# Patient Record
Sex: MALE | Race: WHITE | NOT HISPANIC OR LATINO | URBAN - METROPOLITAN AREA
[De-identification: names, ages, dates, MRNs, and addresses within clinical notes are randomized per-mention and may not be internally consistent; named-entity substitution may affect disease eponyms.]

---

## 2017-05-15 ENCOUNTER — OUTPATIENT (OUTPATIENT)
Dept: OUTPATIENT SERVICES | Facility: HOSPITAL | Age: 57
LOS: 1 days | Discharge: HOME | End: 2017-05-15

## 2017-05-22 ENCOUNTER — OUTPATIENT (OUTPATIENT)
Dept: OUTPATIENT SERVICES | Facility: HOSPITAL | Age: 57
LOS: 1 days | Discharge: HOME | End: 2017-05-22

## 2017-06-05 ENCOUNTER — INPATIENT (INPATIENT)
Facility: HOSPITAL | Age: 57
LOS: 1 days | Discharge: HOME | End: 2017-06-07
Attending: INTERNAL MEDICINE | Admitting: INTERNAL MEDICINE

## 2017-06-05 DIAGNOSIS — E78.5 HYPERLIPIDEMIA, UNSPECIFIED: ICD-10-CM

## 2017-06-05 DIAGNOSIS — E11.9 TYPE 2 DIABETES MELLITUS WITHOUT COMPLICATIONS: ICD-10-CM

## 2017-06-05 DIAGNOSIS — N18.9 CHRONIC KIDNEY DISEASE, UNSPECIFIED: ICD-10-CM

## 2017-06-05 DIAGNOSIS — I10 ESSENTIAL (PRIMARY) HYPERTENSION: ICD-10-CM

## 2017-06-28 DIAGNOSIS — I12.0 HYPERTENSIVE CHRONIC KIDNEY DISEASE WITH STAGE 5 CHRONIC KIDNEY DISEASE OR END STAGE RENAL DISEASE: ICD-10-CM

## 2017-06-28 DIAGNOSIS — E78.5 HYPERLIPIDEMIA, UNSPECIFIED: ICD-10-CM

## 2017-06-28 DIAGNOSIS — F17.200 NICOTINE DEPENDENCE, UNSPECIFIED, UNCOMPLICATED: ICD-10-CM

## 2017-06-28 DIAGNOSIS — I69.951 HEMIPLEGIA AND HEMIPARESIS FOLLOWING UNSPECIFIED CEREBROVASCULAR DISEASE AFFECTING RIGHT DOMINANT SIDE: ICD-10-CM

## 2017-06-28 DIAGNOSIS — R53.1 WEAKNESS: ICD-10-CM

## 2017-06-28 DIAGNOSIS — D64.9 ANEMIA, UNSPECIFIED: ICD-10-CM

## 2017-06-28 DIAGNOSIS — N18.5 CHRONIC KIDNEY DISEASE, STAGE 5: ICD-10-CM

## 2017-06-28 DIAGNOSIS — I44.1 ATRIOVENTRICULAR BLOCK, SECOND DEGREE: ICD-10-CM

## 2017-06-28 DIAGNOSIS — Z79.4 LONG TERM (CURRENT) USE OF INSULIN: ICD-10-CM

## 2017-06-28 DIAGNOSIS — E87.6 HYPOKALEMIA: ICD-10-CM

## 2017-06-28 DIAGNOSIS — Z88.0 ALLERGY STATUS TO PENICILLIN: ICD-10-CM

## 2017-06-28 DIAGNOSIS — Z88.2 ALLERGY STATUS TO SULFONAMIDES: ICD-10-CM

## 2017-06-28 DIAGNOSIS — I16.0 HYPERTENSIVE URGENCY: ICD-10-CM

## 2017-06-28 DIAGNOSIS — Z91.018 ALLERGY TO OTHER FOODS: ICD-10-CM

## 2017-06-28 DIAGNOSIS — E11.21 TYPE 2 DIABETES MELLITUS WITH DIABETIC NEPHROPATHY: ICD-10-CM

## 2017-07-16 ENCOUNTER — INPATIENT (INPATIENT)
Facility: HOSPITAL | Age: 57
LOS: 3 days | Discharge: HOME | End: 2017-07-20
Attending: INTERNAL MEDICINE

## 2017-07-16 DIAGNOSIS — N18.9 CHRONIC KIDNEY DISEASE, UNSPECIFIED: ICD-10-CM

## 2017-07-16 DIAGNOSIS — E78.5 HYPERLIPIDEMIA, UNSPECIFIED: ICD-10-CM

## 2017-07-16 DIAGNOSIS — E11.9 TYPE 2 DIABETES MELLITUS WITHOUT COMPLICATIONS: ICD-10-CM

## 2017-07-16 DIAGNOSIS — I10 ESSENTIAL (PRIMARY) HYPERTENSION: ICD-10-CM

## 2017-07-25 DIAGNOSIS — Z88.0 ALLERGY STATUS TO PENICILLIN: ICD-10-CM

## 2017-07-25 DIAGNOSIS — Z91.018 ALLERGY TO OTHER FOODS: ICD-10-CM

## 2017-07-25 DIAGNOSIS — Z79.4 LONG TERM (CURRENT) USE OF INSULIN: ICD-10-CM

## 2017-07-25 DIAGNOSIS — N19 UNSPECIFIED KIDNEY FAILURE: ICD-10-CM

## 2017-07-25 DIAGNOSIS — Z86.73 PERSONAL HISTORY OF TRANSIENT ISCHEMIC ATTACK (TIA), AND CEREBRAL INFARCTION WITHOUT RESIDUAL DEFICITS: ICD-10-CM

## 2017-07-25 DIAGNOSIS — I50.9 HEART FAILURE, UNSPECIFIED: ICD-10-CM

## 2017-07-25 DIAGNOSIS — Z88.2 ALLERGY STATUS TO SULFONAMIDES: ICD-10-CM

## 2017-07-25 DIAGNOSIS — I13.2 HYPERTENSIVE HEART AND CHRONIC KIDNEY DISEASE WITH HEART FAILURE AND WITH STAGE 5 CHRONIC KIDNEY DISEASE, OR END STAGE RENAL DISEASE: ICD-10-CM

## 2017-07-25 DIAGNOSIS — F17.210 NICOTINE DEPENDENCE, CIGARETTES, UNCOMPLICATED: ICD-10-CM

## 2017-07-25 DIAGNOSIS — E78.5 HYPERLIPIDEMIA, UNSPECIFIED: ICD-10-CM

## 2017-07-25 DIAGNOSIS — E11.40 TYPE 2 DIABETES MELLITUS WITH DIABETIC NEUROPATHY, UNSPECIFIED: ICD-10-CM

## 2017-07-25 DIAGNOSIS — Z99.2 DEPENDENCE ON RENAL DIALYSIS: ICD-10-CM

## 2017-07-25 DIAGNOSIS — E11.22 TYPE 2 DIABETES MELLITUS WITH DIABETIC CHRONIC KIDNEY DISEASE: ICD-10-CM

## 2017-07-25 DIAGNOSIS — N17.9 ACUTE KIDNEY FAILURE, UNSPECIFIED: ICD-10-CM

## 2017-07-25 DIAGNOSIS — K21.9 GASTRO-ESOPHAGEAL REFLUX DISEASE WITHOUT ESOPHAGITIS: ICD-10-CM

## 2017-07-25 DIAGNOSIS — N18.6 END STAGE RENAL DISEASE: ICD-10-CM

## 2017-08-10 DIAGNOSIS — Z01.818 ENCOUNTER FOR OTHER PREPROCEDURAL EXAMINATION: ICD-10-CM

## 2017-08-10 DIAGNOSIS — I10 ESSENTIAL (PRIMARY) HYPERTENSION: ICD-10-CM

## 2017-08-10 DIAGNOSIS — E78.00 PURE HYPERCHOLESTEROLEMIA, UNSPECIFIED: ICD-10-CM

## 2017-08-10 DIAGNOSIS — E11.9 TYPE 2 DIABETES MELLITUS WITHOUT COMPLICATIONS: ICD-10-CM

## 2017-08-17 ENCOUNTER — OUTPATIENT (OUTPATIENT)
Dept: OUTPATIENT SERVICES | Facility: HOSPITAL | Age: 57
LOS: 1 days | Discharge: HOME | End: 2017-08-17

## 2017-08-17 DIAGNOSIS — E78.5 HYPERLIPIDEMIA, UNSPECIFIED: ICD-10-CM

## 2017-08-17 DIAGNOSIS — N18.6 END STAGE RENAL DISEASE: ICD-10-CM

## 2017-08-17 DIAGNOSIS — N18.9 CHRONIC KIDNEY DISEASE, UNSPECIFIED: ICD-10-CM

## 2017-08-17 DIAGNOSIS — E11.9 TYPE 2 DIABETES MELLITUS WITHOUT COMPLICATIONS: ICD-10-CM

## 2017-08-17 DIAGNOSIS — I10 ESSENTIAL (PRIMARY) HYPERTENSION: ICD-10-CM

## 2017-08-17 DIAGNOSIS — Z01.818 ENCOUNTER FOR OTHER PREPROCEDURAL EXAMINATION: ICD-10-CM

## 2017-08-23 DIAGNOSIS — Z01.818 ENCOUNTER FOR OTHER PREPROCEDURAL EXAMINATION: ICD-10-CM

## 2017-08-23 DIAGNOSIS — N18.6 END STAGE RENAL DISEASE: ICD-10-CM

## 2017-08-28 ENCOUNTER — OUTPATIENT (OUTPATIENT)
Dept: OUTPATIENT SERVICES | Facility: HOSPITAL | Age: 57
LOS: 1 days | Discharge: HOME | End: 2017-08-28

## 2017-08-28 DIAGNOSIS — E11.9 TYPE 2 DIABETES MELLITUS WITHOUT COMPLICATIONS: ICD-10-CM

## 2017-08-28 DIAGNOSIS — R06.02 SHORTNESS OF BREATH: ICD-10-CM

## 2017-08-28 DIAGNOSIS — N18.9 CHRONIC KIDNEY DISEASE, UNSPECIFIED: ICD-10-CM

## 2017-08-28 DIAGNOSIS — I10 ESSENTIAL (PRIMARY) HYPERTENSION: ICD-10-CM

## 2017-08-28 DIAGNOSIS — E78.5 HYPERLIPIDEMIA, UNSPECIFIED: ICD-10-CM

## 2017-08-29 ENCOUNTER — INPATIENT (INPATIENT)
Facility: HOSPITAL | Age: 57
LOS: 0 days | Discharge: HOME | End: 2017-08-29
Attending: SURGERY

## 2017-08-29 DIAGNOSIS — E78.5 HYPERLIPIDEMIA, UNSPECIFIED: ICD-10-CM

## 2017-08-29 DIAGNOSIS — N18.9 CHRONIC KIDNEY DISEASE, UNSPECIFIED: ICD-10-CM

## 2017-08-29 DIAGNOSIS — I10 ESSENTIAL (PRIMARY) HYPERTENSION: ICD-10-CM

## 2017-08-29 DIAGNOSIS — E11.9 TYPE 2 DIABETES MELLITUS WITHOUT COMPLICATIONS: ICD-10-CM

## 2017-09-01 DIAGNOSIS — Z88.0 ALLERGY STATUS TO PENICILLIN: ICD-10-CM

## 2017-09-01 DIAGNOSIS — E78.5 HYPERLIPIDEMIA, UNSPECIFIED: ICD-10-CM

## 2017-09-01 DIAGNOSIS — Z99.2 DEPENDENCE ON RENAL DIALYSIS: ICD-10-CM

## 2017-09-01 DIAGNOSIS — I12.0 HYPERTENSIVE CHRONIC KIDNEY DISEASE WITH STAGE 5 CHRONIC KIDNEY DISEASE OR END STAGE RENAL DISEASE: ICD-10-CM

## 2017-09-01 DIAGNOSIS — Z87.891 PERSONAL HISTORY OF NICOTINE DEPENDENCE: ICD-10-CM

## 2017-09-01 DIAGNOSIS — Z86.73 PERSONAL HISTORY OF TRANSIENT ISCHEMIC ATTACK (TIA), AND CEREBRAL INFARCTION WITHOUT RESIDUAL DEFICITS: ICD-10-CM

## 2017-09-01 DIAGNOSIS — N18.6 END STAGE RENAL DISEASE: ICD-10-CM

## 2017-09-01 DIAGNOSIS — Z79.4 LONG TERM (CURRENT) USE OF INSULIN: ICD-10-CM

## 2017-09-01 DIAGNOSIS — Z88.2 ALLERGY STATUS TO SULFONAMIDES: ICD-10-CM

## 2017-09-01 DIAGNOSIS — E11.22 TYPE 2 DIABETES MELLITUS WITH DIABETIC CHRONIC KIDNEY DISEASE: ICD-10-CM

## 2017-09-15 ENCOUNTER — OUTPATIENT (OUTPATIENT)
Dept: OUTPATIENT SERVICES | Facility: HOSPITAL | Age: 57
LOS: 1 days | Discharge: HOME | End: 2017-09-15

## 2017-09-15 DIAGNOSIS — E78.5 HYPERLIPIDEMIA, UNSPECIFIED: ICD-10-CM

## 2017-09-15 DIAGNOSIS — I10 ESSENTIAL (PRIMARY) HYPERTENSION: ICD-10-CM

## 2017-09-15 DIAGNOSIS — E11.9 TYPE 2 DIABETES MELLITUS WITHOUT COMPLICATIONS: ICD-10-CM

## 2017-09-15 DIAGNOSIS — N18.9 CHRONIC KIDNEY DISEASE, UNSPECIFIED: ICD-10-CM

## 2017-09-15 DIAGNOSIS — N18.6 END STAGE RENAL DISEASE: ICD-10-CM

## 2017-09-20 ENCOUNTER — OUTPATIENT (OUTPATIENT)
Dept: OUTPATIENT SERVICES | Facility: HOSPITAL | Age: 57
LOS: 1 days | Discharge: HOME | End: 2017-09-20

## 2017-09-20 DIAGNOSIS — I10 ESSENTIAL (PRIMARY) HYPERTENSION: ICD-10-CM

## 2017-09-20 DIAGNOSIS — N18.9 CHRONIC KIDNEY DISEASE, UNSPECIFIED: ICD-10-CM

## 2017-09-20 DIAGNOSIS — E11.9 TYPE 2 DIABETES MELLITUS WITHOUT COMPLICATIONS: ICD-10-CM

## 2017-09-20 DIAGNOSIS — N18.6 END STAGE RENAL DISEASE: ICD-10-CM

## 2017-09-20 DIAGNOSIS — E78.5 HYPERLIPIDEMIA, UNSPECIFIED: ICD-10-CM

## 2017-09-22 ENCOUNTER — OUTPATIENT (OUTPATIENT)
Dept: OUTPATIENT SERVICES | Facility: HOSPITAL | Age: 57
LOS: 1 days | Discharge: HOME | End: 2017-09-22

## 2017-09-22 DIAGNOSIS — I10 ESSENTIAL (PRIMARY) HYPERTENSION: ICD-10-CM

## 2017-09-22 DIAGNOSIS — E78.5 HYPERLIPIDEMIA, UNSPECIFIED: ICD-10-CM

## 2017-09-22 DIAGNOSIS — E11.9 TYPE 2 DIABETES MELLITUS WITHOUT COMPLICATIONS: ICD-10-CM

## 2017-09-22 DIAGNOSIS — N18.9 CHRONIC KIDNEY DISEASE, UNSPECIFIED: ICD-10-CM

## 2017-09-22 DIAGNOSIS — N18.6 END STAGE RENAL DISEASE: ICD-10-CM

## 2017-09-25 ENCOUNTER — OUTPATIENT (OUTPATIENT)
Dept: OUTPATIENT SERVICES | Facility: HOSPITAL | Age: 57
LOS: 1 days | Discharge: HOME | End: 2017-09-25

## 2017-09-25 DIAGNOSIS — E11.9 TYPE 2 DIABETES MELLITUS WITHOUT COMPLICATIONS: ICD-10-CM

## 2017-09-25 DIAGNOSIS — N18.9 CHRONIC KIDNEY DISEASE, UNSPECIFIED: ICD-10-CM

## 2017-09-25 DIAGNOSIS — N18.6 END STAGE RENAL DISEASE: ICD-10-CM

## 2017-09-25 DIAGNOSIS — E78.5 HYPERLIPIDEMIA, UNSPECIFIED: ICD-10-CM

## 2017-09-25 DIAGNOSIS — I10 ESSENTIAL (PRIMARY) HYPERTENSION: ICD-10-CM

## 2017-09-27 ENCOUNTER — OUTPATIENT (OUTPATIENT)
Dept: OUTPATIENT SERVICES | Facility: HOSPITAL | Age: 57
LOS: 1 days | Discharge: HOME | End: 2017-09-27

## 2017-09-27 DIAGNOSIS — N18.6 END STAGE RENAL DISEASE: ICD-10-CM

## 2017-09-27 DIAGNOSIS — I10 ESSENTIAL (PRIMARY) HYPERTENSION: ICD-10-CM

## 2017-09-27 DIAGNOSIS — E78.5 HYPERLIPIDEMIA, UNSPECIFIED: ICD-10-CM

## 2017-09-27 DIAGNOSIS — E11.9 TYPE 2 DIABETES MELLITUS WITHOUT COMPLICATIONS: ICD-10-CM

## 2017-09-27 DIAGNOSIS — N18.9 CHRONIC KIDNEY DISEASE, UNSPECIFIED: ICD-10-CM

## 2017-10-02 ENCOUNTER — OUTPATIENT (OUTPATIENT)
Dept: OUTPATIENT SERVICES | Facility: HOSPITAL | Age: 57
LOS: 1 days | Discharge: HOME | End: 2017-10-02

## 2017-10-02 DIAGNOSIS — N18.9 CHRONIC KIDNEY DISEASE, UNSPECIFIED: ICD-10-CM

## 2017-10-02 DIAGNOSIS — E78.5 HYPERLIPIDEMIA, UNSPECIFIED: ICD-10-CM

## 2017-10-02 DIAGNOSIS — E11.9 TYPE 2 DIABETES MELLITUS WITHOUT COMPLICATIONS: ICD-10-CM

## 2017-10-02 DIAGNOSIS — N18.6 END STAGE RENAL DISEASE: ICD-10-CM

## 2017-10-02 DIAGNOSIS — I10 ESSENTIAL (PRIMARY) HYPERTENSION: ICD-10-CM

## 2017-10-06 ENCOUNTER — OUTPATIENT (OUTPATIENT)
Dept: OUTPATIENT SERVICES | Facility: HOSPITAL | Age: 57
LOS: 1 days | Discharge: HOME | End: 2017-10-06

## 2017-10-06 DIAGNOSIS — E11.9 TYPE 2 DIABETES MELLITUS WITHOUT COMPLICATIONS: ICD-10-CM

## 2017-10-06 DIAGNOSIS — N18.9 CHRONIC KIDNEY DISEASE, UNSPECIFIED: ICD-10-CM

## 2017-10-06 DIAGNOSIS — I10 ESSENTIAL (PRIMARY) HYPERTENSION: ICD-10-CM

## 2017-10-06 DIAGNOSIS — E78.5 HYPERLIPIDEMIA, UNSPECIFIED: ICD-10-CM

## 2017-10-06 DIAGNOSIS — N18.6 END STAGE RENAL DISEASE: ICD-10-CM

## 2017-10-10 ENCOUNTER — OUTPATIENT (OUTPATIENT)
Dept: OUTPATIENT SERVICES | Facility: HOSPITAL | Age: 57
LOS: 1 days | Discharge: HOME | End: 2017-10-10

## 2017-10-10 DIAGNOSIS — N18.9 CHRONIC KIDNEY DISEASE, UNSPECIFIED: ICD-10-CM

## 2017-10-10 DIAGNOSIS — I10 ESSENTIAL (PRIMARY) HYPERTENSION: ICD-10-CM

## 2017-10-10 DIAGNOSIS — E11.9 TYPE 2 DIABETES MELLITUS WITHOUT COMPLICATIONS: ICD-10-CM

## 2017-10-10 DIAGNOSIS — E78.5 HYPERLIPIDEMIA, UNSPECIFIED: ICD-10-CM

## 2017-10-10 DIAGNOSIS — N18.6 END STAGE RENAL DISEASE: ICD-10-CM

## 2017-10-13 ENCOUNTER — OUTPATIENT (OUTPATIENT)
Dept: OUTPATIENT SERVICES | Facility: HOSPITAL | Age: 57
LOS: 1 days | Discharge: HOME | End: 2017-10-13

## 2017-10-13 DIAGNOSIS — E11.9 TYPE 2 DIABETES MELLITUS WITHOUT COMPLICATIONS: ICD-10-CM

## 2017-10-13 DIAGNOSIS — N18.6 END STAGE RENAL DISEASE: ICD-10-CM

## 2017-10-13 DIAGNOSIS — I10 ESSENTIAL (PRIMARY) HYPERTENSION: ICD-10-CM

## 2017-10-13 DIAGNOSIS — E78.5 HYPERLIPIDEMIA, UNSPECIFIED: ICD-10-CM

## 2017-10-13 DIAGNOSIS — N18.9 CHRONIC KIDNEY DISEASE, UNSPECIFIED: ICD-10-CM

## 2017-10-16 ENCOUNTER — OUTPATIENT (OUTPATIENT)
Dept: OUTPATIENT SERVICES | Facility: HOSPITAL | Age: 57
LOS: 1 days | Discharge: HOME | End: 2017-10-16

## 2017-10-16 DIAGNOSIS — N18.9 CHRONIC KIDNEY DISEASE, UNSPECIFIED: ICD-10-CM

## 2017-10-16 DIAGNOSIS — E11.9 TYPE 2 DIABETES MELLITUS WITHOUT COMPLICATIONS: ICD-10-CM

## 2017-10-16 DIAGNOSIS — N18.6 END STAGE RENAL DISEASE: ICD-10-CM

## 2017-10-16 DIAGNOSIS — E78.5 HYPERLIPIDEMIA, UNSPECIFIED: ICD-10-CM

## 2017-10-16 DIAGNOSIS — I10 ESSENTIAL (PRIMARY) HYPERTENSION: ICD-10-CM

## 2017-10-23 ENCOUNTER — OUTPATIENT (OUTPATIENT)
Dept: OUTPATIENT SERVICES | Facility: HOSPITAL | Age: 57
LOS: 1 days | Discharge: HOME | End: 2017-10-23

## 2017-10-23 DIAGNOSIS — I10 ESSENTIAL (PRIMARY) HYPERTENSION: ICD-10-CM

## 2017-10-23 DIAGNOSIS — N18.9 CHRONIC KIDNEY DISEASE, UNSPECIFIED: ICD-10-CM

## 2017-10-23 DIAGNOSIS — E78.5 HYPERLIPIDEMIA, UNSPECIFIED: ICD-10-CM

## 2017-10-23 DIAGNOSIS — E11.9 TYPE 2 DIABETES MELLITUS WITHOUT COMPLICATIONS: ICD-10-CM

## 2017-10-23 DIAGNOSIS — N18.6 END STAGE RENAL DISEASE: ICD-10-CM

## 2017-10-25 ENCOUNTER — OUTPATIENT (OUTPATIENT)
Dept: OUTPATIENT SERVICES | Facility: HOSPITAL | Age: 57
LOS: 1 days | Discharge: HOME | End: 2017-10-25

## 2017-10-25 DIAGNOSIS — E11.9 TYPE 2 DIABETES MELLITUS WITHOUT COMPLICATIONS: ICD-10-CM

## 2017-10-25 DIAGNOSIS — I10 ESSENTIAL (PRIMARY) HYPERTENSION: ICD-10-CM

## 2017-10-25 DIAGNOSIS — I25.10 ATHEROSCLEROTIC HEART DISEASE OF NATIVE CORONARY ARTERY WITHOUT ANGINA PECTORIS: ICD-10-CM

## 2017-10-25 DIAGNOSIS — Z01.818 ENCOUNTER FOR OTHER PREPROCEDURAL EXAMINATION: ICD-10-CM

## 2017-10-25 DIAGNOSIS — I49.9 CARDIAC ARRHYTHMIA, UNSPECIFIED: ICD-10-CM

## 2017-10-25 DIAGNOSIS — E78.00 PURE HYPERCHOLESTEROLEMIA, UNSPECIFIED: ICD-10-CM

## 2017-10-25 DIAGNOSIS — N18.9 CHRONIC KIDNEY DISEASE, UNSPECIFIED: ICD-10-CM

## 2017-10-25 DIAGNOSIS — E78.5 HYPERLIPIDEMIA, UNSPECIFIED: ICD-10-CM

## 2017-10-31 ENCOUNTER — OUTPATIENT (OUTPATIENT)
Dept: OUTPATIENT SERVICES | Facility: HOSPITAL | Age: 57
LOS: 1 days | Discharge: HOME | End: 2017-10-31

## 2017-10-31 DIAGNOSIS — I10 ESSENTIAL (PRIMARY) HYPERTENSION: ICD-10-CM

## 2017-10-31 DIAGNOSIS — E78.5 HYPERLIPIDEMIA, UNSPECIFIED: ICD-10-CM

## 2017-10-31 DIAGNOSIS — E11.9 TYPE 2 DIABETES MELLITUS WITHOUT COMPLICATIONS: ICD-10-CM

## 2017-10-31 DIAGNOSIS — N18.9 CHRONIC KIDNEY DISEASE, UNSPECIFIED: ICD-10-CM

## 2017-11-13 ENCOUNTER — OUTPATIENT (OUTPATIENT)
Dept: OUTPATIENT SERVICES | Facility: HOSPITAL | Age: 57
LOS: 1 days | Discharge: HOME | End: 2017-11-13

## 2017-11-13 DIAGNOSIS — N18.9 CHRONIC KIDNEY DISEASE, UNSPECIFIED: ICD-10-CM

## 2017-11-13 DIAGNOSIS — E11.9 TYPE 2 DIABETES MELLITUS WITHOUT COMPLICATIONS: ICD-10-CM

## 2017-11-13 DIAGNOSIS — N18.6 END STAGE RENAL DISEASE: ICD-10-CM

## 2017-11-13 DIAGNOSIS — I10 ESSENTIAL (PRIMARY) HYPERTENSION: ICD-10-CM

## 2017-11-13 DIAGNOSIS — E78.5 HYPERLIPIDEMIA, UNSPECIFIED: ICD-10-CM

## 2017-11-14 DIAGNOSIS — I48.92 UNSPECIFIED ATRIAL FLUTTER: ICD-10-CM

## 2017-11-14 DIAGNOSIS — T82.898A OTHER SPECIFIED COMPLICATION OF VASCULAR PROSTHETIC DEVICES, IMPLANTS AND GRAFTS, INITIAL ENCOUNTER: ICD-10-CM

## 2017-11-14 DIAGNOSIS — E78.00 PURE HYPERCHOLESTEROLEMIA, UNSPECIFIED: ICD-10-CM
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
## 2017-11-14 DIAGNOSIS — Z88.0 ALLERGY STATUS TO PENICILLIN: ICD-10-CM
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
## 2017-11-14 DIAGNOSIS — Z79.01 LONG TERM (CURRENT) USE OF ANTICOAGULANTS: ICD-10-CM

## 2017-11-14 DIAGNOSIS — I12.0 HYPERTENSIVE CHRONIC KIDNEY DISEASE WITH STAGE 5 CHRONIC KIDNEY DISEASE OR END STAGE RENAL DISEASE: ICD-10-CM

## 2017-11-14 DIAGNOSIS — N18.6 END STAGE RENAL DISEASE: ICD-10-CM

## 2017-11-14 DIAGNOSIS — Z99.2 DEPENDENCE ON RENAL DIALYSIS: ICD-10-CM

## 2017-11-14 DIAGNOSIS — E11.9 TYPE 2 DIABETES MELLITUS WITHOUT COMPLICATIONS: ICD-10-CM

## 2017-12-08 ENCOUNTER — OUTPATIENT (OUTPATIENT)
Dept: OUTPATIENT SERVICES | Facility: HOSPITAL | Age: 57
LOS: 1 days | Discharge: HOME | End: 2017-12-08

## 2017-12-08 DIAGNOSIS — Z00.00 ENCOUNTER FOR GENERAL ADULT MEDICAL EXAMINATION WITHOUT ABNORMAL FINDINGS: ICD-10-CM

## 2017-12-08 DIAGNOSIS — E78.5 HYPERLIPIDEMIA, UNSPECIFIED: ICD-10-CM

## 2017-12-08 DIAGNOSIS — N18.9 CHRONIC KIDNEY DISEASE, UNSPECIFIED: ICD-10-CM

## 2017-12-08 DIAGNOSIS — E11.9 TYPE 2 DIABETES MELLITUS WITHOUT COMPLICATIONS: ICD-10-CM

## 2017-12-08 DIAGNOSIS — E06.3 AUTOIMMUNE THYROIDITIS: ICD-10-CM

## 2017-12-08 DIAGNOSIS — E78.2 MIXED HYPERLIPIDEMIA: ICD-10-CM

## 2017-12-08 DIAGNOSIS — E11.65 TYPE 2 DIABETES MELLITUS WITH HYPERGLYCEMIA: ICD-10-CM

## 2017-12-08 DIAGNOSIS — E53.8 DEFICIENCY OF OTHER SPECIFIED B GROUP VITAMINS: ICD-10-CM

## 2017-12-08 DIAGNOSIS — E66.09 OTHER OBESITY DUE TO EXCESS CALORIES: ICD-10-CM

## 2017-12-08 DIAGNOSIS — I10 ESSENTIAL (PRIMARY) HYPERTENSION: ICD-10-CM

## 2017-12-08 DIAGNOSIS — N18.6 END STAGE RENAL DISEASE: ICD-10-CM

## 2017-12-08 DIAGNOSIS — E55.9 VITAMIN D DEFICIENCY, UNSPECIFIED: ICD-10-CM

## 2017-12-08 DIAGNOSIS — D53.9 NUTRITIONAL ANEMIA, UNSPECIFIED: ICD-10-CM

## 2017-12-11 ENCOUNTER — OUTPATIENT (OUTPATIENT)
Dept: OUTPATIENT SERVICES | Facility: HOSPITAL | Age: 57
LOS: 1 days | Discharge: HOME | End: 2017-12-11

## 2017-12-11 DIAGNOSIS — N18.6 END STAGE RENAL DISEASE: ICD-10-CM

## 2017-12-11 DIAGNOSIS — E11.9 TYPE 2 DIABETES MELLITUS WITHOUT COMPLICATIONS: ICD-10-CM

## 2017-12-11 DIAGNOSIS — I10 ESSENTIAL (PRIMARY) HYPERTENSION: ICD-10-CM

## 2017-12-11 DIAGNOSIS — E06.3 AUTOIMMUNE THYROIDITIS: ICD-10-CM

## 2017-12-11 DIAGNOSIS — Z00.00 ENCOUNTER FOR GENERAL ADULT MEDICAL EXAMINATION WITHOUT ABNORMAL FINDINGS: ICD-10-CM

## 2017-12-11 DIAGNOSIS — E66.09 OTHER OBESITY DUE TO EXCESS CALORIES: ICD-10-CM

## 2017-12-11 DIAGNOSIS — E78.2 MIXED HYPERLIPIDEMIA: ICD-10-CM

## 2017-12-11 DIAGNOSIS — E11.65 TYPE 2 DIABETES MELLITUS WITH HYPERGLYCEMIA: ICD-10-CM

## 2017-12-11 DIAGNOSIS — E55.9 VITAMIN D DEFICIENCY, UNSPECIFIED: ICD-10-CM

## 2017-12-11 DIAGNOSIS — N18.9 CHRONIC KIDNEY DISEASE, UNSPECIFIED: ICD-10-CM

## 2017-12-11 DIAGNOSIS — D53.9 NUTRITIONAL ANEMIA, UNSPECIFIED: ICD-10-CM

## 2017-12-11 DIAGNOSIS — E53.8 DEFICIENCY OF OTHER SPECIFIED B GROUP VITAMINS: ICD-10-CM

## 2017-12-11 DIAGNOSIS — E78.5 HYPERLIPIDEMIA, UNSPECIFIED: ICD-10-CM

## 2017-12-19 ENCOUNTER — OUTPATIENT (OUTPATIENT)
Dept: OUTPATIENT SERVICES | Facility: HOSPITAL | Age: 57
LOS: 1 days | Discharge: HOME | End: 2017-12-19

## 2017-12-19 DIAGNOSIS — E11.9 TYPE 2 DIABETES MELLITUS WITHOUT COMPLICATIONS: ICD-10-CM

## 2017-12-19 DIAGNOSIS — Z01.818 ENCOUNTER FOR OTHER PREPROCEDURAL EXAMINATION: ICD-10-CM

## 2017-12-19 DIAGNOSIS — N18.9 CHRONIC KIDNEY DISEASE, UNSPECIFIED: ICD-10-CM

## 2017-12-19 DIAGNOSIS — I10 ESSENTIAL (PRIMARY) HYPERTENSION: ICD-10-CM

## 2017-12-19 DIAGNOSIS — E78.5 HYPERLIPIDEMIA, UNSPECIFIED: ICD-10-CM

## 2017-12-20 DIAGNOSIS — I63.9 CEREBRAL INFARCTION, UNSPECIFIED: ICD-10-CM

## 2017-12-20 DIAGNOSIS — I49.9 CARDIAC ARRHYTHMIA, UNSPECIFIED: ICD-10-CM

## 2017-12-20 DIAGNOSIS — E11.9 TYPE 2 DIABETES MELLITUS WITHOUT COMPLICATIONS: ICD-10-CM

## 2017-12-20 DIAGNOSIS — I10 ESSENTIAL (PRIMARY) HYPERTENSION: ICD-10-CM

## 2017-12-26 ENCOUNTER — OUTPATIENT (OUTPATIENT)
Dept: OUTPATIENT SERVICES | Facility: HOSPITAL | Age: 57
LOS: 1 days | Discharge: HOME | End: 2017-12-26

## 2017-12-26 DIAGNOSIS — N18.9 CHRONIC KIDNEY DISEASE, UNSPECIFIED: ICD-10-CM

## 2017-12-26 DIAGNOSIS — E11.9 TYPE 2 DIABETES MELLITUS WITHOUT COMPLICATIONS: ICD-10-CM

## 2017-12-26 DIAGNOSIS — I10 ESSENTIAL (PRIMARY) HYPERTENSION: ICD-10-CM

## 2017-12-26 DIAGNOSIS — E78.5 HYPERLIPIDEMIA, UNSPECIFIED: ICD-10-CM

## 2017-12-29 DIAGNOSIS — Z79.4 LONG TERM (CURRENT) USE OF INSULIN: ICD-10-CM

## 2017-12-29 DIAGNOSIS — Z88.0 ALLERGY STATUS TO PENICILLIN: ICD-10-CM

## 2017-12-29 DIAGNOSIS — E11.9 TYPE 2 DIABETES MELLITUS WITHOUT COMPLICATIONS: ICD-10-CM

## 2017-12-29 DIAGNOSIS — N18.6 END STAGE RENAL DISEASE: ICD-10-CM

## 2017-12-29 DIAGNOSIS — Z99.2 DEPENDENCE ON RENAL DIALYSIS: ICD-10-CM

## 2017-12-29 DIAGNOSIS — Z45.2 ENCOUNTER FOR ADJUSTMENT AND MANAGEMENT OF VASCULAR ACCESS DEVICE: ICD-10-CM

## 2017-12-29 DIAGNOSIS — I12.0 HYPERTENSIVE CHRONIC KIDNEY DISEASE WITH STAGE 5 CHRONIC KIDNEY DISEASE OR END STAGE RENAL DISEASE: ICD-10-CM

## 2018-01-18 DIAGNOSIS — E78.00 PURE HYPERCHOLESTEROLEMIA, UNSPECIFIED: ICD-10-CM

## 2018-01-18 DIAGNOSIS — I12.9 HYPERTENSIVE CHRONIC KIDNEY DISEASE WITH STAGE 1 THROUGH STAGE 4 CHRONIC KIDNEY DISEASE, OR UNSPECIFIED CHRONIC KIDNEY DISEASE: ICD-10-CM

## 2018-01-18 DIAGNOSIS — R80.9 PROTEINURIA, UNSPECIFIED: ICD-10-CM

## 2018-01-18 DIAGNOSIS — Z88.0 ALLERGY STATUS TO PENICILLIN: ICD-10-CM

## 2018-01-18 DIAGNOSIS — E13.22 OTHER SPECIFIED DIABETES MELLITUS WITH DIABETIC CHRONIC KIDNEY DISEASE: ICD-10-CM

## 2018-01-18 DIAGNOSIS — N18.3 CHRONIC KIDNEY DISEASE, STAGE 3 (MODERATE): ICD-10-CM

## 2018-01-18 DIAGNOSIS — I10 ESSENTIAL (PRIMARY) HYPERTENSION: ICD-10-CM

## 2018-02-06 ENCOUNTER — OUTPATIENT (OUTPATIENT)
Dept: OUTPATIENT SERVICES | Facility: HOSPITAL | Age: 58
LOS: 1 days | Discharge: HOME | End: 2018-02-06

## 2018-02-09 DIAGNOSIS — N18.6 END STAGE RENAL DISEASE: ICD-10-CM

## 2018-02-14 ENCOUNTER — EMERGENCY (EMERGENCY)
Facility: HOSPITAL | Age: 58
LOS: 0 days | Discharge: HOME | End: 2018-02-14
Attending: EMERGENCY MEDICINE

## 2018-02-14 VITALS
HEIGHT: 70 IN | SYSTOLIC BLOOD PRESSURE: 157 MMHG | WEIGHT: 214.95 LBS | RESPIRATION RATE: 18 BRPM | DIASTOLIC BLOOD PRESSURE: 74 MMHG | TEMPERATURE: 97 F | HEART RATE: 74 BPM | OXYGEN SATURATION: 98 %

## 2018-02-14 DIAGNOSIS — E11.9 TYPE 2 DIABETES MELLITUS WITHOUT COMPLICATIONS: ICD-10-CM
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
## 2018-02-14 DIAGNOSIS — Z88.0 ALLERGY STATUS TO PENICILLIN: ICD-10-CM

## 2018-02-14 DIAGNOSIS — Z99.2 DEPENDENCE ON RENAL DIALYSIS: ICD-10-CM

## 2018-02-14 DIAGNOSIS — Z79.01 LONG TERM (CURRENT) USE OF ANTICOAGULANTS: ICD-10-CM

## 2018-02-14 DIAGNOSIS — T82.838A HEMORRHAGE DUE TO VASCULAR PROSTHETIC DEVICES, IMPLANTS AND GRAFTS, INITIAL ENCOUNTER: ICD-10-CM

## 2018-02-14 DIAGNOSIS — I10 ESSENTIAL (PRIMARY) HYPERTENSION: ICD-10-CM
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
## 2018-02-14 DIAGNOSIS — I77.0 ARTERIOVENOUS FISTULA, ACQUIRED: Chronic | ICD-10-CM

## 2018-02-14 DIAGNOSIS — Z79.4 LONG TERM (CURRENT) USE OF INSULIN: ICD-10-CM
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
## 2018-02-14 DIAGNOSIS — E78.00 PURE HYPERCHOLESTEROLEMIA, UNSPECIFIED: ICD-10-CM

## 2018-02-14 LAB
BASOPHILS # BLD AUTO: 0.09 K/UL — SIGNIFICANT CHANGE UP (ref 0–0.2)
BASOPHILS NFR BLD AUTO: 1.5 % — HIGH (ref 0–1)
EOSINOPHIL # BLD AUTO: 0.1 K/UL — SIGNIFICANT CHANGE UP (ref 0–0.7)
EOSINOPHIL NFR BLD AUTO: 1.6 % — SIGNIFICANT CHANGE UP (ref 0–8)
HCT VFR BLD CALC: 36.5 % — LOW (ref 42–52)
HGB BLD-MCNC: 11.8 G/DL — LOW (ref 14–18)
IMM GRANULOCYTES NFR BLD AUTO: 0.8 % — HIGH (ref 0.1–0.3)
INR BLD: 2.26 RATIO — HIGH (ref 0.65–1.3)
LYMPHOCYTES # BLD AUTO: 1.73 K/UL — SIGNIFICANT CHANGE UP (ref 1.2–3.4)
LYMPHOCYTES # BLD AUTO: 28.5 % — SIGNIFICANT CHANGE UP (ref 20.5–51.1)
MCHC RBC-ENTMCNC: 27.4 PG — SIGNIFICANT CHANGE UP (ref 27–31)
MCHC RBC-ENTMCNC: 32.3 G/DL — SIGNIFICANT CHANGE UP (ref 32–37)
MCV RBC AUTO: 84.7 FL — SIGNIFICANT CHANGE UP (ref 80–94)
MONOCYTES # BLD AUTO: 0.32 K/UL — SIGNIFICANT CHANGE UP (ref 0.1–0.6)
MONOCYTES NFR BLD AUTO: 5.3 % — SIGNIFICANT CHANGE UP (ref 1.7–9.3)
NEUTROPHILS # BLD AUTO: 3.78 K/UL — SIGNIFICANT CHANGE UP (ref 1.4–6.5)
NEUTROPHILS NFR BLD AUTO: 62.3 % — SIGNIFICANT CHANGE UP (ref 42.2–75.2)
NRBC # BLD: 0 /100 WBCS — SIGNIFICANT CHANGE UP (ref 0–0)
PLATELET # BLD AUTO: 263 K/UL — SIGNIFICANT CHANGE UP (ref 130–400)
PROTHROM AB SERPL-ACNC: 25.5 SEC — HIGH (ref 9.95–12.87)
RBC # BLD: 4.31 M/UL — LOW (ref 4.7–6.1)
RBC # FLD: 15.7 % — HIGH (ref 11.5–14.5)
WBC # BLD: 6.07 K/UL — SIGNIFICANT CHANGE UP (ref 4.8–10.8)
WBC # FLD AUTO: 6.07 K/UL — SIGNIFICANT CHANGE UP (ref 4.8–10.8)

## 2018-02-14 NOTE — ED PROVIDER NOTE - ATTENDING CONTRIBUTION TO CARE
Pt is a 56yo male who comes in for oozing blood from L fistula for 15 min after dialysis.  Pt is on Coumadin.  Unknown last INR.  No symptoms.  No lightheadedness.    Exam: pink conjunctivae, L fistula site normal without bleeding, cap refill <2s  Imp: post-dialysis bleeding  Plan: INR check, dc home

## 2018-02-14 NOTE — ED PROVIDER NOTE - CARE PLAN
Principal Discharge DX:	Bleeding from dialysis shunt, initial encounter  Secondary Diagnosis:	On continuous oral anticoagulation

## 2018-02-14 NOTE — ED ADULT NURSE NOTE - PMH
Diabetes mellitus, type 2    Dialysis patient  Mon, wednesday, friday  High blood cholesterol    Hypertension

## 2018-02-14 NOTE — ED PROVIDER NOTE - OBJECTIVE STATEMENT
Pt comes in stating that he had dialysis today and states that he began to bleed from the fistula. Pt is on Coumadin. Pt states that the bleeding has since stopped. Pt denies any weakness, CP, SOB.

## 2018-03-13 ENCOUNTER — INPATIENT (INPATIENT)
Facility: HOSPITAL | Age: 58
LOS: 2 days | Discharge: HOME | End: 2018-03-16
Attending: INTERNAL MEDICINE

## 2018-03-13 VITALS
DIASTOLIC BLOOD PRESSURE: 67 MMHG | TEMPERATURE: 97 F | SYSTOLIC BLOOD PRESSURE: 118 MMHG | HEART RATE: 108 BPM | OXYGEN SATURATION: 98 % | HEIGHT: 70 IN | WEIGHT: 216.05 LBS | RESPIRATION RATE: 18 BRPM

## 2018-03-13 DIAGNOSIS — I77.0 ARTERIOVENOUS FISTULA, ACQUIRED: Chronic | ICD-10-CM

## 2018-03-13 LAB
ALBUMIN SERPL ELPH-MCNC: 3.5 G/DL — SIGNIFICANT CHANGE UP (ref 3–5.5)
ALLERGY+IMMUNOLOGY DIAG STUDY NOTE: SIGNIFICANT CHANGE UP
ALP SERPL-CCNC: 68 U/L — SIGNIFICANT CHANGE UP (ref 30–115)
ALT FLD-CCNC: 698 U/L — HIGH (ref 0–41)
ANION GAP SERPL CALC-SCNC: 12 MMOL/L — SIGNIFICANT CHANGE UP (ref 7–14)
APTT BLD: 31.4 SEC — SIGNIFICANT CHANGE UP (ref 27–39.2)
AST SERPL-CCNC: 741 U/L — HIGH (ref 0–41)
BILIRUB SERPL-MCNC: 0.9 MG/DL — SIGNIFICANT CHANGE UP (ref 0.2–1.2)
BUN SERPL-MCNC: 39 MG/DL — HIGH (ref 10–20)
CALCIUM SERPL-MCNC: 9.2 MG/DL — SIGNIFICANT CHANGE UP (ref 8.5–10.1)
CHLORIDE SERPL-SCNC: 97 MMOL/L — LOW (ref 98–110)
CK MB BLD-MCNC: 1 % — SIGNIFICANT CHANGE UP (ref 0–4)
CK MB CFR SERPL CALC: 14.4 NG/ML — HIGH (ref 0.6–6.3)
CK MB CFR SERPL CALC: 16.5 NG/ML — HIGH (ref 0.6–6.3)
CK SERPL-CCNC: 1692 U/L — HIGH (ref 0–225)
CK SERPL-CCNC: 2207 U/L — HIGH (ref 0–225)
CO2 SERPL-SCNC: 26 MMOL/L — SIGNIFICANT CHANGE UP (ref 17–32)
CREAT SERPL-MCNC: 5.1 MG/DL — CRITICAL HIGH (ref 0.7–1.5)
GAS PNL BLDV: SIGNIFICANT CHANGE UP
GLUCOSE SERPL-MCNC: 134 MG/DL — HIGH (ref 70–110)
HCT VFR BLD CALC: 24.6 % — LOW (ref 42–52)
HCT VFR BLD CALC: 24.9 % — LOW (ref 42–52)
HGB BLD-MCNC: 7.7 G/DL — LOW (ref 14–18)
HGB BLD-MCNC: 7.8 G/DL — LOW (ref 14–18)
INR BLD: 2.09 RATIO — HIGH (ref 0.65–1.3)
MCHC RBC-ENTMCNC: 29.4 PG — SIGNIFICANT CHANGE UP (ref 27–31)
MCHC RBC-ENTMCNC: 30.2 PG — SIGNIFICANT CHANGE UP (ref 27–31)
MCHC RBC-ENTMCNC: 30.9 G/DL — LOW (ref 32–37)
MCHC RBC-ENTMCNC: 31.7 G/DL — LOW (ref 32–37)
MCV RBC AUTO: 95 FL — HIGH (ref 80–94)
MCV RBC AUTO: 95.3 FL — HIGH (ref 80–94)
NRBC # BLD: 0 /100 WBCS — SIGNIFICANT CHANGE UP (ref 0–0)
NRBC # BLD: 0 /100 WBCS — SIGNIFICANT CHANGE UP (ref 0–0)
PLATELET # BLD AUTO: 415 K/UL — HIGH (ref 130–400)
PLATELET # BLD AUTO: 435 K/UL — HIGH (ref 130–400)
POTASSIUM SERPL-MCNC: 4.5 MMOL/L — SIGNIFICANT CHANGE UP (ref 3.5–5)
POTASSIUM SERPL-SCNC: 4.5 MMOL/L — SIGNIFICANT CHANGE UP (ref 3.5–5)
PROT SERPL-MCNC: 7.3 G/DL — SIGNIFICANT CHANGE UP (ref 6–8)
PROTHROM AB SERPL-ACNC: 23.5 SEC — HIGH (ref 9.95–12.87)
RBC # BLD: 2.58 M/UL — LOW (ref 4.7–6.1)
RBC # BLD: 2.62 M/UL — LOW (ref 4.7–6.1)
RBC # FLD: 22.1 % — HIGH (ref 11.5–14.5)
RBC # FLD: 22.9 % — HIGH (ref 11.5–14.5)
SODIUM SERPL-SCNC: 135 MMOL/L — SIGNIFICANT CHANGE UP (ref 135–146)
TROPONIN I SERPL-MCNC: 0.63 NG/ML — HIGH (ref 0–0.05)
TROPONIN I SERPL-MCNC: 0.9 NG/ML — HIGH (ref 0–0.05)
TYPE + AB SCN PNL BLD: SIGNIFICANT CHANGE UP
WBC # BLD: 10.57 K/UL — SIGNIFICANT CHANGE UP (ref 4.8–10.8)
WBC # BLD: 10.7 K/UL — SIGNIFICANT CHANGE UP (ref 4.8–10.8)
WBC # FLD AUTO: 10.57 K/UL — SIGNIFICANT CHANGE UP (ref 4.8–10.8)
WBC # FLD AUTO: 10.7 K/UL — SIGNIFICANT CHANGE UP (ref 4.8–10.8)

## 2018-03-13 RX ORDER — SODIUM CHLORIDE 9 MG/ML
1000 INJECTION, SOLUTION INTRAVENOUS
Qty: 0 | Refills: 0 | Status: DISCONTINUED | OUTPATIENT
Start: 2018-03-13 | End: 2018-03-16

## 2018-03-13 RX ORDER — DEXTROSE 50 % IN WATER 50 %
1 SYRINGE (ML) INTRAVENOUS ONCE
Qty: 0 | Refills: 0 | Status: DISCONTINUED | OUTPATIENT
Start: 2018-03-13 | End: 2018-03-16

## 2018-03-13 RX ORDER — INSULIN LISPRO 100/ML
7 VIAL (ML) SUBCUTANEOUS
Qty: 0 | Refills: 0 | Status: DISCONTINUED | OUTPATIENT
Start: 2018-03-13 | End: 2018-03-16

## 2018-03-13 RX ORDER — METOPROLOL TARTRATE 50 MG
25 TABLET ORAL DAILY
Qty: 0 | Refills: 0 | Status: DISCONTINUED | OUTPATIENT
Start: 2018-03-13 | End: 2018-03-15

## 2018-03-13 RX ORDER — ACETAMINOPHEN 500 MG
650 TABLET ORAL EVERY 6 HOURS
Qty: 0 | Refills: 0 | Status: DISCONTINUED | OUTPATIENT
Start: 2018-03-13 | End: 2018-03-16

## 2018-03-13 RX ORDER — AMLODIPINE BESYLATE 2.5 MG/1
10 TABLET ORAL DAILY
Qty: 0 | Refills: 0 | Status: DISCONTINUED | OUTPATIENT
Start: 2018-03-13 | End: 2018-03-14

## 2018-03-13 RX ORDER — PANTOPRAZOLE SODIUM 20 MG/1
40 TABLET, DELAYED RELEASE ORAL
Qty: 0 | Refills: 0 | Status: DISCONTINUED | OUTPATIENT
Start: 2018-03-13 | End: 2018-03-14

## 2018-03-13 RX ORDER — INSULIN LISPRO 100/ML
VIAL (ML) SUBCUTANEOUS
Qty: 0 | Refills: 0 | Status: DISCONTINUED | OUTPATIENT
Start: 2018-03-13 | End: 2018-03-16

## 2018-03-13 RX ORDER — DIATRIZOATE MEGLUMINE 180 MG/ML
20 INJECTION, SOLUTION INTRAVESICAL ONCE
Qty: 0 | Refills: 0 | Status: COMPLETED | OUTPATIENT
Start: 2018-03-13 | End: 2018-03-13

## 2018-03-13 RX ORDER — INSULIN GLARGINE 100 [IU]/ML
21 INJECTION, SOLUTION SUBCUTANEOUS AT BEDTIME
Qty: 0 | Refills: 0 | Status: DISCONTINUED | OUTPATIENT
Start: 2018-03-13 | End: 2018-03-16

## 2018-03-13 RX ORDER — GLUCAGON INJECTION, SOLUTION 0.5 MG/.1ML
1 INJECTION, SOLUTION SUBCUTANEOUS ONCE
Qty: 0 | Refills: 0 | Status: DISCONTINUED | OUTPATIENT
Start: 2018-03-13 | End: 2018-03-16

## 2018-03-13 RX ORDER — FUROSEMIDE 40 MG
40 TABLET ORAL DAILY
Qty: 0 | Refills: 0 | Status: DISCONTINUED | OUTPATIENT
Start: 2018-03-13 | End: 2018-03-14

## 2018-03-13 RX ORDER — DEXTROSE 50 % IN WATER 50 %
25 SYRINGE (ML) INTRAVENOUS ONCE
Qty: 0 | Refills: 0 | Status: DISCONTINUED | OUTPATIENT
Start: 2018-03-13 | End: 2018-03-16

## 2018-03-13 RX ORDER — HYDROXYZINE HCL 10 MG
25 TABLET ORAL
Qty: 0 | Refills: 0 | Status: DISCONTINUED | OUTPATIENT
Start: 2018-03-13 | End: 2018-03-16

## 2018-03-13 RX ORDER — ATORVASTATIN CALCIUM 80 MG/1
20 TABLET, FILM COATED ORAL AT BEDTIME
Qty: 0 | Refills: 0 | Status: DISCONTINUED | OUTPATIENT
Start: 2018-03-13 | End: 2018-03-14

## 2018-03-13 RX ORDER — LOSARTAN POTASSIUM 100 MG/1
100 TABLET, FILM COATED ORAL DAILY
Qty: 0 | Refills: 0 | Status: DISCONTINUED | OUTPATIENT
Start: 2018-03-13 | End: 2018-03-14

## 2018-03-13 RX ORDER — ASPIRIN/CALCIUM CARB/MAGNESIUM 324 MG
325 TABLET ORAL ONCE
Qty: 0 | Refills: 0 | Status: DISCONTINUED | OUTPATIENT
Start: 2018-03-13 | End: 2018-03-13

## 2018-03-13 RX ORDER — SEVELAMER CARBONATE 2400 MG/1
800 POWDER, FOR SUSPENSION ORAL THREE TIMES A DAY
Qty: 0 | Refills: 0 | Status: DISCONTINUED | OUTPATIENT
Start: 2018-03-13 | End: 2018-03-14

## 2018-03-13 RX ORDER — GABAPENTIN 400 MG/1
100 CAPSULE ORAL
Qty: 0 | Refills: 0 | Status: DISCONTINUED | OUTPATIENT
Start: 2018-03-13 | End: 2018-03-16

## 2018-03-13 RX ORDER — DEXTROSE 50 % IN WATER 50 %
12.5 SYRINGE (ML) INTRAVENOUS ONCE
Qty: 0 | Refills: 0 | Status: DISCONTINUED | OUTPATIENT
Start: 2018-03-13 | End: 2018-03-16

## 2018-03-13 RX ADMIN — LOSARTAN POTASSIUM 100 MILLIGRAM(S): 100 TABLET, FILM COATED ORAL at 17:42

## 2018-03-13 RX ADMIN — Medication 25 MILLIGRAM(S): at 17:42

## 2018-03-13 RX ADMIN — PANTOPRAZOLE SODIUM 40 MILLIGRAM(S): 20 TABLET, DELAYED RELEASE ORAL at 17:42

## 2018-03-13 RX ADMIN — GABAPENTIN 100 MILLIGRAM(S): 400 CAPSULE ORAL at 17:36

## 2018-03-13 RX ADMIN — DIATRIZOATE MEGLUMINE 20 MILLILITER(S): 180 INJECTION, SOLUTION INTRAVESICAL at 21:59

## 2018-03-13 RX ADMIN — INSULIN GLARGINE 21 UNIT(S): 100 INJECTION, SOLUTION SUBCUTANEOUS at 23:00

## 2018-03-13 RX ADMIN — Medication 40 MILLIGRAM(S): at 17:37

## 2018-03-13 RX ADMIN — AMLODIPINE BESYLATE 10 MILLIGRAM(S): 2.5 TABLET ORAL at 17:35

## 2018-03-13 RX ADMIN — Medication 7 UNIT(S): at 17:38

## 2018-03-13 NOTE — ED PROVIDER NOTE - PROGRESS NOTE DETAILS
upon ekg results cards called and Dr. Avelar reviewing ekg and will make recommendation. D/w Dr. Avelar and Dr. Vaughan and stemi code canceled.  Dr. Squires (pts primary cardiologist) paged. D/w Dr. Sanchez (renal) who knows pt well and agrees with transfusion of 1 unit prbc.  Can follow with lasix as needed. D/w Dr. Avelar and Dr. Vaughan and stemi code canceled.  Dr. Squires (pts primary cardiologist) paged. [facility] NEGATIVE Dr. Alicia recommended admission to CCU. Dr. Roque aware of the patient coming up. Dr. Alicia recommended admission to CCU. Dr. Roque aware of the patient coming up.  Cards felt no asa at present.   CP likely secondary to low hbg.  Pt with tachycardia in the ED which might be secondary to anemia or volume depletion.  Adm team will reassess after transfusion.

## 2018-03-13 NOTE — ED PROVIDER NOTE - ATTENDING CONTRIBUTION TO CARE
Pt with continued symptoms in the ED despite treatment.  Pt needed to be admitted for continued treatment.

## 2018-03-13 NOTE — ED PROVIDER NOTE - CARE PLAN
Principal Discharge DX:	Elevated troponin  Secondary Diagnosis:	Generalized weakness Principal Discharge DX:	Elevated troponin  Secondary Diagnosis:	Generalized weakness  Secondary Diagnosis:	Dialysis patient

## 2018-03-13 NOTE — ED ADULT NURSE NOTE - PMH
Diabetes mellitus, type 2    Dialysis patient  Mon, wednesday, friday  High blood cholesterol    Hypertension Atrial fibrillation    Diabetes mellitus, type 2    Dialysis patient  Mon, wednesday, friday  High blood cholesterol    Hypertension    Right sided weakness    Stroke

## 2018-03-13 NOTE — H&P ADULT - NSHPLABSRESULTS_GEN_ALL_CORE
7.7    10.70 )-----------( 435      ( 13 Mar 2018 12:40 )             24.9       03-13    135  |  97<L>  |  39<H>  ----------------------------<  134<H>  4.5   |  26  |  5.1<HH>    Ca    9.2      13 Mar 2018 12:40    TPro  7.3  /  Alb  3.5  /  TBili  0.9  /  DBili  x   /  AST  741<H>  /  ALT  698<H>  /  AlkPhos  68  03-13        PT/INR - ( 13 Mar 2018 14:13 )   PT: 23.50 sec;   INR: 2.09 ratio      PTT - ( 13 Mar 2018 14:13 )  PTT:31.4 sec    CARDIAC MARKERS ( 13 Mar 2018 12:40 )  0.90 ng/mL / x     / 2207 U/L / x     / 16.5 ng/mL

## 2018-03-13 NOTE — H&P ADULT - ASSESSMENT
58 yo M w/ h/o CVA, ESRD on HD MWF, HTN, DM, Atrial fibrillation on coumadin presents with weakness    1. Weakness 2/2 Symptomatic Anemia - guaiac negative  -Admit to ICU  -Trend CBC, CT abdomen/pelvis r/o retroperitoneal bleed, s/p 1u pRBC  - send iron studies in the morning, f/u w/ renal, hold coumadin for now, trend pt/ptt    2. Troponemia - r/o acs, trend CE, most likely type 2 from anemia, 2d echo, am ekg  3. HTN - c/w meds  4. ESRD on HD - renal c/s dr. wong,   5. DM - insulin, check fs  6. GI/DVT PPx

## 2018-03-13 NOTE — ED ADULT NURSE REASSESSMENT NOTE - NS ED NURSE REASSESS COMMENT FT1
1 U PRBC infusing as ordered, vital signs stable, awaiting bed at this time in CCU. Family at bedside, will continue to closely monitor.

## 2018-03-13 NOTE — ED PROVIDER NOTE - ENMT, MLM
Initiate Treatment: Ciclopirox twice daily 2-3 times per week Detail Level: Simple Airway patent, Nasal mucosa clear. Mouth with normal mucosa. Throat has no vesicles, no oropharyngeal exudates and uvula is midline.

## 2018-03-13 NOTE — ED PROVIDER NOTE - OBJECTIVE STATEMENT
58 y/o M with pmh of ESRD MWF, a fib on coumadin, HTN presents with generalized weakness since yesterday. Patient had one episode of midsternal CP yesterday. Denies CP right now. States CP lasted ~15 min at the most. Denies any n/v. No abd pain.

## 2018-03-13 NOTE — H&P ADULT - NSHPPHYSICALEXAM_GEN_ALL_CORE
PHYSICAL EXAM:  GENERAL: NAD, well-developed  HEAD:  Atraumatic, Normocephalic  EYES: EOMI, PERRLA, conjunctiva and sclera clear  NECK: Supple, No JVD  CHEST/LUNG: Clear to auscultation bilaterally; No wheeze  HEART: Regular rate and rhythm; No murmurs, rubs, or gallops  ABDOMEN: Soft, Nontender, Nondistended; Bowel sounds present  EXTREMITIES:  L AVF w/ thrill/bruit  PSYCH: AAOx3  NEUROLOGY: non-focal  SKIN: No rashes or lesions

## 2018-03-13 NOTE — PATIENT PROFILE ADULT. - PMH
Atrial fibrillation    Diabetes mellitus, type 2    Dialysis patient  Mon, wednesday, friday  High blood cholesterol    Hypertension    Right sided weakness    Stroke

## 2018-03-14 LAB
ALBUMIN SERPL ELPH-MCNC: 3.1 G/DL — SIGNIFICANT CHANGE UP (ref 3–5.5)
ALP SERPL-CCNC: 62 U/L — SIGNIFICANT CHANGE UP (ref 30–115)
ALT FLD-CCNC: 616 U/L — HIGH (ref 0–41)
ANION GAP SERPL CALC-SCNC: 12 MMOL/L — SIGNIFICANT CHANGE UP (ref 7–14)
APTT BLD: 31.4 SEC — SIGNIFICANT CHANGE UP (ref 27–39.2)
AST SERPL-CCNC: 502 U/L — HIGH (ref 0–41)
BASOPHILS # BLD AUTO: 0.13 K/UL — SIGNIFICANT CHANGE UP (ref 0–0.2)
BASOPHILS NFR BLD AUTO: 1.5 % — HIGH (ref 0–1)
BILIRUB SERPL-MCNC: 0.9 MG/DL — SIGNIFICANT CHANGE UP (ref 0.2–1.2)
BUN SERPL-MCNC: 45 MG/DL — HIGH (ref 10–20)
CALCIUM SERPL-MCNC: 8.9 MG/DL — SIGNIFICANT CHANGE UP (ref 8.5–10.1)
CHLORIDE SERPL-SCNC: 97 MMOL/L — LOW (ref 98–110)
CHOLEST SERPL-MCNC: 119 MG/DL — SIGNIFICANT CHANGE UP (ref 100–200)
CK MB BLD-MCNC: 1 % — SIGNIFICANT CHANGE UP (ref 0–4)
CK MB CFR SERPL CALC: 11.6 NG/ML — HIGH (ref 0.6–6.3)
CK SERPL-CCNC: 1197 U/L — HIGH (ref 0–225)
CO2 SERPL-SCNC: 25 MMOL/L — SIGNIFICANT CHANGE UP (ref 17–32)
CREAT SERPL-MCNC: 6.1 MG/DL — CRITICAL HIGH (ref 0.7–1.5)
EOSINOPHIL # BLD AUTO: 0.14 K/UL — SIGNIFICANT CHANGE UP (ref 0–0.7)
EOSINOPHIL NFR BLD AUTO: 1.7 % — SIGNIFICANT CHANGE UP (ref 0–8)
ESTIMATED AVERAGE GLUCOSE: 128 MG/DL — HIGH (ref 68–114)
GLUCOSE SERPL-MCNC: 152 MG/DL — HIGH (ref 70–110)
HBA1C BLD-MCNC: 6.1 % — HIGH (ref 4–5.6)
HCT VFR BLD CALC: 24.8 % — LOW (ref 42–52)
HCT VFR BLD CALC: 26.1 % — LOW (ref 42–52)
HDLC SERPL-MCNC: 35 MG/DL — LOW (ref 40–60)
HGB BLD-MCNC: 7.8 G/DL — LOW (ref 14–18)
HGB BLD-MCNC: 8.4 G/DL — LOW (ref 14–18)
IMM GRANULOCYTES NFR BLD AUTO: 0.6 % — HIGH (ref 0.1–0.3)
INR BLD: 1.96 RATIO — HIGH (ref 0.65–1.3)
LIPID PNL WITH DIRECT LDL SERPL: 68 MG/DL — SIGNIFICANT CHANGE UP (ref 50–100)
LYMPHOCYTES # BLD AUTO: 1.73 K/UL — SIGNIFICANT CHANGE UP (ref 1.2–3.4)
LYMPHOCYTES # BLD AUTO: 20.6 % — SIGNIFICANT CHANGE UP (ref 20.5–51.1)
MAGNESIUM SERPL-MCNC: 2.4 MG/DL — SIGNIFICANT CHANGE UP (ref 1.8–2.4)
MCHC RBC-ENTMCNC: 30.1 PG — SIGNIFICANT CHANGE UP (ref 27–31)
MCHC RBC-ENTMCNC: 30.5 PG — SIGNIFICANT CHANGE UP (ref 27–31)
MCHC RBC-ENTMCNC: 31.5 G/DL — LOW (ref 32–37)
MCHC RBC-ENTMCNC: 32.2 G/DL — SIGNIFICANT CHANGE UP (ref 32–37)
MCV RBC AUTO: 93.5 FL — SIGNIFICANT CHANGE UP (ref 80–94)
MCV RBC AUTO: 96.9 FL — HIGH (ref 80–94)
MONOCYTES # BLD AUTO: 0.67 K/UL — HIGH (ref 0.1–0.6)
MONOCYTES NFR BLD AUTO: 8 % — SIGNIFICANT CHANGE UP (ref 1.7–9.3)
NEUTROPHILS # BLD AUTO: 5.67 K/UL — SIGNIFICANT CHANGE UP (ref 1.4–6.5)
NEUTROPHILS NFR BLD AUTO: 67.6 % — SIGNIFICANT CHANGE UP (ref 42.2–75.2)
NRBC # BLD: 0 /100 WBCS — SIGNIFICANT CHANGE UP (ref 0–0)
PHOSPHATE SERPL-MCNC: 5.5 MG/DL — HIGH (ref 2.1–4.9)
PLATELET # BLD AUTO: 280 K/UL — SIGNIFICANT CHANGE UP (ref 130–400)
PLATELET # BLD AUTO: 392 K/UL — SIGNIFICANT CHANGE UP (ref 130–400)
POTASSIUM SERPL-MCNC: 4.5 MMOL/L — SIGNIFICANT CHANGE UP (ref 3.5–5)
POTASSIUM SERPL-SCNC: 4.5 MMOL/L — SIGNIFICANT CHANGE UP (ref 3.5–5)
PROT SERPL-MCNC: 6.2 G/DL — SIGNIFICANT CHANGE UP (ref 6–8)
PROTHROM AB SERPL-ACNC: 21.9 SEC — HIGH (ref 9.95–12.87)
RBC # BLD: 2.56 M/UL — LOW (ref 4.7–6.1)
RBC # BLD: 2.79 M/UL — LOW (ref 4.7–6.1)
RBC # FLD: 21.5 % — HIGH (ref 11.5–14.5)
RBC # FLD: 22 % — HIGH (ref 11.5–14.5)
SODIUM SERPL-SCNC: 134 MMOL/L — LOW (ref 135–146)
TOTAL CHOLESTEROL/HDL RATIO MEASUREMENT: 3.4 RATIO — LOW (ref 4–5.5)
TRIGL SERPL-MCNC: 96 MG/DL — SIGNIFICANT CHANGE UP (ref 40–150)
TROPONIN I SERPL-MCNC: 0.62 NG/ML — HIGH (ref 0–0.05)
TSH SERPL-MCNC: 2.19 UIU/ML — SIGNIFICANT CHANGE UP (ref 0.27–4.2)
WBC # BLD: 8.39 K/UL — SIGNIFICANT CHANGE UP (ref 4.8–10.8)
WBC # BLD: 9.66 K/UL — SIGNIFICANT CHANGE UP (ref 4.8–10.8)
WBC # FLD AUTO: 8.39 K/UL — SIGNIFICANT CHANGE UP (ref 4.8–10.8)
WBC # FLD AUTO: 9.66 K/UL — SIGNIFICANT CHANGE UP (ref 4.8–10.8)

## 2018-03-14 RX ORDER — FUROSEMIDE 40 MG
80 TABLET ORAL
Qty: 0 | Refills: 0 | Status: DISCONTINUED | OUTPATIENT
Start: 2018-03-14 | End: 2018-03-16

## 2018-03-14 RX ORDER — PANTOPRAZOLE SODIUM 20 MG/1
40 TABLET, DELAYED RELEASE ORAL EVERY 12 HOURS
Qty: 0 | Refills: 0 | Status: DISCONTINUED | OUTPATIENT
Start: 2018-03-14 | End: 2018-03-16

## 2018-03-14 RX ORDER — PANTOPRAZOLE SODIUM 20 MG/1
40 TABLET, DELAYED RELEASE ORAL
Qty: 0 | Refills: 0 | Status: DISCONTINUED | OUTPATIENT
Start: 2018-03-14 | End: 2018-03-14

## 2018-03-14 RX ORDER — SEVELAMER CARBONATE 2400 MG/1
800 POWDER, FOR SUSPENSION ORAL THREE TIMES A DAY
Qty: 0 | Refills: 0 | Status: DISCONTINUED | OUTPATIENT
Start: 2018-03-14 | End: 2018-03-16

## 2018-03-14 RX ORDER — ERYTHROPOIETIN 10000 [IU]/ML
10000 INJECTION, SOLUTION INTRAVENOUS; SUBCUTANEOUS
Qty: 0 | Refills: 0 | Status: DISCONTINUED | OUTPATIENT
Start: 2018-03-14 | End: 2018-03-16

## 2018-03-14 RX ADMIN — AMLODIPINE BESYLATE 10 MILLIGRAM(S): 2.5 TABLET ORAL at 06:30

## 2018-03-14 RX ADMIN — Medication 25 MILLIGRAM(S): at 06:31

## 2018-03-14 RX ADMIN — Medication 7 UNIT(S): at 07:48

## 2018-03-14 RX ADMIN — Medication 1: at 11:54

## 2018-03-14 RX ADMIN — PANTOPRAZOLE SODIUM 40 MILLIGRAM(S): 20 TABLET, DELAYED RELEASE ORAL at 07:54

## 2018-03-14 RX ADMIN — Medication 7 UNIT(S): at 11:53

## 2018-03-14 RX ADMIN — GABAPENTIN 100 MILLIGRAM(S): 400 CAPSULE ORAL at 17:34

## 2018-03-14 RX ADMIN — Medication 2: at 16:39

## 2018-03-14 RX ADMIN — Medication 40 MILLIGRAM(S): at 06:31

## 2018-03-14 RX ADMIN — Medication 7 UNIT(S): at 16:39

## 2018-03-14 RX ADMIN — LOSARTAN POTASSIUM 100 MILLIGRAM(S): 100 TABLET, FILM COATED ORAL at 06:31

## 2018-03-14 RX ADMIN — INSULIN GLARGINE 21 UNIT(S): 100 INJECTION, SOLUTION SUBCUTANEOUS at 22:03

## 2018-03-14 RX ADMIN — Medication 80 MILLIGRAM(S): at 17:33

## 2018-03-14 RX ADMIN — ERYTHROPOIETIN 10000 UNIT(S): 10000 INJECTION, SOLUTION INTRAVENOUS; SUBCUTANEOUS at 09:39

## 2018-03-14 RX ADMIN — SEVELAMER CARBONATE 800 MILLIGRAM(S): 2400 POWDER, FOR SUSPENSION ORAL at 06:30

## 2018-03-14 RX ADMIN — SEVELAMER CARBONATE 800 MILLIGRAM(S): 2400 POWDER, FOR SUSPENSION ORAL at 13:08

## 2018-03-14 RX ADMIN — GABAPENTIN 100 MILLIGRAM(S): 400 CAPSULE ORAL at 06:31

## 2018-03-14 RX ADMIN — PANTOPRAZOLE SODIUM 40 MILLIGRAM(S): 20 TABLET, DELAYED RELEASE ORAL at 17:32

## 2018-03-14 NOTE — PROGRESS NOTE ADULT - ASSESSMENT
58 yo M w/ h/o CVA, ESRD on HD MWF, HTN, DM, Atrial fibrillation on coumadin presents with weakness    1. Weakness 2/2 Symptomatic Anemia - guaiac negative  -Admit to ICU  -Trend CBC, CT abdomen/pelvis r/o retroperitoneal bleed, s/p 1u pRBC  - send iron studies in the morning, f/u w/ renal, hold coumadin for now, trend pt/ptt    2. Troponemia - r/o acs, trend CE, most likely type 2 from anemia, 2d echo, am ekg  3. HTN - c/w meds  4. ESRD on HD - renal c/s dr. wong,   5. DM - insulin, check fs  6. GI/DVT PPx 56 yo M w/ h/o CVA, ESRD on HD MWF, HTN, DM, Atrial fibrillation on coumadin presents with weakness    1. Weakness 2/2 Symptomatic Anemia - guaiac negative  -Admit to ICU  -Trend CBC, CT abdomen/pelvis r/o retroperitoneal bleed was negative, s/p 2u pRBC  - send iron studies in the morning, f/u w/ renal, hold coumadin for now, trend pt/ptt  -GI consult, RUQ ultrasound, sequentials    2. Troponemia - r/o acs, trend CE, most likely type 2 from anemia, 2d echo, am ekg  3. HTN - c/w meds  4. ESRD on HD - renal c/s dr. wong, c/w HD  5. DM - insulin, check fs  6. GI/DVT PPx

## 2018-03-14 NOTE — CONSULT NOTE ADULT - ASSESSMENT
IMPRESSION: Acute Blood Loss Anemia, NSTEMI, Transaminitis      PLAN:    CNS: No sedatives     HEENT: HOB at 45    PULMONARY: Keep SpO2>92%    CARDIOVASCULAR: Keep Input less than output, cardiology follow up, Serial CBC, Keep Hg >8     GI: GI prophylaxis.  NPO for now, Follow up with GI, Protonix 40mg Q12 IV for now, RUQ US     RENAL: Follow up with renal for HD    INFECTIOUS DISEASE: no Abx    HEMATOLOGICAL:  DVT prophylaxis.    ENDOCRINE:  Follow up FS.  Insulin protocol if needed. IMPRESSION: Acute Blood Loss Anemia, NSTEMI, Transaminitis, s/p 1 unit of PRBCs      PLAN:    CNS: No sedatives     HEENT: HOB at 45    PULMONARY: Keep SpO2>92%    CARDIOVASCULAR: Keep Input less than output, cardiology follow up, Serial CBC, Keep Hg >8. D/c lipitor, I unit of PRBCS    GI: GI prophylaxis.  Oral diet, Follow up with GI, Protonix 40mg Q12 PO for now, RUQ US     RENAL: Follow up with renal for HD    INFECTIOUS DISEASE: no Abx    HEMATOLOGICAL: Sequentials, f/u INR, PTT, PT    ENDOCRINE:  Follow up FS.  Insulin protocol if needed.

## 2018-03-14 NOTE — CONSULT NOTE ADULT - ASSESSMENT
RENAL FAILURE ON HD  A FIB  ANEMIA GUAIAC NEG LESS LIKELY GI BLOOD LOSS  INCREASED AST/ALT POSSIBLY FROM HYPOTENTION    PLAN          PPI          REPEAT CBC IF DEC THEN CONSIDER EGD AND PLACE PATIENT NPO          IF STABLE THEN RESTART COUMADIN AND CONTINUE PPI

## 2018-03-14 NOTE — CONSULT NOTE ADULT - SUBJECTIVE AND OBJECTIVE BOX
Patient is a 57y old  Male who presents with a chief complaint of Weakness (13 Mar 2018 16:22)      HPI:  56 yo M w/ h/o CVA, ESRD on HD MWF, HTN, DM, Atrial fibrillation on coumadin presents with above cc.  Pt. is poor historian,  Pt. has been feeling weak for some time, unable to tell when symptoms have exactly started but has been becoming progressively weak, yesterday he had difficulty ambulating and asked his mom to bring his walker.  He then came to the ED to get checked out.  Pt. reports dark than usual colored stools (guaiac negative in ED), pt last colonoscopy about 2-3 years ago unsure who performed it or the results.  Pt. denies fever, chills, cough, hemoptysis, hematemesis, CP, SOB, LOC, falls, nausea, vomiting, diarrhea.  Pt. does not recall his medications but gave a list to the ED. (13 Mar 2018 16:22)      PAST MEDICAL & SURGICAL HISTORY:  Atrial fibrillation  Right sided weakness  Stroke  Hypertension  High blood cholesterol  Diabetes mellitus, type 2  Dialysis patient: Mon, wednesday, friday  A-V fistula    Allergies    Orange (Other)  Originally Entered as [HIVES] reaction to [sulfur] (Unknown)  penicillin (Unknown)  sulfADIAZINE (Unknown)    Intolerances      FAMILY HISTORY:  No pertinent family history in first degree relatives    Home Medications:  amLODIPine 10 mg oral tablet: 1 tab(s) orally once a day (13 Mar 2018 12:01)  atorvastatin 20 mg oral tablet: 1 tab(s) orally once a day (13 Mar 2018 12:01)  furosemide 40 mg oral tablet: 1 tab(s) orally once a day (13 Mar 2018 12:01)  gabapentin 100 mg oral tablet: orally 2 times a day (13 Mar 2018 12:01)  hydrOXYzine hydrochloride 25 mg oral tablet:  (13 Mar 2018 12:01)  Lantus 100 units/mL subcutaneous solution:  (13 Mar 2018 12:01)  losartan 100 mg oral tablet: 1 tab(s) orally once a day (13 Mar 2018 12:01)  NovoLOG:  (13 Mar 2018 12:01)  pantoprazole 40 mg oral delayed release tablet: 1 tab(s) orally once a day (13 Mar 2018 12:01)  pioglitazone 30 mg oral tablet: 1 tab(s) orally once a day (13 Mar 2018 12:01)  Renvela 800 mg oral tablet: 1 tab(s) orally 3 times a day (with meals) (13 Mar 2018 12:01)  Toprol-XL 25 mg oral tablet, extended release: 1 tab(s) orally once a day (13 Mar 2018 12:01)  Vitamin D3 10,000 intl units oral capsule: 25643 milligram(s) orally once a week (13 Mar 2018 12:01)  warfarin 2.5 mg oral tablet: 1 tab(s) orally once a day (13 Mar 2018 12:01)    Occupation:  Alochol: Denied  Smoking: Denied  Drug Use: Denied  Marital Status:         ROS: as in HPI; All other systems reviewed are negative    ICU Vital Signs Last 24 Hrs  T(C): 36.2 (14 Mar 2018 07:01), Max: 36.7 (13 Mar 2018 14:20)  T(F): 97.2 (14 Mar 2018 07:01), Max: 98.1 (13 Mar 2018 14:20)  HR: 87 (14 Mar 2018 08:51) (87 - 116)  BP: 99/55 (14 Mar 2018 08:51) (93/55 - 124/57)  BP(mean): 72 (14 Mar 2018 08:51) (68 - 90)  ABP: --  ABP(mean): --  RR: 17 (14 Mar 2018 08:51) (15 - 20)  SpO2: 98% (14 Mar 2018 08:51) (83% - 99%)        Physical Examination:    General: No acute distress.  Alert, oriented, interactive, nonfocal    HEENT: Pupils equal, reactive to light.  Symmetric.    PULM: Clear to auscultation bilaterally, no significant sputum production    CVS: Regular rate and rhythm, no murmurs, rubs, or gallops    ABD: Soft, nondistended, nontender, normoactive bowel sounds, no masses    EXT: No edema, nontender    SKIN: Warm and well perfused, no rashes noted.              I&O's Detail    13 Mar 2018 07:01  -  14 Mar 2018 07:00  --------------------------------------------------------  IN:    Oral Fluid: 1095 mL    PRBCs (Packed Red Blood Cells): 272 mL  Total IN: 1367 mL    OUT:  Total OUT: 0 mL    Total NET: 1367 mL      14 Mar 2018 07:01  -  14 Mar 2018 09:22  --------------------------------------------------------  IN:    Oral Fluid: 360 mL  Total IN: 360 mL    OUT:  Total OUT: 0 mL    Total NET: 360 mL            LABS:                        7.8    8.39  )-----------( 392      ( 14 Mar 2018 05:41 )             24.8     14 Mar 2018 05:41    134    |  97     |  45     ----------------------------<  152    4.5     |  25     |  6.1      Ca    8.9        14 Mar 2018 05:41  Phos  5.5       14 Mar 2018 05:41  Mg     2.4       14 Mar 2018 05:41    TPro  6.2    /  Alb  3.1    /  TBili  0.9    /  DBili  x      /  AST  502    /  ALT  616    /  AlkPhos  62     14 Mar 2018 05:41  Amylase x     lipase x          CARDIAC MARKERS ( 14 Mar 2018 05:41 )  0.62 ng/mL / x     / 1197 U/L / x     / 11.6 ng/mL  CARDIAC MARKERS ( 13 Mar 2018 20:09 )  0.63 ng/mL / x     / 1692 U/L / x     / 14.4 ng/mL  CARDIAC MARKERS ( 13 Mar 2018 12:40 )  0.90 ng/mL / x     / 2207 U/L / x     / 16.5 ng/mL      CAPILLARY BLOOD GLUCOSE  144 (14 Mar 2018 07:42)        PT/INR - ( 14 Mar 2018 05:41 )   PT: 21.90 sec;   INR: 1.96 ratio         PTT - ( 14 Mar 2018 05:41 )  PTT:31.4 sec    Culture        MEDICATIONS  (STANDING):  atorvastatin 20 milliGRAM(s) Oral at bedtime  dextrose 5%. 1000 milliLiter(s) (50 mL/Hr) IV Continuous <Continuous>  dextrose 50% Injectable 12.5 Gram(s) IV Push once  dextrose 50% Injectable 25 Gram(s) IV Push once  dextrose 50% Injectable 25 Gram(s) IV Push once  epoetin raven Injectable 87952 Unit(s) IV Push <User Schedule>  gabapentin 100 milliGRAM(s) Oral two times a day  insulin glargine Injectable (LANTUS) 21 Unit(s) SubCutaneous at bedtime  insulin lispro (HumaLOG) corrective regimen sliding scale   SubCutaneous three times a day before meals  insulin lispro Injectable (HumaLOG) 7 Unit(s) SubCutaneous before breakfast  insulin lispro Injectable (HumaLOG) 7 Unit(s) SubCutaneous before lunch  insulin lispro Injectable (HumaLOG) 7 Unit(s) SubCutaneous before dinner  metoprolol succinate ER 25 milliGRAM(s) Oral daily  pantoprazole    Tablet 40 milliGRAM(s) Oral before breakfast  sevelamer hydrochloride 800 milliGRAM(s) Oral three times a day    MEDICATIONS  (PRN):  acetaminophen   Tablet 650 milliGRAM(s) Oral every 6 hours PRN For Temp greater than 38 C (100.4 F)  dextrose Gel 1 Dose(s) Oral once PRN Blood Glucose LESS THAN 70 milliGRAM(s)/deciliter  glucagon  Injectable 1 milliGRAM(s) IntraMuscular once PRN Glucose LESS THAN 70 milligrams/deciliter  hydrOXYzine hydrochloride 25 milliGRAM(s) Oral two times a day PRN Agitation        RADIOLOGY: ***     CXR: Right costophrenic angle blunting           ECHO: Patient is a 57y old  Male who presents with a chief complaint of Weakness (13 Mar 2018 16:22)      HPI:  56 yo M w/ h/o CVA, ESRD on HD MWF, HTN, DM, Atrial fibrillation on coumadin presents with above cc.  Pt. is poor historian,  Pt. has been feeling weak for some time, unable to tell when symptoms have exactly started but has been becoming progressively weak, yesterday he had difficulty ambulating and asked his mom to bring his walker.  He then came to the ED to get checked out.  Pt. reports dark than usual colored stools (guaiac negative in ED), pt last colonoscopy about 2-3 years ago unsure who performed it or the results.  Pt. denies fever, chills, cough, hemoptysis, hematemesis, CP, SOB, LOC, falls, nausea, vomiting, diarrhea.  Pt. does not recall his medications but gave a list to the ED. (13 Mar 2018 16:22)      PAST MEDICAL & SURGICAL HISTORY:  Atrial fibrillation  Right sided weakness  Stroke  Hypertension  High blood cholesterol  Diabetes mellitus, type 2  Dialysis patient: Mon, wednesday, friday  A-V fistula    Allergies    Orange (Other)  Originally Entered as [HIVES] reaction to [sulfur] (Unknown)  penicillin (Unknown)  sulfADIAZINE (Unknown)    Intolerances      FAMILY HISTORY:  No pertinent family history in first degree relatives    Home Medications:  amLODIPine 10 mg oral tablet: 1 tab(s) orally once a day (13 Mar 2018 12:01)  atorvastatin 20 mg oral tablet: 1 tab(s) orally once a day (13 Mar 2018 12:01)  furosemide 40 mg oral tablet: 1 tab(s) orally once a day (13 Mar 2018 12:01)  gabapentin 100 mg oral tablet: orally 2 times a day (13 Mar 2018 12:01)  hydrOXYzine hydrochloride 25 mg oral tablet:  (13 Mar 2018 12:01)  Lantus 100 units/mL subcutaneous solution:  (13 Mar 2018 12:01)  losartan 100 mg oral tablet: 1 tab(s) orally once a day (13 Mar 2018 12:01)  NovoLOG:  (13 Mar 2018 12:01)  pantoprazole 40 mg oral delayed release tablet: 1 tab(s) orally once a day (13 Mar 2018 12:01)  pioglitazone 30 mg oral tablet: 1 tab(s) orally once a day (13 Mar 2018 12:01)  Renvela 800 mg oral tablet: 1 tab(s) orally 3 times a day (with meals) (13 Mar 2018 12:01)  Toprol-XL 25 mg oral tablet, extended release: 1 tab(s) orally once a day (13 Mar 2018 12:01)  Vitamin D3 10,000 intl units oral capsule: 79534 milligram(s) orally once a week (13 Mar 2018 12:01)  warfarin 2.5 mg oral tablet: 1 tab(s) orally once a day (13 Mar 2018 12:01)    Occupation:  Alochol: Denied  Smoking: Denied  Drug Use: Denied  Marital Status:         ROS: as in HPI; All other systems reviewed are negative    ICU Vital Signs Last 24 Hrs  T(C): 36.2 (14 Mar 2018 07:01), Max: 36.7 (13 Mar 2018 14:20)  T(F): 97.2 (14 Mar 2018 07:01), Max: 98.1 (13 Mar 2018 14:20)  HR: 87 (14 Mar 2018 08:51) (87 - 116)  BP: 99/55 (14 Mar 2018 08:51) (93/55 - 124/57)  BP(mean): 72 (14 Mar 2018 08:51) (68 - 90)  ABP: --  ABP(mean): --  RR: 17 (14 Mar 2018 08:51) (15 - 20)  SpO2: 98% (14 Mar 2018 08:51) (83% - 99%)        Physical Examination:    General: No acute distress.  Alert, oriented, interactive, nonfocal    HEENT: Pupils equal, reactive to light.  Symmetric.    PULM: Clear to auscultation bilaterally, no significant sputum production    CVS: Regular rate and rhythm, no murmurs, rubs, or gallops    ABD: Soft, nondistended, nontender, normoactive bowel sounds, no masses    EXT:  1 edema     SKIN: Warm and well perfused, no rashes noted.              I&O's Detail    13 Mar 2018 07:01  -  14 Mar 2018 07:00  --------------------------------------------------------  IN:    Oral Fluid: 1095 mL    PRBCs (Packed Red Blood Cells): 272 mL  Total IN: 1367 mL    OUT:  Total OUT: 0 mL    Total NET: 1367 mL      14 Mar 2018 07:01  -  14 Mar 2018 09:22  --------------------------------------------------------  IN:    Oral Fluid: 360 mL  Total IN: 360 mL    OUT:  Total OUT: 0 mL    Total NET: 360 mL            LABS:                        7.8    8.39  )-----------( 392      ( 14 Mar 2018 05:41 )             24.8     14 Mar 2018 05:41    134    |  97     |  45     ----------------------------<  152    4.5     |  25     |  6.1      Ca    8.9        14 Mar 2018 05:41  Phos  5.5       14 Mar 2018 05:41  Mg     2.4       14 Mar 2018 05:41    TPro  6.2    /  Alb  3.1    /  TBili  0.9    /  DBili  x      /  AST  502    /  ALT  616    /  AlkPhos  62     14 Mar 2018 05:41  Amylase x     lipase x          CARDIAC MARKERS ( 14 Mar 2018 05:41 )  0.62 ng/mL / x     / 1197 U/L / x     / 11.6 ng/mL  CARDIAC MARKERS ( 13 Mar 2018 20:09 )  0.63 ng/mL / x     / 1692 U/L / x     / 14.4 ng/mL  CARDIAC MARKERS ( 13 Mar 2018 12:40 )  0.90 ng/mL / x     / 2207 U/L / x     / 16.5 ng/mL      CAPILLARY BLOOD GLUCOSE  144 (14 Mar 2018 07:42)        PT/INR - ( 14 Mar 2018 05:41 )   PT: 21.90 sec;   INR: 1.96 ratio         PTT - ( 14 Mar 2018 05:41 )  PTT:31.4 sec    Culture        MEDICATIONS  (STANDING):  atorvastatin 20 milliGRAM(s) Oral at bedtime  dextrose 5%. 1000 milliLiter(s) (50 mL/Hr) IV Continuous <Continuous>  dextrose 50% Injectable 12.5 Gram(s) IV Push once  dextrose 50% Injectable 25 Gram(s) IV Push once  dextrose 50% Injectable 25 Gram(s) IV Push once  epoetin raven Injectable 55707 Unit(s) IV Push <User Schedule>  gabapentin 100 milliGRAM(s) Oral two times a day  insulin glargine Injectable (LANTUS) 21 Unit(s) SubCutaneous at bedtime  insulin lispro (HumaLOG) corrective regimen sliding scale   SubCutaneous three times a day before meals  insulin lispro Injectable (HumaLOG) 7 Unit(s) SubCutaneous before breakfast  insulin lispro Injectable (HumaLOG) 7 Unit(s) SubCutaneous before lunch  insulin lispro Injectable (HumaLOG) 7 Unit(s) SubCutaneous before dinner  metoprolol succinate ER 25 milliGRAM(s) Oral daily  pantoprazole    Tablet 40 milliGRAM(s) Oral before breakfast  sevelamer hydrochloride 800 milliGRAM(s) Oral three times a day    MEDICATIONS  (PRN):  acetaminophen   Tablet 650 milliGRAM(s) Oral every 6 hours PRN For Temp greater than 38 C (100.4 F)  dextrose Gel 1 Dose(s) Oral once PRN Blood Glucose LESS THAN 70 milliGRAM(s)/deciliter  glucagon  Injectable 1 milliGRAM(s) IntraMuscular once PRN Glucose LESS THAN 70 milligrams/deciliter  hydrOXYzine hydrochloride 25 milliGRAM(s) Oral two times a day PRN Agitation        RADIOLOGY: ***     CXR: Right costophrenic angle blunting           ECHO:

## 2018-03-14 NOTE — CONSULT NOTE ADULT - SUBJECTIVE AND OBJECTIVE BOX
[place] [facility] INITIAL CONSULT NOTE  --------------------------------------------------------------------------------  HPI:  56 YO male with past medical history of end stage renal disease secondary to biopsy proven diabetic nephropathy. He is maintained on hemodialysis on MWF at [facility] under the care of Dr Madison. His HD access is a LUE AVF. Non-compliant with fluid restriction. Presented with generalized weakness. Found to gave Hgb of 7.7. Transfused 1 unit PRBCs. CT scan shows R pleural effusion. Patient states 1-2 weeks ago he experienced significant bleeding from AVF post-HD.      PAST HISTORY  --------------------------------------------------------------------------------  PAST MEDICAL & SURGICAL HISTORY:  Atrial fibrillation  Right sided weakness  Stroke  Hypertension  High blood cholesterol  Diabetes mellitus, type 2  ESRD  A-V fistula    FAMILY HISTORY:  No pertinent family history in first degree relatives    SOCIAL HISTORY: Denies current ETOH, tobacco, and illicit drug use.    ALLERGIES & MEDICATIONS  --------------------------------------------------------------------------------  Allergies    Orange (Other)  Originally Entered as [HIVES] reaction to [sulfur] (Unknown)  penicillin (Unknown)  sulfADIAZINE (Unknown)    Standing Inpatient Medications  amLODIPine   Tablet 10 milliGRAM(s) Oral daily  atorvastatin 20 milliGRAM(s) Oral at bedtime  dextrose 5%. 1000 milliLiter(s) IV Continuous <Continuous>  dextrose 50% Injectable 12.5 Gram(s) IV Push once  dextrose 50% Injectable 25 Gram(s) IV Push once  dextrose 50% Injectable 25 Gram(s) IV Push once  furosemide    Tablet 40 milliGRAM(s) Oral daily  gabapentin 100 milliGRAM(s) Oral two times a day  insulin glargine Injectable (LANTUS) 21 Unit(s) SubCutaneous at bedtime  insulin lispro (HumaLOG) corrective regimen sliding scale   SubCutaneous three times a day before meals  insulin lispro Injectable (HumaLOG) 7 Unit(s) SubCutaneous before breakfast  insulin lispro Injectable (HumaLOG) 7 Unit(s) SubCutaneous before lunch  insulin lispro Injectable (HumaLOG) 7 Unit(s) SubCutaneous before dinner  losartan 100 milliGRAM(s) Oral daily  metoprolol succinate ER 25 milliGRAM(s) Oral daily  pantoprazole    Tablet 40 milliGRAM(s) Oral before breakfast  sevelamer hydrochloride 800 milliGRAM(s) Oral three times a day    PRN Inpatient Medications  acetaminophen   Tablet 650 milliGRAM(s) Oral every 6 hours PRN  dextrose Gel 1 Dose(s) Oral once PRN  glucagon  Injectable 1 milliGRAM(s) IntraMuscular once PRN  hydrOXYzine hydrochloride 25 milliGRAM(s) Oral two times a day PRN      REVIEW OF SYSTEMS  --------------------------------------------------------------------------------  Gen: No weight changes, fatigue, fevers/chills  Skin: No rashes  Head/Eyes/Ears/Mouth: No headache; No sinus pain/discomfort, sore throat  Respiratory: No dyspnea, cough, wheezing, hemoptysis  CV: No chest pain, PND, orthopnea  GI: No abdominal pain, diarrhea, constipation, nausea, vomiting, melena, hematochezia  : No increased frequency, dysuria, hematuria, nocturia    All other systems were reviewed and are negative, except as noted.    VITALS/PHYSICAL EXAM  --------------------------------------------------------------------------------  T(C): 36.2 (03-14-18 @ 07:01), Max: 36.7 (03-13-18 @ 14:20)  HR: 114 (03-14-18 @ 07:08) (102 - 116)  BP: 96/56 (03-14-18 @ 07:08) (93/55 - 124/57)  RR: 15 (03-14-18 @ 07:08) (15 - 20)  SpO2: 83% (03-14-18 @ 07:08) (83% - 99%)  Height (cm): 177.8 (03-13-18 @ 16:08)  Weight (kg): 96.3 (03-13-18 @ 17:46)  BMI (kg/m2): 30.5 (03-13-18 @ 17:46)  BSA (m2): 2.14 (03-13-18 @ 17:46)    03-13-18 @ 07:01  -  03-14-18 @ 07:00  --------------------------------------------------------  IN: 1367 mL / OUT: 0 mL / NET: 1367 mL    03-14-18 @ 07:01  -  03-14-18 @ 08:56  --------------------------------------------------------  IN: 360 mL / OUT: 0 mL / NET: 360 mL    Physical Exam:  	Gen: NAD, well-appearing  	HEENT: supple neck, clear oropharynx  	Pulm: CTA B/L  	CV: irreg irreg  	Back: No spinal or CVA tenderness; no sacral edema  	Abd: +BS, soft, nontender/nondistended  	: No suprapubic tenderness  	LE: Warm, FROM, no clubbing, intact strength; no edema  	Psych: Normal affect and mood  	Skin: Warm, without rashes  	Vascular access: AVF with good thrill/bruit    LABS/STUDIES  --------------------------------------------------------------------------------              7.8    8.39  >-----------<  392      [03-14-18 @ 05:41]              24.8     134  |  97  |  45  ----------------------------<  152      [03-14-18 @ 05:41]  4.5   |  25  |  6.1        Ca     8.9     [03-14-18 @ 05:41]      Mg     2.4     [03-14-18 @ 05:41]      Phos  5.5     [03-14-18 @ 05:41]    TPro  6.2  /  Alb  3.1  /  TBili  0.9  /  DBili  x   /  AST  502  /  ALT  616  /  AlkPhos  62  [03-14-18 @ 05:41]    PT/INR: PT 21.90, INR 1.96       [03-14-18 @ 05:41]  PTT: 31.4       [03-14-18 @ 05:41]    Troponin 0.62      [03-14-18 @ 05:41]  CK 1197      [03-14-18 @ 05:41]    Lipid: chol 119, TG 96, HDL 35, LDL 68      [03-14-18 @ 05:41]

## 2018-03-14 NOTE — CONSULT NOTE ADULT - SUBJECTIVE AND OBJECTIVE BOX
CARDIOLOGY CONSULT NOTE     CHIEF COMPLAINT/REASON FOR CONSULT:    HPI:  56 yo M w/ h/o CVA, ESRD on HD MWF, HTN, DM, Atrial fibrillation on coumadin presents with above cc.  Pt. is poor historian,  Pt. has been feeling weak for some time, unable to tell when symptoms have exactly started but has been becoming progressively weak, yesterday he had difficulty ambulating and asked his mom to bring his walker.  He then came to the ED to get checked out.  Pt. reports dark than usual colored stools (guaiac negative in ED), pt last colonoscopy about 2-3 years ago unsure who performed it or the results.  Pt. denies fever, chills, cough, hemoptysis, hematemesis, CP, SOB, LOC, falls, nausea, vomiting, diarrhea.      PAST MEDICAL & SURGICAL HISTORY:  Atrial fibrillation  Right sided weakness  Stroke  Hypertension  High blood cholesterol  Diabetes mellitus, type 2  Dialysis patient: Mon, wednesday, friday  A-V fistula      Cardiac Risks:   [x ]HTN, [x ] DM, [x ] Smoking, [ ] FH,  [x ] Lipids        MEDICATIONS:  MEDICATIONS  (STANDING):  atorvastatin 20 milliGRAM(s) Oral at bedtime  dextrose 5%. 1000 milliLiter(s) (50 mL/Hr) IV Continuous <Continuous>  dextrose 50% Injectable 12.5 Gram(s) IV Push once  dextrose 50% Injectable 25 Gram(s) IV Push once  dextrose 50% Injectable 25 Gram(s) IV Push once  epoetin raven Injectable 97007 Unit(s) IV Push <User Schedule>  furosemide    Tablet 80 milliGRAM(s) Oral two times a day  gabapentin 100 milliGRAM(s) Oral two times a day  insulin glargine Injectable (LANTUS) 21 Unit(s) SubCutaneous at bedtime  insulin lispro (HumaLOG) corrective regimen sliding scale   SubCutaneous three times a day before meals  insulin lispro Injectable (HumaLOG) 7 Unit(s) SubCutaneous before breakfast  insulin lispro Injectable (HumaLOG) 7 Unit(s) SubCutaneous before lunch  insulin lispro Injectable (HumaLOG) 7 Unit(s) SubCutaneous before dinner  metoprolol succinate ER 25 milliGRAM(s) Oral daily  pantoprazole    Tablet 40 milliGRAM(s) Oral before breakfast  sevelamer hydrochloride 800 milliGRAM(s) Oral three times a day      FAMILY HISTORY:  No pertinent family history in first degree relatives      SOCIAL HISTORY:      [ ] Marital status Divourced  Allergies    Orange (Other)  Originally Entered as [HIVES] reaction to [sulfur] (Unknown)  penicillin (Unknown)  sulfADIAZINE (Unknown)        	    REVIEW OF SYSTEMS:  CONSTITUTIONAL: No fever, weight loss, or fatigue  EYES: No eye pain, visual disturbances, or discharge  ENMT:  No difficulty hearing, tinnitus, vertigo; No sinus or throat pain  NECK: No pain or stiffness  RESPIRATORY: No cough, wheezing, chills or hemoptysis; No Shortness of Breath  CARDIOVASCULAR: No chest pain, palpitations, passing out, dizziness, or leg swelling  GASTROINTESTINAL: No abdominal or epigastric pain. No nausea, vomiting, or hematemesis; No diarrhea or constipation. No melena or hematochezia.  GENITOURINARY: No dysuria, frequency, hematuria, or incontinence  NEUROLOGICAL: No headaches, memory loss, loss of strength, numbness, or tremors  SKIN: No itching, burning, rashes, or lesions   	    [ ] All others negative	  [ ] Unable to obtain    PHYSICAL EXAM:  T(C): 36.2 (03-14-18 @ 07:01), Max: 36.7 (03-13-18 @ 14:20)  HR: 87 (03-14-18 @ 08:51) (87 - 116)  BP: 99/55 (03-14-18 @ 08:51) (93/55 - 124/57)  RR: 17 (03-14-18 @ 08:51) (15 - 20)  SpO2: 98% (03-14-18 @ 08:51) (83% - 99%)  Wt(kg): --  I&O's Summary    13 Mar 2018 07:01  -  14 Mar 2018 07:00  --------------------------------------------------------  IN: 1367 mL / OUT: 0 mL / NET: 1367 mL    14 Mar 2018 07:01  -  14 Mar 2018 09:38  --------------------------------------------------------  IN: 360 mL / OUT: 0 mL / NET: 360 mL        Appearance: Normal	  Psychiatry: A & O x 3, Mood & affect appropriate  HEENT:   Normal oral mucosa, PERRL, EOMI	  Lymphatic: No lymphadenopathy  Cardiovascular: Normal S1 S2,RRR, No JVD, No murmurs  Respiratory: Lungs clear to auscultation	  Gastrointestinal:  Soft, Non-tender, + BS	  Skin: No rashes, No ecchymoses, No cyanosis	  Neurologic: Non-focal  Extremities: Normal range of motion, No clubbing, cyanosis or edema  Vascular: Peripheral pulses palpable 2+ bilaterally      ECG:  	Not available    	  LABS:	 	    CARDIAC MARKERS:  Troponin I, Serum: 0.62 ng/mL (03-14 @ 05:41)  Troponin I, Serum: 0.63 ng/mL (03-13 @ 20:09)  Troponin I, Serum: 0.90 ng/mL (03-13 @ 12:40)                                    7.8    8.39  )-----------( 392      ( 14 Mar 2018 05:41 )             24.8     03-14    134<L>  |  97<L>  |  45<H>  ----------------------------<  152<H>  4.5   |  25  |  6.1<HH>    Ca    8.9      14 Mar 2018 05:41  Phos  5.5     03-14  Mg     2.4     03-14    TPro  6.2  /  Alb  3.1  /  TBili  0.9  /  DBili  x   /  AST  502<H>  /  ALT  616<H>  /  AlkPhos  62  03-14    PT/INR - ( 14 Mar 2018 05:41 )   PT: 21.90 sec;   INR: 1.96 ratio         PTT - ( 14 Mar 2018 05:41 )  PTT:31.4 sec

## 2018-03-14 NOTE — CONSULT NOTE ADULT - SUBJECTIVE AND OBJECTIVE BOX
Chief Complaint: Patient is a 57y old  Male who presents with a chief complaint of Weakness (13 Mar 2018 16:22)      HPI:  56 yo M w/ h/o CVA, ESRD on HD MWF, HTN, DM, Atrial fibrillation on coumadin presents with above cc.  Pt. is poor historian,  Pt. has been feeling weak for some time, unable to tell when symptoms have exactly started but has been becoming progressively weak, yesterday he had difficulty ambulating and asked his mom to bring his walker.  He then came to the ED to get checked out.  Pt. reports dark than usual colored stools (guaiac negative in ED), pt last colonoscopy about 3 years ago DR RIVERS, EGD DR RIVERS 1/2017.  Pt. denies fever, chills, cough, hemoptysis, hematemesis, CP, SOB, LOC, falls, nausea, vomiting, diarrhea.  Pt. does not recall his medications but gave a list to the ED. (13 Mar 2018 16:22)      Medications:  acetaminophen   Tablet 650 milliGRAM(s) Oral every 6 hours PRN  dextrose 5%. 1000 milliLiter(s) IV Continuous <Continuous>  dextrose 50% Injectable 12.5 Gram(s) IV Push once  dextrose 50% Injectable 25 Gram(s) IV Push once  dextrose 50% Injectable 25 Gram(s) IV Push once  dextrose Gel 1 Dose(s) Oral once PRN  epoetin raven Injectable 91591 Unit(s) IV Push <User Schedule>  furosemide    Tablet 80 milliGRAM(s) Oral two times a day  gabapentin 100 milliGRAM(s) Oral two times a day  glucagon  Injectable 1 milliGRAM(s) IntraMuscular once PRN  hydrOXYzine hydrochloride 25 milliGRAM(s) Oral two times a day PRN  insulin glargine Injectable (LANTUS) 21 Unit(s) SubCutaneous at bedtime  insulin lispro (HumaLOG) corrective regimen sliding scale   SubCutaneous three times a day before meals  insulin lispro Injectable (HumaLOG) 7 Unit(s) SubCutaneous before breakfast  insulin lispro Injectable (HumaLOG) 7 Unit(s) SubCutaneous before lunch  insulin lispro Injectable (HumaLOG) 7 Unit(s) SubCutaneous before dinner  metoprolol succinate ER 25 milliGRAM(s) Oral daily  pantoprazole    Tablet 40 milliGRAM(s) Oral every 12 hours  sevelamer hydrochloride 800 milliGRAM(s) Oral three times a day      PMHX/PSHX:  Atrial fibrillation  Right sided weakness  Stroke  Hypertension  High blood cholesterol  Diabetes mellitus, type 2  Dialysis patient  A-V fistula      Family history:  No pertinent family history in first degree relatives      Social History:     Allergies:  Orange (Other)  Originally Entered as [HIVES] reaction to [sulfur] (Unknown)  penicillin (Unknown)  sulfADIAZINE (Unknown)        Review of Systems:  General:  No wt loss, fevers, chills, night sweats, fatigue or pruritis.  Eyes:  Good vision, no reported pain or redness.  ENT:  No sore throat, pain, runny nose, or difficulty swallowing  CV:  No pain, palpitations, hypo/hypertension  Resp:  No dyspnea, cough, tachypnea, wheezing  GI:  No pain, nausea, vomiting, dysphagia, heartburn, diarrhea, constipation, or weight loss. , No rectal bleeding, tarry stools, or hematemesis.  :  No pain, bleeding/discharges, incontinence, nocturia  Musculoskeletal:  No pain, weakness or fasciculations.  Neuro:  No weakness, tingling, memory problems or paresthesias  Psych:  No fatigue, insomnia, mood problems, depression  Endocrine:  No polyuria, polydipsia, cold/heat intolerance  Heme:  No petechiae, ecchymosis, easy bruisability  Skin:  No rash, pruritis, tattoos, scars, or edema      PHYSICAL EXAM:   Vital Signs:  Vital Signs Last 24 Hrs  T(C): 36.2 (14 Mar 2018 15:01), Max: 36.6 (13 Mar 2018 23:15)  T(F): 97.1 (14 Mar 2018 15:01), Max: 97.9 (13 Mar 2018 23:15)  HR: 113 (14 Mar 2018 17:06) (87 - 117)  BP: 103/65 (14 Mar 2018 17:06) (92/57 - 124/57)  BP(mean): 78 (14 Mar 2018 17:06) (68 - 84)  RR: 17 (14 Mar 2018 17:06) (15 - 20)  SpO2: 97% (14 Mar 2018 17:06) (68% - 99%)  Daily     Daily     T(C): 36.2 (03-14-18 @ 15:01), Max: 36.6 (03-13-18 @ 23:15)  HR: 113 (03-14-18 @ 17:06) (87 - 117)  BP: 103/65 (03-14-18 @ 17:06) (92/57 - 124/57)  RR: 17 (03-14-18 @ 17:06) (15 - 20)  SpO2: 97% (03-14-18 @ 17:06) (68% - 99%)    GENERAL:  Appears no distress  HEENT:  Conjunctivae clear and pink, no thyromegaly, nodules, adenopathy, no JVD, sclera -anicteric  CHEST:  Full & symmetric excursion, no increased effort, breath sounds clear  HEART:  IRRegular rhythm,   ABDOMEN:  Soft, non-tender, non-distended, normoactive bowel sounds,  no masses ,no hepato-splenomegaly, no signs of chronic liver disease  EXTEREMITIES:  NEG edema  SKIN:  No rash/erythema/ecchymoses/petechiae/wounds/abscess/warm/dry  NEURO:  Alert, oriented, no asterixis, no tremor, no encephalopathy    LABS:                        7.8    8.39  )-----------( 392      ( 14 Mar 2018 05:41 )             24.8     03-14    134<L>  |  97<L>  |  45<H>  ----------------------------<  152<H>  4.5   |  25  |  6.1<HH>    Ca    8.9      14 Mar 2018 05:41  Phos  5.5     03-14  Mg     2.4     03-14    TPro  6.2  /  Alb  3.1  /  TBili  0.9  /  DBili  x   /  AST  502<H>  /  ALT  616<H>  /  AlkPhos  62  03-14    LIVER FUNCTIONS - ( 14 Mar 2018 05:41 )  Alb: 3.1 g/dL / Pro: 6.2 g/dL / ALK PHOS: 62 U/L / ALT: 616 U/L / AST: 502 U/L / GGT: x           PT/INR - ( 14 Mar 2018 05:41 )   PT: 21.90 sec;   INR: 1.96 ratio         PTT - ( 14 Mar 2018 05:41 )  PTT:31.4 sec        Imaging:      Assessment and Plan:

## 2018-03-14 NOTE — PROGRESS NOTE ADULT - SUBJECTIVE AND OBJECTIVE BOX
Patient was examined during HD treatment.    Hemodialysis Prescription:  	Access: LUE AVF  	Dialyzer: Opti 160  	Blood Flow (mL/Min): 400  	Dialysate Flow (mL/Min): 500  	Target UF (Liters): 2  	Treatment Time: 3 hours  	Potassium: 2K  	Calcium: 2.5

## 2018-03-14 NOTE — PROGRESS NOTE ADULT - SUBJECTIVE AND OBJECTIVE BOX
Pt. seen and examined    ICU Vital Signs Last 24 Hrs  T(C): 36.2 (14 Mar 2018 07:01), Max: 36.7 (13 Mar 2018 14:20)  T(F): 97.2 (14 Mar 2018 07:01), Max: 98.1 (13 Mar 2018 14:20)  HR: 114 (14 Mar 2018 07:08) (102 - 116)  BP: 96/56 (14 Mar 2018 07:08) (93/55 - 124/57)  BP(mean): 71 (14 Mar 2018 07:08) (68 - 90)  ABP: --  ABP(mean): --  RR: 15 (14 Mar 2018 07:08) (15 - 20)  SpO2: 83% (14 Mar 2018 07:08) (83% - 99%)    I&O's Summary    13 Mar 2018 07:01  -  14 Mar 2018 07:00  --------------------------------------------------------  IN: 1367 mL / OUT: 0 mL / NET: 1367 mL    PHYSICAL EXAM:  GENERAL: NAD, well-developed  HEAD:  Atraumatic, Normocephalic  EYES: EOMI, PERRLA, conjunctiva and sclera clear  NECK: Supple, No JVD  CHEST/LUNG: Clear to auscultation bilaterally; No wheeze  HEART: Regular rate and rhythm; No murmurs, rubs, or gallops  ABDOMEN: Soft, Nontender, Nondistended; Bowel sounds present  EXTREMITIES:  2+ Peripheral Pulses, No clubbing, cyanosis, or edema  PSYCH: AAOx3  NEUROLOGY: non-focal  SKIN: No rashes or lesions    MEDICATIONS  (STANDING):  amLODIPine   Tablet 10 milliGRAM(s) Oral daily  atorvastatin 20 milliGRAM(s) Oral at bedtime  dextrose 5%. 1000 milliLiter(s) (50 mL/Hr) IV Continuous <Continuous>  dextrose 50% Injectable 12.5 Gram(s) IV Push once  dextrose 50% Injectable 25 Gram(s) IV Push once  dextrose 50% Injectable 25 Gram(s) IV Push once  furosemide    Tablet 40 milliGRAM(s) Oral daily  gabapentin 100 milliGRAM(s) Oral two times a day  insulin glargine Injectable (LANTUS) 21 Unit(s) SubCutaneous at bedtime  insulin lispro (HumaLOG) corrective regimen sliding scale   SubCutaneous three times a day before meals  insulin lispro Injectable (HumaLOG) 7 Unit(s) SubCutaneous before breakfast  insulin lispro Injectable (HumaLOG) 7 Unit(s) SubCutaneous before lunch  insulin lispro Injectable (HumaLOG) 7 Unit(s) SubCutaneous before dinner  losartan 100 milliGRAM(s) Oral daily  metoprolol succinate ER 25 milliGRAM(s) Oral daily  pantoprazole    Tablet 40 milliGRAM(s) Oral before breakfast  sevelamer hydrochloride 800 milliGRAM(s) Oral three times a day    MEDICATIONS  (PRN):  acetaminophen   Tablet 650 milliGRAM(s) Oral every 6 hours PRN For Temp greater than 38 C (100.4 F)  dextrose Gel 1 Dose(s) Oral once PRN Blood Glucose LESS THAN 70 milliGRAM(s)/deciliter  glucagon  Injectable 1 milliGRAM(s) IntraMuscular once PRN Glucose LESS THAN 70 milligrams/deciliter  hydrOXYzine hydrochloride 25 milliGRAM(s) Oral two times a day PRN Agitation                            7.8    8.39  )-----------( 392      ( 14 Mar 2018 05:41 )             24.8       03-14    134<L>  |  97<L>  |  45<H>  ----------------------------<  152<H>  4.5   |  25  |  6.1<HH>    Ca    8.9      14 Mar 2018 05:41  Phos  5.5     03-14  Mg     2.4     03-14    TPro  6.2  /  Alb  3.1  /  TBili  0.9  /  DBili  x   /  AST  502<H>  /  ALT  616<H>  /  AlkPhos  62  03-14          PT/INR - ( 14 Mar 2018 05:41 )   PT: 21.90 sec;   INR: 1.96 ratio       PTT - ( 14 Mar 2018 05:41 )  PTT:31.4 sec    CARDIAC MARKERS ( 14 Mar 2018 05:41 )  0.62 ng/mL / x     / x     / x     / 11.6 ng/mL  CARDIAC MARKERS ( 13 Mar 2018 20:09 )  0.63 ng/mL / x     / 1692 U/L / x     / 14.4 ng/mL  CARDIAC MARKERS ( 13 Mar 2018 12:40 )  0.90 ng/mL / x     / 2207 U/L / x     / 16.5 ng/mL    CAPILLARY BLOOD GLUCOSE  209 (13 Mar 2018 22:01)

## 2018-03-14 NOTE — CONSULT NOTE ADULT - ASSESSMENT
1. Anemia. Trend H/H. Check guaiac. Pulm eval for thoracentesis. Transfuse to keep Hgb 9. EPO 68353 units IV with HD.  2. ESRD on HD MWF. HD today: 3 hours, opti 160 dialyzer, 2K bath, 2L UF.  3. HTN. Stop amlodipine and losartan. Increase metoprolol as needed.  4. Hyperphosphatemia. Sevelamer with meals. Check phos level. Renal diet.

## 2018-03-14 NOTE — CONSULT NOTE ADULT - ASSESSMENT
Patient with hx afib ESRD on HMD. He is anemic. Found increased troponin. No symptoms MI. Need Rx anemia. Afib rate controlled on coumadin. Need determine etiology anemia. Need echo

## 2018-03-15 LAB
ALBUMIN SERPL ELPH-MCNC: 3.6 G/DL — SIGNIFICANT CHANGE UP (ref 3.5–5.2)
ALP SERPL-CCNC: 76 U/L — SIGNIFICANT CHANGE UP (ref 30–115)
ALT FLD-CCNC: 500 U/L — HIGH (ref 0–41)
ANION GAP SERPL CALC-SCNC: 17 MMOL/L — HIGH (ref 7–14)
APTT BLD: 30.1 SEC — SIGNIFICANT CHANGE UP (ref 27–39.2)
AST SERPL-CCNC: 259 U/L — HIGH (ref 0–41)
BASOPHILS # BLD AUTO: 0.17 K/UL — SIGNIFICANT CHANGE UP (ref 0–0.2)
BASOPHILS NFR BLD AUTO: 1.9 % — HIGH (ref 0–1)
BILIRUB DIRECT SERPL-MCNC: 0.4 MG/DL — HIGH (ref 0–0.2)
BILIRUB INDIRECT FLD-MCNC: 0.2 MG/DL — SIGNIFICANT CHANGE UP (ref 0.2–1.2)
BILIRUB SERPL-MCNC: 0.6 MG/DL — SIGNIFICANT CHANGE UP (ref 0.2–1.2)
BUN SERPL-MCNC: 38 MG/DL — HIGH (ref 10–20)
CALCIUM SERPL-MCNC: 8.5 MG/DL — SIGNIFICANT CHANGE UP (ref 8.5–10.1)
CHLORIDE SERPL-SCNC: 95 MMOL/L — LOW (ref 98–110)
CO2 SERPL-SCNC: 26 MMOL/L — SIGNIFICANT CHANGE UP (ref 17–32)
CREAT SERPL-MCNC: 5.4 MG/DL — CRITICAL HIGH (ref 0.7–1.5)
EOSINOPHIL # BLD AUTO: 0.18 K/UL — SIGNIFICANT CHANGE UP (ref 0–0.7)
EOSINOPHIL NFR BLD AUTO: 2.1 % — SIGNIFICANT CHANGE UP (ref 0–8)
GLUCOSE SERPL-MCNC: 66 MG/DL — LOW (ref 70–110)
HCT VFR BLD CALC: 27.3 % — LOW (ref 42–52)
HGB BLD-MCNC: 8.7 G/DL — LOW (ref 14–18)
IMM GRANULOCYTES NFR BLD AUTO: 0.3 % — SIGNIFICANT CHANGE UP (ref 0.1–0.3)
INR BLD: 1.78 RATIO — HIGH (ref 0.65–1.3)
LYMPHOCYTES # BLD AUTO: 2.23 K/UL — SIGNIFICANT CHANGE UP (ref 1.2–3.4)
LYMPHOCYTES # BLD AUTO: 25.5 % — SIGNIFICANT CHANGE UP (ref 20.5–51.1)
MCHC RBC-ENTMCNC: 30.1 PG — SIGNIFICANT CHANGE UP (ref 27–31)
MCHC RBC-ENTMCNC: 31.9 G/DL — LOW (ref 32–37)
MCV RBC AUTO: 94.5 FL — HIGH (ref 80–94)
MONOCYTES # BLD AUTO: 0.8 K/UL — HIGH (ref 0.1–0.6)
MONOCYTES NFR BLD AUTO: 9.2 % — SIGNIFICANT CHANGE UP (ref 1.7–9.3)
NEUTROPHILS # BLD AUTO: 5.32 K/UL — SIGNIFICANT CHANGE UP (ref 1.4–6.5)
NEUTROPHILS NFR BLD AUTO: 61 % — SIGNIFICANT CHANGE UP (ref 42.2–75.2)
NRBC # BLD: 0 /100 WBCS — SIGNIFICANT CHANGE UP (ref 0–0)
PLATELET # BLD AUTO: 286 K/UL — SIGNIFICANT CHANGE UP (ref 130–400)
POTASSIUM SERPL-MCNC: 4.2 MMOL/L — SIGNIFICANT CHANGE UP (ref 3.5–5)
POTASSIUM SERPL-SCNC: 4.2 MMOL/L — SIGNIFICANT CHANGE UP (ref 3.5–5)
PROT SERPL-MCNC: 6 G/DL — SIGNIFICANT CHANGE UP (ref 6–8)
PROTHROM AB SERPL-ACNC: 19.8 SEC — HIGH (ref 9.95–12.87)
RBC # BLD: 2.89 M/UL — LOW (ref 4.7–6.1)
RBC # FLD: 21.4 % — HIGH (ref 11.5–14.5)
SODIUM SERPL-SCNC: 138 MMOL/L — SIGNIFICANT CHANGE UP (ref 135–146)
WBC # BLD: 8.73 K/UL — SIGNIFICANT CHANGE UP (ref 4.8–10.8)
WBC # FLD AUTO: 8.73 K/UL — SIGNIFICANT CHANGE UP (ref 4.8–10.8)

## 2018-03-15 RX ORDER — DEXTROSE 50 % IN WATER 50 %
50 SYRINGE (ML) INTRAVENOUS ONCE
Qty: 0 | Refills: 0 | Status: COMPLETED | OUTPATIENT
Start: 2018-03-15 | End: 2018-03-15

## 2018-03-15 RX ORDER — WARFARIN SODIUM 2.5 MG/1
5 TABLET ORAL ONCE
Qty: 0 | Refills: 0 | Status: COMPLETED | OUTPATIENT
Start: 2018-03-15 | End: 2018-03-15

## 2018-03-15 RX ORDER — METOPROLOL TARTRATE 50 MG
25 TABLET ORAL
Qty: 0 | Refills: 0 | Status: DISCONTINUED | OUTPATIENT
Start: 2018-03-15 | End: 2018-03-16

## 2018-03-15 RX ADMIN — PANTOPRAZOLE SODIUM 40 MILLIGRAM(S): 20 TABLET, DELAYED RELEASE ORAL at 17:39

## 2018-03-15 RX ADMIN — Medication 50 MILLILITER(S): at 11:11

## 2018-03-15 RX ADMIN — Medication 25 MILLIGRAM(S): at 06:23

## 2018-03-15 RX ADMIN — Medication 25 MILLIGRAM(S): at 17:39

## 2018-03-15 RX ADMIN — PANTOPRAZOLE SODIUM 40 MILLIGRAM(S): 20 TABLET, DELAYED RELEASE ORAL at 06:23

## 2018-03-15 RX ADMIN — Medication 80 MILLIGRAM(S): at 17:40

## 2018-03-15 RX ADMIN — WARFARIN SODIUM 5 MILLIGRAM(S): 2.5 TABLET ORAL at 21:26

## 2018-03-15 RX ADMIN — GABAPENTIN 100 MILLIGRAM(S): 400 CAPSULE ORAL at 06:23

## 2018-03-15 RX ADMIN — Medication 80 MILLIGRAM(S): at 06:24

## 2018-03-15 RX ADMIN — INSULIN GLARGINE 21 UNIT(S): 100 INJECTION, SOLUTION SUBCUTANEOUS at 21:26

## 2018-03-15 RX ADMIN — GABAPENTIN 100 MILLIGRAM(S): 400 CAPSULE ORAL at 17:39

## 2018-03-15 RX ADMIN — SEVELAMER CARBONATE 800 MILLIGRAM(S): 2400 POWDER, FOR SUSPENSION ORAL at 17:39

## 2018-03-15 NOTE — PROGRESS NOTE ADULT - SUBJECTIVE AND OBJECTIVE BOX
Patient is a 57y old  Male who presents with a chief complaint of Weakness. Still feels weak      T(F): 98.6 (03-15-18 @ 07:05), Max: 98.6 (03-15-18 @ 07:05)  HR: 104 (03-15-18 @ 03:38)  BP: 101/59 (03-15-18 @ 03:38)  RR: 18 (03-15-18 @ 07:05)  SpO2: 96% (03-15-18 @ 03:38) (68% - 99%)    PHYSICAL EXAM:  GENERAL: NAD, well-groomed, well-developed  HEAD:  Atraumatic, Normocephalic  EYES: EOMI, PERRLA, conjunctiva and sclera clear  ENMT: No tonsillar erythema, exudates, or enlargement; Moist mucous membranes, Good dentition, No lesions  NECK: Supple, No JVD, Normal thyroid  NERVOUS SYSTEM:  Alert & Oriented X3,  Motor Strength 5/5 B/L upper and lower extremities  CHEST/LUNG: Clear to percussion bilaterally; No rales, rhonchi, wheezing, or rubs  HEART: Regular rate and rhythm; No murmurs, rubs, or gallops  ABDOMEN: Soft, Nontender, Nondistended; Bowel sounds present  EXTREMITIES:   No clubbing, cyanosis, or edema  LYMPH: No lymphadenopathy noted  SKIN: No rashes or lesions    labs  03-14    134<L>  |  97<L>  |  45<H>  ----------------------------<  152<H>  4.5   |  25  |  6.1<HH>    Ca    8.9      14 Mar 2018 05:41  Phos  5.5     03-14  Mg     2.4     03-14    TPro  6.2  /  Alb  3.1  /  TBili  0.9  /  DBili  x   /  AST  502<H>  /  ALT  616<H>  /  AlkPhos  62  03-14                          8.7    8.73  )-----------( 286      ( 15 Mar 2018 07:00 )             27.3       Culture - Blood (collected 13 Mar 2018 20:09)  Source: .Blood None  Preliminary Report (15 Mar 2018 02:02):    No growth to date.      PT/INR - ( 15 Mar 2018 07:00 )   PT: 19.80 sec;   INR: 1.78 ratio         PTT - ( 15 Mar 2018 07:00 )  PTT:30.1 sec        acetaminophen   Tablet 650 milliGRAM(s) Oral every 6 hours PRN  dextrose 5%. 1000 milliLiter(s) IV Continuous <Continuous>  dextrose 50% Injectable 12.5 Gram(s) IV Push once  dextrose 50% Injectable 25 Gram(s) IV Push once  dextrose 50% Injectable 25 Gram(s) IV Push once  dextrose Gel 1 Dose(s) Oral once PRN  epoetin raven Injectable 92018 Unit(s) IV Push <User Schedule>  furosemide    Tablet 80 milliGRAM(s) Oral two times a day  gabapentin 100 milliGRAM(s) Oral two times a day  glucagon  Injectable 1 milliGRAM(s) IntraMuscular once PRN  hydrOXYzine hydrochloride 25 milliGRAM(s) Oral two times a day PRN  insulin glargine Injectable (LANTUS) 21 Unit(s) SubCutaneous at bedtime  insulin lispro (HumaLOG) corrective regimen sliding scale   SubCutaneous three times a day before meals  insulin lispro Injectable (HumaLOG) 7 Unit(s) SubCutaneous before breakfast  insulin lispro Injectable (HumaLOG) 7 Unit(s) SubCutaneous before lunch  insulin lispro Injectable (HumaLOG) 7 Unit(s) SubCutaneous before dinner  metoprolol     tartrate 25 milliGRAM(s) Oral two times a day  pantoprazole    Tablet 40 milliGRAM(s) Oral every 12 hours  sevelamer hydrochloride 800 milliGRAM(s) Oral three times a day

## 2018-03-15 NOTE — PROGRESS NOTE ADULT - SUBJECTIVE AND OBJECTIVE BOX
Patient is a 57y old  Male who presents with a chief complaint of Weakness (13 Mar 2018 16:22)      PAST MEDICAL & SURGICAL HISTORY:  Atrial fibrillation  Right sided weakness  Stroke  Hypertension  High blood cholesterol  Diabetes mellitus, type 2  Dialysis patient: Mon, wednesday, friday  A-V fistula    FAMILY HISTORY:  No pertinent family history in first degree relatives      Social: (-) tobacco, alcohol, drug use    MEDICATIONS  (STANDING):  dextrose 5%. 1000 milliLiter(s) (50 mL/Hr) IV Continuous <Continuous>  dextrose 50% Injectable 50 milliLiter(s) IV Push once  dextrose 50% Injectable 12.5 Gram(s) IV Push once  dextrose 50% Injectable 25 Gram(s) IV Push once  dextrose 50% Injectable 25 Gram(s) IV Push once  epoetin raven Injectable 25493 Unit(s) IV Push <User Schedule>  furosemide    Tablet 80 milliGRAM(s) Oral two times a day  gabapentin 100 milliGRAM(s) Oral two times a day  insulin glargine Injectable (LANTUS) 21 Unit(s) SubCutaneous at bedtime  insulin lispro (HumaLOG) corrective regimen sliding scale   SubCutaneous three times a day before meals  insulin lispro Injectable (HumaLOG) 7 Unit(s) SubCutaneous before breakfast  insulin lispro Injectable (HumaLOG) 7 Unit(s) SubCutaneous before lunch  insulin lispro Injectable (HumaLOG) 7 Unit(s) SubCutaneous before dinner  metoprolol     tartrate 25 milliGRAM(s) Oral two times a day  pantoprazole    Tablet 40 milliGRAM(s) Oral every 12 hours  sevelamer hydrochloride 800 milliGRAM(s) Oral three times a day    MEDICATIONS  (PRN):  acetaminophen   Tablet 650 milliGRAM(s) Oral every 6 hours PRN For Temp greater than 38 C (100.4 F)  dextrose Gel 1 Dose(s) Oral once PRN Blood Glucose LESS THAN 70 milliGRAM(s)/deciliter  glucagon  Injectable 1 milliGRAM(s) IntraMuscular once PRN Glucose LESS THAN 70 milligrams/deciliter  hydrOXYzine hydrochloride 25 milliGRAM(s) Oral two times a day PRN Agitation      Overnight events: no BM overnight , last BM was black and was the night before presentation. no bloody vomiting    Vital Signs Last 24 Hrs  T(C): 37 (15 Mar 2018 07:05), Max: 37 (15 Mar 2018 07:05)  T(F): 98.6 (15 Mar 2018 07:05), Max: 98.6 (15 Mar 2018 07:05)  HR: 74 (15 Mar 2018 07:05) (74 - 117)  BP: 101/63 (15 Mar 2018 07:05) (92/57 - 113/63)  BP(mean): 78 (15 Mar 2018 07:05) (69 - 81)  RR: 18 (15 Mar 2018 07:05) (16 - 20)  SpO2: 96% (15 Mar 2018 07:05) (68% - 99%)  CAPILLARY BLOOD GLUCOSE  138 (15 Mar 2018 09:08)  69 (15 Mar 2018 08:17)  127 (14 Mar 2018 22:00)  175 (14 Mar 2018 11:20)        I&O's Summary    14 Mar 2018 07:01  -  15 Mar 2018 07:00  --------------------------------------------------------  IN: 610 mL / OUT: 2925 mL / NET: -2315 mL        Physical Exam:    -     General : In no acute distress. LUE fistula with good thrill. radial pulse normal     -      Cardiac: irregular RR , no added sounds    -      Pulm: GBAE, clear lungs    -      GI: soft abdomen, positive BS< no tenderness     -      Neuro: AAOx3, no focal neurologic deficit        Labs:                        8.7    8.73  )-----------( 286      ( 15 Mar 2018 07:00 )             27.3             03-15    138  |  95<L>  |  38<H>  ----------------------------<  66<L>  4.2   |  26  |  5.4<HH>    Ca    8.5      15 Mar 2018 07:00  Phos  5.5     03-14  Mg     2.4     03-14    TPro  6.0  /  Alb  3.6  /  TBili  0.6  /  DBili  0.4<H>  /  AST  259<H>  /  ALT  500<H>  /  AlkPhos  76  03-15    LIVER FUNCTIONS - ( 15 Mar 2018 07:00 )  Alb: 3.6 g/dL / Pro: 6.0 g/dL / ALK PHOS: 76 U/L / ALT: 500 U/L / AST: 259 U/L / GGT: x                 PT/INR - ( 15 Mar 2018 07:00 )   PT: 19.80 sec;   INR: 1.78 ratio         PTT - ( 15 Mar 2018 07:00 )  PTT:30.1 sec  CARDIAC MARKERS ( 14 Mar 2018 05:41 )  0.62 ng/mL / x     / 1197 U/L / x     / 11.6 ng/mL  CARDIAC MARKERS ( 13 Mar 2018 20:09 )  0.63 ng/mL / x     / 1692 U/L / x     / 14.4 ng/mL  CARDIAC MARKERS ( 13 Mar 2018 12:40 )  0.90 ng/mL / x     / 2207 U/L / x     / 16.5 ng/mL            Culture - Blood (collected 13 Mar 2018 20:09)  Source: .Blood None  Preliminary Report (15 Mar 2018 02:02):    No growth to date.

## 2018-03-15 NOTE — PROGRESS NOTE ADULT - ASSESSMENT
IMPRESSION: Acute Blood Loss Anemia, NSTEMI, Transaminitis, s/p 1 unit of PRBCs      PLAN:    CNS: No sedatives     HEENT: HOB at 45    PULMONARY: Keep SpO2>92%    CARDIOVASCULAR: follow up cardiology   resume coumadin or can try heparin for 24 hrs and if H/H stable switch to coumadin   on lasix continue   on HD     GI: GI prophylaxis.  Oral diet, Follow up with GI, Protonix 40mg Q12 PO for now, RUQ US     RENAL: Follow up with renal for HD    INFECTIOUS DISEASE: no Abx    HEMATOLOGICAL: Sequentials, f/u INR, PTT, PT    ENDOCRINE:  Follow up FS.  Insulin protocol if needed.    can be downgrade to floor if ok by cardiology follow up cardio IMPRESSION: Acute Blood Loss Anemia, NSTEMI, Transaminitis, s/p 1 unit of PRBCs      PLAN:    CNS: No sedatives     HEENT: HOB at 45    PULMONARY: Keep SpO2>92%    CARDIOVASCULAR: follow up cardiology   resume coumadin or can try heparin for 24 hrs and if H/H stable switch to coumadin   on lasix continue   on HD     GI: GI prophylaxis. EGD     RENAL: Follow up with renal for HD    INFECTIOUS DISEASE: no Abx    HEMATOLOGICAL: Sequentials, f/u INR, PTT, PT    ENDOCRINE:  Follow up FS.  Insulin protocol if needed.    can be downgrade to floor if ok by cardiology follow up cardio, if EGD no active bleed

## 2018-03-15 NOTE — PROGRESS NOTE ADULT - SUBJECTIVE AND OBJECTIVE BOX
ROGER RODRIGUES  57y  Male    Patient is a 57y old  Male who presents with a chief complaint of Weakness (13 Mar 2018 16:22)    ALLERGIES:  Orange (Other)  Originally Entered as [HIVES] reaction to [sulfur] (Unknown)  penicillin (Unknown)  sulfADIAZINE (Unknown)      INTERVAL HPI/OVERNIGHT EVENTS:    VITALS:  T(F): 98.6 (03-15-18 @ 07:05), Max: 98.6 (03-15-18 @ 07:05)  HR: 74 (03-15-18 @ 07:05) (74 - 116)  BP: 101/63 (03-15-18 @ 07:05) (92/57 - 113/63)  RR: 18 (03-15-18 @ 07:05) (16 - 20)  SpO2: 96% (03-15-18 @ 07:05) (68% - 99%)    LABS:  03-15    138  |  95<L>  |  38<H>  ----------------------------<  66<L>  4.2   |  26  |  5.4<HH>    Ca    8.5      15 Mar 2018 07:00  Phos  5.5     03-14  Mg     2.4     03-14    TPro  6.0  /  Alb  3.6  /  TBili  0.6  /  DBili  0.4<H>  /  AST  259<H>  /  ALT  500<H>  /  AlkPhos  76  03-15    MICROBIOLOGY:    Culture - Blood (collected 03-13-18 @ 20:09)  Source: .Blood None  Preliminary Report (03-15-18 @ 02:02):    No growth to date.      MEDICATION:  acetaminophen   Tablet 650 milliGRAM(s) Oral every 6 hours PRN  dextrose 5%. 1000 milliLiter(s) IV Continuous <Continuous>  dextrose 50% Injectable 12.5 Gram(s) IV Push once  dextrose 50% Injectable 25 Gram(s) IV Push once  dextrose 50% Injectable 25 Gram(s) IV Push once  dextrose Gel 1 Dose(s) Oral once PRN  epoetin raven Injectable 62719 Unit(s) IV Push <User Schedule>  furosemide    Tablet 80 milliGRAM(s) Oral two times a day  gabapentin 100 milliGRAM(s) Oral two times a day  glucagon  Injectable 1 milliGRAM(s) IntraMuscular once PRN  hydrOXYzine hydrochloride 25 milliGRAM(s) Oral two times a day PRN  insulin glargine Injectable (LANTUS) 21 Unit(s) SubCutaneous at bedtime  insulin lispro (HumaLOG) corrective regimen sliding scale   SubCutaneous three times a day before meals  insulin lispro Injectable (HumaLOG) 7 Unit(s) SubCutaneous before breakfast  insulin lispro Injectable (HumaLOG) 7 Unit(s) SubCutaneous before lunch  insulin lispro Injectable (HumaLOG) 7 Unit(s) SubCutaneous before dinner  metoprolol     tartrate 25 milliGRAM(s) Oral two times a day  pantoprazole    Tablet 40 milliGRAM(s) Oral every 12 hours  sevelamer hydrochloride 800 milliGRAM(s) Oral three times a day    RADIOLOGY & ADDITIONAL TESTS:

## 2018-03-15 NOTE — PROGRESS NOTE ADULT - SUBJECTIVE AND OBJECTIVE BOX
[facility] FOLLOW UP NOTE  --------------------------------------------------------------------------------  24 hour events/subjective: Patient examined. Appears comfortable.    PAST HISTORY  --------------------------------------------------------------------------------  No significant changes to PMH, PSH, FHx, SHx, unless otherwise noted    ALLERGIES & MEDICATIONS  --------------------------------------------------------------------------------  Allergies    Orange (Other)  Originally Entered as [HIVES] reaction to [sulfur] (Unknown)  penicillin (Unknown)  sulfADIAZINE (Unknown)    Standing Inpatient Medications  dextrose 5%. 1000 milliLiter(s) IV Continuous <Continuous>  dextrose 50% Injectable 12.5 Gram(s) IV Push once  dextrose 50% Injectable 25 Gram(s) IV Push once  dextrose 50% Injectable 25 Gram(s) IV Push once  epoetin raven Injectable 66913 Unit(s) IV Push <User Schedule>  furosemide    Tablet 80 milliGRAM(s) Oral two times a day  gabapentin 100 milliGRAM(s) Oral two times a day  insulin glargine Injectable (LANTUS) 21 Unit(s) SubCutaneous at bedtime  insulin lispro (HumaLOG) corrective regimen sliding scale   SubCutaneous three times a day before meals  insulin lispro Injectable (HumaLOG) 7 Unit(s) SubCutaneous before breakfast  insulin lispro Injectable (HumaLOG) 7 Unit(s) SubCutaneous before lunch  insulin lispro Injectable (HumaLOG) 7 Unit(s) SubCutaneous before dinner  metoprolol     tartrate 25 milliGRAM(s) Oral two times a day  pantoprazole    Tablet 40 milliGRAM(s) Oral every 12 hours  sevelamer hydrochloride 800 milliGRAM(s) Oral three times a day    PRN Inpatient Medications  acetaminophen   Tablet 650 milliGRAM(s) Oral every 6 hours PRN  dextrose Gel 1 Dose(s) Oral once PRN  glucagon  Injectable 1 milliGRAM(s) IntraMuscular once PRN  hydrOXYzine hydrochloride 25 milliGRAM(s) Oral two times a day PRN      VITALS/PHYSICAL EXAM  --------------------------------------------------------------------------------  T(C): 37 (03-15-18 @ 07:05), Max: 37 (03-15-18 @ 07:05)  HR: 74 (03-15-18 @ 07:05) (74 - 113)  BP: 101/63 (03-15-18 @ 07:05) (92/57 - 103/65)  RR: 18 (03-15-18 @ 07:05) (16 - 18)  SpO2: 96% (03-15-18 @ 07:05) (91% - 99%)  Height (cm): 177.8 (03-15-18 @ 12:19)  Weight (kg): 96.3 (03-15-18 @ 12:19)  BMI (kg/m2): 30.5 (03-15-18 @ 12:19)  BSA (m2): 2.14 (03-15-18 @ 12:19)    03-14-18 @ 07:01  -  03-15-18 @ 07:00  --------------------------------------------------------  IN: 610 mL / OUT: 2925 mL / NET: -2315 mL      Physical Exam:  	Gen: NAD  	Pulm: CTA B/L  	CV: irreg irreg  	Abd: +BS, soft, nontender/nondistended  	: No suprapubic tenderness  	LE: Warm, no edema  	Vascular access: AVF    LABS/STUDIES  --------------------------------------------------------------------------------              8.7    8.73  >-----------<  286      [03-15-18 @ 07:00]              27.3     138  |  95  |  38  ----------------------------<  66      [03-15-18 @ 07:00]  4.2   |  26  |  5.4        Ca     8.5     [03-15-18 @ 07:00]      Mg     2.4     [03-14-18 @ 05:41]      Phos  5.5     [03-14-18 @ 05:41]    TPro  6.0  /  Alb  3.6  /  TBili  0.6  /  DBili  0.4  /  AST  259  /  ALT  500  /  AlkPhos  76  [03-15-18 @ 07:00]    PT/INR: PT 19.80, INR 1.78       [03-15-18 @ 07:00]  PTT: 30.1       [03-15-18 @ 07:00]    Troponin 0.62      [03-14-18 @ 05:41]  CK 1197      [03-14-18 @ 05:41]

## 2018-03-15 NOTE — PROGRESS NOTE ADULT - ASSESSMENT
Patient still feels weeak. Check enzymes cbc and echo. OOB to chair. Possibly ambulate if stable. Possible outpatient dobutamine SE in 4 to 6 weeks

## 2018-03-15 NOTE — PROGRESS NOTE ADULT - SUBJECTIVE AND OBJECTIVE BOX
Over Night Events:  Patient seen and examined no active bleed as per GI no need for EGD , and ok to resume AC,   patient been refusing to place the monitor lead and to have his BP checked       ROS:  See HPI    PHYSICAL EXAM    ICU Vital Signs Last 24 Hrs  T(C): 37 (15 Mar 2018 07:05), Max: 37 (15 Mar 2018 07:05)  T(F): 98.6 (15 Mar 2018 07:05), Max: 98.6 (15 Mar 2018 07:05)  HR: 104 (15 Mar 2018 03:38) (87 - 117)  BP: 101/59 (15 Mar 2018 03:38) (92/57 - 113/63)  BP(mean): 75 (15 Mar 2018 03:38) (69 - 81)  ABP: --  ABP(mean): --  RR: 18 (15 Mar 2018 07:05) (16 - 20)  SpO2: 96% (15 Mar 2018 03:38) (68% - 99%)      General: AOx3  HEENT:                Lymph Nodes: NO cervical LN   Lungs: Bilateral BS  Cardiovascular: Regular   Abdomen: Soft, Positive BS  Extremities: No clubbing   Skin:   Neurological: follow command move all ext     I&O's Detail    14 Mar 2018 07:01  -  15 Mar 2018 07:00  --------------------------------------------------------  IN:    Oral Fluid: 360 mL    Packed Red Blood Cells: 250 mL  Total IN: 610 mL    OUT:    Other: 2750 mL    Voided: 175 mL  Total OUT: 2925 mL    Total NET: -2315 mL          LABS:                          8.7    8.73  )-----------( 286      ( 15 Mar 2018 07:00 )             27.3         14 Mar 2018 05:41    134    |  97     |  45     ----------------------------<  152    4.5     |  25     |  6.1      Ca    8.9        14 Mar 2018 05:41  Phos  5.5       14 Mar 2018 05:41  Mg     2.4       14 Mar 2018 05:41                                               PT/INR - ( 15 Mar 2018 07:00 )   PT: 19.80 sec;   INR: 1.78 ratio         PTT - ( 15 Mar 2018 07:00 )  PTT:30.1 sec                                             CARDIAC MARKERS ( 14 Mar 2018 05:41 )  0.62 ng/mL / x     / 1197 U/L / x     / 11.6 ng/mL  CARDIAC MARKERS ( 13 Mar 2018 20:09 )  0.63 ng/mL / x     / 1692 U/L / x     / 14.4 ng/mL  CARDIAC MARKERS ( 13 Mar 2018 12:40 )  0.90 ng/mL / x     / 2207 U/L / x     / 16.5 ng/mL                                                        Culture - Blood (collected 13 Mar 2018 20:09)  Source: .Blood None  Preliminary Report (15 Mar 2018 02:02):    No growth to date.                                                                                           MEDICATIONS  (STANDING):  dextrose 5%. 1000 milliLiter(s) (50 mL/Hr) IV Continuous <Continuous>  dextrose 50% Injectable 12.5 Gram(s) IV Push once  dextrose 50% Injectable 25 Gram(s) IV Push once  dextrose 50% Injectable 25 Gram(s) IV Push once  epoetin raven Injectable 47630 Unit(s) IV Push <User Schedule>  furosemide    Tablet 80 milliGRAM(s) Oral two times a day  gabapentin 100 milliGRAM(s) Oral two times a day  insulin glargine Injectable (LANTUS) 21 Unit(s) SubCutaneous at bedtime  insulin lispro (HumaLOG) corrective regimen sliding scale   SubCutaneous three times a day before meals  insulin lispro Injectable (HumaLOG) 7 Unit(s) SubCutaneous before breakfast  insulin lispro Injectable (HumaLOG) 7 Unit(s) SubCutaneous before lunch  insulin lispro Injectable (HumaLOG) 7 Unit(s) SubCutaneous before dinner  metoprolol     tartrate 25 milliGRAM(s) Oral two times a day  pantoprazole    Tablet 40 milliGRAM(s) Oral every 12 hours  sevelamer hydrochloride 800 milliGRAM(s) Oral three times a day    MEDICATIONS  (PRN):  acetaminophen   Tablet 650 milliGRAM(s) Oral every 6 hours PRN For Temp greater than 38 C (100.4 F)  dextrose Gel 1 Dose(s) Oral once PRN Blood Glucose LESS THAN 70 milliGRAM(s)/deciliter  glucagon  Injectable 1 milliGRAM(s) IntraMuscular once PRN Glucose LESS THAN 70 milligrams/deciliter  hydrOXYzine hydrochloride 25 milliGRAM(s) Oral two times a day PRN Agitation          Xrays:  R effusion                                                                                ECHO:    IMPRESSION: Over Night Events:  Patient seen and examined no active bleed  patient been refusing to place the monitor lead and to have his BP checked   pending EGD today       ROS:  See HPI    PHYSICAL EXAM    ICU Vital Signs Last 24 Hrs  T(C): 37 (15 Mar 2018 07:05), Max: 37 (15 Mar 2018 07:05)  T(F): 98.6 (15 Mar 2018 07:05), Max: 98.6 (15 Mar 2018 07:05)  HR: 104 (15 Mar 2018 03:38) (87 - 117)  BP: 101/59 (15 Mar 2018 03:38) (92/57 - 113/63)  BP(mean): 75 (15 Mar 2018 03:38) (69 - 81)  ABP: --  ABP(mean): --  RR: 18 (15 Mar 2018 07:05) (16 - 20)  SpO2: 96% (15 Mar 2018 03:38) (68% - 99%)      General: AOx3  HEENT:                Lymph Nodes: NO cervical LN   Lungs: Bilateral BS  Cardiovascular: Regular   Abdomen: Soft, Positive BS  Extremities: No clubbing   Skin:   Neurological: follow command move all ext     I&O's Detail    14 Mar 2018 07:01  -  15 Mar 2018 07:00  --------------------------------------------------------  IN:    Oral Fluid: 360 mL    Packed Red Blood Cells: 250 mL  Total IN: 610 mL    OUT:    Other: 2750 mL    Voided: 175 mL  Total OUT: 2925 mL    Total NET: -2315 mL          LABS:                          8.7    8.73  )-----------( 286      ( 15 Mar 2018 07:00 )             27.3         14 Mar 2018 05:41    134    |  97     |  45     ----------------------------<  152    4.5     |  25     |  6.1      Ca    8.9        14 Mar 2018 05:41  Phos  5.5       14 Mar 2018 05:41  Mg     2.4       14 Mar 2018 05:41                                               PT/INR - ( 15 Mar 2018 07:00 )   PT: 19.80 sec;   INR: 1.78 ratio         PTT - ( 15 Mar 2018 07:00 )  PTT:30.1 sec                                             CARDIAC MARKERS ( 14 Mar 2018 05:41 )  0.62 ng/mL / x     / 1197 U/L / x     / 11.6 ng/mL  CARDIAC MARKERS ( 13 Mar 2018 20:09 )  0.63 ng/mL / x     / 1692 U/L / x     / 14.4 ng/mL  CARDIAC MARKERS ( 13 Mar 2018 12:40 )  0.90 ng/mL / x     / 2207 U/L / x     / 16.5 ng/mL                                                        Culture - Blood (collected 13 Mar 2018 20:09)  Source: .Blood None  Preliminary Report (15 Mar 2018 02:02):    No growth to date.                                                                                           MEDICATIONS  (STANDING):  dextrose 5%. 1000 milliLiter(s) (50 mL/Hr) IV Continuous <Continuous>  dextrose 50% Injectable 12.5 Gram(s) IV Push once  dextrose 50% Injectable 25 Gram(s) IV Push once  dextrose 50% Injectable 25 Gram(s) IV Push once  epoetin raven Injectable 84789 Unit(s) IV Push <User Schedule>  furosemide    Tablet 80 milliGRAM(s) Oral two times a day  gabapentin 100 milliGRAM(s) Oral two times a day  insulin glargine Injectable (LANTUS) 21 Unit(s) SubCutaneous at bedtime  insulin lispro (HumaLOG) corrective regimen sliding scale   SubCutaneous three times a day before meals  insulin lispro Injectable (HumaLOG) 7 Unit(s) SubCutaneous before breakfast  insulin lispro Injectable (HumaLOG) 7 Unit(s) SubCutaneous before lunch  insulin lispro Injectable (HumaLOG) 7 Unit(s) SubCutaneous before dinner  metoprolol     tartrate 25 milliGRAM(s) Oral two times a day  pantoprazole    Tablet 40 milliGRAM(s) Oral every 12 hours  sevelamer hydrochloride 800 milliGRAM(s) Oral three times a day    MEDICATIONS  (PRN):  acetaminophen   Tablet 650 milliGRAM(s) Oral every 6 hours PRN For Temp greater than 38 C (100.4 F)  dextrose Gel 1 Dose(s) Oral once PRN Blood Glucose LESS THAN 70 milliGRAM(s)/deciliter  glucagon  Injectable 1 milliGRAM(s) IntraMuscular once PRN Glucose LESS THAN 70 milligrams/deciliter  hydrOXYzine hydrochloride 25 milliGRAM(s) Oral two times a day PRN Agitation          Xrays:  R effusion                                                                                ECHO:

## 2018-03-15 NOTE — DIETITIAN INITIAL EVALUATION ADULT. - OTHER INFO
pt presents with weakness, inability to ambulate found to have symptomatic anemia, guaic negative, CT to r/o retroperitoneal bleed was negative, pt to have EGD today, s/p 2 u PRBC, PMHX: CVA, ESRD/HD, HTN, DM, afib, AVF

## 2018-03-15 NOTE — PROGRESS NOTE ADULT - ASSESSMENT
58 yo M w/ h/o CVA, ESRD on HD MWF, HTN, DM, Atrial fibrillation on coumadin presents with weakness.  Reports black stools for 1 week. guaiac negative    CT abdomen/pelvis r/o retroperitoneal bleed was negative    # Weakness 2/2 Symptomatic Anemia  - Hb stable s/p 2 u prbc transfusion.  -GI Dr Ovalle: EGD today scheduled at 2:30 pm  -plan per cardiology, to keep on tele, and if EGD negative for bleed, to resume coumadin for afib.    #Troponemia - most likely type 2 from anemia  -2d echo  -no heparin drip even if egd neg, keep on tele per cardiology    #transaminitis- likely secondary to an episode of hypotension   TA trending down, RUQ sono unremarkable    #HTN - c/w metoprol only for now    #ESRD on HD - renal c/s dr. wong, Had a HD 3/14    #DM - insulin, check fs    #GI/DVT PPx

## 2018-03-15 NOTE — PROGRESS NOTE ADULT - ASSESSMENT
1. Anemia. Hgb stable. EGD today. EPO 12849 units IV with HD.  2. ESRD on HD MWF. HD tomorrow: 3 hours, opti 160 dialyzer, 2K bath, 3L UF.  3. HTN. Continue metoprolol.  4. Hyperphosphatemia. Sevelamer with meals. Renal diet.

## 2018-03-16 ENCOUNTER — TRANSCRIPTION ENCOUNTER (OUTPATIENT)
Age: 58
End: 2018-03-16

## 2018-03-16 VITALS — HEIGHT: 70 IN | WEIGHT: 212.31 LBS

## 2018-03-16 LAB
ANION GAP SERPL CALC-SCNC: 16 MMOL/L — HIGH (ref 7–14)
APTT BLD: 30.6 SEC — SIGNIFICANT CHANGE UP (ref 27–39.2)
BUN SERPL-MCNC: 46 MG/DL — HIGH (ref 10–20)
CALCIUM SERPL-MCNC: 8.6 MG/DL — SIGNIFICANT CHANGE UP (ref 8.5–10.1)
CHLORIDE SERPL-SCNC: 91 MMOL/L — LOW (ref 98–110)
CO2 SERPL-SCNC: 26 MMOL/L — SIGNIFICANT CHANGE UP (ref 17–32)
CREAT SERPL-MCNC: 6.7 MG/DL — CRITICAL HIGH (ref 0.7–1.5)
GLUCOSE SERPL-MCNC: 221 MG/DL — HIGH (ref 70–110)
HCT VFR BLD CALC: 27.4 % — LOW (ref 42–52)
HGB BLD-MCNC: 8.6 G/DL — LOW (ref 14–18)
INR BLD: 1.62 RATIO — HIGH (ref 0.65–1.3)
MAGNESIUM SERPL-MCNC: 2.5 MG/DL — HIGH (ref 1.8–2.4)
MCHC RBC-ENTMCNC: 29.9 PG — SIGNIFICANT CHANGE UP (ref 27–31)
MCHC RBC-ENTMCNC: 31.4 G/DL — LOW (ref 32–37)
MCV RBC AUTO: 95.1 FL — HIGH (ref 80–94)
NRBC # BLD: 0 /100 WBCS — SIGNIFICANT CHANGE UP (ref 0–0)
PLATELET # BLD AUTO: 300 K/UL — SIGNIFICANT CHANGE UP (ref 130–400)
POTASSIUM SERPL-MCNC: 4.4 MMOL/L — SIGNIFICANT CHANGE UP (ref 3.5–5)
POTASSIUM SERPL-SCNC: 4.4 MMOL/L — SIGNIFICANT CHANGE UP (ref 3.5–5)
PROTHROM AB SERPL-ACNC: 18 SEC — HIGH (ref 9.95–12.87)
RBC # BLD: 2.88 M/UL — LOW (ref 4.7–6.1)
RBC # FLD: 21 % — HIGH (ref 11.5–14.5)
SODIUM SERPL-SCNC: 133 MMOL/L — LOW (ref 135–146)
WBC # BLD: 7.73 K/UL — SIGNIFICANT CHANGE UP (ref 4.8–10.8)
WBC # FLD AUTO: 7.73 K/UL — SIGNIFICANT CHANGE UP (ref 4.8–10.8)

## 2018-03-16 RX ORDER — FUROSEMIDE 40 MG
1 TABLET ORAL
Qty: 0 | Refills: 0 | COMMUNITY

## 2018-03-16 RX ORDER — WARFARIN SODIUM 2.5 MG/1
5 TABLET ORAL AT BEDTIME
Qty: 0 | Refills: 0 | Status: DISCONTINUED | OUTPATIENT
Start: 2018-03-16 | End: 2018-03-16

## 2018-03-16 RX ORDER — FUROSEMIDE 40 MG
1 TABLET ORAL
Qty: 0 | Refills: 0 | COMMUNITY
Start: 2018-03-16

## 2018-03-16 RX ORDER — AMLODIPINE BESYLATE 2.5 MG/1
1 TABLET ORAL
Qty: 0 | Refills: 0 | COMMUNITY

## 2018-03-16 RX ORDER — INSULIN ASPART 100 [IU]/ML
0 INJECTION, SOLUTION SUBCUTANEOUS
Qty: 0 | Refills: 0 | COMMUNITY

## 2018-03-16 RX ORDER — INSULIN LISPRO 100/ML
7 VIAL (ML) SUBCUTANEOUS
Qty: 0 | Refills: 0 | DISCHARGE
Start: 2018-03-16

## 2018-03-16 RX ORDER — PIOGLITAZONE HYDROCHLORIDE 15 MG/1
1 TABLET ORAL
Qty: 0 | Refills: 0 | COMMUNITY

## 2018-03-16 RX ORDER — INSULIN GLARGINE 100 [IU]/ML
0 INJECTION, SOLUTION SUBCUTANEOUS
Qty: 0 | Refills: 0 | COMMUNITY

## 2018-03-16 RX ORDER — PANTOPRAZOLE SODIUM 20 MG/1
40 TABLET, DELAYED RELEASE ORAL
Qty: 0 | Refills: 0 | Status: DISCONTINUED | OUTPATIENT
Start: 2018-03-16 | End: 2018-03-16

## 2018-03-16 RX ORDER — LOSARTAN POTASSIUM 100 MG/1
1 TABLET, FILM COATED ORAL
Qty: 0 | Refills: 0 | COMMUNITY

## 2018-03-16 RX ORDER — HYDROXYZINE HCL 10 MG
0 TABLET ORAL
Qty: 0 | Refills: 0 | COMMUNITY

## 2018-03-16 RX ADMIN — Medication 7 UNIT(S): at 13:55

## 2018-03-16 RX ADMIN — PANTOPRAZOLE SODIUM 40 MILLIGRAM(S): 20 TABLET, DELAYED RELEASE ORAL at 05:19

## 2018-03-16 RX ADMIN — SEVELAMER CARBONATE 800 MILLIGRAM(S): 2400 POWDER, FOR SUSPENSION ORAL at 13:55

## 2018-03-16 RX ADMIN — Medication 25 MILLIGRAM(S): at 07:02

## 2018-03-16 RX ADMIN — GABAPENTIN 100 MILLIGRAM(S): 400 CAPSULE ORAL at 05:19

## 2018-03-16 RX ADMIN — Medication 7 UNIT(S): at 07:53

## 2018-03-16 RX ADMIN — SEVELAMER CARBONATE 800 MILLIGRAM(S): 2400 POWDER, FOR SUSPENSION ORAL at 07:50

## 2018-03-16 RX ADMIN — Medication 2: at 07:52

## 2018-03-16 RX ADMIN — Medication 2: at 13:55

## 2018-03-16 RX ADMIN — Medication 80 MILLIGRAM(S): at 05:19

## 2018-03-16 NOTE — DISCHARGE NOTE ADULT - CARE PROVIDER_API CALL
Marli Madison), Internal Medicine; Nephrology  [address] [place] [address]  Phone: [phone]  Fax: [phone]    Dilip Shah), Cardiovascular Disease; Internal Medicine  [address] [place] [id number]  Phone: [phone]  Fax: [phone]

## 2018-03-16 NOTE — PROGRESS NOTE ADULT - ASSESSMENT
56 yo M w/ h/o CVA, ESRD on HD MWF, HTN, DM, Atrial fibrillation on coumadin presents with weakness.  Reports black stools for 1 week. guaiac negative    CT abdomen/pelvis r/o retroperitoneal bleed was negative    # Weakness 2/2 Symptomatic Anemia  - Hb stable s/p 2 u prbc transfusion.  - EGD no acute bleed    #atrial fibrillation  -rate controlled, coumadin resumed    #Troponemia - most likely type 2 from anemia  -no further treatment for now per cardiology, outpatient dobutamine SE in 4 to 6 weeks    #HTN - c/w metoprolol only for now    #ESRD on HD - renal c/s dr. wong, Had a HD 3/14    #DM - insulin, check fs    #GI/DVT PPx 58 yo M w/ h/o CVA, ESRD on HD MWF, HTN, DM, Atrial fibrillation on coumadin presents with weakness.  Reports black stools for 1 week. guaiac negative    CT abdomen/pelvis r/o retroperitoneal bleed was negative    # Weakness 2/2 Symptomatic Anemia  - Hb stable s/p 2 u prbc transfusion.  - EGD no acute bleed  -spoke with Dr nguyen about colonoscopy, aware that the patient is restarted on coumadin, he recommended that the patient follows with him and to get the colonoscopy as an outpatient.     #atrial fibrillation  -rate controlled, coumadin resumed. please check INR elvis and give coumadin dose accordingly    #Troponemia - most likely type 2 from anemia  -no further treatment for now per cardiology, outpatient dobutamine SE in 4 to 6 weeks    #HTN - c/w metoprolol only for now    #ESRD on HD - renal c/s dr. wong, Had a HD 3/14    #DM - insulin, check fs    #GI/DVT PPx

## 2018-03-16 NOTE — PROGRESS NOTE ADULT - SUBJECTIVE AND OBJECTIVE BOX
Patient was examined during HD treatment.    Hemodialysis Prescription:  	Access: AVF  	Dialyzer: Opti 160  	Blood Flow (mL/Min): 400  	Dialysate Flow (mL/Min): 500  	Target UF (Liters): 3  	Treatment Time: 3 hours  	Potassium: 2K  	Calcium: 2.5

## 2018-03-16 NOTE — DISCHARGE NOTE ADULT - CARE PLAN
Principal Discharge DX:	Anemia, unspecified type  Goal:	Hb stable  Assessment and plan of treatment:	EGD done inpatient showed no sign of active bleed.   please follow up with GI-Dr nguyen for outpatient colonoscopy  Secondary Diagnosis:	Elevated troponin  Goal:	likely secondary to anemia  Assessment and plan of treatment:	continue with home medications. follow up with your cardiologist.   possible outpatient dobutamine Stress echo in 4 to 6 weeks.  Secondary Diagnosis:	Atrial fibrillation, unspecified type  Goal:	rate controlled  Assessment and plan of treatment:	on coumadin, reports that he follows his coumadin dose with his cardiologist, instructed on getting his INR checked in the next 3 days in case adjustment needed to be done to the coumadin dose. can also follow up with his Dialysis unit during next dialysis session on Monday

## 2018-03-16 NOTE — PROGRESS NOTE ADULT - SUBJECTIVE AND OBJECTIVE BOX
Over Night Events:  Patient seen and examined.   s/p EGD to active bleed coumadin was reviewed on HD     ROS:  See HPI    PHYSICAL EXAM    ICU Vital Signs Last 24 Hrs  T(C): 35.8 (16 Mar 2018 07:03), Max: 37 (15 Mar 2018 14:24)  T(F): 96.5 (16 Mar 2018 07:03), Max: 98.6 (15 Mar 2018 14:24)  HR: 83 (16 Mar 2018 07:03) (74 - 97)  BP: 97/53 (16 Mar 2018 07:03) (97/53 - 110/59)  BP(mean): 69 (16 Mar 2018 07:03) (69 - 78)  ABP: --  ABP(mean): --  RR: 16 (16 Mar 2018 07:03) (16 - 116)  SpO2: 97% (16 Mar 2018 05:18) (93% - 97%)      General: AOx3   HEENT:     [place]           Lymph Nodes: NO cervical LN   Lungs: Bilateral BS, decrease right lower   Cardiovascular: Regular   Abdomen: Soft, Positive BS  Extremities: No clubbing, 1 edema    Skin:   Neurological: move all ext     I&O's Detail      LABS:                          8.6    7.73  )-----------( 300      ( 16 Mar 2018 06:01 )             27.4         16 Mar 2018 06:01    133    |  91     |  46     ----------------------------<  221    4.4     |  26     |  6.7      Ca    8.6        16 Mar 2018 06:01  Mg     2.5       16 Mar 2018 06:01                                               PT/INR - ( 16 Mar 2018 06:01 )   PT: 18.00 sec;   INR: 1.62 ratio         PTT - ( 16 Mar 2018 06:01 )  PTT:30.6 sec                                                                                                   Culture - Blood (collected 14 Mar 2018 05:41)  Source: .Blood None  Preliminary Report (15 Mar 2018 15:01):    No growth to date.    Culture - Blood (collected 13 Mar 2018 20:09)  Source: .Blood None  Preliminary Report (15 Mar 2018 02:02):    No growth to date.                                                                                           MEDICATIONS  (STANDING):  dextrose 5%. 1000 milliLiter(s) (50 mL/Hr) IV Continuous <Continuous>  dextrose 50% Injectable 12.5 Gram(s) IV Push once  dextrose 50% Injectable 25 Gram(s) IV Push once  dextrose 50% Injectable 25 Gram(s) IV Push once  epoetin raven Injectable 22557 Unit(s) IV Push <User Schedule>  furosemide    Tablet 80 milliGRAM(s) Oral two times a day  gabapentin 100 milliGRAM(s) Oral two times a day  insulin glargine Injectable (LANTUS) 21 Unit(s) SubCutaneous at bedtime  insulin lispro (HumaLOG) corrective regimen sliding scale   SubCutaneous three times a day before meals  insulin lispro Injectable (HumaLOG) 7 Unit(s) SubCutaneous before breakfast  insulin lispro Injectable (HumaLOG) 7 Unit(s) SubCutaneous before lunch  insulin lispro Injectable (HumaLOG) 7 Unit(s) SubCutaneous before dinner  metoprolol     tartrate 25 milliGRAM(s) Oral two times a day  pantoprazole    Tablet 40 milliGRAM(s) Oral every 12 hours  sevelamer hydrochloride 800 milliGRAM(s) Oral three times a day  warfarin 5 milliGRAM(s) Oral at bedtime    MEDICATIONS  (PRN):  acetaminophen   Tablet 650 milliGRAM(s) Oral every 6 hours PRN For Temp greater than 38 C (100.4 F)  dextrose Gel 1 Dose(s) Oral once PRN Blood Glucose LESS THAN 70 milliGRAM(s)/deciliter  glucagon  Injectable 1 milliGRAM(s) IntraMuscular once PRN Glucose LESS THAN 70 milligrams/deciliter  hydrOXYzine hydrochloride 25 milliGRAM(s) Oral two times a day PRN Agitation          Xrays:  TLC: decrease right effsuion   OG:  ET tube:                                                                                       ECHO:

## 2018-03-16 NOTE — PHYSICAL THERAPY INITIAL EVALUATION ADULT - IMPAIRMENTS CONTRIBUTING TO GAIT DEVIATIONS, PT EVAL
decreased strength/decreased endurance/impaired balance/narrow base of support/impaired postural control

## 2018-03-16 NOTE — PROGRESS NOTE ADULT - ASSESSMENT
Patient still feels weak.  Possible outpatient dobutamine SE in 4 to 6 weeks. Work up per GI. Try ambulate

## 2018-03-16 NOTE — CHART NOTE - NSCHARTNOTEFT_GEN_A_CORE
Patient was seen by PT today, he walked with the walker, was a bit unsteady. The patient is adamant about staying and wanted to go home.   Spoke with Dr Nielsen , ok to discharge home.

## 2018-03-16 NOTE — PROGRESS NOTE ADULT - SUBJECTIVE AND OBJECTIVE BOX
Patient is a 57y old  Male who presents with a chief complaint of Weakness (13 Mar 2018 16:22)      T(F): 96.5 (03-16-18 @ 07:03), Max: 98.6 (03-15-18 @ 14:24)  HR: 83 (03-16-18 @ 07:03)  BP: 97/53 (03-16-18 @ 07:03)  RR: 16 (03-16-18 @ 07:03)  SpO2: 97% (03-16-18 @ 05:18) (93% - 97%)    PHYSICAL EXAM:  GENERAL: NAD, well-groomed, well-developed  HEAD:  Atraumatic, Normocephalic  EYES: EOMI, PERRLA, conjunctiva and sclera clear  ENMT: No tonsillar erythema, exudates, or enlargement; Moist mucous membranes, Good dentition, No lesions  NECK: Supple, No JVD, Normal thyroid  NERVOUS SYSTEM:  Alert & Oriented X3,  Motor Strength 5/5 B/L upper and lower extremities  CHEST/LUNG: Clear to percussion bilaterally; No rales, rhonchi, wheezing, or rubs  HEART: Regular rate and rhythm; No murmurs, rubs, or gallops  ABDOMEN: Soft, Nontender, Nondistended; Bowel sounds present  EXTREMITIES:   No clubbing, cyanosis, or edema  LYMPH: No lymphadenopathy noted  SKIN: No rashes or lesions    labs  03-15    138  |  95<L>  |  38<H>  ----------------------------<  66<L>  4.2   |  26  |  5.4<HH>    Ca    8.5      15 Mar 2018 07:00  Mg     2.5     03-16    TPro  6.0  /  Alb  3.6  /  TBili  0.6  /  DBili  0.4<H>  /  AST  259<H>  /  ALT  500<H>  /  AlkPhos  76  03-15                          8.6    7.73  )-----------( 300      ( 16 Mar 2018 06:01 )             27.4       Culture - Blood (collected 14 Mar 2018 05:41)  Source: .Blood None  Preliminary Report (15 Mar 2018 15:01):    No growth to date.    Culture - Blood (collected 13 Mar 2018 20:09)  Source: .Blood None  Preliminary Report (15 Mar 2018 02:02):    No growth to date.      PT/INR - ( 16 Mar 2018 06:01 )   PT: 18.00 sec;   INR: 1.62 ratio         PTT - ( 16 Mar 2018 06:01 )  PTT:30.6 sec        acetaminophen   Tablet 650 milliGRAM(s) Oral every 6 hours PRN  dextrose 5%. 1000 milliLiter(s) IV Continuous <Continuous>  dextrose 50% Injectable 12.5 Gram(s) IV Push once  dextrose 50% Injectable 25 Gram(s) IV Push once  dextrose 50% Injectable 25 Gram(s) IV Push once  dextrose Gel 1 Dose(s) Oral once PRN  epoetin raven Injectable 51254 Unit(s) IV Push <User Schedule>  furosemide    Tablet 80 milliGRAM(s) Oral two times a day  gabapentin 100 milliGRAM(s) Oral two times a day  glucagon  Injectable 1 milliGRAM(s) IntraMuscular once PRN  hydrOXYzine hydrochloride 25 milliGRAM(s) Oral two times a day PRN  insulin glargine Injectable (LANTUS) 21 Unit(s) SubCutaneous at bedtime  insulin lispro (HumaLOG) corrective regimen sliding scale   SubCutaneous three times a day before meals  insulin lispro Injectable (HumaLOG) 7 Unit(s) SubCutaneous before breakfast  insulin lispro Injectable (HumaLOG) 7 Unit(s) SubCutaneous before lunch  insulin lispro Injectable (HumaLOG) 7 Unit(s) SubCutaneous before dinner  metoprolol     tartrate 25 milliGRAM(s) Oral two times a day  pantoprazole    Tablet 40 milliGRAM(s) Oral every 12 hours  sevelamer hydrochloride 800 milliGRAM(s) Oral three times a day  warfarin 5 milliGRAM(s) Oral at bedtime

## 2018-03-16 NOTE — DISCHARGE NOTE ADULT - HOSPITAL COURSE
56 yo M w/ h/o CVA, ESRD on HD MWF, HTN, DM, Atrial fibrillation on coumadin presents with progressive weakness, darker than usual colored stools (guaiac negative in ED). He was found to be anemic with Hb of 7.8 ( last hb reported was in feb 2018 was 11.9) , received 2 unit of PRBC and Hb remained stable. CT abdomen/pelvis showed no  retroperitoneal bleed. EGD done showed no acute bleed. HE is to follow with DR Erickson for an oupatient colonoscopy.  He was also found to have increased troponin. No symptoms MI. He was seen by cardiologykareen secondary to anemia with no further management for now and to follow up as an outpatient for possible outpatient dobutamine SE in 4 to 6 weeks.  He was resumed on coumadin for his afib  and instructed to follow up with his cardiologist and nephrologist for his INR and coumadin dose.

## 2018-03-16 NOTE — DISCHARGE NOTE ADULT - MEDICATION SUMMARY - MEDICATIONS TO TAKE
I will START or STAY ON the medications listed below when I get home from the hospital:    warfarin 2.5 mg oral tablet  -- 1 tab(s) by mouth once a day  -- Indication: For Afib    gabapentin 100 mg oral tablet  -- orally 2 times a day  -- Indication: For neuropathy    Lantus 100 units/mL subcutaneous solution  -- 21 unit(s) subcutaneous once a day (at bedtime)  -- Indication: For Diabetes    insulin lispro (concentrated) 200 units/mL subcutaneous solution  -- 7 unit(s) subcutaneous 3 times a day  -- Indication: For Diabetes    atorvastatin 20 mg oral tablet  -- 1 tab(s) by mouth once a day  -- Indication: For cad    hydrOXYzine hydrochloride 25 mg oral tablet  -- orally once a day  -- Indication: For Allergy    Toprol-XL 25 mg oral tablet, extended release  -- 1 tab(s) by mouth once a day  -- Indication: For cad    furosemide 80 mg oral tablet  -- 1 tab(s) by mouth 2 times a day  -- Indication: For chf    Renvela 800 mg oral tablet  -- 1 tab(s) by mouth 3 times a day (with meals)  -- Indication: For Esrd    pantoprazole 40 mg oral delayed release tablet  -- 1 tab(s) by mouth once a day  -- Indication: For pud    Vitamin D3 10,000 intl units oral capsule  -- 82985 milligram(s) by mouth once a week  -- Indication: For Supplement

## 2018-03-16 NOTE — PROGRESS NOTE ADULT - PROVIDER SPECIALTY LIST ADULT
Cardiology
Critical Care
MICU
Nephrology
Nephrology
Cardiology
Critical Care
Nephrology
Internal Medicine
Internal Medicine

## 2018-03-16 NOTE — PHYSICAL THERAPY INITIAL EVALUATION ADULT - IMPAIRED TRANSFERS: SIT/STAND, REHAB EVAL
narrow base of support/decreased strength/decreased endurance/impaired balance/impaired postural control

## 2018-03-16 NOTE — PROGRESS NOTE ADULT - SUBJECTIVE AND OBJECTIVE BOX
ROGER RODRIGUES  57y  Male    Patient is a 57y old  Male who presents with a chief complaint of Weakness (13 Mar 2018 16:22)    ALLERGIES:  Orange (Other)  Originally Entered as [HIVES] reaction to [sulfur] (Unknown)  penicillin (Unknown)  sulfADIAZINE (Unknown)      INTERVAL HPI/OVERNIGHT EVENTS:    VITALS:  T(F): 96.5 (03-16-18 @ 07:03), Max: 98.6 (03-15-18 @ 14:24)  HR: 76 (03-16-18 @ 12:25) (74 - 97)  BP: 98/67 (03-16-18 @ 12:25) (97/53 - 110/59)  RR: 16 (03-16-18 @ 07:03) (16 - 116)  SpO2: 97% (03-16-18 @ 05:18) (93% - 97%)    LABS:  03-16    133<L>  |  91<L>  |  46<H>  ----------------------------<  221<H>  4.4   |  26  |  6.7<HH>    Ca    8.6      16 Mar 2018 06:01  Mg     2.5     03-16    TPro  6.0  /  Alb  3.6  /  TBili  0.6  /  DBili  0.4<H>  /  AST  259<H>  /  ALT  500<H>  /  AlkPhos  76  03-15    MICROBIOLOGY:    Culture - Blood (collected 03-14-18 @ 05:41)  Source: .Blood None  Preliminary Report (03-15-18 @ 15:01):    No growth to date.    Culture - Blood (collected 03-13-18 @ 20:09)  Source: .Blood None  Preliminary Report (03-15-18 @ 02:02):	    No growth to date.      MEDICATION:  acetaminophen   Tablet 650 milliGRAM(s) Oral every 6 hours PRN  dextrose 5%. 1000 milliLiter(s) IV Continuous <Continuous>  dextrose 50% Injectable 12.5 Gram(s) IV Push once  dextrose 50% Injectable 25 Gram(s) IV Push once  dextrose 50% Injectable 25 Gram(s) IV Push once  dextrose Gel 1 Dose(s) Oral once PRN  epoetin raven Injectable 91230 Unit(s) IV Push <User Schedule>  furosemide    Tablet 80 milliGRAM(s) Oral two times a day  gabapentin 100 milliGRAM(s) Oral two times a day  glucagon  Injectable 1 milliGRAM(s) IntraMuscular once PRN  hydrOXYzine hydrochloride 25 milliGRAM(s) Oral two times a day PRN  insulin glargine Injectable (LANTUS) 21 Unit(s) SubCutaneous at bedtime  insulin lispro (HumaLOG) corrective regimen sliding scale   SubCutaneous three times a day before meals  insulin lispro Injectable (HumaLOG) 7 Unit(s) SubCutaneous before breakfast  insulin lispro Injectable (HumaLOG) 7 Unit(s) SubCutaneous before lunch  insulin lispro Injectable (HumaLOG) 7 Unit(s) SubCutaneous before dinner  metoprolol     tartrate 25 milliGRAM(s) Oral two times a day  pantoprazole    Tablet 40 milliGRAM(s) Oral before breakfast  sevelamer hydrochloride 800 milliGRAM(s) Oral three times a day  warfarin 5 milliGRAM(s) Oral at bedtime    RADIOLOGY & ADDITIONAL TESTS:

## 2018-03-16 NOTE — DISCHARGE NOTE ADULT - PATIENT PORTAL LINK FT
You can access the [facility] Patient Portal, offered by [facility], by registering with the following website: [email or web address]

## 2018-03-16 NOTE — PHYSICAL THERAPY INITIAL EVALUATION ADULT - GAIT DEVIATIONS NOTED, PT EVAL
stooped posture, dec heel strike/pushoff/decreased step length/decreased weight-shifting ability/decreased mery

## 2018-03-16 NOTE — DISCHARGE NOTE ADULT - MEDICATION SUMMARY - MEDICATIONS TO STOP TAKING
I will STOP taking the medications listed below when I get home from the hospital:    amLODIPine 10 mg oral tablet  -- 1 tab(s) by mouth once a day    losartan 100 mg oral tablet  -- 1 tab(s) by mouth once a day    NovoLOG    pioglitazone 30 mg oral tablet  -- 1 tab(s) by mouth once a day

## 2018-03-16 NOTE — PHYSICAL THERAPY INITIAL EVALUATION ADULT - GENERAL OBSERVATIONS, REHAB EVAL
13:00-13:25 Chart reviewed. Pt encountered sitting in chair, may be seen by Physical Therapist as confirmed with Nurse. Patient denies pain and would like to walk with walker for now; +tele

## 2018-03-16 NOTE — PROGRESS NOTE ADULT - ASSESSMENT
58 yo M w/ h/o CVA, ESRD on HD MWF, HTN, DM, Atrial fibrillation on coumadin presents with weakness.  Reports black stools for 1 week. guaiac negative    CT abdomen/pelvis r/o retroperitoneal bleed was negative    # Weakness 2/2 Symptomatic Anemia  - Hb stable s/p 2 u prbc transfusion.  - EGD no acute bleed  -spoke with Dr nguyen about colonoscopy, aware that the patient is restarted on coumadin, he recommended that the patient follows with him and to get the colonoscopy as an outpatient.     #atrial fibrillation  -rate controlled, coumadin resumed. please check INR elvis and give coumadin dose accordingly    #Troponemia - most likely type 2 from anemia  -no further treatment for now per cardiology, outpatient dobutamine SE in 4 to 6 weeks    #HTN - c/w metoprolol only for now    #ESRD on HD - renal c/s dr. wong, Had a HD 3/14    #DM - insulin, check fs    #GI/DVT PPx

## 2018-03-16 NOTE — PROGRESS NOTE ADULT - ASSESSMENT
IMPRESSION: Acute Blood Loss Anemia, NSTEMI, Transaminitis, s/p 1 unit of PRBCs      PLAN:    CNS: No sedatives     HEENT: HOB at 45    PULMONARY: Keep SpO2>92%    CARDIOVASCULAR: follow up cardiology   coumadin    on lasix continue   on HD     GI: GI prophylaxis. EGD     RENAL: Follow up with renal for HD    INFECTIOUS DISEASE: no Abx    HEMATOLOGICAL: Sequentials, f/u INR, and H/H     ENDOCRINE:  Follow up FS.  Insulin protocol if needed.  follow up cardiology recall PRN

## 2018-03-16 NOTE — DISCHARGE NOTE ADULT - PLAN OF CARE
Hb stable EGD done inpatient showed no sign of active bleed.   please follow up with GI-Dr nguyen for outpatient colonoscopy likely secondary to anemia continue with home medications. follow up with your cardiologist.   possible outpatient dobutamine Stress echo in 4 to 6 weeks. rate controlled on coumadin, reports that he follows his coumadin dose with his cardiologist, instructed on getting his INR checked in the next 3 days in case adjustment needed to be done to the coumadin dose. can also follow up with his Dialysis unit during next dialysis session on Monday

## 2018-03-16 NOTE — PROGRESS NOTE ADULT - SUBJECTIVE AND OBJECTIVE BOX
Patient is a 57y old  Male who presents with a chief complaint of Weakness (13 Mar 2018 16:22)      PAST MEDICAL & SURGICAL HISTORY:  Atrial fibrillation  Right sided weakness  Stroke  Hypertension  High blood cholesterol  Diabetes mellitus, type 2  Dialysis patient: Mon, wednesday, friday  A-V fistula    FAMILY HISTORY:  No pertinent family history in first degree relatives      Social: (-) tobacco, alcohol, drug use    MEDICATIONS  (STANDING):  dextrose 5%. 1000 milliLiter(s) (50 mL/Hr) IV Continuous <Continuous>  dextrose 50% Injectable 12.5 Gram(s) IV Push once  dextrose 50% Injectable 25 Gram(s) IV Push once  dextrose 50% Injectable 25 Gram(s) IV Push once  epoetin raven Injectable 50833 Unit(s) IV Push <User Schedule>  furosemide    Tablet 80 milliGRAM(s) Oral two times a day  gabapentin 100 milliGRAM(s) Oral two times a day  insulin glargine Injectable (LANTUS) 21 Unit(s) SubCutaneous at bedtime  insulin lispro (HumaLOG) corrective regimen sliding scale   SubCutaneous three times a day before meals  insulin lispro Injectable (HumaLOG) 7 Unit(s) SubCutaneous before breakfast  insulin lispro Injectable (HumaLOG) 7 Unit(s) SubCutaneous before lunch  insulin lispro Injectable (HumaLOG) 7 Unit(s) SubCutaneous before dinner  metoprolol     tartrate 25 milliGRAM(s) Oral two times a day  pantoprazole    Tablet 40 milliGRAM(s) Oral before breakfast  sevelamer hydrochloride 800 milliGRAM(s) Oral three times a day  warfarin 5 milliGRAM(s) Oral at bedtime    MEDICATIONS  (PRN):  acetaminophen   Tablet 650 milliGRAM(s) Oral every 6 hours PRN For Temp greater than 38 C (100.4 F)  dextrose Gel 1 Dose(s) Oral once PRN Blood Glucose LESS THAN 70 milliGRAM(s)/deciliter  glucagon  Injectable 1 milliGRAM(s) IntraMuscular once PRN Glucose LESS THAN 70 milligrams/deciliter  hydrOXYzine hydrochloride 25 milliGRAM(s) Oral two times a day PRN Agitation      Overnight events:    Vital Signs Last 24 Hrs  T(C): 35.8 (16 Mar 2018 07:03), Max: 37 (15 Mar 2018 14:24)  T(F): 96.5 (16 Mar 2018 07:03), Max: 98.6 (15 Mar 2018 14:24)  HR: 74 (16 Mar 2018 08:50) (74 - 97)  BP: 97/53 (16 Mar 2018 08:50) (97/53 - 110/59)  BP(mean): 69 (16 Mar 2018 07:03) (69 - 78)  RR: 16 (16 Mar 2018 07:03) (16 - 116)  SpO2: 97% (16 Mar 2018 05:18) (93% - 97%)  CAPILLARY BLOOD GLUCOSE  228 (16 Mar 2018 07:33)  116 (15 Mar 2018 16:00)  92 (15 Mar 2018 12:19)        I&O's Summary    16 Mar 2018 07:01  -  16 Mar 2018 09:56  --------------------------------------------------------  IN: 60 mL / OUT: 0 mL / NET: 60 mL        Physical Exam:  left AVF, good thrill    -     General : In no acute distress    -      Cardiac: irregular RR , no added sounds    -      Pulm: GBAE, clear lungs    -      GI: soft abdomen, positive BS,  no tenderness     -      Neuro: AAOx3, no focal neurologic deficit        Labs:                        8.6    7.73  )-----------( 300      ( 16 Mar 2018 06:01 )             27.4             03-16    133<L>  |  91<L>  |  46<H>  ----------------------------<  221<H>  4.4   |  26  |  6.7<HH>    Ca    8.6      16 Mar 2018 06:01  Mg     2.5     03-16    TPro  6.0  /  Alb  3.6  /  TBili  0.6  /  DBili  0.4<H>  /  AST  259<H>  /  ALT  500<H>  /  AlkPhos  76  03-15    LIVER FUNCTIONS - ( 15 Mar 2018 07:00 )  Alb: 3.6 g/dL / Pro: 6.0 g/dL / ALK PHOS: 76 U/L / ALT: 500 U/L / AST: 259 U/L / GGT: x                 PT/INR - ( 16 Mar 2018 06:01 )   PT: 18.00 sec;   INR: 1.62 ratio         PTT - ( 16 Mar 2018 06:01 )  PTT:30.6 sec    Culture - Blood (collected 14 Mar 2018 05:41)  Source: .Blood None  Preliminary Report (15 Mar 2018 15:01):    No growth to date.    Culture - Blood (collected 13 Mar 2018 20:09)  Source: .Blood None  Preliminary Report (15 Mar 2018 02:02):    No growth to date. Patient is a 57y old  Male who presents with a chief complaint of Weakness (13 Mar 2018 16:22)      PAST MEDICAL & SURGICAL HISTORY:  Atrial fibrillation  Right sided weakness  Stroke  Hypertension  High blood cholesterol  Diabetes mellitus, type 2  Dialysis patient: Mon, wednesday, friday  A-V fistula    FAMILY HISTORY:  No pertinent family history in first degree relatives      Social: (-) tobacco, alcohol, drug use    MEDICATIONS  (STANDING):  dextrose 5%. 1000 milliLiter(s) (50 mL/Hr) IV Continuous <Continuous>  dextrose 50% Injectable 12.5 Gram(s) IV Push once  dextrose 50% Injectable 25 Gram(s) IV Push once  dextrose 50% Injectable 25 Gram(s) IV Push once  epoetin raven Injectable 97420 Unit(s) IV Push <User Schedule>  furosemide    Tablet 80 milliGRAM(s) Oral two times a day  gabapentin 100 milliGRAM(s) Oral two times a day  insulin glargine Injectable (LANTUS) 21 Unit(s) SubCutaneous at bedtime  insulin lispro (HumaLOG) corrective regimen sliding scale   SubCutaneous three times a day before meals  insulin lispro Injectable (HumaLOG) 7 Unit(s) SubCutaneous before breakfast  insulin lispro Injectable (HumaLOG) 7 Unit(s) SubCutaneous before lunch  insulin lispro Injectable (HumaLOG) 7 Unit(s) SubCutaneous before dinner  metoprolol     tartrate 25 milliGRAM(s) Oral two times a day  pantoprazole    Tablet 40 milliGRAM(s) Oral before breakfast  sevelamer hydrochloride 800 milliGRAM(s) Oral three times a day  warfarin 5 milliGRAM(s) Oral at bedtime    MEDICATIONS  (PRN):  acetaminophen   Tablet 650 milliGRAM(s) Oral every 6 hours PRN For Temp greater than 38 C (100.4 F)  dextrose Gel 1 Dose(s) Oral once PRN Blood Glucose LESS THAN 70 milliGRAM(s)/deciliter  glucagon  Injectable 1 milliGRAM(s) IntraMuscular once PRN Glucose LESS THAN 70 milligrams/deciliter  hydrOXYzine hydrochloride 25 milliGRAM(s) Oral two times a day PRN Agitation      Overnight events:    Vital Signs Last 24 Hrs  T(C): 35.8 (16 Mar 2018 07:03), Max: 37 (15 Mar 2018 14:24)  T(F): 96.5 (16 Mar 2018 07:03), Max: 98.6 (15 Mar 2018 14:24)  HR: 74 (16 Mar 2018 08:50) (74 - 97)  BP: 97/53 (16 Mar 2018 08:50) (97/53 - 110/59)  BP(mean): 69 (16 Mar 2018 07:03) (69 - 78)  RR: 16 (16 Mar 2018 07:03) (16 - 116)  SpO2: 97% (16 Mar 2018 05:18) (93% - 97%)  CAPILLARY BLOOD GLUCOSE  228 (16 Mar 2018 07:33)  116 (15 Mar 2018 16:00)  92 (15 Mar 2018 12:19)        I&O's Summary    16 Mar 2018 07:01  -  16 Mar 2018 09:56  --------------------------------------------------------  IN: 60 mL / OUT: 0 mL / NET: 60 mL        Physical Exam:  left AVF, good thrill    -     General : In no acute distress    -      Cardiac: irregular RR , no added sounds    -      Pulm: GBAE, clear lungs    -      GI: soft abdomen, positive BS,  no tenderness     -      Neuro: AAOx3, no focal neurologic deficit      Labs:                        8.6    7.73  )-----------( 300      ( 16 Mar 2018 06:01 )             27.4             03-16    133<L>  |  91<L>  |  46<H>  ----------------------------<  221<H>  4.4   |  26  |  6.7<HH>    Ca    8.6      16 Mar 2018 06:01  Mg     2.5     03-16    TPro  6.0  /  Alb  3.6  /  TBili  0.6  /  DBili  0.4<H>  /  AST  259<H>  /  ALT  500<H>  /  AlkPhos  76  03-15    LIVER FUNCTIONS - ( 15 Mar 2018 07:00 )  Alb: 3.6 g/dL / Pro: 6.0 g/dL / ALK PHOS: 76 U/L / ALT: 500 U/L / AST: 259 U/L / GGT: x                 PT/INR - ( 16 Mar 2018 06:01 )   PT: 18.00 sec;   INR: 1.62 ratio         PTT - ( 16 Mar 2018 06:01 )  PTT:30.6 sec    Culture - Blood (collected 14 Mar 2018 05:41)  Source: .Blood None  Preliminary Report (15 Mar 2018 15:01):    No growth to date.    Culture - Blood (collected 13 Mar 2018 20:09)  Source: .Blood None  Preliminary Report (15 Mar 2018 02:02):    No growth to date.

## 2018-03-19 LAB
CULTURE RESULTS: SIGNIFICANT CHANGE UP
CULTURE RESULTS: SIGNIFICANT CHANGE UP
SPECIMEN SOURCE: SIGNIFICANT CHANGE UP
SPECIMEN SOURCE: SIGNIFICANT CHANGE UP

## 2018-03-20 DIAGNOSIS — Z79.4 LONG TERM (CURRENT) USE OF INSULIN: ICD-10-CM

## 2018-03-20 DIAGNOSIS — R74.0 NONSPECIFIC ELEVATION OF LEVELS OF TRANSAMINASE AND LACTIC ACID DEHYDROGENASE [LDH]: ICD-10-CM

## 2018-03-20 DIAGNOSIS — E11.21 TYPE 2 DIABETES MELLITUS WITH DIABETIC NEPHROPATHY: ICD-10-CM

## 2018-03-20 DIAGNOSIS — K20.9 ESOPHAGITIS, UNSPECIFIED: ICD-10-CM

## 2018-03-20 DIAGNOSIS — E83.39 OTHER DISORDERS OF PHOSPHORUS METABOLISM: ICD-10-CM

## 2018-03-20 DIAGNOSIS — Z91.19 PATIENT'S NONCOMPLIANCE WITH OTHER MEDICAL TREATMENT AND REGIMEN: ICD-10-CM

## 2018-03-20 DIAGNOSIS — J90 PLEURAL EFFUSION, NOT ELSEWHERE CLASSIFIED: ICD-10-CM

## 2018-03-20 DIAGNOSIS — I48.91 UNSPECIFIED ATRIAL FIBRILLATION: ICD-10-CM

## 2018-03-20 DIAGNOSIS — D62 ACUTE POSTHEMORRHAGIC ANEMIA: ICD-10-CM

## 2018-03-20 DIAGNOSIS — E78.00 PURE HYPERCHOLESTEROLEMIA, UNSPECIFIED: ICD-10-CM

## 2018-03-20 DIAGNOSIS — I69.351 HEMIPLEGIA AND HEMIPARESIS FOLLOWING CEREBRAL INFARCTION AFFECTING RIGHT DOMINANT SIDE: ICD-10-CM

## 2018-03-20 DIAGNOSIS — R79.89 OTHER SPECIFIED ABNORMAL FINDINGS OF BLOOD CHEMISTRY: ICD-10-CM

## 2018-03-20 DIAGNOSIS — N18.6 END STAGE RENAL DISEASE: ICD-10-CM

## 2018-03-20 DIAGNOSIS — Z99.2 DEPENDENCE ON RENAL DIALYSIS: ICD-10-CM

## 2018-03-20 DIAGNOSIS — K29.70 GASTRITIS, UNSPECIFIED, WITHOUT BLEEDING: ICD-10-CM

## 2018-03-20 DIAGNOSIS — I95.9 HYPOTENSION, UNSPECIFIED: ICD-10-CM

## 2018-03-20 DIAGNOSIS — Z79.01 LONG TERM (CURRENT) USE OF ANTICOAGULANTS: ICD-10-CM

## 2018-03-20 DIAGNOSIS — E11.22 TYPE 2 DIABETES MELLITUS WITH DIABETIC CHRONIC KIDNEY DISEASE: ICD-10-CM

## 2018-04-23 ENCOUNTER — OUTPATIENT (OUTPATIENT)
Dept: OUTPATIENT SERVICES | Facility: HOSPITAL | Age: 58
LOS: 1 days | Discharge: HOME | End: 2018-04-23

## 2018-04-23 ENCOUNTER — RESULT REVIEW (OUTPATIENT)
Age: 58
End: 2018-04-23

## 2018-04-23 VITALS
SYSTOLIC BLOOD PRESSURE: 154 MMHG | TEMPERATURE: 98 F | HEART RATE: 103 BPM | WEIGHT: 214.95 LBS | RESPIRATION RATE: 20 BRPM | DIASTOLIC BLOOD PRESSURE: 83 MMHG

## 2018-04-23 VITALS — RESPIRATION RATE: 18 BRPM | SYSTOLIC BLOOD PRESSURE: 150 MMHG | DIASTOLIC BLOOD PRESSURE: 75 MMHG | HEART RATE: 75 BPM

## 2018-04-23 DIAGNOSIS — I77.0 ARTERIOVENOUS FISTULA, ACQUIRED: Chronic | ICD-10-CM

## 2018-04-23 DIAGNOSIS — K63.5 POLYP OF COLON: ICD-10-CM

## 2018-04-23 NOTE — ASU DISCHARGE PLAN (ADULT/PEDIATRIC). - LAUNCH MEDICATION RECONCILIATION
<<-----Click here for Discharge Medication Review
2/5 strength in left leg   5/5 strength in right leg

## 2018-04-24 LAB — SURGICAL PATHOLOGY STUDY: SIGNIFICANT CHANGE UP

## 2018-04-25 DIAGNOSIS — D12.5 BENIGN NEOPLASM OF SIGMOID COLON: ICD-10-CM

## 2018-04-25 DIAGNOSIS — D12.3 BENIGN NEOPLASM OF TRANSVERSE COLON: ICD-10-CM

## 2018-04-25 DIAGNOSIS — D50.9 IRON DEFICIENCY ANEMIA, UNSPECIFIED: ICD-10-CM

## 2018-04-25 DIAGNOSIS — K64.8 OTHER HEMORRHOIDS: ICD-10-CM

## 2018-04-25 DIAGNOSIS — K57.30 DIVERTICULOSIS OF LARGE INTESTINE WITHOUT PERFORATION OR ABSCESS WITHOUT BLEEDING: ICD-10-CM

## 2018-05-07 ENCOUNTER — INPATIENT (INPATIENT)
Facility: HOSPITAL | Age: 58
LOS: 1 days | Discharge: LEFT BEFORE DISCHARGE | End: 2018-05-09
Attending: INTERNAL MEDICINE | Admitting: INTERNAL MEDICINE

## 2018-05-07 VITALS
TEMPERATURE: 100 F | OXYGEN SATURATION: 93 % | RESPIRATION RATE: 22 BRPM | DIASTOLIC BLOOD PRESSURE: 65 MMHG | SYSTOLIC BLOOD PRESSURE: 142 MMHG | HEIGHT: 70 IN | WEIGHT: 210.1 LBS | HEART RATE: 144 BPM

## 2018-05-07 DIAGNOSIS — I77.0 ARTERIOVENOUS FISTULA, ACQUIRED: Chronic | ICD-10-CM

## 2018-05-07 LAB
ALBUMIN SERPL ELPH-MCNC: 2 G/DL — LOW (ref 3.5–5.2)
ALBUMIN SERPL ELPH-MCNC: 3.9 G/DL — SIGNIFICANT CHANGE UP (ref 3.5–5.2)
ALP SERPL-CCNC: 134 U/L — HIGH (ref 30–115)
ALP SERPL-CCNC: 58 U/L — SIGNIFICANT CHANGE UP (ref 30–115)
ALT FLD-CCNC: 15 U/L — SIGNIFICANT CHANGE UP (ref 0–41)
ALT FLD-CCNC: 7 U/L — SIGNIFICANT CHANGE UP (ref 0–41)
ANION GAP SERPL CALC-SCNC: 16 MMOL/L — HIGH (ref 7–14)
ANION GAP SERPL CALC-SCNC: 21 MMOL/L — HIGH (ref 7–14)
APTT BLD: 33 SEC — SIGNIFICANT CHANGE UP (ref 27–39.2)
AST SERPL-CCNC: 10 U/L — SIGNIFICANT CHANGE UP (ref 0–41)
AST SERPL-CCNC: 35 U/L — SIGNIFICANT CHANGE UP (ref 0–41)
BASE EXCESS BLDV CALC-SCNC: 1.3 MMOL/L — SIGNIFICANT CHANGE UP (ref -2–2)
BILIRUB SERPL-MCNC: 0.4 MG/DL — SIGNIFICANT CHANGE UP (ref 0.2–1.2)
BILIRUB SERPL-MCNC: 0.9 MG/DL — SIGNIFICANT CHANGE UP (ref 0.2–1.2)
BUN SERPL-MCNC: 29 MG/DL — HIGH (ref 10–20)
BUN SERPL-MCNC: 42 MG/DL — HIGH (ref 10–20)
CA-I SERPL-SCNC: 1.12 MMOL/L — SIGNIFICANT CHANGE UP (ref 1.12–1.3)
CALCIUM SERPL-MCNC: 4.5 MG/DL — CRITICAL LOW (ref 8.5–10.1)
CALCIUM SERPL-MCNC: 9.5 MG/DL — SIGNIFICANT CHANGE UP (ref 8.5–10.1)
CHLORIDE SERPL-SCNC: 112 MMOL/L — HIGH (ref 98–110)
CHLORIDE SERPL-SCNC: 91 MMOL/L — LOW (ref 98–110)
CK MB CFR SERPL CALC: 14 NG/ML — HIGH (ref 0.6–6.3)
CK SERPL-CCNC: 368 U/L — HIGH (ref 0–225)
CO2 SERPL-SCNC: 14 MMOL/L — LOW (ref 17–32)
CO2 SERPL-SCNC: 23 MMOL/L — SIGNIFICANT CHANGE UP (ref 17–32)
CREAT SERPL-MCNC: 3 MG/DL — HIGH (ref 0.7–1.5)
CREAT SERPL-MCNC: 5.3 MG/DL — CRITICAL HIGH (ref 0.7–1.5)
GAS PNL BLDV: 135 MMOL/L — LOW (ref 136–145)
GAS PNL BLDV: SIGNIFICANT CHANGE UP
GLUCOSE SERPL-MCNC: 186 MG/DL — HIGH (ref 70–99)
GLUCOSE SERPL-MCNC: 254 MG/DL — HIGH (ref 70–99)
HCO3 BLDV-SCNC: 27 MMOL/L — SIGNIFICANT CHANGE UP (ref 22–29)
HCT VFR BLD CALC: 28.7 % — LOW (ref 42–52)
HCT VFR BLDA CALC: 41.3 % — SIGNIFICANT CHANGE UP (ref 34–44)
HGB BLD CALC-MCNC: 13.5 G/DL — LOW (ref 14–18)
HGB BLD-MCNC: 9.4 G/DL — LOW (ref 14–18)
HOROWITZ INDEX BLDV+IHG-RTO: 21 — SIGNIFICANT CHANGE UP
INR BLD: 2.46 RATIO — HIGH (ref 0.65–1.3)
INR BLD: 2.54 RATIO — HIGH (ref 0.65–1.3)
LACTATE BLDV-MCNC: 2.5 MMOL/L — HIGH (ref 0.5–1.6)
LACTATE BLDV-MCNC: 2.5 MMOL/L — HIGH (ref 0.5–1.6)
LACTATE SERPL-SCNC: 1.7 MMOL/L — SIGNIFICANT CHANGE UP (ref 0.5–2.2)
LACTATE SERPL-SCNC: 2.5 MMOL/L — HIGH (ref 0.5–2.2)
MCHC RBC-ENTMCNC: 29.4 PG — SIGNIFICANT CHANGE UP (ref 27–31)
MCHC RBC-ENTMCNC: 32.8 G/DL — SIGNIFICANT CHANGE UP (ref 32–37)
MCV RBC AUTO: 89.7 FL — SIGNIFICANT CHANGE UP (ref 80–94)
NRBC # BLD: 0 /100 WBCS — SIGNIFICANT CHANGE UP (ref 0–0)
NT-PROBNP SERPL-SCNC: HIGH PG/ML (ref 0–300)
PCO2 BLDV: 45 MMHG — SIGNIFICANT CHANGE UP (ref 41–51)
PH BLDV: 7.38 — SIGNIFICANT CHANGE UP (ref 7.26–7.43)
PLATELET # BLD AUTO: 252 K/UL — SIGNIFICANT CHANGE UP (ref 130–400)
PO2 BLDV: 32 MMHG — SIGNIFICANT CHANGE UP (ref 20–40)
POTASSIUM BLDV-SCNC: 4.5 MMOL/L — SIGNIFICANT CHANGE UP (ref 3.3–5.6)
POTASSIUM SERPL-MCNC: 2.6 MMOL/L — CRITICAL LOW (ref 3.5–5)
POTASSIUM SERPL-MCNC: 6 MMOL/L — CRITICAL HIGH (ref 3.5–5)
POTASSIUM SERPL-SCNC: 2.6 MMOL/L — CRITICAL LOW (ref 3.5–5)
POTASSIUM SERPL-SCNC: 6 MMOL/L — CRITICAL HIGH (ref 3.5–5)
PROT SERPL-MCNC: 3.6 G/DL — LOW (ref 6–8)
PROT SERPL-MCNC: 7.9 G/DL — SIGNIFICANT CHANGE UP (ref 6–8)
PROTHROM AB SERPL-ACNC: 27.1 SEC — HIGH (ref 9.95–12.87)
PROTHROM AB SERPL-ACNC: 28 SEC — HIGH (ref 9.95–12.87)
RBC # BLD: 3.2 M/UL — LOW (ref 4.7–6.1)
RBC # FLD: 16.4 % — HIGH (ref 11.5–14.5)
SAO2 % BLDV: 57 % — SIGNIFICANT CHANGE UP
SODIUM SERPL-SCNC: 135 MMOL/L — SIGNIFICANT CHANGE UP (ref 135–146)
SODIUM SERPL-SCNC: 142 MMOL/L — SIGNIFICANT CHANGE UP (ref 135–146)
TROPONIN T SERPL-MCNC: 0.7 NG/ML — CRITICAL HIGH
TROPONIN T SERPL-MCNC: 0.88 NG/ML — CRITICAL HIGH
WBC # BLD: 9.19 K/UL — SIGNIFICANT CHANGE UP (ref 4.8–10.8)
WBC # FLD AUTO: 9.19 K/UL — SIGNIFICANT CHANGE UP (ref 4.8–10.8)

## 2018-05-07 RX ORDER — ZOLPIDEM TARTRATE 10 MG/1
5 TABLET ORAL AT BEDTIME
Qty: 0 | Refills: 0 | Status: DISCONTINUED | OUTPATIENT
Start: 2018-05-07 | End: 2018-05-09

## 2018-05-07 RX ORDER — DEXTROSE 50 % IN WATER 50 %
25 SYRINGE (ML) INTRAVENOUS ONCE
Qty: 0 | Refills: 0 | Status: DISCONTINUED | OUTPATIENT
Start: 2018-05-07 | End: 2018-05-09

## 2018-05-07 RX ORDER — DEXTROSE 50 % IN WATER 50 %
1 SYRINGE (ML) INTRAVENOUS ONCE
Qty: 0 | Refills: 0 | Status: DISCONTINUED | OUTPATIENT
Start: 2018-05-07 | End: 2018-05-09

## 2018-05-07 RX ORDER — FUROSEMIDE 40 MG
40 TABLET ORAL
Qty: 0 | Refills: 0 | COMMUNITY

## 2018-05-07 RX ORDER — INSULIN LISPRO 100/ML
VIAL (ML) SUBCUTANEOUS
Qty: 0 | Refills: 0 | Status: DISCONTINUED | OUTPATIENT
Start: 2018-05-07 | End: 2018-05-09

## 2018-05-07 RX ORDER — ATORVASTATIN CALCIUM 80 MG/1
20 TABLET, FILM COATED ORAL AT BEDTIME
Qty: 0 | Refills: 0 | Status: DISCONTINUED | OUTPATIENT
Start: 2018-05-07 | End: 2018-05-09

## 2018-05-07 RX ORDER — INSULIN LISPRO 100/ML
5 VIAL (ML) SUBCUTANEOUS
Qty: 0 | Refills: 0 | Status: DISCONTINUED | OUTPATIENT
Start: 2018-05-07 | End: 2018-05-09

## 2018-05-07 RX ORDER — SODIUM CHLORIDE 9 MG/ML
500 INJECTION INTRAMUSCULAR; INTRAVENOUS; SUBCUTANEOUS
Qty: 0 | Refills: 0 | Status: COMPLETED | OUTPATIENT
Start: 2018-05-07 | End: 2018-05-07

## 2018-05-07 RX ORDER — GABAPENTIN 400 MG/1
0 CAPSULE ORAL
Qty: 0 | Refills: 0 | COMMUNITY

## 2018-05-07 RX ORDER — VANCOMYCIN HCL 1 G
1000 VIAL (EA) INTRAVENOUS ONCE
Qty: 0 | Refills: 0 | Status: COMPLETED | OUTPATIENT
Start: 2018-05-07 | End: 2018-05-07

## 2018-05-07 RX ORDER — PANTOPRAZOLE SODIUM 20 MG/1
40 TABLET, DELAYED RELEASE ORAL
Qty: 0 | Refills: 0 | Status: DISCONTINUED | OUTPATIENT
Start: 2018-05-07 | End: 2018-05-09

## 2018-05-07 RX ORDER — ACETAMINOPHEN 500 MG
975 TABLET ORAL ONCE
Qty: 0 | Refills: 0 | Status: COMPLETED | OUTPATIENT
Start: 2018-05-07 | End: 2018-05-07

## 2018-05-07 RX ORDER — INSULIN GLARGINE 100 [IU]/ML
21 INJECTION, SOLUTION SUBCUTANEOUS
Qty: 0 | Refills: 0 | COMMUNITY

## 2018-05-07 RX ORDER — DEXTROSE 50 % IN WATER 50 %
12.5 SYRINGE (ML) INTRAVENOUS ONCE
Qty: 0 | Refills: 0 | Status: DISCONTINUED | OUTPATIENT
Start: 2018-05-07 | End: 2018-05-09

## 2018-05-07 RX ORDER — WARFARIN SODIUM 2.5 MG/1
5 TABLET ORAL ONCE
Qty: 0 | Refills: 0 | Status: COMPLETED | OUTPATIENT
Start: 2018-05-07 | End: 2018-05-07

## 2018-05-07 RX ORDER — GABAPENTIN 400 MG/1
100 CAPSULE ORAL DAILY
Qty: 0 | Refills: 0 | Status: DISCONTINUED | OUTPATIENT
Start: 2018-05-07 | End: 2018-05-09

## 2018-05-07 RX ORDER — WARFARIN SODIUM 2.5 MG/1
1 TABLET ORAL
Qty: 0 | Refills: 0 | COMMUNITY

## 2018-05-07 RX ORDER — INSULIN GLARGINE 100 [IU]/ML
15 INJECTION, SOLUTION SUBCUTANEOUS AT BEDTIME
Qty: 0 | Refills: 0 | Status: DISCONTINUED | OUTPATIENT
Start: 2018-05-07 | End: 2018-05-09

## 2018-05-07 RX ORDER — GLUCAGON INJECTION, SOLUTION 0.5 MG/.1ML
1 INJECTION, SOLUTION SUBCUTANEOUS ONCE
Qty: 0 | Refills: 0 | Status: DISCONTINUED | OUTPATIENT
Start: 2018-05-07 | End: 2018-05-09

## 2018-05-07 RX ORDER — SODIUM CHLORIDE 9 MG/ML
1000 INJECTION, SOLUTION INTRAVENOUS
Qty: 0 | Refills: 0 | Status: DISCONTINUED | OUTPATIENT
Start: 2018-05-07 | End: 2018-05-09

## 2018-05-07 RX ORDER — SEVELAMER CARBONATE 2400 MG/1
800 POWDER, FOR SUSPENSION ORAL THREE TIMES A DAY
Qty: 0 | Refills: 0 | Status: DISCONTINUED | OUTPATIENT
Start: 2018-05-07 | End: 2018-05-09

## 2018-05-07 RX ORDER — PIOGLITAZONE HYDROCHLORIDE 15 MG/1
1 TABLET ORAL
Qty: 0 | Refills: 0 | COMMUNITY

## 2018-05-07 RX ORDER — SODIUM CHLORIDE 9 MG/ML
3 INJECTION INTRAMUSCULAR; INTRAVENOUS; SUBCUTANEOUS ONCE
Qty: 0 | Refills: 0 | Status: COMPLETED | OUTPATIENT
Start: 2018-05-07 | End: 2018-05-07

## 2018-05-07 RX ORDER — HYDROXYZINE HCL 10 MG
0 TABLET ORAL
Qty: 0 | Refills: 0 | COMMUNITY

## 2018-05-07 RX ORDER — METOPROLOL TARTRATE 50 MG
25 TABLET ORAL
Qty: 0 | Refills: 0 | Status: DISCONTINUED | OUTPATIENT
Start: 2018-05-07 | End: 2018-05-08

## 2018-05-07 RX ADMIN — WARFARIN SODIUM 5 MILLIGRAM(S): 2.5 TABLET ORAL at 21:52

## 2018-05-07 RX ADMIN — SODIUM CHLORIDE 3 MILLILITER(S): 9 INJECTION INTRAMUSCULAR; INTRAVENOUS; SUBCUTANEOUS at 14:37

## 2018-05-07 RX ADMIN — INSULIN GLARGINE 15 UNIT(S): 100 INJECTION, SOLUTION SUBCUTANEOUS at 21:52

## 2018-05-07 RX ADMIN — SODIUM CHLORIDE 2000 MILLILITER(S): 9 INJECTION INTRAMUSCULAR; INTRAVENOUS; SUBCUTANEOUS at 15:33

## 2018-05-07 RX ADMIN — Medication 975 MILLIGRAM(S): at 14:37

## 2018-05-07 RX ADMIN — SODIUM CHLORIDE 2000 MILLILITER(S): 9 INJECTION INTRAMUSCULAR; INTRAVENOUS; SUBCUTANEOUS at 14:57

## 2018-05-07 RX ADMIN — SODIUM CHLORIDE 2000 MILLILITER(S): 9 INJECTION INTRAMUSCULAR; INTRAVENOUS; SUBCUTANEOUS at 14:37

## 2018-05-07 RX ADMIN — SODIUM CHLORIDE 2000 MILLILITER(S): 9 INJECTION INTRAMUSCULAR; INTRAVENOUS; SUBCUTANEOUS at 14:58

## 2018-05-07 RX ADMIN — Medication 250 MILLIGRAM(S): at 14:36

## 2018-05-07 RX ADMIN — ATORVASTATIN CALCIUM 20 MILLIGRAM(S): 80 TABLET, FILM COATED ORAL at 21:52

## 2018-05-07 RX ADMIN — SODIUM CHLORIDE 2000 MILLILITER(S): 9 INJECTION INTRAMUSCULAR; INTRAVENOUS; SUBCUTANEOUS at 15:17

## 2018-05-07 RX ADMIN — SEVELAMER CARBONATE 800 MILLIGRAM(S): 2400 POWDER, FOR SUSPENSION ORAL at 21:52

## 2018-05-07 NOTE — ED PROVIDER NOTE - PHYSICAL EXAMINATION
pt is ill appearing, coughing, but able to speak in full sentences. On PE: irregularly irreg. sinus tachy consistent with Afib right sided wheezing and crackles consistent with PNA. Pedal and radial pulses 2+=. No focal deficits.

## 2018-05-07 NOTE — ED PROVIDER NOTE - MEDICAL DECISION MAKING DETAILS
I have personally performed a history and physical exam on this patient and personally directed the management of the patient. Plan: pt likely has infection secondary to PNA which is likely causing him to go into Afib which is new onset. He was given IV ABX. Secondary to pt dialysis status he was given a fluid bolus less than 30cc kilo despite sepsis guidelines. Was given IV Cardizem and improved HR from 130's to 90’s. pt resp status improved will admit the patient.

## 2018-05-07 NOTE — ED ADULT TRIAGE NOTE - CHIEF COMPLAINT QUOTE
Pt here for shortness of breath.  Per wife "He was at dialysis and they had to stop because he was short of breath".

## 2018-05-07 NOTE — CHART NOTE - NSCHARTNOTEFT_GEN_A_CORE
pt was questioned about his home meds. the meds listed were what the pt can only remember, witnessed by mell lea

## 2018-05-07 NOTE — CONSULT NOTE ADULT - ASSESSMENT
1. Probable pneumonia, LLL.  2. ESRD , on HD.  3. A Fibrillation , chronic.  4. DM , on insulin.  5. Mild hyperkalemia.      Recommendations-  1. To admit on telemetry.  2. Continue with lopressor to control ventricular rate.  3. N C O2 and keep O2 saturation above 92%.  4. IV Antibiotic- HD dose.  5. FS Glucose and insulin coverage.  6. Continue Levemir at HS.  7. Continue coumadin and keep INR between 2-3.  8. No more IV Fluid as BP is good and he is on HD.  9. Nephrology consult as he will need HD tomorrow.  10. Cxray tomorrow.

## 2018-05-07 NOTE — ED PROVIDER NOTE - OBJECTIVE STATEMENT
58 yo M comes in with SOB and cough pt with chronic kidney disease dialysis, m/w/f. went for partial today and was sent in for eval of SOB and cough. Pt admits to productive cough, fever, no chills

## 2018-05-07 NOTE — ED PROVIDER NOTE - PROGRESS NOTE DETAILS
I discussed the case with the intensivist admit to ICU telemetry. I consulted nephrology Patient has improved heart rate into the 90's I will continue to monitor at this time

## 2018-05-07 NOTE — ED PROVIDER NOTE - NS ED ROS FT
Review of Systems    Constitutional: (+) fever/ chills (-) weight loss  Eyes/ENT: (-) blurry vision, (-) epistaxis (-) sore throat (-) ear pain  Cardiovascular: (-) chest pain, (-) syncope (-) palpitations  Respiratory: (+) cough, (+) shortness of breath  Gastrointestinal: (-) vomiting, (-) diarrhea (-) abdominal pain  Musculoskeletal: (-) neck pain, (-) back pain, (-) joint pain (-) pedal edema   Integumentary: (-) rash, (-) swelling  Neurological: (-) headache, (-) altered mental status  Psychiatric: (-) hallucinations or depression   Allergic/Immunologic: (-) pruritus

## 2018-05-08 LAB
ANION GAP SERPL CALC-SCNC: 17 MMOL/L — HIGH (ref 7–14)
BUN SERPL-MCNC: 64 MG/DL — CRITICAL HIGH (ref 10–20)
CALCIUM SERPL-MCNC: 9.3 MG/DL — SIGNIFICANT CHANGE UP (ref 8.5–10.1)
CHLORIDE SERPL-SCNC: 92 MMOL/L — LOW (ref 98–110)
CO2 SERPL-SCNC: 25 MMOL/L — SIGNIFICANT CHANGE UP (ref 17–32)
CREAT SERPL-MCNC: 6.5 MG/DL — CRITICAL HIGH (ref 0.7–1.5)
GAS PNL BLDA: SIGNIFICANT CHANGE UP
GLUCOSE SERPL-MCNC: 210 MG/DL — HIGH (ref 70–99)
POTASSIUM SERPL-MCNC: 5.5 MMOL/L — HIGH (ref 3.5–5)
POTASSIUM SERPL-SCNC: 5.5 MMOL/L — HIGH (ref 3.5–5)
SODIUM SERPL-SCNC: 134 MMOL/L — LOW (ref 135–146)

## 2018-05-08 RX ORDER — ALBUTEROL 90 UG/1
1 AEROSOL, METERED ORAL EVERY 4 HOURS
Qty: 0 | Refills: 0 | Status: DISCONTINUED | OUTPATIENT
Start: 2018-05-08 | End: 2018-05-09

## 2018-05-08 RX ORDER — FUROSEMIDE 40 MG
80 TABLET ORAL
Qty: 0 | Refills: 0 | Status: DISCONTINUED | OUTPATIENT
Start: 2018-05-08 | End: 2018-05-09

## 2018-05-08 RX ORDER — FUROSEMIDE 40 MG
80 TABLET ORAL ONCE
Qty: 0 | Refills: 0 | Status: COMPLETED | OUTPATIENT
Start: 2018-05-08 | End: 2018-05-08

## 2018-05-08 RX ORDER — WARFARIN SODIUM 2.5 MG/1
5 TABLET ORAL AT BEDTIME
Qty: 0 | Refills: 0 | Status: DISCONTINUED | OUTPATIENT
Start: 2018-05-08 | End: 2018-05-09

## 2018-05-08 RX ORDER — ERYTHROPOIETIN 10000 [IU]/ML
10000 INJECTION, SOLUTION INTRAVENOUS; SUBCUTANEOUS
Qty: 0 | Refills: 0 | Status: DISCONTINUED | OUTPATIENT
Start: 2018-05-08 | End: 2018-05-09

## 2018-05-08 RX ORDER — METOPROLOL TARTRATE 50 MG
5 TABLET ORAL ONCE
Qty: 0 | Refills: 0 | Status: COMPLETED | OUTPATIENT
Start: 2018-05-08 | End: 2018-05-08

## 2018-05-08 RX ORDER — METOPROLOL TARTRATE 50 MG
50 TABLET ORAL
Qty: 0 | Refills: 0 | Status: DISCONTINUED | OUTPATIENT
Start: 2018-05-08 | End: 2018-05-09

## 2018-05-08 RX ORDER — ALBUTEROL 90 UG/1
2.5 AEROSOL, METERED ORAL EVERY 6 HOURS
Qty: 0 | Refills: 0 | Status: DISCONTINUED | OUTPATIENT
Start: 2018-05-08 | End: 2018-05-09

## 2018-05-08 RX ADMIN — Medication 50 MILLIGRAM(S): at 18:15

## 2018-05-08 RX ADMIN — Medication 5 UNIT(S): at 11:13

## 2018-05-08 RX ADMIN — Medication 1 MILLIGRAM(S): at 11:15

## 2018-05-08 RX ADMIN — ALBUTEROL 2.5 MILLIGRAM(S): 90 AEROSOL, METERED ORAL at 19:33

## 2018-05-08 RX ADMIN — Medication 5 MILLIGRAM(S): at 11:14

## 2018-05-08 RX ADMIN — PANTOPRAZOLE SODIUM 40 MILLIGRAM(S): 20 TABLET, DELAYED RELEASE ORAL at 14:25

## 2018-05-08 RX ADMIN — ZOLPIDEM TARTRATE 5 MILLIGRAM(S): 10 TABLET ORAL at 00:11

## 2018-05-08 RX ADMIN — INSULIN GLARGINE 15 UNIT(S): 100 INJECTION, SOLUTION SUBCUTANEOUS at 21:58

## 2018-05-08 RX ADMIN — Medication 5 UNIT(S): at 18:10

## 2018-05-08 RX ADMIN — Medication 60 MILLIGRAM(S): at 11:26

## 2018-05-08 RX ADMIN — Medication 1: at 11:10

## 2018-05-08 RX ADMIN — Medication 5 UNIT(S): at 11:12

## 2018-05-08 RX ADMIN — Medication 80 MILLIGRAM(S): at 11:19

## 2018-05-08 RX ADMIN — Medication 80 MILLIGRAM(S): at 18:46

## 2018-05-08 RX ADMIN — Medication 40 MILLIGRAM(S): at 18:14

## 2018-05-08 RX ADMIN — SEVELAMER CARBONATE 800 MILLIGRAM(S): 2400 POWDER, FOR SUSPENSION ORAL at 21:57

## 2018-05-08 RX ADMIN — GABAPENTIN 100 MILLIGRAM(S): 400 CAPSULE ORAL at 14:25

## 2018-05-08 RX ADMIN — ALBUTEROL 2.5 MILLIGRAM(S): 90 AEROSOL, METERED ORAL at 14:08

## 2018-05-08 RX ADMIN — Medication 2: at 18:11

## 2018-05-08 RX ADMIN — Medication 25 MILLIGRAM(S): at 05:51

## 2018-05-08 RX ADMIN — SEVELAMER CARBONATE 800 MILLIGRAM(S): 2400 POWDER, FOR SUSPENSION ORAL at 05:51

## 2018-05-08 RX ADMIN — SEVELAMER CARBONATE 800 MILLIGRAM(S): 2400 POWDER, FOR SUSPENSION ORAL at 14:27

## 2018-05-08 RX ADMIN — WARFARIN SODIUM 5 MILLIGRAM(S): 2.5 TABLET ORAL at 21:57

## 2018-05-08 RX ADMIN — ATORVASTATIN CALCIUM 20 MILLIGRAM(S): 80 TABLET, FILM COATED ORAL at 21:56

## 2018-05-08 NOTE — H&P ADULT - NSHPLABSRESULTS_GEN_ALL_CORE
9.4    9.19  )-----------( 252      ( 07 May 2018 19:27 )             28.7     142  |  112<H>  |  29<H>  ----------------------------<  186<H>  2.6<LL>   |  14<L>  |  3.0<H>    Ca    4.5<LL>      07 May 2018 19:27    TPro  3.6<L>  /  Alb  2.0<L>  /  TBili  0.4  /  DBili  x   /  AST  10  /  ALT  7   /  AlkPhos  58  05-07        PT/INR - ( 07 May 2018 19:27 )   PT: 27.10 sec;   INR: 2.46 ratio      PTT - ( 07 May 2018 15:10 )  PTT:33.0 sec    Lactate Trend  05-07 @ 19:27 Lactate:1.7   05-07 @ 15:10 Lactate:2.5       CARDIAC MARKERS ( 07 May 2018 19:27 )  x     / 0.70 ng/mL / x     / x     / x      CARDIAC MARKERS ( 07 May 2018 15:10 )  x     / 0.88 ng/mL / 368 U/L / x     / 14.0 ng/mL    CAPILLARY BLOOD GLUCOSE  180 (08 May 2018 07:32)

## 2018-05-08 NOTE — H&P ADULT - ASSESSMENT
57 year old male p/w SOB / cough / phlegm production found to have LLL PNA 57 year old male p/w SOB / cough / phlegm production found to have pulm congestion / ? LLL opacity    # Shortness of breath / cough  - possible LLL pna / w pulm edema  - pt recieved dialysis yesterday although not a complete course  - plan as per nephrology is to rpt dialysis tomorrow / and give lasix 80mg PO BID (some residual renal function)  - c/w levaquin (renally dosed)  - BP stable, WBC WNL, afebrile  - check 2d ECHO  - cardiac enzymes elevated    # 57 year old male p/w SOB / cough / phlegm production found to have pulm congestion / ? LLL opacity    # Shortness of breath / cough  - possible LLL pna / w pulm edema  - pt recieved dialysis yesterday although not a complete course  - plan as per nephrology is to rpt dialysis tomorrow / and give lasix 80mg PO BID (some residual renal function)  - c/w levaquin (renally dosed)  - BP stable, WBC WNL, afebrile  - check 2d ECHO  - sputum cx / zackery stain / urine legionella antigen (as per ID)    # troponemia   - stable 2/2 ESRD    # 57 year old male p/w SOB / cough / phlegm production found to have pulm congestion / ? LLL opacity    # Shortness of breath / cough  - possible LLL opacity / w pulm edema  - pt recieved dialysis yesterday although not a complete course  - plan as per nephrology is to rpt dialysis tomorrow / and give lasix 80mg PO BID (some residual renal function)  - BP stable, WBC WNL, afebrile  - check 2d ECHO  - ddx also includes undiagnosed COPD / pna  - significant smoking hx / wheezing on exam  - start prednisone 40 mg qd  - sputum cx / zackery stain / urine legionella antigen (as per ID)  - c/w levaquin (renally dosed)  - P/A / Lateral CXR to further assess    # troponemia   - stable 2/2 ESRD    # AFIB  - rate controlled on lopressor  - c/w coumadin INR 2 - 2.5    # HTN  - stable, c/w current medication regimen    # DM  - basal / bolus    # DVT ppx    # FULL CODE

## 2018-05-08 NOTE — CONSULT NOTE ADULT - ASSESSMENT
IMPRESSION  Suspected Gram negative pneumonia i pt on hemodialysis with DM    On levoFLOXacin 500 mg IV Intermittent every 24 hours    Pt with hyponatremia, lactic acidosis    5/7 CXRAY Hazy opacities of the lung bases bilaterally probably representing small   effusions, overall not significant changed since 3/16/2018.    Allergies: sulfa (hives)  penicillin (Unknown)  sulfADIAZINE (Unknown)    SUGGESTIONs  Await blood cultures,   Continue Levaquin q48h for now  Urine legionella antigen IMPRESSION  Suspected Gram negative pneumonia i pt on hemodialysis with DM    On levoFLOXacin 500 mg IV Intermittent every 24 hours    Pt with hyponatremia, lactic acidosis    5/7 CXRAY Hazy opacities of the lung bases bilaterally probably representing small   effusions, overall not significant changed since 3/16/2018.    Allergies: sulfa (hives)  penicillin (Unknown)  sulfADIAZINE (Unknown)    SUGGESTIONs  Await blood cultures,   Continue Levaquin q48h for now  Urine legionella antigen  Send Sputum C&S and gram stain

## 2018-05-08 NOTE — H&P ADULT - NSHPPHYSICALEXAM_GEN_ALL_CORE
T(C): 37.8 (05-08-18 @ 07:01), Max: 37.8 (05-07-18 @ 14:10)  HR: 92 (05-08-18 @ 07:05) (74 - 144)  BP: 135/87 (05-08-18 @ 07:05) (106/65 - 165/76)  RR: 20 (05-08-18 @ 07:01) (16 - 22)  SpO2: 96% (05-08-18 @ 07:05) (93% - 100%)    PHYSICAL EXAM:  GENERAL: NAD, well-developed  HEAD:  Atraumatic, Normocephalic  EYES: EOMI, PERRLA, conjunctiva and sclera clear  ENT: Normal tympanic membrane. No nasal obstruction or discharge. No tonsillar exudate, swelling or erythema.  NECK: Supple, No JVD  CHEST/LUNG: decrease air entry b/l, no wheezing, no crackles  HEART: Regular rate and rhythm; No murmurs, rubs, or gallops  ABDOMEN: Soft, Nontender, Nondistended; Bowel sounds present  EXTREMITIES:  2+ Peripheral Pulses, No clubbing, cyanosis, or edema  PSYCH: AAOx3  NEUROLOGY: non-focal  SKIN: No rashes or lesions

## 2018-05-08 NOTE — CONSULT NOTE ADULT - ASSESSMENT
1. Anemia. Start EPO 46866 units IV with HD.  2. ESRD on HD MWF. HD tomorrow: 3 hours, opti 160 dialyzer, 2K bath, 2L UF.  3. HTN. Continue metoprolol. Start Lasix 80mg PO BID.  4. Hyperphosphatemia. Sevelamer with meals. Check phos level. Renal diet.  5. Hypokalemia. Followup BMP today. Supplement if less than 3.5.  6. PNA. Change Levaquin to every 48 hours.

## 2018-05-08 NOTE — H&P ADULT - HISTORY OF PRESENT ILLNESS
57 year old male p/w CC: cough / phlegm production x 3 days + ? fever. Yesterday went for dialysis and FPC through did not feel good. Otherwise denies any chest pain, palpitations, nausea, vomiting, diarrhea. Pmhx of HTN / DM / ESRD on HD / PVD w/ toe amputations.

## 2018-05-08 NOTE — CONSULT NOTE ADULT - SUBJECTIVE AND OBJECTIVE BOX
[facility] INITIAL CONSULT NOTE  --------------------------------------------------------------------------------  HPI:  57 year old male p/w CC: cough / phlegm production x 3 days + ? fever. Monday went for dialysis and FPC through did not feel good. Otherwise denies any chest pain, palpitations, nausea, vomiting, diarrhea. Pmhx of HTN / DM / PVD w/ toe amputations.     Patient has a past medical history of end stage renal disease secondary to diabetic nephropathy. He is maintained on hemodialysis on MWF at [facility] under the care of Dr Madison. His HD access is a LUE AVF. Last HD treatment was Monday.    PAST HISTORY  --------------------------------------------------------------------------------  PAST MEDICAL & SURGICAL HISTORY:  Atrial fibrillation  Right sided weakness  Stroke  Hypertension  High blood cholesterol  Diabetes mellitus, type 2  Dialysis patient: Mon, wednesday, friday  A-V fistula    FAMILY HISTORY:  No pertinent family history in first degree relatives    SOCIAL HISTORY: No current ETOH, tobacco, or illicit drug use.    ALLERGIES & MEDICATIONS  --------------------------------------------------------------------------------  Allergies    Orange (Other)  Originally Entered as [HIVES] reaction to [sulfur] (Unknown)  penicillin (Unknown)  sulfADIAZINE (Unknown)    Standing Inpatient Medications  atorvastatin 20 milliGRAM(s) Oral at bedtime  dextrose 5%. 1000 milliLiter(s) IV Continuous <Continuous>  dextrose 50% Injectable 12.5 Gram(s) IV Push once  dextrose 50% Injectable 25 Gram(s) IV Push once  dextrose 50% Injectable 25 Gram(s) IV Push once  gabapentin 100 milliGRAM(s) Oral daily  insulin glargine Injectable (LANTUS) 15 Unit(s) SubCutaneous at bedtime  insulin lispro (HumaLOG) corrective regimen sliding scale   SubCutaneous three times a day before meals  insulin lispro Injectable (HumaLOG) 5 Unit(s) SubCutaneous before breakfast  insulin lispro Injectable (HumaLOG) 5 Unit(s) SubCutaneous before lunch  insulin lispro Injectable (HumaLOG) 5 Unit(s) SubCutaneous before dinner  levoFLOXacin IVPB 500 milliGRAM(s) IV Intermittent every 24 hours  metoprolol tartrate 25 milliGRAM(s) Oral two times a day  pantoprazole    Tablet 40 milliGRAM(s) Oral before breakfast  sevelamer hydrochloride 800 milliGRAM(s) Oral three times a day    PRN Inpatient Medications  dextrose Gel 1 Dose(s) Oral once PRN  glucagon  Injectable 1 milliGRAM(s) IntraMuscular once PRN  zolpidem 5 milliGRAM(s) Oral at bedtime PRN  zolpidem 5 milliGRAM(s) Oral at bedtime PRN    REVIEW OF SYSTEMS  --------------------------------------------------------------------------------  Gen: No weight changes  Skin: No rashes  Head/Eyes/Ears/Mouth: No headache; No sinus pain/discomfort, sore throat  Respiratory: No dyspnea, cough, wheezing, hemoptysis  CV: No chest pain, PND, orthopnea  GI: No abdominal pain, diarrhea, constipation, nausea, vomiting, melena, hematochezia  : No increased frequency, dysuria, hematuria, nocturia  All other systems were reviewed and are negative, except as noted.    VITALS/PHYSICAL EXAM  --------------------------------------------------------------------------------  T(C): 37.8 (05-08-18 @ 07:01), Max: 37.8 (05-07-18 @ 14:10)  HR: 92 (05-08-18 @ 07:05) (74 - 144)  BP: 135/87 (05-08-18 @ 07:05) (106/65 - 165/76)  RR: 20 (05-08-18 @ 07:01) (16 - 22)  SpO2: 96% (05-08-18 @ 07:05) (93% - 100%)  Height (cm): 177.8 (05-07-18 @ 19:35)  Weight (kg): 97.9 (05-07-18 @ 19:35)  BMI (kg/m2): 31 (05-07-18 @ 19:35)  BSA (m2): 2.16 (05-07-18 @ 19:35)    05-07-18 @ 07:01  -  05-08-18 @ 07:00  --------------------------------------------------------  IN: 820 mL / OUT: 201 mL / NET: 619 mL    Physical Exam:  	Gen: NAD  	HEENT: AT, NC  	Pulm: B/L rales  	CV: RRR, S1S2  	Back: No CVA tenderness; no sacral edema  	Abd: +BS, soft, nontender/nondistended  	: No suprapubic tenderness  	LE: Warm, edema  	Neuro: AAO x3  	Psych: Normal affect and mood  	Skin: Warm, without rashes  	Vascular access: LUE AVF with good thrill/bruit    LABS/STUDIES  --------------------------------------------------------------------------------              9.4    9.19  >-----------<  252      [05-07-18 @ 19:27]              28.7     142  |  112  |  29  ----------------------------<  186      [05-07-18 @ 19:27]  2.6   |  14  |  3.0        Ca     4.5     [05-07-18 @ 19:27]    TPro  3.6  /  Alb  2.0  /  TBili  0.4  /  DBili  x   /  AST  10  /  ALT  7   /  AlkPhos  58  [05-07-18 @ 19:27]    PT/INR: PT 27.10, INR 2.46       [05-07-18 @ 19:27]  PTT: 33.0       [05-07-18 @ 15:10]    Troponin 0.70      [05-07-18 @ 19:27]        [05-07-18 @ 15:10]    Creatinine Trend:  SCr 3.0 [05-07 @ 19:27]  SCr 5.3 [05-07 @ 15:10]    HbA1c 6.1      [03-14-18 @ 05:41]  TSH 2.19      [03-14-18 @ 05:41]  Lipid: chol 119, TG 96, HDL 35, LDL 68      [03-14-18 @ 05:41]
Patient is a 57y old  Male who presents with a chief complaint of cough for the last 3 days. Mild phlegm present. Today he did not feel good when  he went for his regular HD. Also has mild shortness of breath. Not sure about fever at home. No chest pain. No vomiting.    HPI: Patient is a known case of DM, on insulin and ESRD- on HD. Complaints as above.  Present history as described above.         PAST MEDICAL & SURGICAL HISTORY:  Atrial fibrillation- for the last 4-5 months.    Stroke  Hypertension  High blood cholesterol  Diabetes mellitus, type 2 for 24 years.  Dialysis patient: Mon, wednesday, friday - for the last 7-8 months.  A-V fistula        MEDICATIONS  (STANDING): coumadin , lasix , lipitor , lantus insulin HS and regular Humalog with meals- 3 times a day.  Personal History- . Smokes 1/2 PPD -20 years.    Allergies    Orange (Other)  Originally Entered as [HIVES] reaction to [sulfur] (Unknown)  penicillin (Unknown)  sulfADIAZINE (Unknown)    Intolerances      Vital Signs Last 24 Hrs  T(C): 36.8 (07 May 2018 17:28), Max: 37.8 (07 May 2018 14:10)  T(F): 98.3 (07 May 2018 17:28), Max: 100 (07 May 2018 14:10)  HR: 86 (07 May 2018 17:28) (86 - 144)  BP: 114/65 (07 May 2018 17:28) (106/65 - 142/65)  BP(mean): --  RR: 18 (07 May 2018 17:28) (18 - 22)  SpO2: 99% (07 May 2018 17:28) (93% - 100%)  PHYSICAL EXAM:      Constitutional: sitting in the chair.    Eyes: no icterus.    ENMT: clear.    Neck: no LN enlargement.    Breasts: normal.    Back:    Respiratory: no wheezing. occasional left crepitations.    Cardiovascular: irregular. no murmur.    Gastrointestinal: soft and non tender.    Genitourinary:    Rectal: deferred.    Extremities: no leg edema.    Vascular: poor pulsations in feet and ankles.    Neurological: awake , alert. speech normal.    Skin: no rash.    Lymph Nodes: none.    Musculoskeletal:    Psychiatric:      05-07    135  |  91<L>  |  42<H>  ----------------------------<  254<H>  6.0<HH>   |  23  |  5.3<HH>    Ca    9.5      07 May 2018 15:10    TPro  7.9  /  Alb  3.9  /  TBili  0.9  /  DBili  x   /  AST  35  /  ALT  15  /  AlkPhos  134<H>  05-07        PT/INR - ( 07 May 2018 15:10 )   PT: 28.00 sec;   INR: 2.54 ratio         PTT - ( 07 May 2018 15:10 )  PTT:33.0 sec      EKG A Fibrillation with rapid ventricular rate.    Radiology: cardiomegaly. possible small infiltrate in LLL.
Patient is a 57y old  Male who presents with a chief complaint of pna (08 May 2018 07:56)      HPI:  57 year old male p/w CC: cough / phlegm production x 3 days + ? fever. Yesterday went for dialysis and custodial through did not feel good. Otherwise denies any chest pain, palpitations, nausea, vomiting, diarrhea. Pmhx of HTN / DM / ESRD on HD / PVD w/ toe amputations. (08 May 2018 07:56) admitted to [facility] for rapid AFIB , cardizem push 20 and now in NSR       PAST MEDICAL & SURGICAL HISTORY:  Atrial fibrillation  Right sided weakness  Stroke  Hypertension  High blood cholesterol  Diabetes mellitus, type 2  Dialysis patient: Mon, wednesday, friday  A-V fistula    Allergies    Orange (Other)  Originally Entered as [HIVES] reaction to [sulfur] (Unknown)  penicillin (Unknown)  sulfADIAZINE (Unknown)    Intolerances      FAMILY HISTORY:  No pertinent family history in first degree relatives    Home Medications:  atorvastatin 20 mg oral tablet: 1 tab(s) orally once a day (07 May 2018 20:27)  Coumadin 2.5 mg oral tablet: 2 tab(s) orally once a day (at bedtime) (07 May 2018 20:27)  gabapentin 100 mg oral tablet: 1 tab(s) orally once a day (07 May 2018 20:27)  insulin lispro (concentrated) 200 units/mL subcutaneous solution: 7 unit(s) subcutaneous 3 times a day (07 May 2018 20:27)  Lantus 100 units/mL subcutaneous solution: 15 unit(s) subcutaneous once a day (at bedtime) (07 May 2018 20:27)  pantoprazole 40 mg oral delayed release tablet: 1 tab(s) orally once a day (07 May 2018 20:27)  Renvela 800 mg oral tablet: 1 tab(s) orally 3 times a day (with meals) (07 May 2018 20:27)  Toprol-XL 25 mg oral tablet, extended release: 1 tab(s) orally once a day (07 May 2018 20:27)  Vitamin D3 10,000 intl units oral capsule: 99840 milligram(s) orally once a week (07 May 2018 20:27)    Occupation:  Alochol: Denied  Smoking: Denied  Drug Use: Denied  Marital Status:         ROS: as in HPI; All other systems reviewed are negative    ICU Vital Signs Last 24 Hrs  T(C): 37.8 (08 May 2018 07:01), Max: 37.8 (07 May 2018 14:10)  T(F): 100.1 (08 May 2018 07:01), Max: 100.1 (08 May 2018 07:01)  HR: 92 (08 May 2018 07:05) (74 - 144)  BP: 135/87 (08 May 2018 07:05) (106/65 - 165/76)  BP(mean): 101 (08 May 2018 07:05) (101 - 109)  ABP: --  ABP(mean): --  RR: 20 (08 May 2018 07:01) (16 - 22)  SpO2: 96% (08 May 2018 07:05) (93% - 100%)        Physical Examination:    General: No acute distress.  Alert, oriented, interactive, nonfocal    HEENT: Pupils equal, reactive to light.  Symmetric.    PULM: mild wheeze     CVS: Regular rate and rhythm, no murmurs, rubs, or gallops    ABD: Soft, nondistended, nontender, normoactive bowel sounds, no masses    EXT: 1 edema , nontender    SKIN: Warm and well perfused, no rashes noted.              I&O's Detail    07 May 2018 07:01  -  08 May 2018 07:00  --------------------------------------------------------  IN:    Oral Fluid: 820 mL  Total IN: 820 mL    OUT:    Stool: 1 mL    Voided: 200 mL  Total OUT: 201 mL    Total NET: 619 mL            LABS:                        9.4    9.19  )-----------( 252      ( 07 May 2018 19:27 )             28.7     07 May 2018 19:27    142    |  112    |  29     ----------------------------<  186    2.6     |  14     |  3.0      Ca    4.5        07 May 2018 19:27    TPro  3.6    /  Alb  2.0    /  TBili  0.4    /  DBili  x      /  AST  10     /  ALT  7      /  AlkPhos  58     07 May 2018 19:27  Amylase x     lipase x          CARDIAC MARKERS ( 07 May 2018 19:27 )  x     / 0.70 ng/mL / x     / x     / x      CARDIAC MARKERS ( 07 May 2018 15:10 )  x     / 0.88 ng/mL / 368 U/L / x     / 14.0 ng/mL      CAPILLARY BLOOD GLUCOSE  180 (08 May 2018 07:32)        PT/INR - ( 07 May 2018 19:27 )   PT: 27.10 sec;   INR: 2.46 ratio         PTT - ( 07 May 2018 15:10 )  PTT:33.0 sec    Culture    Lactate, Blood: 1.7 mmol/L (05-07-18 @ 19:27)  Lactate, Blood: 2.5 mmol/L (05-07-18 @ 15:10)      MEDICATIONS  (STANDING):  atorvastatin 20 milliGRAM(s) Oral at bedtime  dextrose 5%. 1000 milliLiter(s) (50 mL/Hr) IV Continuous <Continuous>  dextrose 50% Injectable 12.5 Gram(s) IV Push once  dextrose 50% Injectable 25 Gram(s) IV Push once  dextrose 50% Injectable 25 Gram(s) IV Push once  gabapentin 100 milliGRAM(s) Oral daily  insulin glargine Injectable (LANTUS) 15 Unit(s) SubCutaneous at bedtime  insulin lispro (HumaLOG) corrective regimen sliding scale   SubCutaneous three times a day before meals  insulin lispro Injectable (HumaLOG) 5 Unit(s) SubCutaneous before breakfast  insulin lispro Injectable (HumaLOG) 5 Unit(s) SubCutaneous before lunch  insulin lispro Injectable (HumaLOG) 5 Unit(s) SubCutaneous before dinner  levoFLOXacin IVPB 500 milliGRAM(s) IV Intermittent every 24 hours  metoprolol tartrate 25 milliGRAM(s) Oral two times a day  pantoprazole    Tablet 40 milliGRAM(s) Oral before breakfast  sevelamer hydrochloride 800 milliGRAM(s) Oral three times a day    MEDICATIONS  (PRN):  dextrose Gel 1 Dose(s) Oral once PRN Blood Glucose LESS THAN 70 milliGRAM(s)/deciliter  glucagon  Injectable 1 milliGRAM(s) IntraMuscular once PRN Glucose LESS THAN 70 milligrams/deciliter  zolpidem 5 milliGRAM(s) Oral at bedtime PRN Insomnia  zolpidem 5 milliGRAM(s) Oral at bedtime PRN Insomnia        RADIOLOGY: ***     CXR:  TLC:  OG:  ET tube:      b/l effusion mild     ECHO:
ROGER RODRIGUES 57yMalePatient is a 57y old  Male who presents with a chief complaint of pna (08 May 2018 07:56)      Patient has history of:  Orange (Other)  Originally Entered as [HIVES] reaction to [sulfur] (Unknown)  penicillin (Unknown)  sulfADIAZINE (Unknown)      Adult/Nursing Home residence    PNEUMONIA;ATRIAL FIBRILLATION  ^SOB  H/o or current diagnosis of HF- ACEI/ARB contraindication unknown  H/o or current diagnosis of HF- ACEI/ARB contraindication unknown  No pertinent family history in first degree relatives  Handoff  MEWS Score  Atrial fibrillation  Right sided weakness  Stroke  Hypertension  High blood cholesterol  Diabetes mellitus, type 2  Dialysis patient  Pneumonia  A-V fistula  SOB  51  Atrial fibrillation        Patient treated with:  levoFLOXacin IVPB 500 milliGRAM(s) IV Intermittent every 24 hours        PHYSICAL EXAM  T(F): 100.1 (18 @ 07:01), Max: 100.1 (18 @ 07:01)  HR: 92 (18 @ 07:05) (74 - 144)  BP: 135/87 (18 @ 07:05) (106/65 - 165/76)  RR: 20 (18 @ 07:01) (16 - 22)  SpO2: 96% (18 @ 07:05) (93% - 100%)  Daily Height in cm: 177.8 (07 May 2018 19:35)    Daily Weight in k.9 (07 May 2018 19:35)  HEENT: normal, no nuchal rigidity  Cor: RSR Nl S1 S2  Lungs: clear  Decreased breath sounds at bases    Abdomen: Nontender, Nl BS,     Ext: No clubbing,cyanosis or edema    LAB & RADIOLOGIC RESULTS:                        9.4    9.19  )-----------( 252      ( 07 May 2018 19:27 )             28.7             142  |  112<H>  |  29<H>  ----------------------------<  186<H>  2.6<LL>   |  14<L>  |  3.0<H>      TPro  3.6<L>  /  Alb  2.0<L>  /  TBili  0.4  /  DBili  x   /  AST  10  /  ALT  7   /  AlkPhos  58      <--<9>>2.5      Lactic Acidosis   Lactate, Blood: 1.7 mmol/L (18 @ 19:27)  Lactate, Blood: 2.5 mmol/L (18 @ 15:10)  Blood Gas Venous - Lactate: 2.5 mmoL/L (18 @ 14:59)  Blood Gas Venous - Lactate: 2.5 mmoL/L (18 @ 14:59)      Acidosis         Creatinine, Serum: 3.0 mg/dL (18 @ 19:27)  eGFR if Non African American: 22 mL/min/1.73M2 (18 @ 19:27)  eGFR if : 25 mL/min/1.73M2 (18 @ 19:27)  eGFR if : 13 mL/min/1.73M2 (18 @ 15:10)  eGFR if Non African American: 11 mL/min/1.73M2 (18 @ 15:10)  Creatinine, Serum: 5.3 mg/dL (18 @ 15:10)      Acute Kidney Injury          PT/INR - ( 07 May 2018 19:27 )   PT: 27.10 sec;   INR: 2.46 ratio         PTT - ( 07 May 2018 15:10 )  PTT:33.0 sec

## 2018-05-08 NOTE — CONSULT NOTE ADULT - ASSESSMENT
IMPRESSION:  CHF   ESRD   AFIB paroxysmal   ?? pna /bronchitis       PLAN:    CNS:no sedation     HEENT:    PULMONARY: keep pox > 92 5 , nebulizer albuterol q 4 hrs   prednisone 40 mg     CARDIOVASCULAR: echo, tele  cardiology follow up     GI: GI prophylaxis.  Feeding     RENAL: HD elvis keep negative balance     INFECTIOUS DISEASE: levaquin continue follow culture     HEMATOLOGICAL:  DVT prophylaxis.    ENDOCRINE:  Follow up FS.  Insulin protocol if needed.    MUSCULOSKELETAL:        CRITICAL CARE TIME SPENT: ***

## 2018-05-08 NOTE — H&P ADULT - NSHPREVIEWOFSYSTEMS_GEN_ALL_CORE
REVIEW OF SYSTEMS:    CONSTITUTIONAL: No weakness, fevers or chills  EYES/ENT: No visual changes;  No vertigo or throat pain   NECK: No pain or stiffness  RESPIRATORY: no wheezing, hemoptysis; + shortness of breath, + cough  CARDIOVASCULAR: No chest pain or palpitations  GASTROINTESTINAL: No abdominal or epigastric pain. No nausea, vomiting, or hematemesis; No diarrhea or constipation. No melena or hematochezia.  GENITOURINARY: No dysuria, frequency or hematuria  NEUROLOGICAL: No numbness or weakness  SKIN: No itching, rashes

## 2018-05-09 ENCOUNTER — TRANSCRIPTION ENCOUNTER (OUTPATIENT)
Age: 58
End: 2018-05-09

## 2018-05-09 ENCOUNTER — INPATIENT (INPATIENT)
Facility: HOSPITAL | Age: 58
LOS: 7 days | Discharge: HOME | End: 2018-05-17
Attending: INTERNAL MEDICINE | Admitting: INTERNAL MEDICINE

## 2018-05-09 VITALS
WEIGHT: 220.02 LBS | TEMPERATURE: 98 F | DIASTOLIC BLOOD PRESSURE: 80 MMHG | OXYGEN SATURATION: 95 % | RESPIRATION RATE: 23 BRPM | HEIGHT: 70 IN | SYSTOLIC BLOOD PRESSURE: 137 MMHG | HEART RATE: 119 BPM

## 2018-05-09 VITALS — WEIGHT: 215.83 LBS

## 2018-05-09 DIAGNOSIS — E11.9 TYPE 2 DIABETES MELLITUS WITHOUT COMPLICATIONS: ICD-10-CM

## 2018-05-09 DIAGNOSIS — I50.9 HEART FAILURE, UNSPECIFIED: ICD-10-CM

## 2018-05-09 DIAGNOSIS — I48.91 UNSPECIFIED ATRIAL FIBRILLATION: ICD-10-CM

## 2018-05-09 DIAGNOSIS — I77.0 ARTERIOVENOUS FISTULA, ACQUIRED: Chronic | ICD-10-CM

## 2018-05-09 DIAGNOSIS — Z99.2 DEPENDENCE ON RENAL DIALYSIS: ICD-10-CM

## 2018-05-09 DIAGNOSIS — E78.00 PURE HYPERCHOLESTEROLEMIA, UNSPECIFIED: ICD-10-CM

## 2018-05-09 DIAGNOSIS — I10 ESSENTIAL (PRIMARY) HYPERTENSION: ICD-10-CM

## 2018-05-09 LAB
ALBUMIN SERPL ELPH-MCNC: 3.6 G/DL — SIGNIFICANT CHANGE UP (ref 3.5–5.2)
ALBUMIN SERPL ELPH-MCNC: 4.2 G/DL — SIGNIFICANT CHANGE UP (ref 3.5–5.2)
ALP SERPL-CCNC: 111 U/L — SIGNIFICANT CHANGE UP (ref 30–115)
ALP SERPL-CCNC: 98 U/L — SIGNIFICANT CHANGE UP (ref 30–115)
ALT FLD-CCNC: 21 U/L — SIGNIFICANT CHANGE UP (ref 0–41)
ALT FLD-CCNC: 25 U/L — SIGNIFICANT CHANGE UP (ref 0–41)
ANION GAP SERPL CALC-SCNC: 17 MMOL/L — HIGH (ref 7–14)
ANION GAP SERPL CALC-SCNC: 20 MMOL/L — HIGH (ref 7–14)
APTT BLD: 32.1 SEC — SIGNIFICANT CHANGE UP (ref 27–39.2)
AST SERPL-CCNC: 34 U/L — SIGNIFICANT CHANGE UP (ref 0–41)
AST SERPL-CCNC: 61 U/L — HIGH (ref 0–41)
BASOPHILS # BLD AUTO: 0.01 K/UL — SIGNIFICANT CHANGE UP (ref 0–0.2)
BASOPHILS # BLD AUTO: 0.02 K/UL — SIGNIFICANT CHANGE UP (ref 0–0.2)
BASOPHILS NFR BLD AUTO: 0.1 % — SIGNIFICANT CHANGE UP (ref 0–1)
BASOPHILS NFR BLD AUTO: 0.3 % — SIGNIFICANT CHANGE UP (ref 0–1)
BILIRUB SERPL-MCNC: 0.7 MG/DL — SIGNIFICANT CHANGE UP (ref 0.2–1.2)
BILIRUB SERPL-MCNC: 0.8 MG/DL — SIGNIFICANT CHANGE UP (ref 0.2–1.2)
BUN SERPL-MCNC: 58 MG/DL — HIGH (ref 10–20)
BUN SERPL-MCNC: 76 MG/DL — CRITICAL HIGH (ref 10–20)
CALCIUM SERPL-MCNC: 9.1 MG/DL — SIGNIFICANT CHANGE UP (ref 8.5–10.1)
CALCIUM SERPL-MCNC: 9.6 MG/DL — SIGNIFICANT CHANGE UP (ref 8.5–10.1)
CHLORIDE SERPL-SCNC: 87 MMOL/L — LOW (ref 98–110)
CHLORIDE SERPL-SCNC: 96 MMOL/L — LOW (ref 98–110)
CK MB CFR SERPL CALC: 22.5 NG/ML — HIGH (ref 0.6–6.3)
CK SERPL-CCNC: 822 U/L — HIGH (ref 0–225)
CO2 SERPL-SCNC: 23 MMOL/L — SIGNIFICANT CHANGE UP (ref 17–32)
CO2 SERPL-SCNC: 25 MMOL/L — SIGNIFICANT CHANGE UP (ref 17–32)
CREAT SERPL-MCNC: 6.1 MG/DL — CRITICAL HIGH (ref 0.7–1.5)
CREAT SERPL-MCNC: 7.1 MG/DL — CRITICAL HIGH (ref 0.7–1.5)
EOSINOPHIL # BLD AUTO: 0 K/UL — SIGNIFICANT CHANGE UP (ref 0–0.7)
EOSINOPHIL # BLD AUTO: 0 K/UL — SIGNIFICANT CHANGE UP (ref 0–0.7)
EOSINOPHIL NFR BLD AUTO: 0 % — SIGNIFICANT CHANGE UP (ref 0–8)
EOSINOPHIL NFR BLD AUTO: 0 % — SIGNIFICANT CHANGE UP (ref 0–8)
GLUCOSE SERPL-MCNC: 336 MG/DL — HIGH (ref 70–99)
GLUCOSE SERPL-MCNC: 363 MG/DL — HIGH (ref 70–99)
HCT VFR BLD CALC: 38.7 % — LOW (ref 42–52)
HCT VFR BLD CALC: 40.8 % — LOW (ref 42–52)
HGB BLD-MCNC: 12.5 G/DL — LOW (ref 14–18)
HGB BLD-MCNC: 12.6 G/DL — LOW (ref 14–18)
IMM GRANULOCYTES NFR BLD AUTO: 0.6 % — HIGH (ref 0.1–0.3)
IMM GRANULOCYTES NFR BLD AUTO: 0.7 % — HIGH (ref 0.1–0.3)
INR BLD: 2.08 RATIO — HIGH (ref 0.65–1.3)
INR BLD: 2.37 RATIO — HIGH (ref 0.65–1.3)
LYMPHOCYTES # BLD AUTO: 0.45 K/UL — LOW (ref 1.2–3.4)
LYMPHOCYTES # BLD AUTO: 0.51 K/UL — LOW (ref 1.2–3.4)
LYMPHOCYTES # BLD AUTO: 4.8 % — LOW (ref 20.5–51.1)
LYMPHOCYTES # BLD AUTO: 6.7 % — LOW (ref 20.5–51.1)
MAGNESIUM SERPL-MCNC: 2.5 MG/DL — HIGH (ref 1.8–2.4)
MAGNESIUM SERPL-MCNC: 2.7 MG/DL — HIGH (ref 1.8–2.4)
MCHC RBC-ENTMCNC: 28.3 PG — SIGNIFICANT CHANGE UP (ref 27–31)
MCHC RBC-ENTMCNC: 29.1 PG — SIGNIFICANT CHANGE UP (ref 27–31)
MCHC RBC-ENTMCNC: 30.9 G/DL — LOW (ref 32–37)
MCHC RBC-ENTMCNC: 32.3 G/DL — SIGNIFICANT CHANGE UP (ref 32–37)
MCV RBC AUTO: 90 FL — SIGNIFICANT CHANGE UP (ref 80–94)
MCV RBC AUTO: 91.7 FL — SIGNIFICANT CHANGE UP (ref 80–94)
MONOCYTES # BLD AUTO: 0.22 K/UL — SIGNIFICANT CHANGE UP (ref 0.1–0.6)
MONOCYTES # BLD AUTO: 0.68 K/UL — HIGH (ref 0.1–0.6)
MONOCYTES NFR BLD AUTO: 3.3 % — SIGNIFICANT CHANGE UP (ref 1.7–9.3)
MONOCYTES NFR BLD AUTO: 6.4 % — SIGNIFICANT CHANGE UP (ref 1.7–9.3)
NEUTROPHILS # BLD AUTO: 5.96 K/UL — SIGNIFICANT CHANGE UP (ref 1.4–6.5)
NEUTROPHILS # BLD AUTO: 9.38 K/UL — HIGH (ref 1.4–6.5)
NEUTROPHILS NFR BLD AUTO: 88.1 % — HIGH (ref 42.2–75.2)
NEUTROPHILS NFR BLD AUTO: 89 % — HIGH (ref 42.2–75.2)
NRBC # BLD: 0 /100 WBCS — SIGNIFICANT CHANGE UP (ref 0–0)
NT-PROBNP SERPL-SCNC: HIGH PG/ML (ref 0–300)
PLATELET # BLD AUTO: 342 K/UL — SIGNIFICANT CHANGE UP (ref 130–400)
PLATELET # BLD AUTO: 377 K/UL — SIGNIFICANT CHANGE UP (ref 130–400)
POTASSIUM SERPL-MCNC: 6.3 MMOL/L — CRITICAL HIGH (ref 3.5–5)
POTASSIUM SERPL-MCNC: 6.7 MMOL/L — CRITICAL HIGH (ref 3.5–5)
POTASSIUM SERPL-SCNC: 6.3 MMOL/L — CRITICAL HIGH (ref 3.5–5)
POTASSIUM SERPL-SCNC: 6.7 MMOL/L — CRITICAL HIGH (ref 3.5–5)
PROT SERPL-MCNC: 7.3 G/DL — SIGNIFICANT CHANGE UP (ref 6–8)
PROT SERPL-MCNC: 7.7 G/DL — SIGNIFICANT CHANGE UP (ref 6–8)
PROTHROM AB SERPL-ACNC: 22.8 SEC — HIGH (ref 9.95–12.87)
PROTHROM AB SERPL-ACNC: 26 SEC — HIGH (ref 9.95–12.87)
RBC # BLD: 4.3 M/UL — LOW (ref 4.7–6.1)
RBC # BLD: 4.45 M/UL — LOW (ref 4.7–6.1)
RBC # FLD: 16.3 % — HIGH (ref 11.5–14.5)
RBC # FLD: 16.4 % — HIGH (ref 11.5–14.5)
SODIUM SERPL-SCNC: 132 MMOL/L — LOW (ref 135–146)
SODIUM SERPL-SCNC: 136 MMOL/L — SIGNIFICANT CHANGE UP (ref 135–146)
TROPONIN T SERPL-MCNC: 0.64 NG/ML — CRITICAL HIGH
WBC # BLD: 10.64 K/UL — SIGNIFICANT CHANGE UP (ref 4.8–10.8)
WBC # BLD: 6.7 K/UL — SIGNIFICANT CHANGE UP (ref 4.8–10.8)
WBC # FLD AUTO: 10.64 K/UL — SIGNIFICANT CHANGE UP (ref 4.8–10.8)
WBC # FLD AUTO: 6.7 K/UL — SIGNIFICANT CHANGE UP (ref 4.8–10.8)

## 2018-05-09 RX ORDER — PANTOPRAZOLE SODIUM 20 MG/1
40 TABLET, DELAYED RELEASE ORAL
Qty: 0 | Refills: 0 | Status: DISCONTINUED | OUTPATIENT
Start: 2018-05-09 | End: 2018-05-17

## 2018-05-09 RX ORDER — AMIODARONE HYDROCHLORIDE 400 MG/1
1 TABLET ORAL
Qty: 900 | Refills: 0 | Status: DISCONTINUED | OUTPATIENT
Start: 2018-05-09 | End: 2018-05-09

## 2018-05-09 RX ORDER — AMIODARONE HYDROCHLORIDE 400 MG/1
150 TABLET ORAL ONCE
Qty: 0 | Refills: 0 | Status: COMPLETED | OUTPATIENT
Start: 2018-05-09 | End: 2018-05-09

## 2018-05-09 RX ORDER — ALPRAZOLAM 0.25 MG
0.25 TABLET ORAL EVERY 8 HOURS
Qty: 0 | Refills: 0 | Status: DISCONTINUED | OUTPATIENT
Start: 2018-05-09 | End: 2018-05-09

## 2018-05-09 RX ORDER — AMIODARONE HYDROCHLORIDE 400 MG/1
0.5 TABLET ORAL
Qty: 900 | Refills: 0 | Status: DISCONTINUED | OUTPATIENT
Start: 2018-05-09 | End: 2018-05-09

## 2018-05-09 RX ORDER — WARFARIN SODIUM 2.5 MG/1
5 TABLET ORAL ONCE
Qty: 0 | Refills: 0 | Status: COMPLETED | OUTPATIENT
Start: 2018-05-09 | End: 2018-05-10

## 2018-05-09 RX ORDER — INSULIN LISPRO 100/ML
7 VIAL (ML) SUBCUTANEOUS
Qty: 0 | Refills: 0 | Status: DISCONTINUED | OUTPATIENT
Start: 2018-05-09 | End: 2018-05-17

## 2018-05-09 RX ORDER — METOPROLOL TARTRATE 50 MG
25 TABLET ORAL DAILY
Qty: 0 | Refills: 0 | Status: DISCONTINUED | OUTPATIENT
Start: 2018-05-09 | End: 2018-05-17

## 2018-05-09 RX ORDER — INSULIN GLARGINE 100 [IU]/ML
7 INJECTION, SOLUTION SUBCUTANEOUS AT BEDTIME
Qty: 0 | Refills: 0 | Status: DISCONTINUED | OUTPATIENT
Start: 2018-05-09 | End: 2018-05-17

## 2018-05-09 RX ORDER — FUROSEMIDE 40 MG
40 TABLET ORAL ONCE
Qty: 0 | Refills: 0 | Status: COMPLETED | OUTPATIENT
Start: 2018-05-09 | End: 2018-05-09

## 2018-05-09 RX ORDER — ATORVASTATIN CALCIUM 80 MG/1
20 TABLET, FILM COATED ORAL AT BEDTIME
Qty: 0 | Refills: 0 | Status: DISCONTINUED | OUTPATIENT
Start: 2018-05-09 | End: 2018-05-17

## 2018-05-09 RX ORDER — INSULIN GLARGINE 100 [IU]/ML
15 INJECTION, SOLUTION SUBCUTANEOUS
Qty: 0 | Refills: 0 | COMMUNITY

## 2018-05-09 RX ORDER — WARFARIN SODIUM 2.5 MG/1
5 TABLET ORAL ONCE
Qty: 0 | Refills: 0 | Status: DISCONTINUED | OUTPATIENT
Start: 2018-05-09 | End: 2018-05-09

## 2018-05-09 RX ORDER — SEVELAMER CARBONATE 2400 MG/1
800 POWDER, FOR SUSPENSION ORAL THREE TIMES A DAY
Qty: 0 | Refills: 0 | Status: DISCONTINUED | OUTPATIENT
Start: 2018-05-09 | End: 2018-05-17

## 2018-05-09 RX ORDER — CHOLECALCIFEROL (VITAMIN D3) 125 MCG
50000 CAPSULE ORAL
Qty: 0 | Refills: 0 | COMMUNITY

## 2018-05-09 RX ORDER — GABAPENTIN 400 MG/1
100 CAPSULE ORAL DAILY
Qty: 0 | Refills: 0 | Status: DISCONTINUED | OUTPATIENT
Start: 2018-05-09 | End: 2018-05-17

## 2018-05-09 RX ADMIN — ERYTHROPOIETIN 10000 UNIT(S): 10000 INJECTION, SOLUTION INTRAVENOUS; SUBCUTANEOUS at 10:17

## 2018-05-09 RX ADMIN — ALBUTEROL 2.5 MILLIGRAM(S): 90 AEROSOL, METERED ORAL at 09:16

## 2018-05-09 RX ADMIN — GABAPENTIN 100 MILLIGRAM(S): 400 CAPSULE ORAL at 11:53

## 2018-05-09 RX ADMIN — Medication 0.25 MILLIGRAM(S): at 08:58

## 2018-05-09 RX ADMIN — Medication 50 MILLIGRAM(S): at 06:05

## 2018-05-09 RX ADMIN — Medication 5 UNIT(S): at 07:46

## 2018-05-09 RX ADMIN — SEVELAMER CARBONATE 800 MILLIGRAM(S): 2400 POWDER, FOR SUSPENSION ORAL at 13:29

## 2018-05-09 RX ADMIN — PANTOPRAZOLE SODIUM 40 MILLIGRAM(S): 20 TABLET, DELAYED RELEASE ORAL at 07:49

## 2018-05-09 RX ADMIN — Medication 4: at 16:47

## 2018-05-09 RX ADMIN — SEVELAMER CARBONATE 800 MILLIGRAM(S): 2400 POWDER, FOR SUSPENSION ORAL at 06:05

## 2018-05-09 RX ADMIN — Medication 4: at 07:47

## 2018-05-09 RX ADMIN — AMIODARONE HYDROCHLORIDE 618 MILLIGRAM(S): 400 TABLET ORAL at 12:11

## 2018-05-09 RX ADMIN — Medication 80 MILLIGRAM(S): at 06:04

## 2018-05-09 RX ADMIN — INSULIN GLARGINE 7 UNIT(S): 100 INJECTION, SOLUTION SUBCUTANEOUS at 23:35

## 2018-05-09 RX ADMIN — AMIODARONE HYDROCHLORIDE 33.33 MG/MIN: 400 TABLET ORAL at 13:29

## 2018-05-09 RX ADMIN — Medication 5 UNIT(S): at 11:53

## 2018-05-09 RX ADMIN — Medication 3: at 11:54

## 2018-05-09 RX ADMIN — Medication 40 MILLIGRAM(S): at 23:34

## 2018-05-09 RX ADMIN — Medication 5 UNIT(S): at 16:47

## 2018-05-09 RX ADMIN — Medication 40 MILLIGRAM(S): at 06:05

## 2018-05-09 NOTE — DISCHARGE NOTE ADULT - PATIENT PORTAL LINK FT
You can access the [facility] Patient Portal, offered by [facility], by registering with the following website: [email or web address]

## 2018-05-09 NOTE — ED PROVIDER NOTE - PROGRESS NOTE DETAILS
Discussed case with [facility] group Dr. Bowens accepting admission. Called ICU Dr. Davies for ICU approval whom declined patient for ICU stating patient AMA earlier today after detailed discussion about his health. States to place pt in low risk tele.

## 2018-05-09 NOTE — DISCHARGE NOTE ADULT - HOSPITAL COURSE
Presented with respiratory distress / rapid afib. Plan to rate control with amiodarone now s/p dialysis with improvement in respiratory status.    Patient would like to leave AMA / despite being fully aware that he can have a life threatening complication if he leaves.

## 2018-05-09 NOTE — PROGRESS NOTE ADULT - ASSESSMENT
1. Anemia. Start EPO 03971 units IV with HD.  2. ESRD on HD MWF. HD tomorrow: 3 hours, opti 160 dialyzer, 2K bath, 2L UF.  3. HTN. Continue metoprolol. Start Lasix 80mg PO BID.  4. Hyperphosphatemia. Sevelamer with meals. Check phos level. Renal diet.  5. Hypokalemia. Followup BMP today. Supplement if less than 3.5.  6. PNA. Change Levaquin to every 48 hours.

## 2018-05-09 NOTE — DISCHARGE NOTE ADULT - CARE PLAN
Principal Discharge DX:	Paroxysmal atrial fibrillation  Goal:	leaving AMA / needs appropriate rate control  Assessment and plan of treatment:	please follow upw ith your primary care doctor / you have been aware that you are not cleared to leave the hospital and could potentially have a life threatening complication

## 2018-05-09 NOTE — ED PROVIDER NOTE - OBJECTIVE STATEMENT
56 yo M with pmhx of HTN, DM, ESRD on HD(M,W,Fr), PVD presenting today with shortness of breath that started 1 hour ago after pt signed himself out AMA from the hospital. Pt admitted to hospital for pneumonia found to be in rapid afib on admission w/ small b/l pleural effusions. Was given cardizem 20 mg after which the afib broke into sinus. Has since been maintained on metoprolol PO and symptoms improved during inpatient with lasix 80 mg BID, prednisone, and levaquin. No cp,  fever, chills, abdominal pain, nausea, vomiting, diarrhea, back pain, leg swelling, urinary symptoms, headache, dizziness,  paresthesias, or weakness.

## 2018-05-09 NOTE — H&P ADULT - HISTORY OF PRESENT ILLNESS
56 yo Male presents to ER with chief complaint of  shortness of breath that started 1 hour ago after pt signed himself out AMA from the hospital. Pt was admitted to hospital with similar complaint and was diagnosed sec to pneumonia, volume overload, with a component of undiagnosed COPD exacerebation, later found to be in rapid afib and Was given cardizem 20 mg after which the afib broke into sinus. He was treated with lasix 80 mg BID, prednisone, and levaquin, with this regimen his symptoms are being improved during his earlier hospitalization. Today in the afternoon, Pt left  AMA from ICU and refused to stay and wait. At that time he Is aware of the possibility of a life threatening complication by leaving the hospital prematurely and still he left.  Now pt came back with SOB worsening not associated with cp,  fever, chills, abdominal pain, nausea, vomiting, diarrhea, back pain, leg swelling, urinary symptoms, headache, dizziness,  paresthesias, or weakness .  Pt attends HemoDialysis on Mon, wed, fri

## 2018-05-09 NOTE — DISCHARGE NOTE ADULT - CONDITIONS AT DISCHARGE
Pt left AMA despite at the urging of the medical Resident.  Pt aware of his critical  circumstances yet he refuses to listen to medical staff.

## 2018-05-09 NOTE — CONSULT NOTE ADULT - SUBJECTIVE AND OBJECTIVE BOX
Patient is a 57y old  Male who presents with a chief complaint of shortness of breath     HPI: 56 yo M smoker with pmhx of HTN, DM, ESRD on HD(M,W,Fr), PVD came to ER with chief complaint of  shortness of breath that started 1 hour ago after pt signed himself out AMA from the hospital. Pt was admitted to hospital with similar complaint and was diagnosed sec to pneumonia, volume overload, with a component of undiagnosed COPD exacerebation, later found to be in rapid afib and Was given cardizem 20 mg after which the afib broke into sinus. He was treated with lasix 80 mg BID, prednisone, and levaquin, with this regimen his symptoms are being improved during his earlier hospitalization. Today afternoon Pt left  AMA from ICU and refused to stay and wait. At that time he Is aware of the possibility of a life threatening complication by leaving the hospital prematurely and still he left.  Now pt came back with SOB worsening not associated with cp,  fever, chills, abdominal pain, nausea, vomiting, diarrhea, back pain, leg swelling, urinary symptoms, headache, dizziness,  paresthesias, or weakness .      PAST MEDICAL & SURGICAL HISTORY:  Atrial fibrillation  Right sided weakness  Stroke  Hypertension  High blood cholesterol  Diabetes mellitus, type 2  Dialysis patient: Mon, wednesday, friday  A-V fistula    Allergies    Orange (Other)  Originally Entered as [HIVES] reaction to [sulfur] (Unknown)  penicillin (Unknown)  sulfADIAZINE (Unknown)      FAMILY HISTORY:  No pertinent family history in first degree relatives    Home Medications:  atorvastatin 20 mg oral tablet: 1 tab(s) orally once a day (09 May 2018 18:04)  Coumadin 2.5 mg oral tablet: 2 tab(s) orally once a day (at bedtime) (09 May 2018 18:04)  gabapentin 100 mg oral tablet: 1 tab(s) orally once a day (09 May 2018 18:04)  insulin lispro (concentrated) 200 units/mL subcutaneous solution: 7 unit(s) subcutaneous 3 times a day (09 May 2018 18:04)  Lantus 100 units/mL subcutaneous solution: 15 unit(s) subcutaneous once a day (at bedtime) (09 May 2018 18:04)  pantoprazole 40 mg oral delayed release tablet: 1 tab(s) orally once a day (09 May 2018 18:04)  Renvela 800 mg oral tablet: 1 tab(s) orally 3 times a day (with meals) (09 May 2018 18:04)  Toprol-XL 25 mg oral tablet, extended release: 1 tab(s) orally once a day (09 May 2018 18:04)    Occupation:  Alochol: Denied  Smoking: Denied  Drug Use: Denied  Marital Status:         ROS: as in HPI; All other systems reviewed are negative    ICU Vital Signs Last 24 Hrs  T(C): 36.7 (09 May 2018 18:00), Max: 36.7 (09 May 2018 18:00)  T(F): 98.1 (09 May 2018 18:00), Max: 98.1 (09 May 2018 18:00)  HR: 124 (09 May 2018 19:00) (97 - 124)  BP: 136/94 (09 May 2018 19:00) (101/62 - 137/80)  BP(mean): 91 (09 May 2018 15:04) (75 - 101)  ABP: --  ABP(mean): --  RR: 22 (09 May 2018 19:00) (16 - 23)  SpO2: 98% (09 May 2018 19:00) (82% - 100%)        Physical Examination:      General: No acute distress.  Alert, oriented, interactive, nonfocal    HEENT: Pupils equal, reactive to light.  Symmetric.    PULM: Clear to auscultation bilaterally, no significant sputum production    CVS: Regular rate and rhythm, no murmurs, rubs, or gallops    ABD: Soft, nondistended, nontender, normoactive bowel sounds, no masses    EXT: No edema, nontender    SKIN: Warm and well perfused, no rashes noted.          ABG - ( 08 May 2018 11:22 )  pH, Arterial: 7.34  pH, Blood: x     /  pCO2: 45    /  pO2: 92    / HCO3: 24    / Base Excess: -1.9  /  SaO2: 98                  I&O's Detail        LABS:                        12.5   10.64 )-----------( 342      ( 09 May 2018 19:15 )             38.7     09 May 2018 19:15    136    |  96     |  58     ----------------------------<  336    6.7     |  23     |  6.1      Ca    9.1        09 May 2018 19:15  Mg     2.5       09 May 2018 19:15    TPro  7.3    /  Alb  3.6    /  TBili  0.7    /  DBili  x      /  AST  61     /  ALT  25     /  AlkPhos  98     09 May 2018 19:15  Amylase x     lipase x          CARDIAC MARKERS ( 09 May 2018 19:15 )  x     / 0.64 ng/mL / 822 U/L / x     / 22.5 ng/mL      CAPILLARY BLOOD GLUCOSE  321 (09 May 2018 16:07)        PT/INR - ( 09 May 2018 19:15 )   PT: 26.00 sec;   INR: 2.37 ratio         PTT - ( 09 May 2018 19:15 )  PTT:32.1 sec    Culture    Culture - Blood (collected 07 May 2018 14:29)  Source: .Blood Blood-Peripheral  Preliminary Report (09 May 2018 01:02):    No growth to date.    Culture - Blood (collected 07 May 2018 14:29)  Source: .Blood Blood-Peripheral  Preliminary Report (09 May 2018 01:02):    No growth to date.      Lactate, Blood: 1.7 mmol/L (05-07-18 @ 19:27)  Lactate, Blood: 2.5 mmol/L (05-07-18 @ 15:10)      MEDICATIONS  (STANDING):  furosemide   Injectable 40 milliGRAM(s) IV Push Once    MEDICATIONS  (PRN):        RADIOLOGY: ***     CXR:  TLC:  OG:  ET tube:          ECHO:      IMPRESSION:  Shortness of breath / cough  - CXR w/ b/l pleural effusions / pulm edema  - symptomatically improved s/p lasix / prednisone  - plan as per nephrology is to rpt dialysis today / receiving lasix 80 mg PO BID (some residual renal function)  - BP stable, WBC WNL, afebrile  - check 2d ECHO  - ddx also includes undiagnosed COPD (significant smokign hx)  - avoid metoprolol (especially IV) (increased wheezing s/p lopressor likely 2/2 bronchospasms)  - start prednisone 40 mg qd  - sputum cx / zackery stain / urine legionella antigen (as per ID)  - c/w levaquin (renally dosed)  - rpt CXR in AM    # troponemia   - stable 2/2 ESRD    # AFIB  - transition to amiodarone (to be loaded today)  - stop metoprolol in AM  - c/w coumadin    # HTN  - stable, c/w current medication regimen    # DM  - basal / bolus    # DVT ppx    # FULL CODE            PLAN:    CNS:    HEENT:    PULMONARY:    CARDIOVASCULAR:    GI: GI prophylaxis.  Feeding     RENAL:    INFECTIOUS DISEASE:    HEMATOLOGICAL:  DVT prophylaxis.    ENDOCRINE:  Follow up FS.  Insulin protocol if needed.    MUSCULOSKELETAL:        CRITICAL CARE TIME SPENT: *** Patient is a 57y old  Male who presents with a chief complaint of shortness of breath     HPI: 58 yo M smoker with pmhx of HTN, DM, ESRD on HD(M,W,Fr), PVD came to ER with chief complaint of  shortness of breath that started 1 hour ago after pt signed himself out AMA from the hospital. Pt was admitted to hospital with similar complaint and was diagnosed sec to pneumonia, volume overload, with a component of undiagnosed COPD exacerebation, later found to be in rapid afib and Was given cardizem 20 mg after which the afib broke into sinus. He was treated with lasix 80 mg BID, prednisone, and levaquin, with this regimen his symptoms are being improved during his earlier hospitalization. Today afternoon Pt left  AMA from ICU and refused to stay and wait. At that time he Is aware of the possibility of a life threatening complication by leaving the hospital prematurely and still he left.  Now pt came back with SOB even at rest, worsening with exertion not associated with cp,  fever, chills, abdominal pain, nausea, vomiting, diarrhea, back pain, leg swelling, urinary symptoms, headache, dizziness,  paresthesias, or weakness .      PAST MEDICAL & SURGICAL HISTORY:  Atrial fibrillation  Right sided weakness  Stroke  Hypertension  High blood cholesterol  Diabetes mellitus, type 2  Dialysis patient: Mon, wednesday, friday  A-V fistula    Allergies    Orange (Other)  Originally Entered as [HIVES] reaction to [sulfur] (Unknown)  penicillin (Unknown)  sulfADIAZINE (Unknown)      FAMILY HISTORY:  No pertinent family history in first degree relatives    Home Medications:  atorvastatin 20 mg oral tablet: 1 tab(s) orally once a day (09 May 2018 18:04)  Coumadin 2.5 mg oral tablet: 2 tab(s) orally once a day (at bedtime) (09 May 2018 18:04)  gabapentin 100 mg oral tablet: 1 tab(s) orally once a day (09 May 2018 18:04)  insulin lispro (concentrated) 200 units/mL subcutaneous solution: 7 unit(s) subcutaneous 3 times a day (09 May 2018 18:04)  Lantus 100 units/mL subcutaneous solution: 15 unit(s) subcutaneous once a day (at bedtime) (09 May 2018 18:04)  pantoprazole 40 mg oral delayed release tablet: 1 tab(s) orally once a day (09 May 2018 18:04)  Renvela 800 mg oral tablet: 1 tab(s) orally 3 times a day (with meals) (09 May 2018 18:04)  Toprol-XL 25 mg oral tablet, extended release: 1 tab(s) orally once a day (09 May 2018 18:04)    Occupation:  Alochol: Denied  Smoking: Denied  Drug Use: Denied  Marital Status:         ROS: as in HPI; All other systems reviewed are negative    ICU Vital Signs Last 24 Hrs  T(C): 36.7 (09 May 2018 18:00), Max: 36.7 (09 May 2018 18:00)  T(F): 98.1 (09 May 2018 18:00), Max: 98.1 (09 May 2018 18:00)  HR: 124 (09 May 2018 19:00) (97 - 124)  BP: 136/94 (09 May 2018 19:00) (101/62 - 137/80)  BP(mean): 91 (09 May 2018 15:04) (75 - 101)  ABP: --  ABP(mean): --  RR: 22 (09 May 2018 19:00) (16 - 23)  SpO2: 98% (09 May 2018 19:00) (82% - 100%)        Physical Examination:      General: Not in  acute distress, able to speak in sentences.  Alert, oriented, interactive, nonfocal    HEENT: Pupils equal, reactive to light.  Symmetric.    PULM: B/L AE +, decreased BS at B/l Bases, no wheezes    CVS: irregular, no murmurs, rubs, or gallops    ABD: Soft, nondistended, nontender, normoactive bowel sounds, no masses    EXT: +1 edema,  nontender    SKIN: Warm and well perfused, no rashes noted.          ABG - ( 08 May 2018 11:22 )  pH, Arterial: 7.34  pH, Blood: x     /  pCO2: 45    /  pO2: 92    / HCO3: 24    / Base Excess: -1.9  /  SaO2: 98                  I&O's Detail        LABS:                        12.5   10.64 )-----------( 342      ( 09 May 2018 19:15 )             38.7     09 May 2018 19:15    136    |  96     |  58     ----------------------------<  336    6.7     |  23     |  6.1      Ca    9.1        09 May 2018 19:15  Mg     2.5       09 May 2018 19:15    TPro  7.3    /  Alb  3.6    /  TBili  0.7    /  DBili  x      /  AST  61     /  ALT  25     /  AlkPhos  98     09 May 2018 19:15  Amylase x     lipase x          CARDIAC MARKERS ( 09 May 2018 19:15 )  x     / 0.64 ng/mL / 822 U/L / x     / 22.5 ng/mL      CAPILLARY BLOOD GLUCOSE  321 (09 May 2018 16:07)        PT/INR - ( 09 May 2018 19:15 )   PT: 26.00 sec;   INR: 2.37 ratio         PTT - ( 09 May 2018 19:15 )  PTT:32.1 sec    Culture    Culture - Blood (collected 07 May 2018 14:29)  Source: .Blood Blood-Peripheral  Preliminary Report (09 May 2018 01:02):    No growth to date.    Culture - Blood (collected 07 May 2018 14:29)  Source: .Blood Blood-Peripheral  Preliminary Report (09 May 2018 01:02):    No growth to date.      Lactate, Blood: 1.7 mmol/L (05-07-18 @ 19:27)  Lactate, Blood: 2.5 mmol/L (05-07-18 @ 15:10)      MEDICATIONS  (STANDING):  furosemide   Injectable 40 milliGRAM(s) IV Push Once    MEDICATIONS  (PRN):        RADIOLOGY: ***     CXR: (Prelim) - Pulm vasc congestion with b/l pleural effusions      ECHO:      IMPRESSION:  Acute respiratory failure likely sec to volume overload, CHF exacerbation.  complicated by A.fib with RVR and elevated troponin (down trending)  Possible underlying Pneumonia and Undiagnosed COPD as pt is chronic smoker.      PLAN: Admit to Non CCU Tele    PULMONARY: c/w IV diuresis, monitor I's and O's  fup with renal for possible dialysis tmrw  complete the course of Levaquin  C/w Prednisone taper    CARDIOVASCULAR: Control A.fib with Amiodarone as his SOB is worsened with metoprolol  monitor INR and c/w coumadin  trend CE  fup with 2dECHO    GI: GI prophylaxis.  Feeding     RENAL: monitor lytes, fup with renal for HD   fup with potassium levels.     INFECTIOUS DISEASE: fup with cultures    HEMATOLOGICAL:  DVT prophylaxis.    ENDOCRINE:  Follow up FS, start on Insulin regimen        CRITICAL CARE TIME SPENT: 45 minutes Patient is a 57y old  Male who presents with a chief complaint of shortness of breath     HPI: 58 yo M smoker with pmhx of HTN, DM, ESRD on HD(M,W,Fr), PVD came to ER with chief complaint of  shortness of breath that started 1 hour ago after pt signed himself out AMA from the hospital. Pt was admitted to hospital with similar complaint and was diagnosed sec to pneumonia, volume overload, with a component of undiagnosed COPD exacerebation, later found to be in rapid afib and Was given cardizem 20 mg after which the afib broke into sinus. He was treated with lasix 80 mg BID, prednisone, and levaquin, with this regimen his symptoms are being improved during his earlier hospitalization. Today afternoon Pt left  AMA from ICU and refused to stay and wait. At that time he Is aware of the possibility of a life threatening complication by leaving the hospital prematurely and still he left.  Now pt came back with SOB even at rest, worsening with exertion not associated with cp,  fever, chills, abdominal pain, nausea, vomiting, diarrhea, back pain, leg swelling, urinary symptoms, headache, dizziness,  paresthesias, or weakness .      PAST MEDICAL & SURGICAL HISTORY:  Atrial fibrillation  Right sided weakness  Stroke  Hypertension  High blood cholesterol  Diabetes mellitus, type 2  Dialysis patient: Mon, wednesday, friday  A-V fistula    Allergies    Orange (Other)  Originally Entered as [HIVES] reaction to [sulfur] (Unknown)  penicillin (Unknown)  sulfADIAZINE (Unknown)      FAMILY HISTORY:  No pertinent family history in first degree relatives    Home Medications:  atorvastatin 20 mg oral tablet: 1 tab(s) orally once a day (09 May 2018 18:04)  Coumadin 2.5 mg oral tablet: 2 tab(s) orally once a day (at bedtime) (09 May 2018 18:04)  gabapentin 100 mg oral tablet: 1 tab(s) orally once a day (09 May 2018 18:04)  insulin lispro (concentrated) 200 units/mL subcutaneous solution: 7 unit(s) subcutaneous 3 times a day (09 May 2018 18:04)  Lantus 100 units/mL subcutaneous solution: 15 unit(s) subcutaneous once a day (at bedtime) (09 May 2018 18:04)  pantoprazole 40 mg oral delayed release tablet: 1 tab(s) orally once a day (09 May 2018 18:04)  Renvela 800 mg oral tablet: 1 tab(s) orally 3 times a day (with meals) (09 May 2018 18:04)  Toprol-XL 25 mg oral tablet, extended release: 1 tab(s) orally once a day (09 May 2018 18:04)    Occupation:  Alochol: Denied  Smoking: Denied  Drug Use: Denied  Marital Status:         ROS: as in HPI; All other systems reviewed are negative    ICU Vital Signs Last 24 Hrs  T(C): 36.7 (09 May 2018 18:00), Max: 36.7 (09 May 2018 18:00)  T(F): 98.1 (09 May 2018 18:00), Max: 98.1 (09 May 2018 18:00)  HR: 124 (09 May 2018 19:00) (97 - 124)  BP: 136/94 (09 May 2018 19:00) (101/62 - 137/80)  BP(mean): 91 (09 May 2018 15:04) (75 - 101)  ABP: --  ABP(mean): --  RR: 22 (09 May 2018 19:00) (16 - 23)  SpO2: 98% (09 May 2018 19:00) (82% - 100%)        Physical Examination:      General: Not in  acute distress, able to speak in sentences.  Alert, oriented, interactive, nonfocal    HEENT: Pupils equal, reactive to light.  Symmetric.    PULM: B/L AE +, decreased BS at B/l Bases, no wheezes    CVS: irregular, no murmurs, rubs, or gallops    ABD: Soft, nondistended, nontender, normoactive bowel sounds, no masses    EXT: +1 edema,  nontender    SKIN: Warm and well perfused, no rashes noted.          ABG - ( 08 May 2018 11:22 )  pH, Arterial: 7.34  pH, Blood: x     /  pCO2: 45    /  pO2: 92    / HCO3: 24    / Base Excess: -1.9  /  SaO2: 98                  I&O's Detail        LABS:                        12.5   10.64 )-----------( 342      ( 09 May 2018 19:15 )             38.7     09 May 2018 19:15    136    |  96     |  58     ----------------------------<  336    6.7     |  23     |  6.1      Ca    9.1        09 May 2018 19:15  Mg     2.5       09 May 2018 19:15    TPro  7.3    /  Alb  3.6    /  TBili  0.7    /  DBili  x      /  AST  61     /  ALT  25     /  AlkPhos  98     09 May 2018 19:15  Amylase x     lipase x          CARDIAC MARKERS ( 09 May 2018 19:15 )  x     / 0.64 ng/mL / 822 U/L / x     / 22.5 ng/mL      CAPILLARY BLOOD GLUCOSE  321 (09 May 2018 16:07)        PT/INR - ( 09 May 2018 19:15 )   PT: 26.00 sec;   INR: 2.37 ratio         PTT - ( 09 May 2018 19:15 )  PTT:32.1 sec    Culture    Culture - Blood (collected 07 May 2018 14:29)  Source: .Blood Blood-Peripheral  Preliminary Report (09 May 2018 01:02):    No growth to date.    Culture - Blood (collected 07 May 2018 14:29)  Source: .Blood Blood-Peripheral  Preliminary Report (09 May 2018 01:02):    No growth to date.      Lactate, Blood: 1.7 mmol/L (05-07-18 @ 19:27)  Lactate, Blood: 2.5 mmol/L (05-07-18 @ 15:10)      MEDICATIONS  (STANDING):  furosemide   Injectable 40 milliGRAM(s) IV Push Once    MEDICATIONS  (PRN):        RADIOLOGY: ***     CXR: (Prelim) - Pulm vasc congestion with b/l pleural effusions      ECHO:

## 2018-05-09 NOTE — PROGRESS NOTE ADULT - ASSESSMENT
IMPRESSION:  CHF   ESRD   AFIB paroxysmal   ?? pna /bronchitis       PLAN:    CNS:no sedation     HEENT:    PULMONARY: keep pox > 92 5 , nebulizer albuterol q 4 hrs   prednisone 40 mg     CARDIOVASCULAR: echo, tele  cardiology follow up need HD today keep IS<OS    GI: GI prophylaxis.  Feeding     RENAL: HD today keep negative balance     INFECTIOUS DISEASE: levaquin continue follow culture     HEMATOLOGICAL:  DVT prophylaxis.    ENDOCRINE:  Follow up FS.  Insulin protocol if needed.    MUSCULOSKELETAL:        CRITICAL CARE TIME SPENT: ***

## 2018-05-09 NOTE — PROGRESS NOTE ADULT - SUBJECTIVE AND OBJECTIVE BOX
ROGER RODRIGUES  57y  Male    Patient is a 57y old  Male who presents with a chief complaint of pna (08 May 2018 07:56)    ALLERGIES:  Orange (Other)  Originally Entered as [HIVES] reaction to [sulfur] (Unknown)  penicillin (Unknown)  sulfADIAZINE (Unknown)      INTERVAL HPI/OVERNIGHT EVENTS:    VITALS:  T(F): 96 (05-09-18 @ 07:03), Max: 98.2 (05-08-18 @ 15:15)  HR: 106 (05-09-18 @ 07:03) (106 - 145)  BP: 113/55 (05-09-18 @ 07:03) (113/55 - 159/84)  RR: 17 (05-09-18 @ 07:03) (16 - 44)  SpO2: 98% (05-09-18 @ 07:03) (82% - 100%)    LABS:  05-09    132<L>  |  87<L>  |  x   ----------------------------<  363<H>  x    |  25  |  x     Ca    9.6      09 May 2018 05:23  Mg     2.7     05-09    TPro  7.7  /  Alb  4.2  /  TBili  0.8  /  DBili  x   /  AST  34  /  ALT  21  /  AlkPhos  111  05-09    MICROBIOLOGY:    Culture - Blood (collected 05-07-18 @ 14:29)  Source: .Blood Blood-Peripheral  Preliminary Report (05-09-18 @ 01:02):    No growth to date.    Culture - Blood (collected 05-07-18 @ 14:29)  Source: .Blood Blood-Peripheral  Preliminary Report (05-09-18 @ 01:02):    No growth to date.      MEDICATION:  ALBUTerol    0.083% 2.5 milliGRAM(s) Nebulizer every 6 hours  ALBUTerol    90 MICROgram(s) HFA Inhaler 1 Puff(s) Inhalation every 4 hours  atorvastatin 20 milliGRAM(s) Oral at bedtime  dextrose 5%. 1000 milliLiter(s) IV Continuous <Continuous>  dextrose 50% Injectable 12.5 Gram(s) IV Push once  dextrose 50% Injectable 25 Gram(s) IV Push once  dextrose 50% Injectable 25 Gram(s) IV Push once  dextrose Gel 1 Dose(s) Oral once PRN  epoetin raven Injectable 51114 Unit(s) IV Push <User Schedule>  furosemide    Tablet 80 milliGRAM(s) Oral two times a day  gabapentin 100 milliGRAM(s) Oral daily  glucagon  Injectable 1 milliGRAM(s) IntraMuscular once PRN  insulin glargine Injectable (LANTUS) 15 Unit(s) SubCutaneous at bedtime  insulin lispro (HumaLOG) corrective regimen sliding scale   SubCutaneous three times a day before meals  insulin lispro Injectable (HumaLOG) 5 Unit(s) SubCutaneous before breakfast  insulin lispro Injectable (HumaLOG) 5 Unit(s) SubCutaneous before lunch  insulin lispro Injectable (HumaLOG) 5 Unit(s) SubCutaneous before dinner  levoFLOXacin IVPB 500 milliGRAM(s) IV Intermittent every 48 hours  metoprolol tartrate 50 milliGRAM(s) Oral two times a day  pantoprazole    Tablet 40 milliGRAM(s) Oral before breakfast  predniSONE   Tablet 40 milliGRAM(s) Oral daily  sevelamer hydrochloride 800 milliGRAM(s) Oral three times a day  warfarin 5 milliGRAM(s) Oral at bedtime  zolpidem 5 milliGRAM(s) Oral at bedtime PRN  zolpidem 5 milliGRAM(s) Oral at bedtime PRN    RADIOLOGY & ADDITIONAL TESTS:
Over Night Events:  Patient seen and examined. today feel better started on solumedrol yesterday       ROS:  See HPI    PHYSICAL EXAM    ICU Vital Signs Last 24 Hrs  T(C): 35.6 (09 May 2018 07:03), Max: 36.8 (08 May 2018 15:15)  T(F): 96 (09 May 2018 07:03), Max: 98.2 (08 May 2018 15:15)  HR: 106 (09 May 2018 07:03) (106 - 145)  BP: 113/55 (09 May 2018 07:03) (113/55 - 159/84)  BP(mean): 96 (08 May 2018 22:58) (96 - 116)  ABP: --  ABP(mean): --  RR: 17 (09 May 2018 07:03) (16 - 44)  SpO2: 98% (09 May 2018 07:03) (82% - 100%)      General: AOx3   HEENT:                Lymph Nodes: NO cervical LN   Lungs: mild wheeze and bibasilar crackles   Cardiovascular: Regular   Abdomen: Soft, Positive BS  Extremities: No clubbing   Skin:   Neurological: move all ext     I&O's Detail    08 May 2018 07:01  -  09 May 2018 07:00  --------------------------------------------------------  IN:    Oral Fluid: 400 mL  Total IN: 400 mL    OUT:    Voided: 400 mL  Total OUT: 400 mL    Total NET: 0 mL          LABS:                          12.6   6.70  )-----------( 377      ( 09 May 2018 05:23 )             40.8         09 May 2018 05:23    132    |  87     |  x      ----------------------------<  363    x       |  25     |  x        Ca    9.6        09 May 2018 05:23  Mg     2.7       09 May 2018 05:23    TPro  7.7    /  Alb  4.2    /  TBili  0.8    /  DBili  x      /  AST  34     /  ALT  21     /  AlkPhos  111    09 May 2018 05:23  Amylase x     lipase x                                                 PT/INR - ( 09 May 2018 05:23 )   PT: 22.80 sec;   INR: 2.08 ratio         PTT - ( 07 May 2018 15:10 )  PTT:33.0 sec                                           Lactate, Blood: 1.7 mmol/L (05-07-18 @ 19:27)  Lactate, Blood: 2.5 mmol/L (05-07-18 @ 15:10)    CARDIAC MARKERS ( 07 May 2018 19:27 )  x     / 0.70 ng/mL / x     / x     / x      CARDIAC MARKERS ( 07 May 2018 15:10 )  x     / 0.88 ng/mL / 368 U/L / x     / 14.0 ng/mL                                                        Culture - Blood (collected 07 May 2018 14:29)  Source: .Blood Blood-Peripheral  Preliminary Report (09 May 2018 01:02):    No growth to date.    Culture - Blood (collected 07 May 2018 14:29)  Source: .Blood Blood-Peripheral  Preliminary Report (09 May 2018 01:02):    No growth to date.                                                                                       ABG - ( 08 May 2018 11:22 )  pH, Arterial: 7.34  pH, Blood: x     /  pCO2: 45    /  pO2: 92    / HCO3: 24    / Base Excess: -1.9  /  SaO2: 98                  MEDICATIONS  (STANDING):  ALBUTerol    0.083% 2.5 milliGRAM(s) Nebulizer every 6 hours  ALBUTerol    90 MICROgram(s) HFA Inhaler 1 Puff(s) Inhalation every 4 hours  atorvastatin 20 milliGRAM(s) Oral at bedtime  dextrose 5%. 1000 milliLiter(s) (50 mL/Hr) IV Continuous <Continuous>  dextrose 50% Injectable 12.5 Gram(s) IV Push once  dextrose 50% Injectable 25 Gram(s) IV Push once  dextrose 50% Injectable 25 Gram(s) IV Push once  epoetin raven Injectable 47362 Unit(s) IV Push <User Schedule>  furosemide    Tablet 80 milliGRAM(s) Oral two times a day  gabapentin 100 milliGRAM(s) Oral daily  insulin glargine Injectable (LANTUS) 15 Unit(s) SubCutaneous at bedtime  insulin lispro (HumaLOG) corrective regimen sliding scale   SubCutaneous three times a day before meals  insulin lispro Injectable (HumaLOG) 5 Unit(s) SubCutaneous before breakfast  insulin lispro Injectable (HumaLOG) 5 Unit(s) SubCutaneous before lunch  insulin lispro Injectable (HumaLOG) 5 Unit(s) SubCutaneous before dinner  levoFLOXacin IVPB 500 milliGRAM(s) IV Intermittent every 48 hours  metoprolol tartrate 50 milliGRAM(s) Oral two times a day  pantoprazole    Tablet 40 milliGRAM(s) Oral before breakfast  predniSONE   Tablet 40 milliGRAM(s) Oral daily  sevelamer hydrochloride 800 milliGRAM(s) Oral three times a day  warfarin 5 milliGRAM(s) Oral at bedtime    MEDICATIONS  (PRN):  ALPRAZolam 0.25 milliGRAM(s) Oral every 8 hours PRN anxiety  dextrose Gel 1 Dose(s) Oral once PRN Blood Glucose LESS THAN 70 milliGRAM(s)/deciliter  glucagon  Injectable 1 milliGRAM(s) IntraMuscular once PRN Glucose LESS THAN 70 milligrams/deciliter  zolpidem 5 milliGRAM(s) Oral at bedtime PRN Insomnia  zolpidem 5 milliGRAM(s) Oral at bedtime PRN Insomnia          Xrays:  TLC:  OG:  ET tube:                                                                                    b/l effusion worsening    ECHO:
Patient was examined during HD treatment.    Hemodialysis Prescription:  	Access: LUE AVF  	Dialyzer: Opti 160  	Blood Flow (mL/Min): 400  	Dialysate Flow (mL/Min): 500  	Target UF (Liters): 3.5  	Treatment Time: 3 hours  	Potassium: 2K  	Calcium: 2.5
RAVI ROGER  [address] ([facility] 3I-ICU)      Patient is a 57y old  Male who presents with a chief complaint of pna (08 May 2018 07:56)      HPI:  57 year old male p/w CC: cough / phlegm production x 3 days + ? fever. Yesterday went for dialysis and care home through did not feel good. Otherwise denies any chest pain, palpitations, nausea, vomiting, diarrhea. Pmhx of HTN / DM / ESRD on HD / PVD w/ toe amputations. (08 May 2018 07:56). Found to be in rapid afib on admission w/ small b/l pleural effusions. Was given cardizem 20 mg after which the afib broke into sinus. Has since been maintained on metoprolol PO. Currently symptomatically improved s/p lasix 80 mg BID / prednisone / levaquin. Yesterday had an episode of wheezing / shortness of breath s/p lopressor IV 5 mg.    INTERVAL EVENTS:  - today pt is symptomatically improved  - plan for dialysis today    -PMHx: Atrial fibrillation [Active]  Right sided weakness [Active]  Stroke [Active]  Hypertension [Active]  High blood cholesterol [Active]  Diabetes mellitus, type 2 [Active]  Dialysis patient [Active]    -PSHx:A-V fistula    -FAMILY HISTORY:  No pertinent family history in first degree relatives      Interval events:      REVIEW OF SYSTEMS:  CONSTITUTIONAL: No fever, weight loss, or fatigue  RESPIRATORY: No cough, wheezing, chills or hemoptysis; No shortness of breath  CARDIOVASCULAR: No chest pain, palpitations, dizziness, or leg swelling  GASTROINTESTINAL: No abdominal or epigastric pain. No nausea, vomiting, or hematemesis; No diarrhea or constipation. No melena or hematochezia.  NEUROLOGICAL: No headaches  LYMPH NODES: No enlarged glands  MUSCULOSKELETAL: No joint pain or swelling; No muscle, back, or extremity pain      T(C): , Max: 36.8 (05-08-18 @ 15:15)  HR: 106 (05-09-18 @ 07:03)  BP: 113/55 (05-09-18 @ 07:03)  RR: 17 (05-09-18 @ 07:03)  SpO2: 98% (05-09-18 @ 07:03)  CAPILLARY BLOOD GLUCOSE  333 (09 May 2018 07:26)  334 (08 May 2018 22:01)  217 (08 May 2018 16:30)  217 (08 May 2018 16:25)  215 (08 May 2018 11:00)          PHYSICAL EXAM:  #physical      LABS:          12.6  6.70  )-------(377          40.8  N=89.0  L=6.7  MCV=91.7    134<L>|92<L>|64<HH>  ------------------<210<H>  5.5<H>|25|6.5<HH>  eGFR:9<L>  Ca:9.3      PT/INR - ( 09 May 2018 05:23 )   PT: 22.80 sec;   INR: 2.08 ratio         PTT - ( 07 May 2018 15:10 )  PTT:33.0 sec      Microbiology:    Culture - Blood (collected 05-07-18 @ 14:29)  Source: .Blood Blood-Peripheral  Preliminary Report (05-09-18 @ 01:02):    No growth to date.    Culture - Blood (collected 05-07-18 @ 14:29)  Source: .Blood Blood-Peripheral  Preliminary Report (05-09-18 @ 01:02):    No growth to date.        RADIOLOGY & ADDITIONAL TESTS:        Medications:  ALBUTerol    0.083% 2.5 milliGRAM(s) Nebulizer every 6 hours  ALBUTerol    90 MICROgram(s) HFA Inhaler 1 Puff(s) Inhalation every 4 hours  atorvastatin 20 milliGRAM(s) Oral at bedtime  dextrose 5%. 1000 milliLiter(s) IV Continuous <Continuous>  dextrose 50% Injectable 12.5 Gram(s) IV Push once  dextrose 50% Injectable 25 Gram(s) IV Push once  dextrose 50% Injectable 25 Gram(s) IV Push once  dextrose Gel 1 Dose(s) Oral once PRN  epoetin raven Injectable 94599 Unit(s) IV Push <User Schedule>  furosemide    Tablet 80 milliGRAM(s) Oral two times a day  gabapentin 100 milliGRAM(s) Oral daily  glucagon  Injectable 1 milliGRAM(s) IntraMuscular once PRN  insulin glargine Injectable (LANTUS) 15 Unit(s) SubCutaneous at bedtime  insulin lispro (HumaLOG) corrective regimen sliding scale   SubCutaneous three times a day before meals  insulin lispro Injectable (HumaLOG) 5 Unit(s) SubCutaneous before breakfast  insulin lispro Injectable (HumaLOG) 5 Unit(s) SubCutaneous before lunch  insulin lispro Injectable (HumaLOG) 5 Unit(s) SubCutaneous before dinner  levoFLOXacin IVPB 500 milliGRAM(s) IV Intermittent every 48 hours  metoprolol tartrate 50 milliGRAM(s) Oral two times a day  pantoprazole    Tablet 40 milliGRAM(s) Oral before breakfast  predniSONE   Tablet 40 milliGRAM(s) Oral daily  sevelamer hydrochloride 800 milliGRAM(s) Oral three times a day  warfarin 5 milliGRAM(s) Oral at bedtime  zolpidem 5 milliGRAM(s) Oral at bedtime PRN  zolpidem 5 milliGRAM(s) Oral at bedtime PRN

## 2018-05-09 NOTE — ED PROVIDER NOTE - NS ED ROS FT
Review of Systems:  	•	CONSTITUTIONAL - no fever, no diaphoresis, no chills  	•	SKIN - no rash  	•	HEMATOLOGIC - no bleeding, no bruising  	•	EYES - no eye pain, no blurry vision  	•	ENT - no congestion  	•	RESPIRATORY - + shortness of breath, + cough  	•	CARDIAC - no chest pain, no palpitations  	•	GI - no abd pain, no nausea, no vomiting  	•	GENITO-URINARY -no dysuria; no hematuria, no increased urinary frequency  	•	MUSCULOSKELETAL - no joint paint, no swelling, no redness  	•	NEUROLOGIC - no weakness, no headache, no paresthesias, no LOC

## 2018-05-09 NOTE — DISCHARGE NOTE ADULT - PLAN OF CARE
leaving AMA / needs appropriate rate control please follow upw ith your primary care doctor / you have been aware that you are not cleared to leave the hospital and could potentially have a life threatening complication

## 2018-05-09 NOTE — ED ADULT TRIAGE NOTE - CCCP TRG CHIEF CMPLNT
[facility]    Received message from cardiology to begin warfarin with DAPT for hx of DVT.  Pt is to stop ASA after one month of warfarin (stop on 4/29/18).  Pt has been on warfarin in the past.  Pt will resume her previous warfarin regimen.  Earliest pt can return to the clinic is in 7 days.  Therefore, no bolus dose.  Pt is aware of the risk of not following up with an INR at a sooner interval.    Noman Shukla, PharmD    shortness of breath

## 2018-05-09 NOTE — DISCHARGE NOTE ADULT - CARE PROVIDER_API CALL
Justin Nielsen (MD), Internal Medicine  [address] [facility] [place] [address]  Phone: [phone]  Fax: [phone]

## 2018-05-09 NOTE — DISCHARGE NOTE ADULT - MEDICATION SUMMARY - MEDICATIONS TO TAKE
I will START or STAY ON the medications listed below when I get home from the hospital:    Coumadin 2.5 mg oral tablet  -- 2 tab(s) by mouth once a day (at bedtime)  -- Indication: For Paroxysmal atrial fibrillation    gabapentin 100 mg oral tablet  -- 1 tab(s) by mouth once a day  -- Indication: For neurpathy    Lantus 100 units/mL subcutaneous solution  -- 15 unit(s) subcutaneous once a day (at bedtime)  -- Indication: For diabetes    insulin lispro (concentrated) 200 units/mL subcutaneous solution  -- 7 unit(s) subcutaneous 3 times a day  -- Indication: For diabetes    atorvastatin 20 mg oral tablet  -- 1 tab(s) by mouth once a day  -- Indication: For Pvd    Toprol-XL 25 mg oral tablet, extended release  -- 1 tab(s) by mouth once a day  -- Indication: For afib    Renvela 800 mg oral tablet  -- 1 tab(s) by mouth 3 times a day (with meals)  -- Indication: For afib    pantoprazole 40 mg oral delayed release tablet  -- 1 tab(s) by mouth once a day  -- Indication: For gerd

## 2018-05-09 NOTE — CHART NOTE - NSCHARTNOTEFT_GEN_A_CORE
Made multiple attempts to reach out to the primary care physician via office phone # and cell phone # without success. Pt would like to leave AMA and is refusing to stay and wait. Is aware of the possibility of a life threatening complication by leaving the hospital prematurely. Spent > 30 minutes attempting to convince patient that this is not in his best interest however he has become increasing combative and would like to leave now.

## 2018-05-09 NOTE — ED PROVIDER NOTE - PHYSICAL EXAMINATION
VITAL SIGNS: I have reviewed nursing notes and confirm.  CONSTITUTIONAL: Well-developed; well-nourished; in no mild respiratory distress.  SKIN: Skin exam is warm and dry, no acute rash.  HEAD: Normocephalic; atraumatic.  EYES: PERRL, EOM intact; conjunctiva and sclera clear.  ENT: No nasal discharge; airway clear.   NECK: Supple; non tender.  CARD: S1, S2 normal; no murmurs, gallops, or rubs. Regular rhythm. +Tachycardic.  RESP: Clear to auscultation bilaterally. No wheezes, rales or rhonchi. +Accessory muscle use.   ABD: Normal bowel sounds; soft; non-distended; non-tender.   EXT: Normal ROM. No edema.  LYMPH: No acute cervical adenopathy.  NEURO: Alert, oriented. Grossly unremarkable. No focal deficits.  PSYCH: Cooperative, appropriate.

## 2018-05-09 NOTE — PROGRESS NOTE ADULT - ASSESSMENT
1. Anemia. EPO 10805 units IV with HD.  2. ESRD on HD MWF. HD today: 3 hours, opti 160 dialyzer, 2K bath, 3.5L UF.  3. HTN. Continue metoprolol and Lasix.  4. Hyperphosphatemia. Sevelamer with meals. Check phos level. Renal diet.  5. PNA. On Levaquin.

## 2018-05-09 NOTE — PROGRESS NOTE ADULT - ASSESSMENT
57 year old male p/w SOB / cough / phlegm production found to have pulm congestion / ? LLL opacity    # Shortness of breath / cough  - CXR w/ b/l pleural effusions  - symptomatically improved s/p lasix / prednisone  - plan as per nephrology is to rpt dialysis today / receiving lasix 80 mg PO BID (some residual renal function)  - BP stable, WBC WNL, afebrile  - check 2d ECHO  - ddx also includes undiagnosed COPD / pna  - significant smoking hx / wheezing on exam  - avoid lopressor IV  - start prednisone 40 mg qd  - sputum cx / zackery stain / urine legionella antigen (as per ID)  - c/w levaquin (renally dosed)    # troponemia   - stable 2/2 ESRD    # AFIB  - rate controlled on lopressor PO  - c/w coumadin INR 2 - 2.5, INR 2 today  - avoid IV lopressor (due to possible bronchospasms)  - consider transitioning to PO cardizem    # HTN  - stable, c/w current medication regimen    # DM  - basal / bolus    # DVT ppx    # FULL CODE 57 year old male p/w SOB / cough / phlegm production found to have pulm congestion / ? LLL opacity    # Shortness of breath / cough  - CXR w/ b/l pleural effusions / pulm edema  - symptomatically improved s/p lasix / prednisone  - plan as per nephrology is to rpt dialysis today / receiving lasix 80 mg PO BID (some residual renal function)  - BP stable, WBC WNL, afebrile  - check 2d ECHO  - ddx also includes undiagnosed COPD (significant smokign hx)  - avoid metoprolol (especially IV) (increased wheezing s/p lopressor likely 2/2 bronchospasms)  - start prednisone 40 mg qd  - sputum cx / zackery stain / urine legionella antigen (as per ID)  - c/w levaquin (renally dosed)  - rpt CXR in AM    # troponemia   - stable 2/2 ESRD    # AFIB  - transition to amiodarone (to be loaded today)  - stop metoprolol in AM  - c/w coumadin    # HTN  - stable, c/w current medication regimen    # DM  - basal / bolus    # DVT ppx    # FULL CODE

## 2018-05-10 DIAGNOSIS — I50.23 ACUTE ON CHRONIC SYSTOLIC (CONGESTIVE) HEART FAILURE: ICD-10-CM

## 2018-05-10 DIAGNOSIS — N18.6 END STAGE RENAL DISEASE: ICD-10-CM

## 2018-05-10 DIAGNOSIS — I48.91 UNSPECIFIED ATRIAL FIBRILLATION: ICD-10-CM

## 2018-05-10 LAB
ALBUMIN SERPL ELPH-MCNC: 4 G/DL — SIGNIFICANT CHANGE UP (ref 3.5–5.2)
ALP SERPL-CCNC: 105 U/L — SIGNIFICANT CHANGE UP (ref 30–115)
ALT FLD-CCNC: 26 U/L — SIGNIFICANT CHANGE UP (ref 0–41)
ANION GAP SERPL CALC-SCNC: 18 MMOL/L — HIGH (ref 7–14)
AST SERPL-CCNC: 41 U/L — SIGNIFICANT CHANGE UP (ref 0–41)
BASOPHILS # BLD AUTO: 0.03 K/UL — SIGNIFICANT CHANGE UP (ref 0–0.2)
BASOPHILS NFR BLD AUTO: 0.2 % — SIGNIFICANT CHANGE UP (ref 0–1)
BILIRUB SERPL-MCNC: 0.7 MG/DL — SIGNIFICANT CHANGE UP (ref 0.2–1.2)
BUN SERPL-MCNC: 67 MG/DL — CRITICAL HIGH (ref 10–20)
CALCIUM SERPL-MCNC: 9 MG/DL — SIGNIFICANT CHANGE UP (ref 8.5–10.1)
CHLORIDE SERPL-SCNC: 93 MMOL/L — LOW (ref 98–110)
CO2 SERPL-SCNC: 23 MMOL/L — SIGNIFICANT CHANGE UP (ref 17–32)
CREAT SERPL-MCNC: 5.8 MG/DL — CRITICAL HIGH (ref 0.7–1.5)
EOSINOPHIL # BLD AUTO: 0 K/UL — SIGNIFICANT CHANGE UP (ref 0–0.7)
EOSINOPHIL NFR BLD AUTO: 0 % — SIGNIFICANT CHANGE UP (ref 0–8)
GLUCOSE SERPL-MCNC: 368 MG/DL — HIGH (ref 70–99)
HCT VFR BLD CALC: 38.7 % — LOW (ref 42–52)
HGB BLD-MCNC: 12.3 G/DL — LOW (ref 14–18)
IMM GRANULOCYTES NFR BLD AUTO: 1.7 % — HIGH (ref 0.1–0.3)
INR BLD: 1.95 RATIO — HIGH (ref 0.65–1.3)
LYMPHOCYTES # BLD AUTO: 1.03 K/UL — LOW (ref 1.2–3.4)
LYMPHOCYTES # BLD AUTO: 8.3 % — LOW (ref 20.5–51.1)
MAGNESIUM SERPL-MCNC: 2.5 MG/DL — HIGH (ref 1.8–2.4)
MCHC RBC-ENTMCNC: 29.2 PG — SIGNIFICANT CHANGE UP (ref 27–31)
MCHC RBC-ENTMCNC: 31.8 G/DL — LOW (ref 32–37)
MCV RBC AUTO: 91.9 FL — SIGNIFICANT CHANGE UP (ref 80–94)
MONOCYTES # BLD AUTO: 0.77 K/UL — HIGH (ref 0.1–0.6)
MONOCYTES NFR BLD AUTO: 6.2 % — SIGNIFICANT CHANGE UP (ref 1.7–9.3)
NEUTROPHILS # BLD AUTO: 10.33 K/UL — HIGH (ref 1.4–6.5)
NEUTROPHILS NFR BLD AUTO: 83.6 % — HIGH (ref 42.2–75.2)
NRBC # BLD: 0 /100 WBCS — SIGNIFICANT CHANGE UP (ref 0–0)
PHOSPHATE SERPL-MCNC: 5.4 MG/DL — HIGH (ref 2.1–4.9)
PLATELET # BLD AUTO: 338 K/UL — SIGNIFICANT CHANGE UP (ref 130–400)
POTASSIUM SERPL-MCNC: 5.4 MMOL/L — HIGH (ref 3.5–5)
POTASSIUM SERPL-SCNC: 5.4 MMOL/L — HIGH (ref 3.5–5)
PROT SERPL-MCNC: 7 G/DL — SIGNIFICANT CHANGE UP (ref 6–8)
PROTHROM AB SERPL-ACNC: 21.4 SEC — HIGH (ref 9.95–12.87)
RBC # BLD: 4.21 M/UL — LOW (ref 4.7–6.1)
RBC # FLD: 16.4 % — HIGH (ref 11.5–14.5)
SODIUM SERPL-SCNC: 134 MMOL/L — LOW (ref 135–146)
TROPONIN T SERPL-MCNC: 0.55 NG/ML — CRITICAL HIGH
WBC # BLD: 12.37 K/UL — HIGH (ref 4.8–10.8)
WBC # FLD AUTO: 12.37 K/UL — HIGH (ref 4.8–10.8)

## 2018-05-10 RX ORDER — FUROSEMIDE 40 MG
80 TABLET ORAL EVERY 8 HOURS
Qty: 0 | Refills: 0 | Status: DISCONTINUED | OUTPATIENT
Start: 2018-05-10 | End: 2018-05-17

## 2018-05-10 RX ORDER — ALPRAZOLAM 0.25 MG
0.5 TABLET ORAL ONCE
Qty: 0 | Refills: 0 | Status: DISCONTINUED | OUTPATIENT
Start: 2018-05-10 | End: 2018-05-10

## 2018-05-10 RX ADMIN — Medication 7 UNIT(S): at 17:42

## 2018-05-10 RX ADMIN — Medication 7 UNIT(S): at 08:09

## 2018-05-10 RX ADMIN — SEVELAMER CARBONATE 800 MILLIGRAM(S): 2400 POWDER, FOR SUSPENSION ORAL at 21:47

## 2018-05-10 RX ADMIN — Medication 25 MILLIGRAM(S): at 05:29

## 2018-05-10 RX ADMIN — Medication 80 MILLIGRAM(S): at 21:48

## 2018-05-10 RX ADMIN — WARFARIN SODIUM 5 MILLIGRAM(S): 2.5 TABLET ORAL at 23:03

## 2018-05-10 RX ADMIN — ATORVASTATIN CALCIUM 20 MILLIGRAM(S): 80 TABLET, FILM COATED ORAL at 21:47

## 2018-05-10 RX ADMIN — Medication 7 UNIT(S): at 12:18

## 2018-05-10 RX ADMIN — Medication 0.5 MILLIGRAM(S): at 23:03

## 2018-05-10 RX ADMIN — SEVELAMER CARBONATE 800 MILLIGRAM(S): 2400 POWDER, FOR SUSPENSION ORAL at 17:42

## 2018-05-10 RX ADMIN — INSULIN GLARGINE 7 UNIT(S): 100 INJECTION, SOLUTION SUBCUTANEOUS at 23:03

## 2018-05-10 RX ADMIN — GABAPENTIN 100 MILLIGRAM(S): 400 CAPSULE ORAL at 17:42

## 2018-05-10 RX ADMIN — SEVELAMER CARBONATE 800 MILLIGRAM(S): 2400 POWDER, FOR SUSPENSION ORAL at 05:29

## 2018-05-10 RX ADMIN — PANTOPRAZOLE SODIUM 40 MILLIGRAM(S): 20 TABLET, DELAYED RELEASE ORAL at 08:02

## 2018-05-10 RX ADMIN — Medication 80 MILLIGRAM(S): at 17:17

## 2018-05-10 NOTE — PROGRESS NOTE ADULT - ASSESSMENT
1. Anemia. Adequate Hgb, no need for RAINA.  2. ESRD on HD MWF. HD tomorrow: 3 hours, opti 160 dialyzer, 2K bath, 3L UF.  3. Afib. Continue metoprolol.  4. Hyperphosphatemia. Sevelamer with meals. Check phos level. Renal diet.  5. CHF. Start furosemide 80mg IV every 8 hours. Unable to tolerate large fluid removal during HD.

## 2018-05-11 LAB
INR BLD: 1.44 RATIO — HIGH (ref 0.65–1.3)
PROTHROM AB SERPL-ACNC: 15.7 SEC — HIGH (ref 9.95–12.87)

## 2018-05-11 RX ORDER — WARFARIN SODIUM 2.5 MG/1
7.5 TABLET ORAL ONCE
Qty: 0 | Refills: 0 | Status: COMPLETED | OUTPATIENT
Start: 2018-05-11 | End: 2018-05-11

## 2018-05-11 RX ADMIN — GABAPENTIN 100 MILLIGRAM(S): 400 CAPSULE ORAL at 12:48

## 2018-05-11 RX ADMIN — Medication 7 UNIT(S): at 07:47

## 2018-05-11 RX ADMIN — Medication 80 MILLIGRAM(S): at 14:21

## 2018-05-11 RX ADMIN — WARFARIN SODIUM 7.5 MILLIGRAM(S): 2.5 TABLET ORAL at 21:36

## 2018-05-11 RX ADMIN — Medication 80 MILLIGRAM(S): at 21:35

## 2018-05-11 RX ADMIN — SEVELAMER CARBONATE 800 MILLIGRAM(S): 2400 POWDER, FOR SUSPENSION ORAL at 14:22

## 2018-05-11 RX ADMIN — Medication 80 MILLIGRAM(S): at 05:47

## 2018-05-11 RX ADMIN — Medication 25 MILLIGRAM(S): at 05:48

## 2018-05-11 RX ADMIN — SEVELAMER CARBONATE 800 MILLIGRAM(S): 2400 POWDER, FOR SUSPENSION ORAL at 21:36

## 2018-05-11 RX ADMIN — ATORVASTATIN CALCIUM 20 MILLIGRAM(S): 80 TABLET, FILM COATED ORAL at 21:36

## 2018-05-11 RX ADMIN — SEVELAMER CARBONATE 800 MILLIGRAM(S): 2400 POWDER, FOR SUSPENSION ORAL at 05:48

## 2018-05-11 RX ADMIN — PANTOPRAZOLE SODIUM 40 MILLIGRAM(S): 20 TABLET, DELAYED RELEASE ORAL at 05:47

## 2018-05-11 RX ADMIN — Medication 7 UNIT(S): at 17:18

## 2018-05-11 RX ADMIN — INSULIN GLARGINE 7 UNIT(S): 100 INJECTION, SOLUTION SUBCUTANEOUS at 21:36

## 2018-05-11 RX ADMIN — Medication 7 UNIT(S): at 12:48

## 2018-05-11 NOTE — PROGRESS NOTE ADULT - ASSESSMENT
1. Anemia. Adequate Hgb, no need for RAINA.  2. ESRD on HD MWF. HD today: 3 hours, opti 160 dialyzer, 1K bath, 3.5L UF.  3. Afib. Continue metoprolol.  4. Hyperphosphatemia. Sevelamer with meals. Check phos level. Renal diet.  5. CHF. Furosemide 80mg IV every 8 hours. Unable to tolerate large fluid removal during HD. Will dialyze again tomorrow.

## 2018-05-11 NOTE — CHART NOTE - NSCHARTNOTEFT_GEN_A_CORE
patient seen and examined  still atrial fibrillation  breathing improving  dialysis tomorrow  await pulmonary

## 2018-05-11 NOTE — CHART NOTE - NSCHARTNOTEFT_GEN_A_CORE
Called by RN for Coumadin order.  INR today 1.4.  Usual dose 5mg.  Will give 7.5mg PO QHS tonight and recheck INR in the am

## 2018-05-11 NOTE — PROGRESS NOTE ADULT - ASSESSMENT
Impression:  ARf improved secondary to pulmonary edema fluid over load secondary to ESRD   doubt infection   AFIB     Plan:  Do cxr today   taper off bipap keep on oxygen keep pox>92%  bipap at night and PRN  follow cardiology and renal   possible HD elvis   follow lytes   dvt prophylaxis

## 2018-05-12 LAB
INR BLD: 1.84 RATIO — HIGH (ref 0.65–1.3)
PROTHROM AB SERPL-ACNC: 20.1 SEC — HIGH (ref 9.95–12.87)

## 2018-05-12 RX ORDER — ALPRAZOLAM 0.25 MG
0.5 TABLET ORAL ONCE
Qty: 0 | Refills: 0 | Status: DISCONTINUED | OUTPATIENT
Start: 2018-05-12 | End: 2018-05-12

## 2018-05-12 RX ADMIN — SEVELAMER CARBONATE 800 MILLIGRAM(S): 2400 POWDER, FOR SUSPENSION ORAL at 14:00

## 2018-05-12 RX ADMIN — Medication 80 MILLIGRAM(S): at 05:49

## 2018-05-12 RX ADMIN — Medication 0.5 MILLIGRAM(S): at 04:20

## 2018-05-12 RX ADMIN — SEVELAMER CARBONATE 800 MILLIGRAM(S): 2400 POWDER, FOR SUSPENSION ORAL at 05:49

## 2018-05-12 RX ADMIN — Medication 7 UNIT(S): at 16:39

## 2018-05-12 RX ADMIN — Medication 80 MILLIGRAM(S): at 22:08

## 2018-05-12 RX ADMIN — SEVELAMER CARBONATE 800 MILLIGRAM(S): 2400 POWDER, FOR SUSPENSION ORAL at 22:09

## 2018-05-12 RX ADMIN — Medication 80 MILLIGRAM(S): at 14:00

## 2018-05-12 RX ADMIN — INSULIN GLARGINE 7 UNIT(S): 100 INJECTION, SOLUTION SUBCUTANEOUS at 22:08

## 2018-05-12 RX ADMIN — Medication 25 MILLIGRAM(S): at 05:49

## 2018-05-12 RX ADMIN — Medication 7 UNIT(S): at 13:59

## 2018-05-12 RX ADMIN — Medication 7 UNIT(S): at 08:01

## 2018-05-12 RX ADMIN — GABAPENTIN 100 MILLIGRAM(S): 400 CAPSULE ORAL at 14:00

## 2018-05-12 RX ADMIN — ATORVASTATIN CALCIUM 20 MILLIGRAM(S): 80 TABLET, FILM COATED ORAL at 22:09

## 2018-05-12 RX ADMIN — Medication 0.5 MILLIGRAM(S): at 22:34

## 2018-05-12 RX ADMIN — PANTOPRAZOLE SODIUM 40 MILLIGRAM(S): 20 TABLET, DELAYED RELEASE ORAL at 05:51

## 2018-05-12 NOTE — PROGRESS NOTE ADULT - ASSESSMENT
Impression:  ARf improved secondary to pulmonary edema fluid over load secondary to ESRD   doubt infection   AFIB     Plan:  taper off bipap keep on oxygen keep pox>92%  bipap at night and PRN  follow cardiology and renal   possible HD elvis   follow lytes   dvt prophylaxis

## 2018-05-13 RX ORDER — WARFARIN SODIUM 2.5 MG/1
7.5 TABLET ORAL ONCE
Qty: 0 | Refills: 0 | Status: COMPLETED | OUTPATIENT
Start: 2018-05-13 | End: 2018-05-13

## 2018-05-13 RX ORDER — ALPRAZOLAM 0.25 MG
0.5 TABLET ORAL ONCE
Qty: 0 | Refills: 0 | Status: DISCONTINUED | OUTPATIENT
Start: 2018-05-13 | End: 2018-05-13

## 2018-05-13 RX ADMIN — Medication 80 MILLIGRAM(S): at 05:14

## 2018-05-13 RX ADMIN — Medication 7 UNIT(S): at 09:03

## 2018-05-13 RX ADMIN — Medication 80 MILLIGRAM(S): at 22:21

## 2018-05-13 RX ADMIN — WARFARIN SODIUM 7.5 MILLIGRAM(S): 2.5 TABLET ORAL at 22:21

## 2018-05-13 RX ADMIN — Medication 0.5 MILLIGRAM(S): at 22:21

## 2018-05-13 RX ADMIN — SEVELAMER CARBONATE 800 MILLIGRAM(S): 2400 POWDER, FOR SUSPENSION ORAL at 05:14

## 2018-05-13 RX ADMIN — ATORVASTATIN CALCIUM 20 MILLIGRAM(S): 80 TABLET, FILM COATED ORAL at 22:22

## 2018-05-13 RX ADMIN — Medication 80 MILLIGRAM(S): at 14:34

## 2018-05-13 RX ADMIN — SEVELAMER CARBONATE 800 MILLIGRAM(S): 2400 POWDER, FOR SUSPENSION ORAL at 22:22

## 2018-05-13 RX ADMIN — Medication 7 UNIT(S): at 12:37

## 2018-05-13 RX ADMIN — INSULIN GLARGINE 7 UNIT(S): 100 INJECTION, SOLUTION SUBCUTANEOUS at 22:22

## 2018-05-13 RX ADMIN — WARFARIN SODIUM 7.5 MILLIGRAM(S): 2.5 TABLET ORAL at 03:38

## 2018-05-13 RX ADMIN — SEVELAMER CARBONATE 800 MILLIGRAM(S): 2400 POWDER, FOR SUSPENSION ORAL at 14:34

## 2018-05-13 RX ADMIN — Medication 25 MILLIGRAM(S): at 05:14

## 2018-05-13 RX ADMIN — PANTOPRAZOLE SODIUM 40 MILLIGRAM(S): 20 TABLET, DELAYED RELEASE ORAL at 05:14

## 2018-05-13 RX ADMIN — Medication 7 UNIT(S): at 17:06

## 2018-05-13 RX ADMIN — GABAPENTIN 100 MILLIGRAM(S): 400 CAPSULE ORAL at 12:36

## 2018-05-13 NOTE — PROGRESS NOTE ADULT - ASSESSMENT
ESRD on HD  fluid overload  + troponins  ?COPD    Pt still symptomatic despite agressive UF at dialysis  ? cardiac or pulm etiology    Suggest:  Dialysis tomorrow  Pulm f/u   Cardio consult Dr. Leiva

## 2018-05-14 LAB
INR BLD: 1.71 RATIO — HIGH (ref 0.65–1.3)
PROTHROM AB SERPL-ACNC: 18.7 SEC — HIGH (ref 9.95–12.87)

## 2018-05-14 RX ORDER — WARFARIN SODIUM 2.5 MG/1
7.5 TABLET ORAL ONCE
Qty: 0 | Refills: 0 | Status: COMPLETED | OUTPATIENT
Start: 2018-05-14 | End: 2018-05-14

## 2018-05-14 RX ADMIN — Medication 80 MILLIGRAM(S): at 13:22

## 2018-05-14 RX ADMIN — SEVELAMER CARBONATE 800 MILLIGRAM(S): 2400 POWDER, FOR SUSPENSION ORAL at 22:13

## 2018-05-14 RX ADMIN — PANTOPRAZOLE SODIUM 40 MILLIGRAM(S): 20 TABLET, DELAYED RELEASE ORAL at 05:17

## 2018-05-14 RX ADMIN — WARFARIN SODIUM 7.5 MILLIGRAM(S): 2.5 TABLET ORAL at 22:13

## 2018-05-14 RX ADMIN — Medication 80 MILLIGRAM(S): at 22:12

## 2018-05-14 RX ADMIN — Medication 7 UNIT(S): at 17:51

## 2018-05-14 RX ADMIN — ATORVASTATIN CALCIUM 20 MILLIGRAM(S): 80 TABLET, FILM COATED ORAL at 22:12

## 2018-05-14 RX ADMIN — Medication 7 UNIT(S): at 13:22

## 2018-05-14 RX ADMIN — SEVELAMER CARBONATE 800 MILLIGRAM(S): 2400 POWDER, FOR SUSPENSION ORAL at 13:22

## 2018-05-14 RX ADMIN — Medication 25 MILLIGRAM(S): at 05:16

## 2018-05-14 RX ADMIN — SEVELAMER CARBONATE 800 MILLIGRAM(S): 2400 POWDER, FOR SUSPENSION ORAL at 05:16

## 2018-05-14 RX ADMIN — GABAPENTIN 100 MILLIGRAM(S): 400 CAPSULE ORAL at 13:22

## 2018-05-14 RX ADMIN — Medication 80 MILLIGRAM(S): at 05:16

## 2018-05-14 RX ADMIN — INSULIN GLARGINE 7 UNIT(S): 100 INJECTION, SOLUTION SUBCUTANEOUS at 22:13

## 2018-05-14 NOTE — PROGRESS NOTE ADULT - ASSESSMENT
ESRD on HD  fluid overload  + troponins  ?COPD    Pt still symptomatic despite agressive UF at dialysis  ? cardiac or pulm etiology    plan==========================continue current meds  once breathing improved will discharge patient

## 2018-05-14 NOTE — CONSULT NOTE ADULT - SUBJECTIVE AND OBJECTIVE BOX
CARDIOLOGY CONSULT NOTE     CHIEF COMPLAINT/REASON FOR CONSULT:    HPI:  58 yo Male presents to ER with chief complaint of  shortness of breath that started 1 hour after pt signed himself out AMA from the hospital. Pt was admitted to hospital with similar complaint and was diagnosed sec to pneumonia, volume overload, with a component of undiagnosed COPD exacerebation, later found to be in rapid afib and Was given cardizem 20 mg after which the afib broke into sinus. He was treated with lasix 80 mg BID, prednisone, and levaquin, with this regimen his symptoms are being improved during his earlier hospitalization. Today in the afternoon, Pt left  AMA from ICU and refused to stay and wait. At that time he Is aware of the possibility of a life threatening complication by leaving the hospital prematurely and still he left.  Now pt came back with SOB worsening not associated with cp,  fever, chills, abdominal pain, nausea, vomiting, diarrhea, back pain, leg swelling, urinary symptoms, headache, dizziness,  paresthesias, or weakness .  Pt attends HemoDialysis on Mon, wed, fri (09 May 2018 22:32)      PAST MEDICAL & SURGICAL HISTORY:  Atrial fibrillation  Right sided weakness  Stroke  Hypertension  High blood cholesterol  Diabetes mellitus, type 2  Dialysis patient: Mon, wednesday, friday  A-V fistula      Cardiac Risks:   [ x]HTN, [x ] DM, [x ] Smoking, [ ] FH,  [x ] Lipids   Quit cig one week        MEDICATIONS:  MEDICATIONS  (STANDING):  atorvastatin 20 milliGRAM(s) Oral at bedtime  furosemide   Injectable 80 milliGRAM(s) IV Push every 8 hours  gabapentin 100 milliGRAM(s) Oral daily  insulin glargine Injectable (LANTUS) 7 Unit(s) SubCutaneous at bedtime  insulin lispro Injectable (HumaLOG) 7 Unit(s) SubCutaneous three times a day before meals  metoprolol succinate ER 25 milliGRAM(s) Oral daily  pantoprazole    Tablet 40 milliGRAM(s) Oral before breakfast  sevelamer hydrochloride 800 milliGRAM(s) Oral three times a day      FAMILY HISTORY:  No pertinent family history in first degree relatives      SOCIAL HISTORY:      [ ] Marital status  Allergies    Orange (Other)  Originally Entered as [HIVES] reaction to [sulfur] (Unknown)  penicillin (Unknown)  sulfADIAZINE (Unknown)    Intolerances    	    REVIEW OF SYSTEMS:  CONSTITUTIONAL: No fever, weight loss, or fatigue  EYES: No eye pain, visual disturbances, or discharge  ENMT:  No difficulty hearing, tinnitus, vertigo; No sinus or throat pain  NECK: No pain or stiffness  RESPIRATORY: No cough, wheezing, chills or hemoptysis; No Shortness of Breath  CARDIOVASCULAR: No chest pain, palpitations, passing out, dizziness, or leg swelling  GASTROINTESTINAL: No abdominal or epigastric pain. No nausea, vomiting, or hematemesis; No diarrhea or constipation. No melena or hematochezia.  GENITOURINARY: No dysuria, frequency, hematuria, or incontinence  NEUROLOGICAL: No headaches, memory loss, loss of strength, numbness, or tremors  SKIN: No itching, burning, rashes, or lesions   	    [ ] All others negative	  [ ] Unable to obtain    PHYSICAL EXAM:  T(C): 36.1 (05-14-18 @ 05:19), Max: 37.1 (05-13-18 @ 14:03)  HR: 74 (05-14-18 @ 09:08) (74 - 77)  BP: 158/85 (05-14-18 @ 09:08) (147/69 - 176/84)  RR: 18 (05-14-18 @ 09:08) (16 - 18)  SpO2: --  Wt(kg): --  I&O's Summary    13 May 2018 07:01  -  14 May 2018 07:00  --------------------------------------------------------  IN: 0 mL / OUT: 1570 mL / NET: -1570 mL        Appearance: Normal	  Psychiatry: A & O x 3, Mood & affect appropriate  HEENT:   Normal oral mucosa, PERRL, EOMI	  Lymphatic: No lymphadenopathy  Cardiovascular: Normal S1 S2,RRR, No JVD, No murmurs  Respiratory: Lungs clear to auscultation	  Gastrointestinal:  Soft, Non-tender, + BS	  Skin: No rashes, No ecchymoses, No cyanosis	  Neurologic: Non-focal  Extremities: Normal range of motion, No clubbing, cyanosis or edema  Vascular: Peripheral pulses palpable 2+ bilaterally      ECG:  	< from: 12 Lead ECG (05.09.18 @ 18:13) >  Diagnosis Line Atrial fibrillation with rapid ventricular response  Poor R wave progression  Nonspecific ST-T changes  Confirmed by SIN IBARRA MD ([id number]) on 5/10/2018 12:14:14 PM    < end of copied text >      	  LABS:	 	    CARDIAC MARKERS:                      PT/INR - ( 14 May 2018 06:21 )   PT: 18.70 sec;   INR: 1.71 ratio

## 2018-05-14 NOTE — DIETITIAN INITIAL EVALUATION ADULT. - OTHER INFO
pt recently signed himself out of critical care AMA (admitted with PNA/volume overload, rapid afib) presents with SOB, placed on bipap, s/p HD, 4 L fluid removed pt readmitted with ARF 2/2 pulmonary edema. presently tolerating po diet well. PMHX: afib, DM< ESRD/HD, HTN, AVF, stroke with R sided weakness

## 2018-05-14 NOTE — PROGRESS NOTE ADULT - ASSESSMENT
1. Anemia. Adequate Hgb, no need for RAINA.  2. ESRD on HD MWF. HD today: 3 hours, opti 160 dialyzer, 2K bath, 4L UF.  3. Afib. Continue metoprolol.  4. Hyperphosphatemia. Sevelamer with meals. Renal diet.  5. CHF. HD to remove 4 liters today.

## 2018-05-15 LAB
APTT BLD: 35 SEC — SIGNIFICANT CHANGE UP (ref 27–39.2)
INR BLD: 1.74 RATIO — HIGH (ref 0.65–1.3)
PROTHROM AB SERPL-ACNC: 19 SEC — HIGH (ref 9.95–12.87)

## 2018-05-15 RX ORDER — HYDROXYZINE HCL 10 MG
50 TABLET ORAL ONCE
Qty: 0 | Refills: 0 | Status: COMPLETED | OUTPATIENT
Start: 2018-05-15 | End: 2018-05-15

## 2018-05-15 RX ADMIN — Medication 7 UNIT(S): at 08:59

## 2018-05-15 RX ADMIN — Medication 80 MILLIGRAM(S): at 17:37

## 2018-05-15 RX ADMIN — PANTOPRAZOLE SODIUM 40 MILLIGRAM(S): 20 TABLET, DELAYED RELEASE ORAL at 05:55

## 2018-05-15 RX ADMIN — SEVELAMER CARBONATE 800 MILLIGRAM(S): 2400 POWDER, FOR SUSPENSION ORAL at 05:56

## 2018-05-15 RX ADMIN — Medication 50 MILLIGRAM(S): at 22:21

## 2018-05-15 RX ADMIN — INSULIN GLARGINE 7 UNIT(S): 100 INJECTION, SOLUTION SUBCUTANEOUS at 21:40

## 2018-05-15 RX ADMIN — GABAPENTIN 100 MILLIGRAM(S): 400 CAPSULE ORAL at 12:40

## 2018-05-15 RX ADMIN — SEVELAMER CARBONATE 800 MILLIGRAM(S): 2400 POWDER, FOR SUSPENSION ORAL at 21:38

## 2018-05-15 RX ADMIN — Medication 80 MILLIGRAM(S): at 05:55

## 2018-05-15 RX ADMIN — Medication 25 MILLIGRAM(S): at 05:55

## 2018-05-15 RX ADMIN — Medication 7 UNIT(S): at 17:36

## 2018-05-15 RX ADMIN — Medication 80 MILLIGRAM(S): at 21:38

## 2018-05-15 RX ADMIN — Medication 7 UNIT(S): at 12:39

## 2018-05-15 RX ADMIN — ATORVASTATIN CALCIUM 20 MILLIGRAM(S): 80 TABLET, FILM COATED ORAL at 21:38

## 2018-05-15 RX ADMIN — SEVELAMER CARBONATE 800 MILLIGRAM(S): 2400 POWDER, FOR SUSPENSION ORAL at 17:37

## 2018-05-15 NOTE — CONSULT NOTE ADULT - ASSESSMENT
Impression:  Acute chronic COPD with exacerbation  RLL infiltrate    Check O2 sat on NC, record, continue O2 as necessary to maintain sats > 90%  completed course of antibiotics  OOB to chair  GI and DVT prophylaxis  pulmonary toilet  recheck new CXR  may need home O2
Patient with Hx ESRD on HMD. He appears volume overloaded now. But on dialysis. Trop higher may 7. I doubt mi. I would echo if not done. Dialysis. F/U Dr montgomery
IMPRESSION:  Acute respiratory failure likely sec to volume overload, CHF exacerbation.  complicated by Eugenio with RVR and elevated troponin (down trending)  Possible underlying Pneumonia and Undiagnosed COPD as pt is chronic smoker.      PLAN: Admit to Non CCU Tele    PULMONARY: c/w IV diuresis, monitor I's and O's  fup with renal for possible dialysis tmrw  complete the course of Levaquin  C/w Prednisone taper    CARDIOVASCULAR: Control MONALISA.flor with Amiodarone as his SOB is worsened with metoprolol  monitor INR and c/w coumadin  trend CE  fup with 2dECHO    GI: GI prophylaxis.  Feeding     RENAL: monitor lytes, fup with renal for HD   fup with potassium levels.     INFECTIOUS DISEASE: fup with cultures finish 7 days of ABX    HEMATOLOGICAL:  DVT prophylaxis.    ENDOCRINE:  Follow up FS, start on Insulin regimen  tele monitoring       CRITICAL CARE TIME SPENT: 45 minutes

## 2018-05-15 NOTE — CONSULT NOTE ADULT - SUBJECTIVE AND OBJECTIVE BOX
ROGER RODRIGUES    Male    Patient is a 57y old  Male who presents with a chief complaint of SOB (09 May 2018 22:32)      HPI:  56 yo Male presents to ER with chief complaint of  shortness of breath that started 1 hour ago after pt signed himself out AMA from the hospital. Pt was admitted to hospital with similar complaint and was diagnosed sec to pneumonia, volume overload, with a component of undiagnosed COPD exacerebation, later found to be in rapid afib and Was given cardizem 20 mg after which the afib broke into sinus. He was treated with lasix 80 mg BID, prednisone, and levaquin, with this regimen his symptoms are being improved during his earlier hospitalization.       Now pt came back with SOB worsening not associated with cp,  fever, chills, abdominal pain, nausea, vomiting, diarrhea, back pain, leg swelling, urinary symptoms, headache, dizziness,  paresthesias, or weakness .  Pt attends HemoDialysis on Mon, wed, fri (09 May 2018 22:32).    He states that he is feeling a little more SOB than usual.        Allergies    Orange (Other)  Originally Entered as [HIVES] reaction to [sulfur] (Unknown)  penicillin (Unknown)  sulfADIAZINE (Unknown)    Intolerances        Daily Height in cm: 177.8 (14 May 2018 10:19)    Daily Weight in k.9 (14 May 2018 20:28)    FAMILY HISTORY:  No pertinent family history in first degree relatives      HEALTH ISSUES - PROBLEM Dx:  ESRD (end stage renal disease): ESRD (end stage renal disease)  Atrial fibrillation, unspecified type: Atrial fibrillation, unspecified type  Acute on chronic systolic congestive heart failure: Acute on chronic systolic congestive heart failure  High blood cholesterol: High blood cholesterol  Dialysis patient: Dialysis patient  Diabetes mellitus, type 2: Diabetes mellitus, type 2  Hypertension: Hypertension  Atrial fibrillation: Atrial fibrillation  CHF exacerbation: CHF exacerbation          Vital Signs Last 24 Hrs  T(C): 35.6 (15 May 2018 05:23), Max: 35.9 (14 May 2018 13:23)  T(F): 96 (15 May 2018 05:23), Max: 96.6 (14 May 2018 13:23)  HR: 108 (15 May 2018 05:23) (70 - 108)  BP: 133/65 (15 May 2018 05:23) (133/65 - 173/84)  BP(mean): --  RR: 17 (15 May 2018 07:47) (16 - 19)  SpO2: 98% (15 May 2018 07:47) (95% - 98%)    REVIEW OF SYSTEMS:  CONSTITUTIONAL: No fever, weight loss, or fatigue  EYES: No eye pain, visual disturbances, or discharge  NECK: No pain or stiffness  RESPIRATORY: No cough, wheezing, chills or hemoptysis; +shortness of breath  CARDIOVASCULAR: No chest pain, palpitations, dizziness, +leg swelling  GASTROINTESTINAL: No abdominal or epigastric pain. No nausea, vomiting, or hematemesis; No diarrhea or constipation. No melena or hematochezia.  GENITOURINARY: No dysuria, frequency, hematuria, or incontinence  NEUROLOGICAL: No headaches, memory loss, loss of strength, numbness, or tremors  MUSCULOSKELETAL: No joint pain or swelling; No muscle, back, or extremity pain        PT/INR - ( 15 May 2018 06:24 )   PT: 19.00 sec;   INR: 1.74 ratio         PTT - ( 15 May 2018 06:24 )  PTT:35.0 sec          CAPILLARY BLOOD GLUCOSE  282 (15 May 2018 07:08)        Radiology: RLL infiltrate/effusion last CXR 18    MEDICATIONS  (STANDING):  atorvastatin 20 milliGRAM(s) Oral at bedtime  furosemide   Injectable 80 milliGRAM(s) IV Push every 8 hours  gabapentin 100 milliGRAM(s) Oral daily  insulin glargine Injectable (LANTUS) 7 Unit(s) SubCutaneous at bedtime  insulin lispro Injectable (HumaLOG) 7 Unit(s) SubCutaneous three times a day before meals  metoprolol succinate ER 25 milliGRAM(s) Oral daily  pantoprazole    Tablet 40 milliGRAM(s) Oral before breakfast  sevelamer hydrochloride 800 milliGRAM(s) Oral three times a day      PHYSICAL EXAM:  GENERAL: NAD, well-groomed, well-developed  HEAD:  Atraumatic, Normocephalic  EYES: EOMI, PERRLA, conjunctiva and sclera clear  NERVOUS SYSTEM:  Alert & Oriented X 4, Good concentration; Motor Strength 5/5 B/L upper and lower extremities; DTRs 2+ intact and symmetric  CHEST/LUNG: marked decrease b/l bilaterally; HEART: Regular rate and rhythm; No murmurs, rubs, or gallops  ABDOMEN: Soft, Nontender, Nondistended; Bowel sounds present  EXTREMITIES:  2+ Peripheral Pulses, No clubbing, cyanosis, or edema  SKIN: No rashes or lesions

## 2018-05-15 NOTE — PROGRESS NOTE ADULT - ASSESSMENT
Impression:  Acute chronic COPD with exacerbation  RLL infiltrate    Check O2 sat on NC, record, continue O2 as necessary to maintain sats > 90%  completed course of antibiotics  OOB to chair  GI and DVT prophylaxis  pulmonary toilet  recheck new CXR  may need home O2

## 2018-05-15 NOTE — PROGRESS NOTE ADULT - ASSESSMENT
1. Anemia. Adequate Hgb, no need for RAINA.  2. ESRD on HD MWF. HD tomorrow: 3 hours, opti 160 dialyzer, 2K bath, 4L UF.  3. Afib. Continue metoprolol.  4. Hyperphosphatemia. Sevelamer with meals. Renal diet.  5. CHF. Titrate off supplemental O2.  I have been removing 4 liters fluid with each dialysis treatment: needs to be on a fluid restriction.

## 2018-05-16 RX ORDER — HYDROXYZINE HCL 10 MG
25 TABLET ORAL ONCE
Qty: 0 | Refills: 0 | Status: COMPLETED | OUTPATIENT
Start: 2018-05-16 | End: 2018-05-16

## 2018-05-16 RX ORDER — ACETAMINOPHEN 500 MG
650 TABLET ORAL EVERY 6 HOURS
Qty: 0 | Refills: 0 | Status: DISCONTINUED | OUTPATIENT
Start: 2018-05-16 | End: 2018-05-17

## 2018-05-16 RX ADMIN — Medication 80 MILLIGRAM(S): at 15:35

## 2018-05-16 RX ADMIN — Medication 7 UNIT(S): at 17:44

## 2018-05-16 RX ADMIN — SEVELAMER CARBONATE 800 MILLIGRAM(S): 2400 POWDER, FOR SUSPENSION ORAL at 21:24

## 2018-05-16 RX ADMIN — SEVELAMER CARBONATE 800 MILLIGRAM(S): 2400 POWDER, FOR SUSPENSION ORAL at 15:34

## 2018-05-16 RX ADMIN — Medication 25 MILLIGRAM(S): at 05:54

## 2018-05-16 RX ADMIN — Medication 80 MILLIGRAM(S): at 05:54

## 2018-05-16 RX ADMIN — Medication 80 MILLIGRAM(S): at 21:24

## 2018-05-16 RX ADMIN — Medication 7 UNIT(S): at 08:27

## 2018-05-16 RX ADMIN — ATORVASTATIN CALCIUM 20 MILLIGRAM(S): 80 TABLET, FILM COATED ORAL at 21:24

## 2018-05-16 RX ADMIN — PANTOPRAZOLE SODIUM 40 MILLIGRAM(S): 20 TABLET, DELAYED RELEASE ORAL at 05:54

## 2018-05-16 RX ADMIN — INSULIN GLARGINE 7 UNIT(S): 100 INJECTION, SOLUTION SUBCUTANEOUS at 21:25

## 2018-05-16 RX ADMIN — GABAPENTIN 100 MILLIGRAM(S): 400 CAPSULE ORAL at 12:38

## 2018-05-16 RX ADMIN — Medication 7 UNIT(S): at 12:38

## 2018-05-16 RX ADMIN — SEVELAMER CARBONATE 800 MILLIGRAM(S): 2400 POWDER, FOR SUSPENSION ORAL at 05:54

## 2018-05-16 RX ADMIN — Medication 650 MILLIGRAM(S): at 13:50

## 2018-05-16 NOTE — PROGRESS NOTE ADULT - ASSESSMENT
Impression:  Acute chronic COPD with exacerbation  RLL infiltrate resolved  No CHF, fluid overload    pulse OX on RA  OOB to chair  GI and DVT prophylaxis  pulmonary toilet  Needs ICS/LABA, LAMA  Prednisone taper  may need home O2

## 2018-05-16 NOTE — PROGRESS NOTE ADULT - ASSESSMENT
discharge planning  need home o2 ESRD  COPD  HYPOXIA--improved    dialysis today  monitor 24 hour and discharge in AM

## 2018-05-16 NOTE — PROGRESS NOTE ADULT - ASSESSMENT
1. Anemia. Adequate Hgb, no need for RAINA.  2. ESRD on HD MWF. HD today: 3 hours, opti 160 dialyzer, 2K bath, 4L UF.  3. Afib. Continue metoprolol.  4. Hyperphosphatemia. Sevelamer with meals. Renal diet.  5. CHF. Titrate off supplemental O2.  I have been removing 4 liters fluid with each dialysis treatment: needs to be on a fluid restriction.

## 2018-05-17 ENCOUNTER — TRANSCRIPTION ENCOUNTER (OUTPATIENT)
Age: 58
End: 2018-05-17

## 2018-05-17 VITALS — WEIGHT: 206.57 LBS

## 2018-05-17 DIAGNOSIS — R79.89 OTHER SPECIFIED ABNORMAL FINDINGS OF BLOOD CHEMISTRY: ICD-10-CM

## 2018-05-17 DIAGNOSIS — N18.6 END STAGE RENAL DISEASE: ICD-10-CM

## 2018-05-17 DIAGNOSIS — E11.22 TYPE 2 DIABETES MELLITUS WITH DIABETIC CHRONIC KIDNEY DISEASE: ICD-10-CM

## 2018-05-17 DIAGNOSIS — E11.21 TYPE 2 DIABETES MELLITUS WITH DIABETIC NEPHROPATHY: ICD-10-CM

## 2018-05-17 DIAGNOSIS — E11.51 TYPE 2 DIABETES MELLITUS WITH DIABETIC PERIPHERAL ANGIOPATHY WITHOUT GANGRENE: ICD-10-CM

## 2018-05-17 DIAGNOSIS — Z79.4 LONG TERM (CURRENT) USE OF INSULIN: ICD-10-CM

## 2018-05-17 DIAGNOSIS — J44.0 CHRONIC OBSTRUCTIVE PULMONARY DISEASE WITH (ACUTE) LOWER RESPIRATORY INFECTION: ICD-10-CM

## 2018-05-17 DIAGNOSIS — I48.0 PAROXYSMAL ATRIAL FIBRILLATION: ICD-10-CM

## 2018-05-17 DIAGNOSIS — E87.1 HYPO-OSMOLALITY AND HYPONATREMIA: ICD-10-CM

## 2018-05-17 DIAGNOSIS — Z99.2 DEPENDENCE ON RENAL DIALYSIS: ICD-10-CM

## 2018-05-17 DIAGNOSIS — Z89.429 ACQUIRED ABSENCE OF OTHER TOE(S), UNSPECIFIED SIDE: ICD-10-CM

## 2018-05-17 DIAGNOSIS — Z87.891 PERSONAL HISTORY OF NICOTINE DEPENDENCE: ICD-10-CM

## 2018-05-17 DIAGNOSIS — J18.9 PNEUMONIA, UNSPECIFIED ORGANISM: ICD-10-CM

## 2018-05-17 DIAGNOSIS — E87.2 ACIDOSIS: ICD-10-CM

## 2018-05-17 DIAGNOSIS — E87.5 HYPERKALEMIA: ICD-10-CM

## 2018-05-17 DIAGNOSIS — R00.0 TACHYCARDIA, UNSPECIFIED: ICD-10-CM

## 2018-05-17 DIAGNOSIS — E83.39 OTHER DISORDERS OF PHOSPHORUS METABOLISM: ICD-10-CM

## 2018-05-17 DIAGNOSIS — I13.2 HYPERTENSIVE HEART AND CHRONIC KIDNEY DISEASE WITH HEART FAILURE AND WITH STAGE 5 CHRONIC KIDNEY DISEASE, OR END STAGE RENAL DISEASE: ICD-10-CM

## 2018-05-17 DIAGNOSIS — D64.9 ANEMIA, UNSPECIFIED: ICD-10-CM

## 2018-05-17 DIAGNOSIS — J98.01 ACUTE BRONCHOSPASM: ICD-10-CM

## 2018-05-17 DIAGNOSIS — I50.9 HEART FAILURE, UNSPECIFIED: ICD-10-CM

## 2018-05-17 DIAGNOSIS — R06.02 SHORTNESS OF BREATH: ICD-10-CM

## 2018-05-17 DIAGNOSIS — I69.351 HEMIPLEGIA AND HEMIPARESIS FOLLOWING CEREBRAL INFARCTION AFFECTING RIGHT DOMINANT SIDE: ICD-10-CM

## 2018-05-17 DIAGNOSIS — E87.6 HYPOKALEMIA: ICD-10-CM

## 2018-05-17 RX ADMIN — Medication 25 MILLIGRAM(S): at 06:43

## 2018-05-17 RX ADMIN — PANTOPRAZOLE SODIUM 40 MILLIGRAM(S): 20 TABLET, DELAYED RELEASE ORAL at 06:43

## 2018-05-17 RX ADMIN — Medication 7 UNIT(S): at 08:49

## 2018-05-17 RX ADMIN — Medication 80 MILLIGRAM(S): at 06:44

## 2018-05-17 RX ADMIN — SEVELAMER CARBONATE 800 MILLIGRAM(S): 2400 POWDER, FOR SUSPENSION ORAL at 06:44

## 2018-05-17 NOTE — DISCHARGE NOTE ADULT - HOSPITAL COURSE
patient was admitted w volume overload  given 3 days of dialysis   improved  also had pneumonia  improved

## 2018-05-17 NOTE — DISCHARGE NOTE ADULT - MEDICATION SUMMARY - MEDICATIONS TO TAKE
I will START or STAY ON the medications listed below when I get home from the hospital:    Coumadin 2.5 mg oral tablet  -- 2 tab(s) by mouth once a day (at bedtime)  -- Indication: For Atrial fibrillation    gabapentin 100 mg oral tablet  -- 1 tab(s) by mouth once a day  -- Indication: For neuropathy    insulin lispro (concentrated) 200 units/mL subcutaneous solution  -- 7 unit(s) subcutaneous 3 times a day  -- Indication: For Diabetes mellitus, type 2    Lantus 100 units/mL subcutaneous solution  -- 15 unit(s) subcutaneous once a day (at bedtime)  -- Indication: For Diabetes mellitus, type 2    atorvastatin 20 mg oral tablet  -- 1 tab(s) by mouth once a day  -- Indication: For Hyperlipidemia    Toprol-XL 25 mg oral tablet, extended release  -- 1 tab(s) by mouth once a day  -- Indication: For Hypertension    Renvela 800 mg oral tablet  -- 1 tab(s) by mouth 3 times a day (with meals)  -- Indication: For Dialysis patient    pantoprazole 40 mg oral delayed release tablet  -- 1 tab(s) by mouth once a day  -- Indication: For gerd

## 2018-05-17 NOTE — PROGRESS NOTE ADULT - EXTREMITIES
No cyanosis, clubbing or edema
detailed exam
No cyanosis, clubbing or edema
General

## 2018-05-17 NOTE — DISCHARGE NOTE ADULT - CARE PLAN
Principal Discharge DX:	CHF exacerbation  Goal:	improved w dialysis  Assessment and plan of treatment:	continue current meds

## 2018-05-17 NOTE — PROGRESS NOTE ADULT - SUBJECTIVE AND OBJECTIVE BOX
[facility] FOLLOW UP NOTE  --------------------------------------------------------------------------------  24 hour events/subjective:  Pt's SOB improved but not back to baseline  Adhering to fluid restriction      PAST HISTORY  --------------------------------------------------------------------------------  No significant changes to PMH, PSH, FHx, SHx, unless otherwise noted    ALLERGIES & MEDICATIONS  --------------------------------------------------------------------------------  Allergies    Orange (Other)  Originally Entered as [HIVES] reaction to [sulfur] (Unknown)  penicillin (Unknown)  sulfADIAZINE (Unknown)    Intolerances      Standing Inpatient Medications  atorvastatin 20 milliGRAM(s) Oral at bedtime  furosemide   Injectable 80 milliGRAM(s) IV Push every 8 hours  gabapentin 100 milliGRAM(s) Oral daily  insulin glargine Injectable (LANTUS) 7 Unit(s) SubCutaneous at bedtime  insulin lispro Injectable (HumaLOG) 7 Unit(s) SubCutaneous three times a day before meals  metoprolol succinate ER 25 milliGRAM(s) Oral daily  pantoprazole    Tablet 40 milliGRAM(s) Oral before breakfast  sevelamer hydrochloride 800 milliGRAM(s) Oral three times a day    PRN Inpatient Medications      REVIEW OF SYSTEMS  --------------------------------------------------------------------------------  No New complaints    VITALS/PHYSICAL EXAM  --------------------------------------------------------------------------------  T(C): 36.8 (05-13-18 @ 05:56), Max: 36.8 (05-13-18 @ 05:56)  HR: 71 (05-13-18 @ 05:56) (71 - 125)  BP: 144/70 (05-13-18 @ 05:56) (126/72 - 144/76)  RR: 17 (05-13-18 @ 07:36) (17 - 98)  SpO2: 95% (05-13-18 @ 07:36) (95% - 95%)  Wt(kg): --  Height (cm): 177.8 (05-12-18 @ 13:50)  Weight (kg): 99.8 (05-12-18 @ 13:50)  BMI (kg/m2): 31.6 (05-12-18 @ 13:50)  BSA (m2): 2.17 (05-12-18 @ 13:50)      05-12-18 @ 07:01  -  05-13-18 @ 07:00  --------------------------------------------------------  IN: 780 mL / OUT: 2500 mL / NET: -1720 mL      Physical Exam:  	Gen: tacypneic, appears sad  	HEENT: no JVD  	Pulm: decreased breath sounds, no wheeses or rales  	CV: RRR  	Abd: +BS, soft, nontender/nondistended  	No edema  	Vascular access: left fistula    LABS/STUDIES  --------------------------------------------------------------------------------    HbA1c 6.1      [03-14-18 @ 05:41]  TSH 2.19      [03-14-18 @ 05:41]  Lipid: chol 119, TG 96, HDL 35, LDL 68      [03-14-18 @ 05:41]
[facility] FOLLOW UP NOTE  --------------------------------------------------------------------------------  24 hour events/subjective: Patient examined. Appears comfortable.    PAST HISTORY  --------------------------------------------------------------------------------  No significant changes to PMH, PSH, FHx, SHx, unless otherwise noted    ALLERGIES & MEDICATIONS  --------------------------------------------------------------------------------  Allergies    Orange (Other)  Originally Entered as [HIVES] reaction to [sulfur] (Unknown)  penicillin (Unknown)  sulfADIAZINE (Unknown)    Standing Inpatient Medications  atorvastatin 20 milliGRAM(s) Oral at bedtime  gabapentin 100 milliGRAM(s) Oral daily  insulin glargine Injectable (LANTUS) 7 Unit(s) SubCutaneous at bedtime  insulin lispro Injectable (HumaLOG) 7 Unit(s) SubCutaneous three times a day before meals  metoprolol succinate ER 25 milliGRAM(s) Oral daily  pantoprazole    Tablet 40 milliGRAM(s) Oral before breakfast  sevelamer hydrochloride 800 milliGRAM(s) Oral three times a day  warfarin 5 milliGRAM(s) Oral once    VITALS/PHYSICAL EXAM  --------------------------------------------------------------------------------  T(C): 35.3 (05-10-18 @ 13:30), Max: 36.8 (05-10-18 @ 10:12)  HR: 103 (05-10-18 @ 13:30) (103 - 124)  BP: 130/75 (05-10-18 @ 13:30) (115/71 - 162/82)  RR: 16 (05-10-18 @ 13:30) (16 - 23)  SpO2: 96% (05-10-18 @ 05:21) (95% - 100%)  Height (cm): 177.8 (05-09-18 @ 18:00)  Weight (kg): 99.8 (05-09-18 @ 18:00)  BMI (kg/m2): 31.6 (05-09-18 @ 18:00)  BSA (m2): 2.17 (05-09-18 @ 18:00)    Physical Exam:  	Gen: NAD  	Pulm: B/L rales  	CV: RRR, S1S2  	Abd: +BS, soft, nontender/nondistended  	: No suprapubic tenderness  	LE: Warm,  edema  	Vascular access: AVF    LABS/STUDIES  --------------------------------------------------------------------------------              12.3   12.37 >-----------<  338      [05-10-18 @ 07:09]              38.7     134  |  93  |  67  ----------------------------<  368      [05-10-18 @ 07:09]  5.4   |  23  |  5.8        Ca     9.0     [05-10-18 @ 07:09]      Mg     2.5     [05-10-18 @ 07:09]      Phos  5.4     [05-10-18 @ 07:09]    TPro  7.0  /  Alb  4.0  /  TBili  0.7  /  DBili  x   /  AST  41  /  ALT  26  /  AlkPhos  105  [05-10-18 @ 07:09]    PT/INR: PT 21.40, INR 1.95       [05-10-18 @ 07:09]  PTT: 32.1       [05-09-18 @ 19:15]    Troponin 0.55      [05-10-18 @ 07:09]        [05-09-18 @ 19:15]    Creatinine Trend:  SCr 5.8 [05-10 @ 07:09]  SCr 6.1 [05-09 @ 19:15]  SCr 7.1 [05-09 @ 05:23]  SCr 6.5 [05-08 @ 11:45]  SCr 3.0 [05-07 @ 19:27]    HbA1c 6.1      [03-14-18 @ 05:41]  TSH 2.19      [03-14-18 @ 05:41]  Lipid: chol 119, TG 96, HDL 35, LDL 68      [03-14-18 @ 05:41]
INTERVAL HISTORY/overnight events  this morning he was complaining of sob and resp distress placed on bipap and then taken to HD   now he is s/p HD and 4 liter on fluid was removed , he feel much better now sitting in bed no distress       Vital Signs Last 24 Hrs  T(C): 35.9 (11 May 2018 06:00), Max: 36.8 (10 May 2018 12:06)  T(F): 96.7 (11 May 2018 06:00), Max: 98.2 (10 May 2018 12:06)  HR: 115 (11 May 2018 11:33) (103 - 133)  BP: 128/74 (11 May 2018 11:33) (128/74 - 164/114)  BP(mean): --  RR: 22 (11 May 2018 11:33) (16 - 22)  SpO2: 98% (11 May 2018 08:40) (94% - 98%)  Daily Height in cm: 177.8 (11 May 2018 11:21)    Daily Weight in k (11 May 2018 06:00)  I&O's Summary    10 May 2018 07:01  -  11 May 2018 07:00  --------------------------------------------------------  IN: 60 mL / OUT: 200 mL / NET: -140 mL    11 May 2018 07:01  -  11 May 2018 12:00  --------------------------------------------------------  IN: 0 mL / OUT: 4000 mL / NET: -4000 mL        Physical Examination:    General: No acute distress.  Alert, oriented, interactive, nonfocal    HEENT: Pupils equal, reactive to light.  Symmetric.    PULM: decrease bibasilar     CVS: Regular rate and rhythm, no murmurs, rubs, or gallops    ABD: Soft, nondistended, nontender, normoactive bowel sounds, no masses    EXT: No edema, nontender    SKIN: Warm and well perfused, no rashes noted.        Lab Results:                        12.3   12.37 )-----------( 338      ( 10 May 2018 07:09 )             38.7     10 May 2018 07:09    134    |  93     |  67     ----------------------------<  368    5.4     |  23     |  5.8      Ca    9.0        10 May 2018 07:09  Phos  5.4       10 May 2018 07:09  Mg     2.5       10 May 2018 07:09      PT/INR - ( 10 May 2018 07:09 )   PT: 21.40 sec;   INR: 1.95 ratio         PTT - ( 09 May 2018 19:15 )  PTT:32.1 sec    CARDIAC MARKERS ( 10 May 2018 07:09 )  x     / 0.55 ng/mL / x     / x     / x      CARDIAC MARKERS ( 09 May 2018 19:15 )  x     / 0.64 ng/mL / 822 U/L / x     / 22.5 ng/mL        Microbiology      Medication:  MEDICATIONS  (STANDING):  atorvastatin 20 milliGRAM(s) Oral at bedtime  furosemide   Injectable 80 milliGRAM(s) IV Push every 8 hours  gabapentin 100 milliGRAM(s) Oral daily  insulin glargine Injectable (LANTUS) 7 Unit(s) SubCutaneous at bedtime  insulin lispro Injectable (HumaLOG) 7 Unit(s) SubCutaneous three times a day before meals  metoprolol succinate ER 25 milliGRAM(s) Oral daily  pantoprazole    Tablet 40 milliGRAM(s) Oral before breakfast  sevelamer hydrochloride 800 milliGRAM(s) Oral three times a day    MEDICATIONS  (PRN):        IMAGING STUDIES:
Interval Events:    None    REVIEW OF SYSTEMS:  Constitutional: No fevers or chills. No weight loss. +fatigue +generalized malaise.    CV: No chest pain. No palpitations. No lightheadedness or dizziness.   Resp: No dyspnea at rest. No dyspnea on exertion. No orthopnea. No wheezing. No cough. No stridor. No sputum production. No chest pain with respiration.  GI: No nausea. No vomiting. No diarrhea.  MSK: No joint pain or pain in any extremities  Integumentary: No skin lesions. No pedal edema.  Neurological: No gross motor weakness. No sensory changes.      OBJECTIVE:  ICU Vital Signs Last 24 Hrs  T(C): 35.7 (16 May 2018 06:00), Max: 36.1 (15 May 2018 21:00)  T(F): 96.2 (16 May 2018 06:00), Max: 96.9 (15 May 2018 21:00)  HR: 79 (16 May 2018 08:50) (73 - 108)  BP: 155/85 (16 May 2018 08:50) (106/58 - 174/80)    RR: 17 (16 May 2018 08:50) (16 - 17)  SpO2: 99% (16 May 2018 08:50) (92% - 99%)        05-15 @ 07:01  -  05-16 @ 07:00  --------------------------------------------------------  IN: 0 mL / OUT: 450 mL / NET: -450 mL      CAPILLARY BLOOD GLUCOSE  138 (16 May 2018 07:30)          PHYSICAL EXAM:  General: Awake, alert, oriented X 3.   HEENT: Atraumatic, normocephalic.                 Mallampatti Grade                 No nasal congestion.                No tonsillar or pharyngeal exudates.  Lymph Nodes: No palpable lymphadenopathy  Neck: No JVD. No carotid bruit.   Respiratory: Normal chest expansion                         Normal percussion                         decreased air entry                          Cardiovascular: S1 S2 normal. No murmurs, rubs or gallops.   Abdomen: Soft, non-tender, non-distended. No organomegaly.  Extremities: Warm to touch. Peripheral pulse palpable. No pedal edema.   Skin: No rashes or skin lesions  Neurological: Motor and sensory examination equal and normal in all four extremities.  Psychiatry: Appropriate mood and affect.    HOSPITAL MEDICATIONS:  MEDICATIONS  (STANDING):  atorvastatin 20 milliGRAM(s) Oral at bedtime  furosemide   Injectable 80 milliGRAM(s) IV Push every 8 hours  gabapentin 100 milliGRAM(s) Oral daily  insulin glargine Injectable (LANTUS) 7 Unit(s) SubCutaneous at bedtime  insulin lispro Injectable (HumaLOG) 7 Unit(s) SubCutaneous three times a day before meals  metoprolol succinate ER 25 milliGRAM(s) Oral daily  pantoprazole    Tablet 40 milliGRAM(s) Oral before breakfast  sevelamer hydrochloride 800 milliGRAM(s) Oral three times a day        PT/INR - ( 15 May 2018 06:24 )   PT: 19.00 sec;   INR: 1.74 ratio         PTT - ( 15 May 2018 06:24 )  PTT:35.0 sec              RADIOLOGY: resolved RLL infiltrate, no CHF
[facility]Y FOLLOW UP NOTE  --------------------------------------------------------------------------------  24 hour events/subjective: Patient examined. Appears comfortable.    PAST HISTORY  --------------------------------------------------------------------------------  No significant changes to PMH, PSH, FHx, SHx, unless otherwise noted    ALLERGIES & MEDICATIONS  --------------------------------------------------------------------------------  Allergies    Orange (Other)  Originally Entered as [HIVES] reaction to [sulfur] (Unknown)  penicillin (Unknown)  sulfADIAZINE (Unknown)    Standing Inpatient Medications  atorvastatin 20 milliGRAM(s) Oral at bedtime  furosemide   Injectable 80 milliGRAM(s) IV Push every 8 hours  gabapentin 100 milliGRAM(s) Oral daily  insulin glargine Injectable (LANTUS) 7 Unit(s) SubCutaneous at bedtime  insulin lispro Injectable (HumaLOG) 7 Unit(s) SubCutaneous three times a day before meals  metoprolol succinate ER 25 milliGRAM(s) Oral daily  pantoprazole    Tablet 40 milliGRAM(s) Oral before breakfast  sevelamer hydrochloride 800 milliGRAM(s) Oral three times a day    VITALS/PHYSICAL EXAM  --------------------------------------------------------------------------------  T(C): 35.6 (05-15-18 @ 05:23), Max: 35.9 (05-14-18 @ 13:23)  HR: 108 (05-15-18 @ 05:23) (70 - 108)  BP: 133/65 (05-15-18 @ 05:23) (133/65 - 173/84)  RR: 17 (05-15-18 @ 07:47) (16 - 19)  SpO2: 98% (05-15-18 @ 07:47) (95% - 98%)  Height (cm): 177.8 (05-14-18 @ 10:19)  Weight (kg): 88.6 (05-15-18 @ 05:23)  BMI (kg/m2): 28 (05-15-18 @ 05:23)  BSA (m2): 2.07 (05-15-18 @ 05:23)    05-14-18 @ 07:01  -  05-15-18 @ 07:00  --------------------------------------------------------  IN: 0 mL / OUT: 4200 mL / NET: -4200 mL    Physical Exam:  	Gen: NAD  	Pulm: CTA B/L  	CV:  S1S2  	Abd: +BS, soft, nontender/nondistended  	: No suprapubic tenderness  	LE: Warm,  edema    LABS/STUDIES  -------------------------------------------------------------------------------    PT/INR: PT 19.00, INR 1.74       [05-15-18 @ 06:24]  PTT: 35.0       [05-15-18 @ 06:24]    Creatinine Trend:  SCr 5.8 [05-10 @ 07:09]  SCr 6.1 [05-09 @ 19:15]  SCr 7.1 [05-09 @ 05:23]  SCr 6.5 [05-08 @ 11:45]  SCr 3.0 [05-07 @ 19:27]    HbA1c 6.1      [03-14-18 @ 05:41]  TSH 2.19      [03-14-18 @ 05:41]  Lipid: chol 119, TG 96, HDL 35, LDL 68      [03-14-18 @ 05:41]
Patient was examined during HD treatment.    Hemodialysis Prescription:  	Access: AVF  	Dialyzer: Opti 160  	Blood Flow (mL/Min): 400  	Dialysate Flow (mL/Min): 500  	Target UF (Liters): 3  	Treatment Time: 3 hours  	Potassium: 2K  	Calcium: 2.5
Patient was examined during HD treatment.    Hemodialysis Prescription:  	Access: AVF  	Dialyzer: Opti 160  	Blood Flow (mL/Min): 400  	Dialysate Flow (mL/Min): 500  	Target UF (Liters): 4  	Treatment Time: 3 hours  	Potassium: 2K  	Calcium: 2.5
Patient was examined during HD treatment.    Hemodialysis Prescription:  	Access: LUE AVF  	Dialyzer: Opti 160  	Blood Flow (mL/Min): 400  	Dialysate Flow (mL/Min): 500  	Target UF (Liters): 3.5  	Treatment Time: 3 hours  	Potassium: 1K  	Calcium: 2.5
ROGER RODRIGUES  57y  Male    Patient is a 57y old  Male who presents with a chief complaint of SOB (09 May 2018 22:32)    ALLERGIES:  Orange (Other)  Originally Entered as [HIVES] reaction to [sulfur] (Unknown)  penicillin (Unknown)  sulfADIAZINE (Unknown)      INTERVAL HPI/OVERNIGHT EVENTS:    VITALS:  T(F): 96 (05-15-18 @ 05:23), Max: 96.6 (05-14-18 @ 14:00)  HR: 108 (05-15-18 @ 05:23) (70 - 108)  BP: 133/65 (05-15-18 @ 05:23) (133/65 - 153/68)  RR: 17 (05-15-18 @ 07:47) (16 - 19)  SpO2: 98% (05-15-18 @ 07:47) (98% - 98%)    LABS:        MICROBIOLOGY:    MEDICATION:  furosemide   Injectable 80 milliGRAM(s) IV Push every 8 hours    RADIOLOGY & ADDITIONAL TESTS:
ROGER RODRIGUES  57y  Male    Patient is a 57y old  Male who presents with a chief complaint of SOB (09 May 2018 22:32)    ALLERGIES:  Orange (Other)  Originally Entered as [HIVES] reaction to [sulfur] (Unknown)  penicillin (Unknown)  sulfADIAZINE (Unknown)      INTERVAL HPI/OVERNIGHT EVENTS:    VITALS:  T(F): 96.2 (05-16-18 @ 06:00), Max: 96.9 (05-15-18 @ 21:00)  HR: 80 (05-16-18 @ 06:00) (73 - 108)  BP: 174/80 (05-16-18 @ 06:00) (106/58 - 174/80)  RR: 16 (05-16-18 @ 07:34) (16 - 17)  SpO2: 92% (05-16-18 @ 07:34) (92% - 92%)    LABS:        MICROBIOLOGY:    MEDICATION:    RADIOLOGY & ADDITIONAL TESTS:
ROGER RODRIGUES  57y  Male    Patient is a 57y old  Male who presents with a chief complaint of SOB (09 May 2018 22:32)    ALLERGIES:  Orange (Other)  Originally Entered as [HIVES] reaction to [sulfur] (Unknown)  penicillin (Unknown)  sulfADIAZINE (Unknown)      INTERVAL HPI/OVERNIGHT EVENTS:    VITALS:  T(F): 96.7 (05-11-18 @ 06:00), Max: 98.2 (05-10-18 @ 12:06)  HR: 133 (05-11-18 @ 08:40) (103 - 133)  BP: 160/87 (05-11-18 @ 08:20) (130/75 - 164/114)  RR: 18 (05-11-18 @ 08:20) (16 - 18)  SpO2: 98% (05-11-18 @ 08:40) (94% - 98%)    LABS:  05-10    134<L>  |  93<L>  |  67<HH>  ----------------------------<  368<H>  5.4<H>   |  23  |  5.8<HH>    Ca    9.0      10 May 2018 07:09  Phos  5.4     05-10  Mg     2.5     05-10    TPro  7.0  /  Alb  4.0  /  TBili  0.7  /  DBili  x   /  AST  41  /  ALT  26  /  AlkPhos  105  05-10    MICROBIOLOGY:    MEDICATION:  atorvastatin 20 milliGRAM(s) Oral at bedtime  furosemide   Injectable 80 milliGRAM(s) IV Push every 8 hours  gabapentin 100 milliGRAM(s) Oral daily  insulin glargine Injectable (LANTUS) 7 Unit(s) SubCutaneous at bedtime  insulin lispro Injectable (HumaLOG) 7 Unit(s) SubCutaneous three times a day before meals  metoprolol succinate ER 25 milliGRAM(s) Oral daily  pantoprazole    Tablet 40 milliGRAM(s) Oral before breakfast  sevelamer hydrochloride 800 milliGRAM(s) Oral three times a day    RADIOLOGY & ADDITIONAL TESTS:
ROGER RODRIGUES  57y  Male    Patient is a 57y old  Male who presents with a chief complaint of SOB (09 May 2018 22:32)    ALLERGIES:  Orange (Other)  Originally Entered as [HIVES] reaction to [sulfur] (Unknown)  penicillin (Unknown)  sulfADIAZINE (Unknown)      INTERVAL HPI/OVERNIGHT EVENTS:    VITALS:  T(F): 97 (05-14-18 @ 05:19), Max: 98.7 (05-13-18 @ 14:03)  HR: 74 (05-14-18 @ 09:08) (74 - 77)  BP: 158/85 (05-14-18 @ 09:08) (147/69 - 176/84)  RR: 18 (05-14-18 @ 09:08) (16 - 18)  SpO2: --    LABS:        MICROBIOLOGY:    MEDICATION:  atorvastatin 20 milliGRAM(s) Oral at bedtime  furosemide   Injectable 80 milliGRAM(s) IV Push every 8 hours  gabapentin 100 milliGRAM(s) Oral daily  insulin glargine Injectable (LANTUS) 7 Unit(s) SubCutaneous at bedtime  insulin lispro Injectable (HumaLOG) 7 Unit(s) SubCutaneous three times a day before meals  metoprolol succinate ER 25 milliGRAM(s) Oral daily  pantoprazole    Tablet 40 milliGRAM(s) Oral before breakfast  sevelamer hydrochloride 800 milliGRAM(s) Oral three times a day    RADIOLOGY & ADDITIONAL TESTS:
ROGER RODRIGUES  57y  Male    Patient is a 57y old  Male who presents with a chief complaint of SOB (09 May 2018 22:32)    ALLERGIES:  Orange (Other)  Originally Entered as [HIVES] reaction to [sulfur] (Unknown)  penicillin (Unknown)  sulfADIAZINE (Unknown)      INTERVAL HPI/OVERNIGHT EVENTS:    VITALS:  T(F): 97.1 (05-12-18 @ 06:00), Max: 97.1 (05-12-18 @ 06:00)  HR: 100 (05-12-18 @ 11:30) (73 - 118)  BP: 144/76 (05-12-18 @ 11:30) (118/77 - 144/76)  RR: 22 (05-12-18 @ 11:30) (16 - 22)  SpO2: --    LABS:        MICROBIOLOGY:    MEDICATION:  atorvastatin 20 milliGRAM(s) Oral at bedtime  furosemide   Injectable 80 milliGRAM(s) IV Push every 8 hours  gabapentin 100 milliGRAM(s) Oral daily  insulin glargine Injectable (LANTUS) 7 Unit(s) SubCutaneous at bedtime  insulin lispro Injectable (HumaLOG) 7 Unit(s) SubCutaneous three times a day before meals  metoprolol succinate ER 25 milliGRAM(s) Oral daily  pantoprazole    Tablet 40 milliGRAM(s) Oral before breakfast  sevelamer hydrochloride 800 milliGRAM(s) Oral three times a day    RADIOLOGY & ADDITIONAL TESTS:
ROGER RODRIGUES  57y  Male    Patient is a 57y old  Male who presents with a chief complaint of SOB (09 May 2018 22:32)    ALLERGIES:  Orange (Other)  Originally Entered as [HIVES] reaction to [sulfur] (Unknown)  penicillin (Unknown)  sulfADIAZINE (Unknown)      INTERVAL HPI/OVERNIGHT EVENTS:    VITALS:  T(F): 97.1 (05-17-18 @ 06:13), Max: 97.1 (05-17-18 @ 06:13)  HR: 72 (05-17-18 @ 06:13) (70 - 100)  BP: 150/75 (05-17-18 @ 06:13) (111/57 - 150/75)  RR: 16 (05-17-18 @ 06:13) (16 - 18)  SpO2: 94% (05-16-18 @ 21:00) (94% - 97%)    LABS:        MICROBIOLOGY:    MEDICATION:  acetaminophen   Tablet 650 milliGRAM(s) Oral every 6 hours PRN    RADIOLOGY & ADDITIONAL TESTS:
ROGER RODRIGUES  57y  Male    Patient is a 57y old  Male who presents with a chief complaint of SOB (09 May 2018 22:32)    ALLERGIES:  Orange (Other)  Originally Entered as [HIVES] reaction to [sulfur] (Unknown)  penicillin (Unknown)  sulfADIAZINE (Unknown)      INTERVAL HPI/OVERNIGHT EVENTS:    VITALS:  T(F): 98.3 (05-13-18 @ 05:56), Max: 98.3 (05-13-18 @ 05:56)  HR: 71 (05-13-18 @ 05:56) (71 - 125)  BP: 144/70 (05-13-18 @ 05:56) (126/72 - 144/70)  RR: 17 (05-13-18 @ 07:36) (17 - 98)  SpO2: 95% (05-13-18 @ 07:36) (95% - 95%)    LABS:        MICROBIOLOGY:    MEDICATION:  atorvastatin 20 milliGRAM(s) Oral at bedtime  furosemide   Injectable 80 milliGRAM(s) IV Push every 8 hours  gabapentin 100 milliGRAM(s) Oral daily  insulin glargine Injectable (LANTUS) 7 Unit(s) SubCutaneous at bedtime  insulin lispro Injectable (HumaLOG) 7 Unit(s) SubCutaneous three times a day before meals  metoprolol succinate ER 25 milliGRAM(s) Oral daily  pantoprazole    Tablet 40 milliGRAM(s) Oral before breakfast  sevelamer hydrochloride 800 milliGRAM(s) Oral three times a day    RADIOLOGY & ADDITIONAL TESTS:

## 2018-05-17 NOTE — DISCHARGE NOTE ADULT - PATIENT PORTAL LINK FT
You can access the [facility] Patient Portal, offered by [facility], by registering with the following website: [email or web address]

## 2018-05-17 NOTE — PROGRESS NOTE ADULT - PROVIDER SPECIALTY LIST ADULT
Internal Medicine
Nephrology
Pulmonology
Pulmonology
Nephrology

## 2018-05-17 NOTE — DISCHARGE NOTE ADULT - CARE PROVIDER_API CALL
Justin Nielsen), Internal Medicine  [facility] [place] [address]  Phone: [phone]  Fax: [phone]

## 2018-05-19 DIAGNOSIS — K21.9 GASTRO-ESOPHAGEAL REFLUX DISEASE WITHOUT ESOPHAGITIS: ICD-10-CM

## 2018-05-19 DIAGNOSIS — I50.23 ACUTE ON CHRONIC SYSTOLIC (CONGESTIVE) HEART FAILURE: ICD-10-CM

## 2018-05-19 DIAGNOSIS — E11.51 TYPE 2 DIABETES MELLITUS WITH DIABETIC PERIPHERAL ANGIOPATHY WITHOUT GANGRENE: ICD-10-CM

## 2018-05-19 DIAGNOSIS — E11.22 TYPE 2 DIABETES MELLITUS WITH DIABETIC CHRONIC KIDNEY DISEASE: ICD-10-CM

## 2018-05-19 DIAGNOSIS — I48.91 UNSPECIFIED ATRIAL FIBRILLATION: ICD-10-CM

## 2018-05-19 DIAGNOSIS — J44.0 CHRONIC OBSTRUCTIVE PULMONARY DISEASE WITH (ACUTE) LOWER RESPIRATORY INFECTION: ICD-10-CM

## 2018-05-19 DIAGNOSIS — J44.1 CHRONIC OBSTRUCTIVE PULMONARY DISEASE WITH (ACUTE) EXACERBATION: ICD-10-CM

## 2018-05-19 DIAGNOSIS — I69.351 HEMIPLEGIA AND HEMIPARESIS FOLLOWING CEREBRAL INFARCTION AFFECTING RIGHT DOMINANT SIDE: ICD-10-CM

## 2018-05-19 DIAGNOSIS — F17.210 NICOTINE DEPENDENCE, CIGARETTES, UNCOMPLICATED: ICD-10-CM

## 2018-05-19 DIAGNOSIS — I13.2 HYPERTENSIVE HEART AND CHRONIC KIDNEY DISEASE WITH HEART FAILURE AND WITH STAGE 5 CHRONIC KIDNEY DISEASE, OR END STAGE RENAL DISEASE: ICD-10-CM

## 2018-05-19 DIAGNOSIS — D64.9 ANEMIA, UNSPECIFIED: ICD-10-CM

## 2018-05-19 DIAGNOSIS — Z79.01 LONG TERM (CURRENT) USE OF ANTICOAGULANTS: ICD-10-CM

## 2018-05-19 DIAGNOSIS — Z79.4 LONG TERM (CURRENT) USE OF INSULIN: ICD-10-CM

## 2018-05-19 DIAGNOSIS — R06.02 SHORTNESS OF BREATH: ICD-10-CM

## 2018-05-19 DIAGNOSIS — N18.6 END STAGE RENAL DISEASE: ICD-10-CM

## 2018-05-19 DIAGNOSIS — E83.39 OTHER DISORDERS OF PHOSPHORUS METABOLISM: ICD-10-CM

## 2018-05-19 DIAGNOSIS — Z99.2 DEPENDENCE ON RENAL DIALYSIS: ICD-10-CM

## 2018-05-19 DIAGNOSIS — J18.9 PNEUMONIA, UNSPECIFIED ORGANISM: ICD-10-CM

## 2018-05-19 DIAGNOSIS — E11.40 TYPE 2 DIABETES MELLITUS WITH DIABETIC NEUROPATHY, UNSPECIFIED: ICD-10-CM

## 2018-05-19 DIAGNOSIS — E78.5 HYPERLIPIDEMIA, UNSPECIFIED: ICD-10-CM

## 2018-05-19 DIAGNOSIS — J96.00 ACUTE RESPIRATORY FAILURE, UNSPECIFIED WHETHER WITH HYPOXIA OR HYPERCAPNIA: ICD-10-CM

## 2018-06-11 ENCOUNTER — OUTPATIENT (OUTPATIENT)
Dept: OUTPATIENT SERVICES | Facility: HOSPITAL | Age: 58
LOS: 1 days | Discharge: HOME | End: 2018-06-11

## 2018-06-11 DIAGNOSIS — I77.0 ARTERIOVENOUS FISTULA, ACQUIRED: Chronic | ICD-10-CM

## 2018-06-11 DIAGNOSIS — N18.6 END STAGE RENAL DISEASE: ICD-10-CM

## 2018-07-17 PROBLEM — E11.9 TYPE 2 DIABETES MELLITUS WITHOUT COMPLICATIONS: Chronic | Status: ACTIVE | Noted: 2018-02-14

## 2018-07-17 PROBLEM — E78.00 PURE HYPERCHOLESTEROLEMIA, UNSPECIFIED: Chronic | Status: ACTIVE | Noted: 2018-02-14

## 2018-07-17 PROBLEM — R53.1 WEAKNESS: Chronic | Status: ACTIVE | Noted: 2018-03-13

## 2018-07-17 PROBLEM — I10 ESSENTIAL (PRIMARY) HYPERTENSION: Chronic | Status: ACTIVE | Noted: 2018-02-14

## 2018-08-22 NOTE — ED ADULT NURSE NOTE - AS SC BRADEN SENSORY
(4) no impairment Number Of Freeze-Thaw Cycles: 1 freeze-thaw cycle Post-Care Instructions: I reviewed with the patient in detail post-care instructions. Patient is to wear sunprotection, and avoid picking at any of the treated lesions. Pt may apply Vaseline to crusted or scabbing areas. Include Z78.9 (Other Specified Conditions Influencing Health Status) As An Associated Diagnosis?: No Medical Necessity Clause: This procedure was medically necessary because the lesions that were treated were intensely: Detail Level: Detailed Consent: The patient's consent was obtained including but not limited to risks of crusting, scabbing, blistering, scarring, darker or lighter pigmentary change, recurrence, incomplete removal and infection. Medical Necessity Information: It is in your best interest to select a reason for this procedure from the list below. All of these items fulfill various CMS LCD requirements except the new and changing color options.

## 2019-01-25 NOTE — PATIENT PROFILE ADULT. - PAIN CHRONIC, PROFILE
Please see the attachement and also transfer my  Kevin Ryan prescriptions to [facility]  Patient requesting following refills:  LOV 5/24/18  Due for office visit and lab work  Ranitadine  Fenofibrate  Advair Discus  Montelukast  Albuterol inhaler  Meloxicam  (due for lab work)
no

## 2019-02-21 NOTE — PRE-ANESTHESIA EVALUATION ADULT - ANESTHESIA, PREVIOUS REACTION, PROFILE
Problem: Respiratory Impairment - Respiratory Therapy 253  Intervention: Inhaled medication delivery  Intervention Status  Done  Intervention: Inhaled gas administration  Intervention Status  Done         none

## 2019-02-22 NOTE — H&P ADULT - HISTORY OF PRESENT ILLNESS
X Size Of Lesion In Cm (Optional): 0 Detail Level: Generalized 56 yo M w/ h/o CVA, ESRD on HD MWF, HTN, DM, Atrial fibrillation on coumadin presents with above cc.  Pt. is poor historian,  Pt. has been feeling weak for some time, unable to tell when symptoms have exactly started but has been becoming progressively weak, yesterday he had difficulty ambulating and asked his mom to bring his walker.  He then came to the ED to get checked out.  Pt. reports dark than usual colored stools (guaiac negative in ED), pt last colonoscopy about 2-3 years ago unsure who performed it or the results.  Pt. denies fever, chills, cough, hemoptysis, hematemesis, CP, SOB, LOC, falls, nausea, vomiting, diarrhea.  Pt. does not recall his medications but gave a list to the ED.

## 2019-03-27 ENCOUNTER — APPOINTMENT (OUTPATIENT)
Dept: OTOLARYNGOLOGY | Facility: CLINIC | Age: 59
End: 2019-03-27
Payer: MEDICARE

## 2019-03-27 VITALS
WEIGHT: 220 LBS | HEIGHT: 72 IN | DIASTOLIC BLOOD PRESSURE: 80 MMHG | BODY MASS INDEX: 29.8 KG/M2 | SYSTOLIC BLOOD PRESSURE: 134 MMHG

## 2019-03-27 DIAGNOSIS — F17.200 NICOTINE DEPENDENCE, UNSPECIFIED, UNCOMPLICATED: ICD-10-CM

## 2019-03-27 DIAGNOSIS — Z83.49 FAMILY HISTORY OF OTHER ENDOCRINE, NUTRITIONAL AND METABOLIC DISEASES: ICD-10-CM

## 2019-03-27 DIAGNOSIS — E83.52 HYPERCALCEMIA: ICD-10-CM

## 2019-03-27 DIAGNOSIS — F17.210 NICOTINE DEPENDENCE, CIGARETTES, UNCOMPLICATED: ICD-10-CM

## 2019-03-27 PROCEDURE — 31575 DIAGNOSTIC LARYNGOSCOPY: CPT

## 2019-03-27 PROCEDURE — 99205 OFFICE O/P NEW HI 60 MIN: CPT | Mod: 25

## 2019-03-27 RX ORDER — SEVELAMER CARBONATE 800 MG/1
800 TABLET, FILM COATED ORAL
Refills: 0 | Status: ACTIVE | COMMUNITY

## 2019-03-27 NOTE — CONSULT LETTER
[Dear  ___] : Dear  [unfilled], [Consult Letter:] : I had the pleasure of evaluating your patient, [unfilled]. [Please see my note below.] : Please see my note below. [Consult Closing:] : Thank you very much for allowing me to participate in the care of this patient.  If you have any questions, please do not hesitate to contact me. [Sincerely,] : Sincerely, [FreeTextEntry3] : Corbin Culver MD FACS

## 2019-03-27 NOTE — HISTORY OF PRESENT ILLNESS
[de-identified] : Patient here today due to recent diagnosis of thyroid carcinoma. Patient admits having thyroid nodules for years. Biopsies in the past have been benign. Recently biopsied and had positive for malignant cells. Patient denies any dysphagia. No choking. Patient c/o severe dry mouth when going to sleep at night. Fatigue. Snoring at night. No history of radiation exposure. Family history of sisters having thyroid disease.

## 2019-03-27 NOTE — ASSESSMENT
[FreeTextEntry1] : We will discuss the possibility of parathyroidectomy. We will check PTH and Ca and speak with nephrologist.

## 2019-03-27 NOTE — PROCEDURE
[Unable to Cooperate with Mirror] : patient unable to cooperate with mirror [Lesion] : lesion identified by mirror examination needing further evaluation [Normal] : posterior cricoid area had healthy pink mucosa in the interarytenoid area and the esophageal inlet

## 2019-05-05 ENCOUNTER — FORM ENCOUNTER (OUTPATIENT)
Age: 59
End: 2019-05-05

## 2019-05-06 ENCOUNTER — OUTPATIENT (OUTPATIENT)
Dept: OUTPATIENT SERVICES | Facility: HOSPITAL | Age: 59
LOS: 1 days | Discharge: HOME | End: 2019-05-06
Payer: MEDICARE

## 2019-05-06 VITALS
DIASTOLIC BLOOD PRESSURE: 64 MMHG | TEMPERATURE: 99 F | OXYGEN SATURATION: 96 % | SYSTOLIC BLOOD PRESSURE: 133 MMHG | RESPIRATION RATE: 20 BRPM | HEIGHT: 72 IN | HEART RATE: 60 BPM | WEIGHT: 214.07 LBS

## 2019-05-06 DIAGNOSIS — Z86.39 PERSONAL HISTORY OF OTHER ENDOCRINE, NUTRITIONAL AND METABOLIC DISEASE: Chronic | ICD-10-CM

## 2019-05-06 DIAGNOSIS — Z01.818 ENCOUNTER FOR OTHER PREPROCEDURAL EXAMINATION: ICD-10-CM

## 2019-05-06 DIAGNOSIS — C73 MALIGNANT NEOPLASM OF THYROID GLAND: ICD-10-CM

## 2019-05-06 DIAGNOSIS — I77.0 ARTERIOVENOUS FISTULA, ACQUIRED: Chronic | ICD-10-CM

## 2019-05-06 DIAGNOSIS — Z90.89 ACQUIRED ABSENCE OF OTHER ORGANS: Chronic | ICD-10-CM

## 2019-05-06 DIAGNOSIS — F17.200 NICOTINE DEPENDENCE, UNSPECIFIED, UNCOMPLICATED: Chronic | ICD-10-CM

## 2019-05-06 DIAGNOSIS — Z87.19 PERSONAL HISTORY OF OTHER DISEASES OF THE DIGESTIVE SYSTEM: Chronic | ICD-10-CM

## 2019-05-06 DIAGNOSIS — Z98.890 OTHER SPECIFIED POSTPROCEDURAL STATES: Chronic | ICD-10-CM

## 2019-05-06 LAB
ALBUMIN SERPL ELPH-MCNC: 4.3 G/DL — SIGNIFICANT CHANGE UP (ref 3.5–5.2)
ALP SERPL-CCNC: 104 U/L — SIGNIFICANT CHANGE UP (ref 30–115)
ALT FLD-CCNC: 15 U/L — SIGNIFICANT CHANGE UP (ref 0–41)
ANION GAP SERPL CALC-SCNC: 18 MMOL/L — HIGH (ref 7–14)
APPEARANCE UR: ABNORMAL
APTT BLD: 37.7 SEC — SIGNIFICANT CHANGE UP (ref 27–39.2)
AST SERPL-CCNC: 25 U/L — SIGNIFICANT CHANGE UP (ref 0–41)
BACTERIA # UR AUTO: ABNORMAL /HPF
BASOPHILS # BLD AUTO: 0.19 K/UL — SIGNIFICANT CHANGE UP (ref 0–0.2)
BASOPHILS NFR BLD AUTO: 3.2 % — HIGH (ref 0–1)
BILIRUB SERPL-MCNC: 1.1 MG/DL — SIGNIFICANT CHANGE UP (ref 0.2–1.2)
BILIRUB UR-MCNC: ABNORMAL
BUN SERPL-MCNC: 27 MG/DL — HIGH (ref 10–20)
CALCIUM SERPL-MCNC: 9.5 MG/DL — SIGNIFICANT CHANGE UP (ref 8.5–10.1)
CHLORIDE SERPL-SCNC: 92 MMOL/L — LOW (ref 98–110)
CO2 SERPL-SCNC: 31 MMOL/L — SIGNIFICANT CHANGE UP (ref 17–32)
COLOR SPEC: ABNORMAL
COMMENT - URINE: SIGNIFICANT CHANGE UP
CREAT SERPL-MCNC: 4.6 MG/DL — CRITICAL HIGH (ref 0.7–1.5)
DIFF PNL FLD: ABNORMAL
EOSINOPHIL # BLD AUTO: 0.06 K/UL — SIGNIFICANT CHANGE UP (ref 0–0.7)
EOSINOPHIL NFR BLD AUTO: 1 % — SIGNIFICANT CHANGE UP (ref 0–8)
GLUCOSE SERPL-MCNC: 116 MG/DL — HIGH (ref 70–99)
GLUCOSE UR QL: NEGATIVE MG/DL — SIGNIFICANT CHANGE UP
HCT VFR BLD CALC: 38.1 % — LOW (ref 42–52)
HGB BLD-MCNC: 12.3 G/DL — LOW (ref 14–18)
IMM GRANULOCYTES NFR BLD AUTO: 0.3 % — SIGNIFICANT CHANGE UP (ref 0.1–0.3)
INR BLD: 1.68 RATIO — HIGH (ref 0.65–1.3)
KETONES UR-MCNC: ABNORMAL
LEUKOCYTE ESTERASE UR-ACNC: ABNORMAL
LYMPHOCYTES # BLD AUTO: 1.14 K/UL — LOW (ref 1.2–3.4)
LYMPHOCYTES # BLD AUTO: 18.9 % — LOW (ref 20.5–51.1)
MCHC RBC-ENTMCNC: 29.5 PG — SIGNIFICANT CHANGE UP (ref 27–31)
MCHC RBC-ENTMCNC: 32.3 G/DL — SIGNIFICANT CHANGE UP (ref 32–37)
MCV RBC AUTO: 91.4 FL — SIGNIFICANT CHANGE UP (ref 80–94)
MONOCYTES # BLD AUTO: 0.44 K/UL — SIGNIFICANT CHANGE UP (ref 0.1–0.6)
MONOCYTES NFR BLD AUTO: 7.3 % — SIGNIFICANT CHANGE UP (ref 1.7–9.3)
NEUTROPHILS # BLD AUTO: 4.17 K/UL — SIGNIFICANT CHANGE UP (ref 1.4–6.5)
NEUTROPHILS NFR BLD AUTO: 69.3 % — SIGNIFICANT CHANGE UP (ref 42.2–75.2)
NITRITE UR-MCNC: NEGATIVE — SIGNIFICANT CHANGE UP
NRBC # BLD: 0 /100 WBCS — SIGNIFICANT CHANGE UP (ref 0–0)
PH UR: 7 — SIGNIFICANT CHANGE UP (ref 5–8)
PLATELET # BLD AUTO: 249 K/UL — SIGNIFICANT CHANGE UP (ref 130–400)
POTASSIUM SERPL-MCNC: 4.1 MMOL/L — SIGNIFICANT CHANGE UP (ref 3.5–5)
POTASSIUM SERPL-SCNC: 4.1 MMOL/L — SIGNIFICANT CHANGE UP (ref 3.5–5)
PROT SERPL-MCNC: 7.6 G/DL — SIGNIFICANT CHANGE UP (ref 6–8)
PROT UR-MCNC: >=300 MG/DL
PROTHROM AB SERPL-ACNC: 19.2 SEC — HIGH (ref 9.95–12.87)
RBC # BLD: 4.17 M/UL — LOW (ref 4.7–6.1)
RBC # FLD: 17.2 % — HIGH (ref 11.5–14.5)
RBC CASTS # UR COMP ASSIST: >50 /HPF
SODIUM SERPL-SCNC: 141 MMOL/L — SIGNIFICANT CHANGE UP (ref 135–146)
SP GR SPEC: 1.02 — SIGNIFICANT CHANGE UP (ref 1.01–1.03)
UROBILINOGEN FLD QL: 1 MG/DL (ref 0.2–0.2)
WBC # BLD: 6.02 K/UL — SIGNIFICANT CHANGE UP (ref 4.8–10.8)
WBC # FLD AUTO: 6.02 K/UL — SIGNIFICANT CHANGE UP (ref 4.8–10.8)
WBC UR QL: ABNORMAL /HPF

## 2019-05-06 PROCEDURE — 71046 X-RAY EXAM CHEST 2 VIEWS: CPT | Mod: 26

## 2019-05-06 PROCEDURE — 93010 ELECTROCARDIOGRAM REPORT: CPT

## 2019-05-06 RX ORDER — ATORVASTATIN CALCIUM 80 MG/1
1 TABLET, FILM COATED ORAL
Qty: 0 | Refills: 0 | COMMUNITY

## 2019-05-06 NOTE — H&P PST ADULT - NSICDXPASTSURGICALHX_GEN_ALL_CORE_FT
PAST SURGICAL HISTORY:  A-V fistula left AVF    History of surgery Multiple toe amputations (amp 4 1/2 left toes; has 1/2 left mid toe; amp right mid toe)    History of tonsillectomy PAST SURGICAL HISTORY:  A-V fistula left AVF    H/O gastroesophageal reflux (GERD)     H/O thyroid nodule     History of surgery Multiple toe amputations (amp 4 1/2 left toes; has 1/2 left mid toe; amp right mid toe)    History of tonsillectomy     Smoker

## 2019-05-06 NOTE — H&P PST ADULT - NSICDXPASTMEDICALHX_GEN_ALL_CORE_FT
PAST MEDICAL HISTORY:  Anemia chronic anemia - s/p transfusion 2018    Atrial fibrillation on warfarin    Chronic leg pain     Diabetes mellitus, type 2     Dialysis patient Mon, Wednesday, Friday ([address])    ESRD (end stage renal disease) 2 yrs    High blood cholesterol     Hypertension     OA (osteoarthritis)     PAD (peripheral artery disease) multiple toe amputations    Right sided weakness     SOB (shortness of breath) on exertion     Stroke 8 yrs ago    Thyroid cancer

## 2019-05-06 NOTE — H&P PST ADULT - HISTORY OF PRESENT ILLNESS
57 y/o male reports h/o thyroid nodules found to be cancerous, dx few months ago; elected for procedure. Denies chest pain, palp, cough, fever, recent URI / UTI, dizziness, syncope.    Ambs < 1 bls with HOLCOMB.    ESRD on HD M_W_F (voids) - had HD today (5/6/19).

## 2019-05-06 NOTE — H&P PST ADULT - EXTREMITIES COMMENTS
Kj Lower Extrems edema 3+; Vascular changes kj lower extrems;  Small open lesion to right shin (reports he "banged" shin against furniture).

## 2019-05-07 DIAGNOSIS — E11.9 TYPE 2 DIABETES MELLITUS WITHOUT COMPLICATIONS: ICD-10-CM

## 2019-05-07 DIAGNOSIS — I73.9 PERIPHERAL VASCULAR DISEASE, UNSPECIFIED: ICD-10-CM

## 2019-05-07 DIAGNOSIS — D64.9 ANEMIA, UNSPECIFIED: ICD-10-CM

## 2019-05-07 DIAGNOSIS — K21.9 GASTRO-ESOPHAGEAL REFLUX DISEASE WITHOUT ESOPHAGITIS: ICD-10-CM

## 2019-05-07 DIAGNOSIS — C80.1 MALIGNANT (PRIMARY) NEOPLASM, UNSPECIFIED: ICD-10-CM

## 2019-05-07 DIAGNOSIS — E78.00 PURE HYPERCHOLESTEROLEMIA, UNSPECIFIED: ICD-10-CM

## 2019-05-07 DIAGNOSIS — I10 ESSENTIAL (PRIMARY) HYPERTENSION: ICD-10-CM

## 2019-05-07 DIAGNOSIS — Z86.73 PERSONAL HISTORY OF TRANSIENT ISCHEMIC ATTACK (TIA), AND CEREBRAL INFARCTION WITHOUT RESIDUAL DEFICITS: ICD-10-CM

## 2019-05-07 DIAGNOSIS — R06.02 SHORTNESS OF BREATH: ICD-10-CM

## 2019-05-07 PROBLEM — N18.6 END STAGE RENAL DISEASE: Chronic | Status: ACTIVE | Noted: 2019-05-06

## 2019-05-07 PROBLEM — I63.9 CEREBRAL INFARCTION, UNSPECIFIED: Chronic | Status: ACTIVE | Noted: 2018-03-13

## 2019-05-07 PROBLEM — Z99.2 DEPENDENCE ON RENAL DIALYSIS: Chronic | Status: ACTIVE | Noted: 2018-02-14

## 2019-05-07 PROBLEM — I48.91 UNSPECIFIED ATRIAL FIBRILLATION: Chronic | Status: ACTIVE | Noted: 2018-03-13

## 2019-05-08 ENCOUNTER — APPOINTMENT (OUTPATIENT)
Dept: OTOLARYNGOLOGY | Facility: CLINIC | Age: 59
End: 2019-05-08

## 2019-05-20 ENCOUNTER — RESULT REVIEW (OUTPATIENT)
Age: 59
End: 2019-05-20

## 2019-05-20 ENCOUNTER — APPOINTMENT (OUTPATIENT)
Dept: OTOLARYNGOLOGY | Facility: HOSPITAL | Age: 59
End: 2019-05-20
Payer: MEDICARE

## 2019-05-20 ENCOUNTER — INPATIENT (INPATIENT)
Facility: HOSPITAL | Age: 59
LOS: 0 days | Discharge: HOME | End: 2019-05-21
Attending: OTOLARYNGOLOGY | Admitting: OTOLARYNGOLOGY
Payer: MEDICARE

## 2019-05-20 ENCOUNTER — OUTPATIENT (OUTPATIENT)
Dept: OUTPATIENT SERVICES | Facility: HOSPITAL | Age: 59
LOS: 1 days | Discharge: HOME | End: 2019-05-20

## 2019-05-20 VITALS
WEIGHT: 220.02 LBS | DIASTOLIC BLOOD PRESSURE: 81 MMHG | SYSTOLIC BLOOD PRESSURE: 150 MMHG | HEART RATE: 105 BPM | RESPIRATION RATE: 18 BRPM | TEMPERATURE: 98 F

## 2019-05-20 DIAGNOSIS — M19.90 UNSPECIFIED OSTEOARTHRITIS, UNSPECIFIED SITE: ICD-10-CM

## 2019-05-20 DIAGNOSIS — F17.210 NICOTINE DEPENDENCE, CIGARETTES, UNCOMPLICATED: ICD-10-CM

## 2019-05-20 DIAGNOSIS — Z98.890 OTHER SPECIFIED POSTPROCEDURAL STATES: Chronic | ICD-10-CM

## 2019-05-20 DIAGNOSIS — C73 MALIGNANT NEOPLASM OF THYROID GLAND: ICD-10-CM

## 2019-05-20 DIAGNOSIS — I48.91 UNSPECIFIED ATRIAL FIBRILLATION: ICD-10-CM

## 2019-05-20 DIAGNOSIS — N18.6 END STAGE RENAL DISEASE: ICD-10-CM

## 2019-05-20 DIAGNOSIS — Z90.89 ACQUIRED ABSENCE OF OTHER ORGANS: Chronic | ICD-10-CM

## 2019-05-20 DIAGNOSIS — Z87.19 PERSONAL HISTORY OF OTHER DISEASES OF THE DIGESTIVE SYSTEM: Chronic | ICD-10-CM

## 2019-05-20 DIAGNOSIS — Z89.422 ACQUIRED ABSENCE OF OTHER LEFT TOE(S): ICD-10-CM

## 2019-05-20 DIAGNOSIS — Z86.39 PERSONAL HISTORY OF OTHER ENDOCRINE, NUTRITIONAL AND METABOLIC DISEASE: Chronic | ICD-10-CM

## 2019-05-20 DIAGNOSIS — E83.39 OTHER DISORDERS OF PHOSPHORUS METABOLISM: ICD-10-CM

## 2019-05-20 DIAGNOSIS — E11.51 TYPE 2 DIABETES MELLITUS WITH DIABETIC PERIPHERAL ANGIOPATHY WITHOUT GANGRENE: ICD-10-CM

## 2019-05-20 DIAGNOSIS — E78.00 PURE HYPERCHOLESTEROLEMIA, UNSPECIFIED: ICD-10-CM

## 2019-05-20 DIAGNOSIS — D63.1 ANEMIA IN CHRONIC KIDNEY DISEASE: ICD-10-CM

## 2019-05-20 DIAGNOSIS — G89.29 OTHER CHRONIC PAIN: ICD-10-CM

## 2019-05-20 DIAGNOSIS — Z99.2 DEPENDENCE ON RENAL DIALYSIS: ICD-10-CM

## 2019-05-20 DIAGNOSIS — I77.0 ARTERIOVENOUS FISTULA, ACQUIRED: Chronic | ICD-10-CM

## 2019-05-20 DIAGNOSIS — I69.351 HEMIPLEGIA AND HEMIPARESIS FOLLOWING CEREBRAL INFARCTION AFFECTING RIGHT DOMINANT SIDE: ICD-10-CM

## 2019-05-20 DIAGNOSIS — Z89.421 ACQUIRED ABSENCE OF OTHER RIGHT TOE(S): ICD-10-CM

## 2019-05-20 DIAGNOSIS — E11.22 TYPE 2 DIABETES MELLITUS WITH DIABETIC CHRONIC KIDNEY DISEASE: ICD-10-CM

## 2019-05-20 DIAGNOSIS — I12.0 HYPERTENSIVE CHRONIC KIDNEY DISEASE WITH STAGE 5 CHRONIC KIDNEY DISEASE OR END STAGE RENAL DISEASE: ICD-10-CM

## 2019-05-20 DIAGNOSIS — Z79.01 LONG TERM (CURRENT) USE OF ANTICOAGULANTS: ICD-10-CM

## 2019-05-20 DIAGNOSIS — F17.200 NICOTINE DEPENDENCE, UNSPECIFIED, UNCOMPLICATED: Chronic | ICD-10-CM

## 2019-05-20 LAB
ANION GAP SERPL CALC-SCNC: 18 MMOL/L — HIGH (ref 7–14)
APTT BLD: 34.3 SEC — SIGNIFICANT CHANGE UP (ref 27–39.2)
BASE EXCESS BLDA CALC-SCNC: 3.3 MMOL/L — HIGH (ref -2–2)
BASE EXCESS BLDA CALC-SCNC: 3.3 MMOL/L — HIGH (ref -2–2)
BUN SERPL-MCNC: 46 MG/DL — HIGH (ref 10–20)
CALCIUM SERPL-MCNC: 9.2 MG/DL — SIGNIFICANT CHANGE UP (ref 8.5–10.1)
CHLORIDE SERPL-SCNC: 92 MMOL/L — LOW (ref 98–110)
CK MB CFR SERPL CALC: 8.8 NG/ML — HIGH (ref 0.6–6.3)
CK SERPL-CCNC: 348 U/L — HIGH (ref 0–225)
CO2 SERPL-SCNC: 27 MMOL/L — SIGNIFICANT CHANGE UP (ref 17–32)
CREAT SERPL-MCNC: 6.2 MG/DL — CRITICAL HIGH (ref 0.7–1.5)
GLUCOSE BLDC GLUCOMTR-MCNC: 144 MG/DL — HIGH (ref 70–99)
GLUCOSE BLDC GLUCOMTR-MCNC: 172 MG/DL — HIGH (ref 70–99)
GLUCOSE BLDC GLUCOMTR-MCNC: 180 MG/DL — HIGH (ref 70–99)
GLUCOSE SERPL-MCNC: 181 MG/DL — HIGH (ref 70–99)
HCO3 BLDA-SCNC: 29 MMOL/L — HIGH (ref 23–27)
HCO3 BLDA-SCNC: 29 MMOL/L — HIGH (ref 23–27)
HCT VFR BLD CALC: 36.1 % — LOW (ref 42–52)
HCT VFR BLD CALC: 37.8 % — LOW (ref 42–52)
HGB BLD-MCNC: 11.8 G/DL — LOW (ref 14–18)
HGB BLD-MCNC: 12.2 G/DL — LOW (ref 14–18)
INR BLD: 1.36 RATIO — HIGH (ref 0.65–1.3)
MAGNESIUM SERPL-MCNC: 2.3 MG/DL — SIGNIFICANT CHANGE UP (ref 1.8–2.4)
MCHC RBC-ENTMCNC: 29.9 PG — SIGNIFICANT CHANGE UP (ref 27–31)
MCHC RBC-ENTMCNC: 29.9 PG — SIGNIFICANT CHANGE UP (ref 27–31)
MCHC RBC-ENTMCNC: 32.3 G/DL — SIGNIFICANT CHANGE UP (ref 32–37)
MCHC RBC-ENTMCNC: 32.7 G/DL — SIGNIFICANT CHANGE UP (ref 32–37)
MCV RBC AUTO: 91.4 FL — SIGNIFICANT CHANGE UP (ref 80–94)
MCV RBC AUTO: 92.6 FL — SIGNIFICANT CHANGE UP (ref 80–94)
NRBC # BLD: 0 /100 WBCS — SIGNIFICANT CHANGE UP (ref 0–0)
NRBC # BLD: 0 /100 WBCS — SIGNIFICANT CHANGE UP (ref 0–0)
PCO2 BLDA: 47 MMHG — HIGH (ref 38–42)
PCO2 BLDA: 47 MMHG — HIGH (ref 38–42)
PH BLDA: 7.4 — SIGNIFICANT CHANGE UP (ref 7.38–7.42)
PH BLDA: 7.4 — SIGNIFICANT CHANGE UP (ref 7.38–7.42)
PHOSPHATE SERPL-MCNC: 5.9 MG/DL — HIGH (ref 2.1–4.9)
PLATELET # BLD AUTO: 208 K/UL — SIGNIFICANT CHANGE UP (ref 130–400)
PLATELET # BLD AUTO: 220 K/UL — SIGNIFICANT CHANGE UP (ref 130–400)
PO2 BLDA: 85 MMHG — SIGNIFICANT CHANGE UP (ref 78–95)
PO2 BLDA: 85 MMHG — SIGNIFICANT CHANGE UP (ref 78–95)
POTASSIUM SERPL-MCNC: 5.1 MMOL/L — HIGH (ref 3.5–5)
POTASSIUM SERPL-SCNC: 5.1 MMOL/L — HIGH (ref 3.5–5)
PROTHROM AB SERPL-ACNC: 15.6 SEC — HIGH (ref 9.95–12.87)
RBC # BLD: 3.95 M/UL — LOW (ref 4.7–6.1)
RBC # BLD: 4.08 M/UL — LOW (ref 4.7–6.1)
RBC # FLD: 17 % — HIGH (ref 11.5–14.5)
RBC # FLD: 17.3 % — HIGH (ref 11.5–14.5)
SAO2 % BLDA: 97 % — SIGNIFICANT CHANGE UP (ref 94–98)
SAO2 % BLDA: 97 % — SIGNIFICANT CHANGE UP (ref 94–98)
SODIUM SERPL-SCNC: 137 MMOL/L — SIGNIFICANT CHANGE UP (ref 135–146)
TROPONIN T SERPL-MCNC: 0.56 NG/ML — CRITICAL HIGH
WBC # BLD: 6.29 K/UL — SIGNIFICANT CHANGE UP (ref 4.8–10.8)
WBC # BLD: 6.39 K/UL — SIGNIFICANT CHANGE UP (ref 4.8–10.8)
WBC # FLD AUTO: 6.29 K/UL — SIGNIFICANT CHANGE UP (ref 4.8–10.8)
WBC # FLD AUTO: 6.39 K/UL — SIGNIFICANT CHANGE UP (ref 4.8–10.8)

## 2019-05-20 PROCEDURE — 93010 ELECTROCARDIOGRAM REPORT: CPT

## 2019-05-20 PROCEDURE — 60240 REMOVAL OF THYROID: CPT

## 2019-05-20 PROCEDURE — 88307 TISSUE EXAM BY PATHOLOGIST: CPT | Mod: 26

## 2019-05-20 PROCEDURE — 99291 CRITICAL CARE FIRST HOUR: CPT

## 2019-05-20 RX ORDER — DEXTROSE 50 % IN WATER 50 %
25 SYRINGE (ML) INTRAVENOUS ONCE
Refills: 0 | Status: DISCONTINUED | OUTPATIENT
Start: 2019-05-20 | End: 2019-05-21

## 2019-05-20 RX ORDER — ONDANSETRON 8 MG/1
4 TABLET, FILM COATED ORAL ONCE
Refills: 0 | Status: DISCONTINUED | OUTPATIENT
Start: 2019-05-20 | End: 2019-05-20

## 2019-05-20 RX ORDER — ATORVASTATIN CALCIUM 80 MG/1
20 TABLET, FILM COATED ORAL AT BEDTIME
Refills: 0 | Status: DISCONTINUED | OUTPATIENT
Start: 2019-05-20 | End: 2019-05-21

## 2019-05-20 RX ORDER — PANTOPRAZOLE SODIUM 20 MG/1
40 TABLET, DELAYED RELEASE ORAL
Refills: 0 | Status: DISCONTINUED | OUTPATIENT
Start: 2019-05-20 | End: 2019-05-21

## 2019-05-20 RX ORDER — INSULIN LISPRO 100/ML
7 VIAL (ML) SUBCUTANEOUS
Refills: 0 | Status: DISCONTINUED | OUTPATIENT
Start: 2019-05-20 | End: 2019-05-21

## 2019-05-20 RX ORDER — PIOGLITAZONE HYDROCHLORIDE 15 MG/1
30 TABLET ORAL DAILY
Refills: 0 | Status: DISCONTINUED | OUTPATIENT
Start: 2019-05-20 | End: 2019-05-21

## 2019-05-20 RX ORDER — FUROSEMIDE 40 MG
40 TABLET ORAL DAILY
Refills: 0 | Status: DISCONTINUED | OUTPATIENT
Start: 2019-05-20 | End: 2019-05-21

## 2019-05-20 RX ORDER — DEXTROSE 50 % IN WATER 50 %
12.5 SYRINGE (ML) INTRAVENOUS ONCE
Refills: 0 | Status: DISCONTINUED | OUTPATIENT
Start: 2019-05-20 | End: 2019-05-21

## 2019-05-20 RX ORDER — HYDROMORPHONE HYDROCHLORIDE 2 MG/ML
0.5 INJECTION INTRAMUSCULAR; INTRAVENOUS; SUBCUTANEOUS
Refills: 0 | Status: DISCONTINUED | OUTPATIENT
Start: 2019-05-20 | End: 2019-05-20

## 2019-05-20 RX ORDER — SODIUM CHLORIDE 9 MG/ML
1000 INJECTION, SOLUTION INTRAVENOUS
Refills: 0 | Status: DISCONTINUED | OUTPATIENT
Start: 2019-05-20 | End: 2019-05-21

## 2019-05-20 RX ORDER — GLUCAGON INJECTION, SOLUTION 0.5 MG/.1ML
1 INJECTION, SOLUTION SUBCUTANEOUS ONCE
Refills: 0 | Status: DISCONTINUED | OUTPATIENT
Start: 2019-05-20 | End: 2019-05-21

## 2019-05-20 RX ORDER — SODIUM CHLORIDE 9 MG/ML
1000 INJECTION INTRAMUSCULAR; INTRAVENOUS; SUBCUTANEOUS
Refills: 0 | Status: DISCONTINUED | OUTPATIENT
Start: 2019-05-20 | End: 2019-05-20

## 2019-05-20 RX ORDER — INSULIN GLARGINE 100 [IU]/ML
15 INJECTION, SOLUTION SUBCUTANEOUS AT BEDTIME
Refills: 0 | Status: DISCONTINUED | OUTPATIENT
Start: 2019-05-20 | End: 2019-05-21

## 2019-05-20 RX ORDER — INSULIN GLARGINE 100 [IU]/ML
15 INJECTION, SOLUTION SUBCUTANEOUS AT BEDTIME
Refills: 0 | Status: DISCONTINUED | OUTPATIENT
Start: 2019-05-20 | End: 2019-05-20

## 2019-05-20 RX ORDER — METOPROLOL TARTRATE 50 MG
25 TABLET ORAL DAILY
Refills: 0 | Status: DISCONTINUED | OUTPATIENT
Start: 2019-05-20 | End: 2019-05-21

## 2019-05-20 RX ORDER — INSULIN LISPRO 100/ML
VIAL (ML) SUBCUTANEOUS
Refills: 0 | Status: DISCONTINUED | OUTPATIENT
Start: 2019-05-20 | End: 2019-05-21

## 2019-05-20 RX ORDER — HYDROMORPHONE HYDROCHLORIDE 2 MG/ML
1 INJECTION INTRAMUSCULAR; INTRAVENOUS; SUBCUTANEOUS
Refills: 0 | Status: DISCONTINUED | OUTPATIENT
Start: 2019-05-20 | End: 2019-05-20

## 2019-05-20 RX ORDER — DEXTROSE 50 % IN WATER 50 %
15 SYRINGE (ML) INTRAVENOUS ONCE
Refills: 0 | Status: DISCONTINUED | OUTPATIENT
Start: 2019-05-20 | End: 2019-05-21

## 2019-05-20 RX ORDER — SEVELAMER CARBONATE 2400 MG/1
800 POWDER, FOR SUSPENSION ORAL
Refills: 0 | Status: DISCONTINUED | OUTPATIENT
Start: 2019-05-20 | End: 2019-05-21

## 2019-05-20 RX ADMIN — INSULIN GLARGINE 15 UNIT(S): 100 INJECTION, SOLUTION SUBCUTANEOUS at 21:48

## 2019-05-20 RX ADMIN — Medication 2 MILLIGRAM(S): at 14:00

## 2019-05-20 NOTE — PROGRESS NOTE ADULT - ASSESSMENT
P) Nephrology notified Dr Sanchez, plans for HD tomorrow   Monitor PTH levels   Monitor Calcium levels   Monitor for signs of hypocalcemia   Monitor hemovac output  Monitor incision site   Management as per SICU team and Nephrology team       D/W attending

## 2019-05-20 NOTE — PRE-ANESTHESIA EVALUATION ADULT - NSANTHPEFT_GEN_ALL_CORE
Pale, ill-appearing WM in NAD.  Somewhat lethargic.  Oriented X 3.  Appears older than stated age.  Chest:  BLBS, ronchi present  Cor:  Reg. S1S2 no murmur

## 2019-05-20 NOTE — CONSULT NOTE ADULT - SUBJECTIVE AND OBJECTIVE BOX
[facility] INITIAL CONSULT NOTE  --------------------------------------------------------------------------------  HPI:  Mr Maza is a 48 YO male with past medical history of end stage renal disease secondary to diabetic nephropathy. He is maintained on hemodialysis on MWF at [facility] under the care of Dr Madison. His HD access is a LUE AVF. Last HD treatment was Saturday (missed Friday due to doctor's appointment). He underwent a total parathyroidectomy today for thyroid cancer. Patient evaluated in the PACU with family at bedside. Complained of mild SOB.     PAST HISTORY  --------------------------------------------------------------------------------  PAST MEDICAL & SURGICAL HISTORY:  Peripheral artery disease: multiple toe amputations (amp 4 1/2 left toes; has 1/2 left mid toe; amp right mid toe)  Anemia: chronic anemia - s/p transfusion 2018  Thyroid cancer  Chronic leg pain  OA (osteoarthritis)  SOB (shortness of breath) on exertion  CHF  ESRD (end stage renal disease): 2 yrs  Atrial fibrillation: on warfarin  Right sided weakness  Stroke: 8 yrs ago  Hypertension  High blood cholesterol  Diabetes mellitus, type 2  Smoker  H/O gastroesophageal reflux (GERD)  History of tonsillectomy  A-V fistula: left AVF    FAMILY HISTORY:  FH: atrial fibrillation: mother  FH: heart disease: Mother    SOCIAL HISTORY:  Smoker    ALLERGIES & MEDICATIONS  --------------------------------------------------------------------------------  Allergies    Orange (Other)  Originally Entered as [HIVES] reaction to [sulfur] (Unknown)  penicillin (Unknown)  sulfADIAZINE (Unknown)    Standing Inpatient Medications  atorvastatin 20 milliGRAM(s) Oral at bedtime  dextrose 5%. 1000 milliLiter(s) IV Continuous <Continuous>  dextrose 50% Injectable 12.5 Gram(s) IV Push once  dextrose 50% Injectable 25 Gram(s) IV Push once  dextrose 50% Injectable 25 Gram(s) IV Push once  furosemide    Tablet 40 milliGRAM(s) Oral daily  insulin glargine Injectable (LANTUS) 15 Unit(s) SubCutaneous at bedtime  insulin lispro (HumaLOG) corrective regimen sliding scale   SubCutaneous three times a day before meals  insulin lispro Injectable (HumaLOG) 7 Unit(s) SubCutaneous three times a day before meals  LORazepam   Injectable 2 milliGRAM(s) IV Push once  metoprolol succinate ER 25 milliGRAM(s) Oral daily  pantoprazole    Tablet 40 milliGRAM(s) Oral before breakfast  pioglitazone 30 milliGRAM(s) Oral daily  sevelamer carbonate 800 milliGRAM(s) Oral three times a day with meals  sodium chloride 0.9%. 1000 milliLiter(s) IV Continuous <Continuous>    PRN Inpatient Medications  dextrose 40% Gel 15 Gram(s) Oral once PRN  glucagon  Injectable 1 milliGRAM(s) IntraMuscular once PRN  HYDROmorphone  Injectable 0.5 milliGRAM(s) IV Push every 10 minutes PRN  HYDROmorphone  Injectable 1 milliGRAM(s) IV Push every 10 minutes PRN  ondansetron Injectable 4 milliGRAM(s) IV Push once PRN    REVIEW OF SYSTEMS  --------------------------------------------------------------------------------  Gen: No fevers/chills  Skin: No rashes  Head/Eyes/Ears/Mouth: No headache; No sinus pain/discomfort, sore throat  Respiratory: No dyspnea, cough, wheezing, hemoptysis  CV: No chest pain, PND, orthopnea  GI: No abdominal pain, diarrhea, constipation, nausea, vomiting, melena, hematochezia  : No increased frequency, dysuria, hematuria, nocturia  All other systems were reviewed and are negative, except as noted.    VITALS/PHYSICAL EXAM  --------------------------------------------------------------------------------  T(C): 36.4 (05-20-19 @ 13:54), Max: 36.6 (05-20-19 @ 08:27)  HR: 102 (05-20-19 @ 14:37) (102 - 105)  BP: 136/75 (05-20-19 @ 14:37) (123/70 - 150/81)  RR: 20 (05-20-19 @ 14:37) (18 - 28)  SpO2: 99% (05-20-19 @ 14:37) (92% - 100%)  Weight (kg): 99.8 (05-20-19 @ 10:15)    Physical Exam:  	Gen: NAD  	HEENT: Supple neck, clear oropharynx  	Pulm: CTA B/L  	CV: RRR, S1S2  	Back: No CVA tenderness; no sacral edema  	Abd: +BS, soft, nontender/nondistended  	: No suprapubic tenderness  	LE: Warm, edema  	Vascular access: LUE AVF with good thrill/bruit    LABS/STUDIES  --------------------------------------------------------------------------------  Creatinine Trend:  SCr 4.6 [05-06 @ 18:24]    Urinalysis - [05-06-19 @ 18:24]      Color Yellow / Appearance Cloudy / SG 1.020 / pH 7.0      Gluc Negative / Ketone Trace  / Bili Small / Urobili 1.0       Blood Large / Protein >=300 / Leuk Est Small / Nitrite Negative      RBC >50 / WBC 6-10 / Hyaline  / Gran  / Sq Epi  / Non Sq Epi  / Bacteria Few    HbA1c 6.1      [03-14-18 @ 05:41]

## 2019-05-20 NOTE — PRE-ANESTHESIA EVALUATION ADULT - NSANTHADDINFOFT_GEN_ALL_CORE
59 y/o WM evaluated for total thyroidectomy.  ASA 4.  Plan GA.  Plan, benefits, foreseeable risks, viable alternatives discussed with patient and all his pertinent questions answered and he understands and elects to proceed.

## 2019-05-20 NOTE — CONSULT NOTE ADULT - ASSESSMENT
Assessment and Plan:  58y Male  s/p     NEURO:   Pain-controlled with     RESP:   RR: 20 (05-20) (18 - 28)  SpO2: 99% (05-20) (92% - 100%)      CARDS:   HR: 102 (05-20) (102 - 105)  BP: 136/75 (05-20) (123/70 - 150/81)  BP(mean): --  -      GI/NUTRITION:   Diet-    /RENAL:   -Cr     HEME/ONC:   -H/H     ID:   -Afebrile T(C): 36.4 (05-20 @ 13:54), Max: 36.6 (05-20 @ 08:27)  -       ENDO:  -Glu 144 mg/dL (05-20 @ 08:14)      LINES/DRAINS: Myla RODRIGUEZ Foley     DISPO: SICU Assessment and Plan:  58y Male s/p total thyroidectomy    NEURO:   Pain-controlled with     RESP:   RR: 20 (05-20) (18 - 28)  SpO2: 99% (05-20) (92% - 100%)      CARDS:   HR: 102 (05-20) (102 - 105)  BP: 136/75 (05-20) (123/70 - 150/81)  BP(mean): --  -      GI/NUTRITION:   Diet-    /RENAL:   -Cr     HEME/ONC:   -H/H     ID:   -Afebrile T(C): 36.4 (05-20 @ 13:54), Max: 36.6 (05-20 @ 08:27)  -       ENDO:  -Glu 144 mg/dL (05-20 @ 08:14)      LINES/DRAINS: Myla RODRIGUEZ Foley     DISPO: SICU Assessment and Plan:  58y Male s/p total thyroidectomy    NEURO:   Pain-controlled with Tylenol.  Patient agitated in PACU, given Ativan 2mg by primary team, no lethargic but arousable    RESP:   NC @ 2L, currently saturating at 98%  ABG -> r/o hypoxia or hypercapnia as cause of post-op agitation  RR: 20 (05-20) (18 - 28)  SpO2: 99% (05-20) (92% - 100%)      CARDS:   EKG shows tachycardia at 103, incomplete RBBB  Cardiac enzymes pending, patient denies chest pain  HR: 102 (05-20) (102 - 105)  BP: 136/75 (05-20) (123/70 - 150/81)    GI/NUTRITION:   Diet - NPO    /RENAL:   ESRD (HD MWF, last dialysis Saturday) - nephrologist (Laura) recommends dialysis post-op  BMP, Mg, Phos    HEME/ONC:   CBC  Wound vac to anterior neck surgical site, bloody drainage, monitor output    ID:   No active disease  -Afebrile T(C): 36.4 (05-20 @ 13:54), Max: 36.6 (05-20 @ 08:27)    ENDO:  s/p complete thyroidectomy for thyroid cancer  Ca and PTH s/p dialysis as per primary team  -Glu 144 mg/dL (05-20 @ 08:14)    LINES/DRAINS: Myla RODRIGUEZ    DISPO: SICU Assessment and Plan:  58y Male s/p total thyroidectomy    NEURO:   Pain-controlled with Tylenol.  Patient agitated in PACU, given Ativan 2mg by primary team, no lethargic but arousable    RESP:   NC @ 2L, currently saturating at 98%  ABG -> r/o hypoxia or hypercapnia as cause of post-op agitation  RR: 20 (05-20) (18 - 28)  SpO2: 99% (05-20) (92% - 100%)      CARDS:   EKG shows tachycardia at 103, incomplete RBBB  Cardiac enzymes pending, patient denies chest pain  HR: 102 (05-20) (102 - 105)  BP: 136/75 (05-20) (123/70 - 150/81)    GI/NUTRITION:   Diet - NPO    /RENAL:   ESRD (HD MWF, last dialysis Saturday) - nephrologist (Laura) recommends dialysis post-op  BMP, Mg, Phos    HEME/ONC:   CBC  Wound vac to anterior neck surgical site, bloody drainage, monitor output    ID:   No active disease  -Afebrile T(C): 36.4 (05-20 @ 13:54), Max: 36.6 (05-20 @ 08:27)    ENDO:  S/p complete thyroidectomy for thyroid cancer  Ca q6 hours and PTH s/p dialysis as per primary team  -Glu 144 mg/dL (05-20 @ 08:14)    LINES/DRAINS: Myla RODRIGUEZ    DISPO: SICU Assessment and Plan:  58y Male s/p total thyroidectomy    NEURO:   Pain-controlled with Tylenol.  Patient agitated in PACU, given Ativan 2mg by primary team, no lethargic but arousable    RESP:   NC @ 2L, currently saturating at 98%  ABG -> r/o hypoxia or hypercapnia as cause of post-op agitation  RR: 20 (05-20) (18 - 28)  SpO2: 99% (05-20) (92% - 100%)      CARDS:   EKG shows tachycardia at 103, incomplete RBBB  Cardiac enzymes pending, patient denies chest pain  HR: 102 (05-20) (102 - 105)  BP: 136/75 (05-20) (123/70 - 150/81)    GI/NUTRITION:   Diet - NPO    /RENAL:   ESRD (HD MWF, last dialysis Saturday) - nephrologist (Laura) recommends dialysis post-op  BMP, Mg, Phos    HEME/ONC:   CBC  Wound vac to anterior neck surgical site, bloody drainage, monitor output    ID:   No active disease  -Afebrile T(C): 36.4 (05-20 @ 13:54), Max: 36.6 (05-20 @ 08:27)    ENDO:  S/p complete thyroidectomy for thyroid cancer  Ca q6 hours and PTH s/p dialysis as per primary team  DM -> ISS  -Glu 144 mg/dL (05-20 @ 08:14)    LINES/DRAINS: MICHAEL, [place]    DISPO: SICU

## 2019-05-20 NOTE — CONSULT NOTE ADULT - CONSULT REASON
S/p total thyroidectomy, concern for HD monitoring S/p total thyroidectomy, concern for HD monitoring, electrolyte monitoring and emergent dialysis

## 2019-05-20 NOTE — CONSULT NOTE ADULT - ATTENDING COMMENTS
patient was agitated post op and required ativan- now somnolent, post op labs no severe electrolyte abnormalities will postpone HD today since patient's neurological exam might worsen and patient should not be sent to HD unit out of icu.   Assessment and plan above were modified and discussed with residents, physician assistants, and nurses.  I have provided 35  min of Critical Care to this patient.

## 2019-05-20 NOTE — CONSULT NOTE ADULT - ASSESSMENT
1. S/P thyroidectomy. POD #0. Check iPTH and calcium.   2. ESRD on HD MWF. HD today: 3 hours, opti 160 dialyzer, 2K bath, 4L UF.  3. HTN. Continue metoprolol. Increase Lasix to 80mg PO BID.  4. Hyperphosphatemia. Increase sevelamer to 2400mg with meals. Check phos level. Renal diet.  5. Anemia. Adequate Hgb, no need for RAINA.

## 2019-05-20 NOTE — PROGRESS NOTE ADULT - SUBJECTIVE AND OBJECTIVE BOX
Post Op Note     58y Male s/p total thyroidectomy. Admitted to SICU for hemodynamic monitoring and for monitoring calcium and PTH levels. Patient seen and examined at bedside. No apparent distress. ENT was called to bedside 2/2 to blood drainage around Hemovac site. Hemovac is draining appropriately no clots within the hemovac/tubing. Sutures remained in place. Site was cleaned and 4x4 gauze and tegaderm were placed at site. Patient denies any paraesthesias. Patient is currently hemodynamically stable. Management as per the SICU team.     Vitals:  T(C): 36.7 (05-20-19 @ 16:12), Max: 36.7 (05-20-19 @ 16:12)  HR: 102 (05-20-19 @ 17:00) (102 - 105)  BP: 123/66 (05-20-19 @ 17:00) (123/66 - 150/81)  RR: 20 (05-20-19 @ 17:00) (17 - 28)  SpO2: 96% (05-20-19 @ 17:00) (92% - 100%)    Labs:  CAPILLARY BLOOD GLUCOSE  POCT Blood Glucose.: 172 mg/dL (20 May 2019 16:43)  POCT Blood Glucose.: 144 mg/dL (20 May 2019 08:14)                          12.2   6.29  )-----------( 220      ( 20 May 2019 15:05 )             37.8         05-20    137  |  92<L>  |  46<H>  ----------------------------<  181<H>  5.1<H>   |  27  |  6.2<HH>      Calcium, Total Serum: 9.2 mg/dL (05-20-19 @ 15:05)      LFTs:     Blood Gas Arterial, Lactate: 0.8 mmoL/L (05-20-19 @ 17:51)  Blood Gas Arterial, Lactate: 0.8 mmoL/L (05-20-19 @ 16:24)    ABG - ( 20 May 2019 17:51 )  pH: 7.40  /  pCO2: 47    /  pO2: 85    / HCO3: 29    / Base Excess: 3.3   /  SaO2: 97              ABG - ( 20 May 2019 16:24 )  pH: 7.40  /  pCO2: 47    /  pO2: 85    / HCO3: 29    / Base Excess: 3.3   /  SaO2: 97                Coags:     15.60  ----< 1.36    ( 20 May 2019 15:05 )     34.3        CARDIAC MARKERS ( 20 May 2019 15:05 )  x     / 0.56 ng/mL / 348 U/L / x     / 8.8 ng/mL                      Physical Exam:  Neuro: NAD   Head: NC/AT. EOM intact   Ears: No deformities noted. No tenderness to palpation at pre/post auricle or tragal site. BL TM intact, pearly gray. No hearing loss noted. No discharge, erythema noted.   Nose: Pink mucosa, BL nares patent. No discharge, erythema noted BL.   Throat: Pink mucosa, good dentition. Uvula and tongue midline. No tenderness noted at the floor of the mouth.   Neck: Incision c/d/i. Hemovac suture in place. Hemovac draining appropriately. Trachea midline. No lymphadenopathy.

## 2019-05-21 ENCOUNTER — TRANSCRIPTION ENCOUNTER (OUTPATIENT)
Age: 59
End: 2019-05-21

## 2019-05-21 VITALS
RESPIRATION RATE: 24 BRPM | DIASTOLIC BLOOD PRESSURE: 43 MMHG | OXYGEN SATURATION: 92 % | TEMPERATURE: 98 F | HEART RATE: 90 BPM | SYSTOLIC BLOOD PRESSURE: 99 MMHG

## 2019-05-21 PROBLEM — C73 MALIGNANT NEOPLASM OF THYROID GLAND: Chronic | Status: ACTIVE | Noted: 2019-05-06

## 2019-05-21 PROBLEM — M79.606 PAIN IN LEG, UNSPECIFIED: Chronic | Status: ACTIVE | Noted: 2019-05-06

## 2019-05-21 PROBLEM — R06.02 SHORTNESS OF BREATH: Chronic | Status: ACTIVE | Noted: 2019-05-06

## 2019-05-21 PROBLEM — M19.90 UNSPECIFIED OSTEOARTHRITIS, UNSPECIFIED SITE: Chronic | Status: ACTIVE | Noted: 2019-05-06

## 2019-05-21 LAB
ALBUMIN SERPL ELPH-MCNC: 3.8 G/DL — SIGNIFICANT CHANGE UP (ref 3.5–5.2)
ALP SERPL-CCNC: 90 U/L — SIGNIFICANT CHANGE UP (ref 30–115)
ALT FLD-CCNC: 10 U/L — SIGNIFICANT CHANGE UP (ref 0–41)
ANION GAP SERPL CALC-SCNC: 17 MMOL/L — HIGH (ref 7–14)
ANION GAP SERPL CALC-SCNC: 18 MMOL/L — HIGH (ref 7–14)
ANION GAP SERPL CALC-SCNC: 19 MMOL/L — HIGH (ref 7–14)
AST SERPL-CCNC: 18 U/L — SIGNIFICANT CHANGE UP (ref 0–41)
BILIRUB SERPL-MCNC: 1.2 MG/DL — SIGNIFICANT CHANGE UP (ref 0.2–1.2)
BUN SERPL-MCNC: 53 MG/DL — HIGH (ref 10–20)
BUN SERPL-MCNC: 56 MG/DL — HIGH (ref 10–20)
BUN SERPL-MCNC: 61 MG/DL — CRITICAL HIGH (ref 10–20)
CA-I BLD-SCNC: 1.09 MMOL/L — LOW (ref 1.12–1.3)
CALCIUM SERPL-MCNC: 8.9 MG/DL — SIGNIFICANT CHANGE UP (ref 8.5–10.1)
CALCIUM SERPL-MCNC: 9 MG/DL — SIGNIFICANT CHANGE UP (ref 8.4–10.5)
CALCIUM SERPL-MCNC: 9 MG/DL — SIGNIFICANT CHANGE UP (ref 8.5–10.1)
CALCIUM SERPL-MCNC: 9.1 MG/DL — SIGNIFICANT CHANGE UP (ref 8.5–10.1)
CALCIUM SERPL-MCNC: 9.1 MG/DL — SIGNIFICANT CHANGE UP (ref 8.5–10.1)
CHLORIDE SERPL-SCNC: 92 MMOL/L — LOW (ref 98–110)
CHLORIDE SERPL-SCNC: 92 MMOL/L — LOW (ref 98–110)
CHLORIDE SERPL-SCNC: 94 MMOL/L — LOW (ref 98–110)
CO2 SERPL-SCNC: 26 MMOL/L — SIGNIFICANT CHANGE UP (ref 17–32)
CO2 SERPL-SCNC: 26 MMOL/L — SIGNIFICANT CHANGE UP (ref 17–32)
CO2 SERPL-SCNC: 27 MMOL/L — SIGNIFICANT CHANGE UP (ref 17–32)
CREAT SERPL-MCNC: 7.3 MG/DL — CRITICAL HIGH (ref 0.7–1.5)
CREAT SERPL-MCNC: 7.6 MG/DL — CRITICAL HIGH (ref 0.7–1.5)
CREAT SERPL-MCNC: 7.7 MG/DL — CRITICAL HIGH (ref 0.7–1.5)
ESTIMATED AVERAGE GLUCOSE: 157 MG/DL — HIGH (ref 68–114)
GLUCOSE BLDC GLUCOMTR-MCNC: 152 MG/DL — HIGH (ref 70–99)
GLUCOSE BLDC GLUCOMTR-MCNC: 154 MG/DL — HIGH (ref 70–99)
GLUCOSE SERPL-MCNC: 185 MG/DL — HIGH (ref 70–99)
GLUCOSE SERPL-MCNC: 188 MG/DL — HIGH (ref 70–99)
GLUCOSE SERPL-MCNC: 205 MG/DL — HIGH (ref 70–99)
HBA1C BLD-MCNC: 7.1 % — HIGH (ref 4–5.6)
HCT VFR BLD CALC: 35.9 % — LOW (ref 42–52)
HGB BLD-MCNC: 11.7 G/DL — LOW (ref 14–18)
MAGNESIUM SERPL-MCNC: 2.2 MG/DL — SIGNIFICANT CHANGE UP (ref 1.8–2.4)
MAGNESIUM SERPL-MCNC: 2.3 MG/DL — SIGNIFICANT CHANGE UP (ref 1.8–2.4)
MCHC RBC-ENTMCNC: 29.8 PG — SIGNIFICANT CHANGE UP (ref 27–31)
MCHC RBC-ENTMCNC: 32.6 G/DL — SIGNIFICANT CHANGE UP (ref 32–37)
MCV RBC AUTO: 91.6 FL — SIGNIFICANT CHANGE UP (ref 80–94)
NRBC # BLD: 0 /100 WBCS — SIGNIFICANT CHANGE UP (ref 0–0)
PHOSPHATE SERPL-MCNC: 6.7 MG/DL — HIGH (ref 2.1–4.9)
PHOSPHATE SERPL-MCNC: 7.1 MG/DL — HIGH (ref 2.1–4.9)
PLATELET # BLD AUTO: 215 K/UL — SIGNIFICANT CHANGE UP (ref 130–400)
POTASSIUM SERPL-MCNC: 5.4 MMOL/L — HIGH (ref 3.5–5)
POTASSIUM SERPL-MCNC: 5.6 MMOL/L — HIGH (ref 3.5–5)
POTASSIUM SERPL-MCNC: 5.6 MMOL/L — HIGH (ref 3.5–5)
POTASSIUM SERPL-SCNC: 5.4 MMOL/L — HIGH (ref 3.5–5)
POTASSIUM SERPL-SCNC: 5.6 MMOL/L — HIGH (ref 3.5–5)
POTASSIUM SERPL-SCNC: 5.6 MMOL/L — HIGH (ref 3.5–5)
PROT SERPL-MCNC: 6.8 G/DL — SIGNIFICANT CHANGE UP (ref 6–8)
PTH-INTACT FLD-MCNC: 98 PG/ML — HIGH (ref 15–65)
PTH-INTACT IO % DIF SERPL: 120 PG/ML — HIGH (ref 19–73)
RBC # BLD: 3.92 M/UL — LOW (ref 4.7–6.1)
RBC # FLD: 17 % — HIGH (ref 11.5–14.5)
SODIUM SERPL-SCNC: 136 MMOL/L — SIGNIFICANT CHANGE UP (ref 135–146)
SODIUM SERPL-SCNC: 137 MMOL/L — SIGNIFICANT CHANGE UP (ref 135–146)
SODIUM SERPL-SCNC: 138 MMOL/L — SIGNIFICANT CHANGE UP (ref 135–146)
TROPONIN T SERPL-MCNC: 0.52 NG/ML — CRITICAL HIGH
TROPONIN T SERPL-MCNC: 0.56 NG/ML — CRITICAL HIGH
WBC # BLD: 7.04 K/UL — SIGNIFICANT CHANGE UP (ref 4.8–10.8)
WBC # FLD AUTO: 7.04 K/UL — SIGNIFICANT CHANGE UP (ref 4.8–10.8)

## 2019-05-21 PROCEDURE — 99233 SBSQ HOSP IP/OBS HIGH 50: CPT

## 2019-05-21 PROCEDURE — 71045 X-RAY EXAM CHEST 1 VIEW: CPT | Mod: 26

## 2019-05-21 PROCEDURE — 93010 ELECTROCARDIOGRAM REPORT: CPT

## 2019-05-21 RX ORDER — LEVOTHYROXINE SODIUM 125 MCG
1 TABLET ORAL
Qty: 30 | Refills: 0
Start: 2019-05-21 | End: 2019-06-19

## 2019-05-21 RX ORDER — HEPARIN SODIUM 5000 [USP'U]/ML
5000 INJECTION INTRAVENOUS; SUBCUTANEOUS EVERY 8 HOURS
Refills: 0 | Status: DISCONTINUED | OUTPATIENT
Start: 2019-05-21 | End: 2019-05-21

## 2019-05-21 RX ORDER — WARFARIN SODIUM 2.5 MG/1
2 TABLET ORAL
Qty: 0 | Refills: 0 | DISCHARGE

## 2019-05-21 RX ADMIN — Medication 7 UNIT(S): at 08:42

## 2019-05-21 RX ADMIN — Medication 40 MILLIGRAM(S): at 06:01

## 2019-05-21 RX ADMIN — Medication 25 MILLIGRAM(S): at 06:01

## 2019-05-21 RX ADMIN — PIOGLITAZONE HYDROCHLORIDE 30 MILLIGRAM(S): 15 TABLET ORAL at 12:36

## 2019-05-21 RX ADMIN — Medication 2: at 08:42

## 2019-05-21 RX ADMIN — Medication 7 UNIT(S): at 12:33

## 2019-05-21 RX ADMIN — Medication 7 UNIT(S): at 12:34

## 2019-05-21 RX ADMIN — SEVELAMER CARBONATE 800 MILLIGRAM(S): 2400 POWDER, FOR SUSPENSION ORAL at 10:49

## 2019-05-21 RX ADMIN — PANTOPRAZOLE SODIUM 40 MILLIGRAM(S): 20 TABLET, DELAYED RELEASE ORAL at 06:01

## 2019-05-21 RX ADMIN — Medication 2: at 12:34

## 2019-05-21 RX ADMIN — SEVELAMER CARBONATE 800 MILLIGRAM(S): 2400 POWDER, FOR SUSPENSION ORAL at 12:36

## 2019-05-21 NOTE — PROGRESS NOTE ADULT - SUBJECTIVE AND OBJECTIVE BOX
[facility] FOLLOW UP NOTE  --------------------------------------------------------------------------------  24 hour events/subjective: Patient examined during HD. Appears comfortable.    PAST HISTORY  --------------------------------------------------------------------------------  No significant changes to PMH, PSH, FHx, SHx, unless otherwise noted    ALLERGIES & MEDICATIONS  --------------------------------------------------------------------------------  Allergies    Orange (Other)  Originally Entered as [HIVES] reaction to [sulfur] (Unknown)  penicillin (Unknown)  sulfADIAZINE (Unknown)    Standing Inpatient Medications  atorvastatin 20 milliGRAM(s) Oral at bedtime  dextrose 5%. 1000 milliLiter(s) IV Continuous <Continuous>  dextrose 50% Injectable 12.5 Gram(s) IV Push once  dextrose 50% Injectable 25 Gram(s) IV Push once  dextrose 50% Injectable 25 Gram(s) IV Push once  furosemide    Tablet 40 milliGRAM(s) Oral daily  heparin  Injectable 5000 Unit(s) SubCutaneous every 8 hours  insulin glargine Injectable (LANTUS) 15 Unit(s) SubCutaneous at bedtime  insulin lispro (HumaLOG) corrective regimen sliding scale   SubCutaneous three times a day before meals  insulin lispro Injectable (HumaLOG) 7 Unit(s) SubCutaneous three times a day before meals  metoprolol succinate ER 25 milliGRAM(s) Oral daily  pantoprazole    Tablet 40 milliGRAM(s) Oral before breakfast  pioglitazone 30 milliGRAM(s) Oral daily  sevelamer carbonate 800 milliGRAM(s) Oral three times a day with meals    PRN Inpatient Medications  dextrose 40% Gel 15 Gram(s) Oral once PRN  glucagon  Injectable 1 milliGRAM(s) IntraMuscular once PRN    VITALS/PHYSICAL EXAM  --------------------------------------------------------------------------------  T(C): 37 (05-21-19 @ 08:00), Max: 37.1 (05-21-19 @ 04:00)  HR: 106 (05-21-19 @ 09:00) (88 - 106)  BP: 123/66 (05-20-19 @ 17:00) (123/66 - 146/74)  RR: 20 (05-21-19 @ 09:00) (16 - 37)  SpO2: 98% (05-21-19 @ 09:00) (92% - 100%)  Weight (kg): 99.5 (05-20-19 @ 18:00)    05-20-19 @ 07:01  -  05-21-19 @ 07:00  --------------------------------------------------------  IN: 200 mL / OUT: 85 mL / NET: 115 mL    Physical Exam:  	Gen: NAD  	Pulm: CTA B/L  	CV: RRR, S1S2  	Abd: +BS, soft, nontender/nondistended  	: No suprapubic tenderness  	LE: Warm, no edema  	Vascular access: AVF    LABS/STUDIES  --------------------------------------------------------------------------------              11.7   7.04  >-----------<  215      [05-21-19 @ 04:30]              35.9     137  |  92  |  61  ----------------------------<  185      [05-21-19 @ 11:00]  5.4   |  26  |  7.7        Ca     9.1     [05-21-19 @ 11:00]      iCa    1.09     [05-20 @ 19:50]      Mg     2.3     [05-21-19 @ 04:30]      Phos  7.1     [05-21-19 @ 04:30]    TPro  6.8  /  Alb  3.8  /  TBili  1.2  /  DBili  x   /  AST  18  /  ALT  10  /  AlkPhos  90  [05-21-19 @ 11:00]    PT/INR: PT 15.60, INR 1.36       [05-20-19 @ 15:05]  PTT: 34.3       [05-20-19 @ 15:05]    Troponin 0.56      [05-21-19 @ 04:30]        [05-20-19 @ 15:05]    Creatinine Trend:  SCr 7.7 [05-21 @ 11:00]  SCr 7.6 [05-21 @ 04:30]  SCr 7.3 [05-20 @ 23:30]  SCr 6.2 [05-20 @ 15:05]  SCr 4.6 [05-06 @ 18:24]    Urinalysis - [05-06-19 @ 18:24]      Color Yellow / Appearance Cloudy / SG 1.020 / pH 7.0      Gluc Negative / Ketone Trace  / Bili Small / Urobili 1.0       Blood Large / Protein >=300 / Leuk Est Small / Nitrite Negative      RBC >50 / WBC 6-10 / Hyaline  / Gran  / Sq Epi  / Non Sq Epi  / Bacteria Few    HbA1c 7.1      [05-21-19 @ 04:30]

## 2019-05-21 NOTE — DISCHARGE NOTE PROVIDER - NSDCCPTREATMENT_GEN_ALL_CORE_FT
PRINCIPAL PROCEDURE  Procedure: Total thyroidectomy  Findings and Treatment: Follow up with Dr Culver in one week

## 2019-05-21 NOTE — DISCHARGE NOTE PROVIDER - NSDCFUADDINST_GEN_ALL_CORE_FT
May bathe, may shower on Wednesday. Have water hit you in the back of your head, pat incision dry - do not rub. Do not take steri strips off. May resume Coumadin on Wednesday. Return to ED or call Dr Culver if develop fever> 101.4, difficulty breathing or increased swelling in your neck.

## 2019-05-21 NOTE — DISCHARGE NOTE NURSING/CASE MANAGEMENT/SOCIAL WORK - NSDCDPATPORTLINK_GEN_ALL_CORE
You can access the [email or web address] Portal, offered by [facility], by registering with the following website: [email or web address]

## 2019-05-21 NOTE — DISCHARGE NOTE PROVIDER - CARE PROVIDER_API CALL
Corbin Culver)  Otolaryngology  [address], [address] [place] [address]  Phone: [phone]  Fax: [phone]  Follow Up Time:     Jonatan Anaya)  EndocrinologyMetabDiabetes; Internal Medicine  [address] [place] [address]  Phone: [phone]  Fax: [phone]  Follow Up Time:     Debbie Sanchez)  Internal Medicine; Nephrology  [address] [place] [address]  Phone: [phone]  Fax: [phone]  Follow Up Time:

## 2019-05-21 NOTE — PROGRESS NOTE ADULT - ASSESSMENT
58 y.o male s/p total thyroidectomy POD #1, doing well.    ·	will redraw PTH to be done inhouse  ·	renal following - HD today, will discuss role of replacement Ca after PTH is back  ·	pain control  ·	will remove hemovac prior to D/C  ·	possible D/C home today if stable post HD.  ·	w/d with attng

## 2019-05-21 NOTE — PROGRESS NOTE ADULT - SUBJECTIVE AND OBJECTIVE BOX
Pt is a 58 y.o male s/p total thyroidectomy, POD #1, seen and examined at bedside. PT doing well this AM, no complaints. PT denies any numbness/tingling to hands/feet/mouth. PT tolerating PO breakfast this AM. No SOB/diff breathing.    Vital Signs: T(F): 98.6 (21 May 2019 08:00), Max: 98.8 (21 May 2019 04:00), HR: 106, BP: 123/66, RR: 20, SpO2: 98%  GEN: NAD, awake and alert. no drooling or pooling of secretions. good vocal quality, no increased work of respiratory effort  HEENT: + incision C/D/I, + steri strips with bloody drainage. area around incision soft, no hematoma palpated. no TTP. no chovstek B/L    Basic Metabolic Panel in AM (05.21.19 @ 04:30)    Calcium, Total Serum: 9.0 mg/dL

## 2019-05-21 NOTE — DISCHARGE NOTE PROVIDER - HOSPITAL COURSE
Pt is a 58 y.o male s/p total thyroidectomy for possible thyroid cancer. Pt was evaluated and observed in the ICU post op due to his renal issues (ESRD on HD). Pt did well overnight, was dialyzed on Tuesday. PT remained stable and was discharged from the ICU (downgrade) POD #1. Calcium remained stable, did not require calcium replacement.

## 2019-05-21 NOTE — PROGRESS NOTE ADULT - ASSESSMENT
1. S/P thyroidectomy. POD #1. Calcium normal/stable.   2. ESRD on HD MWF. HD today: 3 hours, opti 160 dialyzer, 2K bath, 3L UF.  3. HTN. Continue metoprolol.   4. Hyperphosphatemia. Sevelamer with meals. Renal diet.  5. Anemia. Adequate Hgb, no need for RAINA.

## 2019-05-21 NOTE — PROGRESS NOTE ADULT - ASSESSMENT
Assessment and Plan:  58y Male s/p total thyroidectomy    NEURO:   Pain-controlled with Tylenol.  Patient awas gitated in PACU, given Ativan 2mg by primary team, no lethargic but arousable  - holding ativan    RESP:   NC @ 2L, currently saturating at 98%        CARDS:   EKG shows tachycardia at 103, incomplete RBBB  Troponin 0.56 (baseline 0.80.55)  HLD: c/w atorvastatin  AFIB - holding coumadin      GI/NUTRITION:   Diet - NPO    /RENAL:   ESRD (HD MWF, last dialysis Saturday) - nephrologist (Laura) recommends dialysis post-op  BMP, Mg, Phos  Consider increasing lasix to 80 as per neprhology.     HEME/ONC:   CBC  Wound vac to anterior neck surgical site, bloody drainage, monitor output    ID:   No active disease  -Afebrille    ENDO:  S/p complete thyroidectomy for thyroid cancer  Ca q6 hours and PTH s/p dialysis as per primary team (PTH=5.9)  DM -> ISS      LINES/DRAINS: Myla RODRIGUEZ    DISPO: SICU

## 2019-05-21 NOTE — DISCHARGE NOTE PROVIDER - NSDCCPCAREPLAN_GEN_ALL_CORE_FT
PRINCIPAL DISCHARGE DIAGNOSIS  Diagnosis: Thyroid cancer  Assessment and Plan of Treatment: Follow up with Dr Culver in one week (5/28/19 at 9:45 am) to have stitch removed. Please make an appointment to follow up with Dr Anaya (Endocrinologist) within two weeks.

## 2019-05-21 NOTE — DISCHARGE NOTE PROVIDER - CARE PROVIDERS DIRECT ADDRESSES
[email or web address]

## 2019-05-21 NOTE — DISCHARGE NOTE PROVIDER - PROVIDER TOKENS
PROVIDER:[TOKEN:[[id number]]],PROVIDER:[TOKEN:[[id number]]],PROVIDER:[TOKEN:[[id number]]]

## 2019-05-21 NOTE — PROGRESS NOTE ADULT - ATTENDING COMMENTS
neurological exam improved.  HD today  downgrade  Assessment and plan above were modified and discussed with residents, physician assistants, and nurses.  I have provided 28  min of Critical Care to this patient.

## 2019-05-21 NOTE — DISCHARGE NOTE PROVIDER - NSDCFUADDAPPT_GEN_ALL_CORE_FT
Follow up with Dr Culver next week on Tuesday 5/28/19 at 9:45 am. Please call Dr Anaya for an appointment within the next two weeks. Return to your regular HD schedule tomorrow.

## 2019-05-21 NOTE — PROGRESS NOTE ADULT - SUBJECTIVE AND OBJECTIVE BOX
ROGER RODRIGUES  [id number]  58y Male    Indication for ICU admission:    Admit Date:05-20-19  ICU Date: 5-20-19  OR Date: 5-20-19    [place] (Other)  Originally Entered as [HIVES] reaction to [sulfur] (Unknown)  penicillin (Unknown)  sulfADIAZINE (Unknown)    PAST MEDICAL & SURGICAL HISTORY:  PAD (peripheral artery disease): multiple toe amputations  Anemia: chronic anemia - s/p transfusion 2018  Thyroid cancer  Chronic leg pain  OA (osteoarthritis)  SOB (shortness of breath) on exertion  ESRD (end stage renal disease): 2 yrs  Atrial fibrillation: on warfarin  Right sided weakness  Stroke: 8 yrs ago  Hypertension  High blood cholesterol  Diabetes mellitus, type 2  Dialysis patient: Mon, Wednesday, Friday ([facility])  Smoker  H/O thyroid nodule  H/O gastroesophageal reflux (GERD)  History of tonsillectomy  History of surgery: Multiple toe amputations (amp 4 1/2 left toes; has 1/2 left mid toe; amp right mid toe)  A-V fistula: left AVF    Home Medications:  Ambien: 1 tab(s) orally once a day (at bedtime) (20 May 2019 08:23)  atorvastatin 20 mg oral tablet: 1 tab(s) orally once a day (at bedtime) (20 May 2019 08:23)  Coumadin 2.5 mg oral tablet: 2 tab(s) orally once a day (at bedtime) (20 May 2019 08:23)  furosemide 40 mg oral tablet: 1 tab(s) orally once a day (20 May 2019 08:23)  gabapentin 100 mg oral tablet: 1 tab(s) orally once a day (20 May 2019 08:23)  insulin lispro (concentrated) 200 units/mL subcutaneous solution: 7 unit(s) subcutaneous 3 times a day  5-10 units prn (20 May 2019 08:23)  Lantus 100 units/mL subcutaneous solution: 15 unit(s) subcutaneous once a day (at bedtime) (20 May 2019 08:23)  pantoprazole 40 mg oral delayed release tablet: 1 tab(s) orally once a day (20 May 2019 08:23)  pioglitazone 30 mg oral tablet: 1 tab(s) orally once a day (20 May 2019 08:23)  Renvela 800 mg oral tablet: 1 tab(s) orally 3 times a day (with meals) (20 May 2019 08:23)  Toprol-XL 25 mg oral tablet, extended release: 1 tab(s) orally once a day (20 May 2019 08:23)  Vit D 50,000: 1 cap(s) orally once a week (20 May 2019 08:23)        24HRS EVENT:  NEURO:   Pain-controlled with Tylenol.  Patient agitated in PACU, given Ativan 2mg by primary team, no lethargic but arousable    RESP:   NC @ 2L, currently saturating at 98%  ABG -> r/o hypoxia or hypercapnia as cause of post-op agitation  RR: 20 (05-20) (18 - 28)  SpO2: 99% (05-20) (92% - 100%)      CARDS:   EKG shows tachycardia at 103, incomplete RBBB  Cardiac enzymes pending, patient denies chest pain  HR: 102 (05-20) (102 - 105)  BP: 136/75 (05-20) (123/70 - 150/81)    GI/NUTRITION:   Diet - NPO    /RENAL:   ESRD (HD MWF, last dialysis Saturday) - nephrologist (Laura) recommends dialysis post-op  BMP, Mg, Phos    HEME/ONC:   CBC  Wound vac to anterior neck surgical site, bloody drainage, monitor output    ID:   No active disease  -Afebrile T(C): 36.4 (05-20 @ 13:54), Max: 36.6 (05-20 @ 08:27)    ENDO:  s/p complete thyroidectomy for thyroid cancer  Ca q6 hours and PTH s/p dialysis as per primary team  -Glu 144 mg/dL (05-20 @ 08:14)    LINES/DRAINS: PIV, Myla    DVT PTX:     GI PTX: Protonix PO    ***Tubes/Lines/Drains  ***  Peripheral IV  Central Venous Line     	Date   Arterial Line		                Date   [] PICC:         	[] Midline		[] Mediport             Urinary Catheter		Indication: Strict I&O    Date Placed:       REVIEW OF SYSTEMS    [ ] A ten-point review of systems was otherwise negative except as noted.  [ ] Due to altered mental status/intubation, subjective information were not able to be obtained from the patient. History was obtained, to the extent possible, from review of the chart and collateral sources of information. ROGER RODRIGUES  [id number]  58y Male    Indication for ICU admission:    Admit Date:19  ICU Date: 19  OR Date: 19    [place] (Other)  Originally Entered as [HIVES] reaction to [sulfur] (Unknown)  penicillin (Unknown)  sulfADIAZINE (Unknown)    PAST MEDICAL & SURGICAL HISTORY:  PAD (peripheral artery disease): multiple toe amputations  Anemia: chronic anemia - s/p transfusion 2018  Thyroid cancer  Chronic leg pain  OA (osteoarthritis)  SOB (shortness of breath) on exertion  ESRD (end stage renal disease): 2 yrs  Atrial fibrillation: on warfarin  Right sided weakness  Stroke: 8 yrs ago  Hypertension  High blood cholesterol  Diabetes mellitus, type 2  Dialysis patient: Mon, Wednesday, Friday ([facility])  Smoker  H/O thyroid nodule  H/O gastroesophageal reflux (GERD)  History of tonsillectomy  History of surgery: Multiple toe amputations (amp 4 1/2 left toes; has 1/2 left mid toe; amp right mid toe)  A-V fistula: left AVF    Home Medications:  Ambien: 1 tab(s) orally once a day (at bedtime) (20 May 2019 08:23)  atorvastatin 20 mg oral tablet: 1 tab(s) orally once a day (at bedtime) (20 May 2019 08:23)  Coumadin 2.5 mg oral tablet: 2 tab(s) orally once a day (at bedtime) (20 May 2019 08:23)  furosemide 40 mg oral tablet: 1 tab(s) orally once a day (20 May 2019 08:23)  gabapentin 100 mg oral tablet: 1 tab(s) orally once a day (20 May 2019 08:23)  insulin lispro (concentrated) 200 units/mL subcutaneous solution: 7 unit(s) subcutaneous 3 times a day  5-10 units prn (20 May 2019 08:23)  Lantus 100 units/mL subcutaneous solution: 15 unit(s) subcutaneous once a day (at bedtime) (20 May 2019 08:23)  pantoprazole 40 mg oral delayed release tablet: 1 tab(s) orally once a day (20 May 2019 08:23)  pioglitazone 30 mg oral tablet: 1 tab(s) orally once a day (20 May 2019 08:23)  Renvela 800 mg oral tablet: 1 tab(s) orally 3 times a day (with meals) (20 May 2019 08:23)  Toprol-XL 25 mg oral tablet, extended release: 1 tab(s) orally once a day (20 May 2019 08:23)  Vit D 50,000: 1 cap(s) orally once a week (20 May 2019 08:23)        24HRS EVENT:  NEURO:   Pain-controlled with Tylenol.  Patient agitated in PACU, given Ativan 2mg by primary team, no lethargic but arousable    RESP:   NC @ 2L, currently saturating at 98%  ABG -> r/o hypoxia or hypercapnia as cause of post-op agitation  RR: 20 () (18 - 28)  SpO2: 99% () (92% - 100%)      CARDS:   EKG shows tachycardia at 103, incomplete RBBB  Cardiac enzymes pending, patient denies chest pain  HR: 102 () (102 - 105)  BP: 136/75 () (123/70 - 150/81)    GI/NUTRITION:   Diet - NPO    /RENAL:   ESRD (HD , last dialysis Saturday) - nephrologist (Laura) recommends dialysis post-op  BMP, Mg, Phos    HEME/ONC:   CBC  Wound vac to anterior neck surgical site, bloody drainage, monitor output    ID:   No active disease  -Afebrile T(C): 36.4 ( @ 13:54), Max: 36.6 ( @ 08:27)    ENDO:  s/p complete thyroidectomy for thyroid cancer  Ca q6 hours and PTH s/p dialysis as per primary team  -Glu 144 mg/dL ( @ 08:14)    LINES/DRAINS: PIV, Myla    DVT PTX:     GI PTX: Protonix PO    ***Tubes/Lines/Drains  ***  Peripheral IV  Central Venous Line     	Date   Arterial Line		                Date   [] PICC:         	[] Midline		[] Mediport             Urinary Catheter		Indication: Strict I&O    Date Placed:       REVIEW OF SYSTEMS    [x] A ten-point review of systems was otherwise negative except as noted.  [ ] Due to altered mental status/intubation, subjective information were not able to be obtained from the patient. History was obtained, to the extent possible, from review of the chart and collateral sources of information.      Daily     Daily Weight in k.5 (20 May 2019 18:00)    Diet, Consistent Carbohydrate Renal/No Snacks (19 @ 20:07)      CURRENT MEDS:  Neurologic Medications    Respiratory Medications    Cardiovascular Medications  furosemide    Tablet 40 milliGRAM(s) Oral daily  metoprolol succinate ER 25 milliGRAM(s) Oral daily    Gastrointestinal Medications  dextrose 5%. 1000 milliLiter(s) IV Continuous <Continuous>  pantoprazole    Tablet 40 milliGRAM(s) Oral before breakfast    Genitourinary Medications    Hematologic/Oncologic Medications    Antimicrobial/Immunologic Medications    Endocrine/Metabolic Medications  atorvastatin 20 milliGRAM(s) Oral at bedtime  dextrose 40% Gel 15 Gram(s) Oral once PRN Blood Glucose LESS THAN 70 milliGRAM(s)/deciliter  dextrose 50% Injectable 12.5 Gram(s) IV Push once  dextrose 50% Injectable 25 Gram(s) IV Push once  dextrose 50% Injectable 25 Gram(s) IV Push once  glucagon  Injectable 1 milliGRAM(s) IntraMuscular once PRN Glucose LESS THAN 70 milligrams/deciliter  insulin glargine Injectable (LANTUS) 15 Unit(s) SubCutaneous at bedtime  insulin lispro (HumaLOG) corrective regimen sliding scale   SubCutaneous three times a day before meals  insulin lispro Injectable (HumaLOG) 7 Unit(s) SubCutaneous three times a day before meals  pioglitazone 30 milliGRAM(s) Oral daily    Topical/Other Medications  sevelamer carbonate 800 milliGRAM(s) Oral three times a day with meals      ICU Vital Signs Last 24 Hrs  T(C): 37.1 (21 May 2019 04:00), Max: 37.1 (21 May 2019 04:00)  T(F): 98.8 (21 May 2019 04:00), Max: 98.8 (21 May 2019 04:00)  HR: 100 (21 May 2019 06:00) (90 - 105)  BP: 123/66 (20 May 2019 17:00) (123/66 - 150/81)  BP(mean): 85 (20 May 2019 17:00) (85 - 105)  ABP: 146/70 (21 May 2019 06:00) (116/110 - 156/80)  ABP(mean): 94 (21 May 2019 06:00) (0 - 104)  RR: 24 (21 May 2019 06:00) (16 - 37)  SpO2: 97% (21 May 2019 06:00) (92% - 100%)      Adult Advanced Hemodynamics Last 24 Hrs  CVP(mm Hg): --  CVP(cm H2O): --  CO: --  CI: --  PA: --  PA(mean): --  PCWP: --  SVR: --  SVRI: --  PVR: --  PVRI: --      ABG - ( 21 May 2019 03:50 )  pH, Arterial: 7.37  pH, Blood: x     /  pCO2: 50    /  pO2: 76    / HCO3: 29    / Base Excess: 2.9   /  SaO2: 93                  I&O's Summary    20 May 2019 07:  -  21 May 2019 07:00  --------------------------------------------------------  IN: 200 mL / OUT: 85 mL / NET: 115 mL      I&O's Detail    20 May 2019 07:  -  21 May 2019 07:00  --------------------------------------------------------  IN:    Oral Fluid: 200 mL  Total IN: 200 mL    OUT:    Accordian: 35 mL    Voided: 50 mL  Total OUT: 85 mL    Total NET: 115 mL          PHYSICAL EXAM:    General/Neuro  RASS:   0          GCS: 15   Deficits: none,  alert & oriented x 3, no focal deficits  Pupils: 3mm, PERRLA    Lungs:      clear to auscultation, Normal expansion/effort.     Cardiovascular : S1, S2.  Regular rate and rhythm.  No Peripheral edema   Cardiac Rhythm: Normal Sinus Rhythm    GI: Abdomen soft, Non-tender, Non-distended.      Wound: well healing surgical wound to anterior neck    Extremities: Extremities warm, pink, well-perfused. Pulses: Rt  1+   Lt 1+    Derm: Good skin turgor, no skin breakdown.      LABS:  CAPILLARY BLOOD GLUCOSE      POCT Blood Glucose.: 152 mg/dL (21 May 2019 08:10)  POCT Blood Glucose.: 180 mg/dL (20 May 2019 21:45)  POCT Blood Glucose.: 172 mg/dL (20 May 2019 16:43)                          11.7   7.04  )-----------( 215      ( 21 May 2019 04:30 )             35.9       PT  15.60 ()    INR  1.36 ()    PTT  34.3 ()          136  |  92<L>  |  56<H>  ----------------------------<  188<H>  5.6<H>   |  27  |  7.6<HH>    Ca    9.0      21 May 2019 04:30  Phos  7.1       Mg     2.3             PT/INR - ( 20 May 2019 15:05 )   PT: 15.60 sec;   INR: 1.36 ratio         PTT - ( 20 May 2019 15:05 )  PTT:34.3 sec  CARDIAC MARKERS ( 21 May 2019 04:30 )  x     / 0.56 ng/mL / x     / x     / x      CARDIAC MARKERS ( 20 May 2019 23:30 )  x     / 0.52 ng/mL / x     / x     / x      CARDIAC MARKERS ( 20 May 2019 15:05 )  x     / 0.56 ng/mL / 348 U/L / x     / 8.8 ng/mL

## 2019-05-24 LAB — SURGICAL PATHOLOGY STUDY: SIGNIFICANT CHANGE UP

## 2019-05-29 ENCOUNTER — APPOINTMENT (OUTPATIENT)
Dept: OTOLARYNGOLOGY | Facility: CLINIC | Age: 59
End: 2019-05-29
Payer: MEDICARE

## 2019-05-29 DIAGNOSIS — C73 MALIGNANT NEOPLASM OF THYROID GLAND: ICD-10-CM

## 2019-05-29 PROCEDURE — 99024 POSTOP FOLLOW-UP VISIT: CPT

## 2019-05-29 NOTE — HISTORY OF PRESENT ILLNESS
[FreeTextEntry1] : Patient here post op 5/20/19 total thyroidectomy. Pt has no complaints. Pathology was reviewed with the patient. Pt has no change in voice or voice issues. Pt has no paresthesias or numbness.

## 2019-06-04 NOTE — ASU DISCHARGE PLAN (ADULT/PEDIATRIC). - ACTIVITY LEVEL
----- Message from Claus Liao MD sent at 6/4/2019  7:49 AM CDT -----  Call. CBC normal. Ferritin 23.   no exercise

## 2019-06-27 DIAGNOSIS — Z99.2 DEPENDENCE ON RENAL DIALYSIS: ICD-10-CM

## 2019-06-27 DIAGNOSIS — I73.9 PERIPHERAL VASCULAR DISEASE, UNSPECIFIED: ICD-10-CM

## 2019-06-27 DIAGNOSIS — I48.91 UNSPECIFIED ATRIAL FIBRILLATION: ICD-10-CM

## 2019-06-27 DIAGNOSIS — Z88.2 ALLERGY STATUS TO SULFONAMIDES: ICD-10-CM

## 2019-06-27 DIAGNOSIS — I63.9 CEREBRAL INFARCTION, UNSPECIFIED: ICD-10-CM

## 2019-06-27 DIAGNOSIS — E78.00 PURE HYPERCHOLESTEROLEMIA, UNSPECIFIED: ICD-10-CM

## 2019-06-27 DIAGNOSIS — C73 MALIGNANT NEOPLASM OF THYROID GLAND: ICD-10-CM

## 2019-06-27 DIAGNOSIS — E11.9 TYPE 2 DIABETES MELLITUS WITHOUT COMPLICATIONS: ICD-10-CM

## 2019-06-27 DIAGNOSIS — R06.02 SHORTNESS OF BREATH: ICD-10-CM

## 2019-06-27 DIAGNOSIS — Z88.0 ALLERGY STATUS TO PENICILLIN: ICD-10-CM

## 2019-06-27 DIAGNOSIS — D64.9 ANEMIA, UNSPECIFIED: ICD-10-CM

## 2019-06-27 DIAGNOSIS — N18.6 END STAGE RENAL DISEASE: ICD-10-CM

## 2019-06-27 DIAGNOSIS — I10 ESSENTIAL (PRIMARY) HYPERTENSION: ICD-10-CM

## 2019-06-27 DIAGNOSIS — E04.1 NONTOXIC SINGLE THYROID NODULE: ICD-10-CM

## 2019-08-20 ENCOUNTER — APPOINTMENT (OUTPATIENT)
Dept: ENDOCRINOLOGY | Facility: CLINIC | Age: 59
End: 2019-08-20

## 2019-09-05 ENCOUNTER — APPOINTMENT (OUTPATIENT)
Dept: ENDOCRINOLOGY | Facility: CLINIC | Age: 59
End: 2019-09-05
Payer: MEDICARE

## 2019-09-05 VITALS
RESPIRATION RATE: 18 BRPM | SYSTOLIC BLOOD PRESSURE: 126 MMHG | HEIGHT: 70 IN | BODY MASS INDEX: 30.92 KG/M2 | OXYGEN SATURATION: 98 % | WEIGHT: 216 LBS | HEART RATE: 96 BPM | DIASTOLIC BLOOD PRESSURE: 74 MMHG

## 2019-09-05 PROCEDURE — 99214 OFFICE O/P EST MOD 30 MIN: CPT

## 2019-09-05 RX ORDER — INSULIN GLARGINE 100 [IU]/ML
100 INJECTION, SOLUTION SUBCUTANEOUS
Refills: 0 | Status: ACTIVE | COMMUNITY

## 2019-09-05 NOTE — ASSESSMENT
[Carbohydrate Consistent Diet] : carbohydrate consistent diet [Hypoglycemia Management] : hypoglycemia management [Sick-Day Management] : sick-day management [Diabetes Foot Care] : diabetes foot care [Long Term Vascular Complications] : long term vascular complications of diabetes [Importance of Diet and Exercise] : importance of diet and exercise to improve glycemic control, achieve weight loss and improve cardiovascular health [Action and use of Insulin] : action and use of short and long-acting insulin [Self Monitoring of Blood Glucose] : self monitoring of blood glucose [Insulin Self-Administration] : insulin self-administration [Levothyroxine] : The patient was instructed to take Levothyroxine on an empty stomach, separate from vitamins, and wait at least 30 minutes before eating [FreeTextEntry1] : patient to try lantus 20 units at night and to monitor novolog via scale. Discussed stop pioglitazone. He is on hemodialysis 3 x a week

## 2019-09-05 NOTE — THERAPY
[Today's Date] : [unfilled] [Novolog] : Novolog [___] : 1 unit per [unfilled] grams of carbohydrate [BG Target = ____] : BG Target = [unfilled] [FreeTextEntry6] : has not used x 1 month [FreeTextEntry2] : teaching to use lantu to decrease hypoglycemia 20 units at night.\par novolog >120 = 0 insulin, 121- 150= 3 units, 151-200 6 units, 201-250 9 units, 251-300= 12 units [FreeTextEntry7] : pioglitazone 30 daily

## 2019-09-05 NOTE — PHYSICAL EXAM
[Alert] : alert [No Acute Distress] : no acute distress [Well Developed] : well developed [Well Nourished] : well nourished [Normal Sclera/Conjunctiva] : normal sclera/conjunctiva [EOMI] : extra ocular movement intact [No Proptosis] : no proptosis [Normal Oropharynx] : the oropharynx was normal [Thyroid Not Enlarged] : the thyroid was not enlarged [No Thyroid Nodules] : there were no palpable thyroid nodules [No Respiratory Distress] : no respiratory distress [No Accessory Muscle Use] : no accessory muscle use [Clear to Auscultation] : lungs were clear to auscultation bilaterally [Normal Rate] : heart rate was normal  [Normal S1, S2] : normal S1 and S2 [Regular Rhythm] : with a regular rhythm [Pedal Pulses Normal] : the pedal pulses are present [No Edema] : there was no peripheral edema [Normal Bowel Sounds] : normal bowel sounds [Not Tender] : non-tender [Not Distended] : not distended [Soft] : abdomen soft [Post Cervical Nodes] : posterior cervical nodes [Anterior Cervical Nodes] : anterior cervical nodes [Axillary Nodes] : axillary nodes [Normal] : normal and non tender [No Spinal Tenderness] : no spinal tenderness [Spine Straight] : spine straight [Normal Strength/Tone] : muscle strength and tone were normal [Normal Gait] : normal gait [No Rash] : no rash [Acanthosis Nigricans] : no acanthosis nigricans [No Tremors] : no tremors [Normal Reflexes] : deep tendon reflexes were 2+ and symmetric [Oriented x3] : oriented to person, place, and time [de-identified] : DM 2 [de-identified] : skin leasions in hands

## 2019-12-23 ENCOUNTER — APPOINTMENT (OUTPATIENT)
Dept: ENDOCRINOLOGY | Facility: CLINIC | Age: 59
End: 2019-12-23
Payer: MEDICARE

## 2019-12-23 VITALS
WEIGHT: 220 LBS | HEIGHT: 70 IN | SYSTOLIC BLOOD PRESSURE: 140 MMHG | BODY MASS INDEX: 31.5 KG/M2 | HEART RATE: 102 BPM | DIASTOLIC BLOOD PRESSURE: 80 MMHG

## 2019-12-23 DIAGNOSIS — Z98.890 OTHER SPECIFIED POSTPROCEDURAL STATES: ICD-10-CM

## 2019-12-23 DIAGNOSIS — E11.9 TYPE 2 DIABETES MELLITUS W/OUT COMPLICATIONS: ICD-10-CM

## 2019-12-23 DIAGNOSIS — I10 ESSENTIAL (PRIMARY) HYPERTENSION: ICD-10-CM

## 2019-12-23 DIAGNOSIS — K21.9 GASTRO-ESOPHAGEAL REFLUX DISEASE W/OUT ESOPHAGITIS: ICD-10-CM

## 2019-12-23 DIAGNOSIS — N18.9 CHRONIC KIDNEY DISEASE, UNSPECIFIED: ICD-10-CM

## 2019-12-23 PROCEDURE — 99214 OFFICE O/P EST MOD 30 MIN: CPT

## 2019-12-23 NOTE — PHYSICAL EXAM
[Alert] : alert [Well Nourished] : well nourished [No Acute Distress] : no acute distress [Well Developed] : well developed [Normal Sclera/Conjunctiva] : normal sclera/conjunctiva [EOMI] : extra ocular movement intact [Normal Hearing] : hearing was normal [No Proptosis] : no proptosis [Normal Nasal Mucosa] : the nasal mucosa was normal [Thyroid Not Enlarged] : the thyroid was not enlarged [Normal Oropharynx] : the oropharynx was normal [No Thyroid Nodules] : there were no palpable thyroid nodules [No Respiratory Distress] : no respiratory distress [Clear to Auscultation] : lungs were clear to auscultation bilaterally [Normal Rate] : heart rate was normal  [No Accessory Muscle Use] : no accessory muscle use [Normal S1, S2] : normal S1 and S2 [Regular Rhythm] : with a regular rhythm [Normal Bowel Sounds] : normal bowel sounds [No Edema] : there was no peripheral edema [Pedal Pulses Normal] : the pedal pulses are present [Soft] : abdomen soft [Not Tender] : non-tender [Anterior Cervical Nodes] : anterior cervical nodes [Post Cervical Nodes] : posterior cervical nodes [Not Distended] : not distended [Normal] : normal and non tender [Axillary Nodes] : axillary nodes [Spine Straight] : spine straight [No CVA Tenderness] : no ~M costovertebral angle tenderness [No Spinal Tenderness] : no spinal tenderness [Normal Gait] : normal gait [Normal Strength/Tone] : muscle strength and tone were normal [Acanthosis Nigricans] : no acanthosis nigricans [No Rash] : no rash [Normal Reflexes] : deep tendon reflexes were 2+ and symmetric [Cranial Nerves Intact] : cranial nerves 2-12 were intact [No Tremors] : no tremors [Oriented x3] : oriented to person, place, and time [Normal Affect] : the affect was normal [Normal Mood] : the mood was normal [de-identified] : walks with cane [de-identified] : DM 2 , hypothyroid

## 2019-12-23 NOTE — THERAPY
[Today's Date] : [unfilled] [Novolog] : Novolog [BG Target = ____] : BG Target = [unfilled] [___] : [unfilled] units of insulin pre-bedtime [FreeTextEntry2] : patient to take 15 units q hs Lantus, 4-10 units Novolog 3 x a day

## 2019-12-23 NOTE — ASSESSMENT
[FreeTextEntry1] : patient was recently seen by Dr. Anaya who follows up on his thyroid. but noted TSH 16.6 on 12/6/19. \par Will call Dr. Anaya office if he was started on Synthroid. \par He has poor memory recall and does not have updated medication list. \par Will need to have  call [employer] to verify all medications.  [Hypoglycemia Management] : hypoglycemia management [Carbohydrate Consistent Diet] : carbohydrate consistent diet [Sick-Day Management] : sick-day management [Diabetes Foot Care] : diabetes foot care [Long Term Vascular Complications] : long term vascular complications of diabetes [Action and use of Insulin] : action and use of short and long-acting insulin [Self Monitoring of Blood Glucose] : self monitoring of blood glucose [Importance of Diet and Exercise] : importance of diet and exercise to improve glycemic control, achieve weight loss and improve cardiovascular health [Injection Technique, Storage, Sharps Disposal] : injection technique, storage, and sharps disposal [Retinopathy Screening] : Patient was referred to ophthalmology for retinopathy screening [Insulin Self-Administration] : insulin self-administration

## 2020-01-01 ENCOUNTER — APPOINTMENT (OUTPATIENT)
Dept: ENDOCRINOLOGY | Facility: CLINIC | Age: 60
End: 2020-01-01

## 2020-01-01 RX ORDER — HYDROXYZINE HYDROCHLORIDE 25 MG/1
25 TABLET ORAL
Qty: 30 | Refills: 3 | Status: ACTIVE | COMMUNITY
Start: 2020-01-01

## 2020-01-01 RX ORDER — PANTOPRAZOLE 40 MG/1
40 TABLET, DELAYED RELEASE ORAL DAILY
Refills: 0 | Status: ACTIVE | COMMUNITY
Start: 2020-01-01

## 2020-01-01 RX ORDER — METOPROLOL TARTRATE 25 MG/1
25 TABLET, FILM COATED ORAL DAILY
Refills: 0 | Status: ACTIVE | COMMUNITY
Start: 2020-01-01

## 2020-01-01 RX ORDER — LEVOTHYROXINE SODIUM 0.15 MG/1
150 TABLET ORAL DAILY
Qty: 90 | Refills: 2 | Status: ACTIVE | COMMUNITY
Start: 2020-01-01

## 2020-01-01 RX ORDER — GABAPENTIN 100 MG/1
100 CAPSULE ORAL
Qty: 30 | Refills: 3 | Status: ACTIVE | COMMUNITY
Start: 2020-01-01

## 2020-01-01 RX ORDER — ATORVASTATIN CALCIUM 20 MG/1
20 TABLET, FILM COATED ORAL
Qty: 90 | Refills: 1 | Status: ACTIVE | COMMUNITY
Start: 2020-01-01

## 2020-01-01 RX ORDER — FUROSEMIDE 40 MG/1
40 TABLET ORAL DAILY
Refills: 0 | Status: ACTIVE | COMMUNITY
Start: 2020-01-01

## 2020-01-01 RX ORDER — ZOLPIDEM TARTRATE 10 MG/1
10 TABLET ORAL
Refills: 0 | Status: ACTIVE | COMMUNITY
Start: 2020-01-01

## 2020-01-01 RX ORDER — WARFARIN 2.5 MG/1
2.5 TABLET ORAL DAILY
Refills: 0 | Status: ACTIVE | COMMUNITY
Start: 2020-01-01

## 2020-01-01 RX ORDER — INSULIN ASPART 100 [IU]/ML
100 INJECTION, SOLUTION INTRAVENOUS; SUBCUTANEOUS
Refills: 0 | Status: ACTIVE | COMMUNITY
Start: 2020-01-01

## 2020-05-11 NOTE — H&P ADULT - EXTREMITIES
Next appt 05/29/20  Last appt 01/2/20    Refill request for   Disp Refills Start End    levothyroxine (SYNTHROID, LEVOTHROID) 50 MCG tablet 90 tablet 1 2/17/2020     Sig - Route: Take 1 tablet by mouth daily. - Oral    Sent to pharmacy as: Levothyroxine Sodium 50 MCG Oral Tablet    Class: Eprescribe    E-Prescribing Status: Receipt confirmed by pharmacy (2/17/2020  1:04 PM CST)          Refilled per standing med protocol.    
No cyanosis, clubbing or edema

## 2020-05-26 NOTE — H&P ADULT - NSHPROSALLOTHERNEGRD_GEN_ALL_CORE
[facility] request was put on Dr Juan Aldridge desk to process
All other review of systems negative, except as noted in HPI

## 2021-01-01 ENCOUNTER — INPATIENT (INPATIENT)
Facility: HOSPITAL | Age: 61
LOS: 39 days | End: 2021-02-10
Attending: INTERNAL MEDICINE | Admitting: INTERNAL MEDICINE
Payer: MEDICARE

## 2021-01-01 VITALS
TEMPERATURE: 98 F | HEART RATE: 89 BPM | HEIGHT: 72 IN | DIASTOLIC BLOOD PRESSURE: 62 MMHG | OXYGEN SATURATION: 95 % | SYSTOLIC BLOOD PRESSURE: 142 MMHG

## 2021-01-01 VITALS
DIASTOLIC BLOOD PRESSURE: 45 MMHG | TEMPERATURE: 99 F | RESPIRATION RATE: 20 BRPM | HEART RATE: 77 BPM | SYSTOLIC BLOOD PRESSURE: 124 MMHG

## 2021-01-01 DIAGNOSIS — G93.41 METABOLIC ENCEPHALOPATHY: ICD-10-CM

## 2021-01-01 DIAGNOSIS — Z86.39 PERSONAL HISTORY OF OTHER ENDOCRINE, NUTRITIONAL AND METABOLIC DISEASE: Chronic | ICD-10-CM
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
## 2021-01-01 DIAGNOSIS — E87.70 FLUID OVERLOAD, UNSPECIFIED: ICD-10-CM

## 2021-01-01 DIAGNOSIS — K56.7 ILEUS, UNSPECIFIED: ICD-10-CM

## 2021-01-01 DIAGNOSIS — E11.65 TYPE 2 DIABETES MELLITUS WITH HYPERGLYCEMIA: ICD-10-CM

## 2021-01-01 DIAGNOSIS — N18.6 END STAGE RENAL DISEASE: ICD-10-CM

## 2021-01-01 DIAGNOSIS — Z88.0 ALLERGY STATUS TO PENICILLIN: ICD-10-CM

## 2021-01-01 DIAGNOSIS — I48.0 PAROXYSMAL ATRIAL FIBRILLATION: ICD-10-CM

## 2021-01-01 DIAGNOSIS — I77.0 ARTERIOVENOUS FISTULA, ACQUIRED: Chronic | ICD-10-CM

## 2021-01-01 DIAGNOSIS — F17.200 NICOTINE DEPENDENCE, UNSPECIFIED, UNCOMPLICATED: Chronic | ICD-10-CM
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
## 2021-01-01 DIAGNOSIS — L89.150 PRESSURE ULCER OF SACRAL REGION, UNSTAGEABLE: ICD-10-CM

## 2021-01-01 DIAGNOSIS — Z79.4 LONG TERM (CURRENT) USE OF INSULIN: ICD-10-CM

## 2021-01-01 DIAGNOSIS — Z88.2 ALLERGY STATUS TO SULFONAMIDES: ICD-10-CM

## 2021-01-01 DIAGNOSIS — S90.412A ABRASION, LEFT GREAT TOE, INITIAL ENCOUNTER: ICD-10-CM

## 2021-01-01 DIAGNOSIS — I13.2 HYPERTENSIVE HEART AND CHRONIC KIDNEY DISEASE WITH HEART FAILURE AND WITH STAGE 5 CHRONIC KIDNEY DISEASE, OR END STAGE RENAL DISEASE: ICD-10-CM

## 2021-01-01 DIAGNOSIS — E11.22 TYPE 2 DIABETES MELLITUS WITH DIABETIC CHRONIC KIDNEY DISEASE: ICD-10-CM

## 2021-01-01 DIAGNOSIS — U07.1 COVID-19: ICD-10-CM

## 2021-01-01 DIAGNOSIS — Z86.73 PERSONAL HISTORY OF TRANSIENT ISCHEMIC ATTACK (TIA), AND CEREBRAL INFARCTION WITHOUT RESIDUAL DEFICITS: ICD-10-CM

## 2021-01-01 DIAGNOSIS — T85.528A DISPLACEMENT OF OTHER GASTROINTESTINAL PROSTHETIC DEVICES, IMPLANTS AND GRAFTS, INITIAL ENCOUNTER: ICD-10-CM

## 2021-01-01 DIAGNOSIS — I46.9 CARDIAC ARREST, CAUSE UNSPECIFIED: ICD-10-CM
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
## 2021-01-01 DIAGNOSIS — J96.01 ACUTE RESPIRATORY FAILURE WITH HYPOXIA: ICD-10-CM

## 2021-01-01 DIAGNOSIS — J12.82 PNEUMONIA DUE TO CORONAVIRUS DISEASE 2019: ICD-10-CM

## 2021-01-01 DIAGNOSIS — Z90.89 ACQUIRED ABSENCE OF OTHER ORGANS: Chronic | ICD-10-CM

## 2021-01-01 DIAGNOSIS — Z89.422 ACQUIRED ABSENCE OF OTHER LEFT TOE(S): ICD-10-CM

## 2021-01-01 DIAGNOSIS — I21.A1 MYOCARDIAL INFARCTION TYPE 2: ICD-10-CM

## 2021-01-01 DIAGNOSIS — Z79.01 LONG TERM (CURRENT) USE OF ANTICOAGULANTS: ICD-10-CM

## 2021-01-01 DIAGNOSIS — Z98.890 OTHER SPECIFIED POSTPROCEDURAL STATES: Chronic | ICD-10-CM

## 2021-01-01 DIAGNOSIS — E11.51 TYPE 2 DIABETES MELLITUS WITH DIABETIC PERIPHERAL ANGIOPATHY WITHOUT GANGRENE: ICD-10-CM

## 2021-01-01 DIAGNOSIS — E87.1 HYPO-OSMOLALITY AND HYPONATREMIA: ICD-10-CM

## 2021-01-01 DIAGNOSIS — Z87.19 PERSONAL HISTORY OF OTHER DISEASES OF THE DIGESTIVE SYSTEM: Chronic | ICD-10-CM

## 2021-01-01 DIAGNOSIS — Z91.018 ALLERGY TO OTHER FOODS: ICD-10-CM

## 2021-01-01 DIAGNOSIS — Z89.421 ACQUIRED ABSENCE OF OTHER RIGHT TOE(S): ICD-10-CM

## 2021-01-01 DIAGNOSIS — J15.9 UNSPECIFIED BACTERIAL PNEUMONIA: ICD-10-CM

## 2021-01-01 LAB
-  AMPICILLIN: SIGNIFICANT CHANGE UP
-  DAPTOMYCIN: SIGNIFICANT CHANGE UP
-  DAPTOMYCIN: SIGNIFICANT CHANGE UP
-  LEVOFLOXACIN: SIGNIFICANT CHANGE UP
-  LINEZOLID: SIGNIFICANT CHANGE UP
-  TETRACYCLINE: SIGNIFICANT CHANGE UP
-  VANCOMYCIN: SIGNIFICANT CHANGE UP
24R-OH-CALCIDIOL SERPL-MCNC: 45 NG/ML — SIGNIFICANT CHANGE UP (ref 30–80)
A1C WITH ESTIMATED AVERAGE GLUCOSE RESULT: 5.9 % — HIGH (ref 4–5.6)
ALBUMIN SERPL ELPH-MCNC: 1.5 G/DL — LOW (ref 3.5–5.2)
ALBUMIN SERPL ELPH-MCNC: 2.2 G/DL — LOW (ref 3.5–5.2)
ALBUMIN SERPL ELPH-MCNC: 2.4 G/DL — LOW (ref 3.5–5.2)
ALBUMIN SERPL ELPH-MCNC: 2.5 G/DL — LOW (ref 3.5–5.2)
ALBUMIN SERPL ELPH-MCNC: 2.5 G/DL — LOW (ref 3.5–5.2)
ALBUMIN SERPL ELPH-MCNC: 2.6 G/DL — LOW (ref 3.5–5.2)
ALBUMIN SERPL ELPH-MCNC: 2.7 G/DL — LOW (ref 3.5–5.2)
ALBUMIN SERPL ELPH-MCNC: 2.8 G/DL — LOW (ref 3.5–5.2)
ALBUMIN SERPL ELPH-MCNC: 2.9 G/DL — LOW (ref 3.5–5.2)
ALBUMIN SERPL ELPH-MCNC: 3 G/DL — LOW (ref 3.5–5.2)
ALBUMIN SERPL ELPH-MCNC: 3.1 G/DL — LOW (ref 3.5–5.2)
ALBUMIN SERPL ELPH-MCNC: 3.1 G/DL — LOW (ref 3.5–5.2)
ALBUMIN SERPL ELPH-MCNC: 3.2 G/DL — LOW (ref 3.5–5.2)
ALBUMIN SERPL ELPH-MCNC: 3.3 G/DL — LOW (ref 3.5–5.2)
ALBUMIN SERPL ELPH-MCNC: 3.5 G/DL — SIGNIFICANT CHANGE UP (ref 3.5–5.2)
ALBUMIN SERPL ELPH-MCNC: 3.7 G/DL — SIGNIFICANT CHANGE UP (ref 3.5–5.2)
ALP SERPL-CCNC: 100 U/L — SIGNIFICANT CHANGE UP (ref 30–115)
ALP SERPL-CCNC: 101 U/L — SIGNIFICANT CHANGE UP (ref 30–115)
ALP SERPL-CCNC: 102 U/L — SIGNIFICANT CHANGE UP (ref 30–115)
ALP SERPL-CCNC: 103 U/L — SIGNIFICANT CHANGE UP (ref 30–115)
ALP SERPL-CCNC: 104 U/L — SIGNIFICANT CHANGE UP (ref 30–115)
ALP SERPL-CCNC: 105 U/L — SIGNIFICANT CHANGE UP (ref 30–115)
ALP SERPL-CCNC: 106 U/L — SIGNIFICANT CHANGE UP (ref 30–115)
ALP SERPL-CCNC: 106 U/L — SIGNIFICANT CHANGE UP (ref 30–115)
ALP SERPL-CCNC: 107 U/L — SIGNIFICANT CHANGE UP (ref 30–115)
ALP SERPL-CCNC: 108 U/L — SIGNIFICANT CHANGE UP (ref 30–115)
ALP SERPL-CCNC: 112 U/L — SIGNIFICANT CHANGE UP (ref 30–115)
ALP SERPL-CCNC: 113 U/L — SIGNIFICANT CHANGE UP (ref 30–115)
ALP SERPL-CCNC: 117 U/L — HIGH (ref 30–115)
ALP SERPL-CCNC: 117 U/L — HIGH (ref 30–115)
ALP SERPL-CCNC: 118 U/L — HIGH (ref 30–115)
ALP SERPL-CCNC: 118 U/L — HIGH (ref 30–115)
ALP SERPL-CCNC: 127 U/L — HIGH (ref 30–115)
ALP SERPL-CCNC: 133 U/L — HIGH (ref 30–115)
ALP SERPL-CCNC: 139 U/L — HIGH (ref 30–115)
ALP SERPL-CCNC: 139 U/L — HIGH (ref 30–115)
ALP SERPL-CCNC: 66 U/L — SIGNIFICANT CHANGE UP (ref 30–115)
ALP SERPL-CCNC: 72 U/L — SIGNIFICANT CHANGE UP (ref 30–115)
ALP SERPL-CCNC: 74 U/L — SIGNIFICANT CHANGE UP (ref 30–115)
ALP SERPL-CCNC: 75 U/L — SIGNIFICANT CHANGE UP (ref 30–115)
ALP SERPL-CCNC: 79 U/L — SIGNIFICANT CHANGE UP (ref 30–115)
ALP SERPL-CCNC: 80 U/L — SIGNIFICANT CHANGE UP (ref 30–115)
ALP SERPL-CCNC: 81 U/L — SIGNIFICANT CHANGE UP (ref 30–115)
ALP SERPL-CCNC: 84 U/L — SIGNIFICANT CHANGE UP (ref 30–115)
ALP SERPL-CCNC: 86 U/L — SIGNIFICANT CHANGE UP (ref 30–115)
ALP SERPL-CCNC: 86 U/L — SIGNIFICANT CHANGE UP (ref 30–115)
ALP SERPL-CCNC: 88 U/L — SIGNIFICANT CHANGE UP (ref 30–115)
ALP SERPL-CCNC: 90 U/L — SIGNIFICANT CHANGE UP (ref 30–115)
ALP SERPL-CCNC: 90 U/L — SIGNIFICANT CHANGE UP (ref 30–115)
ALP SERPL-CCNC: 91 U/L — SIGNIFICANT CHANGE UP (ref 30–115)
ALP SERPL-CCNC: 93 U/L — SIGNIFICANT CHANGE UP (ref 30–115)
ALP SERPL-CCNC: 93 U/L — SIGNIFICANT CHANGE UP (ref 30–115)
ALP SERPL-CCNC: 94 U/L — SIGNIFICANT CHANGE UP (ref 30–115)
ALP SERPL-CCNC: 97 U/L — SIGNIFICANT CHANGE UP (ref 30–115)
ALP SERPL-CCNC: 98 U/L — SIGNIFICANT CHANGE UP (ref 30–115)
ALT FLD-CCNC: 11 U/L — SIGNIFICANT CHANGE UP (ref 0–41)
ALT FLD-CCNC: 12 U/L — SIGNIFICANT CHANGE UP (ref 0–41)
ALT FLD-CCNC: 13 U/L — SIGNIFICANT CHANGE UP (ref 0–41)
ALT FLD-CCNC: 14 U/L — SIGNIFICANT CHANGE UP (ref 0–41)
ALT FLD-CCNC: 15 U/L — SIGNIFICANT CHANGE UP (ref 0–41)
ALT FLD-CCNC: 15 U/L — SIGNIFICANT CHANGE UP (ref 0–41)
ALT FLD-CCNC: 16 U/L — SIGNIFICANT CHANGE UP (ref 0–41)
ALT FLD-CCNC: 16 U/L — SIGNIFICANT CHANGE UP (ref 0–41)
ALT FLD-CCNC: 18 U/L — SIGNIFICANT CHANGE UP (ref 0–41)
ALT FLD-CCNC: 22 U/L — SIGNIFICANT CHANGE UP (ref 0–41)
ALT FLD-CCNC: 23 U/L — SIGNIFICANT CHANGE UP (ref 0–41)
ALT FLD-CCNC: 24 U/L — SIGNIFICANT CHANGE UP (ref 0–41)
ALT FLD-CCNC: 25 U/L — SIGNIFICANT CHANGE UP (ref 0–41)
ALT FLD-CCNC: 25 U/L — SIGNIFICANT CHANGE UP (ref 0–41)
ALT FLD-CCNC: 26 U/L — SIGNIFICANT CHANGE UP (ref 0–41)
ALT FLD-CCNC: 28 U/L — SIGNIFICANT CHANGE UP (ref 0–41)
ALT FLD-CCNC: 31 U/L — SIGNIFICANT CHANGE UP (ref 0–41)
ALT FLD-CCNC: 32 U/L — SIGNIFICANT CHANGE UP (ref 0–41)
ALT FLD-CCNC: 32 U/L — SIGNIFICANT CHANGE UP (ref 0–41)
ALT FLD-CCNC: 33 U/L — SIGNIFICANT CHANGE UP (ref 0–41)
ALT FLD-CCNC: 37 U/L — SIGNIFICANT CHANGE UP (ref 0–41)
ALT FLD-CCNC: 38 U/L — SIGNIFICANT CHANGE UP (ref 0–41)
ALT FLD-CCNC: 40 U/L — SIGNIFICANT CHANGE UP (ref 0–41)
ALT FLD-CCNC: 52 U/L — HIGH (ref 0–41)
ALT FLD-CCNC: 57 U/L — HIGH (ref 0–41)
ALT FLD-CCNC: 63 U/L — HIGH (ref 0–41)
ALT FLD-CCNC: 68 U/L — HIGH (ref 0–41)
ALT FLD-CCNC: 69 U/L — HIGH (ref 0–41)
ALT FLD-CCNC: 89 U/L — HIGH (ref 0–41)
ALT FLD-CCNC: 89 U/L — HIGH (ref 0–41)
AMMONIA BLD-MCNC: 33 UMOL/L — SIGNIFICANT CHANGE UP (ref 11–55)
ANION GAP SERPL CALC-SCNC: 12 MMOL/L — SIGNIFICANT CHANGE UP (ref 7–14)
ANION GAP SERPL CALC-SCNC: 13 MMOL/L — SIGNIFICANT CHANGE UP (ref 7–14)
ANION GAP SERPL CALC-SCNC: 14 MMOL/L — SIGNIFICANT CHANGE UP (ref 7–14)
ANION GAP SERPL CALC-SCNC: 15 MMOL/L — HIGH (ref 7–14)
ANION GAP SERPL CALC-SCNC: 16 MMOL/L — HIGH (ref 7–14)
ANION GAP SERPL CALC-SCNC: 17 MMOL/L — HIGH (ref 7–14)
ANION GAP SERPL CALC-SCNC: 18 MMOL/L — HIGH (ref 7–14)
ANION GAP SERPL CALC-SCNC: 19 MMOL/L — HIGH (ref 7–14)
ANION GAP SERPL CALC-SCNC: 20 MMOL/L — HIGH (ref 7–14)
ANION GAP SERPL CALC-SCNC: 20 MMOL/L — HIGH (ref 7–14)
ANION GAP SERPL CALC-SCNC: 21 MMOL/L — HIGH (ref 7–14)
ANION GAP SERPL CALC-SCNC: 22 MMOL/L — HIGH (ref 7–14)
ANION GAP SERPL CALC-SCNC: 23 MMOL/L — HIGH (ref 7–14)
ANION GAP SERPL CALC-SCNC: 9 MMOL/L — SIGNIFICANT CHANGE UP (ref 7–14)
ANISOCYTOSIS BLD QL: SLIGHT — SIGNIFICANT CHANGE UP
APPEARANCE UR: ABNORMAL
APTT BLD: 104.2 SEC — CRITICAL HIGH (ref 27–39.2)
APTT BLD: 139.2 SEC — CRITICAL HIGH (ref 27–39.2)
APTT BLD: 140.9 SEC — CRITICAL HIGH (ref 27–39.2)
APTT BLD: 141.8 SEC — CRITICAL HIGH (ref 27–39.2)
APTT BLD: 150.3 SEC — CRITICAL HIGH (ref 27–39.2)
APTT BLD: 178.4 SEC — CRITICAL HIGH (ref 27–39.2)
APTT BLD: 27.9 SEC — SIGNIFICANT CHANGE UP (ref 27–39.2)
APTT BLD: 29 SEC — SIGNIFICANT CHANGE UP (ref 27–39.2)
APTT BLD: 31.9 SEC — SIGNIFICANT CHANGE UP (ref 27–39.2)
APTT BLD: 32.3 SEC — SIGNIFICANT CHANGE UP (ref 27–39.2)
APTT BLD: 32.5 SEC — SIGNIFICANT CHANGE UP (ref 27–39.2)
APTT BLD: 33.1 SEC — SIGNIFICANT CHANGE UP (ref 27–39.2)
APTT BLD: 33.2 SEC — SIGNIFICANT CHANGE UP (ref 27–39.2)
APTT BLD: 33.8 SEC — SIGNIFICANT CHANGE UP (ref 27–39.2)
APTT BLD: 34.3 SEC — SIGNIFICANT CHANGE UP (ref 27–39.2)
APTT BLD: 34.4 SEC — SIGNIFICANT CHANGE UP (ref 27–39.2)
APTT BLD: 36 SEC — SIGNIFICANT CHANGE UP (ref 27–39.2)
APTT BLD: 36 SEC — SIGNIFICANT CHANGE UP (ref 27–39.2)
APTT BLD: 38 SEC — SIGNIFICANT CHANGE UP (ref 27–39.2)
APTT BLD: 40 SEC — HIGH (ref 27–39.2)
APTT BLD: 40.7 SEC — HIGH (ref 27–39.2)
APTT BLD: 41.9 SEC — HIGH (ref 27–39.2)
APTT BLD: 41.9 SEC — HIGH (ref 27–39.2)
APTT BLD: 45.3 SEC — HIGH (ref 27–39.2)
APTT BLD: 46.2 SEC — HIGH (ref 27–39.2)
APTT BLD: 46.7 SEC — HIGH (ref 27–39.2)
APTT BLD: 47.5 SEC — HIGH (ref 27–39.2)
APTT BLD: 47.5 SEC — HIGH (ref 27–39.2)
APTT BLD: 47.9 SEC — HIGH (ref 27–39.2)
APTT BLD: 48.4 SEC — HIGH (ref 27–39.2)
APTT BLD: 49.2 SEC — HIGH (ref 27–39.2)
APTT BLD: 49.8 SEC — HIGH (ref 27–39.2)
APTT BLD: 49.9 SEC — HIGH (ref 27–39.2)
APTT BLD: 51.6 SEC — HIGH (ref 27–39.2)
APTT BLD: 52.8 SEC — HIGH (ref 27–39.2)
APTT BLD: 53.6 SEC — HIGH (ref 27–39.2)
APTT BLD: 54.2 SEC — HIGH (ref 27–39.2)
APTT BLD: 54.3 SEC — HIGH (ref 27–39.2)
APTT BLD: 54.5 SEC — HIGH (ref 27–39.2)
APTT BLD: 54.7 SEC — HIGH (ref 27–39.2)
APTT BLD: 55 SEC — HIGH (ref 27–39.2)
APTT BLD: 55.6 SEC — HIGH (ref 27–39.2)
APTT BLD: 55.8 SEC — HIGH (ref 27–39.2)
APTT BLD: 55.8 SEC — HIGH (ref 27–39.2)
APTT BLD: 56.6 SEC — HIGH (ref 27–39.2)
APTT BLD: 58 SEC — HIGH (ref 27–39.2)
APTT BLD: 58.2 SEC — HIGH (ref 27–39.2)
APTT BLD: 58.8 SEC — HIGH (ref 27–39.2)
APTT BLD: 59.3 SEC — HIGH (ref 27–39.2)
APTT BLD: 59.6 SEC — HIGH (ref 27–39.2)
APTT BLD: 59.7 SEC — HIGH (ref 27–39.2)
APTT BLD: 59.9 SEC — HIGH (ref 27–39.2)
APTT BLD: 60.1 SEC — HIGH (ref 27–39.2)
APTT BLD: 60.7 SEC — HIGH (ref 27–39.2)
APTT BLD: 61.6 SEC — HIGH (ref 27–39.2)
APTT BLD: 62.1 SEC — HIGH (ref 27–39.2)
APTT BLD: 63.8 SEC — HIGH (ref 27–39.2)
APTT BLD: 64.4 SEC — HIGH (ref 27–39.2)
APTT BLD: 64.6 SEC — HIGH (ref 27–39.2)
APTT BLD: 64.6 SEC — HIGH (ref 27–39.2)
APTT BLD: 65.1 SEC — HIGH (ref 27–39.2)
APTT BLD: 65.2 SEC — HIGH (ref 27–39.2)
APTT BLD: 66 SEC — HIGH (ref 27–39.2)
APTT BLD: 66.3 SEC — HIGH (ref 27–39.2)
APTT BLD: 67.6 SEC — HIGH (ref 27–39.2)
APTT BLD: 68.1 SEC — HIGH (ref 27–39.2)
APTT BLD: 68.2 SEC — HIGH (ref 27–39.2)
APTT BLD: 68.7 SEC — HIGH (ref 27–39.2)
APTT BLD: 69.6 SEC — HIGH (ref 27–39.2)
APTT BLD: 70.4 SEC — CRITICAL HIGH (ref 27–39.2)
APTT BLD: 70.5 SEC — CRITICAL HIGH (ref 27–39.2)
APTT BLD: 71 SEC — CRITICAL HIGH (ref 27–39.2)
APTT BLD: 71.3 SEC — CRITICAL HIGH (ref 27–39.2)
APTT BLD: 71.6 SEC — CRITICAL HIGH (ref 27–39.2)
APTT BLD: 72.1 SEC — CRITICAL HIGH (ref 27–39.2)
APTT BLD: 73.1 SEC — CRITICAL HIGH (ref 27–39.2)
APTT BLD: 74.2 SEC — CRITICAL HIGH (ref 27–39.2)
APTT BLD: 75 SEC — CRITICAL HIGH (ref 27–39.2)
APTT BLD: 78.1 SEC — CRITICAL HIGH (ref 27–39.2)
APTT BLD: 78.4 SEC — CRITICAL HIGH (ref 27–39.2)
APTT BLD: 78.6 SEC — CRITICAL HIGH (ref 27–39.2)
APTT BLD: 81.5 SEC — CRITICAL HIGH (ref 27–39.2)
APTT BLD: 82.9 SEC — CRITICAL HIGH (ref 27–39.2)
APTT BLD: 83.3 SEC — CRITICAL HIGH (ref 27–39.2)
APTT BLD: 87.1 SEC — CRITICAL HIGH (ref 27–39.2)
APTT BLD: 87.9 SEC — CRITICAL HIGH (ref 27–39.2)
APTT BLD: 88.5 SEC — CRITICAL HIGH (ref 27–39.2)
APTT BLD: 89.7 SEC — CRITICAL HIGH (ref 27–39.2)
AST SERPL-CCNC: 108 U/L — HIGH (ref 0–41)
AST SERPL-CCNC: 144 U/L — HIGH (ref 0–41)
AST SERPL-CCNC: 174 U/L — HIGH (ref 0–41)
AST SERPL-CCNC: 197 U/L — HIGH (ref 0–41)
AST SERPL-CCNC: 22 U/L — SIGNIFICANT CHANGE UP (ref 0–41)
AST SERPL-CCNC: 24 U/L — SIGNIFICANT CHANGE UP (ref 0–41)
AST SERPL-CCNC: 24 U/L — SIGNIFICANT CHANGE UP (ref 0–41)
AST SERPL-CCNC: 25 U/L — SIGNIFICANT CHANGE UP (ref 0–41)
AST SERPL-CCNC: 253 U/L — HIGH (ref 0–41)
AST SERPL-CCNC: 26 U/L — SIGNIFICANT CHANGE UP (ref 0–41)
AST SERPL-CCNC: 27 U/L — SIGNIFICANT CHANGE UP (ref 0–41)
AST SERPL-CCNC: 27 U/L — SIGNIFICANT CHANGE UP (ref 0–41)
AST SERPL-CCNC: 28 U/L — SIGNIFICANT CHANGE UP (ref 0–41)
AST SERPL-CCNC: 28 U/L — SIGNIFICANT CHANGE UP (ref 0–41)
AST SERPL-CCNC: 29 U/L — SIGNIFICANT CHANGE UP (ref 0–41)
AST SERPL-CCNC: 30 U/L — SIGNIFICANT CHANGE UP (ref 0–41)
AST SERPL-CCNC: 31 U/L — SIGNIFICANT CHANGE UP (ref 0–41)
AST SERPL-CCNC: 32 U/L — SIGNIFICANT CHANGE UP (ref 0–41)
AST SERPL-CCNC: 32 U/L — SIGNIFICANT CHANGE UP (ref 0–41)
AST SERPL-CCNC: 33 U/L — SIGNIFICANT CHANGE UP (ref 0–41)
AST SERPL-CCNC: 35 U/L — SIGNIFICANT CHANGE UP (ref 0–41)
AST SERPL-CCNC: 355 U/L — HIGH (ref 0–41)
AST SERPL-CCNC: 37 U/L — SIGNIFICANT CHANGE UP (ref 0–41)
AST SERPL-CCNC: 38 U/L — SIGNIFICANT CHANGE UP (ref 0–41)
AST SERPL-CCNC: 382 U/L — HIGH (ref 0–41)
AST SERPL-CCNC: 39 U/L — SIGNIFICANT CHANGE UP (ref 0–41)
AST SERPL-CCNC: 39 U/L — SIGNIFICANT CHANGE UP (ref 0–41)
AST SERPL-CCNC: 40 U/L — SIGNIFICANT CHANGE UP (ref 0–41)
AST SERPL-CCNC: 42 U/L — HIGH (ref 0–41)
AST SERPL-CCNC: 43 U/L — HIGH (ref 0–41)
AST SERPL-CCNC: 44 U/L — HIGH (ref 0–41)
AST SERPL-CCNC: 45 U/L — HIGH (ref 0–41)
AST SERPL-CCNC: 45 U/L — HIGH (ref 0–41)
AST SERPL-CCNC: 49 U/L — HIGH (ref 0–41)
AST SERPL-CCNC: 53 U/L — HIGH (ref 0–41)
AST SERPL-CCNC: 70 U/L — HIGH (ref 0–41)
BACTERIA # UR AUTO: NEGATIVE — SIGNIFICANT CHANGE UP
BASE EXCESS BLDA CALC-SCNC: 0.8 MMOL/L — SIGNIFICANT CHANGE UP (ref -2–2)
BASE EXCESS BLDV CALC-SCNC: 5.6 MMOL/L — HIGH (ref -2–2)
BASOPHILS # BLD AUTO: 0 K/UL — SIGNIFICANT CHANGE UP (ref 0–0.2)
BASOPHILS # BLD AUTO: 0.01 K/UL — SIGNIFICANT CHANGE UP (ref 0–0.2)
BASOPHILS # BLD AUTO: 0.03 K/UL — SIGNIFICANT CHANGE UP (ref 0–0.2)
BASOPHILS # BLD AUTO: 0.04 K/UL — SIGNIFICANT CHANGE UP (ref 0–0.2)
BASOPHILS # BLD AUTO: 0.04 K/UL — SIGNIFICANT CHANGE UP (ref 0–0.2)
BASOPHILS # BLD AUTO: 0.05 K/UL — SIGNIFICANT CHANGE UP (ref 0–0.2)
BASOPHILS # BLD AUTO: 0.06 K/UL — SIGNIFICANT CHANGE UP (ref 0–0.2)
BASOPHILS # BLD AUTO: 0.07 K/UL — SIGNIFICANT CHANGE UP (ref 0–0.2)
BASOPHILS # BLD AUTO: 0.08 K/UL — SIGNIFICANT CHANGE UP (ref 0–0.2)
BASOPHILS # BLD AUTO: 0.09 K/UL — SIGNIFICANT CHANGE UP (ref 0–0.2)
BASOPHILS # BLD AUTO: 0.1 K/UL — SIGNIFICANT CHANGE UP (ref 0–0.2)
BASOPHILS # BLD AUTO: 0.11 K/UL — SIGNIFICANT CHANGE UP (ref 0–0.2)
BASOPHILS # BLD AUTO: 0.11 K/UL — SIGNIFICANT CHANGE UP (ref 0–0.2)
BASOPHILS # BLD AUTO: 0.12 K/UL — SIGNIFICANT CHANGE UP (ref 0–0.2)
BASOPHILS # BLD AUTO: 0.12 K/UL — SIGNIFICANT CHANGE UP (ref 0–0.2)
BASOPHILS # BLD AUTO: 0.13 K/UL — SIGNIFICANT CHANGE UP (ref 0–0.2)
BASOPHILS # BLD AUTO: 0.13 K/UL — SIGNIFICANT CHANGE UP (ref 0–0.2)
BASOPHILS # BLD AUTO: 0.15 K/UL — SIGNIFICANT CHANGE UP (ref 0–0.2)
BASOPHILS # BLD AUTO: 0.15 K/UL — SIGNIFICANT CHANGE UP (ref 0–0.2)
BASOPHILS # BLD AUTO: 0.17 K/UL — SIGNIFICANT CHANGE UP (ref 0–0.2)
BASOPHILS # BLD AUTO: 0.17 K/UL — SIGNIFICANT CHANGE UP (ref 0–0.2)
BASOPHILS # BLD AUTO: 0.18 K/UL — SIGNIFICANT CHANGE UP (ref 0–0.2)
BASOPHILS NFR BLD AUTO: 0 % — SIGNIFICANT CHANGE UP (ref 0–1)
BASOPHILS NFR BLD AUTO: 0.1 % — SIGNIFICANT CHANGE UP (ref 0–1)
BASOPHILS NFR BLD AUTO: 0.2 % — SIGNIFICANT CHANGE UP (ref 0–1)
BASOPHILS NFR BLD AUTO: 0.3 % — SIGNIFICANT CHANGE UP (ref 0–1)
BASOPHILS NFR BLD AUTO: 0.4 % — SIGNIFICANT CHANGE UP (ref 0–1)
BASOPHILS NFR BLD AUTO: 0.5 % — SIGNIFICANT CHANGE UP (ref 0–1)
BASOPHILS NFR BLD AUTO: 0.6 % — SIGNIFICANT CHANGE UP (ref 0–1)
BASOPHILS NFR BLD AUTO: 0.7 % — SIGNIFICANT CHANGE UP (ref 0–1)
BASOPHILS NFR BLD AUTO: 0.8 % — SIGNIFICANT CHANGE UP (ref 0–1)
BASOPHILS NFR BLD AUTO: 0.9 % — SIGNIFICANT CHANGE UP (ref 0–1)
BASOPHILS NFR BLD AUTO: 1 % — SIGNIFICANT CHANGE UP (ref 0–1)
BASOPHILS NFR BLD AUTO: 1 % — SIGNIFICANT CHANGE UP (ref 0–1)
BASOPHILS NFR BLD AUTO: 1.1 % — HIGH (ref 0–1)
BASOPHILS NFR BLD AUTO: 1.2 % — HIGH (ref 0–1)
BILIRUB SERPL-MCNC: 0.6 MG/DL — SIGNIFICANT CHANGE UP (ref 0.2–1.2)
BILIRUB SERPL-MCNC: 0.7 MG/DL — SIGNIFICANT CHANGE UP (ref 0.2–1.2)
BILIRUB SERPL-MCNC: 0.8 MG/DL — SIGNIFICANT CHANGE UP (ref 0.2–1.2)
BILIRUB SERPL-MCNC: 0.9 MG/DL — SIGNIFICANT CHANGE UP (ref 0.2–1.2)
BILIRUB SERPL-MCNC: 1 MG/DL — SIGNIFICANT CHANGE UP (ref 0.2–1.2)
BILIRUB SERPL-MCNC: 1.1 MG/DL — SIGNIFICANT CHANGE UP (ref 0.2–1.2)
BILIRUB SERPL-MCNC: 1.2 MG/DL — SIGNIFICANT CHANGE UP (ref 0.2–1.2)
BILIRUB SERPL-MCNC: 1.3 MG/DL — HIGH (ref 0.2–1.2)
BILIRUB UR-MCNC: NEGATIVE — SIGNIFICANT CHANGE UP
BLD GP AB SCN SERPL QL: SIGNIFICANT CHANGE UP
BUN SERPL-MCNC: 100 MG/DL — CRITICAL HIGH (ref 10–20)
BUN SERPL-MCNC: 102 MG/DL — CRITICAL HIGH (ref 10–20)
BUN SERPL-MCNC: 106 MG/DL — CRITICAL HIGH (ref 10–20)
BUN SERPL-MCNC: 127 MG/DL — CRITICAL HIGH (ref 10–20)
BUN SERPL-MCNC: 37 MG/DL — HIGH (ref 10–20)
BUN SERPL-MCNC: 39 MG/DL — HIGH (ref 10–20)
BUN SERPL-MCNC: 40 MG/DL — HIGH (ref 10–20)
BUN SERPL-MCNC: 43 MG/DL — HIGH (ref 10–20)
BUN SERPL-MCNC: 44 MG/DL — HIGH (ref 10–20)
BUN SERPL-MCNC: 46 MG/DL — HIGH (ref 10–20)
BUN SERPL-MCNC: 48 MG/DL — HIGH (ref 10–20)
BUN SERPL-MCNC: 50 MG/DL — HIGH (ref 10–20)
BUN SERPL-MCNC: 52 MG/DL — HIGH (ref 10–20)
BUN SERPL-MCNC: 53 MG/DL — HIGH (ref 10–20)
BUN SERPL-MCNC: 54 MG/DL — HIGH (ref 10–20)
BUN SERPL-MCNC: 60 MG/DL — HIGH (ref 10–20)
BUN SERPL-MCNC: 61 MG/DL — CRITICAL HIGH (ref 10–20)
BUN SERPL-MCNC: 61 MG/DL — CRITICAL HIGH (ref 10–20)
BUN SERPL-MCNC: 63 MG/DL — CRITICAL HIGH (ref 10–20)
BUN SERPL-MCNC: 65 MG/DL — CRITICAL HIGH (ref 10–20)
BUN SERPL-MCNC: 67 MG/DL — CRITICAL HIGH (ref 10–20)
BUN SERPL-MCNC: 67 MG/DL — CRITICAL HIGH (ref 10–20)
BUN SERPL-MCNC: 69 MG/DL — CRITICAL HIGH (ref 10–20)
BUN SERPL-MCNC: 70 MG/DL — CRITICAL HIGH (ref 10–20)
BUN SERPL-MCNC: 73 MG/DL — CRITICAL HIGH (ref 10–20)
BUN SERPL-MCNC: 74 MG/DL — CRITICAL HIGH (ref 10–20)
BUN SERPL-MCNC: 77 MG/DL — CRITICAL HIGH (ref 10–20)
BUN SERPL-MCNC: 77 MG/DL — CRITICAL HIGH (ref 10–20)
BUN SERPL-MCNC: 78 MG/DL — CRITICAL HIGH (ref 10–20)
BUN SERPL-MCNC: 79 MG/DL — CRITICAL HIGH (ref 10–20)
BUN SERPL-MCNC: 80 MG/DL — CRITICAL HIGH (ref 10–20)
BUN SERPL-MCNC: 84 MG/DL — CRITICAL HIGH (ref 10–20)
BUN SERPL-MCNC: 84 MG/DL — CRITICAL HIGH (ref 10–20)
BUN SERPL-MCNC: 87 MG/DL — CRITICAL HIGH (ref 10–20)
BUN SERPL-MCNC: 88 MG/DL — CRITICAL HIGH (ref 10–20)
BUN SERPL-MCNC: 88 MG/DL — CRITICAL HIGH (ref 10–20)
BUN SERPL-MCNC: 89 MG/DL — CRITICAL HIGH (ref 10–20)
BUN SERPL-MCNC: 93 MG/DL — CRITICAL HIGH (ref 10–20)
BUN SERPL-MCNC: 93 MG/DL — CRITICAL HIGH (ref 10–20)
BUN SERPL-MCNC: 97 MG/DL — CRITICAL HIGH (ref 10–20)
C DIFF BY PCR RESULT: NEGATIVE — SIGNIFICANT CHANGE UP
C DIFF TOX GENS STL QL NAA+PROBE: SIGNIFICANT CHANGE UP
CA-I SERPL-SCNC: 1.13 MMOL/L — SIGNIFICANT CHANGE UP (ref 1.12–1.3)
CALCIUM SERPL-MCNC: 7.2 MG/DL — LOW (ref 8.5–10.1)
CALCIUM SERPL-MCNC: 7.2 MG/DL — LOW (ref 8.5–10.1)
CALCIUM SERPL-MCNC: 7.3 MG/DL — LOW (ref 8.5–10.1)
CALCIUM SERPL-MCNC: 7.4 MG/DL — LOW (ref 8.5–10.1)
CALCIUM SERPL-MCNC: 7.5 MG/DL — LOW (ref 8.5–10.1)
CALCIUM SERPL-MCNC: 7.5 MG/DL — LOW (ref 8.5–10.1)
CALCIUM SERPL-MCNC: 7.6 MG/DL — LOW (ref 8.5–10.1)
CALCIUM SERPL-MCNC: 7.7 MG/DL — LOW (ref 8.5–10.1)
CALCIUM SERPL-MCNC: 7.8 MG/DL — LOW (ref 8.5–10.1)
CALCIUM SERPL-MCNC: 7.9 MG/DL — LOW (ref 8.5–10.1)
CALCIUM SERPL-MCNC: 7.9 MG/DL — LOW (ref 8.5–10.1)
CALCIUM SERPL-MCNC: 8 MG/DL — LOW (ref 8.5–10.1)
CALCIUM SERPL-MCNC: 8.1 MG/DL — LOW (ref 8.5–10.1)
CALCIUM SERPL-MCNC: 8.2 MG/DL — LOW (ref 8.5–10.1)
CALCIUM SERPL-MCNC: 8.3 MG/DL — LOW (ref 8.5–10.1)
CALCIUM SERPL-MCNC: 8.5 MG/DL — SIGNIFICANT CHANGE UP (ref 8.5–10.1)
CALCIUM SERPL-MCNC: 8.5 MG/DL — SIGNIFICANT CHANGE UP (ref 8.5–10.1)
CALCIUM SERPL-MCNC: 8.6 MG/DL — SIGNIFICANT CHANGE UP (ref 8.5–10.1)
CALCIUM SERPL-MCNC: 8.7 MG/DL — SIGNIFICANT CHANGE UP (ref 8.5–10.1)
CALCIUM SERPL-MCNC: 8.9 MG/DL — SIGNIFICANT CHANGE UP (ref 8.5–10.1)
CHLORIDE SERPL-SCNC: 100 MMOL/L — SIGNIFICANT CHANGE UP (ref 98–110)
CHLORIDE SERPL-SCNC: 101 MMOL/L — SIGNIFICANT CHANGE UP (ref 98–110)
CHLORIDE SERPL-SCNC: 102 MMOL/L — SIGNIFICANT CHANGE UP (ref 98–110)
CHLORIDE SERPL-SCNC: 102 MMOL/L — SIGNIFICANT CHANGE UP (ref 98–110)
CHLORIDE SERPL-SCNC: 103 MMOL/L — SIGNIFICANT CHANGE UP (ref 98–110)
CHLORIDE SERPL-SCNC: 103 MMOL/L — SIGNIFICANT CHANGE UP (ref 98–110)
CHLORIDE SERPL-SCNC: 104 MMOL/L — SIGNIFICANT CHANGE UP (ref 98–110)
CHLORIDE SERPL-SCNC: 104 MMOL/L — SIGNIFICANT CHANGE UP (ref 98–110)
CHLORIDE SERPL-SCNC: 90 MMOL/L — LOW (ref 98–110)
CHLORIDE SERPL-SCNC: 91 MMOL/L — LOW (ref 98–110)
CHLORIDE SERPL-SCNC: 92 MMOL/L — LOW (ref 98–110)
CHLORIDE SERPL-SCNC: 93 MMOL/L — LOW (ref 98–110)
CHLORIDE SERPL-SCNC: 94 MMOL/L — LOW (ref 98–110)
CHLORIDE SERPL-SCNC: 95 MMOL/L — LOW (ref 98–110)
CHLORIDE SERPL-SCNC: 96 MMOL/L — LOW (ref 98–110)
CHLORIDE SERPL-SCNC: 96 MMOL/L — LOW (ref 98–110)
CHLORIDE SERPL-SCNC: 97 MMOL/L — LOW (ref 98–110)
CHLORIDE SERPL-SCNC: 97 MMOL/L — LOW (ref 98–110)
CHLORIDE SERPL-SCNC: 98 MMOL/L — SIGNIFICANT CHANGE UP (ref 98–110)
CHLORIDE SERPL-SCNC: 98 MMOL/L — SIGNIFICANT CHANGE UP (ref 98–110)
CHLORIDE SERPL-SCNC: 99 MMOL/L — SIGNIFICANT CHANGE UP (ref 98–110)
CK MB CFR SERPL CALC: 18.3 NG/ML — HIGH (ref 0.6–6.3)
CK MB CFR SERPL CALC: 20.9 NG/ML — HIGH (ref 0.6–6.3)
CK SERPL-CCNC: 56 U/L — SIGNIFICANT CHANGE UP (ref 0–225)
CK SERPL-CCNC: 6283 U/L — HIGH (ref 0–225)
CK SERPL-CCNC: 66 U/L — SIGNIFICANT CHANGE UP (ref 0–225)
CK SERPL-CCNC: 6997 U/L — HIGH (ref 0–225)
CO2 SERPL-SCNC: 16 MMOL/L — LOW (ref 17–32)
CO2 SERPL-SCNC: 20 MMOL/L — SIGNIFICANT CHANGE UP (ref 17–32)
CO2 SERPL-SCNC: 21 MMOL/L — SIGNIFICANT CHANGE UP (ref 17–32)
CO2 SERPL-SCNC: 22 MMOL/L — SIGNIFICANT CHANGE UP (ref 17–32)
CO2 SERPL-SCNC: 23 MMOL/L — SIGNIFICANT CHANGE UP (ref 17–32)
CO2 SERPL-SCNC: 24 MMOL/L — SIGNIFICANT CHANGE UP (ref 17–32)
CO2 SERPL-SCNC: 25 MMOL/L — SIGNIFICANT CHANGE UP (ref 17–32)
CO2 SERPL-SCNC: 26 MMOL/L — SIGNIFICANT CHANGE UP (ref 17–32)
CO2 SERPL-SCNC: 27 MMOL/L — SIGNIFICANT CHANGE UP (ref 17–32)
CO2 SERPL-SCNC: 27 MMOL/L — SIGNIFICANT CHANGE UP (ref 17–32)
CO2 SERPL-SCNC: 28 MMOL/L — SIGNIFICANT CHANGE UP (ref 17–32)
CO2 SERPL-SCNC: 28 MMOL/L — SIGNIFICANT CHANGE UP (ref 17–32)
CO2 SERPL-SCNC: 29 MMOL/L — SIGNIFICANT CHANGE UP (ref 17–32)
CO2 SERPL-SCNC: 30 MMOL/L — SIGNIFICANT CHANGE UP (ref 17–32)
CO2 SERPL-SCNC: 30 MMOL/L — SIGNIFICANT CHANGE UP (ref 17–32)
COLOR SPEC: YELLOW — SIGNIFICANT CHANGE UP
COMMENT - URINE: SIGNIFICANT CHANGE UP
CREAT SERPL-MCNC: 3.3 MG/DL — HIGH (ref 0.7–1.5)
CREAT SERPL-MCNC: 3.6 MG/DL — HIGH (ref 0.7–1.5)
CREAT SERPL-MCNC: 4.1 MG/DL — CRITICAL HIGH (ref 0.7–1.5)
CREAT SERPL-MCNC: 4.1 MG/DL — CRITICAL HIGH (ref 0.7–1.5)
CREAT SERPL-MCNC: 4.3 MG/DL — CRITICAL HIGH (ref 0.7–1.5)
CREAT SERPL-MCNC: 4.6 MG/DL — CRITICAL HIGH (ref 0.7–1.5)
CREAT SERPL-MCNC: 4.7 MG/DL — CRITICAL HIGH (ref 0.7–1.5)
CREAT SERPL-MCNC: 4.7 MG/DL — CRITICAL HIGH (ref 0.7–1.5)
CREAT SERPL-MCNC: 5 MG/DL — CRITICAL HIGH (ref 0.7–1.5)
CREAT SERPL-MCNC: 5.1 MG/DL — CRITICAL HIGH (ref 0.7–1.5)
CREAT SERPL-MCNC: 5.2 MG/DL — CRITICAL HIGH (ref 0.7–1.5)
CREAT SERPL-MCNC: 5.3 MG/DL — CRITICAL HIGH (ref 0.7–1.5)
CREAT SERPL-MCNC: 5.3 MG/DL — CRITICAL HIGH (ref 0.7–1.5)
CREAT SERPL-MCNC: 5.4 MG/DL — CRITICAL HIGH (ref 0.7–1.5)
CREAT SERPL-MCNC: 5.5 MG/DL — CRITICAL HIGH (ref 0.7–1.5)
CREAT SERPL-MCNC: 5.5 MG/DL — CRITICAL HIGH (ref 0.7–1.5)
CREAT SERPL-MCNC: 5.6 MG/DL — CRITICAL HIGH (ref 0.7–1.5)
CREAT SERPL-MCNC: 5.7 MG/DL — CRITICAL HIGH (ref 0.7–1.5)
CREAT SERPL-MCNC: 5.8 MG/DL — CRITICAL HIGH (ref 0.7–1.5)
CREAT SERPL-MCNC: 5.8 MG/DL — CRITICAL HIGH (ref 0.7–1.5)
CREAT SERPL-MCNC: 5.9 MG/DL — CRITICAL HIGH (ref 0.7–1.5)
CREAT SERPL-MCNC: 5.9 MG/DL — CRITICAL HIGH (ref 0.7–1.5)
CREAT SERPL-MCNC: 6 MG/DL — CRITICAL HIGH (ref 0.7–1.5)
CREAT SERPL-MCNC: 6.1 MG/DL — CRITICAL HIGH (ref 0.7–1.5)
CREAT SERPL-MCNC: 6.2 MG/DL — CRITICAL HIGH (ref 0.7–1.5)
CREAT SERPL-MCNC: 6.2 MG/DL — CRITICAL HIGH (ref 0.7–1.5)
CREAT SERPL-MCNC: 6.4 MG/DL — CRITICAL HIGH (ref 0.7–1.5)
CREAT SERPL-MCNC: 6.4 MG/DL — CRITICAL HIGH (ref 0.7–1.5)
CREAT SERPL-MCNC: 6.5 MG/DL — CRITICAL HIGH (ref 0.7–1.5)
CREAT SERPL-MCNC: 6.5 MG/DL — CRITICAL HIGH (ref 0.7–1.5)
CREAT SERPL-MCNC: 6.6 MG/DL — CRITICAL HIGH (ref 0.7–1.5)
CREAT SERPL-MCNC: 6.8 MG/DL — CRITICAL HIGH (ref 0.7–1.5)
CREAT SERPL-MCNC: 6.9 MG/DL — CRITICAL HIGH (ref 0.7–1.5)
CREAT SERPL-MCNC: 6.9 MG/DL — CRITICAL HIGH (ref 0.7–1.5)
CREAT SERPL-MCNC: 7 MG/DL — CRITICAL HIGH (ref 0.7–1.5)
CREAT SERPL-MCNC: 7 MG/DL — CRITICAL HIGH (ref 0.7–1.5)
CREAT SERPL-MCNC: 7.3 MG/DL — CRITICAL HIGH (ref 0.7–1.5)
CREAT SERPL-MCNC: 7.3 MG/DL — CRITICAL HIGH (ref 0.7–1.5)
CREAT SERPL-MCNC: 7.5 MG/DL — CRITICAL HIGH (ref 0.7–1.5)
CRP SERPL-MCNC: 3.19 MG/DL — HIGH (ref 0–0.4)
CRP SERPL-MCNC: 3.67 MG/DL — HIGH (ref 0–0.4)
CRP SERPL-MCNC: 3.95 MG/DL — HIGH (ref 0–0.4)
CRP SERPL-MCNC: 4.84 MG/DL — HIGH (ref 0–0.4)
CRP SERPL-MCNC: 4.91 MG/DL — HIGH (ref 0–0.4)
CRP SERPL-MCNC: 6.43 MG/DL — HIGH (ref 0–0.4)
CULTURE RESULTS: SIGNIFICANT CHANGE UP
D DIMER BLD IA.RAPID-MCNC: 1130 NG/ML DDU — HIGH (ref 0–230)
D DIMER BLD IA.RAPID-MCNC: 1183 NG/ML DDU — HIGH (ref 0–230)
D DIMER BLD IA.RAPID-MCNC: 1290 NG/ML DDU — HIGH (ref 0–230)
D DIMER BLD IA.RAPID-MCNC: 1353 NG/ML DDU — HIGH (ref 0–230)
D DIMER BLD IA.RAPID-MCNC: 377 NG/ML DDU — HIGH (ref 0–230)
D DIMER BLD IA.RAPID-MCNC: 685 NG/ML DDU — HIGH (ref 0–230)
D DIMER BLD IA.RAPID-MCNC: 852 NG/ML DDU — HIGH (ref 0–230)
D DIMER BLD IA.RAPID-MCNC: 884 NG/ML DDU — HIGH (ref 0–230)
D DIMER BLD IA.RAPID-MCNC: 896 NG/ML DDU — HIGH (ref 0–230)
DIFF PNL FLD: ABNORMAL
EOSINOPHIL # BLD AUTO: 0 K/UL — SIGNIFICANT CHANGE UP (ref 0–0.7)
EOSINOPHIL # BLD AUTO: 0.01 K/UL — SIGNIFICANT CHANGE UP (ref 0–0.7)
EOSINOPHIL # BLD AUTO: 0.03 K/UL — SIGNIFICANT CHANGE UP (ref 0–0.7)
EOSINOPHIL # BLD AUTO: 0.03 K/UL — SIGNIFICANT CHANGE UP (ref 0–0.7)
EOSINOPHIL # BLD AUTO: 0.04 K/UL — SIGNIFICANT CHANGE UP (ref 0–0.7)
EOSINOPHIL # BLD AUTO: 0.04 K/UL — SIGNIFICANT CHANGE UP (ref 0–0.7)
EOSINOPHIL # BLD AUTO: 0.05 K/UL — SIGNIFICANT CHANGE UP (ref 0–0.7)
EOSINOPHIL # BLD AUTO: 0.06 K/UL — SIGNIFICANT CHANGE UP (ref 0–0.7)
EOSINOPHIL # BLD AUTO: 0.07 K/UL — SIGNIFICANT CHANGE UP (ref 0–0.7)
EOSINOPHIL # BLD AUTO: 0.1 K/UL — SIGNIFICANT CHANGE UP (ref 0–0.7)
EOSINOPHIL # BLD AUTO: 0.12 K/UL — SIGNIFICANT CHANGE UP (ref 0–0.7)
EOSINOPHIL # BLD AUTO: 0.12 K/UL — SIGNIFICANT CHANGE UP (ref 0–0.7)
EOSINOPHIL # BLD AUTO: 0.13 K/UL — SIGNIFICANT CHANGE UP (ref 0–0.7)
EOSINOPHIL # BLD AUTO: 0.14 K/UL — SIGNIFICANT CHANGE UP (ref 0–0.7)
EOSINOPHIL # BLD AUTO: 0.14 K/UL — SIGNIFICANT CHANGE UP (ref 0–0.7)
EOSINOPHIL # BLD AUTO: 0.15 K/UL — SIGNIFICANT CHANGE UP (ref 0–0.7)
EOSINOPHIL # BLD AUTO: 0.16 K/UL — SIGNIFICANT CHANGE UP (ref 0–0.7)
EOSINOPHIL # BLD AUTO: 0.17 K/UL — SIGNIFICANT CHANGE UP (ref 0–0.7)
EOSINOPHIL # BLD AUTO: 0.17 K/UL — SIGNIFICANT CHANGE UP (ref 0–0.7)
EOSINOPHIL # BLD AUTO: 0.18 K/UL — SIGNIFICANT CHANGE UP (ref 0–0.7)
EOSINOPHIL # BLD AUTO: 0.2 K/UL — SIGNIFICANT CHANGE UP (ref 0–0.7)
EOSINOPHIL # BLD AUTO: 0.26 K/UL — SIGNIFICANT CHANGE UP (ref 0–0.7)
EOSINOPHIL # BLD AUTO: 0.31 K/UL — SIGNIFICANT CHANGE UP (ref 0–0.7)
EOSINOPHIL NFR BLD AUTO: 0 % — SIGNIFICANT CHANGE UP (ref 0–8)
EOSINOPHIL NFR BLD AUTO: 0.1 % — SIGNIFICANT CHANGE UP (ref 0–8)
EOSINOPHIL NFR BLD AUTO: 0.2 % — SIGNIFICANT CHANGE UP (ref 0–8)
EOSINOPHIL NFR BLD AUTO: 0.2 % — SIGNIFICANT CHANGE UP (ref 0–8)
EOSINOPHIL NFR BLD AUTO: 0.3 % — SIGNIFICANT CHANGE UP (ref 0–8)
EOSINOPHIL NFR BLD AUTO: 0.4 % — SIGNIFICANT CHANGE UP (ref 0–8)
EOSINOPHIL NFR BLD AUTO: 0.5 % — SIGNIFICANT CHANGE UP (ref 0–8)
EOSINOPHIL NFR BLD AUTO: 0.6 % — SIGNIFICANT CHANGE UP (ref 0–8)
EOSINOPHIL NFR BLD AUTO: 0.7 % — SIGNIFICANT CHANGE UP (ref 0–8)
EOSINOPHIL NFR BLD AUTO: 1.2 % — SIGNIFICANT CHANGE UP (ref 0–8)
EOSINOPHIL NFR BLD AUTO: 1.4 % — SIGNIFICANT CHANGE UP (ref 0–8)
EOSINOPHIL NFR BLD AUTO: 1.4 % — SIGNIFICANT CHANGE UP (ref 0–8)
EOSINOPHIL NFR BLD AUTO: 1.5 % — SIGNIFICANT CHANGE UP (ref 0–8)
EOSINOPHIL NFR BLD AUTO: 1.5 % — SIGNIFICANT CHANGE UP (ref 0–8)
EOSINOPHIL NFR BLD AUTO: 1.9 % — SIGNIFICANT CHANGE UP (ref 0–8)
EOSINOPHIL NFR BLD AUTO: 2.1 % — SIGNIFICANT CHANGE UP (ref 0–8)
EOSINOPHIL NFR BLD AUTO: 2.4 % — SIGNIFICANT CHANGE UP (ref 0–8)
EOSINOPHIL NFR BLD AUTO: 3.7 % — SIGNIFICANT CHANGE UP (ref 0–8)
EPI CELLS # UR: 1 /HPF — SIGNIFICANT CHANGE UP (ref 0–5)
ESTIMATED AVERAGE GLUCOSE: 123 MG/DL — HIGH (ref 68–114)
FERRITIN SERPL-MCNC: 1202 NG/ML — HIGH (ref 30–400)
FERRITIN SERPL-MCNC: 1273 NG/ML — HIGH (ref 30–400)
FERRITIN SERPL-MCNC: 1382 NG/ML — HIGH (ref 30–400)
FERRITIN SERPL-MCNC: 1696 NG/ML — HIGH (ref 30–400)
FERRITIN SERPL-MCNC: 2748 NG/ML — HIGH (ref 30–400)
FERRITIN SERPL-MCNC: 2812 NG/ML — HIGH (ref 30–400)
FERRITIN SERPL-MCNC: 4895 NG/ML — HIGH (ref 30–400)
FERRITIN SERPL-MCNC: 671 NG/ML — HIGH (ref 30–400)
FERRITIN SERPL-MCNC: 983 NG/ML — HIGH (ref 30–400)
FOLATE SERPL-MCNC: 8 NG/ML — SIGNIFICANT CHANGE UP
FUNGITELL: 38 PG/ML — SIGNIFICANT CHANGE UP
GAS PNL BLDA: SIGNIFICANT CHANGE UP
GAS PNL BLDV: 137 MMOL/L — SIGNIFICANT CHANGE UP (ref 136–145)
GAS PNL BLDV: SIGNIFICANT CHANGE UP
GIANT PLATELETS BLD QL SMEAR: PRESENT — SIGNIFICANT CHANGE UP
GLUCOSE BLDC GLUCOMTR-MCNC: 100 MG/DL — HIGH (ref 70–99)
GLUCOSE BLDC GLUCOMTR-MCNC: 101 MG/DL — HIGH (ref 70–99)
GLUCOSE BLDC GLUCOMTR-MCNC: 102 MG/DL — HIGH (ref 70–99)
GLUCOSE BLDC GLUCOMTR-MCNC: 102 MG/DL — HIGH (ref 70–99)
GLUCOSE BLDC GLUCOMTR-MCNC: 104 MG/DL — HIGH (ref 70–99)
GLUCOSE BLDC GLUCOMTR-MCNC: 105 MG/DL — HIGH (ref 70–99)
GLUCOSE BLDC GLUCOMTR-MCNC: 106 MG/DL — HIGH (ref 70–99)
GLUCOSE BLDC GLUCOMTR-MCNC: 107 MG/DL — HIGH (ref 70–99)
GLUCOSE BLDC GLUCOMTR-MCNC: 107 MG/DL — HIGH (ref 70–99)
GLUCOSE BLDC GLUCOMTR-MCNC: 108 MG/DL — HIGH (ref 70–99)
GLUCOSE BLDC GLUCOMTR-MCNC: 109 MG/DL — HIGH (ref 70–99)
GLUCOSE BLDC GLUCOMTR-MCNC: 113 MG/DL — HIGH (ref 70–99)
GLUCOSE BLDC GLUCOMTR-MCNC: 114 MG/DL — HIGH (ref 70–99)
GLUCOSE BLDC GLUCOMTR-MCNC: 114 MG/DL — HIGH (ref 70–99)
GLUCOSE BLDC GLUCOMTR-MCNC: 117 MG/DL — HIGH (ref 70–99)
GLUCOSE BLDC GLUCOMTR-MCNC: 118 MG/DL — HIGH (ref 70–99)
GLUCOSE BLDC GLUCOMTR-MCNC: 118 MG/DL — HIGH (ref 70–99)
GLUCOSE BLDC GLUCOMTR-MCNC: 120 MG/DL — HIGH (ref 70–99)
GLUCOSE BLDC GLUCOMTR-MCNC: 120 MG/DL — HIGH (ref 70–99)
GLUCOSE BLDC GLUCOMTR-MCNC: 121 MG/DL — HIGH (ref 70–99)
GLUCOSE BLDC GLUCOMTR-MCNC: 121 MG/DL — HIGH (ref 70–99)
GLUCOSE BLDC GLUCOMTR-MCNC: 122 MG/DL — HIGH (ref 70–99)
GLUCOSE BLDC GLUCOMTR-MCNC: 126 MG/DL — HIGH (ref 70–99)
GLUCOSE BLDC GLUCOMTR-MCNC: 126 MG/DL — HIGH (ref 70–99)
GLUCOSE BLDC GLUCOMTR-MCNC: 127 MG/DL — HIGH (ref 70–99)
GLUCOSE BLDC GLUCOMTR-MCNC: 127 MG/DL — HIGH (ref 70–99)
GLUCOSE BLDC GLUCOMTR-MCNC: 128 MG/DL — HIGH (ref 70–99)
GLUCOSE BLDC GLUCOMTR-MCNC: 130 MG/DL — HIGH (ref 70–99)
GLUCOSE BLDC GLUCOMTR-MCNC: 130 MG/DL — HIGH (ref 70–99)
GLUCOSE BLDC GLUCOMTR-MCNC: 132 MG/DL — HIGH (ref 70–99)
GLUCOSE BLDC GLUCOMTR-MCNC: 133 MG/DL — HIGH (ref 70–99)
GLUCOSE BLDC GLUCOMTR-MCNC: 133 MG/DL — HIGH (ref 70–99)
GLUCOSE BLDC GLUCOMTR-MCNC: 134 MG/DL — HIGH (ref 70–99)
GLUCOSE BLDC GLUCOMTR-MCNC: 135 MG/DL — HIGH (ref 70–99)
GLUCOSE BLDC GLUCOMTR-MCNC: 135 MG/DL — HIGH (ref 70–99)
GLUCOSE BLDC GLUCOMTR-MCNC: 136 MG/DL — HIGH (ref 70–99)
GLUCOSE BLDC GLUCOMTR-MCNC: 136 MG/DL — HIGH (ref 70–99)
GLUCOSE BLDC GLUCOMTR-MCNC: 137 MG/DL — HIGH (ref 70–99)
GLUCOSE BLDC GLUCOMTR-MCNC: 138 MG/DL — HIGH (ref 70–99)
GLUCOSE BLDC GLUCOMTR-MCNC: 139 MG/DL — HIGH (ref 70–99)
GLUCOSE BLDC GLUCOMTR-MCNC: 139 MG/DL — HIGH (ref 70–99)
GLUCOSE BLDC GLUCOMTR-MCNC: 140 MG/DL — HIGH (ref 70–99)
GLUCOSE BLDC GLUCOMTR-MCNC: 141 MG/DL — HIGH (ref 70–99)
GLUCOSE BLDC GLUCOMTR-MCNC: 142 MG/DL — HIGH (ref 70–99)
GLUCOSE BLDC GLUCOMTR-MCNC: 142 MG/DL — HIGH (ref 70–99)
GLUCOSE BLDC GLUCOMTR-MCNC: 143 MG/DL — HIGH (ref 70–99)
GLUCOSE BLDC GLUCOMTR-MCNC: 144 MG/DL — HIGH (ref 70–99)
GLUCOSE BLDC GLUCOMTR-MCNC: 145 MG/DL — HIGH (ref 70–99)
GLUCOSE BLDC GLUCOMTR-MCNC: 146 MG/DL — HIGH (ref 70–99)
GLUCOSE BLDC GLUCOMTR-MCNC: 146 MG/DL — HIGH (ref 70–99)
GLUCOSE BLDC GLUCOMTR-MCNC: 147 MG/DL — HIGH (ref 70–99)
GLUCOSE BLDC GLUCOMTR-MCNC: 148 MG/DL — HIGH (ref 70–99)
GLUCOSE BLDC GLUCOMTR-MCNC: 149 MG/DL — HIGH (ref 70–99)
GLUCOSE BLDC GLUCOMTR-MCNC: 151 MG/DL — HIGH (ref 70–99)
GLUCOSE BLDC GLUCOMTR-MCNC: 152 MG/DL — HIGH (ref 70–99)
GLUCOSE BLDC GLUCOMTR-MCNC: 152 MG/DL — HIGH (ref 70–99)
GLUCOSE BLDC GLUCOMTR-MCNC: 154 MG/DL — HIGH (ref 70–99)
GLUCOSE BLDC GLUCOMTR-MCNC: 154 MG/DL — HIGH (ref 70–99)
GLUCOSE BLDC GLUCOMTR-MCNC: 156 MG/DL — HIGH (ref 70–99)
GLUCOSE BLDC GLUCOMTR-MCNC: 156 MG/DL — HIGH (ref 70–99)
GLUCOSE BLDC GLUCOMTR-MCNC: 160 MG/DL — HIGH (ref 70–99)
GLUCOSE BLDC GLUCOMTR-MCNC: 160 MG/DL — HIGH (ref 70–99)
GLUCOSE BLDC GLUCOMTR-MCNC: 161 MG/DL — HIGH (ref 70–99)
GLUCOSE BLDC GLUCOMTR-MCNC: 161 MG/DL — HIGH (ref 70–99)
GLUCOSE BLDC GLUCOMTR-MCNC: 164 MG/DL — HIGH (ref 70–99)
GLUCOSE BLDC GLUCOMTR-MCNC: 164 MG/DL — HIGH (ref 70–99)
GLUCOSE BLDC GLUCOMTR-MCNC: 165 MG/DL — HIGH (ref 70–99)
GLUCOSE BLDC GLUCOMTR-MCNC: 165 MG/DL — HIGH (ref 70–99)
GLUCOSE BLDC GLUCOMTR-MCNC: 166 MG/DL — HIGH (ref 70–99)
GLUCOSE BLDC GLUCOMTR-MCNC: 166 MG/DL — HIGH (ref 70–99)
GLUCOSE BLDC GLUCOMTR-MCNC: 167 MG/DL — HIGH (ref 70–99)
GLUCOSE BLDC GLUCOMTR-MCNC: 169 MG/DL — HIGH (ref 70–99)
GLUCOSE BLDC GLUCOMTR-MCNC: 170 MG/DL — HIGH (ref 70–99)
GLUCOSE BLDC GLUCOMTR-MCNC: 172 MG/DL — HIGH (ref 70–99)
GLUCOSE BLDC GLUCOMTR-MCNC: 172 MG/DL — HIGH (ref 70–99)
GLUCOSE BLDC GLUCOMTR-MCNC: 173 MG/DL — HIGH (ref 70–99)
GLUCOSE BLDC GLUCOMTR-MCNC: 175 MG/DL — HIGH (ref 70–99)
GLUCOSE BLDC GLUCOMTR-MCNC: 177 MG/DL — HIGH (ref 70–99)
GLUCOSE BLDC GLUCOMTR-MCNC: 179 MG/DL — HIGH (ref 70–99)
GLUCOSE BLDC GLUCOMTR-MCNC: 179 MG/DL — HIGH (ref 70–99)
GLUCOSE BLDC GLUCOMTR-MCNC: 180 MG/DL — HIGH (ref 70–99)
GLUCOSE BLDC GLUCOMTR-MCNC: 181 MG/DL — HIGH (ref 70–99)
GLUCOSE BLDC GLUCOMTR-MCNC: 181 MG/DL — HIGH (ref 70–99)
GLUCOSE BLDC GLUCOMTR-MCNC: 182 MG/DL — HIGH (ref 70–99)
GLUCOSE BLDC GLUCOMTR-MCNC: 182 MG/DL — HIGH (ref 70–99)
GLUCOSE BLDC GLUCOMTR-MCNC: 184 MG/DL — HIGH (ref 70–99)
GLUCOSE BLDC GLUCOMTR-MCNC: 185 MG/DL — HIGH (ref 70–99)
GLUCOSE BLDC GLUCOMTR-MCNC: 186 MG/DL — HIGH (ref 70–99)
GLUCOSE BLDC GLUCOMTR-MCNC: 187 MG/DL — HIGH (ref 70–99)
GLUCOSE BLDC GLUCOMTR-MCNC: 188 MG/DL — HIGH (ref 70–99)
GLUCOSE BLDC GLUCOMTR-MCNC: 189 MG/DL — HIGH (ref 70–99)
GLUCOSE BLDC GLUCOMTR-MCNC: 190 MG/DL — HIGH (ref 70–99)
GLUCOSE BLDC GLUCOMTR-MCNC: 192 MG/DL — HIGH (ref 70–99)
GLUCOSE BLDC GLUCOMTR-MCNC: 192 MG/DL — HIGH (ref 70–99)
GLUCOSE BLDC GLUCOMTR-MCNC: 193 MG/DL — HIGH (ref 70–99)
GLUCOSE BLDC GLUCOMTR-MCNC: 193 MG/DL — HIGH (ref 70–99)
GLUCOSE BLDC GLUCOMTR-MCNC: 194 MG/DL — HIGH (ref 70–99)
GLUCOSE BLDC GLUCOMTR-MCNC: 194 MG/DL — HIGH (ref 70–99)
GLUCOSE BLDC GLUCOMTR-MCNC: 195 MG/DL — HIGH (ref 70–99)
GLUCOSE BLDC GLUCOMTR-MCNC: 196 MG/DL — HIGH (ref 70–99)
GLUCOSE BLDC GLUCOMTR-MCNC: 197 MG/DL — HIGH (ref 70–99)
GLUCOSE BLDC GLUCOMTR-MCNC: 199 MG/DL — HIGH (ref 70–99)
GLUCOSE BLDC GLUCOMTR-MCNC: 199 MG/DL — HIGH (ref 70–99)
GLUCOSE BLDC GLUCOMTR-MCNC: 201 MG/DL — HIGH (ref 70–99)
GLUCOSE BLDC GLUCOMTR-MCNC: 202 MG/DL — HIGH (ref 70–99)
GLUCOSE BLDC GLUCOMTR-MCNC: 203 MG/DL — HIGH (ref 70–99)
GLUCOSE BLDC GLUCOMTR-MCNC: 203 MG/DL — HIGH (ref 70–99)
GLUCOSE BLDC GLUCOMTR-MCNC: 204 MG/DL — HIGH (ref 70–99)
GLUCOSE BLDC GLUCOMTR-MCNC: 204 MG/DL — HIGH (ref 70–99)
GLUCOSE BLDC GLUCOMTR-MCNC: 205 MG/DL — HIGH (ref 70–99)
GLUCOSE BLDC GLUCOMTR-MCNC: 206 MG/DL — HIGH (ref 70–99)
GLUCOSE BLDC GLUCOMTR-MCNC: 208 MG/DL — HIGH (ref 70–99)
GLUCOSE BLDC GLUCOMTR-MCNC: 209 MG/DL — HIGH (ref 70–99)
GLUCOSE BLDC GLUCOMTR-MCNC: 209 MG/DL — HIGH (ref 70–99)
GLUCOSE BLDC GLUCOMTR-MCNC: 210 MG/DL — HIGH (ref 70–99)
GLUCOSE BLDC GLUCOMTR-MCNC: 211 MG/DL — HIGH (ref 70–99)
GLUCOSE BLDC GLUCOMTR-MCNC: 212 MG/DL — HIGH (ref 70–99)
GLUCOSE BLDC GLUCOMTR-MCNC: 212 MG/DL — HIGH (ref 70–99)
GLUCOSE BLDC GLUCOMTR-MCNC: 213 MG/DL — HIGH (ref 70–99)
GLUCOSE BLDC GLUCOMTR-MCNC: 213 MG/DL — HIGH (ref 70–99)
GLUCOSE BLDC GLUCOMTR-MCNC: 214 MG/DL — HIGH (ref 70–99)
GLUCOSE BLDC GLUCOMTR-MCNC: 215 MG/DL — HIGH (ref 70–99)
GLUCOSE BLDC GLUCOMTR-MCNC: 219 MG/DL — HIGH (ref 70–99)
GLUCOSE BLDC GLUCOMTR-MCNC: 220 MG/DL — HIGH (ref 70–99)
GLUCOSE BLDC GLUCOMTR-MCNC: 221 MG/DL — HIGH (ref 70–99)
GLUCOSE BLDC GLUCOMTR-MCNC: 221 MG/DL — HIGH (ref 70–99)
GLUCOSE BLDC GLUCOMTR-MCNC: 222 MG/DL — HIGH (ref 70–99)
GLUCOSE BLDC GLUCOMTR-MCNC: 223 MG/DL — HIGH (ref 70–99)
GLUCOSE BLDC GLUCOMTR-MCNC: 225 MG/DL — HIGH (ref 70–99)
GLUCOSE BLDC GLUCOMTR-MCNC: 227 MG/DL — HIGH (ref 70–99)
GLUCOSE BLDC GLUCOMTR-MCNC: 228 MG/DL — HIGH (ref 70–99)
GLUCOSE BLDC GLUCOMTR-MCNC: 229 MG/DL — HIGH (ref 70–99)
GLUCOSE BLDC GLUCOMTR-MCNC: 230 MG/DL — HIGH (ref 70–99)
GLUCOSE BLDC GLUCOMTR-MCNC: 232 MG/DL — HIGH (ref 70–99)
GLUCOSE BLDC GLUCOMTR-MCNC: 237 MG/DL — HIGH (ref 70–99)
GLUCOSE BLDC GLUCOMTR-MCNC: 238 MG/DL — HIGH (ref 70–99)
GLUCOSE BLDC GLUCOMTR-MCNC: 238 MG/DL — HIGH (ref 70–99)
GLUCOSE BLDC GLUCOMTR-MCNC: 239 MG/DL — HIGH (ref 70–99)
GLUCOSE BLDC GLUCOMTR-MCNC: 240 MG/DL — HIGH (ref 70–99)
GLUCOSE BLDC GLUCOMTR-MCNC: 241 MG/DL — HIGH (ref 70–99)
GLUCOSE BLDC GLUCOMTR-MCNC: 242 MG/DL — HIGH (ref 70–99)
GLUCOSE BLDC GLUCOMTR-MCNC: 243 MG/DL — HIGH (ref 70–99)
GLUCOSE BLDC GLUCOMTR-MCNC: 243 MG/DL — HIGH (ref 70–99)
GLUCOSE BLDC GLUCOMTR-MCNC: 245 MG/DL — HIGH (ref 70–99)
GLUCOSE BLDC GLUCOMTR-MCNC: 245 MG/DL — HIGH (ref 70–99)
GLUCOSE BLDC GLUCOMTR-MCNC: 249 MG/DL — HIGH (ref 70–99)
GLUCOSE BLDC GLUCOMTR-MCNC: 253 MG/DL — HIGH (ref 70–99)
GLUCOSE BLDC GLUCOMTR-MCNC: 255 MG/DL — HIGH (ref 70–99)
GLUCOSE BLDC GLUCOMTR-MCNC: 256 MG/DL — HIGH (ref 70–99)
GLUCOSE BLDC GLUCOMTR-MCNC: 258 MG/DL — HIGH (ref 70–99)
GLUCOSE BLDC GLUCOMTR-MCNC: 259 MG/DL — HIGH (ref 70–99)
GLUCOSE BLDC GLUCOMTR-MCNC: 26 MG/DL — CRITICAL LOW (ref 70–99)
GLUCOSE BLDC GLUCOMTR-MCNC: 265 MG/DL — HIGH (ref 70–99)
GLUCOSE BLDC GLUCOMTR-MCNC: 267 MG/DL — HIGH (ref 70–99)
GLUCOSE BLDC GLUCOMTR-MCNC: 268 MG/DL — HIGH (ref 70–99)
GLUCOSE BLDC GLUCOMTR-MCNC: 270 MG/DL — HIGH (ref 70–99)
GLUCOSE BLDC GLUCOMTR-MCNC: 271 MG/DL — HIGH (ref 70–99)
GLUCOSE BLDC GLUCOMTR-MCNC: 271 MG/DL — HIGH (ref 70–99)
GLUCOSE BLDC GLUCOMTR-MCNC: 275 MG/DL — HIGH (ref 70–99)
GLUCOSE BLDC GLUCOMTR-MCNC: 281 MG/DL — HIGH (ref 70–99)
GLUCOSE BLDC GLUCOMTR-MCNC: 287 MG/DL — HIGH (ref 70–99)
GLUCOSE BLDC GLUCOMTR-MCNC: 298 MG/DL — HIGH (ref 70–99)
GLUCOSE BLDC GLUCOMTR-MCNC: 299 MG/DL — HIGH (ref 70–99)
GLUCOSE BLDC GLUCOMTR-MCNC: 301 MG/DL — HIGH (ref 70–99)
GLUCOSE BLDC GLUCOMTR-MCNC: 302 MG/DL — HIGH (ref 70–99)
GLUCOSE BLDC GLUCOMTR-MCNC: 306 MG/DL — HIGH (ref 70–99)
GLUCOSE BLDC GLUCOMTR-MCNC: 309 MG/DL — HIGH (ref 70–99)
GLUCOSE BLDC GLUCOMTR-MCNC: 315 MG/DL — HIGH (ref 70–99)
GLUCOSE BLDC GLUCOMTR-MCNC: 318 MG/DL — HIGH (ref 70–99)
GLUCOSE BLDC GLUCOMTR-MCNC: 323 MG/DL — HIGH (ref 70–99)
GLUCOSE BLDC GLUCOMTR-MCNC: 325 MG/DL — HIGH (ref 70–99)
GLUCOSE BLDC GLUCOMTR-MCNC: 327 MG/DL — HIGH (ref 70–99)
GLUCOSE BLDC GLUCOMTR-MCNC: 330 MG/DL — HIGH (ref 70–99)
GLUCOSE BLDC GLUCOMTR-MCNC: 334 MG/DL — HIGH (ref 70–99)
GLUCOSE BLDC GLUCOMTR-MCNC: 337 MG/DL — HIGH (ref 70–99)
GLUCOSE BLDC GLUCOMTR-MCNC: 340 MG/DL — HIGH (ref 70–99)
GLUCOSE BLDC GLUCOMTR-MCNC: 355 MG/DL — HIGH (ref 70–99)
GLUCOSE BLDC GLUCOMTR-MCNC: 358 MG/DL — HIGH (ref 70–99)
GLUCOSE BLDC GLUCOMTR-MCNC: 364 MG/DL — HIGH (ref 70–99)
GLUCOSE BLDC GLUCOMTR-MCNC: 372 MG/DL — HIGH (ref 70–99)
GLUCOSE BLDC GLUCOMTR-MCNC: 376 MG/DL — HIGH (ref 70–99)
GLUCOSE BLDC GLUCOMTR-MCNC: 38 MG/DL — CRITICAL LOW (ref 70–99)
GLUCOSE BLDC GLUCOMTR-MCNC: 390 MG/DL — HIGH (ref 70–99)
GLUCOSE BLDC GLUCOMTR-MCNC: 399 MG/DL — HIGH (ref 70–99)
GLUCOSE BLDC GLUCOMTR-MCNC: 410 MG/DL — HIGH (ref 70–99)
GLUCOSE BLDC GLUCOMTR-MCNC: 414 MG/DL — HIGH (ref 70–99)
GLUCOSE BLDC GLUCOMTR-MCNC: 416 MG/DL — HIGH (ref 70–99)
GLUCOSE BLDC GLUCOMTR-MCNC: 434 MG/DL — HIGH (ref 70–99)
GLUCOSE BLDC GLUCOMTR-MCNC: 434 MG/DL — HIGH (ref 70–99)
GLUCOSE BLDC GLUCOMTR-MCNC: 447 MG/DL — HIGH (ref 70–99)
GLUCOSE BLDC GLUCOMTR-MCNC: 53 MG/DL — CRITICAL LOW (ref 70–99)
GLUCOSE BLDC GLUCOMTR-MCNC: 56 MG/DL — LOW (ref 70–99)
GLUCOSE BLDC GLUCOMTR-MCNC: 58 MG/DL — LOW (ref 70–99)
GLUCOSE BLDC GLUCOMTR-MCNC: 59 MG/DL — LOW (ref 70–99)
GLUCOSE BLDC GLUCOMTR-MCNC: 60 MG/DL — LOW (ref 70–99)
GLUCOSE BLDC GLUCOMTR-MCNC: 62 MG/DL — LOW (ref 70–99)
GLUCOSE BLDC GLUCOMTR-MCNC: 67 MG/DL — LOW (ref 70–99)
GLUCOSE BLDC GLUCOMTR-MCNC: 68 MG/DL — LOW (ref 70–99)
GLUCOSE BLDC GLUCOMTR-MCNC: 72 MG/DL — SIGNIFICANT CHANGE UP (ref 70–99)
GLUCOSE BLDC GLUCOMTR-MCNC: 73 MG/DL — SIGNIFICANT CHANGE UP (ref 70–99)
GLUCOSE BLDC GLUCOMTR-MCNC: 73 MG/DL — SIGNIFICANT CHANGE UP (ref 70–99)
GLUCOSE BLDC GLUCOMTR-MCNC: 75 MG/DL — SIGNIFICANT CHANGE UP (ref 70–99)
GLUCOSE BLDC GLUCOMTR-MCNC: 76 MG/DL — SIGNIFICANT CHANGE UP (ref 70–99)
GLUCOSE BLDC GLUCOMTR-MCNC: 76 MG/DL — SIGNIFICANT CHANGE UP (ref 70–99)
GLUCOSE BLDC GLUCOMTR-MCNC: 77 MG/DL — SIGNIFICANT CHANGE UP (ref 70–99)
GLUCOSE BLDC GLUCOMTR-MCNC: 79 MG/DL — SIGNIFICANT CHANGE UP (ref 70–99)
GLUCOSE BLDC GLUCOMTR-MCNC: 79 MG/DL — SIGNIFICANT CHANGE UP (ref 70–99)
GLUCOSE BLDC GLUCOMTR-MCNC: 80 MG/DL — SIGNIFICANT CHANGE UP (ref 70–99)
GLUCOSE BLDC GLUCOMTR-MCNC: 82 MG/DL — SIGNIFICANT CHANGE UP (ref 70–99)
GLUCOSE BLDC GLUCOMTR-MCNC: 83 MG/DL — SIGNIFICANT CHANGE UP (ref 70–99)
GLUCOSE BLDC GLUCOMTR-MCNC: 84 MG/DL — SIGNIFICANT CHANGE UP (ref 70–99)
GLUCOSE BLDC GLUCOMTR-MCNC: 85 MG/DL — SIGNIFICANT CHANGE UP (ref 70–99)
GLUCOSE BLDC GLUCOMTR-MCNC: 86 MG/DL — SIGNIFICANT CHANGE UP (ref 70–99)
GLUCOSE BLDC GLUCOMTR-MCNC: 87 MG/DL — SIGNIFICANT CHANGE UP (ref 70–99)
GLUCOSE BLDC GLUCOMTR-MCNC: 88 MG/DL — SIGNIFICANT CHANGE UP (ref 70–99)
GLUCOSE BLDC GLUCOMTR-MCNC: 91 MG/DL — SIGNIFICANT CHANGE UP (ref 70–99)
GLUCOSE BLDC GLUCOMTR-MCNC: 91 MG/DL — SIGNIFICANT CHANGE UP (ref 70–99)
GLUCOSE BLDC GLUCOMTR-MCNC: 92 MG/DL — SIGNIFICANT CHANGE UP (ref 70–99)
GLUCOSE BLDC GLUCOMTR-MCNC: 92 MG/DL — SIGNIFICANT CHANGE UP (ref 70–99)
GLUCOSE BLDC GLUCOMTR-MCNC: 93 MG/DL — SIGNIFICANT CHANGE UP (ref 70–99)
GLUCOSE BLDC GLUCOMTR-MCNC: 94 MG/DL — SIGNIFICANT CHANGE UP (ref 70–99)
GLUCOSE BLDC GLUCOMTR-MCNC: 96 MG/DL — SIGNIFICANT CHANGE UP (ref 70–99)
GLUCOSE BLDC GLUCOMTR-MCNC: 97 MG/DL — SIGNIFICANT CHANGE UP (ref 70–99)
GLUCOSE BLDC GLUCOMTR-MCNC: 98 MG/DL — SIGNIFICANT CHANGE UP (ref 70–99)
GLUCOSE BLDC GLUCOMTR-MCNC: 98 MG/DL — SIGNIFICANT CHANGE UP (ref 70–99)
GLUCOSE BLDC GLUCOMTR-MCNC: 99 MG/DL — SIGNIFICANT CHANGE UP (ref 70–99)
GLUCOSE SERPL-MCNC: 112 MG/DL — HIGH (ref 70–99)
GLUCOSE SERPL-MCNC: 115 MG/DL — HIGH (ref 70–99)
GLUCOSE SERPL-MCNC: 120 MG/DL — HIGH (ref 70–99)
GLUCOSE SERPL-MCNC: 121 MG/DL — HIGH (ref 70–99)
GLUCOSE SERPL-MCNC: 126 MG/DL — HIGH (ref 70–99)
GLUCOSE SERPL-MCNC: 127 MG/DL — HIGH (ref 70–99)
GLUCOSE SERPL-MCNC: 137 MG/DL — HIGH (ref 70–99)
GLUCOSE SERPL-MCNC: 143 MG/DL — HIGH (ref 70–99)
GLUCOSE SERPL-MCNC: 150 MG/DL — HIGH (ref 70–99)
GLUCOSE SERPL-MCNC: 152 MG/DL — HIGH (ref 70–99)
GLUCOSE SERPL-MCNC: 153 MG/DL — HIGH (ref 70–99)
GLUCOSE SERPL-MCNC: 155 MG/DL — HIGH (ref 70–99)
GLUCOSE SERPL-MCNC: 167 MG/DL — HIGH (ref 70–99)
GLUCOSE SERPL-MCNC: 168 MG/DL — HIGH (ref 70–99)
GLUCOSE SERPL-MCNC: 171 MG/DL — HIGH (ref 70–99)
GLUCOSE SERPL-MCNC: 176 MG/DL — HIGH (ref 70–99)
GLUCOSE SERPL-MCNC: 178 MG/DL — HIGH (ref 70–99)
GLUCOSE SERPL-MCNC: 178 MG/DL — HIGH (ref 70–99)
GLUCOSE SERPL-MCNC: 180 MG/DL — HIGH (ref 70–99)
GLUCOSE SERPL-MCNC: 184 MG/DL — HIGH (ref 70–99)
GLUCOSE SERPL-MCNC: 191 MG/DL — HIGH (ref 70–99)
GLUCOSE SERPL-MCNC: 197 MG/DL — HIGH (ref 70–99)
GLUCOSE SERPL-MCNC: 200 MG/DL — HIGH (ref 70–99)
GLUCOSE SERPL-MCNC: 203 MG/DL — HIGH (ref 70–99)
GLUCOSE SERPL-MCNC: 204 MG/DL — HIGH (ref 70–99)
GLUCOSE SERPL-MCNC: 206 MG/DL — HIGH (ref 70–99)
GLUCOSE SERPL-MCNC: 210 MG/DL — HIGH (ref 70–99)
GLUCOSE SERPL-MCNC: 222 MG/DL — HIGH (ref 70–99)
GLUCOSE SERPL-MCNC: 249 MG/DL — HIGH (ref 70–99)
GLUCOSE SERPL-MCNC: 268 MG/DL — HIGH (ref 70–99)
GLUCOSE SERPL-MCNC: 274 MG/DL — HIGH (ref 70–99)
GLUCOSE SERPL-MCNC: 279 MG/DL — HIGH (ref 70–99)
GLUCOSE SERPL-MCNC: 283 MG/DL — HIGH (ref 70–99)
GLUCOSE SERPL-MCNC: 317 MG/DL — HIGH (ref 70–99)
GLUCOSE SERPL-MCNC: 339 MG/DL — HIGH (ref 70–99)
GLUCOSE SERPL-MCNC: 66 MG/DL — LOW (ref 70–99)
GLUCOSE SERPL-MCNC: 70 MG/DL — SIGNIFICANT CHANGE UP (ref 70–99)
GLUCOSE SERPL-MCNC: 70 MG/DL — SIGNIFICANT CHANGE UP (ref 70–99)
GLUCOSE SERPL-MCNC: 75 MG/DL — SIGNIFICANT CHANGE UP (ref 70–99)
GLUCOSE SERPL-MCNC: 76 MG/DL — SIGNIFICANT CHANGE UP (ref 70–99)
GLUCOSE SERPL-MCNC: 78 MG/DL — SIGNIFICANT CHANGE UP (ref 70–99)
GLUCOSE SERPL-MCNC: 78 MG/DL — SIGNIFICANT CHANGE UP (ref 70–99)
GLUCOSE SERPL-MCNC: 84 MG/DL — SIGNIFICANT CHANGE UP (ref 70–99)
GLUCOSE SERPL-MCNC: 93 MG/DL — SIGNIFICANT CHANGE UP (ref 70–99)
GLUCOSE SERPL-MCNC: 94 MG/DL — SIGNIFICANT CHANGE UP (ref 70–99)
GLUCOSE SERPL-MCNC: 95 MG/DL — SIGNIFICANT CHANGE UP (ref 70–99)
GLUCOSE UR QL: ABNORMAL
GRAM STN FLD: SIGNIFICANT CHANGE UP
HAV IGM SER-ACNC: SIGNIFICANT CHANGE UP
HAV IGM SER-ACNC: SIGNIFICANT CHANGE UP
HBV CORE AB SER-ACNC: SIGNIFICANT CHANGE UP
HBV CORE IGM SER-ACNC: SIGNIFICANT CHANGE UP
HBV CORE IGM SER-ACNC: SIGNIFICANT CHANGE UP
HBV SURFACE AB SER-ACNC: REACTIVE
HBV SURFACE AG SER-ACNC: SIGNIFICANT CHANGE UP
HBV SURFACE AG SER-ACNC: SIGNIFICANT CHANGE UP
HCO3 BLDA-SCNC: 28 MMOL/L — SIGNIFICANT CHANGE UP (ref 21–29)
HCO3 BLDV-SCNC: 33 MMOL/L — HIGH (ref 22–29)
HCT VFR BLD CALC: 22.1 % — LOW (ref 42–52)
HCT VFR BLD CALC: 22.3 % — LOW (ref 42–52)
HCT VFR BLD CALC: 22.4 % — LOW (ref 42–52)
HCT VFR BLD CALC: 22.5 % — LOW (ref 42–52)
HCT VFR BLD CALC: 22.5 % — LOW (ref 42–52)
HCT VFR BLD CALC: 22.6 % — LOW (ref 42–52)
HCT VFR BLD CALC: 22.7 % — LOW (ref 42–52)
HCT VFR BLD CALC: 22.8 % — LOW (ref 42–52)
HCT VFR BLD CALC: 22.9 % — LOW (ref 42–52)
HCT VFR BLD CALC: 22.9 % — LOW (ref 42–52)
HCT VFR BLD CALC: 23.1 % — LOW (ref 42–52)
HCT VFR BLD CALC: 23.2 % — LOW (ref 42–52)
HCT VFR BLD CALC: 23.3 % — LOW (ref 42–52)
HCT VFR BLD CALC: 23.6 % — LOW (ref 42–52)
HCT VFR BLD CALC: 23.9 % — LOW (ref 42–52)
HCT VFR BLD CALC: 24 % — LOW (ref 42–52)
HCT VFR BLD CALC: 24.3 % — LOW (ref 42–52)
HCT VFR BLD CALC: 24.4 % — LOW (ref 42–52)
HCT VFR BLD CALC: 24.5 % — LOW (ref 42–52)
HCT VFR BLD CALC: 24.8 % — LOW (ref 42–52)
HCT VFR BLD CALC: 24.9 % — LOW (ref 42–52)
HCT VFR BLD CALC: 25 % — LOW (ref 42–52)
HCT VFR BLD CALC: 25 % — LOW (ref 42–52)
HCT VFR BLD CALC: 25.1 % — LOW (ref 42–52)
HCT VFR BLD CALC: 25.2 % — LOW (ref 42–52)
HCT VFR BLD CALC: 25.4 % — LOW (ref 42–52)
HCT VFR BLD CALC: 25.8 % — LOW (ref 42–52)
HCT VFR BLD CALC: 25.9 % — LOW (ref 42–52)
HCT VFR BLD CALC: 26.6 % — LOW (ref 42–52)
HCT VFR BLD CALC: 26.8 % — LOW (ref 42–52)
HCT VFR BLD CALC: 27.1 % — LOW (ref 42–52)
HCT VFR BLD CALC: 27.1 % — LOW (ref 42–52)
HCT VFR BLD CALC: 27.3 % — LOW (ref 42–52)
HCT VFR BLD CALC: 27.3 % — LOW (ref 42–52)
HCT VFR BLD CALC: 27.5 % — LOW (ref 42–52)
HCT VFR BLD CALC: 27.9 % — LOW (ref 42–52)
HCT VFR BLD CALC: 28.3 % — LOW (ref 42–52)
HCT VFR BLD CALC: 28.6 % — LOW (ref 42–52)
HCT VFR BLD CALC: 28.7 % — LOW (ref 42–52)
HCT VFR BLD CALC: 28.9 % — LOW (ref 42–52)
HCT VFR BLD CALC: 29.1 % — LOW (ref 42–52)
HCT VFR BLD CALC: 29.1 % — LOW (ref 42–52)
HCT VFR BLD CALC: 29.2 % — LOW (ref 42–52)
HCT VFR BLD CALC: 29.3 % — LOW (ref 42–52)
HCT VFR BLD CALC: 30.9 % — LOW (ref 42–52)
HCT VFR BLD CALC: 32.7 % — LOW (ref 42–52)
HCT VFR BLDA CALC: 41.6 % — SIGNIFICANT CHANGE UP (ref 34–44)
HCV AB S/CO SERPL IA: 0.04 COI — SIGNIFICANT CHANGE UP
HCV AB S/CO SERPL IA: 0.17 S/CO — SIGNIFICANT CHANGE UP (ref 0–0.99)
HCV AB S/CO SERPL IA: 0.18 S/CO — SIGNIFICANT CHANGE UP (ref 0–0.99)
HCV AB SERPL-IMP: SIGNIFICANT CHANGE UP
HGB BLD CALC-MCNC: 13.6 G/DL — LOW (ref 14–18)
HGB BLD-MCNC: 10.2 G/DL — LOW (ref 14–18)
HGB BLD-MCNC: 6.6 G/DL — CRITICAL LOW (ref 14–18)
HGB BLD-MCNC: 6.8 G/DL — CRITICAL LOW (ref 14–18)
HGB BLD-MCNC: 7 G/DL — LOW (ref 14–18)
HGB BLD-MCNC: 7.1 G/DL — LOW (ref 14–18)
HGB BLD-MCNC: 7.2 G/DL — LOW (ref 14–18)
HGB BLD-MCNC: 7.3 G/DL — LOW (ref 14–18)
HGB BLD-MCNC: 7.4 G/DL — LOW (ref 14–18)
HGB BLD-MCNC: 7.5 G/DL — LOW (ref 14–18)
HGB BLD-MCNC: 7.6 G/DL — LOW (ref 14–18)
HGB BLD-MCNC: 7.7 G/DL — LOW (ref 14–18)
HGB BLD-MCNC: 7.7 G/DL — LOW (ref 14–18)
HGB BLD-MCNC: 7.8 G/DL — LOW (ref 14–18)
HGB BLD-MCNC: 8 G/DL — LOW (ref 14–18)
HGB BLD-MCNC: 8.2 G/DL — LOW (ref 14–18)
HGB BLD-MCNC: 8.2 G/DL — LOW (ref 14–18)
HGB BLD-MCNC: 8.5 G/DL — LOW (ref 14–18)
HGB BLD-MCNC: 8.5 G/DL — LOW (ref 14–18)
HGB BLD-MCNC: 8.6 G/DL — LOW (ref 14–18)
HGB BLD-MCNC: 8.6 G/DL — LOW (ref 14–18)
HGB BLD-MCNC: 8.7 G/DL — LOW (ref 14–18)
HGB BLD-MCNC: 8.8 G/DL — LOW (ref 14–18)
HGB BLD-MCNC: 8.8 G/DL — LOW (ref 14–18)
HGB BLD-MCNC: 8.9 G/DL — LOW (ref 14–18)
HGB BLD-MCNC: 9 G/DL — LOW (ref 14–18)
HGB BLD-MCNC: 9.1 G/DL — LOW (ref 14–18)
HGB BLD-MCNC: 9.1 G/DL — LOW (ref 14–18)
HGB BLD-MCNC: 9.2 G/DL — LOW (ref 14–18)
HGB BLD-MCNC: 9.3 G/DL — LOW (ref 14–18)
HGB BLD-MCNC: 9.5 G/DL — LOW (ref 14–18)
HGB BLD-MCNC: 9.9 G/DL — LOW (ref 14–18)
HMWK ACTIVITY ACT/NOR PPP: 32.4 SEC — SIGNIFICANT CHANGE UP
HMWK PPP-ACNC: 44.5 SEC — HIGH (ref 27.5–36.5)
HMWK PPP-ACNC: >200 — SIGNIFICANT CHANGE UP
HYALINE CASTS # UR AUTO: 114 /LPF — HIGH (ref 0–7)
IMM GRANULOCYTES NFR BLD AUTO: 0.4 % — HIGH (ref 0.1–0.3)
IMM GRANULOCYTES NFR BLD AUTO: 0.5 % — HIGH (ref 0.1–0.3)
IMM GRANULOCYTES NFR BLD AUTO: 0.6 % — HIGH (ref 0.1–0.3)
IMM GRANULOCYTES NFR BLD AUTO: 0.7 % — HIGH (ref 0.1–0.3)
IMM GRANULOCYTES NFR BLD AUTO: 1.1 % — HIGH (ref 0.1–0.3)
IMM GRANULOCYTES NFR BLD AUTO: 1.2 % — HIGH (ref 0.1–0.3)
IMM GRANULOCYTES NFR BLD AUTO: 1.3 % — HIGH (ref 0.1–0.3)
IMM GRANULOCYTES NFR BLD AUTO: 1.4 % — HIGH (ref 0.1–0.3)
IMM GRANULOCYTES NFR BLD AUTO: 1.4 % — HIGH (ref 0.1–0.3)
IMM GRANULOCYTES NFR BLD AUTO: 1.7 % — HIGH (ref 0.1–0.3)
IMM GRANULOCYTES NFR BLD AUTO: 1.7 % — HIGH (ref 0.1–0.3)
IMM GRANULOCYTES NFR BLD AUTO: 11.7 % — HIGH (ref 0.1–0.3)
IMM GRANULOCYTES NFR BLD AUTO: 11.9 % — HIGH (ref 0.1–0.3)
IMM GRANULOCYTES NFR BLD AUTO: 13.5 % — HIGH (ref 0.1–0.3)
IMM GRANULOCYTES NFR BLD AUTO: 2 % — HIGH (ref 0.1–0.3)
IMM GRANULOCYTES NFR BLD AUTO: 2.3 % — HIGH (ref 0.1–0.3)
IMM GRANULOCYTES NFR BLD AUTO: 2.3 % — HIGH (ref 0.1–0.3)
IMM GRANULOCYTES NFR BLD AUTO: 2.4 % — HIGH (ref 0.1–0.3)
IMM GRANULOCYTES NFR BLD AUTO: 2.5 % — HIGH (ref 0.1–0.3)
IMM GRANULOCYTES NFR BLD AUTO: 5.5 % — HIGH (ref 0.1–0.3)
IMM GRANULOCYTES NFR BLD AUTO: 5.7 % — HIGH (ref 0.1–0.3)
IMM GRANULOCYTES NFR BLD AUTO: 6.1 % — HIGH (ref 0.1–0.3)
IMM GRANULOCYTES NFR BLD AUTO: 6.8 % — HIGH (ref 0.1–0.3)
IMM GRANULOCYTES NFR BLD AUTO: 6.8 % — HIGH (ref 0.1–0.3)
IMM GRANULOCYTES NFR BLD AUTO: 6.9 % — HIGH (ref 0.1–0.3)
IMM GRANULOCYTES NFR BLD AUTO: 7.1 % — HIGH (ref 0.1–0.3)
IMM GRANULOCYTES NFR BLD AUTO: 9 % — HIGH (ref 0.1–0.3)
INR BLD: 1.05 RATIO — SIGNIFICANT CHANGE UP (ref 0.65–1.3)
INR BLD: 1.14 RATIO — SIGNIFICANT CHANGE UP (ref 0.65–1.3)
INR BLD: 1.17 RATIO — SIGNIFICANT CHANGE UP (ref 0.65–1.3)
INR BLD: 1.18 RATIO — SIGNIFICANT CHANGE UP (ref 0.65–1.3)
INR BLD: 1.19 RATIO — SIGNIFICANT CHANGE UP (ref 0.65–1.3)
INR BLD: 1.2 RATIO — SIGNIFICANT CHANGE UP (ref 0.65–1.3)
INR BLD: 1.23 RATIO — SIGNIFICANT CHANGE UP (ref 0.65–1.3)
INR BLD: 1.27 RATIO — SIGNIFICANT CHANGE UP (ref 0.65–1.3)
INR BLD: 1.28 RATIO — SIGNIFICANT CHANGE UP (ref 0.65–1.3)
INR BLD: 1.29 RATIO — SIGNIFICANT CHANGE UP (ref 0.65–1.3)
INR BLD: 1.29 RATIO — SIGNIFICANT CHANGE UP (ref 0.65–1.3)
INR BLD: 1.32 RATIO — HIGH (ref 0.65–1.3)
INR BLD: 1.36 RATIO — HIGH (ref 0.65–1.3)
INR BLD: 1.4 RATIO — HIGH (ref 0.65–1.3)
INR BLD: 1.41 RATIO — HIGH (ref 0.65–1.3)
INR BLD: 1.49 RATIO — HIGH (ref 0.65–1.3)
INR BLD: 1.5 RATIO — HIGH (ref 0.65–1.3)
INR BLD: 1.54 RATIO — HIGH (ref 0.65–1.3)
INR BLD: 1.56 RATIO — HIGH (ref 0.65–1.3)
INR BLD: 1.58 RATIO — HIGH (ref 0.65–1.3)
INR BLD: 1.72 RATIO — HIGH (ref 0.65–1.3)
INR BLD: 2.55 RATIO — HIGH (ref 0.65–1.3)
INR BLD: 2.66 RATIO — HIGH (ref 0.65–1.3)
INR BLD: 2.77 RATIO — HIGH (ref 0.65–1.3)
INR BLD: 2.79 RATIO — HIGH (ref 0.65–1.3)
IRON SATN MFR SERPL: 16 % — SIGNIFICANT CHANGE UP (ref 15–50)
IRON SATN MFR SERPL: 33 UG/DL — LOW (ref 35–150)
KETONES UR-MCNC: NEGATIVE — SIGNIFICANT CHANGE UP
LACTATE BLDV-MCNC: 3.4 MMOL/L — HIGH (ref 0.5–1.6)
LACTATE SERPL-SCNC: 1.1 MMOL/L — SIGNIFICANT CHANGE UP (ref 0.7–2)
LACTATE SERPL-SCNC: 1.4 MMOL/L — SIGNIFICANT CHANGE UP (ref 0.7–2)
LACTATE SERPL-SCNC: 3.7 MMOL/L — HIGH (ref 0.7–2)
LEUKOCYTE ESTERASE UR-ACNC: ABNORMAL
LYMPHOCYTES # BLD AUTO: 0.26 K/UL — LOW (ref 1.2–3.4)
LYMPHOCYTES # BLD AUTO: 0.26 K/UL — LOW (ref 1.2–3.4)
LYMPHOCYTES # BLD AUTO: 0.29 K/UL — LOW (ref 1.2–3.4)
LYMPHOCYTES # BLD AUTO: 0.32 K/UL — LOW (ref 1.2–3.4)
LYMPHOCYTES # BLD AUTO: 0.36 K/UL — LOW (ref 1.2–3.4)
LYMPHOCYTES # BLD AUTO: 0.37 K/UL — LOW (ref 1.2–3.4)
LYMPHOCYTES # BLD AUTO: 0.38 K/UL — LOW (ref 1.2–3.4)
LYMPHOCYTES # BLD AUTO: 0.48 K/UL — LOW (ref 1.2–3.4)
LYMPHOCYTES # BLD AUTO: 0.55 K/UL — LOW (ref 1.2–3.4)
LYMPHOCYTES # BLD AUTO: 0.57 K/UL — LOW (ref 1.2–3.4)
LYMPHOCYTES # BLD AUTO: 0.61 K/UL — LOW (ref 1.2–3.4)
LYMPHOCYTES # BLD AUTO: 0.74 K/UL — LOW (ref 1.2–3.4)
LYMPHOCYTES # BLD AUTO: 0.74 K/UL — LOW (ref 1.2–3.4)
LYMPHOCYTES # BLD AUTO: 0.77 K/UL — LOW (ref 1.2–3.4)
LYMPHOCYTES # BLD AUTO: 0.78 K/UL — LOW (ref 1.2–3.4)
LYMPHOCYTES # BLD AUTO: 0.78 K/UL — LOW (ref 1.2–3.4)
LYMPHOCYTES # BLD AUTO: 0.79 K/UL — LOW (ref 1.2–3.4)
LYMPHOCYTES # BLD AUTO: 0.8 K/UL — LOW (ref 1.2–3.4)
LYMPHOCYTES # BLD AUTO: 0.81 K/UL — LOW (ref 1.2–3.4)
LYMPHOCYTES # BLD AUTO: 0.83 K/UL — LOW (ref 1.2–3.4)
LYMPHOCYTES # BLD AUTO: 0.86 K/UL — LOW (ref 1.2–3.4)
LYMPHOCYTES # BLD AUTO: 0.87 K/UL — LOW (ref 1.2–3.4)
LYMPHOCYTES # BLD AUTO: 0.92 K/UL — LOW (ref 1.2–3.4)
LYMPHOCYTES # BLD AUTO: 0.94 K/UL — LOW (ref 1.2–3.4)
LYMPHOCYTES # BLD AUTO: 0.99 K/UL — LOW (ref 1.2–3.4)
LYMPHOCYTES # BLD AUTO: 0.99 K/UL — LOW (ref 1.2–3.4)
LYMPHOCYTES # BLD AUTO: 1.03 K/UL — LOW (ref 1.2–3.4)
LYMPHOCYTES # BLD AUTO: 1.08 K/UL — LOW (ref 1.2–3.4)
LYMPHOCYTES # BLD AUTO: 1.14 K/UL — LOW (ref 1.2–3.4)
LYMPHOCYTES # BLD AUTO: 1.17 K/UL — LOW (ref 1.2–3.4)
LYMPHOCYTES # BLD AUTO: 1.18 K/UL — LOW (ref 1.2–3.4)
LYMPHOCYTES # BLD AUTO: 1.25 K/UL — SIGNIFICANT CHANGE UP (ref 1.2–3.4)
LYMPHOCYTES # BLD AUTO: 1.25 K/UL — SIGNIFICANT CHANGE UP (ref 1.2–3.4)
LYMPHOCYTES # BLD AUTO: 1.34 K/UL — SIGNIFICANT CHANGE UP (ref 1.2–3.4)
LYMPHOCYTES # BLD AUTO: 1.38 K/UL — SIGNIFICANT CHANGE UP (ref 1.2–3.4)
LYMPHOCYTES # BLD AUTO: 1.4 K/UL — SIGNIFICANT CHANGE UP (ref 1.2–3.4)
LYMPHOCYTES # BLD AUTO: 1.9 % — LOW (ref 20.5–51.1)
LYMPHOCYTES # BLD AUTO: 1.97 K/UL — SIGNIFICANT CHANGE UP (ref 1.2–3.4)
LYMPHOCYTES # BLD AUTO: 10.3 % — LOW (ref 20.5–51.1)
LYMPHOCYTES # BLD AUTO: 10.9 % — LOW (ref 20.5–51.1)
LYMPHOCYTES # BLD AUTO: 11.3 % — LOW (ref 20.5–51.1)
LYMPHOCYTES # BLD AUTO: 12.4 % — LOW (ref 20.5–51.1)
LYMPHOCYTES # BLD AUTO: 12.8 % — LOW (ref 20.5–51.1)
LYMPHOCYTES # BLD AUTO: 2.4 % — LOW (ref 20.5–51.1)
LYMPHOCYTES # BLD AUTO: 2.4 % — LOW (ref 20.5–51.1)
LYMPHOCYTES # BLD AUTO: 2.5 % — LOW (ref 20.5–51.1)
LYMPHOCYTES # BLD AUTO: 2.7 % — LOW (ref 20.5–51.1)
LYMPHOCYTES # BLD AUTO: 2.7 % — LOW (ref 20.5–51.1)
LYMPHOCYTES # BLD AUTO: 3.2 % — LOW (ref 20.5–51.1)
LYMPHOCYTES # BLD AUTO: 3.2 % — LOW (ref 20.5–51.1)
LYMPHOCYTES # BLD AUTO: 3.4 % — LOW (ref 20.5–51.1)
LYMPHOCYTES # BLD AUTO: 3.4 % — LOW (ref 20.5–51.1)
LYMPHOCYTES # BLD AUTO: 3.8 % — LOW (ref 20.5–51.1)
LYMPHOCYTES # BLD AUTO: 3.8 % — LOW (ref 20.5–51.1)
LYMPHOCYTES # BLD AUTO: 3.9 % — LOW (ref 20.5–51.1)
LYMPHOCYTES # BLD AUTO: 4.1 % — LOW (ref 20.5–51.1)
LYMPHOCYTES # BLD AUTO: 4.5 % — LOW (ref 20.5–51.1)
LYMPHOCYTES # BLD AUTO: 4.6 % — LOW (ref 20.5–51.1)
LYMPHOCYTES # BLD AUTO: 4.9 % — LOW (ref 20.5–51.1)
LYMPHOCYTES # BLD AUTO: 4.9 % — LOW (ref 20.5–51.1)
LYMPHOCYTES # BLD AUTO: 5 % — LOW (ref 20.5–51.1)
LYMPHOCYTES # BLD AUTO: 5.2 % — LOW (ref 20.5–51.1)
LYMPHOCYTES # BLD AUTO: 5.2 % — LOW (ref 20.5–51.1)
LYMPHOCYTES # BLD AUTO: 5.3 % — LOW (ref 20.5–51.1)
LYMPHOCYTES # BLD AUTO: 5.4 % — LOW (ref 20.5–51.1)
LYMPHOCYTES # BLD AUTO: 5.6 % — LOW (ref 20.5–51.1)
LYMPHOCYTES # BLD AUTO: 7.1 % — LOW (ref 20.5–51.1)
LYMPHOCYTES # BLD AUTO: 7.2 % — LOW (ref 20.5–51.1)
LYMPHOCYTES # BLD AUTO: 7.3 % — LOW (ref 20.5–51.1)
LYMPHOCYTES # BLD AUTO: 7.6 % — LOW (ref 20.5–51.1)
LYMPHOCYTES # BLD AUTO: 7.6 % — LOW (ref 20.5–51.1)
LYMPHOCYTES # BLD AUTO: 7.7 % — LOW (ref 20.5–51.1)
LYMPHOCYTES # BLD AUTO: 8 % — LOW (ref 20.5–51.1)
LYMPHOCYTES # BLD AUTO: 8.6 % — LOW (ref 20.5–51.1)
LYMPHOCYTES # BLD AUTO: 8.6 % — LOW (ref 20.5–51.1)
LYMPHOCYTES # BLD AUTO: 9.2 % — LOW (ref 20.5–51.1)
LYMPHOCYTES # BLD AUTO: 9.5 % — LOW (ref 20.5–51.1)
MACROCYTES BLD QL: SLIGHT — SIGNIFICANT CHANGE UP
MAGNESIUM SERPL-MCNC: 1.9 MG/DL — SIGNIFICANT CHANGE UP (ref 1.8–2.4)
MAGNESIUM SERPL-MCNC: 2.3 MG/DL — SIGNIFICANT CHANGE UP (ref 1.8–2.4)
MAGNESIUM SERPL-MCNC: 2.3 MG/DL — SIGNIFICANT CHANGE UP (ref 1.8–2.4)
MAGNESIUM SERPL-MCNC: 2.4 MG/DL — SIGNIFICANT CHANGE UP (ref 1.8–2.4)
MAGNESIUM SERPL-MCNC: 2.5 MG/DL — HIGH (ref 1.8–2.4)
MAGNESIUM SERPL-MCNC: 2.6 MG/DL — HIGH (ref 1.8–2.4)
MAGNESIUM SERPL-MCNC: 2.7 MG/DL — HIGH (ref 1.8–2.4)
MAGNESIUM SERPL-MCNC: 2.8 MG/DL — HIGH (ref 1.8–2.4)
MAGNESIUM SERPL-MCNC: 2.8 MG/DL — HIGH (ref 1.8–2.4)
MAGNESIUM SERPL-MCNC: 2.9 MG/DL — HIGH (ref 1.8–2.4)
MAGNESIUM SERPL-MCNC: 2.9 MG/DL — HIGH (ref 1.8–2.4)
MANUAL SMEAR VERIFICATION: SIGNIFICANT CHANGE UP
MCHC RBC-ENTMCNC: 28.8 PG — SIGNIFICANT CHANGE UP (ref 27–31)
MCHC RBC-ENTMCNC: 29.2 G/DL — LOW (ref 32–37)
MCHC RBC-ENTMCNC: 29.2 PG — SIGNIFICANT CHANGE UP (ref 27–31)
MCHC RBC-ENTMCNC: 29.3 G/DL — LOW (ref 32–37)
MCHC RBC-ENTMCNC: 29.3 PG — SIGNIFICANT CHANGE UP (ref 27–31)
MCHC RBC-ENTMCNC: 29.3 PG — SIGNIFICANT CHANGE UP (ref 27–31)
MCHC RBC-ENTMCNC: 29.4 PG — SIGNIFICANT CHANGE UP (ref 27–31)
MCHC RBC-ENTMCNC: 29.4 PG — SIGNIFICANT CHANGE UP (ref 27–31)
MCHC RBC-ENTMCNC: 29.5 G/DL — LOW (ref 32–37)
MCHC RBC-ENTMCNC: 29.6 PG — SIGNIFICANT CHANGE UP (ref 27–31)
MCHC RBC-ENTMCNC: 29.6 PG — SIGNIFICANT CHANGE UP (ref 27–31)
MCHC RBC-ENTMCNC: 29.7 G/DL — LOW (ref 32–37)
MCHC RBC-ENTMCNC: 29.7 PG — SIGNIFICANT CHANGE UP (ref 27–31)
MCHC RBC-ENTMCNC: 29.8 PG — SIGNIFICANT CHANGE UP (ref 27–31)
MCHC RBC-ENTMCNC: 30 PG — SIGNIFICANT CHANGE UP (ref 27–31)
MCHC RBC-ENTMCNC: 30 PG — SIGNIFICANT CHANGE UP (ref 27–31)
MCHC RBC-ENTMCNC: 30.1 PG — SIGNIFICANT CHANGE UP (ref 27–31)
MCHC RBC-ENTMCNC: 30.2 G/DL — LOW (ref 32–37)
MCHC RBC-ENTMCNC: 30.2 PG — SIGNIFICANT CHANGE UP (ref 27–31)
MCHC RBC-ENTMCNC: 30.3 G/DL — LOW (ref 32–37)
MCHC RBC-ENTMCNC: 30.3 PG — SIGNIFICANT CHANGE UP (ref 27–31)
MCHC RBC-ENTMCNC: 30.3 PG — SIGNIFICANT CHANGE UP (ref 27–31)
MCHC RBC-ENTMCNC: 30.4 G/DL — LOW (ref 32–37)
MCHC RBC-ENTMCNC: 30.4 G/DL — LOW (ref 32–37)
MCHC RBC-ENTMCNC: 30.4 PG — SIGNIFICANT CHANGE UP (ref 27–31)
MCHC RBC-ENTMCNC: 30.4 PG — SIGNIFICANT CHANGE UP (ref 27–31)
MCHC RBC-ENTMCNC: 30.5 G/DL — LOW (ref 32–37)
MCHC RBC-ENTMCNC: 30.5 PG — SIGNIFICANT CHANGE UP (ref 27–31)
MCHC RBC-ENTMCNC: 30.6 G/DL — LOW (ref 32–37)
MCHC RBC-ENTMCNC: 30.6 G/DL — LOW (ref 32–37)
MCHC RBC-ENTMCNC: 30.6 PG — SIGNIFICANT CHANGE UP (ref 27–31)
MCHC RBC-ENTMCNC: 30.7 G/DL — LOW (ref 32–37)
MCHC RBC-ENTMCNC: 30.7 PG — SIGNIFICANT CHANGE UP (ref 27–31)
MCHC RBC-ENTMCNC: 30.8 G/DL — LOW (ref 32–37)
MCHC RBC-ENTMCNC: 30.8 PG — SIGNIFICANT CHANGE UP (ref 27–31)
MCHC RBC-ENTMCNC: 30.9 G/DL — LOW (ref 32–37)
MCHC RBC-ENTMCNC: 30.9 PG — SIGNIFICANT CHANGE UP (ref 27–31)
MCHC RBC-ENTMCNC: 31 G/DL — LOW (ref 32–37)
MCHC RBC-ENTMCNC: 31 PG — SIGNIFICANT CHANGE UP (ref 27–31)
MCHC RBC-ENTMCNC: 31.1 G/DL — LOW (ref 32–37)
MCHC RBC-ENTMCNC: 31.1 PG — HIGH (ref 27–31)
MCHC RBC-ENTMCNC: 31.2 G/DL — LOW (ref 32–37)
MCHC RBC-ENTMCNC: 31.2 G/DL — LOW (ref 32–37)
MCHC RBC-ENTMCNC: 31.3 G/DL — LOW (ref 32–37)
MCHC RBC-ENTMCNC: 31.3 G/DL — LOW (ref 32–37)
MCHC RBC-ENTMCNC: 31.3 PG — HIGH (ref 27–31)
MCHC RBC-ENTMCNC: 31.4 G/DL — LOW (ref 32–37)
MCHC RBC-ENTMCNC: 31.5 G/DL — LOW (ref 32–37)
MCHC RBC-ENTMCNC: 31.5 PG — HIGH (ref 27–31)
MCHC RBC-ENTMCNC: 31.6 G/DL — LOW (ref 32–37)
MCHC RBC-ENTMCNC: 31.6 G/DL — LOW (ref 32–37)
MCHC RBC-ENTMCNC: 31.7 G/DL — LOW (ref 32–37)
MCHC RBC-ENTMCNC: 31.7 PG — HIGH (ref 27–31)
MCHC RBC-ENTMCNC: 31.8 G/DL — LOW (ref 32–37)
MCHC RBC-ENTMCNC: 31.8 PG — HIGH (ref 27–31)
MCHC RBC-ENTMCNC: 31.8 PG — HIGH (ref 27–31)
MCHC RBC-ENTMCNC: 31.9 G/DL — LOW (ref 32–37)
MCHC RBC-ENTMCNC: 31.9 PG — HIGH (ref 27–31)
MCHC RBC-ENTMCNC: 32 G/DL — SIGNIFICANT CHANGE UP (ref 32–37)
MCHC RBC-ENTMCNC: 32.1 G/DL — SIGNIFICANT CHANGE UP (ref 32–37)
MCHC RBC-ENTMCNC: 32.2 G/DL — SIGNIFICANT CHANGE UP (ref 32–37)
MCHC RBC-ENTMCNC: 32.2 PG — HIGH (ref 27–31)
MCHC RBC-ENTMCNC: 32.3 G/DL — SIGNIFICANT CHANGE UP (ref 32–37)
MCHC RBC-ENTMCNC: 32.4 G/DL — SIGNIFICANT CHANGE UP (ref 32–37)
MCHC RBC-ENTMCNC: 32.5 G/DL — SIGNIFICANT CHANGE UP (ref 32–37)
MCHC RBC-ENTMCNC: 32.5 G/DL — SIGNIFICANT CHANGE UP (ref 32–37)
MCHC RBC-ENTMCNC: 32.6 G/DL — SIGNIFICANT CHANGE UP (ref 32–37)
MCHC RBC-ENTMCNC: 32.7 G/DL — SIGNIFICANT CHANGE UP (ref 32–37)
MCHC RBC-ENTMCNC: 33 G/DL — SIGNIFICANT CHANGE UP (ref 32–37)
MCHC RBC-ENTMCNC: 33.3 G/DL — SIGNIFICANT CHANGE UP (ref 32–37)
MCV RBC AUTO: 100 FL — HIGH (ref 80–94)
MCV RBC AUTO: 100.4 FL — HIGH (ref 80–94)
MCV RBC AUTO: 100.4 FL — HIGH (ref 80–94)
MCV RBC AUTO: 100.7 FL — HIGH (ref 80–94)
MCV RBC AUTO: 100.8 FL — HIGH (ref 80–94)
MCV RBC AUTO: 101.2 FL — HIGH (ref 80–94)
MCV RBC AUTO: 101.3 FL — HIGH (ref 80–94)
MCV RBC AUTO: 101.3 FL — HIGH (ref 80–94)
MCV RBC AUTO: 101.6 FL — HIGH (ref 80–94)
MCV RBC AUTO: 101.7 FL — HIGH (ref 80–94)
MCV RBC AUTO: 101.8 FL — HIGH (ref 80–94)
MCV RBC AUTO: 102.2 FL — HIGH (ref 80–94)
MCV RBC AUTO: 102.3 FL — HIGH (ref 80–94)
MCV RBC AUTO: 102.5 FL — HIGH (ref 80–94)
MCV RBC AUTO: 102.6 FL — HIGH (ref 80–94)
MCV RBC AUTO: 103.3 FL — HIGH (ref 80–94)
MCV RBC AUTO: 103.6 FL — HIGH (ref 80–94)
MCV RBC AUTO: 90.3 FL — SIGNIFICANT CHANGE UP (ref 80–94)
MCV RBC AUTO: 91 FL — SIGNIFICANT CHANGE UP (ref 80–94)
MCV RBC AUTO: 91.1 FL — SIGNIFICANT CHANGE UP (ref 80–94)
MCV RBC AUTO: 91.2 FL — SIGNIFICANT CHANGE UP (ref 80–94)
MCV RBC AUTO: 91.3 FL — SIGNIFICANT CHANGE UP (ref 80–94)
MCV RBC AUTO: 91.3 FL — SIGNIFICANT CHANGE UP (ref 80–94)
MCV RBC AUTO: 91.6 FL — SIGNIFICANT CHANGE UP (ref 80–94)
MCV RBC AUTO: 91.8 FL — SIGNIFICANT CHANGE UP (ref 80–94)
MCV RBC AUTO: 92 FL — SIGNIFICANT CHANGE UP (ref 80–94)
MCV RBC AUTO: 92.5 FL — SIGNIFICANT CHANGE UP (ref 80–94)
MCV RBC AUTO: 92.5 FL — SIGNIFICANT CHANGE UP (ref 80–94)
MCV RBC AUTO: 92.6 FL — SIGNIFICANT CHANGE UP (ref 80–94)
MCV RBC AUTO: 92.6 FL — SIGNIFICANT CHANGE UP (ref 80–94)
MCV RBC AUTO: 92.7 FL — SIGNIFICANT CHANGE UP (ref 80–94)
MCV RBC AUTO: 92.8 FL — SIGNIFICANT CHANGE UP (ref 80–94)
MCV RBC AUTO: 93.5 FL — SIGNIFICANT CHANGE UP (ref 80–94)
MCV RBC AUTO: 93.8 FL — SIGNIFICANT CHANGE UP (ref 80–94)
MCV RBC AUTO: 94 FL — SIGNIFICANT CHANGE UP (ref 80–94)
MCV RBC AUTO: 95.1 FL — HIGH (ref 80–94)
MCV RBC AUTO: 95.3 FL — HIGH (ref 80–94)
MCV RBC AUTO: 95.3 FL — HIGH (ref 80–94)
MCV RBC AUTO: 95.4 FL — HIGH (ref 80–94)
MCV RBC AUTO: 95.4 FL — HIGH (ref 80–94)
MCV RBC AUTO: 95.7 FL — HIGH (ref 80–94)
MCV RBC AUTO: 95.7 FL — HIGH (ref 80–94)
MCV RBC AUTO: 95.9 FL — HIGH (ref 80–94)
MCV RBC AUTO: 96 FL — HIGH (ref 80–94)
MCV RBC AUTO: 96 FL — HIGH (ref 80–94)
MCV RBC AUTO: 97.7 FL — HIGH (ref 80–94)
MCV RBC AUTO: 97.8 FL — HIGH (ref 80–94)
MCV RBC AUTO: 98.4 FL — HIGH (ref 80–94)
MCV RBC AUTO: 99.2 FL — HIGH (ref 80–94)
MCV RBC AUTO: 99.6 FL — HIGH (ref 80–94)
MCV RBC AUTO: 99.6 FL — HIGH (ref 80–94)
METHOD TYPE: SIGNIFICANT CHANGE UP
METHOD TYPE: SIGNIFICANT CHANGE UP
MONOCYTES # BLD AUTO: 0.14 K/UL — SIGNIFICANT CHANGE UP (ref 0.1–0.6)
MONOCYTES # BLD AUTO: 0.18 K/UL — SIGNIFICANT CHANGE UP (ref 0.1–0.6)
MONOCYTES # BLD AUTO: 0.21 K/UL — SIGNIFICANT CHANGE UP (ref 0.1–0.6)
MONOCYTES # BLD AUTO: 0.24 K/UL — SIGNIFICANT CHANGE UP (ref 0.1–0.6)
MONOCYTES # BLD AUTO: 0.25 K/UL — SIGNIFICANT CHANGE UP (ref 0.1–0.6)
MONOCYTES # BLD AUTO: 0.35 K/UL — SIGNIFICANT CHANGE UP (ref 0.1–0.6)
MONOCYTES # BLD AUTO: 0.38 K/UL — SIGNIFICANT CHANGE UP (ref 0.1–0.6)
MONOCYTES # BLD AUTO: 0.58 K/UL — SIGNIFICANT CHANGE UP (ref 0.1–0.6)
MONOCYTES # BLD AUTO: 0.59 K/UL — SIGNIFICANT CHANGE UP (ref 0.1–0.6)
MONOCYTES # BLD AUTO: 0.62 K/UL — HIGH (ref 0.1–0.6)
MONOCYTES # BLD AUTO: 0.63 K/UL — HIGH (ref 0.1–0.6)
MONOCYTES # BLD AUTO: 0.65 K/UL — HIGH (ref 0.1–0.6)
MONOCYTES # BLD AUTO: 0.68 K/UL — HIGH (ref 0.1–0.6)
MONOCYTES # BLD AUTO: 0.7 K/UL — HIGH (ref 0.1–0.6)
MONOCYTES # BLD AUTO: 0.71 K/UL — HIGH (ref 0.1–0.6)
MONOCYTES # BLD AUTO: 0.74 K/UL — HIGH (ref 0.1–0.6)
MONOCYTES # BLD AUTO: 0.74 K/UL — HIGH (ref 0.1–0.6)
MONOCYTES # BLD AUTO: 0.79 K/UL — HIGH (ref 0.1–0.6)
MONOCYTES # BLD AUTO: 0.81 K/UL — HIGH (ref 0.1–0.6)
MONOCYTES # BLD AUTO: 0.82 K/UL — HIGH (ref 0.1–0.6)
MONOCYTES # BLD AUTO: 0.84 K/UL — HIGH (ref 0.1–0.6)
MONOCYTES # BLD AUTO: 0.85 K/UL — HIGH (ref 0.1–0.6)
MONOCYTES # BLD AUTO: 0.87 K/UL — HIGH (ref 0.1–0.6)
MONOCYTES # BLD AUTO: 0.93 K/UL — HIGH (ref 0.1–0.6)
MONOCYTES # BLD AUTO: 0.99 K/UL — HIGH (ref 0.1–0.6)
MONOCYTES # BLD AUTO: 1.02 K/UL — HIGH (ref 0.1–0.6)
MONOCYTES # BLD AUTO: 1.06 K/UL — HIGH (ref 0.1–0.6)
MONOCYTES # BLD AUTO: 1.15 K/UL — HIGH (ref 0.1–0.6)
MONOCYTES # BLD AUTO: 1.16 K/UL — HIGH (ref 0.1–0.6)
MONOCYTES # BLD AUTO: 1.16 K/UL — HIGH (ref 0.1–0.6)
MONOCYTES # BLD AUTO: 1.26 K/UL — HIGH (ref 0.1–0.6)
MONOCYTES # BLD AUTO: 1.27 K/UL — HIGH (ref 0.1–0.6)
MONOCYTES # BLD AUTO: 1.3 K/UL — HIGH (ref 0.1–0.6)
MONOCYTES # BLD AUTO: 1.38 K/UL — HIGH (ref 0.1–0.6)
MONOCYTES # BLD AUTO: 1.54 K/UL — HIGH (ref 0.1–0.6)
MONOCYTES # BLD AUTO: 1.65 K/UL — HIGH (ref 0.1–0.6)
MONOCYTES # BLD AUTO: 1.72 K/UL — HIGH (ref 0.1–0.6)
MONOCYTES # BLD AUTO: 1.9 K/UL — HIGH (ref 0.1–0.6)
MONOCYTES # BLD AUTO: 1.96 K/UL — HIGH (ref 0.1–0.6)
MONOCYTES # BLD AUTO: 2.2 K/UL — HIGH (ref 0.1–0.6)
MONOCYTES NFR BLD AUTO: 1.2 % — LOW (ref 1.7–9.3)
MONOCYTES NFR BLD AUTO: 1.4 % — LOW (ref 1.7–9.3)
MONOCYTES NFR BLD AUTO: 1.7 % — SIGNIFICANT CHANGE UP (ref 1.7–9.3)
MONOCYTES NFR BLD AUTO: 10.1 % — HIGH (ref 1.7–9.3)
MONOCYTES NFR BLD AUTO: 11.4 % — HIGH (ref 1.7–9.3)
MONOCYTES NFR BLD AUTO: 11.8 % — HIGH (ref 1.7–9.3)
MONOCYTES NFR BLD AUTO: 11.8 % — HIGH (ref 1.7–9.3)
MONOCYTES NFR BLD AUTO: 2.1 % — SIGNIFICANT CHANGE UP (ref 1.7–9.3)
MONOCYTES NFR BLD AUTO: 2.2 % — SIGNIFICANT CHANGE UP (ref 1.7–9.3)
MONOCYTES NFR BLD AUTO: 2.3 % — SIGNIFICANT CHANGE UP (ref 1.7–9.3)
MONOCYTES NFR BLD AUTO: 2.4 % — SIGNIFICANT CHANGE UP (ref 1.7–9.3)
MONOCYTES NFR BLD AUTO: 3.4 % — SIGNIFICANT CHANGE UP (ref 1.7–9.3)
MONOCYTES NFR BLD AUTO: 4 % — SIGNIFICANT CHANGE UP (ref 1.7–9.3)
MONOCYTES NFR BLD AUTO: 4.3 % — SIGNIFICANT CHANGE UP (ref 1.7–9.3)
MONOCYTES NFR BLD AUTO: 4.6 % — SIGNIFICANT CHANGE UP (ref 1.7–9.3)
MONOCYTES NFR BLD AUTO: 4.8 % — SIGNIFICANT CHANGE UP (ref 1.7–9.3)
MONOCYTES NFR BLD AUTO: 4.8 % — SIGNIFICANT CHANGE UP (ref 1.7–9.3)
MONOCYTES NFR BLD AUTO: 4.9 % — SIGNIFICANT CHANGE UP (ref 1.7–9.3)
MONOCYTES NFR BLD AUTO: 5 % — SIGNIFICANT CHANGE UP (ref 1.7–9.3)
MONOCYTES NFR BLD AUTO: 5.7 % — SIGNIFICANT CHANGE UP (ref 1.7–9.3)
MONOCYTES NFR BLD AUTO: 5.7 % — SIGNIFICANT CHANGE UP (ref 1.7–9.3)
MONOCYTES NFR BLD AUTO: 6 % — SIGNIFICANT CHANGE UP (ref 1.7–9.3)
MONOCYTES NFR BLD AUTO: 6.5 % — SIGNIFICANT CHANGE UP (ref 1.7–9.3)
MONOCYTES NFR BLD AUTO: 6.7 % — SIGNIFICANT CHANGE UP (ref 1.7–9.3)
MONOCYTES NFR BLD AUTO: 6.8 % — SIGNIFICANT CHANGE UP (ref 1.7–9.3)
MONOCYTES NFR BLD AUTO: 6.8 % — SIGNIFICANT CHANGE UP (ref 1.7–9.3)
MONOCYTES NFR BLD AUTO: 7 % — SIGNIFICANT CHANGE UP (ref 1.7–9.3)
MONOCYTES NFR BLD AUTO: 7.3 % — SIGNIFICANT CHANGE UP (ref 1.7–9.3)
MONOCYTES NFR BLD AUTO: 7.4 % — SIGNIFICANT CHANGE UP (ref 1.7–9.3)
MONOCYTES NFR BLD AUTO: 7.5 % — SIGNIFICANT CHANGE UP (ref 1.7–9.3)
MONOCYTES NFR BLD AUTO: 7.8 % — SIGNIFICANT CHANGE UP (ref 1.7–9.3)
MONOCYTES NFR BLD AUTO: 7.9 % — SIGNIFICANT CHANGE UP (ref 1.7–9.3)
MONOCYTES NFR BLD AUTO: 8.5 % — SIGNIFICANT CHANGE UP (ref 1.7–9.3)
MONOCYTES NFR BLD AUTO: 8.6 % — SIGNIFICANT CHANGE UP (ref 1.7–9.3)
MONOCYTES NFR BLD AUTO: 9.8 % — HIGH (ref 1.7–9.3)
MONOCYTES NFR BLD AUTO: 9.8 % — HIGH (ref 1.7–9.3)
MRSA PCR RESULT.: NEGATIVE — SIGNIFICANT CHANGE UP
MRSA PCR RESULT.: SIGNIFICANT CHANGE UP
NEUTROPHILS # BLD AUTO: 10 K/UL — HIGH (ref 1.4–6.5)
NEUTROPHILS # BLD AUTO: 10.06 K/UL — HIGH (ref 1.4–6.5)
NEUTROPHILS # BLD AUTO: 10.38 K/UL — HIGH (ref 1.4–6.5)
NEUTROPHILS # BLD AUTO: 10.39 K/UL — HIGH (ref 1.4–6.5)
NEUTROPHILS # BLD AUTO: 10.45 K/UL — HIGH (ref 1.4–6.5)
NEUTROPHILS # BLD AUTO: 10.92 K/UL — HIGH (ref 1.4–6.5)
NEUTROPHILS # BLD AUTO: 11.13 K/UL — HIGH (ref 1.4–6.5)
NEUTROPHILS # BLD AUTO: 11.44 K/UL — HIGH (ref 1.4–6.5)
NEUTROPHILS # BLD AUTO: 11.77 K/UL — HIGH (ref 1.4–6.5)
NEUTROPHILS # BLD AUTO: 12.86 K/UL — HIGH (ref 1.4–6.5)
NEUTROPHILS # BLD AUTO: 13.58 K/UL — HIGH (ref 1.4–6.5)
NEUTROPHILS # BLD AUTO: 14 K/UL — HIGH (ref 1.4–6.5)
NEUTROPHILS # BLD AUTO: 14.09 K/UL — HIGH (ref 1.4–6.5)
NEUTROPHILS # BLD AUTO: 14.53 K/UL — HIGH (ref 1.4–6.5)
NEUTROPHILS # BLD AUTO: 14.69 K/UL — HIGH (ref 1.4–6.5)
NEUTROPHILS # BLD AUTO: 16.67 K/UL — HIGH (ref 1.4–6.5)
NEUTROPHILS # BLD AUTO: 17.21 K/UL — HIGH (ref 1.4–6.5)
NEUTROPHILS # BLD AUTO: 17.26 K/UL — HIGH (ref 1.4–6.5)
NEUTROPHILS # BLD AUTO: 19.83 K/UL — HIGH (ref 1.4–6.5)
NEUTROPHILS # BLD AUTO: 20.23 K/UL — HIGH (ref 1.4–6.5)
NEUTROPHILS # BLD AUTO: 21.53 K/UL — HIGH (ref 1.4–6.5)
NEUTROPHILS # BLD AUTO: 21.78 K/UL — HIGH (ref 1.4–6.5)
NEUTROPHILS # BLD AUTO: 21.99 K/UL — HIGH (ref 1.4–6.5)
NEUTROPHILS # BLD AUTO: 27.29 K/UL — HIGH (ref 1.4–6.5)
NEUTROPHILS # BLD AUTO: 28.05 K/UL — HIGH (ref 1.4–6.5)
NEUTROPHILS # BLD AUTO: 31.71 K/UL — HIGH (ref 1.4–6.5)
NEUTROPHILS # BLD AUTO: 6.25 K/UL — SIGNIFICANT CHANGE UP (ref 1.4–6.5)
NEUTROPHILS # BLD AUTO: 6.26 K/UL — SIGNIFICANT CHANGE UP (ref 1.4–6.5)
NEUTROPHILS # BLD AUTO: 6.43 K/UL — SIGNIFICANT CHANGE UP (ref 1.4–6.5)
NEUTROPHILS # BLD AUTO: 7.6 K/UL — HIGH (ref 1.4–6.5)
NEUTROPHILS # BLD AUTO: 7.76 K/UL — HIGH (ref 1.4–6.5)
NEUTROPHILS # BLD AUTO: 7.95 K/UL — HIGH (ref 1.4–6.5)
NEUTROPHILS # BLD AUTO: 8.35 K/UL — HIGH (ref 1.4–6.5)
NEUTROPHILS # BLD AUTO: 8.36 K/UL — HIGH (ref 1.4–6.5)
NEUTROPHILS # BLD AUTO: 8.37 K/UL — HIGH (ref 1.4–6.5)
NEUTROPHILS # BLD AUTO: 8.48 K/UL — HIGH (ref 1.4–6.5)
NEUTROPHILS # BLD AUTO: 8.55 K/UL — HIGH (ref 1.4–6.5)
NEUTROPHILS # BLD AUTO: 8.7 K/UL — HIGH (ref 1.4–6.5)
NEUTROPHILS # BLD AUTO: 8.79 K/UL — HIGH (ref 1.4–6.5)
NEUTROPHILS # BLD AUTO: 9.97 K/UL — HIGH (ref 1.4–6.5)
NEUTROPHILS NFR BLD AUTO: 71.9 % — SIGNIFICANT CHANGE UP (ref 42.2–75.2)
NEUTROPHILS NFR BLD AUTO: 75.1 % — SIGNIFICANT CHANGE UP (ref 42.2–75.2)
NEUTROPHILS NFR BLD AUTO: 75.6 % — HIGH (ref 42.2–75.2)
NEUTROPHILS NFR BLD AUTO: 75.6 % — HIGH (ref 42.2–75.2)
NEUTROPHILS NFR BLD AUTO: 76.2 % — HIGH (ref 42.2–75.2)
NEUTROPHILS NFR BLD AUTO: 76.6 % — HIGH (ref 42.2–75.2)
NEUTROPHILS NFR BLD AUTO: 77.2 % — HIGH (ref 42.2–75.2)
NEUTROPHILS NFR BLD AUTO: 77.2 % — HIGH (ref 42.2–75.2)
NEUTROPHILS NFR BLD AUTO: 77.3 % — HIGH (ref 42.2–75.2)
NEUTROPHILS NFR BLD AUTO: 78 % — HIGH (ref 42.2–75.2)
NEUTROPHILS NFR BLD AUTO: 78.5 % — HIGH (ref 42.2–75.2)
NEUTROPHILS NFR BLD AUTO: 78.8 % — HIGH (ref 42.2–75.2)
NEUTROPHILS NFR BLD AUTO: 79.4 % — HIGH (ref 42.2–75.2)
NEUTROPHILS NFR BLD AUTO: 79.8 % — HIGH (ref 42.2–75.2)
NEUTROPHILS NFR BLD AUTO: 80.4 % — HIGH (ref 42.2–75.2)
NEUTROPHILS NFR BLD AUTO: 80.9 % — HIGH (ref 42.2–75.2)
NEUTROPHILS NFR BLD AUTO: 81.1 % — HIGH (ref 42.2–75.2)
NEUTROPHILS NFR BLD AUTO: 82.2 % — HIGH (ref 42.2–75.2)
NEUTROPHILS NFR BLD AUTO: 82.8 % — HIGH (ref 42.2–75.2)
NEUTROPHILS NFR BLD AUTO: 83.2 % — HIGH (ref 42.2–75.2)
NEUTROPHILS NFR BLD AUTO: 83.4 % — HIGH (ref 42.2–75.2)
NEUTROPHILS NFR BLD AUTO: 83.5 % — HIGH (ref 42.2–75.2)
NEUTROPHILS NFR BLD AUTO: 84.4 % — HIGH (ref 42.2–75.2)
NEUTROPHILS NFR BLD AUTO: 84.4 % — HIGH (ref 42.2–75.2)
NEUTROPHILS NFR BLD AUTO: 85.6 % — HIGH (ref 42.2–75.2)
NEUTROPHILS NFR BLD AUTO: 85.6 % — HIGH (ref 42.2–75.2)
NEUTROPHILS NFR BLD AUTO: 86 % — HIGH (ref 42.2–75.2)
NEUTROPHILS NFR BLD AUTO: 86.1 % — HIGH (ref 42.2–75.2)
NEUTROPHILS NFR BLD AUTO: 86.4 % — HIGH (ref 42.2–75.2)
NEUTROPHILS NFR BLD AUTO: 87.2 % — HIGH (ref 42.2–75.2)
NEUTROPHILS NFR BLD AUTO: 88 % — HIGH (ref 42.2–75.2)
NEUTROPHILS NFR BLD AUTO: 88.2 % — HIGH (ref 42.2–75.2)
NEUTROPHILS NFR BLD AUTO: 89.5 % — HIGH (ref 42.2–75.2)
NEUTROPHILS NFR BLD AUTO: 91.6 % — HIGH (ref 42.2–75.2)
NEUTROPHILS NFR BLD AUTO: 92.3 % — HIGH (ref 42.2–75.2)
NEUTROPHILS NFR BLD AUTO: 92.5 % — HIGH (ref 42.2–75.2)
NEUTROPHILS NFR BLD AUTO: 93.8 % — HIGH (ref 42.2–75.2)
NEUTROPHILS NFR BLD AUTO: 94.7 % — HIGH (ref 42.2–75.2)
NEUTROPHILS NFR BLD AUTO: 95.3 % — HIGH (ref 42.2–75.2)
NEUTROPHILS NFR BLD AUTO: 95.5 % — HIGH (ref 42.2–75.2)
NITRITE UR-MCNC: NEGATIVE — SIGNIFICANT CHANGE UP
NRBC # BLD: 0 /100 WBCS — SIGNIFICANT CHANGE UP (ref 0–0)
NT-PROBNP SERPL-SCNC: HIGH PG/ML (ref 0–300)
ORGANISM # SPEC MICROSCOPIC CNT: SIGNIFICANT CHANGE UP
PCO2 BLDA: 54 MMHG — HIGH (ref 38–42)
PCO2 BLDV: 60 MMHG — HIGH (ref 41–51)
PH BLDA: 7.32 — LOW (ref 7.38–7.42)
PH BLDV: 7.35 — SIGNIFICANT CHANGE UP (ref 7.26–7.43)
PH UR: 6.5 — SIGNIFICANT CHANGE UP (ref 5–8)
PHOSPHATE SERPL-MCNC: 3.4 MG/DL — SIGNIFICANT CHANGE UP (ref 2.1–4.9)
PHOSPHATE SERPL-MCNC: 3.9 MG/DL — SIGNIFICANT CHANGE UP (ref 2.1–4.9)
PHOSPHATE SERPL-MCNC: 4.1 MG/DL — SIGNIFICANT CHANGE UP (ref 2.1–4.9)
PHOSPHATE SERPL-MCNC: 4.5 MG/DL — SIGNIFICANT CHANGE UP (ref 2.1–4.9)
PHOSPHATE SERPL-MCNC: 4.6 MG/DL — SIGNIFICANT CHANGE UP (ref 2.1–4.9)
PHOSPHATE SERPL-MCNC: 4.9 MG/DL — SIGNIFICANT CHANGE UP (ref 2.1–4.9)
PHOSPHATE SERPL-MCNC: 5 MG/DL — HIGH (ref 2.1–4.9)
PHOSPHATE SERPL-MCNC: 5.3 MG/DL — HIGH (ref 2.1–4.9)
PHOSPHATE SERPL-MCNC: 5.6 MG/DL — HIGH (ref 2.1–4.9)
PHOSPHATE SERPL-MCNC: 5.7 MG/DL — HIGH (ref 2.1–4.9)
PHOSPHATE SERPL-MCNC: 6 MG/DL — HIGH (ref 2.1–4.9)
PHOSPHATE SERPL-MCNC: 6.1 MG/DL — HIGH (ref 2.1–4.9)
PHOSPHATE SERPL-MCNC: 6.7 MG/DL — HIGH (ref 2.1–4.9)
PHOSPHATE SERPL-MCNC: 6.9 MG/DL — HIGH (ref 2.1–4.9)
PHOSPHATE SERPL-MCNC: 7.1 MG/DL — HIGH (ref 2.1–4.9)
PHOSPHATE SERPL-MCNC: 7.2 MG/DL — HIGH (ref 2.1–4.9)
PHOSPHATE SERPL-MCNC: 7.2 MG/DL — HIGH (ref 2.1–4.9)
PHOSPHATE SERPL-MCNC: 7.6 MG/DL — HIGH (ref 2.1–4.9)
PHOSPHATE SERPL-MCNC: 8 MG/DL — HIGH (ref 2.1–4.9)
PLAT MORPH BLD: ABNORMAL
PLATELET # BLD AUTO: 136 K/UL — SIGNIFICANT CHANGE UP (ref 130–400)
PLATELET # BLD AUTO: 153 K/UL — SIGNIFICANT CHANGE UP (ref 130–400)
PLATELET # BLD AUTO: 156 K/UL — SIGNIFICANT CHANGE UP (ref 130–400)
PLATELET # BLD AUTO: 158 K/UL — SIGNIFICANT CHANGE UP (ref 130–400)
PLATELET # BLD AUTO: 159 K/UL — SIGNIFICANT CHANGE UP (ref 130–400)
PLATELET # BLD AUTO: 165 K/UL — SIGNIFICANT CHANGE UP (ref 130–400)
PLATELET # BLD AUTO: 184 K/UL — SIGNIFICANT CHANGE UP (ref 130–400)
PLATELET # BLD AUTO: 195 K/UL — SIGNIFICANT CHANGE UP (ref 130–400)
PLATELET # BLD AUTO: 196 K/UL — SIGNIFICANT CHANGE UP (ref 130–400)
PLATELET # BLD AUTO: 198 K/UL — SIGNIFICANT CHANGE UP (ref 130–400)
PLATELET # BLD AUTO: 202 K/UL — SIGNIFICANT CHANGE UP (ref 130–400)
PLATELET # BLD AUTO: 205 K/UL — SIGNIFICANT CHANGE UP (ref 130–400)
PLATELET # BLD AUTO: 212 K/UL — SIGNIFICANT CHANGE UP (ref 130–400)
PLATELET # BLD AUTO: 213 K/UL — SIGNIFICANT CHANGE UP (ref 130–400)
PLATELET # BLD AUTO: 218 K/UL — SIGNIFICANT CHANGE UP (ref 130–400)
PLATELET # BLD AUTO: 219 K/UL — SIGNIFICANT CHANGE UP (ref 130–400)
PLATELET # BLD AUTO: 222 K/UL — SIGNIFICANT CHANGE UP (ref 130–400)
PLATELET # BLD AUTO: 225 K/UL — SIGNIFICANT CHANGE UP (ref 130–400)
PLATELET # BLD AUTO: 226 K/UL — SIGNIFICANT CHANGE UP (ref 130–400)
PLATELET # BLD AUTO: 235 K/UL — SIGNIFICANT CHANGE UP (ref 130–400)
PLATELET # BLD AUTO: 239 K/UL — SIGNIFICANT CHANGE UP (ref 130–400)
PLATELET # BLD AUTO: 239 K/UL — SIGNIFICANT CHANGE UP (ref 130–400)
PLATELET # BLD AUTO: 252 K/UL — SIGNIFICANT CHANGE UP (ref 130–400)
PLATELET # BLD AUTO: 255 K/UL — SIGNIFICANT CHANGE UP (ref 130–400)
PLATELET # BLD AUTO: 267 K/UL — SIGNIFICANT CHANGE UP (ref 130–400)
PLATELET # BLD AUTO: 268 K/UL — SIGNIFICANT CHANGE UP (ref 130–400)
PLATELET # BLD AUTO: 275 K/UL — SIGNIFICANT CHANGE UP (ref 130–400)
PLATELET # BLD AUTO: 280 K/UL — SIGNIFICANT CHANGE UP (ref 130–400)
PLATELET # BLD AUTO: 282 K/UL — SIGNIFICANT CHANGE UP (ref 130–400)
PLATELET # BLD AUTO: 287 K/UL — SIGNIFICANT CHANGE UP (ref 130–400)
PLATELET # BLD AUTO: 295 K/UL — SIGNIFICANT CHANGE UP (ref 130–400)
PLATELET # BLD AUTO: 300 K/UL — SIGNIFICANT CHANGE UP (ref 130–400)
PLATELET # BLD AUTO: 300 K/UL — SIGNIFICANT CHANGE UP (ref 130–400)
PLATELET # BLD AUTO: 301 K/UL — SIGNIFICANT CHANGE UP (ref 130–400)
PLATELET # BLD AUTO: 305 K/UL — SIGNIFICANT CHANGE UP (ref 130–400)
PLATELET # BLD AUTO: 314 K/UL — SIGNIFICANT CHANGE UP (ref 130–400)
PLATELET # BLD AUTO: 319 K/UL — SIGNIFICANT CHANGE UP (ref 130–400)
PLATELET # BLD AUTO: 326 K/UL — SIGNIFICANT CHANGE UP (ref 130–400)
PLATELET # BLD AUTO: 339 K/UL — SIGNIFICANT CHANGE UP (ref 130–400)
PLATELET # BLD AUTO: 346 K/UL — SIGNIFICANT CHANGE UP (ref 130–400)
PLATELET # BLD AUTO: 348 K/UL — SIGNIFICANT CHANGE UP (ref 130–400)
PLATELET # BLD AUTO: 349 K/UL — SIGNIFICANT CHANGE UP (ref 130–400)
PLATELET # BLD AUTO: 371 K/UL — SIGNIFICANT CHANGE UP (ref 130–400)
PLATELET # BLD AUTO: 371 K/UL — SIGNIFICANT CHANGE UP (ref 130–400)
PLATELET # BLD AUTO: 383 K/UL — SIGNIFICANT CHANGE UP (ref 130–400)
PLATELET # BLD AUTO: 384 K/UL — SIGNIFICANT CHANGE UP (ref 130–400)
PLATELET # BLD AUTO: 392 K/UL — SIGNIFICANT CHANGE UP (ref 130–400)
PLATELET # BLD AUTO: 392 K/UL — SIGNIFICANT CHANGE UP (ref 130–400)
PLATELET # BLD AUTO: 405 K/UL — HIGH (ref 130–400)
PLATELET # BLD AUTO: 412 K/UL — HIGH (ref 130–400)
PLATELET # BLD AUTO: 415 K/UL — HIGH (ref 130–400)
PLATELET # BLD AUTO: 425 K/UL — HIGH (ref 130–400)
PLATELET # BLD AUTO: 431 K/UL — HIGH (ref 130–400)
PLATELET # BLD AUTO: 433 K/UL — HIGH (ref 130–400)
PLATELET # BLD AUTO: 435 K/UL — HIGH (ref 130–400)
PLATELET # BLD AUTO: 512 K/UL — HIGH (ref 130–400)
PLATELET # BLD AUTO: 79 K/UL — LOW (ref 130–400)
PO2 BLDA: 64 MMHG — LOW (ref 78–95)
PO2 BLDV: 28 MMHG — SIGNIFICANT CHANGE UP (ref 20–40)
POIKILOCYTOSIS BLD QL AUTO: SLIGHT — SIGNIFICANT CHANGE UP
POLYCHROMASIA BLD QL SMEAR: SLIGHT — SIGNIFICANT CHANGE UP
POTASSIUM BLDV-SCNC: 4.6 MMOL/L — SIGNIFICANT CHANGE UP (ref 3.3–5.6)
POTASSIUM SERPL-MCNC: 3.3 MMOL/L — LOW (ref 3.5–5)
POTASSIUM SERPL-MCNC: 3.9 MMOL/L — SIGNIFICANT CHANGE UP (ref 3.5–5)
POTASSIUM SERPL-MCNC: 4 MMOL/L — SIGNIFICANT CHANGE UP (ref 3.5–5)
POTASSIUM SERPL-MCNC: 4.1 MMOL/L — SIGNIFICANT CHANGE UP (ref 3.5–5)
POTASSIUM SERPL-MCNC: 4.2 MMOL/L — SIGNIFICANT CHANGE UP (ref 3.5–5)
POTASSIUM SERPL-MCNC: 4.3 MMOL/L — SIGNIFICANT CHANGE UP (ref 3.5–5)
POTASSIUM SERPL-MCNC: 4.3 MMOL/L — SIGNIFICANT CHANGE UP (ref 3.5–5)
POTASSIUM SERPL-MCNC: 4.4 MMOL/L — SIGNIFICANT CHANGE UP (ref 3.5–5)
POTASSIUM SERPL-MCNC: 4.4 MMOL/L — SIGNIFICANT CHANGE UP (ref 3.5–5)
POTASSIUM SERPL-MCNC: 4.5 MMOL/L — SIGNIFICANT CHANGE UP (ref 3.5–5)
POTASSIUM SERPL-MCNC: 4.5 MMOL/L — SIGNIFICANT CHANGE UP (ref 3.5–5)
POTASSIUM SERPL-MCNC: 4.6 MMOL/L — SIGNIFICANT CHANGE UP (ref 3.5–5)
POTASSIUM SERPL-MCNC: 4.7 MMOL/L — SIGNIFICANT CHANGE UP (ref 3.5–5)
POTASSIUM SERPL-MCNC: 4.8 MMOL/L — SIGNIFICANT CHANGE UP (ref 3.5–5)
POTASSIUM SERPL-MCNC: 4.9 MMOL/L — SIGNIFICANT CHANGE UP (ref 3.5–5)
POTASSIUM SERPL-MCNC: 5 MMOL/L — SIGNIFICANT CHANGE UP (ref 3.5–5)
POTASSIUM SERPL-MCNC: 5.3 MMOL/L — HIGH (ref 3.5–5)
POTASSIUM SERPL-MCNC: 5.4 MMOL/L — HIGH (ref 3.5–5)
POTASSIUM SERPL-MCNC: 5.6 MMOL/L — HIGH (ref 3.5–5)
POTASSIUM SERPL-MCNC: 5.6 MMOL/L — HIGH (ref 3.5–5)
POTASSIUM SERPL-MCNC: 5.7 MMOL/L — HIGH (ref 3.5–5)
POTASSIUM SERPL-MCNC: 6 MMOL/L — CRITICAL HIGH (ref 3.5–5)
POTASSIUM SERPL-MCNC: 6.1 MMOL/L — CRITICAL HIGH (ref 3.5–5)
POTASSIUM SERPL-SCNC: 3.3 MMOL/L — LOW (ref 3.5–5)
POTASSIUM SERPL-SCNC: 3.9 MMOL/L — SIGNIFICANT CHANGE UP (ref 3.5–5)
POTASSIUM SERPL-SCNC: 4 MMOL/L — SIGNIFICANT CHANGE UP (ref 3.5–5)
POTASSIUM SERPL-SCNC: 4.1 MMOL/L — SIGNIFICANT CHANGE UP (ref 3.5–5)
POTASSIUM SERPL-SCNC: 4.2 MMOL/L — SIGNIFICANT CHANGE UP (ref 3.5–5)
POTASSIUM SERPL-SCNC: 4.3 MMOL/L — SIGNIFICANT CHANGE UP (ref 3.5–5)
POTASSIUM SERPL-SCNC: 4.3 MMOL/L — SIGNIFICANT CHANGE UP (ref 3.5–5)
POTASSIUM SERPL-SCNC: 4.4 MMOL/L — SIGNIFICANT CHANGE UP (ref 3.5–5)
POTASSIUM SERPL-SCNC: 4.4 MMOL/L — SIGNIFICANT CHANGE UP (ref 3.5–5)
POTASSIUM SERPL-SCNC: 4.5 MMOL/L — SIGNIFICANT CHANGE UP (ref 3.5–5)
POTASSIUM SERPL-SCNC: 4.5 MMOL/L — SIGNIFICANT CHANGE UP (ref 3.5–5)
POTASSIUM SERPL-SCNC: 4.6 MMOL/L — SIGNIFICANT CHANGE UP (ref 3.5–5)
POTASSIUM SERPL-SCNC: 4.7 MMOL/L — SIGNIFICANT CHANGE UP (ref 3.5–5)
POTASSIUM SERPL-SCNC: 4.8 MMOL/L — SIGNIFICANT CHANGE UP (ref 3.5–5)
POTASSIUM SERPL-SCNC: 4.9 MMOL/L — SIGNIFICANT CHANGE UP (ref 3.5–5)
POTASSIUM SERPL-SCNC: 5 MMOL/L — SIGNIFICANT CHANGE UP (ref 3.5–5)
POTASSIUM SERPL-SCNC: 5.3 MMOL/L — HIGH (ref 3.5–5)
POTASSIUM SERPL-SCNC: 5.4 MMOL/L — HIGH (ref 3.5–5)
POTASSIUM SERPL-SCNC: 5.6 MMOL/L — HIGH (ref 3.5–5)
POTASSIUM SERPL-SCNC: 5.6 MMOL/L — HIGH (ref 3.5–5)
POTASSIUM SERPL-SCNC: 5.7 MMOL/L — HIGH (ref 3.5–5)
POTASSIUM SERPL-SCNC: 6 MMOL/L — CRITICAL HIGH (ref 3.5–5)
POTASSIUM SERPL-SCNC: 6.1 MMOL/L — CRITICAL HIGH (ref 3.5–5)
PROCALCITONIN SERPL-MCNC: 1.33 NG/ML — HIGH (ref 0.02–0.1)
PROCALCITONIN SERPL-MCNC: 1.98 NG/ML — HIGH (ref 0.02–0.1)
PROCALCITONIN SERPL-MCNC: 2.42 NG/ML — HIGH (ref 0.02–0.1)
PROCALCITONIN SERPL-MCNC: 2.9 NG/ML — HIGH (ref 0.02–0.1)
PROCALCITONIN SERPL-MCNC: 2.95 NG/ML — HIGH (ref 0.02–0.1)
PROCALCITONIN SERPL-MCNC: 3.47 NG/ML — HIGH (ref 0.02–0.1)
PROCALCITONIN SERPL-MCNC: 3.6 NG/ML — HIGH (ref 0.02–0.1)
PROCALCITONIN SERPL-MCNC: 4.11 NG/ML — HIGH (ref 0.02–0.1)
PROCALCITONIN SERPL-MCNC: 4.7 NG/ML — HIGH (ref 0.02–0.1)
PROCALCITONIN SERPL-MCNC: 4.89 NG/ML — HIGH (ref 0.02–0.1)
PROCALCITONIN SERPL-MCNC: 5.28 NG/ML — HIGH (ref 0.02–0.1)
PROCALCITONIN SERPL-MCNC: 5.41 NG/ML — HIGH (ref 0.02–0.1)
PROCALCITONIN SERPL-MCNC: 6.13 NG/ML — HIGH (ref 0.02–0.1)
PROMYELOCYTES # FLD: 2.6 % — HIGH (ref 0–0)
PROT SERPL-MCNC: 4.5 G/DL — LOW (ref 6–8)
PROT SERPL-MCNC: 4.7 G/DL — LOW (ref 6–8)
PROT SERPL-MCNC: 5 G/DL — LOW (ref 6–8)
PROT SERPL-MCNC: 5 G/DL — LOW (ref 6–8)
PROT SERPL-MCNC: 5.2 G/DL — LOW (ref 6–8)
PROT SERPL-MCNC: 5.3 G/DL — LOW (ref 6–8)
PROT SERPL-MCNC: 5.3 G/DL — LOW (ref 6–8)
PROT SERPL-MCNC: 5.4 G/DL — LOW (ref 6–8)
PROT SERPL-MCNC: 5.5 G/DL — LOW (ref 6–8)
PROT SERPL-MCNC: 5.6 G/DL — LOW (ref 6–8)
PROT SERPL-MCNC: 5.7 G/DL — LOW (ref 6–8)
PROT SERPL-MCNC: 5.8 G/DL — LOW (ref 6–8)
PROT SERPL-MCNC: 5.9 G/DL — LOW (ref 6–8)
PROT SERPL-MCNC: 5.9 G/DL — LOW (ref 6–8)
PROT SERPL-MCNC: 6 G/DL — SIGNIFICANT CHANGE UP (ref 6–8)
PROT SERPL-MCNC: 6.1 G/DL — SIGNIFICANT CHANGE UP (ref 6–8)
PROT SERPL-MCNC: 6.1 G/DL — SIGNIFICANT CHANGE UP (ref 6–8)
PROT SERPL-MCNC: 6.2 G/DL — SIGNIFICANT CHANGE UP (ref 6–8)
PROT SERPL-MCNC: 6.2 G/DL — SIGNIFICANT CHANGE UP (ref 6–8)
PROT SERPL-MCNC: 6.3 G/DL — SIGNIFICANT CHANGE UP (ref 6–8)
PROT SERPL-MCNC: 6.4 G/DL — SIGNIFICANT CHANGE UP (ref 6–8)
PROT SERPL-MCNC: 6.9 G/DL — SIGNIFICANT CHANGE UP (ref 6–8)
PROT UR-MCNC: ABNORMAL
PROTHROM AB SERPL-ACNC: 12.1 SEC — SIGNIFICANT CHANGE UP (ref 9.95–12.87)
PROTHROM AB SERPL-ACNC: 13.1 SEC — HIGH (ref 9.95–12.87)
PROTHROM AB SERPL-ACNC: 13.5 SEC — HIGH (ref 9.95–12.87)
PROTHROM AB SERPL-ACNC: 13.6 SEC — HIGH (ref 9.95–12.87)
PROTHROM AB SERPL-ACNC: 13.7 SEC — HIGH (ref 9.95–12.87)
PROTHROM AB SERPL-ACNC: 13.8 SEC — HIGH (ref 9.95–12.87)
PROTHROM AB SERPL-ACNC: 14.2 SEC — HIGH (ref 9.95–12.87)
PROTHROM AB SERPL-ACNC: 14.6 SEC — HIGH (ref 9.95–12.87)
PROTHROM AB SERPL-ACNC: 14.7 SEC — HIGH (ref 9.95–12.87)
PROTHROM AB SERPL-ACNC: 14.8 SEC — HIGH (ref 9.95–12.87)
PROTHROM AB SERPL-ACNC: 14.8 SEC — HIGH (ref 9.95–12.87)
PROTHROM AB SERPL-ACNC: 15.2 SEC — HIGH (ref 9.95–12.87)
PROTHROM AB SERPL-ACNC: 15.6 SEC — HIGH (ref 9.95–12.87)
PROTHROM AB SERPL-ACNC: 16.1 SEC — HIGH (ref 9.95–12.87)
PROTHROM AB SERPL-ACNC: 16.2 SEC — HIGH (ref 9.95–12.87)
PROTHROM AB SERPL-ACNC: 17.1 SEC — HIGH (ref 9.95–12.87)
PROTHROM AB SERPL-ACNC: 17.2 SEC — HIGH (ref 9.95–12.87)
PROTHROM AB SERPL-ACNC: 17.7 SEC — HIGH (ref 9.95–12.87)
PROTHROM AB SERPL-ACNC: 17.9 SEC — HIGH (ref 9.95–12.87)
PROTHROM AB SERPL-ACNC: 18.2 SEC — HIGH (ref 9.95–12.87)
PROTHROM AB SERPL-ACNC: 19.8 SEC — HIGH (ref 9.95–12.87)
PROTHROM AB SERPL-ACNC: 29.3 SEC — HIGH (ref 9.95–12.87)
PROTHROM AB SERPL-ACNC: 30.6 SEC — HIGH (ref 9.95–12.87)
PROTHROM AB SERPL-ACNC: 31.9 SEC — HIGH (ref 9.95–12.87)
PROTHROM AB SERPL-ACNC: 32.1 SEC — HIGH (ref 9.95–12.87)
RAPID RVP RESULT: DETECTED
RBC # BLD: 2.2 M/UL — LOW (ref 4.7–6.1)
RBC # BLD: 2.2 M/UL — LOW (ref 4.7–6.1)
RBC # BLD: 2.21 M/UL — LOW (ref 4.7–6.1)
RBC # BLD: 2.23 M/UL — LOW (ref 4.7–6.1)
RBC # BLD: 2.24 M/UL — LOW (ref 4.7–6.1)
RBC # BLD: 2.27 M/UL — LOW (ref 4.7–6.1)
RBC # BLD: 2.27 M/UL — LOW (ref 4.7–6.1)
RBC # BLD: 2.28 M/UL — LOW (ref 4.7–6.1)
RBC # BLD: 2.28 M/UL — LOW (ref 4.7–6.1)
RBC # BLD: 2.29 M/UL — LOW (ref 4.7–6.1)
RBC # BLD: 2.3 M/UL — LOW (ref 4.7–6.1)
RBC # BLD: 2.32 M/UL — LOW (ref 4.7–6.1)
RBC # BLD: 2.32 M/UL — LOW (ref 4.7–6.1)
RBC # BLD: 2.33 M/UL — LOW (ref 4.7–6.1)
RBC # BLD: 2.35 M/UL — LOW (ref 4.7–6.1)
RBC # BLD: 2.35 M/UL — LOW (ref 4.7–6.1)
RBC # BLD: 2.41 M/UL — LOW (ref 4.7–6.1)
RBC # BLD: 2.43 M/UL — LOW (ref 4.7–6.1)
RBC # BLD: 2.45 M/UL — LOW (ref 4.7–6.1)
RBC # BLD: 2.46 M/UL — LOW (ref 4.7–6.1)
RBC # BLD: 2.48 M/UL — LOW (ref 4.7–6.1)
RBC # BLD: 2.49 M/UL — LOW (ref 4.7–6.1)
RBC # BLD: 2.5 M/UL — LOW (ref 4.7–6.1)
RBC # BLD: 2.5 M/UL — LOW (ref 4.7–6.1)
RBC # BLD: 2.51 M/UL — LOW (ref 4.7–6.1)
RBC # BLD: 2.52 M/UL — LOW (ref 4.7–6.1)
RBC # BLD: 2.56 M/UL — LOW (ref 4.7–6.1)
RBC # BLD: 2.59 M/UL — LOW (ref 4.7–6.1)
RBC # BLD: 2.6 M/UL — LOW (ref 4.7–6.1)
RBC # BLD: 2.65 M/UL — LOW (ref 4.7–6.1)
RBC # BLD: 2.73 M/UL — LOW (ref 4.7–6.1)
RBC # BLD: 2.79 M/UL — LOW (ref 4.7–6.1)
RBC # BLD: 2.79 M/UL — LOW (ref 4.7–6.1)
RBC # BLD: 2.85 M/UL — LOW (ref 4.7–6.1)
RBC # BLD: 2.89 M/UL — LOW (ref 4.7–6.1)
RBC # BLD: 2.89 M/UL — LOW (ref 4.7–6.1)
RBC # BLD: 2.95 M/UL — LOW (ref 4.7–6.1)
RBC # BLD: 2.96 M/UL — LOW (ref 4.7–6.1)
RBC # BLD: 2.99 M/UL — LOW (ref 4.7–6.1)
RBC # BLD: 3.03 M/UL — LOW (ref 4.7–6.1)
RBC # BLD: 3.04 M/UL — LOW (ref 4.7–6.1)
RBC # BLD: 3.05 M/UL — LOW (ref 4.7–6.1)
RBC # BLD: 3.1 M/UL — LOW (ref 4.7–6.1)
RBC # BLD: 3.11 M/UL — LOW (ref 4.7–6.1)
RBC # BLD: 3.12 M/UL — LOW (ref 4.7–6.1)
RBC # BLD: 3.14 M/UL — LOW (ref 4.7–6.1)
RBC # BLD: 3.19 M/UL — LOW (ref 4.7–6.1)
RBC # BLD: 3.21 M/UL — LOW (ref 4.7–6.1)
RBC # BLD: 3.24 M/UL — LOW (ref 4.7–6.1)
RBC # BLD: 3.43 M/UL — LOW (ref 4.7–6.1)
RBC # FLD: 17.2 % — HIGH (ref 11.5–14.5)
RBC # FLD: 17.7 % — HIGH (ref 11.5–14.5)
RBC # FLD: 18 % — HIGH (ref 11.5–14.5)
RBC # FLD: 18 % — HIGH (ref 11.5–14.5)
RBC # FLD: 18.1 % — HIGH (ref 11.5–14.5)
RBC # FLD: 18.1 % — HIGH (ref 11.5–14.5)
RBC # FLD: 18.2 % — HIGH (ref 11.5–14.5)
RBC # FLD: 18.2 % — HIGH (ref 11.5–14.5)
RBC # FLD: 18.3 % — HIGH (ref 11.5–14.5)
RBC # FLD: 18.5 % — HIGH (ref 11.5–14.5)
RBC # FLD: 18.6 % — HIGH (ref 11.5–14.5)
RBC # FLD: 18.6 % — HIGH (ref 11.5–14.5)
RBC # FLD: 18.7 % — HIGH (ref 11.5–14.5)
RBC # FLD: 18.8 % — HIGH (ref 11.5–14.5)
RBC # FLD: 18.9 % — HIGH (ref 11.5–14.5)
RBC # FLD: 18.9 % — HIGH (ref 11.5–14.5)
RBC # FLD: 19 % — HIGH (ref 11.5–14.5)
RBC # FLD: 19.1 % — HIGH (ref 11.5–14.5)
RBC # FLD: 19.1 % — HIGH (ref 11.5–14.5)
RBC # FLD: 19.2 % — HIGH (ref 11.5–14.5)
RBC # FLD: 19.3 % — HIGH (ref 11.5–14.5)
RBC # FLD: 19.4 % — HIGH (ref 11.5–14.5)
RBC # FLD: 19.5 % — HIGH (ref 11.5–14.5)
RBC # FLD: 19.8 % — HIGH (ref 11.5–14.5)
RBC # FLD: 19.9 % — HIGH (ref 11.5–14.5)
RBC # FLD: 19.9 % — HIGH (ref 11.5–14.5)
RBC # FLD: 20 % — HIGH (ref 11.5–14.5)
RBC # FLD: 20.2 % — HIGH (ref 11.5–14.5)
RBC # FLD: 20.2 % — HIGH (ref 11.5–14.5)
RBC # FLD: 20.4 % — HIGH (ref 11.5–14.5)
RBC # FLD: 20.5 % — HIGH (ref 11.5–14.5)
RBC # FLD: 21 % — HIGH (ref 11.5–14.5)
RBC # FLD: 21.1 % — HIGH (ref 11.5–14.5)
RBC # FLD: 21.2 % — HIGH (ref 11.5–14.5)
RBC # FLD: 21.4 % — HIGH (ref 11.5–14.5)
RBC # FLD: 21.7 % — HIGH (ref 11.5–14.5)
RBC # FLD: 21.7 % — HIGH (ref 11.5–14.5)
RBC # FLD: 21.8 % — HIGH (ref 11.5–14.5)
RBC # FLD: 21.9 % — HIGH (ref 11.5–14.5)
RBC # FLD: 22.3 % — HIGH (ref 11.5–14.5)
RBC # FLD: 22.5 % — HIGH (ref 11.5–14.5)
RBC # FLD: 22.5 % — HIGH (ref 11.5–14.5)
RBC # FLD: 22.6 % — HIGH (ref 11.5–14.5)
RBC # FLD: 22.8 % — HIGH (ref 11.5–14.5)
RBC # FLD: 22.9 % — HIGH (ref 11.5–14.5)
RBC # FLD: 23 % — HIGH (ref 11.5–14.5)
RBC BLD AUTO: ABNORMAL
RBC CASTS # UR COMP ASSIST: 178 /HPF — HIGH (ref 0–4)
S AUREUS DNA NOSE QL NAA+PROBE: DETECTED
SAO2 % BLDA: 94 % — SIGNIFICANT CHANGE UP (ref 92–96)
SAO2 % BLDV: 45 % — SIGNIFICANT CHANGE UP
SARS-COV-2 IGG SERPL IA-ACNC: 0.8 RATIO — SIGNIFICANT CHANGE UP
SARS-COV-2 IGG SERPL QL IA: ABNORMAL
SARS-COV-2 IGG SERPL QL IA: NEGATIVE — SIGNIFICANT CHANGE UP
SARS-COV-2 IGM SERPL IA-ACNC: 0.38 RATIO — SIGNIFICANT CHANGE UP
SARS-COV-2 RNA SPEC QL NAA+PROBE: DETECTED
SARS-COV-2 RNA SPEC QL NAA+PROBE: SIGNIFICANT CHANGE UP
SARS-COV-2 RNA SPEC QL NAA+PROBE: SIGNIFICANT CHANGE UP
SODIUM SERPL-SCNC: 131 MMOL/L — LOW (ref 135–146)
SODIUM SERPL-SCNC: 131 MMOL/L — LOW (ref 135–146)
SODIUM SERPL-SCNC: 132 MMOL/L — LOW (ref 135–146)
SODIUM SERPL-SCNC: 133 MMOL/L — LOW (ref 135–146)
SODIUM SERPL-SCNC: 134 MMOL/L — LOW (ref 135–146)
SODIUM SERPL-SCNC: 135 MMOL/L — SIGNIFICANT CHANGE UP (ref 135–146)
SODIUM SERPL-SCNC: 137 MMOL/L — SIGNIFICANT CHANGE UP (ref 135–146)
SODIUM SERPL-SCNC: 138 MMOL/L — SIGNIFICANT CHANGE UP (ref 135–146)
SODIUM SERPL-SCNC: 139 MMOL/L — SIGNIFICANT CHANGE UP (ref 135–146)
SODIUM SERPL-SCNC: 140 MMOL/L — SIGNIFICANT CHANGE UP (ref 135–146)
SODIUM SERPL-SCNC: 141 MMOL/L — SIGNIFICANT CHANGE UP (ref 135–146)
SODIUM SERPL-SCNC: 142 MMOL/L — SIGNIFICANT CHANGE UP (ref 135–146)
SODIUM SERPL-SCNC: 144 MMOL/L — SIGNIFICANT CHANGE UP (ref 135–146)
SODIUM SERPL-SCNC: 144 MMOL/L — SIGNIFICANT CHANGE UP (ref 135–146)
SP GR SPEC: 1.02 — SIGNIFICANT CHANGE UP (ref 1.01–1.03)
SPECIMEN SOURCE: SIGNIFICANT CHANGE UP
TIBC SERPL-MCNC: 212 UG/DL — LOW (ref 220–430)
TRANSFERRIN SERPL-MCNC: 181 MG/DL — LOW (ref 200–360)
TROPONIN T SERPL-MCNC: 1.2 NG/ML — CRITICAL HIGH
TROPONIN T SERPL-MCNC: 1.62 NG/ML — CRITICAL HIGH
TROPONIN T SERPL-MCNC: 1.78 NG/ML — CRITICAL HIGH
TROPONIN T SERPL-MCNC: 2.08 NG/ML — CRITICAL HIGH
TSH SERPL-MCNC: 4.57 UIU/ML — HIGH (ref 0.27–4.2)
UIBC SERPL-MCNC: 179 UG/DL — SIGNIFICANT CHANGE UP (ref 110–370)
UROBILINOGEN FLD QL: SIGNIFICANT CHANGE UP
VANCOMYCIN FLD-MCNC: 17.6 UG/ML — HIGH (ref 5–10)
VANCOMYCIN FLD-MCNC: 19.7 UG/ML — HIGH (ref 5–10)
VANCOMYCIN TROUGH SERPL-MCNC: 19.5 UG/ML — HIGH (ref 5–10)
VANCOMYCIN TROUGH SERPL-MCNC: 21.9 UG/ML — HIGH (ref 5–10)
VANCOMYCIN TROUGH SERPL-MCNC: 25.7 UG/ML — HIGH (ref 5–10)
VANCOMYCIN TROUGH SERPL-MCNC: 27.4 UG/ML — HIGH (ref 5–10)
VIT B12 SERPL-MCNC: 1917 PG/ML — HIGH (ref 232–1245)
WBC # BLD: 10.13 K/UL — SIGNIFICANT CHANGE UP (ref 4.8–10.8)
WBC # BLD: 10.18 K/UL — SIGNIFICANT CHANGE UP (ref 4.8–10.8)
WBC # BLD: 10.18 K/UL — SIGNIFICANT CHANGE UP (ref 4.8–10.8)
WBC # BLD: 10.44 K/UL — SIGNIFICANT CHANGE UP (ref 4.8–10.8)
WBC # BLD: 10.48 K/UL — SIGNIFICANT CHANGE UP (ref 4.8–10.8)
WBC # BLD: 10.63 K/UL — SIGNIFICANT CHANGE UP (ref 4.8–10.8)
WBC # BLD: 10.79 K/UL — SIGNIFICANT CHANGE UP (ref 4.8–10.8)
WBC # BLD: 10.81 K/UL — HIGH (ref 4.8–10.8)
WBC # BLD: 10.94 K/UL — HIGH (ref 4.8–10.8)
WBC # BLD: 10.96 K/UL — HIGH (ref 4.8–10.8)
WBC # BLD: 11.03 K/UL — HIGH (ref 4.8–10.8)
WBC # BLD: 11.05 K/UL — HIGH (ref 4.8–10.8)
WBC # BLD: 11.15 K/UL — HIGH (ref 4.8–10.8)
WBC # BLD: 11.31 K/UL — HIGH (ref 4.8–10.8)
WBC # BLD: 11.36 K/UL — HIGH (ref 4.8–10.8)
WBC # BLD: 11.61 K/UL — HIGH (ref 4.8–10.8)
WBC # BLD: 11.99 K/UL — HIGH (ref 4.8–10.8)
WBC # BLD: 12.07 K/UL — HIGH (ref 4.8–10.8)
WBC # BLD: 12.24 K/UL — HIGH (ref 4.8–10.8)
WBC # BLD: 12.31 K/UL — HIGH (ref 4.8–10.8)
WBC # BLD: 12.52 K/UL — HIGH (ref 4.8–10.8)
WBC # BLD: 12.63 K/UL — HIGH (ref 4.8–10.8)
WBC # BLD: 13.07 K/UL — HIGH (ref 4.8–10.8)
WBC # BLD: 13.74 K/UL — HIGH (ref 4.8–10.8)
WBC # BLD: 14.53 K/UL — HIGH (ref 4.8–10.8)
WBC # BLD: 14.66 K/UL — HIGH (ref 4.8–10.8)
WBC # BLD: 14.75 K/UL — HIGH (ref 4.8–10.8)
WBC # BLD: 15.2 K/UL — HIGH (ref 4.8–10.8)
WBC # BLD: 15.29 K/UL — HIGH (ref 4.8–10.8)
WBC # BLD: 15.34 K/UL — HIGH (ref 4.8–10.8)
WBC # BLD: 15.63 K/UL — HIGH (ref 4.8–10.8)
WBC # BLD: 16.36 K/UL — HIGH (ref 4.8–10.8)
WBC # BLD: 16.55 K/UL — HIGH (ref 4.8–10.8)
WBC # BLD: 17.17 K/UL — HIGH (ref 4.8–10.8)
WBC # BLD: 18.72 K/UL — HIGH (ref 4.8–10.8)
WBC # BLD: 20.1 K/UL — HIGH (ref 4.8–10.8)
WBC # BLD: 20.62 K/UL — HIGH (ref 4.8–10.8)
WBC # BLD: 22.8 K/UL — HIGH (ref 4.8–10.8)
WBC # BLD: 23.22 K/UL — HIGH (ref 4.8–10.8)
WBC # BLD: 25.5 K/UL — HIGH (ref 4.8–10.8)
WBC # BLD: 25.65 K/UL — HIGH (ref 4.8–10.8)
WBC # BLD: 26.23 K/UL — HIGH (ref 4.8–10.8)
WBC # BLD: 27.28 K/UL — HIGH (ref 4.8–10.8)
WBC # BLD: 28.02 K/UL — HIGH (ref 4.8–10.8)
WBC # BLD: 30.94 K/UL — HIGH (ref 4.8–10.8)
WBC # BLD: 32.69 K/UL — HIGH (ref 4.8–10.8)
WBC # BLD: 33.61 K/UL — HIGH (ref 4.8–10.8)
WBC # BLD: 34.35 K/UL — HIGH (ref 4.8–10.8)
WBC # BLD: 7.75 K/UL — SIGNIFICANT CHANGE UP (ref 4.8–10.8)
WBC # BLD: 8.11 K/UL — SIGNIFICANT CHANGE UP (ref 4.8–10.8)
WBC # BLD: 8.33 K/UL — SIGNIFICANT CHANGE UP (ref 4.8–10.8)
WBC # BLD: 8.34 K/UL — SIGNIFICANT CHANGE UP (ref 4.8–10.8)
WBC # BLD: 8.7 K/UL — SIGNIFICANT CHANGE UP (ref 4.8–10.8)
WBC # BLD: 9.59 K/UL — SIGNIFICANT CHANGE UP (ref 4.8–10.8)
WBC # BLD: 9.8 K/UL — SIGNIFICANT CHANGE UP (ref 4.8–10.8)
WBC # BLD: 9.88 K/UL — SIGNIFICANT CHANGE UP (ref 4.8–10.8)
WBC # BLD: 9.95 K/UL — SIGNIFICANT CHANGE UP (ref 4.8–10.8)
WBC # FLD AUTO: 10.13 K/UL — SIGNIFICANT CHANGE UP (ref 4.8–10.8)
WBC # FLD AUTO: 10.18 K/UL — SIGNIFICANT CHANGE UP (ref 4.8–10.8)
WBC # FLD AUTO: 10.18 K/UL — SIGNIFICANT CHANGE UP (ref 4.8–10.8)
WBC # FLD AUTO: 10.44 K/UL — SIGNIFICANT CHANGE UP (ref 4.8–10.8)
WBC # FLD AUTO: 10.48 K/UL — SIGNIFICANT CHANGE UP (ref 4.8–10.8)
WBC # FLD AUTO: 10.63 K/UL — SIGNIFICANT CHANGE UP (ref 4.8–10.8)
WBC # FLD AUTO: 10.79 K/UL — SIGNIFICANT CHANGE UP (ref 4.8–10.8)
WBC # FLD AUTO: 10.81 K/UL — HIGH (ref 4.8–10.8)
WBC # FLD AUTO: 10.94 K/UL — HIGH (ref 4.8–10.8)
WBC # FLD AUTO: 10.96 K/UL — HIGH (ref 4.8–10.8)
WBC # FLD AUTO: 11.03 K/UL — HIGH (ref 4.8–10.8)
WBC # FLD AUTO: 11.05 K/UL — HIGH (ref 4.8–10.8)
WBC # FLD AUTO: 11.15 K/UL — HIGH (ref 4.8–10.8)
WBC # FLD AUTO: 11.31 K/UL — HIGH (ref 4.8–10.8)
WBC # FLD AUTO: 11.36 K/UL — HIGH (ref 4.8–10.8)
WBC # FLD AUTO: 11.61 K/UL — HIGH (ref 4.8–10.8)
WBC # FLD AUTO: 11.99 K/UL — HIGH (ref 4.8–10.8)
WBC # FLD AUTO: 12.07 K/UL — HIGH (ref 4.8–10.8)
WBC # FLD AUTO: 12.24 K/UL — HIGH (ref 4.8–10.8)
WBC # FLD AUTO: 12.31 K/UL — HIGH (ref 4.8–10.8)
WBC # FLD AUTO: 12.52 K/UL — HIGH (ref 4.8–10.8)
WBC # FLD AUTO: 12.63 K/UL — HIGH (ref 4.8–10.8)
WBC # FLD AUTO: 13.07 K/UL — HIGH (ref 4.8–10.8)
WBC # FLD AUTO: 13.74 K/UL — HIGH (ref 4.8–10.8)
WBC # FLD AUTO: 14.53 K/UL — HIGH (ref 4.8–10.8)
WBC # FLD AUTO: 14.66 K/UL — HIGH (ref 4.8–10.8)
WBC # FLD AUTO: 14.75 K/UL — HIGH (ref 4.8–10.8)
WBC # FLD AUTO: 15.2 K/UL — HIGH (ref 4.8–10.8)
WBC # FLD AUTO: 15.29 K/UL — HIGH (ref 4.8–10.8)
WBC # FLD AUTO: 15.34 K/UL — HIGH (ref 4.8–10.8)
WBC # FLD AUTO: 15.63 K/UL — HIGH (ref 4.8–10.8)
WBC # FLD AUTO: 16.36 K/UL — HIGH (ref 4.8–10.8)
WBC # FLD AUTO: 16.55 K/UL — HIGH (ref 4.8–10.8)
WBC # FLD AUTO: 17.17 K/UL — HIGH (ref 4.8–10.8)
WBC # FLD AUTO: 18.72 K/UL — HIGH (ref 4.8–10.8)
WBC # FLD AUTO: 20.1 K/UL — HIGH (ref 4.8–10.8)
WBC # FLD AUTO: 20.62 K/UL — HIGH (ref 4.8–10.8)
WBC # FLD AUTO: 22.8 K/UL — HIGH (ref 4.8–10.8)
WBC # FLD AUTO: 23.22 K/UL — HIGH (ref 4.8–10.8)
WBC # FLD AUTO: 25.5 K/UL — HIGH (ref 4.8–10.8)
WBC # FLD AUTO: 25.65 K/UL — HIGH (ref 4.8–10.8)
WBC # FLD AUTO: 26.23 K/UL — HIGH (ref 4.8–10.8)
WBC # FLD AUTO: 27.28 K/UL — HIGH (ref 4.8–10.8)
WBC # FLD AUTO: 28.02 K/UL — HIGH (ref 4.8–10.8)
WBC # FLD AUTO: 30.94 K/UL — HIGH (ref 4.8–10.8)
WBC # FLD AUTO: 32.69 K/UL — HIGH (ref 4.8–10.8)
WBC # FLD AUTO: 33.61 K/UL — HIGH (ref 4.8–10.8)
WBC # FLD AUTO: 34.35 K/UL — HIGH (ref 4.8–10.8)
WBC # FLD AUTO: 7.75 K/UL — SIGNIFICANT CHANGE UP (ref 4.8–10.8)
WBC # FLD AUTO: 8.11 K/UL — SIGNIFICANT CHANGE UP (ref 4.8–10.8)
WBC # FLD AUTO: 8.33 K/UL — SIGNIFICANT CHANGE UP (ref 4.8–10.8)
WBC # FLD AUTO: 8.34 K/UL — SIGNIFICANT CHANGE UP (ref 4.8–10.8)
WBC # FLD AUTO: 8.7 K/UL — SIGNIFICANT CHANGE UP (ref 4.8–10.8)
WBC # FLD AUTO: 9.59 K/UL — SIGNIFICANT CHANGE UP (ref 4.8–10.8)
WBC # FLD AUTO: 9.8 K/UL — SIGNIFICANT CHANGE UP (ref 4.8–10.8)
WBC # FLD AUTO: 9.88 K/UL — SIGNIFICANT CHANGE UP (ref 4.8–10.8)
WBC # FLD AUTO: 9.95 K/UL — SIGNIFICANT CHANGE UP (ref 4.8–10.8)
WBC UR QL: 120 /HPF — HIGH (ref 0–5)

## 2021-01-01 PROCEDURE — 99232 SBSQ HOSP IP/OBS MODERATE 35: CPT

## 2021-01-01 PROCEDURE — 74019 RADEX ABDOMEN 2 VIEWS: CPT | Mod: 26

## 2021-01-01 PROCEDURE — 71045 X-RAY EXAM CHEST 1 VIEW: CPT | Mod: 26

## 2021-01-01 PROCEDURE — 11043 DBRDMT MUSC&/FSCA 1ST 20/<: CPT

## 2021-01-01 PROCEDURE — 99222 1ST HOSP IP/OBS MODERATE 55: CPT

## 2021-01-01 PROCEDURE — 99285 EMERGENCY DEPT VISIT HI MDM: CPT

## 2021-01-01 PROCEDURE — 99233 SBSQ HOSP IP/OBS HIGH 50: CPT

## 2021-01-01 PROCEDURE — 99232 SBSQ HOSP IP/OBS MODERATE 35: CPT | Mod: GC

## 2021-01-01 PROCEDURE — 43246 EGD PLACE GASTROSTOMY TUBE: CPT

## 2021-01-01 PROCEDURE — 93970 EXTREMITY STUDY: CPT | Mod: 26

## 2021-01-01 PROCEDURE — 93010 ELECTROCARDIOGRAM REPORT: CPT | Mod: 76

## 2021-01-01 PROCEDURE — 93010 ELECTROCARDIOGRAM REPORT: CPT | Mod: 77

## 2021-01-01 PROCEDURE — 74018 RADEX ABDOMEN 1 VIEW: CPT | Mod: 26

## 2021-01-01 PROCEDURE — 95816 EEG AWAKE AND DROWSY: CPT | Mod: 26

## 2021-01-01 PROCEDURE — 70450 CT HEAD/BRAIN W/O DYE: CPT | Mod: 26

## 2021-01-01 PROCEDURE — 99231 SBSQ HOSP IP/OBS SF/LOW 25: CPT

## 2021-01-01 PROCEDURE — 71045 X-RAY EXAM CHEST 1 VIEW: CPT | Mod: 26,77

## 2021-01-01 PROCEDURE — 11046 DBRDMT MUSC&/FSCA EA ADDL: CPT

## 2021-01-01 PROCEDURE — 99221 1ST HOSP IP/OBS SF/LOW 40: CPT

## 2021-01-01 PROCEDURE — 76705 ECHO EXAM OF ABDOMEN: CPT | Mod: 26

## 2021-01-01 PROCEDURE — 74176 CT ABD & PELVIS W/O CONTRAST: CPT | Mod: 26

## 2021-01-01 PROCEDURE — 31600 PLANNED TRACHEOSTOMY: CPT

## 2021-01-01 PROCEDURE — 71045 X-RAY EXAM CHEST 1 VIEW: CPT | Mod: 26,76

## 2021-01-01 PROCEDURE — 93971 EXTREMITY STUDY: CPT | Mod: 26,RT

## 2021-01-01 PROCEDURE — 93931 UPPER EXTREMITY STUDY: CPT | Mod: 26,RT

## 2021-01-01 PROCEDURE — 93010 ELECTROCARDIOGRAM REPORT: CPT

## 2021-01-01 PROCEDURE — 99231 SBSQ HOSP IP/OBS SF/LOW 25: CPT | Mod: GC

## 2021-01-01 PROCEDURE — 74018 RADEX ABDOMEN 1 VIEW: CPT | Mod: 26,77

## 2021-01-01 RX ORDER — NOREPINEPHRINE BITARTRATE/D5W 8 MG/250ML
0.19 PLASTIC BAG, INJECTION (ML) INTRAVENOUS
Qty: 8 | Refills: 0 | Status: DISCONTINUED | OUTPATIENT
Start: 2021-01-01 | End: 2021-01-01

## 2021-01-01 RX ORDER — VANCOMYCIN HCL 1 G
250 VIAL (EA) INTRAVENOUS EVERY 6 HOURS
Refills: 0 | Status: DISCONTINUED | OUTPATIENT
Start: 2021-01-01 | End: 2021-01-01

## 2021-01-01 RX ORDER — PIOGLITAZONE HYDROCHLORIDE 15 MG/1
1 TABLET ORAL
Qty: 0 | Refills: 0 | DISCHARGE

## 2021-01-01 RX ORDER — VANCOMYCIN HCL 1 G
1250 VIAL (EA) INTRAVENOUS ONCE
Refills: 0 | Status: COMPLETED | OUTPATIENT
Start: 2021-01-01 | End: 2021-01-01

## 2021-01-01 RX ORDER — SEVELAMER CARBONATE 2400 MG/1
1 POWDER, FOR SUSPENSION ORAL
Qty: 0 | Refills: 0 | DISCHARGE

## 2021-01-01 RX ORDER — METHADONE HYDROCHLORIDE 40 MG/1
5 TABLET ORAL EVERY 12 HOURS
Refills: 0 | Status: DISCONTINUED | OUTPATIENT
Start: 2021-01-01 | End: 2021-01-01

## 2021-01-01 RX ORDER — SODIUM ZIRCONIUM CYCLOSILICATE 10 G/10G
5 POWDER, FOR SUSPENSION ORAL ONCE
Refills: 0 | Status: COMPLETED | OUTPATIENT
Start: 2021-01-01 | End: 2021-01-01

## 2021-01-01 RX ORDER — INSULIN HUMAN 100 [IU]/ML
10 INJECTION, SOLUTION SUBCUTANEOUS ONCE
Refills: 0 | Status: COMPLETED | OUTPATIENT
Start: 2021-01-01 | End: 2021-01-01

## 2021-01-01 RX ORDER — SODIUM BICARBONATE 1 MEQ/ML
650 SYRINGE (ML) INTRAVENOUS EVERY 8 HOURS
Refills: 0 | Status: DISCONTINUED | OUTPATIENT
Start: 2021-01-01 | End: 2021-01-01

## 2021-01-01 RX ORDER — HEPARIN SODIUM 5000 [USP'U]/ML
1500 INJECTION INTRAVENOUS; SUBCUTANEOUS
Qty: 25000 | Refills: 0 | Status: DISCONTINUED | OUTPATIENT
Start: 2021-01-01 | End: 2021-01-01

## 2021-01-01 RX ORDER — MIDODRINE HYDROCHLORIDE 2.5 MG/1
10 TABLET ORAL EVERY 8 HOURS
Refills: 0 | Status: DISCONTINUED | OUTPATIENT
Start: 2021-01-01 | End: 2021-01-01

## 2021-01-01 RX ORDER — DEXTROSE 50 % IN WATER 50 %
25 SYRINGE (ML) INTRAVENOUS ONCE
Refills: 0 | Status: COMPLETED | OUTPATIENT
Start: 2021-01-01 | End: 2021-01-01

## 2021-01-01 RX ORDER — LACTULOSE 10 G/15ML
10 SOLUTION ORAL EVERY 12 HOURS
Refills: 0 | Status: DISCONTINUED | OUTPATIENT
Start: 2021-01-01 | End: 2021-01-01

## 2021-01-01 RX ORDER — DEXTROSE 50 % IN WATER 50 %
15 SYRINGE (ML) INTRAVENOUS ONCE
Refills: 0 | Status: DISCONTINUED | OUTPATIENT
Start: 2021-01-01 | End: 2021-01-01

## 2021-01-01 RX ORDER — INSULIN LISPRO 100/ML
VIAL (ML) SUBCUTANEOUS
Refills: 0 | Status: DISCONTINUED | OUTPATIENT
Start: 2021-01-01 | End: 2021-01-01

## 2021-01-01 RX ORDER — QUETIAPINE FUMARATE 200 MG/1
100 TABLET, FILM COATED ORAL
Refills: 0 | Status: DISCONTINUED | OUTPATIENT
Start: 2021-01-01 | End: 2021-01-01

## 2021-01-01 RX ORDER — CLONAZEPAM 1 MG
1 TABLET ORAL
Refills: 0 | Status: DISCONTINUED | OUTPATIENT
Start: 2021-01-01 | End: 2021-01-01

## 2021-01-01 RX ORDER — FUROSEMIDE 40 MG
40 TABLET ORAL
Refills: 0 | Status: DISCONTINUED | OUTPATIENT
Start: 2021-01-01 | End: 2021-01-01

## 2021-01-01 RX ORDER — GABAPENTIN 400 MG/1
1 CAPSULE ORAL
Qty: 0 | Refills: 0 | DISCHARGE

## 2021-01-01 RX ORDER — NOREPINEPHRINE BITARTRATE/D5W 8 MG/250ML
0.05 PLASTIC BAG, INJECTION (ML) INTRAVENOUS
Qty: 8 | Refills: 0 | Status: DISCONTINUED | OUTPATIENT
Start: 2021-01-01 | End: 2021-01-01

## 2021-01-01 RX ORDER — SODIUM CHLORIDE 9 MG/ML
1000 INJECTION, SOLUTION INTRAVENOUS
Refills: 0 | Status: DISCONTINUED | OUTPATIENT
Start: 2021-01-01 | End: 2021-01-01

## 2021-01-01 RX ORDER — DEXTROSE 50 % IN WATER 50 %
50 SYRINGE (ML) INTRAVENOUS ONCE
Refills: 0 | Status: COMPLETED | OUTPATIENT
Start: 2021-01-01 | End: 2021-01-01

## 2021-01-01 RX ORDER — PANTOPRAZOLE SODIUM 20 MG/1
40 TABLET, DELAYED RELEASE ORAL EVERY 12 HOURS
Refills: 0 | Status: DISCONTINUED | OUTPATIENT
Start: 2021-01-01 | End: 2021-01-01

## 2021-01-01 RX ORDER — QUETIAPINE FUMARATE 200 MG/1
50 TABLET, FILM COATED ORAL ONCE
Refills: 0 | Status: COMPLETED | OUTPATIENT
Start: 2021-01-01 | End: 2021-01-01

## 2021-01-01 RX ORDER — CEFEPIME 1 G/1
2000 INJECTION, POWDER, FOR SOLUTION INTRAMUSCULAR; INTRAVENOUS ONCE
Refills: 0 | Status: COMPLETED | OUTPATIENT
Start: 2021-01-01 | End: 2021-01-01

## 2021-01-01 RX ORDER — VANCOMYCIN HCL 1 G
1500 VIAL (EA) INTRAVENOUS ONCE
Refills: 0 | Status: COMPLETED | OUTPATIENT
Start: 2021-01-01 | End: 2021-01-01

## 2021-01-01 RX ORDER — DAPTOMYCIN 500 MG/10ML
775 INJECTION, POWDER, LYOPHILIZED, FOR SOLUTION INTRAVENOUS
Refills: 0 | Status: COMPLETED | OUTPATIENT
Start: 2021-01-01 | End: 2021-01-01

## 2021-01-01 RX ORDER — WARFARIN SODIUM 2.5 MG/1
5 TABLET ORAL ONCE
Refills: 0 | Status: COMPLETED | OUTPATIENT
Start: 2021-01-01 | End: 2021-01-01

## 2021-01-01 RX ORDER — METOPROLOL TARTRATE 50 MG
25 TABLET ORAL DAILY
Refills: 0 | Status: DISCONTINUED | OUTPATIENT
Start: 2021-01-01 | End: 2021-01-01

## 2021-01-01 RX ORDER — DEXTROSE 50 % IN WATER 50 %
12.5 SYRINGE (ML) INTRAVENOUS ONCE
Refills: 0 | Status: DISCONTINUED | OUTPATIENT
Start: 2021-01-01 | End: 2021-01-01

## 2021-01-01 RX ORDER — TOCILIZUMAB 20 MG/ML
400 INJECTION, SOLUTION, CONCENTRATE INTRAVENOUS ONCE
Refills: 0 | Status: COMPLETED | OUTPATIENT
Start: 2021-01-01 | End: 2021-01-01

## 2021-01-01 RX ORDER — MIDAZOLAM HYDROCHLORIDE 1 MG/ML
0.02 INJECTION, SOLUTION INTRAMUSCULAR; INTRAVENOUS
Qty: 100 | Refills: 0 | Status: DISCONTINUED | OUTPATIENT
Start: 2021-01-01 | End: 2021-01-01

## 2021-01-01 RX ORDER — COLLAGENASE CLOSTRIDIUM HIST. 250 UNIT/G
1 OINTMENT (GRAM) TOPICAL
Refills: 0 | Status: DISCONTINUED | OUTPATIENT
Start: 2021-01-01 | End: 2021-01-01

## 2021-01-01 RX ORDER — FENTANYL CITRATE 50 UG/ML
25 INJECTION INTRAVENOUS
Refills: 0 | Status: DISCONTINUED | OUTPATIENT
Start: 2021-01-01 | End: 2021-01-01

## 2021-01-01 RX ORDER — SENNA PLUS 8.6 MG/1
2 TABLET ORAL AT BEDTIME
Refills: 0 | Status: DISCONTINUED | OUTPATIENT
Start: 2021-01-01 | End: 2021-01-01

## 2021-01-01 RX ORDER — CHLORHEXIDINE GLUCONATE 213 G/1000ML
1 SOLUTION TOPICAL
Refills: 0 | Status: DISCONTINUED | OUTPATIENT
Start: 2021-01-01 | End: 2021-01-01

## 2021-01-01 RX ORDER — METOCLOPRAMIDE HCL 10 MG
10 TABLET ORAL ONCE
Refills: 0 | Status: COMPLETED | OUTPATIENT
Start: 2021-01-01 | End: 2021-01-01

## 2021-01-01 RX ORDER — HALOPERIDOL DECANOATE 100 MG/ML
5 INJECTION INTRAMUSCULAR ONCE
Refills: 0 | Status: COMPLETED | OUTPATIENT
Start: 2021-01-01 | End: 2021-01-01

## 2021-01-01 RX ORDER — ASPIRIN/CALCIUM CARB/MAGNESIUM 324 MG
81 TABLET ORAL DAILY
Refills: 0 | Status: DISCONTINUED | OUTPATIENT
Start: 2021-01-01 | End: 2021-01-01

## 2021-01-01 RX ORDER — INSULIN LISPRO 100/ML
1 VIAL (ML) SUBCUTANEOUS
Refills: 0 | Status: DISCONTINUED | OUTPATIENT
Start: 2021-01-01 | End: 2021-01-01

## 2021-01-01 RX ORDER — POLYETHYLENE GLYCOL 3350 17 G/17G
17 POWDER, FOR SOLUTION ORAL AT BEDTIME
Refills: 0 | Status: DISCONTINUED | OUTPATIENT
Start: 2021-01-01 | End: 2021-01-01

## 2021-01-01 RX ORDER — HEPARIN SODIUM 5000 [USP'U]/ML
1000 INJECTION INTRAVENOUS; SUBCUTANEOUS
Qty: 25000 | Refills: 0 | Status: DISCONTINUED | OUTPATIENT
Start: 2021-01-01 | End: 2021-01-01

## 2021-01-01 RX ORDER — ERYTHROPOIETIN 10000 [IU]/ML
8000 INJECTION, SOLUTION INTRAVENOUS; SUBCUTANEOUS
Refills: 0 | Status: DISCONTINUED | OUTPATIENT
Start: 2021-01-01 | End: 2021-01-01

## 2021-01-01 RX ORDER — DEXMEDETOMIDINE HYDROCHLORIDE IN 0.9% SODIUM CHLORIDE 4 UG/ML
0.2 INJECTION INTRAVENOUS
Qty: 400 | Refills: 0 | Status: DISCONTINUED | OUTPATIENT
Start: 2021-01-01 | End: 2021-01-01

## 2021-01-01 RX ORDER — SODIUM ZIRCONIUM CYCLOSILICATE 10 G/10G
10 POWDER, FOR SUSPENSION ORAL EVERY 8 HOURS
Refills: 0 | Status: DISCONTINUED | OUTPATIENT
Start: 2021-01-01 | End: 2021-01-01

## 2021-01-01 RX ORDER — DEXAMETHASONE 0.5 MG/5ML
6 ELIXIR ORAL DAILY
Refills: 0 | Status: COMPLETED | OUTPATIENT
Start: 2021-01-01 | End: 2021-01-01

## 2021-01-01 RX ORDER — INSULIN GLARGINE 100 [IU]/ML
15 INJECTION, SOLUTION SUBCUTANEOUS
Qty: 0 | Refills: 0 | DISCHARGE

## 2021-01-01 RX ORDER — PANTOPRAZOLE SODIUM 20 MG/1
40 TABLET, DELAYED RELEASE ORAL
Refills: 0 | Status: DISCONTINUED | OUTPATIENT
Start: 2021-01-01 | End: 2021-01-01

## 2021-01-01 RX ORDER — INSULIN GLARGINE 100 [IU]/ML
20 INJECTION, SOLUTION SUBCUTANEOUS AT BEDTIME
Refills: 0 | Status: DISCONTINUED | OUTPATIENT
Start: 2021-01-01 | End: 2021-01-01

## 2021-01-01 RX ORDER — METOPROLOL TARTRATE 50 MG
12.5 TABLET ORAL
Refills: 0 | Status: DISCONTINUED | OUTPATIENT
Start: 2021-01-01 | End: 2021-01-01

## 2021-01-01 RX ORDER — MEROPENEM 1 G/30ML
500 INJECTION INTRAVENOUS EVERY 24 HOURS
Refills: 0 | Status: COMPLETED | OUTPATIENT
Start: 2021-01-01 | End: 2021-01-01

## 2021-01-01 RX ORDER — FUROSEMIDE 40 MG
1 TABLET ORAL
Qty: 0 | Refills: 0 | DISCHARGE

## 2021-01-01 RX ORDER — INSULIN HUMAN 100 [IU]/ML
1 INJECTION, SOLUTION SUBCUTANEOUS
Qty: 100 | Refills: 0 | Status: DISCONTINUED | OUTPATIENT
Start: 2021-01-01 | End: 2021-01-01

## 2021-01-01 RX ORDER — SEVELAMER CARBONATE 2400 MG/1
800 POWDER, FOR SUSPENSION ORAL
Refills: 0 | Status: DISCONTINUED | OUTPATIENT
Start: 2021-01-01 | End: 2021-01-01

## 2021-01-01 RX ORDER — MEROPENEM 1 G/30ML
INJECTION INTRAVENOUS
Refills: 0 | Status: DISCONTINUED | OUTPATIENT
Start: 2021-01-01 | End: 2021-01-01

## 2021-01-01 RX ORDER — WARFARIN SODIUM 2.5 MG/1
2.5 TABLET ORAL ONCE
Refills: 0 | Status: DISCONTINUED | OUTPATIENT
Start: 2021-01-01 | End: 2021-01-01

## 2021-01-01 RX ORDER — METOCLOPRAMIDE HCL 10 MG
5 TABLET ORAL EVERY 8 HOURS
Refills: 0 | Status: DISCONTINUED | OUTPATIENT
Start: 2021-01-01 | End: 2021-01-01

## 2021-01-01 RX ORDER — NICOTINE POLACRILEX 2 MG
1 GUM BUCCAL DAILY
Refills: 0 | Status: DISCONTINUED | OUTPATIENT
Start: 2021-01-01 | End: 2021-01-01

## 2021-01-01 RX ORDER — WARFARIN SODIUM 2.5 MG/1
2.5 TABLET ORAL ONCE
Refills: 0 | Status: COMPLETED | OUTPATIENT
Start: 2021-01-01 | End: 2021-01-01

## 2021-01-01 RX ORDER — DAPTOMYCIN 500 MG/10ML
775 INJECTION, POWDER, LYOPHILIZED, FOR SOLUTION INTRAVENOUS
Refills: 0 | Status: DISCONTINUED | OUTPATIENT
Start: 2021-01-01 | End: 2021-01-01

## 2021-01-01 RX ORDER — HEPARIN SODIUM 5000 [USP'U]/ML
1800 INJECTION INTRAVENOUS; SUBCUTANEOUS
Qty: 25000 | Refills: 0 | Status: DISCONTINUED | OUTPATIENT
Start: 2021-01-01 | End: 2021-01-01

## 2021-01-01 RX ORDER — GUAIFENESIN/DEXTROMETHORPHAN 600MG-30MG
10 TABLET, EXTENDED RELEASE 12 HR ORAL EVERY 6 HOURS
Refills: 0 | Status: DISCONTINUED | OUTPATIENT
Start: 2021-01-01 | End: 2021-01-01

## 2021-01-01 RX ORDER — SODIUM CHLORIDE 9 MG/ML
1000 INJECTION INTRAMUSCULAR; INTRAVENOUS; SUBCUTANEOUS
Refills: 0 | Status: DISCONTINUED | OUTPATIENT
Start: 2021-01-01 | End: 2021-01-01

## 2021-01-01 RX ORDER — PANTOPRAZOLE SODIUM 20 MG/1
1 TABLET, DELAYED RELEASE ORAL
Qty: 0 | Refills: 0 | DISCHARGE

## 2021-01-01 RX ORDER — INSULIN GLARGINE 100 [IU]/ML
25 INJECTION, SOLUTION SUBCUTANEOUS AT BEDTIME
Refills: 0 | Status: DISCONTINUED | OUTPATIENT
Start: 2021-01-01 | End: 2021-01-01

## 2021-01-01 RX ORDER — SODIUM ZIRCONIUM CYCLOSILICATE 10 G/10G
10 POWDER, FOR SUSPENSION ORAL EVERY 8 HOURS
Refills: 0 | Status: COMPLETED | OUTPATIENT
Start: 2021-01-01 | End: 2021-01-01

## 2021-01-01 RX ORDER — CLONAZEPAM 1 MG
0.5 TABLET ORAL EVERY 12 HOURS
Refills: 0 | Status: DISCONTINUED | OUTPATIENT
Start: 2021-01-01 | End: 2021-01-01

## 2021-01-01 RX ORDER — CEFEPIME 1 G/1
500 INJECTION, POWDER, FOR SOLUTION INTRAMUSCULAR; INTRAVENOUS EVERY 24 HOURS
Refills: 0 | Status: DISCONTINUED | OUTPATIENT
Start: 2021-01-01 | End: 2021-01-01

## 2021-01-01 RX ORDER — METOCLOPRAMIDE HCL 10 MG
10 TABLET ORAL
Refills: 0 | Status: DISCONTINUED | OUTPATIENT
Start: 2021-01-01 | End: 2021-01-01

## 2021-01-01 RX ORDER — DEXTROSE 50 % IN WATER 50 %
25 SYRINGE (ML) INTRAVENOUS ONCE
Refills: 0 | Status: DISCONTINUED | OUTPATIENT
Start: 2021-01-01 | End: 2021-01-01

## 2021-01-01 RX ORDER — SODIUM HYPOCHLORITE 0.125 %
1 SOLUTION, NON-ORAL MISCELLANEOUS
Refills: 0 | Status: DISCONTINUED | OUTPATIENT
Start: 2021-01-01 | End: 2021-01-01

## 2021-01-01 RX ORDER — HEPARIN SODIUM 5000 [USP'U]/ML
5000 INJECTION INTRAVENOUS; SUBCUTANEOUS EVERY 8 HOURS
Refills: 0 | Status: DISCONTINUED | OUTPATIENT
Start: 2021-01-01 | End: 2021-01-01

## 2021-01-01 RX ORDER — LIDOCAINE HYDROCHLORIDE AND EPINEPHRINE 10; 10 MG/ML; UG/ML
20 INJECTION, SOLUTION INFILTRATION; PERINEURAL ONCE
Refills: 0 | Status: COMPLETED | OUTPATIENT
Start: 2021-01-01 | End: 2021-01-01

## 2021-01-01 RX ORDER — INSULIN GLARGINE 100 [IU]/ML
1 INJECTION, SOLUTION SUBCUTANEOUS EVERY MORNING
Refills: 0 | Status: DISCONTINUED | OUTPATIENT
Start: 2021-01-01 | End: 2021-01-01

## 2021-01-01 RX ORDER — LIDOCAINE HYDROCHLORIDE AND EPINEPHRINE 10; 10 MG/ML; UG/ML
1 INJECTION, SOLUTION INFILTRATION; PERINEURAL ONCE
Refills: 0 | Status: COMPLETED | OUTPATIENT
Start: 2021-01-01 | End: 2021-01-01

## 2021-01-01 RX ORDER — INSULIN GLARGINE 100 [IU]/ML
12 INJECTION, SOLUTION SUBCUTANEOUS AT BEDTIME
Refills: 0 | Status: DISCONTINUED | OUTPATIENT
Start: 2021-01-01 | End: 2021-01-01

## 2021-01-01 RX ORDER — CALCIUM GLUCONATE 100 MG/ML
2 VIAL (ML) INTRAVENOUS ONCE
Refills: 0 | Status: COMPLETED | OUTPATIENT
Start: 2021-01-01 | End: 2021-01-01

## 2021-01-01 RX ORDER — WARFARIN SODIUM 2.5 MG/1
7.5 TABLET ORAL ONCE
Refills: 0 | Status: COMPLETED | OUTPATIENT
Start: 2021-01-01 | End: 2021-01-01

## 2021-01-01 RX ORDER — DEXMEDETOMIDINE HYDROCHLORIDE IN 0.9% SODIUM CHLORIDE 4 UG/ML
0.2 INJECTION INTRAVENOUS
Qty: 200 | Refills: 0 | Status: DISCONTINUED | OUTPATIENT
Start: 2021-01-01 | End: 2021-01-01

## 2021-01-01 RX ORDER — FUROSEMIDE 40 MG
80 TABLET ORAL
Refills: 0 | Status: DISCONTINUED | OUTPATIENT
Start: 2021-01-01 | End: 2021-01-01

## 2021-01-01 RX ORDER — ACETAMINOPHEN 500 MG
650 TABLET ORAL EVERY 6 HOURS
Refills: 0 | Status: DISCONTINUED | OUTPATIENT
Start: 2021-01-01 | End: 2021-01-01

## 2021-01-01 RX ORDER — PANTOPRAZOLE SODIUM 20 MG/1
8 TABLET, DELAYED RELEASE ORAL
Qty: 80 | Refills: 0 | Status: DISCONTINUED | OUTPATIENT
Start: 2021-01-01 | End: 2021-01-01

## 2021-01-01 RX ORDER — VANCOMYCIN HCL 1 G
750 VIAL (EA) INTRAVENOUS
Refills: 0 | Status: DISCONTINUED | OUTPATIENT
Start: 2021-01-01 | End: 2021-01-01

## 2021-01-01 RX ORDER — INSULIN LISPRO 100/ML
4 VIAL (ML) SUBCUTANEOUS
Refills: 0 | Status: DISCONTINUED | OUTPATIENT
Start: 2021-01-01 | End: 2021-01-01

## 2021-01-01 RX ORDER — MEROPENEM 1 G/30ML
500 INJECTION INTRAVENOUS EVERY 24 HOURS
Refills: 0 | Status: DISCONTINUED | OUTPATIENT
Start: 2021-01-01 | End: 2021-01-01

## 2021-01-01 RX ORDER — FENTANYL CITRATE 50 UG/ML
0.5 INJECTION INTRAVENOUS
Qty: 2500 | Refills: 0 | Status: DISCONTINUED | OUTPATIENT
Start: 2021-01-01 | End: 2021-01-01

## 2021-01-01 RX ORDER — LEVOTHYROXINE SODIUM 125 MCG
100 TABLET ORAL DAILY
Refills: 0 | Status: DISCONTINUED | OUTPATIENT
Start: 2021-01-01 | End: 2021-01-01

## 2021-01-01 RX ORDER — LACTULOSE 10 G/15ML
10 SOLUTION ORAL ONCE
Refills: 0 | Status: COMPLETED | OUTPATIENT
Start: 2021-01-01 | End: 2021-01-01

## 2021-01-01 RX ORDER — MEROPENEM 1 G/30ML
INJECTION INTRAVENOUS
Refills: 0 | Status: COMPLETED | OUTPATIENT
Start: 2021-01-01 | End: 2021-01-01

## 2021-01-01 RX ORDER — VANCOMYCIN HCL 1 G
750 VIAL (EA) INTRAVENOUS ONCE
Refills: 0 | Status: COMPLETED | OUTPATIENT
Start: 2021-01-01 | End: 2021-01-01

## 2021-01-01 RX ORDER — METOCLOPRAMIDE HCL 10 MG
5 TABLET ORAL THREE TIMES A DAY
Refills: 0 | Status: DISCONTINUED | OUTPATIENT
Start: 2021-01-01 | End: 2021-01-01

## 2021-01-01 RX ORDER — ACETAMINOPHEN 500 MG
650 TABLET ORAL ONCE
Refills: 0 | Status: COMPLETED | OUTPATIENT
Start: 2021-01-01 | End: 2021-01-01

## 2021-01-01 RX ORDER — LIDOCAINE HYDROCHLORIDE AND EPINEPHRINE 10; 10 MG/ML; UG/ML
40 INJECTION, SOLUTION INFILTRATION; PERINEURAL ONCE
Refills: 0 | Status: DISCONTINUED | OUTPATIENT
Start: 2021-01-01 | End: 2021-01-01

## 2021-01-01 RX ORDER — ATORVASTATIN CALCIUM 80 MG/1
20 TABLET, FILM COATED ORAL AT BEDTIME
Refills: 0 | Status: DISCONTINUED | OUTPATIENT
Start: 2021-01-01 | End: 2021-01-01

## 2021-01-01 RX ORDER — INSULIN LISPRO 100/ML
12 VIAL (ML) SUBCUTANEOUS ONCE
Refills: 0 | Status: COMPLETED | OUTPATIENT
Start: 2021-01-01 | End: 2021-01-01

## 2021-01-01 RX ORDER — MIDAZOLAM HYDROCHLORIDE 1 MG/ML
2 INJECTION, SOLUTION INTRAMUSCULAR; INTRAVENOUS ONCE
Refills: 0 | Status: DISCONTINUED | OUTPATIENT
Start: 2021-01-01 | End: 2021-01-01

## 2021-01-01 RX ORDER — METHADONE HYDROCHLORIDE 40 MG/1
5 TABLET ORAL DAILY
Refills: 0 | Status: DISCONTINUED | OUTPATIENT
Start: 2021-01-01 | End: 2021-01-01

## 2021-01-01 RX ORDER — CLONAZEPAM 1 MG
1 TABLET ORAL EVERY 12 HOURS
Refills: 0 | Status: DISCONTINUED | OUTPATIENT
Start: 2021-01-01 | End: 2021-01-01

## 2021-01-01 RX ORDER — DEXMEDETOMIDINE HYDROCHLORIDE IN 0.9% SODIUM CHLORIDE 4 UG/ML
0.5 INJECTION INTRAVENOUS
Qty: 200 | Refills: 0 | Status: DISCONTINUED | OUTPATIENT
Start: 2021-01-01 | End: 2021-01-01

## 2021-01-01 RX ORDER — VANCOMYCIN HCL 1 G
125 VIAL (EA) INTRAVENOUS EVERY 6 HOURS
Refills: 0 | Status: DISCONTINUED | OUTPATIENT
Start: 2021-01-01 | End: 2021-01-01

## 2021-01-01 RX ORDER — SODIUM BICARBONATE 1 MEQ/ML
650 SYRINGE (ML) INTRAVENOUS EVERY 6 HOURS
Refills: 0 | Status: DISCONTINUED | OUTPATIENT
Start: 2021-01-01 | End: 2021-01-01

## 2021-01-01 RX ORDER — AZITHROMYCIN 500 MG/1
500 TABLET, FILM COATED ORAL EVERY 24 HOURS
Refills: 0 | Status: DISCONTINUED | OUTPATIENT
Start: 2021-01-01 | End: 2021-01-01

## 2021-01-01 RX ORDER — ZOLPIDEM TARTRATE 10 MG/1
1 TABLET ORAL
Qty: 0 | Refills: 0 | DISCHARGE

## 2021-01-01 RX ORDER — POLYETHYLENE GLYCOL 3350 17 G/17G
17 POWDER, FOR SOLUTION ORAL DAILY
Refills: 0 | Status: DISCONTINUED | OUTPATIENT
Start: 2021-01-01 | End: 2021-01-01

## 2021-01-01 RX ORDER — MIDAZOLAM HYDROCHLORIDE 1 MG/ML
4 INJECTION, SOLUTION INTRAMUSCULAR; INTRAVENOUS ONCE
Refills: 0 | Status: DISCONTINUED | OUTPATIENT
Start: 2021-01-01 | End: 2021-01-01

## 2021-01-01 RX ORDER — VANCOMYCIN HCL 1 G
1000 VIAL (EA) INTRAVENOUS EVERY 12 HOURS
Refills: 0 | Status: DISCONTINUED | OUTPATIENT
Start: 2021-01-01 | End: 2021-01-01

## 2021-01-01 RX ORDER — CHLORHEXIDINE GLUCONATE 213 G/1000ML
15 SOLUTION TOPICAL EVERY 12 HOURS
Refills: 0 | Status: DISCONTINUED | OUTPATIENT
Start: 2021-01-01 | End: 2021-01-01

## 2021-01-01 RX ORDER — WARFARIN SODIUM 2.5 MG/1
5 TABLET ORAL ONCE
Refills: 0 | Status: DISCONTINUED | OUTPATIENT
Start: 2021-01-01 | End: 2021-01-01

## 2021-01-01 RX ORDER — WARFARIN SODIUM 2.5 MG/1
1 TABLET ORAL ONCE
Refills: 0 | Status: COMPLETED | OUTPATIENT
Start: 2021-01-01 | End: 2021-01-01

## 2021-01-01 RX ORDER — NOREPINEPHRINE BITARTRATE/D5W 8 MG/250ML
0.15 PLASTIC BAG, INJECTION (ML) INTRAVENOUS
Qty: 8 | Refills: 0 | Status: DISCONTINUED | OUTPATIENT
Start: 2021-01-01 | End: 2021-01-01

## 2021-01-01 RX ORDER — INSULIN LISPRO 100/ML
8 VIAL (ML) SUBCUTANEOUS
Refills: 0 | Status: DISCONTINUED | OUTPATIENT
Start: 2021-01-01 | End: 2021-01-01

## 2021-01-01 RX ORDER — SODIUM CHLORIDE 9 MG/ML
250 INJECTION, SOLUTION INTRAVENOUS ONCE
Refills: 0 | Status: COMPLETED | OUTPATIENT
Start: 2021-01-01 | End: 2021-01-01

## 2021-01-01 RX ORDER — ATORVASTATIN CALCIUM 80 MG/1
1 TABLET, FILM COATED ORAL
Qty: 0 | Refills: 0 | DISCHARGE

## 2021-01-01 RX ORDER — MEROPENEM 1 G/30ML
500 INJECTION INTRAVENOUS ONCE
Refills: 0 | Status: COMPLETED | OUTPATIENT
Start: 2021-01-01 | End: 2021-01-01

## 2021-01-01 RX ORDER — GLUCAGON INJECTION, SOLUTION 0.5 MG/.1ML
1 INJECTION, SOLUTION SUBCUTANEOUS ONCE
Refills: 0 | Status: DISCONTINUED | OUTPATIENT
Start: 2021-01-01 | End: 2021-01-01

## 2021-01-01 RX ORDER — QUETIAPINE FUMARATE 200 MG/1
50 TABLET, FILM COATED ORAL
Refills: 0 | Status: DISCONTINUED | OUTPATIENT
Start: 2021-01-01 | End: 2021-01-01

## 2021-01-01 RX ORDER — METOPROLOL TARTRATE 50 MG
1 TABLET ORAL
Qty: 0 | Refills: 0 | DISCHARGE

## 2021-01-01 RX ORDER — HEPARIN SODIUM 5000 [USP'U]/ML
2300 INJECTION INTRAVENOUS; SUBCUTANEOUS
Qty: 25000 | Refills: 0 | Status: DISCONTINUED | OUTPATIENT
Start: 2021-01-01 | End: 2021-01-01

## 2021-01-01 RX ORDER — SODIUM ZIRCONIUM CYCLOSILICATE 10 G/10G
10 POWDER, FOR SUSPENSION ORAL THREE TIMES A DAY
Refills: 0 | Status: DISCONTINUED | OUTPATIENT
Start: 2021-01-01 | End: 2021-01-01

## 2021-01-01 RX ADMIN — Medication 80 MILLIGRAM(S): at 05:01

## 2021-01-01 RX ADMIN — Medication 4: at 06:27

## 2021-01-01 RX ADMIN — CHLORHEXIDINE GLUCONATE 15 MILLILITER(S): 213 SOLUTION TOPICAL at 05:34

## 2021-01-01 RX ADMIN — PANTOPRAZOLE SODIUM 40 MILLIGRAM(S): 20 TABLET, DELAYED RELEASE ORAL at 04:47

## 2021-01-01 RX ADMIN — Medication 1 APPLICATION(S): at 04:32

## 2021-01-01 RX ADMIN — MIDODRINE HYDROCHLORIDE 10 MILLIGRAM(S): 2.5 TABLET ORAL at 11:18

## 2021-01-01 RX ADMIN — Medication 12.5 MILLIGRAM(S): at 16:57

## 2021-01-01 RX ADMIN — ERYTHROPOIETIN 8000 UNIT(S): 10000 INJECTION, SOLUTION INTRAVENOUS; SUBCUTANEOUS at 15:48

## 2021-01-01 RX ADMIN — CHLORHEXIDINE GLUCONATE 15 MILLILITER(S): 213 SOLUTION TOPICAL at 17:47

## 2021-01-01 RX ADMIN — Medication 40 MILLIGRAM(S): at 05:21

## 2021-01-01 RX ADMIN — Medication 5 MILLIGRAM(S): at 04:41

## 2021-01-01 RX ADMIN — QUETIAPINE FUMARATE 100 MILLIGRAM(S): 200 TABLET, FILM COATED ORAL at 19:47

## 2021-01-01 RX ADMIN — Medication 0.5 MILLIGRAM(S): at 05:10

## 2021-01-01 RX ADMIN — MIDAZOLAM HYDROCHLORIDE 1.94 MG/KG/HR: 1 INJECTION, SOLUTION INTRAMUSCULAR; INTRAVENOUS at 04:33

## 2021-01-01 RX ADMIN — INSULIN GLARGINE 20 UNIT(S): 100 INJECTION, SOLUTION SUBCUTANEOUS at 20:56

## 2021-01-01 RX ADMIN — Medication 12.5 MILLIGRAM(S): at 04:40

## 2021-01-01 RX ADMIN — INSULIN HUMAN 10 UNIT(S): 100 INJECTION, SOLUTION SUBCUTANEOUS at 11:00

## 2021-01-01 RX ADMIN — CHLORHEXIDINE GLUCONATE 1 APPLICATION(S): 213 SOLUTION TOPICAL at 05:02

## 2021-01-01 RX ADMIN — DEXMEDETOMIDINE HYDROCHLORIDE IN 0.9% SODIUM CHLORIDE 4.85 MICROGRAM(S)/KG/HR: 4 INJECTION INTRAVENOUS at 22:38

## 2021-01-01 RX ADMIN — Medication 81 MILLIGRAM(S): at 13:56

## 2021-01-01 RX ADMIN — MIDODRINE HYDROCHLORIDE 10 MILLIGRAM(S): 2.5 TABLET ORAL at 22:36

## 2021-01-01 RX ADMIN — Medication 12.5 MILLIGRAM(S): at 17:47

## 2021-01-01 RX ADMIN — Medication 1 APPLICATION(S): at 17:20

## 2021-01-01 RX ADMIN — METHADONE HYDROCHLORIDE 5 MILLIGRAM(S): 40 TABLET ORAL at 16:46

## 2021-01-01 RX ADMIN — Medication 100 MICROGRAM(S): at 04:35

## 2021-01-01 RX ADMIN — INSULIN HUMAN 10 UNIT(S): 100 INJECTION, SOLUTION SUBCUTANEOUS at 09:29

## 2021-01-01 RX ADMIN — CHLORHEXIDINE GLUCONATE 1 APPLICATION(S): 213 SOLUTION TOPICAL at 04:58

## 2021-01-01 RX ADMIN — CHLORHEXIDINE GLUCONATE 1 APPLICATION(S): 213 SOLUTION TOPICAL at 04:42

## 2021-01-01 RX ADMIN — PANTOPRAZOLE SODIUM 40 MILLIGRAM(S): 20 TABLET, DELAYED RELEASE ORAL at 05:16

## 2021-01-01 RX ADMIN — Medication 5 MILLIGRAM(S): at 05:41

## 2021-01-01 RX ADMIN — ERYTHROPOIETIN 8000 UNIT(S): 10000 INJECTION, SOLUTION INTRAVENOUS; SUBCUTANEOUS at 13:21

## 2021-01-01 RX ADMIN — SENNA PLUS 2 TABLET(S): 8.6 TABLET ORAL at 21:54

## 2021-01-01 RX ADMIN — METHADONE HYDROCHLORIDE 5 MILLIGRAM(S): 40 TABLET ORAL at 04:41

## 2021-01-01 RX ADMIN — METHADONE HYDROCHLORIDE 5 MILLIGRAM(S): 40 TABLET ORAL at 11:43

## 2021-01-01 RX ADMIN — SEVELAMER CARBONATE 800 MILLIGRAM(S): 2400 POWDER, FOR SUSPENSION ORAL at 04:42

## 2021-01-01 RX ADMIN — Medication 10 MILLILITER(S): at 23:46

## 2021-01-01 RX ADMIN — SENNA PLUS 2 TABLET(S): 8.6 TABLET ORAL at 21:33

## 2021-01-01 RX ADMIN — Medication 12 UNIT(S): at 07:51

## 2021-01-01 RX ADMIN — Medication 12.5 MILLIGRAM(S): at 17:20

## 2021-01-01 RX ADMIN — Medication 81 MILLIGRAM(S): at 12:06

## 2021-01-01 RX ADMIN — ATORVASTATIN CALCIUM 20 MILLIGRAM(S): 80 TABLET, FILM COATED ORAL at 20:27

## 2021-01-01 RX ADMIN — INSULIN GLARGINE 20 UNIT(S): 100 INJECTION, SOLUTION SUBCUTANEOUS at 20:43

## 2021-01-01 RX ADMIN — FENTANYL CITRATE 4.85 MICROGRAM(S)/KG/HR: 50 INJECTION INTRAVENOUS at 02:37

## 2021-01-01 RX ADMIN — MIDODRINE HYDROCHLORIDE 10 MILLIGRAM(S): 2.5 TABLET ORAL at 20:45

## 2021-01-01 RX ADMIN — Medication 1: at 04:48

## 2021-01-01 RX ADMIN — MIDODRINE HYDROCHLORIDE 10 MILLIGRAM(S): 2.5 TABLET ORAL at 04:51

## 2021-01-01 RX ADMIN — ATORVASTATIN CALCIUM 20 MILLIGRAM(S): 80 TABLET, FILM COATED ORAL at 21:21

## 2021-01-01 RX ADMIN — Medication 0.5 MILLIGRAM(S): at 16:58

## 2021-01-01 RX ADMIN — Medication 81 MILLIGRAM(S): at 11:41

## 2021-01-01 RX ADMIN — CHLORHEXIDINE GLUCONATE 1 APPLICATION(S): 213 SOLUTION TOPICAL at 05:03

## 2021-01-01 RX ADMIN — Medication 1 APPLICATION(S): at 17:15

## 2021-01-01 RX ADMIN — Medication 100 MICROGRAM(S): at 04:05

## 2021-01-01 RX ADMIN — QUETIAPINE FUMARATE 100 MILLIGRAM(S): 200 TABLET, FILM COATED ORAL at 04:58

## 2021-01-01 RX ADMIN — Medication 1 MILLIGRAM(S): at 04:51

## 2021-01-01 RX ADMIN — ERYTHROPOIETIN 8000 UNIT(S): 10000 INJECTION, SOLUTION INTRAVENOUS; SUBCUTANEOUS at 15:16

## 2021-01-01 RX ADMIN — Medication 12.5 MILLIGRAM(S): at 17:55

## 2021-01-01 RX ADMIN — QUETIAPINE FUMARATE 100 MILLIGRAM(S): 200 TABLET, FILM COATED ORAL at 16:57

## 2021-01-01 RX ADMIN — POLYETHYLENE GLYCOL 3350 17 GRAM(S): 17 POWDER, FOR SOLUTION ORAL at 03:29

## 2021-01-01 RX ADMIN — Medication 650 MILLIGRAM(S): at 14:27

## 2021-01-01 RX ADMIN — Medication 81 MILLIGRAM(S): at 12:14

## 2021-01-01 RX ADMIN — CEFEPIME 100 MILLIGRAM(S): 1 INJECTION, POWDER, FOR SOLUTION INTRAMUSCULAR; INTRAVENOUS at 05:02

## 2021-01-01 RX ADMIN — DAPTOMYCIN 131 MILLIGRAM(S): 500 INJECTION, POWDER, LYOPHILIZED, FOR SOLUTION INTRAVENOUS at 21:10

## 2021-01-01 RX ADMIN — Medication 1 APPLICATION(S): at 17:39

## 2021-01-01 RX ADMIN — PANTOPRAZOLE SODIUM 40 MILLIGRAM(S): 20 TABLET, DELAYED RELEASE ORAL at 04:42

## 2021-01-01 RX ADMIN — Medication 8 UNIT(S): at 15:39

## 2021-01-01 RX ADMIN — CHLORHEXIDINE GLUCONATE 1 APPLICATION(S): 213 SOLUTION TOPICAL at 04:41

## 2021-01-01 RX ADMIN — CEFEPIME 100 MILLIGRAM(S): 1 INJECTION, POWDER, FOR SOLUTION INTRAMUSCULAR; INTRAVENOUS at 13:00

## 2021-01-01 RX ADMIN — Medication 1 MILLIGRAM(S): at 16:51

## 2021-01-01 RX ADMIN — LACTULOSE 10 GRAM(S): 10 SOLUTION ORAL at 05:41

## 2021-01-01 RX ADMIN — Medication 100 MICROGRAM(S): at 07:54

## 2021-01-01 RX ADMIN — PANTOPRAZOLE SODIUM 40 MILLIGRAM(S): 20 TABLET, DELAYED RELEASE ORAL at 07:37

## 2021-01-01 RX ADMIN — MIDODRINE HYDROCHLORIDE 10 MILLIGRAM(S): 2.5 TABLET ORAL at 20:46

## 2021-01-01 RX ADMIN — Medication 8 UNIT(S): at 17:50

## 2021-01-01 RX ADMIN — PANTOPRAZOLE SODIUM 40 MILLIGRAM(S): 20 TABLET, DELAYED RELEASE ORAL at 04:07

## 2021-01-01 RX ADMIN — MEROPENEM 100 MILLIGRAM(S): 1 INJECTION INTRAVENOUS at 12:52

## 2021-01-01 RX ADMIN — CEFEPIME 100 MILLIGRAM(S): 1 INJECTION, POWDER, FOR SOLUTION INTRAMUSCULAR; INTRAVENOUS at 06:28

## 2021-01-01 RX ADMIN — Medication 12.5 MILLIGRAM(S): at 05:45

## 2021-01-01 RX ADMIN — Medication 12.5 MILLIGRAM(S): at 21:19

## 2021-01-01 RX ADMIN — DEXMEDETOMIDINE HYDROCHLORIDE IN 0.9% SODIUM CHLORIDE 4.85 MICROGRAM(S)/KG/HR: 4 INJECTION INTRAVENOUS at 05:30

## 2021-01-01 RX ADMIN — Medication 100 MICROGRAM(S): at 04:36

## 2021-01-01 RX ADMIN — Medication 1 APPLICATION(S): at 17:47

## 2021-01-01 RX ADMIN — Medication 8 UNIT(S): at 11:59

## 2021-01-01 RX ADMIN — Medication 5 MILLIGRAM(S): at 15:43

## 2021-01-01 RX ADMIN — Medication 1 APPLICATION(S): at 17:45

## 2021-01-01 RX ADMIN — QUETIAPINE FUMARATE 50 MILLIGRAM(S): 200 TABLET, FILM COATED ORAL at 04:10

## 2021-01-01 RX ADMIN — Medication 1 APPLICATION(S): at 04:38

## 2021-01-01 RX ADMIN — ATORVASTATIN CALCIUM 20 MILLIGRAM(S): 80 TABLET, FILM COATED ORAL at 20:43

## 2021-01-01 RX ADMIN — Medication 250 MILLIGRAM(S): at 09:19

## 2021-01-01 RX ADMIN — Medication 1 APPLICATION(S): at 04:30

## 2021-01-01 RX ADMIN — Medication 5 MILLIGRAM(S): at 05:35

## 2021-01-01 RX ADMIN — Medication 81 MILLIGRAM(S): at 11:48

## 2021-01-01 RX ADMIN — Medication 12.5 MILLIGRAM(S): at 05:02

## 2021-01-01 RX ADMIN — Medication 100 MICROGRAM(S): at 05:03

## 2021-01-01 RX ADMIN — PANTOPRAZOLE SODIUM 40 MILLIGRAM(S): 20 TABLET, DELAYED RELEASE ORAL at 04:05

## 2021-01-01 RX ADMIN — Medication 8 UNIT(S): at 10:54

## 2021-01-01 RX ADMIN — METHADONE HYDROCHLORIDE 5 MILLIGRAM(S): 40 TABLET ORAL at 11:55

## 2021-01-01 RX ADMIN — CHLORHEXIDINE GLUCONATE 15 MILLILITER(S): 213 SOLUTION TOPICAL at 05:02

## 2021-01-01 RX ADMIN — Medication 12.5 MILLIGRAM(S): at 04:41

## 2021-01-01 RX ADMIN — Medication 2: at 09:10

## 2021-01-01 RX ADMIN — INSULIN HUMAN 1 UNIT(S)/HR: 100 INJECTION, SOLUTION SUBCUTANEOUS at 13:21

## 2021-01-01 RX ADMIN — Medication 5 MILLIGRAM(S): at 05:14

## 2021-01-01 RX ADMIN — CHLORHEXIDINE GLUCONATE 1 APPLICATION(S): 213 SOLUTION TOPICAL at 05:28

## 2021-01-01 RX ADMIN — INSULIN HUMAN 10 UNIT(S): 100 INJECTION, SOLUTION SUBCUTANEOUS at 07:39

## 2021-01-01 RX ADMIN — Medication 5: at 11:55

## 2021-01-01 RX ADMIN — CHLORHEXIDINE GLUCONATE 15 MILLILITER(S): 213 SOLUTION TOPICAL at 17:34

## 2021-01-01 RX ADMIN — Medication 100 MICROGRAM(S): at 04:57

## 2021-01-01 RX ADMIN — Medication 650 MILLIGRAM(S): at 12:28

## 2021-01-01 RX ADMIN — Medication 5 MILLIGRAM(S): at 21:47

## 2021-01-01 RX ADMIN — QUETIAPINE FUMARATE 100 MILLIGRAM(S): 200 TABLET, FILM COATED ORAL at 17:46

## 2021-01-01 RX ADMIN — Medication 4 UNIT(S): at 17:11

## 2021-01-01 RX ADMIN — CHLORHEXIDINE GLUCONATE 15 MILLILITER(S): 213 SOLUTION TOPICAL at 17:20

## 2021-01-01 RX ADMIN — FENTANYL CITRATE 25 MICROGRAM(S): 50 INJECTION INTRAVENOUS at 02:17

## 2021-01-01 RX ADMIN — Medication 10 MILLILITER(S): at 17:45

## 2021-01-01 RX ADMIN — Medication 12.5 MILLIGRAM(S): at 18:43

## 2021-01-01 RX ADMIN — ATORVASTATIN CALCIUM 20 MILLIGRAM(S): 80 TABLET, FILM COATED ORAL at 20:44

## 2021-01-01 RX ADMIN — Medication 100 MICROGRAM(S): at 04:38

## 2021-01-01 RX ADMIN — Medication 5 MILLIGRAM(S): at 12:00

## 2021-01-01 RX ADMIN — Medication 12.5 MILLIGRAM(S): at 12:26

## 2021-01-01 RX ADMIN — Medication 6: at 10:59

## 2021-01-01 RX ADMIN — Medication 5 MILLIGRAM(S): at 06:04

## 2021-01-01 RX ADMIN — QUETIAPINE FUMARATE 100 MILLIGRAM(S): 200 TABLET, FILM COATED ORAL at 04:51

## 2021-01-01 RX ADMIN — Medication 8 UNIT(S): at 19:00

## 2021-01-01 RX ADMIN — Medication 1 APPLICATION(S): at 16:42

## 2021-01-01 RX ADMIN — Medication 50 MILLILITER(S): at 07:39

## 2021-01-01 RX ADMIN — SENNA PLUS 2 TABLET(S): 8.6 TABLET ORAL at 21:47

## 2021-01-01 RX ADMIN — CHLORHEXIDINE GLUCONATE 1 APPLICATION(S): 213 SOLUTION TOPICAL at 04:30

## 2021-01-01 RX ADMIN — QUETIAPINE FUMARATE 100 MILLIGRAM(S): 200 TABLET, FILM COATED ORAL at 04:34

## 2021-01-01 RX ADMIN — Medication 50 MILLILITER(S): at 09:30

## 2021-01-01 RX ADMIN — Medication 250 MILLIGRAM(S): at 11:08

## 2021-01-01 RX ADMIN — MIDODRINE HYDROCHLORIDE 10 MILLIGRAM(S): 2.5 TABLET ORAL at 21:23

## 2021-01-01 RX ADMIN — Medication 12.5 MILLIGRAM(S): at 04:36

## 2021-01-01 RX ADMIN — QUETIAPINE FUMARATE 100 MILLIGRAM(S): 200 TABLET, FILM COATED ORAL at 05:42

## 2021-01-01 RX ADMIN — DEXMEDETOMIDINE HYDROCHLORIDE IN 0.9% SODIUM CHLORIDE 4.85 MICROGRAM(S)/KG/HR: 4 INJECTION INTRAVENOUS at 18:18

## 2021-01-01 RX ADMIN — Medication 4 UNIT(S): at 05:59

## 2021-01-01 RX ADMIN — Medication 81 MILLIGRAM(S): at 12:46

## 2021-01-01 RX ADMIN — SODIUM ZIRCONIUM CYCLOSILICATE 5 GRAM(S): 10 POWDER, FOR SUSPENSION ORAL at 09:30

## 2021-01-01 RX ADMIN — Medication 1: at 17:11

## 2021-01-01 RX ADMIN — Medication 4 UNIT(S): at 16:51

## 2021-01-01 RX ADMIN — Medication 12.5 MILLIGRAM(S): at 05:15

## 2021-01-01 RX ADMIN — SENNA PLUS 2 TABLET(S): 8.6 TABLET ORAL at 20:43

## 2021-01-01 RX ADMIN — MIDODRINE HYDROCHLORIDE 10 MILLIGRAM(S): 2.5 TABLET ORAL at 20:57

## 2021-01-01 RX ADMIN — Medication 2: at 17:47

## 2021-01-01 RX ADMIN — CHLORHEXIDINE GLUCONATE 15 MILLILITER(S): 213 SOLUTION TOPICAL at 05:42

## 2021-01-01 RX ADMIN — Medication 12.5 MILLIGRAM(S): at 17:34

## 2021-01-01 RX ADMIN — SEVELAMER CARBONATE 800 MILLIGRAM(S): 2400 POWDER, FOR SUSPENSION ORAL at 11:56

## 2021-01-01 RX ADMIN — CHLORHEXIDINE GLUCONATE 15 MILLILITER(S): 213 SOLUTION TOPICAL at 16:57

## 2021-01-01 RX ADMIN — Medication 25 MILLILITER(S): at 16:00

## 2021-01-01 RX ADMIN — SEVELAMER CARBONATE 800 MILLIGRAM(S): 2400 POWDER, FOR SUSPENSION ORAL at 17:10

## 2021-01-01 RX ADMIN — Medication 100 MICROGRAM(S): at 05:48

## 2021-01-01 RX ADMIN — Medication 12.5 MILLIGRAM(S): at 16:50

## 2021-01-01 RX ADMIN — HEPARIN SODIUM 12 UNIT(S)/HR: 5000 INJECTION INTRAVENOUS; SUBCUTANEOUS at 02:45

## 2021-01-01 RX ADMIN — ATORVASTATIN CALCIUM 20 MILLIGRAM(S): 80 TABLET, FILM COATED ORAL at 00:17

## 2021-01-01 RX ADMIN — HEPARIN SODIUM 20 UNIT(S)/HR: 5000 INJECTION INTRAVENOUS; SUBCUTANEOUS at 19:01

## 2021-01-01 RX ADMIN — CHLORHEXIDINE GLUCONATE 15 MILLILITER(S): 213 SOLUTION TOPICAL at 18:21

## 2021-01-01 RX ADMIN — QUETIAPINE FUMARATE 100 MILLIGRAM(S): 200 TABLET, FILM COATED ORAL at 05:43

## 2021-01-01 RX ADMIN — Medication 1 APPLICATION(S): at 16:41

## 2021-01-01 RX ADMIN — Medication 2: at 17:57

## 2021-01-01 RX ADMIN — Medication 34.6 MICROGRAM(S)/KG/MIN: at 05:13

## 2021-01-01 RX ADMIN — HEPARIN SODIUM 5000 UNIT(S): 5000 INJECTION INTRAVENOUS; SUBCUTANEOUS at 16:20

## 2021-01-01 RX ADMIN — CHLORHEXIDINE GLUCONATE 15 MILLILITER(S): 213 SOLUTION TOPICAL at 04:41

## 2021-01-01 RX ADMIN — CHLORHEXIDINE GLUCONATE 15 MILLILITER(S): 213 SOLUTION TOPICAL at 04:07

## 2021-01-01 RX ADMIN — Medication 100 MICROGRAM(S): at 05:05

## 2021-01-01 RX ADMIN — Medication 1 MILLIGRAM(S): at 17:47

## 2021-01-01 RX ADMIN — CHLORHEXIDINE GLUCONATE 15 MILLILITER(S): 213 SOLUTION TOPICAL at 16:50

## 2021-01-01 RX ADMIN — FENTANYL CITRATE 4.85 MICROGRAM(S)/KG/HR: 50 INJECTION INTRAVENOUS at 05:04

## 2021-01-01 RX ADMIN — Medication 81 MILLIGRAM(S): at 13:50

## 2021-01-01 RX ADMIN — DEXMEDETOMIDINE HYDROCHLORIDE IN 0.9% SODIUM CHLORIDE 4.85 MICROGRAM(S)/KG/HR: 4 INJECTION INTRAVENOUS at 23:10

## 2021-01-01 RX ADMIN — ERYTHROPOIETIN 8000 UNIT(S): 10000 INJECTION, SOLUTION INTRAVENOUS; SUBCUTANEOUS at 15:29

## 2021-01-01 RX ADMIN — MIDODRINE HYDROCHLORIDE 10 MILLIGRAM(S): 2.5 TABLET ORAL at 12:00

## 2021-01-01 RX ADMIN — Medication 125 MILLIGRAM(S): at 12:28

## 2021-01-01 RX ADMIN — Medication 12.5 MILLIGRAM(S): at 17:11

## 2021-01-01 RX ADMIN — Medication 1: at 10:56

## 2021-01-01 RX ADMIN — Medication 1 APPLICATION(S): at 04:41

## 2021-01-01 RX ADMIN — Medication 5 MILLIGRAM(S): at 20:58

## 2021-01-01 RX ADMIN — Medication 100 MICROGRAM(S): at 05:28

## 2021-01-01 RX ADMIN — Medication 12.5 MILLIGRAM(S): at 05:35

## 2021-01-01 RX ADMIN — MIDAZOLAM HYDROCHLORIDE 1.94 MG/KG/HR: 1 INJECTION, SOLUTION INTRAMUSCULAR; INTRAVENOUS at 06:29

## 2021-01-01 RX ADMIN — HEPARIN SODIUM 15 UNIT(S)/HR: 5000 INJECTION INTRAVENOUS; SUBCUTANEOUS at 16:34

## 2021-01-01 RX ADMIN — MIDODRINE HYDROCHLORIDE 10 MILLIGRAM(S): 2.5 TABLET ORAL at 12:32

## 2021-01-01 RX ADMIN — Medication 12.5 MILLIGRAM(S): at 16:32

## 2021-01-01 RX ADMIN — MEROPENEM 100 MILLIGRAM(S): 1 INJECTION INTRAVENOUS at 12:58

## 2021-01-01 RX ADMIN — INSULIN GLARGINE 20 UNIT(S): 100 INJECTION, SOLUTION SUBCUTANEOUS at 00:17

## 2021-01-01 RX ADMIN — QUETIAPINE FUMARATE 100 MILLIGRAM(S): 200 TABLET, FILM COATED ORAL at 16:54

## 2021-01-01 RX ADMIN — PANTOPRAZOLE SODIUM 40 MILLIGRAM(S): 20 TABLET, DELAYED RELEASE ORAL at 07:30

## 2021-01-01 RX ADMIN — Medication 5 MILLIGRAM(S): at 21:25

## 2021-01-01 RX ADMIN — CEFEPIME 100 MILLIGRAM(S): 1 INJECTION, POWDER, FOR SOLUTION INTRAMUSCULAR; INTRAVENOUS at 04:41

## 2021-01-01 RX ADMIN — ERYTHROPOIETIN 8000 UNIT(S): 10000 INJECTION, SOLUTION INTRAVENOUS; SUBCUTANEOUS at 11:20

## 2021-01-01 RX ADMIN — Medication 4 UNIT(S): at 17:48

## 2021-01-01 RX ADMIN — AZITHROMYCIN 255 MILLIGRAM(S): 500 TABLET, FILM COATED ORAL at 08:48

## 2021-01-01 RX ADMIN — CHLORHEXIDINE GLUCONATE 1 APPLICATION(S): 213 SOLUTION TOPICAL at 04:12

## 2021-01-01 RX ADMIN — SODIUM ZIRCONIUM CYCLOSILICATE 10 GRAM(S): 10 POWDER, FOR SUSPENSION ORAL at 14:27

## 2021-01-01 RX ADMIN — Medication 1 APPLICATION(S): at 04:26

## 2021-01-01 RX ADMIN — Medication 80 MILLIGRAM(S): at 16:53

## 2021-01-01 RX ADMIN — CEFEPIME 100 MILLIGRAM(S): 1 INJECTION, POWDER, FOR SOLUTION INTRAMUSCULAR; INTRAVENOUS at 05:41

## 2021-01-01 RX ADMIN — Medication 12.5 MILLIGRAM(S): at 05:41

## 2021-01-01 RX ADMIN — Medication 8 UNIT(S): at 17:49

## 2021-01-01 RX ADMIN — Medication 1 APPLICATION(S): at 16:58

## 2021-01-01 RX ADMIN — CHLORHEXIDINE GLUCONATE 15 MILLILITER(S): 213 SOLUTION TOPICAL at 17:48

## 2021-01-01 RX ADMIN — Medication 10 MILLILITER(S): at 23:59

## 2021-01-01 RX ADMIN — MIDODRINE HYDROCHLORIDE 10 MILLIGRAM(S): 2.5 TABLET ORAL at 04:37

## 2021-01-01 RX ADMIN — ATORVASTATIN CALCIUM 20 MILLIGRAM(S): 80 TABLET, FILM COATED ORAL at 22:09

## 2021-01-01 RX ADMIN — Medication 8 UNIT(S): at 07:04

## 2021-01-01 RX ADMIN — Medication 81 MILLIGRAM(S): at 12:08

## 2021-01-01 RX ADMIN — ERYTHROPOIETIN 8000 UNIT(S): 10000 INJECTION, SOLUTION INTRAVENOUS; SUBCUTANEOUS at 12:39

## 2021-01-01 RX ADMIN — Medication 5 MILLIGRAM(S): at 05:12

## 2021-01-01 RX ADMIN — ATORVASTATIN CALCIUM 20 MILLIGRAM(S): 80 TABLET, FILM COATED ORAL at 21:10

## 2021-01-01 RX ADMIN — CHLORHEXIDINE GLUCONATE 15 MILLILITER(S): 213 SOLUTION TOPICAL at 18:16

## 2021-01-01 RX ADMIN — MIDODRINE HYDROCHLORIDE 10 MILLIGRAM(S): 2.5 TABLET ORAL at 12:35

## 2021-01-01 RX ADMIN — SEVELAMER CARBONATE 800 MILLIGRAM(S): 2400 POWDER, FOR SUSPENSION ORAL at 16:53

## 2021-01-01 RX ADMIN — CHLORHEXIDINE GLUCONATE 1 APPLICATION(S): 213 SOLUTION TOPICAL at 05:51

## 2021-01-01 RX ADMIN — Medication 80 MILLIGRAM(S): at 17:22

## 2021-01-01 RX ADMIN — Medication 10 MILLILITER(S): at 11:56

## 2021-01-01 RX ADMIN — CHLORHEXIDINE GLUCONATE 15 MILLILITER(S): 213 SOLUTION TOPICAL at 19:00

## 2021-01-01 RX ADMIN — Medication 4 UNIT(S): at 11:33

## 2021-01-01 RX ADMIN — Medication 8 UNIT(S): at 12:24

## 2021-01-01 RX ADMIN — AZITHROMYCIN 255 MILLIGRAM(S): 500 TABLET, FILM COATED ORAL at 12:12

## 2021-01-01 RX ADMIN — Medication 1 APPLICATION(S): at 05:41

## 2021-01-01 RX ADMIN — Medication 1 APPLICATION(S): at 17:02

## 2021-01-01 RX ADMIN — ERYTHROPOIETIN 8000 UNIT(S): 10000 INJECTION, SOLUTION INTRAVENOUS; SUBCUTANEOUS at 16:08

## 2021-01-01 RX ADMIN — Medication 12.5 MILLIGRAM(S): at 19:27

## 2021-01-01 RX ADMIN — Medication 12.5 MILLIGRAM(S): at 18:19

## 2021-01-01 RX ADMIN — CHLORHEXIDINE GLUCONATE 1 APPLICATION(S): 213 SOLUTION TOPICAL at 04:52

## 2021-01-01 RX ADMIN — PANTOPRAZOLE SODIUM 40 MILLIGRAM(S): 20 TABLET, DELAYED RELEASE ORAL at 07:11

## 2021-01-01 RX ADMIN — TOCILIZUMAB 100 MILLIGRAM(S): 20 INJECTION, SOLUTION, CONCENTRATE INTRAVENOUS at 18:20

## 2021-01-01 RX ADMIN — CHLORHEXIDINE GLUCONATE 15 MILLILITER(S): 213 SOLUTION TOPICAL at 05:51

## 2021-01-01 RX ADMIN — QUETIAPINE FUMARATE 100 MILLIGRAM(S): 200 TABLET, FILM COATED ORAL at 18:00

## 2021-01-01 RX ADMIN — SEVELAMER CARBONATE 800 MILLIGRAM(S): 2400 POWDER, FOR SUSPENSION ORAL at 13:00

## 2021-01-01 RX ADMIN — QUETIAPINE FUMARATE 100 MILLIGRAM(S): 200 TABLET, FILM COATED ORAL at 16:09

## 2021-01-01 RX ADMIN — Medication 1 MILLIGRAM(S): at 17:45

## 2021-01-01 RX ADMIN — CHLORHEXIDINE GLUCONATE 15 MILLILITER(S): 213 SOLUTION TOPICAL at 05:28

## 2021-01-01 RX ADMIN — CHLORHEXIDINE GLUCONATE 15 MILLILITER(S): 213 SOLUTION TOPICAL at 16:33

## 2021-01-01 RX ADMIN — ATORVASTATIN CALCIUM 20 MILLIGRAM(S): 80 TABLET, FILM COATED ORAL at 21:47

## 2021-01-01 RX ADMIN — QUETIAPINE FUMARATE 100 MILLIGRAM(S): 200 TABLET, FILM COATED ORAL at 05:48

## 2021-01-01 RX ADMIN — Medication 1 APPLICATION(S): at 18:27

## 2021-01-01 RX ADMIN — CHLORHEXIDINE GLUCONATE 15 MILLILITER(S): 213 SOLUTION TOPICAL at 06:06

## 2021-01-01 RX ADMIN — Medication 6 MILLIGRAM(S): at 04:42

## 2021-01-01 RX ADMIN — MEROPENEM 100 MILLIGRAM(S): 1 INJECTION INTRAVENOUS at 12:22

## 2021-01-01 RX ADMIN — CHLORHEXIDINE GLUCONATE 15 MILLILITER(S): 213 SOLUTION TOPICAL at 04:51

## 2021-01-01 RX ADMIN — WARFARIN SODIUM 5 MILLIGRAM(S): 2.5 TABLET ORAL at 21:23

## 2021-01-01 RX ADMIN — Medication 5 MILLIGRAM(S): at 04:47

## 2021-01-01 RX ADMIN — MEROPENEM 100 MILLIGRAM(S): 1 INJECTION INTRAVENOUS at 14:43

## 2021-01-01 RX ADMIN — CHLORHEXIDINE GLUCONATE 15 MILLILITER(S): 213 SOLUTION TOPICAL at 05:16

## 2021-01-01 RX ADMIN — Medication 5 MILLIGRAM(S): at 00:16

## 2021-01-01 RX ADMIN — Medication 5 MILLIGRAM(S): at 12:22

## 2021-01-01 RX ADMIN — MIDODRINE HYDROCHLORIDE 10 MILLIGRAM(S): 2.5 TABLET ORAL at 11:29

## 2021-01-01 RX ADMIN — ATORVASTATIN CALCIUM 20 MILLIGRAM(S): 80 TABLET, FILM COATED ORAL at 00:19

## 2021-01-01 RX ADMIN — Medication 12.5 MILLIGRAM(S): at 19:46

## 2021-01-01 RX ADMIN — CHLORHEXIDINE GLUCONATE 1 APPLICATION(S): 213 SOLUTION TOPICAL at 06:27

## 2021-01-01 RX ADMIN — METHADONE HYDROCHLORIDE 5 MILLIGRAM(S): 40 TABLET ORAL at 12:08

## 2021-01-01 RX ADMIN — Medication 2: at 05:47

## 2021-01-01 RX ADMIN — CHLORHEXIDINE GLUCONATE 1 APPLICATION(S): 213 SOLUTION TOPICAL at 05:41

## 2021-01-01 RX ADMIN — SODIUM ZIRCONIUM CYCLOSILICATE 5 GRAM(S): 10 POWDER, FOR SUSPENSION ORAL at 07:38

## 2021-01-01 RX ADMIN — Medication 1 APPLICATION(S): at 04:05

## 2021-01-01 RX ADMIN — CHLORHEXIDINE GLUCONATE 15 MILLILITER(S): 213 SOLUTION TOPICAL at 04:35

## 2021-01-01 RX ADMIN — PANTOPRAZOLE SODIUM 40 MILLIGRAM(S): 20 TABLET, DELAYED RELEASE ORAL at 08:33

## 2021-01-01 RX ADMIN — PANTOPRAZOLE SODIUM 40 MILLIGRAM(S): 20 TABLET, DELAYED RELEASE ORAL at 05:03

## 2021-01-01 RX ADMIN — Medication 300 MILLIGRAM(S): at 12:48

## 2021-01-01 RX ADMIN — Medication 1 APPLICATION(S): at 17:34

## 2021-01-01 RX ADMIN — INSULIN GLARGINE 12 UNIT(S): 100 INJECTION, SOLUTION SUBCUTANEOUS at 20:34

## 2021-01-01 RX ADMIN — Medication 80 MILLIGRAM(S): at 18:45

## 2021-01-01 RX ADMIN — Medication 100 MICROGRAM(S): at 04:51

## 2021-01-01 RX ADMIN — HEPARIN SODIUM 14 UNIT(S)/HR: 5000 INJECTION INTRAVENOUS; SUBCUTANEOUS at 21:55

## 2021-01-01 RX ADMIN — ATORVASTATIN CALCIUM 20 MILLIGRAM(S): 80 TABLET, FILM COATED ORAL at 22:55

## 2021-01-01 RX ADMIN — INSULIN GLARGINE 12 UNIT(S): 100 INJECTION, SOLUTION SUBCUTANEOUS at 22:37

## 2021-01-01 RX ADMIN — Medication 12.5 MILLIGRAM(S): at 04:37

## 2021-01-01 RX ADMIN — SEVELAMER CARBONATE 800 MILLIGRAM(S): 2400 POWDER, FOR SUSPENSION ORAL at 05:29

## 2021-01-01 RX ADMIN — Medication 1 APPLICATION(S): at 05:12

## 2021-01-01 RX ADMIN — Medication 9.08 MICROGRAM(S)/KG/MIN: at 06:29

## 2021-01-01 RX ADMIN — WARFARIN SODIUM 5 MILLIGRAM(S): 2.5 TABLET ORAL at 02:07

## 2021-01-01 RX ADMIN — QUETIAPINE FUMARATE 50 MILLIGRAM(S): 200 TABLET, FILM COATED ORAL at 16:47

## 2021-01-01 RX ADMIN — PANTOPRAZOLE SODIUM 40 MILLIGRAM(S): 20 TABLET, DELAYED RELEASE ORAL at 06:26

## 2021-01-01 RX ADMIN — Medication 12.5 MILLIGRAM(S): at 04:42

## 2021-01-01 RX ADMIN — Medication 1 APPLICATION(S): at 06:11

## 2021-01-01 RX ADMIN — Medication 8 UNIT(S): at 13:04

## 2021-01-01 RX ADMIN — CHLORHEXIDINE GLUCONATE 15 MILLILITER(S): 213 SOLUTION TOPICAL at 05:12

## 2021-01-01 RX ADMIN — QUETIAPINE FUMARATE 50 MILLIGRAM(S): 200 TABLET, FILM COATED ORAL at 17:49

## 2021-01-01 RX ADMIN — Medication 100 MICROGRAM(S): at 05:35

## 2021-01-01 RX ADMIN — Medication 9.09 MICROGRAM(S)/KG/MIN: at 23:20

## 2021-01-01 RX ADMIN — Medication 1 APPLICATION(S): at 17:46

## 2021-01-01 RX ADMIN — HEPARIN SODIUM 18 UNIT(S)/HR: 5000 INJECTION INTRAVENOUS; SUBCUTANEOUS at 10:52

## 2021-01-01 RX ADMIN — QUETIAPINE FUMARATE 100 MILLIGRAM(S): 200 TABLET, FILM COATED ORAL at 05:11

## 2021-01-01 RX ADMIN — Medication 10 MILLILITER(S): at 06:44

## 2021-01-01 RX ADMIN — Medication 12.5 MILLIGRAM(S): at 17:18

## 2021-01-01 RX ADMIN — Medication 4 UNIT(S): at 08:38

## 2021-01-01 RX ADMIN — Medication 1 MILLIGRAM(S): at 04:41

## 2021-01-01 RX ADMIN — ATORVASTATIN CALCIUM 20 MILLIGRAM(S): 80 TABLET, FILM COATED ORAL at 22:00

## 2021-01-01 RX ADMIN — Medication 5 MILLIGRAM(S): at 04:10

## 2021-01-01 RX ADMIN — Medication 8 UNIT(S): at 17:48

## 2021-01-01 RX ADMIN — Medication 81 MILLIGRAM(S): at 11:11

## 2021-01-01 RX ADMIN — Medication 100 MICROGRAM(S): at 05:42

## 2021-01-01 RX ADMIN — ATORVASTATIN CALCIUM 20 MILLIGRAM(S): 80 TABLET, FILM COATED ORAL at 21:11

## 2021-01-01 RX ADMIN — Medication 1 MILLIGRAM(S): at 17:34

## 2021-01-01 RX ADMIN — Medication 1 APPLICATION(S): at 05:47

## 2021-01-01 RX ADMIN — CHLORHEXIDINE GLUCONATE 15 MILLILITER(S): 213 SOLUTION TOPICAL at 18:42

## 2021-01-01 RX ADMIN — Medication 1: at 10:54

## 2021-01-01 RX ADMIN — Medication 0.5 MILLIGRAM(S): at 04:39

## 2021-01-01 RX ADMIN — Medication 12.5 MILLIGRAM(S): at 04:35

## 2021-01-01 RX ADMIN — Medication 1 MILLIGRAM(S): at 16:46

## 2021-01-01 RX ADMIN — CHLORHEXIDINE GLUCONATE 1 APPLICATION(S): 213 SOLUTION TOPICAL at 04:26

## 2021-01-01 RX ADMIN — ATORVASTATIN CALCIUM 20 MILLIGRAM(S): 80 TABLET, FILM COATED ORAL at 21:54

## 2021-01-01 RX ADMIN — Medication 81 MILLIGRAM(S): at 11:50

## 2021-01-01 RX ADMIN — LACTULOSE 10 GRAM(S): 10 SOLUTION ORAL at 17:58

## 2021-01-01 RX ADMIN — Medication 100 MICROGRAM(S): at 04:41

## 2021-01-01 RX ADMIN — MIDODRINE HYDROCHLORIDE 10 MILLIGRAM(S): 2.5 TABLET ORAL at 04:34

## 2021-01-01 RX ADMIN — Medication 1 MILLIGRAM(S): at 16:11

## 2021-01-01 RX ADMIN — METHADONE HYDROCHLORIDE 5 MILLIGRAM(S): 40 TABLET ORAL at 12:45

## 2021-01-01 RX ADMIN — ERYTHROPOIETIN 8000 UNIT(S): 10000 INJECTION, SOLUTION INTRAVENOUS; SUBCUTANEOUS at 12:41

## 2021-01-01 RX ADMIN — ERYTHROPOIETIN 8000 UNIT(S): 10000 INJECTION, SOLUTION INTRAVENOUS; SUBCUTANEOUS at 15:15

## 2021-01-01 RX ADMIN — Medication 1 APPLICATION(S): at 05:33

## 2021-01-01 RX ADMIN — MIDODRINE HYDROCHLORIDE 10 MILLIGRAM(S): 2.5 TABLET ORAL at 12:29

## 2021-01-01 RX ADMIN — MEROPENEM 100 MILLIGRAM(S): 1 INJECTION INTRAVENOUS at 12:27

## 2021-01-01 RX ADMIN — ERYTHROPOIETIN 8000 UNIT(S): 10000 INJECTION, SOLUTION INTRAVENOUS; SUBCUTANEOUS at 12:25

## 2021-01-01 RX ADMIN — SODIUM CHLORIDE 500 MILLILITER(S): 9 INJECTION, SOLUTION INTRAVENOUS at 16:03

## 2021-01-01 RX ADMIN — Medication 81 MILLIGRAM(S): at 11:18

## 2021-01-01 RX ADMIN — Medication 650 MILLIGRAM(S): at 05:28

## 2021-01-01 RX ADMIN — Medication 100 MICROGRAM(S): at 06:05

## 2021-01-01 RX ADMIN — Medication 50 MILLILITER(S): at 16:00

## 2021-01-01 RX ADMIN — Medication 1 APPLICATION(S): at 18:25

## 2021-01-01 RX ADMIN — CHLORHEXIDINE GLUCONATE 15 MILLILITER(S): 213 SOLUTION TOPICAL at 17:02

## 2021-01-01 RX ADMIN — MIDODRINE HYDROCHLORIDE 10 MILLIGRAM(S): 2.5 TABLET ORAL at 07:55

## 2021-01-01 RX ADMIN — Medication 80 MILLIGRAM(S): at 04:07

## 2021-01-01 RX ADMIN — Medication 1 APPLICATION(S): at 04:34

## 2021-01-01 RX ADMIN — Medication 5 MILLIGRAM(S): at 20:00

## 2021-01-01 RX ADMIN — Medication 10 MILLIGRAM(S): at 18:21

## 2021-01-01 RX ADMIN — Medication 25 MILLILITER(S): at 00:00

## 2021-01-01 RX ADMIN — INSULIN HUMAN 10 UNIT(S): 100 INJECTION, SOLUTION SUBCUTANEOUS at 13:21

## 2021-01-01 RX ADMIN — QUETIAPINE FUMARATE 100 MILLIGRAM(S): 200 TABLET, FILM COATED ORAL at 04:36

## 2021-01-01 RX ADMIN — Medication 1 APPLICATION(S): at 16:54

## 2021-01-01 RX ADMIN — INSULIN GLARGINE 20 UNIT(S): 100 INJECTION, SOLUTION SUBCUTANEOUS at 21:23

## 2021-01-01 RX ADMIN — Medication 80 MILLIGRAM(S): at 05:35

## 2021-01-01 RX ADMIN — CHLORHEXIDINE GLUCONATE 15 MILLILITER(S): 213 SOLUTION TOPICAL at 05:35

## 2021-01-01 RX ADMIN — Medication 8 UNIT(S): at 12:35

## 2021-01-01 RX ADMIN — Medication 12.5 MILLIGRAM(S): at 04:01

## 2021-01-01 RX ADMIN — Medication 125 MILLIGRAM(S): at 01:09

## 2021-01-01 RX ADMIN — Medication 12.5 MILLIGRAM(S): at 04:45

## 2021-01-01 RX ADMIN — ATORVASTATIN CALCIUM 20 MILLIGRAM(S): 80 TABLET, FILM COATED ORAL at 20:00

## 2021-01-01 RX ADMIN — PANTOPRAZOLE SODIUM 40 MILLIGRAM(S): 20 TABLET, DELAYED RELEASE ORAL at 06:11

## 2021-01-01 RX ADMIN — WARFARIN SODIUM 5 MILLIGRAM(S): 2.5 TABLET ORAL at 20:43

## 2021-01-01 RX ADMIN — QUETIAPINE FUMARATE 50 MILLIGRAM(S): 200 TABLET, FILM COATED ORAL at 12:30

## 2021-01-01 RX ADMIN — Medication 81 MILLIGRAM(S): at 12:28

## 2021-01-01 RX ADMIN — ATORVASTATIN CALCIUM 20 MILLIGRAM(S): 80 TABLET, FILM COATED ORAL at 21:42

## 2021-01-01 RX ADMIN — DEXMEDETOMIDINE HYDROCHLORIDE IN 0.9% SODIUM CHLORIDE 4.85 MICROGRAM(S)/KG/HR: 4 INJECTION INTRAVENOUS at 21:16

## 2021-01-01 RX ADMIN — Medication 10 MILLIGRAM(S): at 13:02

## 2021-01-01 RX ADMIN — PANTOPRAZOLE SODIUM 40 MILLIGRAM(S): 20 TABLET, DELAYED RELEASE ORAL at 05:21

## 2021-01-01 RX ADMIN — Medication 81 MILLIGRAM(S): at 12:58

## 2021-01-01 RX ADMIN — Medication 1 APPLICATION(S): at 04:45

## 2021-01-01 RX ADMIN — Medication 12.5 MILLIGRAM(S): at 17:49

## 2021-01-01 RX ADMIN — CHLORHEXIDINE GLUCONATE 15 MILLILITER(S): 213 SOLUTION TOPICAL at 07:56

## 2021-01-01 RX ADMIN — Medication 0.5 MILLIGRAM(S): at 17:50

## 2021-01-01 RX ADMIN — MIDODRINE HYDROCHLORIDE 10 MILLIGRAM(S): 2.5 TABLET ORAL at 13:09

## 2021-01-01 RX ADMIN — POLYETHYLENE GLYCOL 3350 17 GRAM(S): 17 POWDER, FOR SOLUTION ORAL at 21:47

## 2021-01-01 RX ADMIN — SEVELAMER CARBONATE 800 MILLIGRAM(S): 2400 POWDER, FOR SUSPENSION ORAL at 07:31

## 2021-01-01 RX ADMIN — SEVELAMER CARBONATE 800 MILLIGRAM(S): 2400 POWDER, FOR SUSPENSION ORAL at 18:09

## 2021-01-01 RX ADMIN — Medication 81 MILLIGRAM(S): at 12:35

## 2021-01-01 RX ADMIN — Medication 8 UNIT(S): at 12:00

## 2021-01-01 RX ADMIN — Medication 12.5 MILLIGRAM(S): at 04:05

## 2021-01-01 RX ADMIN — Medication 6 MILLIGRAM(S): at 05:20

## 2021-01-01 RX ADMIN — PANTOPRAZOLE SODIUM 40 MILLIGRAM(S): 20 TABLET, DELAYED RELEASE ORAL at 05:35

## 2021-01-01 RX ADMIN — Medication 1 APPLICATION(S): at 18:00

## 2021-01-01 RX ADMIN — Medication 25 MILLILITER(S): at 14:33

## 2021-01-01 RX ADMIN — Medication 81 MILLIGRAM(S): at 12:22

## 2021-01-01 RX ADMIN — Medication 12.5 MILLIGRAM(S): at 18:25

## 2021-01-01 RX ADMIN — Medication 12.5 MILLIGRAM(S): at 17:45

## 2021-01-01 RX ADMIN — Medication 80 MILLIGRAM(S): at 06:31

## 2021-01-01 RX ADMIN — Medication 650 MILLIGRAM(S): at 06:44

## 2021-01-01 RX ADMIN — Medication 100 MICROGRAM(S): at 04:45

## 2021-01-01 RX ADMIN — CHLORHEXIDINE GLUCONATE 15 MILLILITER(S): 213 SOLUTION TOPICAL at 17:56

## 2021-01-01 RX ADMIN — POLYETHYLENE GLYCOL 3350 17 GRAM(S): 17 POWDER, FOR SOLUTION ORAL at 12:45

## 2021-01-01 RX ADMIN — Medication 5 MILLIGRAM(S): at 04:27

## 2021-01-01 RX ADMIN — MIDODRINE HYDROCHLORIDE 10 MILLIGRAM(S): 2.5 TABLET ORAL at 20:00

## 2021-01-01 RX ADMIN — QUETIAPINE FUMARATE 50 MILLIGRAM(S): 200 TABLET, FILM COATED ORAL at 05:28

## 2021-01-01 RX ADMIN — Medication 2: at 07:04

## 2021-01-01 RX ADMIN — LACTULOSE 10 GRAM(S): 10 SOLUTION ORAL at 16:50

## 2021-01-01 RX ADMIN — MIDODRINE HYDROCHLORIDE 10 MILLIGRAM(S): 2.5 TABLET ORAL at 04:01

## 2021-01-01 RX ADMIN — PANTOPRAZOLE SODIUM 40 MILLIGRAM(S): 20 TABLET, DELAYED RELEASE ORAL at 05:42

## 2021-01-01 RX ADMIN — Medication 6 MILLIGRAM(S): at 05:01

## 2021-01-01 RX ADMIN — Medication 3: at 05:52

## 2021-01-01 RX ADMIN — Medication 1: at 12:35

## 2021-01-01 RX ADMIN — Medication 0.5 MILLIGRAM(S): at 07:55

## 2021-01-01 RX ADMIN — Medication 2: at 16:57

## 2021-01-01 RX ADMIN — Medication 12.5 MILLIGRAM(S): at 06:28

## 2021-01-01 RX ADMIN — CHLORHEXIDINE GLUCONATE 1 APPLICATION(S): 213 SOLUTION TOPICAL at 06:11

## 2021-01-01 RX ADMIN — MIDODRINE HYDROCHLORIDE 10 MILLIGRAM(S): 2.5 TABLET ORAL at 13:31

## 2021-01-01 RX ADMIN — Medication 1 APPLICATION(S): at 07:18

## 2021-01-01 RX ADMIN — SENNA PLUS 2 TABLET(S): 8.6 TABLET ORAL at 21:52

## 2021-01-01 RX ADMIN — Medication 1 APPLICATION(S): at 06:24

## 2021-01-01 RX ADMIN — Medication 12.5 MILLIGRAM(S): at 17:48

## 2021-01-01 RX ADMIN — Medication 81 MILLIGRAM(S): at 14:08

## 2021-01-01 RX ADMIN — Medication 1 MILLIGRAM(S): at 05:15

## 2021-01-01 RX ADMIN — WARFARIN SODIUM 1 MILLIGRAM(S): 2.5 TABLET ORAL at 21:11

## 2021-01-01 RX ADMIN — Medication 1 APPLICATION(S): at 17:49

## 2021-01-01 RX ADMIN — ATORVASTATIN CALCIUM 20 MILLIGRAM(S): 80 TABLET, FILM COATED ORAL at 22:38

## 2021-01-01 RX ADMIN — Medication 4 UNIT(S): at 12:14

## 2021-01-01 RX ADMIN — Medication 1 APPLICATION(S): at 16:59

## 2021-01-01 RX ADMIN — CHLORHEXIDINE GLUCONATE 1 APPLICATION(S): 213 SOLUTION TOPICAL at 05:05

## 2021-01-01 RX ADMIN — Medication 4 UNIT(S): at 05:51

## 2021-01-01 RX ADMIN — Medication 100 MICROGRAM(S): at 05:11

## 2021-01-01 RX ADMIN — Medication 1 APPLICATION(S): at 04:48

## 2021-01-01 RX ADMIN — Medication 8 UNIT(S): at 08:34

## 2021-01-01 RX ADMIN — ATORVASTATIN CALCIUM 20 MILLIGRAM(S): 80 TABLET, FILM COATED ORAL at 22:26

## 2021-01-01 RX ADMIN — CHLORHEXIDINE GLUCONATE 1 APPLICATION(S): 213 SOLUTION TOPICAL at 04:05

## 2021-01-01 RX ADMIN — FENTANYL CITRATE 4.85 MICROGRAM(S)/KG/HR: 50 INJECTION INTRAVENOUS at 14:27

## 2021-01-01 RX ADMIN — Medication 1 APPLICATION(S): at 04:59

## 2021-01-01 RX ADMIN — MEROPENEM 100 MILLIGRAM(S): 1 INJECTION INTRAVENOUS at 12:28

## 2021-01-01 RX ADMIN — QUETIAPINE FUMARATE 100 MILLIGRAM(S): 200 TABLET, FILM COATED ORAL at 17:48

## 2021-01-01 RX ADMIN — PANTOPRAZOLE SODIUM 40 MILLIGRAM(S): 20 TABLET, DELAYED RELEASE ORAL at 06:07

## 2021-01-01 RX ADMIN — DEXMEDETOMIDINE HYDROCHLORIDE IN 0.9% SODIUM CHLORIDE 4.85 MICROGRAM(S)/KG/HR: 4 INJECTION INTRAVENOUS at 06:04

## 2021-01-01 RX ADMIN — Medication 1: at 10:53

## 2021-01-01 RX ADMIN — CHLORHEXIDINE GLUCONATE 15 MILLILITER(S): 213 SOLUTION TOPICAL at 17:22

## 2021-01-01 RX ADMIN — Medication 5 MILLIGRAM(S): at 20:43

## 2021-01-01 RX ADMIN — MIDODRINE HYDROCHLORIDE 10 MILLIGRAM(S): 2.5 TABLET ORAL at 12:46

## 2021-01-01 RX ADMIN — Medication 8 UNIT(S): at 08:11

## 2021-01-01 RX ADMIN — Medication 8 UNIT(S): at 10:55

## 2021-01-01 RX ADMIN — Medication 12.5 MILLIGRAM(S): at 04:34

## 2021-01-01 RX ADMIN — Medication 25 GRAM(S): at 11:28

## 2021-01-01 RX ADMIN — Medication 1 MILLIGRAM(S): at 17:59

## 2021-01-01 RX ADMIN — Medication 4: at 17:49

## 2021-01-01 RX ADMIN — Medication 1 MILLIGRAM(S): at 05:30

## 2021-01-01 RX ADMIN — MIDODRINE HYDROCHLORIDE 10 MILLIGRAM(S): 2.5 TABLET ORAL at 21:21

## 2021-01-01 RX ADMIN — LIDOCAINE HYDROCHLORIDE AND EPINEPHRINE 20 MILLILITER(S): 10; 10 INJECTION, SOLUTION INFILTRATION; PERINEURAL at 18:50

## 2021-01-01 RX ADMIN — PANTOPRAZOLE SODIUM 40 MILLIGRAM(S): 20 TABLET, DELAYED RELEASE ORAL at 05:29

## 2021-01-01 RX ADMIN — HEPARIN SODIUM 2300 UNIT(S)/HR: 5000 INJECTION INTRAVENOUS; SUBCUTANEOUS at 01:01

## 2021-01-01 RX ADMIN — ATORVASTATIN CALCIUM 20 MILLIGRAM(S): 80 TABLET, FILM COATED ORAL at 20:06

## 2021-01-01 RX ADMIN — Medication 12.5 MILLIGRAM(S): at 16:09

## 2021-01-01 RX ADMIN — Medication 1 APPLICATION(S): at 06:51

## 2021-01-01 RX ADMIN — Medication 81 MILLIGRAM(S): at 12:00

## 2021-01-01 RX ADMIN — QUETIAPINE FUMARATE 100 MILLIGRAM(S): 200 TABLET, FILM COATED ORAL at 18:25

## 2021-01-01 RX ADMIN — MIDODRINE HYDROCHLORIDE 10 MILLIGRAM(S): 2.5 TABLET ORAL at 00:17

## 2021-01-01 RX ADMIN — ATORVASTATIN CALCIUM 20 MILLIGRAM(S): 80 TABLET, FILM COATED ORAL at 21:25

## 2021-01-01 RX ADMIN — QUETIAPINE FUMARATE 100 MILLIGRAM(S): 200 TABLET, FILM COATED ORAL at 04:45

## 2021-01-01 RX ADMIN — Medication 1 APPLICATION(S): at 07:49

## 2021-01-01 RX ADMIN — Medication 12.5 MILLIGRAM(S): at 04:51

## 2021-01-01 RX ADMIN — QUETIAPINE FUMARATE 100 MILLIGRAM(S): 200 TABLET, FILM COATED ORAL at 04:35

## 2021-01-01 RX ADMIN — CHLORHEXIDINE GLUCONATE 15 MILLILITER(S): 213 SOLUTION TOPICAL at 17:12

## 2021-01-01 RX ADMIN — SODIUM ZIRCONIUM CYCLOSILICATE 10 GRAM(S): 10 POWDER, FOR SUSPENSION ORAL at 05:28

## 2021-01-01 RX ADMIN — Medication 5 MILLIGRAM(S): at 12:31

## 2021-01-01 RX ADMIN — MIDODRINE HYDROCHLORIDE 10 MILLIGRAM(S): 2.5 TABLET ORAL at 21:59

## 2021-01-01 RX ADMIN — INSULIN GLARGINE 12 UNIT(S): 100 INJECTION, SOLUTION SUBCUTANEOUS at 21:58

## 2021-01-01 RX ADMIN — MIDODRINE HYDROCHLORIDE 10 MILLIGRAM(S): 2.5 TABLET ORAL at 00:18

## 2021-01-01 RX ADMIN — Medication 650 MILLIGRAM(S): at 10:42

## 2021-01-01 RX ADMIN — Medication 650 MILLIGRAM(S): at 11:47

## 2021-01-01 RX ADMIN — Medication 12.5 MILLIGRAM(S): at 19:00

## 2021-01-01 RX ADMIN — Medication 0.5 MILLIGRAM(S): at 17:17

## 2021-01-01 RX ADMIN — Medication 4: at 16:29

## 2021-01-01 RX ADMIN — AZITHROMYCIN 255 MILLIGRAM(S): 500 TABLET, FILM COATED ORAL at 09:17

## 2021-01-01 RX ADMIN — SODIUM ZIRCONIUM CYCLOSILICATE 10 GRAM(S): 10 POWDER, FOR SUSPENSION ORAL at 14:09

## 2021-01-01 RX ADMIN — Medication 0.5 MILLIGRAM(S): at 04:47

## 2021-01-01 RX ADMIN — HEPARIN SODIUM 18 UNIT(S)/HR: 5000 INJECTION INTRAVENOUS; SUBCUTANEOUS at 09:59

## 2021-01-01 RX ADMIN — Medication 2: at 11:34

## 2021-01-01 RX ADMIN — Medication 650 MILLIGRAM(S): at 14:09

## 2021-01-01 RX ADMIN — ATORVASTATIN CALCIUM 20 MILLIGRAM(S): 80 TABLET, FILM COATED ORAL at 20:30

## 2021-01-01 RX ADMIN — MIDODRINE HYDROCHLORIDE 10 MILLIGRAM(S): 2.5 TABLET ORAL at 12:19

## 2021-01-01 RX ADMIN — CHLORHEXIDINE GLUCONATE 15 MILLILITER(S): 213 SOLUTION TOPICAL at 16:51

## 2021-01-01 RX ADMIN — Medication 12.5 MILLIGRAM(S): at 05:34

## 2021-01-01 RX ADMIN — CHLORHEXIDINE GLUCONATE 15 MILLILITER(S): 213 SOLUTION TOPICAL at 04:36

## 2021-01-01 RX ADMIN — CHLORHEXIDINE GLUCONATE 1 APPLICATION(S): 213 SOLUTION TOPICAL at 04:36

## 2021-01-01 RX ADMIN — INSULIN GLARGINE 25 UNIT(S): 100 INJECTION, SOLUTION SUBCUTANEOUS at 22:29

## 2021-01-01 RX ADMIN — Medication 125 MILLIGRAM(S): at 18:43

## 2021-01-01 RX ADMIN — QUETIAPINE FUMARATE 100 MILLIGRAM(S): 200 TABLET, FILM COATED ORAL at 05:35

## 2021-01-01 RX ADMIN — Medication 12.5 MILLIGRAM(S): at 05:29

## 2021-01-01 RX ADMIN — Medication 5 MILLIGRAM(S): at 22:37

## 2021-01-01 RX ADMIN — MIDODRINE HYDROCHLORIDE 10 MILLIGRAM(S): 2.5 TABLET ORAL at 20:30

## 2021-01-01 RX ADMIN — HEPARIN SODIUM 15 UNIT(S)/HR: 5000 INJECTION INTRAVENOUS; SUBCUTANEOUS at 09:51

## 2021-01-01 RX ADMIN — Medication 1 APPLICATION(S): at 04:46

## 2021-01-01 RX ADMIN — Medication 81 MILLIGRAM(S): at 12:10

## 2021-01-01 RX ADMIN — MIDODRINE HYDROCHLORIDE 10 MILLIGRAM(S): 2.5 TABLET ORAL at 12:50

## 2021-01-01 RX ADMIN — MIDODRINE HYDROCHLORIDE 10 MILLIGRAM(S): 2.5 TABLET ORAL at 20:43

## 2021-01-01 RX ADMIN — QUETIAPINE FUMARATE 100 MILLIGRAM(S): 200 TABLET, FILM COATED ORAL at 16:50

## 2021-01-01 RX ADMIN — Medication 1 APPLICATION(S): at 05:13

## 2021-01-01 RX ADMIN — Medication 1 APPLICATION(S): at 16:12

## 2021-01-01 RX ADMIN — Medication 6 MILLIGRAM(S): at 04:07

## 2021-01-01 RX ADMIN — Medication 80 MILLIGRAM(S): at 19:00

## 2021-01-01 RX ADMIN — QUETIAPINE FUMARATE 100 MILLIGRAM(S): 200 TABLET, FILM COATED ORAL at 21:19

## 2021-01-01 RX ADMIN — POLYETHYLENE GLYCOL 3350 17 GRAM(S): 17 POWDER, FOR SOLUTION ORAL at 15:08

## 2021-01-01 RX ADMIN — FENTANYL CITRATE 4.85 MICROGRAM(S)/KG/HR: 50 INJECTION INTRAVENOUS at 23:24

## 2021-01-01 RX ADMIN — CHLORHEXIDINE GLUCONATE 1 APPLICATION(S): 213 SOLUTION TOPICAL at 06:44

## 2021-01-01 RX ADMIN — Medication 5 MILLIGRAM(S): at 11:19

## 2021-01-01 RX ADMIN — MIDODRINE HYDROCHLORIDE 10 MILLIGRAM(S): 2.5 TABLET ORAL at 20:31

## 2021-01-01 RX ADMIN — Medication 650 MILLIGRAM(S): at 22:16

## 2021-01-01 RX ADMIN — Medication 5 MILLIGRAM(S): at 04:34

## 2021-01-01 RX ADMIN — Medication 650 MILLIGRAM(S): at 04:00

## 2021-01-01 RX ADMIN — Medication 12.5 MILLIGRAM(S): at 16:47

## 2021-01-01 RX ADMIN — ATORVASTATIN CALCIUM 20 MILLIGRAM(S): 80 TABLET, FILM COATED ORAL at 22:20

## 2021-01-01 RX ADMIN — MIDODRINE HYDROCHLORIDE 10 MILLIGRAM(S): 2.5 TABLET ORAL at 06:06

## 2021-01-01 RX ADMIN — HEPARIN SODIUM 18 UNIT(S)/HR: 5000 INJECTION INTRAVENOUS; SUBCUTANEOUS at 06:29

## 2021-01-01 RX ADMIN — METHADONE HYDROCHLORIDE 5 MILLIGRAM(S): 40 TABLET ORAL at 11:23

## 2021-01-01 RX ADMIN — PANTOPRAZOLE SODIUM 10 MG/HR: 20 TABLET, DELAYED RELEASE ORAL at 09:35

## 2021-01-01 RX ADMIN — Medication 1 APPLICATION(S): at 04:58

## 2021-01-01 RX ADMIN — Medication 5 MILLIGRAM(S): at 05:51

## 2021-01-01 RX ADMIN — DEXMEDETOMIDINE HYDROCHLORIDE IN 0.9% SODIUM CHLORIDE 4.85 MICROGRAM(S)/KG/HR: 4 INJECTION INTRAVENOUS at 05:03

## 2021-01-01 RX ADMIN — CHLORHEXIDINE GLUCONATE 15 MILLILITER(S): 213 SOLUTION TOPICAL at 17:49

## 2021-01-01 RX ADMIN — METHADONE HYDROCHLORIDE 5 MILLIGRAM(S): 40 TABLET ORAL at 12:27

## 2021-01-01 RX ADMIN — QUETIAPINE FUMARATE 100 MILLIGRAM(S): 200 TABLET, FILM COATED ORAL at 04:01

## 2021-01-01 RX ADMIN — QUETIAPINE FUMARATE 100 MILLIGRAM(S): 200 TABLET, FILM COATED ORAL at 17:34

## 2021-01-01 RX ADMIN — WARFARIN SODIUM 7.5 MILLIGRAM(S): 2.5 TABLET ORAL at 09:21

## 2021-01-01 RX ADMIN — Medication 100 MICROGRAM(S): at 05:02

## 2021-01-01 RX ADMIN — Medication 1: at 06:05

## 2021-01-01 RX ADMIN — CHLORHEXIDINE GLUCONATE 15 MILLILITER(S): 213 SOLUTION TOPICAL at 19:20

## 2021-01-01 RX ADMIN — Medication 100 MICROGRAM(S): at 04:42

## 2021-01-01 RX ADMIN — AZITHROMYCIN 255 MILLIGRAM(S): 500 TABLET, FILM COATED ORAL at 09:57

## 2021-01-01 RX ADMIN — Medication 5 MILLIGRAM(S): at 20:45

## 2021-01-01 RX ADMIN — MIDODRINE HYDROCHLORIDE 10 MILLIGRAM(S): 2.5 TABLET ORAL at 21:58

## 2021-01-01 RX ADMIN — Medication 100 MICROGRAM(S): at 04:34

## 2021-01-01 RX ADMIN — DEXMEDETOMIDINE HYDROCHLORIDE IN 0.9% SODIUM CHLORIDE 4.85 MICROGRAM(S)/KG/HR: 4 INJECTION INTRAVENOUS at 00:17

## 2021-01-01 RX ADMIN — Medication 8 UNIT(S): at 19:16

## 2021-01-01 RX ADMIN — CHLORHEXIDINE GLUCONATE 1 APPLICATION(S): 213 SOLUTION TOPICAL at 06:06

## 2021-01-01 RX ADMIN — Medication 5 MILLIGRAM(S): at 07:55

## 2021-01-01 RX ADMIN — DEXMEDETOMIDINE HYDROCHLORIDE IN 0.9% SODIUM CHLORIDE 4.85 MICROGRAM(S)/KG/HR: 4 INJECTION INTRAVENOUS at 04:10

## 2021-01-01 RX ADMIN — SEVELAMER CARBONATE 800 MILLIGRAM(S): 2400 POWDER, FOR SUSPENSION ORAL at 08:47

## 2021-01-01 RX ADMIN — CHLORHEXIDINE GLUCONATE 15 MILLILITER(S): 213 SOLUTION TOPICAL at 05:29

## 2021-01-01 RX ADMIN — PANTOPRAZOLE SODIUM 40 MILLIGRAM(S): 20 TABLET, DELAYED RELEASE ORAL at 07:54

## 2021-01-01 RX ADMIN — SENNA PLUS 2 TABLET(S): 8.6 TABLET ORAL at 21:32

## 2021-01-01 RX ADMIN — SENNA PLUS 2 TABLET(S): 8.6 TABLET ORAL at 22:09

## 2021-01-01 RX ADMIN — Medication 5 MILLIGRAM(S): at 21:59

## 2021-01-01 RX ADMIN — Medication 5 MILLIGRAM(S): at 12:18

## 2021-01-01 RX ADMIN — QUETIAPINE FUMARATE 50 MILLIGRAM(S): 200 TABLET, FILM COATED ORAL at 16:46

## 2021-01-01 RX ADMIN — Medication 8 UNIT(S): at 12:53

## 2021-01-01 RX ADMIN — Medication 3: at 12:25

## 2021-01-01 RX ADMIN — DEXMEDETOMIDINE HYDROCHLORIDE IN 0.9% SODIUM CHLORIDE 12.1 MICROGRAM(S)/KG/HR: 4 INJECTION INTRAVENOUS at 10:51

## 2021-01-01 RX ADMIN — Medication 1 MILLIGRAM(S): at 04:35

## 2021-01-01 RX ADMIN — Medication 100 MICROGRAM(S): at 05:15

## 2021-01-01 RX ADMIN — Medication 12.5 MILLIGRAM(S): at 04:57

## 2021-01-01 RX ADMIN — CHLORHEXIDINE GLUCONATE 1 APPLICATION(S): 213 SOLUTION TOPICAL at 05:34

## 2021-01-01 RX ADMIN — LACTULOSE 10 GRAM(S): 10 SOLUTION ORAL at 18:35

## 2021-01-01 RX ADMIN — Medication 100 MICROGRAM(S): at 05:41

## 2021-01-01 RX ADMIN — PANTOPRAZOLE SODIUM 40 MILLIGRAM(S): 20 TABLET, DELAYED RELEASE ORAL at 11:12

## 2021-01-01 RX ADMIN — Medication 81 MILLIGRAM(S): at 11:27

## 2021-01-01 RX ADMIN — Medication 12.5 MILLIGRAM(S): at 05:48

## 2021-01-01 RX ADMIN — SODIUM ZIRCONIUM CYCLOSILICATE 10 GRAM(S): 10 POWDER, FOR SUSPENSION ORAL at 22:16

## 2021-01-01 RX ADMIN — CHLORHEXIDINE GLUCONATE 1 APPLICATION(S): 213 SOLUTION TOPICAL at 07:17

## 2021-01-01 RX ADMIN — METHADONE HYDROCHLORIDE 5 MILLIGRAM(S): 40 TABLET ORAL at 17:45

## 2021-01-01 RX ADMIN — ERYTHROPOIETIN 8000 UNIT(S): 10000 INJECTION, SOLUTION INTRAVENOUS; SUBCUTANEOUS at 11:15

## 2021-01-01 RX ADMIN — MIDODRINE HYDROCHLORIDE 10 MILLIGRAM(S): 2.5 TABLET ORAL at 04:45

## 2021-01-01 RX ADMIN — Medication 1 MILLIGRAM(S): at 16:47

## 2021-01-01 RX ADMIN — FENTANYL CITRATE 25 MICROGRAM(S): 50 INJECTION INTRAVENOUS at 12:53

## 2021-01-01 RX ADMIN — Medication 4 UNIT(S): at 10:53

## 2021-01-01 RX ADMIN — METHADONE HYDROCHLORIDE 5 MILLIGRAM(S): 40 TABLET ORAL at 11:51

## 2021-01-01 RX ADMIN — Medication 12.5 MILLIGRAM(S): at 17:13

## 2021-01-01 RX ADMIN — Medication 81 MILLIGRAM(S): at 12:45

## 2021-01-01 RX ADMIN — Medication 100 MICROGRAM(S): at 14:08

## 2021-01-01 RX ADMIN — Medication 5 MILLIGRAM(S): at 12:08

## 2021-01-01 RX ADMIN — Medication 1 APPLICATION(S): at 05:16

## 2021-01-01 RX ADMIN — CHLORHEXIDINE GLUCONATE 15 MILLILITER(S): 213 SOLUTION TOPICAL at 07:16

## 2021-01-01 RX ADMIN — CHLORHEXIDINE GLUCONATE 15 MILLILITER(S): 213 SOLUTION TOPICAL at 04:43

## 2021-01-01 RX ADMIN — PANTOPRAZOLE SODIUM 40 MILLIGRAM(S): 20 TABLET, DELAYED RELEASE ORAL at 06:27

## 2021-01-01 RX ADMIN — Medication 5 MILLIGRAM(S): at 12:49

## 2021-01-01 RX ADMIN — CHLORHEXIDINE GLUCONATE 15 MILLILITER(S): 213 SOLUTION TOPICAL at 17:59

## 2021-01-01 RX ADMIN — PANTOPRAZOLE SODIUM 40 MILLIGRAM(S): 20 TABLET, DELAYED RELEASE ORAL at 05:48

## 2021-01-01 RX ADMIN — CHLORHEXIDINE GLUCONATE 1 APPLICATION(S): 213 SOLUTION TOPICAL at 04:46

## 2021-01-01 RX ADMIN — Medication 81 MILLIGRAM(S): at 12:48

## 2021-01-01 RX ADMIN — Medication 5 MILLIGRAM(S): at 20:30

## 2021-01-01 RX ADMIN — MEROPENEM 100 MILLIGRAM(S): 1 INJECTION INTRAVENOUS at 13:57

## 2021-01-01 RX ADMIN — Medication 81 MILLIGRAM(S): at 12:26

## 2021-01-01 RX ADMIN — Medication 12.5 MILLIGRAM(S): at 16:52

## 2021-01-01 RX ADMIN — Medication 1 APPLICATION(S): at 05:42

## 2021-01-01 RX ADMIN — Medication 12.5 MILLIGRAM(S): at 04:10

## 2021-01-01 RX ADMIN — MIDODRINE HYDROCHLORIDE 10 MILLIGRAM(S): 2.5 TABLET ORAL at 04:10

## 2021-01-01 RX ADMIN — ATORVASTATIN CALCIUM 20 MILLIGRAM(S): 80 TABLET, FILM COATED ORAL at 20:19

## 2021-01-01 RX ADMIN — Medication 0.5 MILLIGRAM(S): at 16:56

## 2021-01-01 RX ADMIN — MIDODRINE HYDROCHLORIDE 10 MILLIGRAM(S): 2.5 TABLET ORAL at 12:30

## 2021-01-01 RX ADMIN — QUETIAPINE FUMARATE 100 MILLIGRAM(S): 200 TABLET, FILM COATED ORAL at 17:14

## 2021-01-01 RX ADMIN — CHLORHEXIDINE GLUCONATE 15 MILLILITER(S): 213 SOLUTION TOPICAL at 04:37

## 2021-01-01 RX ADMIN — Medication 250 MILLIGRAM(S): at 11:16

## 2021-01-01 RX ADMIN — INSULIN HUMAN 1 UNIT(S)/HR: 100 INJECTION, SOLUTION SUBCUTANEOUS at 21:55

## 2021-01-01 RX ADMIN — INSULIN GLARGINE 20 UNIT(S): 100 INJECTION, SOLUTION SUBCUTANEOUS at 23:45

## 2021-01-01 RX ADMIN — PANTOPRAZOLE SODIUM 40 MILLIGRAM(S): 20 TABLET, DELAYED RELEASE ORAL at 12:15

## 2021-01-01 RX ADMIN — SENNA PLUS 2 TABLET(S): 8.6 TABLET ORAL at 15:17

## 2021-01-01 RX ADMIN — Medication 1 APPLICATION(S): at 04:37

## 2021-01-01 RX ADMIN — Medication 1 APPLICATION(S): at 19:13

## 2021-01-01 RX ADMIN — Medication 1: at 15:39

## 2021-01-01 RX ADMIN — MIDODRINE HYDROCHLORIDE 10 MILLIGRAM(S): 2.5 TABLET ORAL at 11:24

## 2021-01-01 RX ADMIN — ATORVASTATIN CALCIUM 20 MILLIGRAM(S): 80 TABLET, FILM COATED ORAL at 21:32

## 2021-01-01 RX ADMIN — ATORVASTATIN CALCIUM 20 MILLIGRAM(S): 80 TABLET, FILM COATED ORAL at 20:31

## 2021-01-01 RX ADMIN — CHLORHEXIDINE GLUCONATE 1 APPLICATION(S): 213 SOLUTION TOPICAL at 04:33

## 2021-01-01 RX ADMIN — Medication 1 MILLIGRAM(S): at 06:05

## 2021-01-01 RX ADMIN — Medication 1 MILLIGRAM(S): at 18:04

## 2021-01-01 RX ADMIN — Medication 12.5 MILLIGRAM(S): at 16:58

## 2021-01-01 RX ADMIN — Medication 5 MILLIGRAM(S): at 21:52

## 2021-01-01 RX ADMIN — Medication 12.5 MILLIGRAM(S): at 05:03

## 2021-01-01 RX ADMIN — HEPARIN SODIUM 18 UNIT(S)/HR: 5000 INJECTION INTRAVENOUS; SUBCUTANEOUS at 14:30

## 2021-01-01 RX ADMIN — MIDODRINE HYDROCHLORIDE 10 MILLIGRAM(S): 2.5 TABLET ORAL at 05:42

## 2021-01-01 RX ADMIN — Medication 5 MILLIGRAM(S): at 12:30

## 2021-01-01 RX ADMIN — CHLORHEXIDINE GLUCONATE 15 MILLILITER(S): 213 SOLUTION TOPICAL at 04:10

## 2021-01-01 RX ADMIN — Medication 0.5 MILLIGRAM(S): at 05:36

## 2021-01-01 RX ADMIN — Medication 25 MILLIGRAM(S): at 05:21

## 2021-01-01 RX ADMIN — Medication 80 MILLIGRAM(S): at 05:04

## 2021-01-01 RX ADMIN — AZITHROMYCIN 255 MILLIGRAM(S): 500 TABLET, FILM COATED ORAL at 09:18

## 2021-01-01 RX ADMIN — ERYTHROPOIETIN 8000 UNIT(S): 10000 INJECTION, SOLUTION INTRAVENOUS; SUBCUTANEOUS at 10:52

## 2021-01-01 RX ADMIN — DEXMEDETOMIDINE HYDROCHLORIDE IN 0.9% SODIUM CHLORIDE 4.85 MICROGRAM(S)/KG/HR: 4 INJECTION INTRAVENOUS at 02:45

## 2021-01-01 RX ADMIN — Medication 5 MILLIGRAM(S): at 15:29

## 2021-01-01 RX ADMIN — PANTOPRAZOLE SODIUM 40 MILLIGRAM(S): 20 TABLET, DELAYED RELEASE ORAL at 06:57

## 2021-01-01 RX ADMIN — CHLORHEXIDINE GLUCONATE 1 APPLICATION(S): 213 SOLUTION TOPICAL at 05:16

## 2021-01-01 RX ADMIN — Medication 1 APPLICATION(S): at 05:34

## 2021-01-01 RX ADMIN — INSULIN GLARGINE 20 UNIT(S): 100 INJECTION, SOLUTION SUBCUTANEOUS at 20:45

## 2021-01-01 RX ADMIN — INSULIN GLARGINE 12 UNIT(S): 100 INJECTION, SOLUTION SUBCUTANEOUS at 21:24

## 2021-01-01 RX ADMIN — MIDODRINE HYDROCHLORIDE 10 MILLIGRAM(S): 2.5 TABLET ORAL at 12:23

## 2021-01-01 RX ADMIN — INSULIN GLARGINE 12 UNIT(S): 100 INJECTION, SOLUTION SUBCUTANEOUS at 21:21

## 2021-01-01 RX ADMIN — CHLORHEXIDINE GLUCONATE 15 MILLILITER(S): 213 SOLUTION TOPICAL at 17:11

## 2021-01-01 RX ADMIN — Medication 100 MICROGRAM(S): at 05:29

## 2021-01-01 RX ADMIN — Medication 4: at 08:02

## 2021-01-01 RX ADMIN — Medication 8 UNIT(S): at 05:17

## 2021-01-01 RX ADMIN — Medication 1 MILLIGRAM(S): at 04:10

## 2021-01-01 RX ADMIN — Medication 2: at 12:29

## 2021-01-01 RX ADMIN — CEFEPIME 100 MILLIGRAM(S): 1 INJECTION, POWDER, FOR SOLUTION INTRAMUSCULAR; INTRAVENOUS at 05:20

## 2021-01-01 RX ADMIN — Medication 80 MILLIGRAM(S): at 05:42

## 2021-01-01 RX ADMIN — INSULIN GLARGINE 20 UNIT(S): 100 INJECTION, SOLUTION SUBCUTANEOUS at 21:34

## 2021-01-01 RX ADMIN — Medication 10 MILLIGRAM(S): at 07:43

## 2021-01-01 RX ADMIN — Medication 1 APPLICATION(S): at 04:27

## 2021-01-01 RX ADMIN — Medication 8 UNIT(S): at 05:47

## 2021-01-01 RX ADMIN — MEROPENEM 100 MILLIGRAM(S): 1 INJECTION INTRAVENOUS at 11:18

## 2021-01-01 RX ADMIN — Medication 8 UNIT(S): at 16:52

## 2021-01-01 RX ADMIN — Medication 1: at 16:25

## 2021-01-01 RX ADMIN — Medication 1 MILLIGRAM(S): at 18:30

## 2021-01-01 RX ADMIN — Medication 5 MILLIGRAM(S): at 04:35

## 2021-01-01 RX ADMIN — METHADONE HYDROCHLORIDE 5 MILLIGRAM(S): 40 TABLET ORAL at 16:11

## 2021-01-01 RX ADMIN — MIDODRINE HYDROCHLORIDE 10 MILLIGRAM(S): 2.5 TABLET ORAL at 15:08

## 2021-01-01 RX ADMIN — ATORVASTATIN CALCIUM 20 MILLIGRAM(S): 80 TABLET, FILM COATED ORAL at 20:57

## 2021-01-01 RX ADMIN — Medication 0.5 MILLIGRAM(S): at 16:44

## 2021-01-01 RX ADMIN — POLYETHYLENE GLYCOL 3350 17 GRAM(S): 17 POWDER, FOR SOLUTION ORAL at 12:16

## 2021-01-01 RX ADMIN — Medication 80 MILLIGRAM(S): at 16:32

## 2021-01-01 RX ADMIN — Medication 100 MICROGRAM(S): at 04:10

## 2021-01-01 RX ADMIN — PANTOPRAZOLE SODIUM 40 MILLIGRAM(S): 20 TABLET, DELAYED RELEASE ORAL at 05:02

## 2021-01-01 RX ADMIN — SENNA PLUS 2 TABLET(S): 8.6 TABLET ORAL at 22:20

## 2021-01-01 RX ADMIN — Medication 1 APPLICATION(S): at 05:48

## 2021-01-01 RX ADMIN — SEVELAMER CARBONATE 800 MILLIGRAM(S): 2400 POWDER, FOR SUSPENSION ORAL at 17:45

## 2021-01-01 RX ADMIN — INSULIN GLARGINE 20 UNIT(S): 100 INJECTION, SOLUTION SUBCUTANEOUS at 00:15

## 2021-01-01 RX ADMIN — CHLORHEXIDINE GLUCONATE 15 MILLILITER(S): 213 SOLUTION TOPICAL at 05:38

## 2021-01-01 RX ADMIN — SODIUM ZIRCONIUM CYCLOSILICATE 10 GRAM(S): 10 POWDER, FOR SUSPENSION ORAL at 20:27

## 2021-01-01 RX ADMIN — QUETIAPINE FUMARATE 100 MILLIGRAM(S): 200 TABLET, FILM COATED ORAL at 18:21

## 2021-01-01 RX ADMIN — MIDODRINE HYDROCHLORIDE 10 MILLIGRAM(S): 2.5 TABLET ORAL at 20:27

## 2021-01-01 RX ADMIN — Medication 4 UNIT(S): at 16:46

## 2021-01-01 RX ADMIN — WARFARIN SODIUM 2.5 MILLIGRAM(S): 2.5 TABLET ORAL at 18:57

## 2021-01-01 RX ADMIN — HEPARIN SODIUM 18 UNIT(S)/HR: 5000 INJECTION INTRAVENOUS; SUBCUTANEOUS at 08:46

## 2021-01-01 RX ADMIN — METHADONE HYDROCHLORIDE 5 MILLIGRAM(S): 40 TABLET ORAL at 12:25

## 2021-01-01 RX ADMIN — Medication 5 MILLIGRAM(S): at 22:00

## 2021-01-01 RX ADMIN — Medication 1 APPLICATION(S): at 06:12

## 2021-01-01 RX ADMIN — Medication 80 MILLIGRAM(S): at 20:08

## 2021-01-01 RX ADMIN — Medication 300 MILLIGRAM(S): at 09:54

## 2021-01-01 RX ADMIN — SODIUM ZIRCONIUM CYCLOSILICATE 10 GRAM(S): 10 POWDER, FOR SUSPENSION ORAL at 15:08

## 2021-01-01 RX ADMIN — METHADONE HYDROCHLORIDE 5 MILLIGRAM(S): 40 TABLET ORAL at 05:44

## 2021-01-01 RX ADMIN — CHLORHEXIDINE GLUCONATE 1 APPLICATION(S): 213 SOLUTION TOPICAL at 04:37

## 2021-01-01 RX ADMIN — Medication 166.67 MILLIGRAM(S): at 13:38

## 2021-01-01 RX ADMIN — Medication 1 APPLICATION(S): at 06:07

## 2021-01-01 RX ADMIN — CHLORHEXIDINE GLUCONATE 1 APPLICATION(S): 213 SOLUTION TOPICAL at 05:38

## 2021-01-01 RX ADMIN — Medication 1 APPLICATION(S): at 16:45

## 2021-01-01 RX ADMIN — CEFEPIME 100 MILLIGRAM(S): 1 INJECTION, POWDER, FOR SOLUTION INTRAMUSCULAR; INTRAVENOUS at 05:29

## 2021-01-01 RX ADMIN — Medication 1 APPLICATION(S): at 17:56

## 2021-01-01 RX ADMIN — CHLORHEXIDINE GLUCONATE 15 MILLILITER(S): 213 SOLUTION TOPICAL at 05:03

## 2021-01-01 RX ADMIN — CHLORHEXIDINE GLUCONATE 15 MILLILITER(S): 213 SOLUTION TOPICAL at 17:40

## 2021-01-01 RX ADMIN — Medication 5 MILLIGRAM(S): at 13:30

## 2021-01-01 RX ADMIN — Medication 1 APPLICATION(S): at 04:07

## 2021-01-01 RX ADMIN — QUETIAPINE FUMARATE 100 MILLIGRAM(S): 200 TABLET, FILM COATED ORAL at 05:15

## 2021-01-01 RX ADMIN — METHADONE HYDROCHLORIDE 5 MILLIGRAM(S): 40 TABLET ORAL at 06:05

## 2021-01-01 RX ADMIN — HEPARIN SODIUM 19 UNIT(S)/HR: 5000 INJECTION INTRAVENOUS; SUBCUTANEOUS at 04:37

## 2021-01-01 RX ADMIN — Medication 4 UNIT(S): at 12:15

## 2021-01-01 RX ADMIN — Medication 1 MILLIGRAM(S): at 05:48

## 2021-01-01 RX ADMIN — Medication 81 MILLIGRAM(S): at 12:31

## 2021-01-01 RX ADMIN — CHLORHEXIDINE GLUCONATE 15 MILLILITER(S): 213 SOLUTION TOPICAL at 06:26

## 2021-01-01 RX ADMIN — Medication 6 MILLIGRAM(S): at 05:41

## 2021-01-01 RX ADMIN — Medication 10 MILLILITER(S): at 05:35

## 2021-01-01 RX ADMIN — Medication 1 APPLICATION(S): at 05:15

## 2021-01-01 RX ADMIN — ATORVASTATIN CALCIUM 20 MILLIGRAM(S): 80 TABLET, FILM COATED ORAL at 22:11

## 2021-01-01 RX ADMIN — Medication 100 MICROGRAM(S): at 04:01

## 2021-01-01 RX ADMIN — MIDODRINE HYDROCHLORIDE 10 MILLIGRAM(S): 2.5 TABLET ORAL at 21:25

## 2021-01-01 RX ADMIN — METHADONE HYDROCHLORIDE 5 MILLIGRAM(S): 40 TABLET ORAL at 11:52

## 2021-01-01 RX ADMIN — MIDODRINE HYDROCHLORIDE 10 MILLIGRAM(S): 2.5 TABLET ORAL at 21:53

## 2021-01-01 RX ADMIN — PANTOPRAZOLE SODIUM 40 MILLIGRAM(S): 20 TABLET, DELAYED RELEASE ORAL at 07:03

## 2021-01-01 RX ADMIN — MEROPENEM 100 MILLIGRAM(S): 1 INJECTION INTRAVENOUS at 11:08

## 2021-01-01 RX ADMIN — QUETIAPINE FUMARATE 50 MILLIGRAM(S): 200 TABLET, FILM COATED ORAL at 06:05

## 2021-01-01 RX ADMIN — CHLORHEXIDINE GLUCONATE 15 MILLILITER(S): 213 SOLUTION TOPICAL at 18:14

## 2021-01-01 RX ADMIN — METHADONE HYDROCHLORIDE 5 MILLIGRAM(S): 40 TABLET ORAL at 12:47

## 2021-01-01 RX ADMIN — CHLORHEXIDINE GLUCONATE 15 MILLILITER(S): 213 SOLUTION TOPICAL at 04:33

## 2021-01-01 RX ADMIN — HEPARIN SODIUM 18 UNIT(S)/HR: 5000 INJECTION INTRAVENOUS; SUBCUTANEOUS at 21:16

## 2021-01-01 RX ADMIN — MIDODRINE HYDROCHLORIDE 10 MILLIGRAM(S): 2.5 TABLET ORAL at 05:15

## 2021-01-01 RX ADMIN — PANTOPRAZOLE SODIUM 40 MILLIGRAM(S): 20 TABLET, DELAYED RELEASE ORAL at 04:51

## 2021-01-01 RX ADMIN — ERYTHROPOIETIN 8000 UNIT(S): 10000 INJECTION, SOLUTION INTRAVENOUS; SUBCUTANEOUS at 11:59

## 2021-01-01 RX ADMIN — Medication 81 MILLIGRAM(S): at 11:33

## 2021-01-01 RX ADMIN — Medication 25 MILLIGRAM(S): at 05:03

## 2021-01-01 RX ADMIN — DEXMEDETOMIDINE HYDROCHLORIDE IN 0.9% SODIUM CHLORIDE 4.85 MICROGRAM(S)/KG/HR: 4 INJECTION INTRAVENOUS at 05:23

## 2021-01-01 RX ADMIN — DEXMEDETOMIDINE HYDROCHLORIDE IN 0.9% SODIUM CHLORIDE 4.85 MICROGRAM(S)/KG/HR: 4 INJECTION INTRAVENOUS at 08:33

## 2021-01-01 RX ADMIN — METHADONE HYDROCHLORIDE 5 MILLIGRAM(S): 40 TABLET ORAL at 04:01

## 2021-01-01 RX ADMIN — CHLORHEXIDINE GLUCONATE 15 MILLILITER(S): 213 SOLUTION TOPICAL at 18:19

## 2021-01-01 RX ADMIN — Medication 4 UNIT(S): at 04:48

## 2021-01-01 RX ADMIN — Medication 12.5 MILLIGRAM(S): at 18:21

## 2021-01-01 RX ADMIN — Medication 100 MICROGRAM(S): at 06:28

## 2021-01-01 RX ADMIN — MIDODRINE HYDROCHLORIDE 10 MILLIGRAM(S): 2.5 TABLET ORAL at 21:52

## 2021-01-01 RX ADMIN — Medication 1 APPLICATION(S): at 04:47

## 2021-01-01 RX ADMIN — MIDAZOLAM HYDROCHLORIDE 4 MILLIGRAM(S): 1 INJECTION, SOLUTION INTRAMUSCULAR; INTRAVENOUS at 18:42

## 2021-01-01 RX ADMIN — Medication 5 MILLIGRAM(S): at 13:09

## 2021-01-01 RX ADMIN — Medication 5 MILLIGRAM(S): at 20:44

## 2021-01-01 RX ADMIN — Medication 1: at 08:38

## 2021-01-01 RX ADMIN — CHLORHEXIDINE GLUCONATE 15 MILLILITER(S): 213 SOLUTION TOPICAL at 17:24

## 2021-01-01 RX ADMIN — Medication 5 MILLIGRAM(S): at 20:27

## 2021-01-01 RX ADMIN — HEPARIN SODIUM 16 UNIT(S)/HR: 5000 INJECTION INTRAVENOUS; SUBCUTANEOUS at 06:08

## 2021-01-01 RX ADMIN — Medication 6 MILLIGRAM(S): at 05:02

## 2021-01-01 RX ADMIN — QUETIAPINE FUMARATE 100 MILLIGRAM(S): 200 TABLET, FILM COATED ORAL at 17:49

## 2021-01-01 RX ADMIN — MEROPENEM 100 MILLIGRAM(S): 1 INJECTION INTRAVENOUS at 14:11

## 2021-01-01 RX ADMIN — Medication 12.5 MILLIGRAM(S): at 07:54

## 2021-01-01 RX ADMIN — CEFEPIME 100 MILLIGRAM(S): 1 INJECTION, POWDER, FOR SOLUTION INTRAMUSCULAR; INTRAVENOUS at 05:35

## 2021-01-01 RX ADMIN — Medication 6 MILLIGRAM(S): at 05:03

## 2021-01-01 RX ADMIN — Medication 6 MILLIGRAM(S): at 05:29

## 2021-01-01 RX ADMIN — Medication 4 UNIT(S): at 14:35

## 2021-01-01 RX ADMIN — Medication 100 MICROGRAM(S): at 04:40

## 2021-01-01 RX ADMIN — MIDODRINE HYDROCHLORIDE 10 MILLIGRAM(S): 2.5 TABLET ORAL at 05:39

## 2021-01-01 RX ADMIN — ATORVASTATIN CALCIUM 20 MILLIGRAM(S): 80 TABLET, FILM COATED ORAL at 22:59

## 2021-01-01 RX ADMIN — SEVELAMER CARBONATE 800 MILLIGRAM(S): 2400 POWDER, FOR SUSPENSION ORAL at 08:03

## 2021-01-01 RX ADMIN — Medication 200 GRAM(S): at 07:39

## 2021-01-01 RX ADMIN — CHLORHEXIDINE GLUCONATE 15 MILLILITER(S): 213 SOLUTION TOPICAL at 04:58

## 2021-01-01 RX ADMIN — Medication 1: at 16:45

## 2021-01-01 RX ADMIN — MIDODRINE HYDROCHLORIDE 10 MILLIGRAM(S): 2.5 TABLET ORAL at 15:43

## 2021-01-01 RX ADMIN — WARFARIN SODIUM 5 MILLIGRAM(S): 2.5 TABLET ORAL at 00:19

## 2021-01-01 RX ADMIN — Medication 650 MILLIGRAM(S): at 07:45

## 2021-01-01 RX ADMIN — Medication 5 MILLIGRAM(S): at 04:51

## 2021-01-01 RX ADMIN — ATORVASTATIN CALCIUM 20 MILLIGRAM(S): 80 TABLET, FILM COATED ORAL at 21:52

## 2021-01-01 RX ADMIN — Medication 5 MILLIGRAM(S): at 11:30

## 2021-01-01 RX ADMIN — Medication 2: at 13:03

## 2021-01-01 RX ADMIN — Medication 1 MILLIGRAM(S): at 17:49

## 2021-01-01 RX ADMIN — Medication 10 MILLILITER(S): at 18:09

## 2021-01-01 RX ADMIN — METHADONE HYDROCHLORIDE 5 MILLIGRAM(S): 40 TABLET ORAL at 11:20

## 2021-01-01 RX ADMIN — FENTANYL CITRATE 4.85 MICROGRAM(S)/KG/HR: 50 INJECTION INTRAVENOUS at 16:34

## 2021-01-01 RX ADMIN — SENNA PLUS 2 TABLET(S): 8.6 TABLET ORAL at 20:06

## 2021-01-01 RX ADMIN — Medication 81 MILLIGRAM(S): at 11:21

## 2021-01-01 RX ADMIN — ATORVASTATIN CALCIUM 20 MILLIGRAM(S): 80 TABLET, FILM COATED ORAL at 21:57

## 2021-01-01 RX ADMIN — DAPTOMYCIN 131 MILLIGRAM(S): 500 INJECTION, POWDER, LYOPHILIZED, FOR SOLUTION INTRAVENOUS at 23:28

## 2021-01-01 RX ADMIN — CHLORHEXIDINE GLUCONATE 15 MILLILITER(S): 213 SOLUTION TOPICAL at 16:41

## 2021-01-01 RX ADMIN — SEVELAMER CARBONATE 800 MILLIGRAM(S): 2400 POWDER, FOR SUSPENSION ORAL at 12:26

## 2021-01-01 RX ADMIN — Medication 80 MILLIGRAM(S): at 17:10

## 2021-01-01 RX ADMIN — Medication 5 MILLIGRAM(S): at 00:19

## 2021-01-01 RX ADMIN — METHADONE HYDROCHLORIDE 5 MILLIGRAM(S): 40 TABLET ORAL at 17:34

## 2021-01-01 RX ADMIN — MIDODRINE HYDROCHLORIDE 10 MILLIGRAM(S): 2.5 TABLET ORAL at 05:35

## 2021-01-01 RX ADMIN — Medication 5 MILLIGRAM(S): at 14:36

## 2021-01-01 RX ADMIN — PANTOPRAZOLE SODIUM 40 MILLIGRAM(S): 20 TABLET, DELAYED RELEASE ORAL at 05:44

## 2021-01-01 RX ADMIN — Medication 80 MILLIGRAM(S): at 16:56

## 2021-01-01 RX ADMIN — Medication 5: at 10:45

## 2021-01-01 RX ADMIN — CHLORHEXIDINE GLUCONATE 15 MILLILITER(S): 213 SOLUTION TOPICAL at 16:59

## 2021-01-01 RX ADMIN — Medication 2 MILLIGRAM(S): at 23:59

## 2021-01-01 RX ADMIN — CHLORHEXIDINE GLUCONATE 15 MILLILITER(S): 213 SOLUTION TOPICAL at 16:47

## 2021-01-01 RX ADMIN — FENTANYL CITRATE 4.85 MICROGRAM(S)/KG/HR: 50 INJECTION INTRAVENOUS at 00:36

## 2021-01-01 RX ADMIN — PANTOPRAZOLE SODIUM 40 MILLIGRAM(S): 20 TABLET, DELAYED RELEASE ORAL at 07:59

## 2021-01-01 RX ADMIN — Medication 1 APPLICATION(S): at 05:27

## 2021-01-01 RX ADMIN — Medication 12.5 MILLIGRAM(S): at 06:06

## 2021-01-01 RX ADMIN — Medication 25 MILLILITER(S): at 21:00

## 2021-01-01 RX ADMIN — Medication 8 UNIT(S): at 11:33

## 2021-01-01 RX ADMIN — METHADONE HYDROCHLORIDE 5 MILLIGRAM(S): 40 TABLET ORAL at 12:29

## 2021-01-01 RX ADMIN — MIDODRINE HYDROCHLORIDE 10 MILLIGRAM(S): 2.5 TABLET ORAL at 13:03

## 2021-01-01 RX ADMIN — Medication 12.5 MILLIGRAM(S): at 04:27

## 2021-01-01 RX ADMIN — ERYTHROPOIETIN 8000 UNIT(S): 10000 INJECTION, SOLUTION INTRAVENOUS; SUBCUTANEOUS at 12:35

## 2021-01-01 RX ADMIN — PANTOPRAZOLE SODIUM 40 MILLIGRAM(S): 20 TABLET, DELAYED RELEASE ORAL at 07:44

## 2021-01-01 RX ADMIN — Medication 650 MILLIGRAM(S): at 16:31

## 2021-01-01 RX ADMIN — CHLORHEXIDINE GLUCONATE 1 APPLICATION(S): 213 SOLUTION TOPICAL at 04:48

## 2021-01-01 RX ADMIN — Medication 1 APPLICATION(S): at 06:52

## 2021-01-01 RX ADMIN — Medication 1 APPLICATION(S): at 04:39

## 2021-01-01 RX ADMIN — Medication 12.5 MILLIGRAM(S): at 16:46

## 2021-01-01 RX ADMIN — QUETIAPINE FUMARATE 100 MILLIGRAM(S): 200 TABLET, FILM COATED ORAL at 04:37

## 2021-01-01 RX ADMIN — QUETIAPINE FUMARATE 100 MILLIGRAM(S): 200 TABLET, FILM COATED ORAL at 07:54

## 2021-01-01 RX ADMIN — CHLORHEXIDINE GLUCONATE 15 MILLILITER(S): 213 SOLUTION TOPICAL at 17:46

## 2021-01-01 RX ADMIN — Medication 8 UNIT(S): at 07:07

## 2021-01-01 RX ADMIN — Medication 6 MILLIGRAM(S): at 06:26

## 2021-01-01 RX ADMIN — Medication 8 UNIT(S): at 16:25

## 2021-01-01 RX ADMIN — Medication 12.5 MILLIGRAM(S): at 17:24

## 2021-01-01 RX ADMIN — Medication 1: at 04:43

## 2021-01-01 RX ADMIN — CHLORHEXIDINE GLUCONATE 15 MILLILITER(S): 213 SOLUTION TOPICAL at 16:53

## 2021-01-01 RX ADMIN — Medication 4 UNIT(S): at 18:25

## 2021-01-01 RX ADMIN — Medication 81 MILLIGRAM(S): at 12:23

## 2021-01-01 RX ADMIN — ATORVASTATIN CALCIUM 20 MILLIGRAM(S): 80 TABLET, FILM COATED ORAL at 21:33

## 2021-01-01 RX ADMIN — Medication 81 MILLIGRAM(S): at 14:28

## 2021-01-01 RX ADMIN — MIDODRINE HYDROCHLORIDE 10 MILLIGRAM(S): 2.5 TABLET ORAL at 05:11

## 2021-01-01 RX ADMIN — Medication 81 MILLIGRAM(S): at 11:20

## 2021-01-01 RX ADMIN — SENNA PLUS 2 TABLET(S): 8.6 TABLET ORAL at 03:29

## 2021-01-01 RX ADMIN — Medication 1 MILLIGRAM(S): at 12:27

## 2021-01-01 RX ADMIN — Medication 81 MILLIGRAM(S): at 10:54

## 2021-01-01 RX ADMIN — Medication 80 MILLIGRAM(S): at 05:29

## 2021-01-01 RX ADMIN — Medication 81 MILLIGRAM(S): at 11:51

## 2021-01-01 RX ADMIN — MIDODRINE HYDROCHLORIDE 10 MILLIGRAM(S): 2.5 TABLET ORAL at 21:47

## 2021-01-01 RX ADMIN — INSULIN GLARGINE 12 UNIT(S): 100 INJECTION, SOLUTION SUBCUTANEOUS at 22:10

## 2021-01-01 RX ADMIN — METHADONE HYDROCHLORIDE 5 MILLIGRAM(S): 40 TABLET ORAL at 11:57

## 2021-01-01 RX ADMIN — CHLORHEXIDINE GLUCONATE 15 MILLILITER(S): 213 SOLUTION TOPICAL at 16:46

## 2021-01-01 RX ADMIN — HEPARIN SODIUM 18 UNIT(S)/HR: 5000 INJECTION INTRAVENOUS; SUBCUTANEOUS at 16:00

## 2021-01-01 RX ADMIN — Medication 1: at 08:34

## 2021-01-01 RX ADMIN — Medication 125 MILLIGRAM(S): at 04:40

## 2021-01-01 RX ADMIN — INSULIN GLARGINE 20 UNIT(S): 100 INJECTION, SOLUTION SUBCUTANEOUS at 21:47

## 2021-01-01 RX ADMIN — Medication 1: at 06:27

## 2021-01-01 RX ADMIN — DAPTOMYCIN 131 MILLIGRAM(S): 500 INJECTION, POWDER, LYOPHILIZED, FOR SOLUTION INTRAVENOUS at 19:21

## 2021-01-01 RX ADMIN — Medication 25 GRAM(S): at 12:20

## 2021-01-01 RX ADMIN — SENNA PLUS 2 TABLET(S): 8.6 TABLET ORAL at 22:59

## 2021-01-01 RX ADMIN — Medication 25 MILLIGRAM(S): at 05:35

## 2021-01-01 RX ADMIN — CHLORHEXIDINE GLUCONATE 15 MILLILITER(S): 213 SOLUTION TOPICAL at 04:01

## 2021-01-01 RX ADMIN — Medication 8 UNIT(S): at 12:30

## 2021-01-01 RX ADMIN — Medication 81 MILLIGRAM(S): at 11:52

## 2021-01-01 RX ADMIN — Medication 12.5 MILLIGRAM(S): at 16:53

## 2021-01-01 RX ADMIN — Medication 1 APPLICATION(S): at 18:17

## 2021-01-01 RX ADMIN — Medication 5 MILLIGRAM(S): at 15:15

## 2021-01-01 RX ADMIN — Medication 81 MILLIGRAM(S): at 11:53

## 2021-01-01 RX ADMIN — Medication 8 UNIT(S): at 08:14

## 2021-01-01 RX ADMIN — CHLORHEXIDINE GLUCONATE 15 MILLILITER(S): 213 SOLUTION TOPICAL at 04:05

## 2021-01-01 RX ADMIN — ATORVASTATIN CALCIUM 20 MILLIGRAM(S): 80 TABLET, FILM COATED ORAL at 03:29

## 2021-01-01 RX ADMIN — Medication 1 APPLICATION(S): at 04:40

## 2021-01-01 RX ADMIN — Medication 81 MILLIGRAM(S): at 12:30

## 2021-01-01 RX ADMIN — CHLORHEXIDINE GLUCONATE 15 MILLILITER(S): 213 SOLUTION TOPICAL at 06:55

## 2021-01-01 RX ADMIN — MIDODRINE HYDROCHLORIDE 10 MILLIGRAM(S): 2.5 TABLET ORAL at 14:36

## 2021-01-01 RX ADMIN — SEVELAMER CARBONATE 800 MILLIGRAM(S): 2400 POWDER, FOR SUSPENSION ORAL at 19:01

## 2021-01-01 RX ADMIN — METHADONE HYDROCHLORIDE 5 MILLIGRAM(S): 40 TABLET ORAL at 11:27

## 2021-01-01 RX ADMIN — MIDODRINE HYDROCHLORIDE 10 MILLIGRAM(S): 2.5 TABLET ORAL at 12:08

## 2021-01-01 RX ADMIN — Medication 1 MILLIGRAM(S): at 04:01

## 2021-01-01 RX ADMIN — CHLORHEXIDINE GLUCONATE 1 APPLICATION(S): 213 SOLUTION TOPICAL at 07:56

## 2021-01-01 RX ADMIN — MEROPENEM 100 MILLIGRAM(S): 1 INJECTION INTRAVENOUS at 12:31

## 2021-01-01 RX ADMIN — HALOPERIDOL DECANOATE 5 MILLIGRAM(S): 100 INJECTION INTRAMUSCULAR at 02:15

## 2021-01-01 RX ADMIN — LIDOCAINE HYDROCHLORIDE AND EPINEPHRINE 1 VIAL(S): 10; 10 INJECTION, SOLUTION INFILTRATION; PERINEURAL at 15:45

## 2021-01-01 RX ADMIN — QUETIAPINE FUMARATE 100 MILLIGRAM(S): 200 TABLET, FILM COATED ORAL at 17:50

## 2021-01-01 RX ADMIN — Medication 166.67 MILLIGRAM(S): at 18:29

## 2021-01-01 RX ADMIN — CEFEPIME 100 MILLIGRAM(S): 1 INJECTION, POWDER, FOR SOLUTION INTRAMUSCULAR; INTRAVENOUS at 04:06

## 2021-01-01 RX ADMIN — Medication 1: at 07:07

## 2021-01-01 RX ADMIN — Medication 1 APPLICATION(S): at 05:40

## 2021-01-01 RX ADMIN — SODIUM CHLORIDE 50 MILLILITER(S): 9 INJECTION INTRAMUSCULAR; INTRAVENOUS; SUBCUTANEOUS at 03:04

## 2021-01-01 RX ADMIN — HALOPERIDOL DECANOATE 5 MILLIGRAM(S): 100 INJECTION INTRAMUSCULAR at 20:48

## 2021-01-01 RX ADMIN — QUETIAPINE FUMARATE 100 MILLIGRAM(S): 200 TABLET, FILM COATED ORAL at 17:45

## 2021-01-01 RX ADMIN — Medication 5 MILLIGRAM(S): at 20:31

## 2021-01-01 RX ADMIN — Medication 12.5 MILLIGRAM(S): at 18:00

## 2021-01-01 RX ADMIN — DAPTOMYCIN 131 MILLIGRAM(S): 500 INJECTION, POWDER, LYOPHILIZED, FOR SOLUTION INTRAVENOUS at 19:54

## 2021-01-01 RX ADMIN — Medication 5 MILLIGRAM(S): at 21:20

## 2021-01-01 RX ADMIN — FENTANYL CITRATE 4.85 MICROGRAM(S)/KG/HR: 50 INJECTION INTRAVENOUS at 06:29

## 2021-01-01 RX ADMIN — Medication 4 UNIT(S): at 17:15

## 2021-01-01 RX ADMIN — CHLORHEXIDINE GLUCONATE 15 MILLILITER(S): 213 SOLUTION TOPICAL at 04:32

## 2021-01-01 RX ADMIN — Medication 100 MICROGRAM(S): at 05:21

## 2021-01-01 RX ADMIN — ERYTHROPOIETIN 8000 UNIT(S): 10000 INJECTION, SOLUTION INTRAVENOUS; SUBCUTANEOUS at 10:34

## 2021-01-01 RX ADMIN — SEVELAMER CARBONATE 800 MILLIGRAM(S): 2400 POWDER, FOR SUSPENSION ORAL at 11:41

## 2021-01-01 RX ADMIN — METHADONE HYDROCHLORIDE 5 MILLIGRAM(S): 40 TABLET ORAL at 12:21

## 2021-01-01 RX ADMIN — Medication 1 MILLIGRAM(S): at 05:44

## 2021-01-01 RX ADMIN — Medication 8 UNIT(S): at 17:56

## 2021-01-01 RX ADMIN — CHLORHEXIDINE GLUCONATE 15 MILLILITER(S): 213 SOLUTION TOPICAL at 16:08

## 2021-01-01 RX ADMIN — SEVELAMER CARBONATE 800 MILLIGRAM(S): 2400 POWDER, FOR SUSPENSION ORAL at 16:31

## 2021-01-01 RX ADMIN — MIDODRINE HYDROCHLORIDE 10 MILLIGRAM(S): 2.5 TABLET ORAL at 05:50

## 2021-01-01 RX ADMIN — Medication 6 MILLIGRAM(S): at 05:34

## 2021-01-01 RX ADMIN — SEVELAMER CARBONATE 800 MILLIGRAM(S): 2400 POWDER, FOR SUSPENSION ORAL at 11:48

## 2021-01-01 RX ADMIN — ATORVASTATIN CALCIUM 20 MILLIGRAM(S): 80 TABLET, FILM COATED ORAL at 20:45

## 2021-01-01 RX ADMIN — MIDODRINE HYDROCHLORIDE 10 MILLIGRAM(S): 2.5 TABLET ORAL at 04:57

## 2021-01-01 RX ADMIN — DEXMEDETOMIDINE HYDROCHLORIDE IN 0.9% SODIUM CHLORIDE 12.1 MICROGRAM(S)/KG/HR: 4 INJECTION INTRAVENOUS at 16:34

## 2021-01-01 RX ADMIN — Medication 10 MILLIGRAM(S): at 21:53

## 2021-01-01 RX ADMIN — Medication 10 MILLILITER(S): at 05:02

## 2021-01-01 RX ADMIN — METHADONE HYDROCHLORIDE 5 MILLIGRAM(S): 40 TABLET ORAL at 11:21

## 2021-01-01 RX ADMIN — Medication 3: at 12:53

## 2021-01-01 RX ADMIN — ERYTHROPOIETIN 8000 UNIT(S): 10000 INJECTION, SOLUTION INTRAVENOUS; SUBCUTANEOUS at 12:00

## 2021-01-01 RX ADMIN — Medication 80 MILLIGRAM(S): at 04:42

## 2021-01-01 RX ADMIN — Medication 12.5 MILLIGRAM(S): at 16:43

## 2021-01-01 RX ADMIN — QUETIAPINE FUMARATE 100 MILLIGRAM(S): 200 TABLET, FILM COATED ORAL at 17:56

## 2021-01-01 RX ADMIN — Medication 1 MILLIGRAM(S): at 19:21

## 2021-01-01 RX ADMIN — MIDODRINE HYDROCHLORIDE 10 MILLIGRAM(S): 2.5 TABLET ORAL at 04:35

## 2021-01-01 RX ADMIN — Medication 3: at 16:53

## 2021-01-01 RX ADMIN — ATORVASTATIN CALCIUM 20 MILLIGRAM(S): 80 TABLET, FILM COATED ORAL at 23:02

## 2021-01-01 RX ADMIN — PANTOPRAZOLE SODIUM 40 MILLIGRAM(S): 20 TABLET, DELAYED RELEASE ORAL at 05:14

## 2021-01-01 RX ADMIN — MIDODRINE HYDROCHLORIDE 10 MILLIGRAM(S): 2.5 TABLET ORAL at 04:36

## 2021-01-01 RX ADMIN — MIDAZOLAM HYDROCHLORIDE 2 MILLIGRAM(S): 1 INJECTION, SOLUTION INTRAMUSCULAR; INTRAVENOUS at 04:30

## 2021-01-01 RX ADMIN — Medication 80 MILLIGRAM(S): at 17:24

## 2021-01-01 RX ADMIN — Medication 1 APPLICATION(S): at 19:14

## 2021-01-01 RX ADMIN — Medication 2: at 16:20

## 2021-01-01 RX ADMIN — ATORVASTATIN CALCIUM 20 MILLIGRAM(S): 80 TABLET, FILM COATED ORAL at 21:05

## 2021-01-01 RX ADMIN — WARFARIN SODIUM 2.5 MILLIGRAM(S): 2.5 TABLET ORAL at 00:12

## 2021-01-01 RX ADMIN — Medication 10 MILLIGRAM(S): at 12:29

## 2021-01-01 RX ADMIN — Medication 1 APPLICATION(S): at 05:11

## 2021-01-01 RX ADMIN — Medication 5 MILLIGRAM(S): at 11:25

## 2021-01-01 NOTE — H&P ADULT - HISTORY OF PRESENT ILLNESS
59 yo M w/ PMH of Afib on coumadin, DM2, ESRD on HD MWF, HLD, HTN, CVA presents with weakness & fall. Patient notes when he woke up today, he felt generally weak, slid from the bed onto the floor landing on his bottom, denies head injury/loc. Patient notes that he has been having increased sputum production for the past few days, endorses mild shortness of breath without chest pain/fever/diarrhea/vomiting. Patient had full dialysis session wednesday, notes next session is tomorrow due to the holidays. Denies any focal weakness or pain currently.     On my assessment, pt is obtunded and unable to provide HPI. HPI obtained by Sister who is his primary care taker. As per sister, pt has been having increased frequency of imbalance/fall since 2 days ago. Decreased PO intake.     ICU Vital Signs Last 24 Hrs  T(C): 37.2 (01 Jan 2021 14:00), Max: 38.3 (01 Jan 2021 10:28)  T(F): 98.9 (01 Jan 2021 14:00), Max: 101 (01 Jan 2021 10:28)  HR: 78 (01 Jan 2021 14:00) (78 - 107)  BP: 109/60 (01 Jan 2021 14:00) (109/60 - 142/62)  BP(mean): --  ABP: --  ABP(mean): --  RR: 18 (01 Jan 2021 14:00) (18 - 20)  SpO2: 97% (01 Jan 2021 14:00) (95% - 99%)    In ED, pt underwent CT head which is negative. CXR performed and shows possible RLL PNA. Given cefepime and vancomycin. Also found to be COVID+.  61 yo M w/ PMH of Afib on coumadin, DM2, ESRD on HD MWF, HLD, HTN, CVA presents with weakness & fall. Patient notes when he woke up today, he felt generally weak, slid from the bed onto the floor landing on his bottom, denies head injury/loc. Patient notes that he has been having increased sputum production for the past few days, endorses mild shortness of breath without chest pain/fever/diarrhea/vomiting. Patient had full dialysis session wednesday, notes next session is tomorrow due to the holidays. Denies any focal weakness or pain currently.     On my assessment, pt is obtunded and unable to provide HPI. HPI obtained by Sister who is his primary care taker. As per sister, pt has been having increased frequency of imbalance/fall since 2 days ago. Decreased PO intake.     ICU Vital Signs Last 24 Hrs  T(C): 37.2 (01 Jan 2021 14:00), Max: 38.3 (01 Jan 2021 10:28)  T(F): 98.9 (01 Jan 2021 14:00), Max: 101 (01 Jan 2021 10:28)  HR: 78 (01 Jan 2021 14:00) (78 - 107)  BP: 109/60 (01 Jan 2021 14:00) (109/60 - 142/62)  RR: 18 (01 Jan 2021 14:00) (18 - 20)  SpO2: 97% (01 Jan 2021 14:00) (95% - 99%)    In ED, pt underwent CT head which is negative. CXR performed and shows possible RLL PNA. Given cefepime and vancomycin. Also found to be COVID+. no drips

## 2021-01-01 NOTE — ED PROVIDER NOTE - SECONDARY DIAGNOSIS.
Hypoxia ESRD (end stage renal disease) on dialysis Pneumonia Elevated troponin Fluid overload Altered mental status

## 2021-01-01 NOTE — H&P ADULT - ASSESSMENT
IMPRESSION:  s/p fall ruled out bleed  ESRD MWF  DM  Afib on coumadin  COVID PNA w/ ?superimposed bacteria PNA  AMS likely secondary to metabolic encephalopathy     PLAN:    CNS: Avoid sedation. Order EEG. Check ammonia level.     HEENT: Oral care.    PULMONARY: Keep SpO2 > 88%. Titrate down NC.     CARDIOVASCULAR: Keep I < O (oligouric). Trend CE. Hold coumadin for tonight (INR > 2.5).     GI: GI prophylaxis. NPO for now due to AMS.     RENAL: check lytes and replete PRN. Nephro consult.     INFECTIOUS DISEASE: Dexamethasone 6 mg qD x 10 days. Check inflammatory markers, procal. Vanco x 1 dose and c/w cefepime 500 qD (Renal dosing). ID consult.     HEMATOLOGICAL: On coumadin.    ENDOCRINE:  Follow up FS.  Insulin protocol if needed.    MUSCULOSKELETAL: Increase as tolerated.   IMPRESSION:    s/p fall / head CT neg  ESRD MWF for HD  Acute hypoxemic resp failure / fluid overlaod  DM  NSTEMI  Afib on coumadin  COVID PNA w/ ?superimposed bacteria PNA  altered MS      PLAN:    CNS: Avoid CNS depressant    HEENT: Oral care.    PULMONARY: Keep SpO2  88 to 92%. Titrate down NC.     CARDIOVASCULAR:  Trend CE. Hold coumadin for tonight (INR > 2.5).  EKG reviewed    GI: GI prophylaxis. po feeds    RENAL: check lytes and replete PRN. Nephro F/UP result    INFECTIOUS DISEASE: Dexamethasone 6 mg qD x 10 days. Check inflammatory markers, procal. Vanco x 1 dose and c/w cefepime 500 qD (Renal dosing). ID consult.     HEMATOLOGICAL: On coumadin.    ENDOCRINE:  Follow up FS.  Insulin protocol if needed.    MUSCULOSKELETAL: Increase as tolerated.  MICU

## 2021-01-01 NOTE — ED ADULT TRIAGE NOTE - CHIEF COMPLAINT QUOTE
Pt s/p mechanical trip and fall this morning out of bed, on coumadin. pt has wound to right foot. pt denies any loc. denies head trauma. pt c/o generalized weakness, cough. denies any pain. Pt s/p fall due  weakness  this s morning out of bed, on coumadin. pt has wound to right foot. pt denies any loc. denies head trauma. pt c/o generalized weakness, cough. denies any pain. gcs-15 Pt s/p fall due to weakness  this morning out of bed, on coumadin. pt has wound to right foot. pt denies any loc. denies head trauma. pt c/o generalized weakness, cough. denies any pain. gcs-15

## 2021-01-01 NOTE — ED ADULT NURSE NOTE - OBJECTIVE STATEMENT
pt comes in s/p slip out of bed onto his buttock. pt states his legs felt weak. has had cough for a few days. denies fever. pt has rectal temp=101. pt is on coumadin. pt has abrasion to left greater and 2nd digit toe. bleeding now controlled. pt also has wound to right lower leg from fall 3 weeks ago.

## 2021-01-01 NOTE — H&P ADULT - NSHPLABSRESULTS_GEN_ALL_CORE
LABS:                          9.9    12.63 )-----------( 159      ( 01 Jan 2021 11:00 )             30.9     01-01    135  |  91<L>  |  37<H>  ----------------------------<  121<H>  4.7   |  29  |  5.8<HH>    Ca    8.9      01 Jan 2021 11:00    TPro  6.9  /  Alb  3.7  /  TBili  1.3<H>  /  DBili  x   /  AST  174<H>  /  ALT  32  /  AlkPhos  81  01-01            PT/INR - ( 01 Jan 2021 11:00 )   PT: 30.60 sec;   INR: 2.66 ratio         PTT - ( 01 Jan 2021 11:00 )  PTT:41.9 sec  Lactate Trend  01-01 @ 11:00 Lactate:3.7     CARDIAC MARKERS ( 01 Jan 2021 11:00 )  x     / 1.20 ng/mL / x     / x     / x          CAPILLARY BLOOD GLUCOSE            RADIOLOGY:    < from: CT Head No Cont (01.01.21 @ 11:04) >  IMPRESSION:  No CT evidence for acute intracranial pathology. Stable examination since June 7, 2017.  < end of copied text >    CXR (read by me): RLL opacity.     EKGS:    < from: 12 Lead ECG (01.01.21 @ 11:31) >  Diagnosis Line Atrial fibrillation  Incomplete right bundle branch block  Rightward axis  Possible Right ventricular hypertrophy  ST & T wave abnormality, consider lateral ischemia  Abnormal ECG  < end of copied text >

## 2021-01-01 NOTE — ED ADULT NURSE NOTE - NSIMPLEMENTINTERV_GEN_ALL_ED
Implemented All Fall with Harm Risk Interventions:  [place] to call system. Call bell, personal items and telephone within reach. Instruct patient to call for assistance. Room bathroom lighting operational. Non-slip footwear when patient is off stretcher. Physically safe environment: no spills, clutter or unnecessary equipment. Stretcher in lowest position, wheels locked, appropriate side rails in place. Provide visual cue, wrist band, yellow gown, etc. Monitor gait and stability. Monitor for mental status changes and reorient to person, place, and time. Review medications for side effects contributing to fall risk. Reinforce activity limits and safety measures with patient and family. Provide visual clues: red socks.

## 2021-01-01 NOTE — ED PROVIDER NOTE - SEVERE SEPSIS ALERT DETAILS
Sepsis was considered at this time, however fluid administration deferred to due to Dialysis patient and fluid overload state.

## 2021-01-01 NOTE — CONSULT NOTE ADULT - SUBJECTIVE AND OBJECTIVE BOX
ROGER RODRIGUES  60y, Male  Allergy: Orange (Other)  Originally Entered as [HIVES] reaction to [sulfur] (Unknown)  penicillin (Unknown)  sulfADIAZINE (Unknown)      CHIEF COMPLAINT: s/p fall. (01 Jan 2021 14:01)      LOS      HPI:  59 yo M w/ PMH of Afib on coumadin, DM2, ESRD on HD MWF, HLD, HTN, CVA presents with weakness & fall. Patient notes when he woke up today, he felt generally weak, slid from the bed onto the floor landing on his bottom, denies head injury/loc. Patient notes that he has been having increased sputum production for the past few days, endorses mild shortness of breath without chest pain/fever/diarrhea/vomiting. Patient had full dialysis session wednesday, notes next session is tomorrow due to the holidays. Denies any focal weakness or pain currently.     On my assessment, pt is obtunded and unable to provide HPI. HPI obtained by Sister who is his primary care taker. As per sister, pt has been having increased frequency of imbalance/fall since 2 days ago. Decreased PO intake.     ICU Vital Signs Last 24 Hrs  T(C): 37.2 (01 Jan 2021 14:00), Max: 38.3 (01 Jan 2021 10:28)  T(F): 98.9 (01 Jan 2021 14:00), Max: 101 (01 Jan 2021 10:28)  HR: 78 (01 Jan 2021 14:00) (78 - 107)  BP: 109/60 (01 Jan 2021 14:00) (109/60 - 142/62)  BP(mean): --  ABP: --  ABP(mean): --  RR: 18 (01 Jan 2021 14:00) (18 - 20)  SpO2: 97% (01 Jan 2021 14:00) (95% - 99%)    In ED, pt underwent CT head which is negative. CXR performed and shows possible RLL PNA. Given cefepime and vancomycin. Also found to be COVID+.  (01 Jan 2021 14:01)      INFECTIOUS DISEASE HISTORY:  Limited history due to poor mental status  History as above   Currently on 2L NC    PAST MEDICAL & SURGICAL HISTORY:  PAD (peripheral artery disease)  multiple toe amputations    Anemia  chronic anemia - s/p transfusion 2018    Thyroid cancer    Chronic leg pain    OA (osteoarthritis)    SOB (shortness of breath) on exertion    ESRD (end stage renal disease)  2 yrs    Atrial fibrillation  on warfarin    Right sided weakness    Stroke  8 yrs ago    Hypertension    High blood cholesterol    Diabetes mellitus, type 2    Dialysis patient  Mon, Wednesday, Friday ([facility])    Smoker    H/O thyroid nodule    H/O gastroesophageal reflux (GERD)    History of tonsillectomy    History of surgery  Multiple toe amputations (amp 4 1/2 left toes; has 1/2 left mid toe; amp right mid toe)    A-V fistula  left AVF        FAMILY HISTORY  FH: atrial fibrillation    FH: heart disease    No pertinent family history in first degree relatives        SOCIAL HISTORY  Social History:  Lives at home.  +smoking.  Denies alcohol or illicit drug use. (01 Jan 2021 14:01)        ROS  unable to obtain history secondary to patient's mental status and/or sedation      VITALS:  T(F): 97.2, Max: 101 (01-01-21 @ 10:28)  HR: 78  BP: 97/46  RR: 22Vital Signs Last 24 Hrs  T(C): 36.2 (01 Jan 2021 16:05), Max: 38.3 (01 Jan 2021 10:28)  T(F): 97.2 (01 Jan 2021 16:05), Max: 101 (01 Jan 2021 10:28)  HR: 78 (01 Jan 2021 19:00) (76 - 107)  BP: 97/46 (01 Jan 2021 19:00) (82/38 - 142/62)  BP(mean): 66 (01 Jan 2021 19:00) (51 - 89)  RR: 22 (01 Jan 2021 19:00) (16 - 22)  SpO2: 97% (01 Jan 2021 19:00) (95% - 99%)    PHYSICAL EXAM:  Gen: NAD  HEENT: Normocephalic, atraumatic  Neck: supple, no lymphadenopathy  CV: Regular rate & regular rhythm  Lungs: decreased BS at bases, no fremitus  Abdomen: Soft, BS present  Ext: Warm, well perfused  Neuro: non focal, awake  Skin: no rash, no erythema  Lines: no phlebitis    TESTS & MEASUREMENTS:                        9.2    10.18 )-----------( 136      ( 01 Jan 2021 17:56 )             29.2     01-01    137  |  95<L>  |  39<H>  ----------------------------<  127<H>  4.7   |  29  |  6.5<HH>    Ca    8.2<L>      01 Jan 2021 17:56    TPro  6.0  /  Alb  3.2<L>  /  TBili  0.9  /  DBili  x   /  AST  197<H>  /  ALT  33  /  AlkPhos  66  01-01    eGFR if Non African American: 8 mL/min/1.73M2 (01-01-21 @ 17:56)  eGFR if : 10 mL/min/1.73M2 (01-01-21 @ 17:56)  eGFR if Non African American: 10 mL/min/1.73M2 (01-01-21 @ 11:00)  eGFR if : 11 mL/min/1.73M2 (01-01-21 @ 11:00)    LIVER FUNCTIONS - ( 01 Jan 2021 17:56 )  Alb: 3.2 g/dL / Pro: 6.0 g/dL / ALK PHOS: 66 U/L / ALT: 33 U/L / AST: 197 U/L / GGT: x                 Lactate, Blood: 1.1 mmol/L (01-01-21 @ 17:56)  Lactate, Blood: 3.7 mmol/L (01-01-21 @ 11:00)  Blood Gas Venous - Lactate: 3.4 mmoL/L (01-01-21 @ 10:16)      INFECTIOUS DISEASES TESTING      RADIOLOGY & ADDITIONAL TESTS:  I have personally reviewed the last Chest xray  CXR      CT      CARDIOLOGY TESTING  12 Lead ECG:   Ventricular Rate 96 BPM    Atrial Rate 220 BPM    QRS Duration 110 ms    Q-T Interval 348 ms    QTC Calculation(Bazett) 439 ms    R Axis 116 degrees    T Axis 143 degrees    Diagnosis Line Atrial fibrillation  Incomplete right bundle branch block  Rightward axis  Possible Right ventricular hypertrophy  ST & T wave abnormality, consider lateral ischemia  Abnormal ECG    Confirmed by KYLE MICHAELS, GRIFFIN ([id number]) on 1/1/2021 1:11:53 PM (01-01-21 @ 11:31)      MEDICATIONS  atorvastatin 20 Oral at bedtime  dexAMETHasone  Injectable 6 IV Push daily  furosemide   Injectable 40 IV Push two times a day  levothyroxine 100 Oral daily  metoprolol succinate ER 25 Oral daily  pantoprazole    Tablet 40 Oral before breakfast      Weight  Weight (kg): 96.9 (01-01-21 @ 16:05)    ANTIBIOTICS:      ALLERGIES:  Orange (Other)  Originally Entered as [HIVES] reaction to [sulfur] (Unknown)  penicillin (Unknown)  sulfADIAZINE (Unknown)           ROGER RODRIGUES  60y, Male  Allergy: Orange (Other)  Originally Entered as [HIVES] reaction to [sulfur] (Unknown)  penicillin (Unknown)  sulfADIAZINE (Unknown)      CHIEF COMPLAINT: s/p fall. (01 Jan 2021 14:01)      LOS      HPI:  61 yo M w/ PMH of Afib on coumadin, DM2, ESRD on HD MWF, HLD, HTN, CVA presents with weakness & fall. Patient notes when he woke up today, he felt generally weak, slid from the bed onto the floor landing on his bottom, denies head injury/loc. Patient notes that he has been having increased sputum production for the past few days, endorses mild shortness of breath without chest pain/fever/diarrhea/vomiting. Patient had full dialysis session wednesday, notes next session is tomorrow due to the holidays. Denies any focal weakness or pain currently.     On my assessment, pt is obtunded and unable to provide HPI. HPI obtained by Sister who is his primary care taker. As per sister, pt has been having increased frequency of imbalance/fall since 2 days ago. Decreased PO intake.     ICU Vital Signs Last 24 Hrs  T(C): 37.2 (01 Jan 2021 14:00), Max: 38.3 (01 Jan 2021 10:28)  T(F): 98.9 (01 Jan 2021 14:00), Max: 101 (01 Jan 2021 10:28)  HR: 78 (01 Jan 2021 14:00) (78 - 107)  BP: 109/60 (01 Jan 2021 14:00) (109/60 - 142/62)  BP(mean): --  ABP: --  ABP(mean): --  RR: 18 (01 Jan 2021 14:00) (18 - 20)  SpO2: 97% (01 Jan 2021 14:00) (95% - 99%)    In ED, pt underwent CT head which is negative. CXR performed and shows possible RLL PNA. Given cefepime and vancomycin. Also found to be COVID+.  (01 Jan 2021 14:01)      INFECTIOUS DISEASE HISTORY:  History as above   Currently on 2L NC  Feels that symptoms started about 1 week prior to presentation.   Says he was tested for COVID - but not sure what the result is.   Continues to have coughing with productive sputum.  Denies any abdominal pain, nausea, vomiting, diarrhea.     PAST MEDICAL & SURGICAL HISTORY:  PAD (peripheral artery disease)  multiple toe amputations    Anemia  chronic anemia - s/p transfusion 2018    Thyroid cancer    Chronic leg pain    OA (osteoarthritis)    SOB (shortness of breath) on exertion    ESRD (end stage renal disease)  2 yrs    Atrial fibrillation  on warfarin    Right sided weakness    Stroke  8 yrs ago    Hypertension    High blood cholesterol    Diabetes mellitus, type 2    Dialysis patient  Mon, Wednesday, Friday ([facility])    Smoker    H/O thyroid nodule    H/O gastroesophageal reflux (GERD)    History of tonsillectomy    History of surgery  Multiple toe amputations (amp 4 1/2 left toes; has 1/2 left mid toe; amp right mid toe)    A-V fistula  left AVF        FAMILY HISTORY  FH: atrial fibrillation    FH: heart disease    No pertinent family history in first degree relatives        SOCIAL HISTORY  Social History:  Lives at home.  +smoking.  Denies alcohol or illicit drug use. (01 Jan 2021 14:01)        ROS  unable to obtain history secondary to patient's mental status and/or sedation      VITALS:  T(F): 97.2, Max: 101 (01-01-21 @ 10:28)  HR: 78  BP: 97/46  RR: 22Vital Signs Last 24 Hrs  T(C): 36.2 (01 Jan 2021 16:05), Max: 38.3 (01 Jan 2021 10:28)  T(F): 97.2 (01 Jan 2021 16:05), Max: 101 (01 Jan 2021 10:28)  HR: 78 (01 Jan 2021 19:00) (76 - 107)  BP: 97/46 (01 Jan 2021 19:00) (82/38 - 142/62)  BP(mean): 66 (01 Jan 2021 19:00) (51 - 89)  RR: 22 (01 Jan 2021 19:00) (16 - 22)  SpO2: 97% (01 Jan 2021 19:00) (95% - 99%)    PHYSICAL EXAM:  Gen: NAD  HEENT: Normocephalic, atraumatic  Neck: supple, no lymphadenopathy  CV: Regular rate & regular rhythm  Lungs: decreased BS at bases, no fremitus  Abdomen: Soft, BS present  Ext: Warm, well perfused  Neuro: non focal, awake  Skin: no rash, no erythema  Lines: no phlebitis    TESTS & MEASUREMENTS:                        9.2    10.18 )-----------( 136      ( 01 Jan 2021 17:56 )             29.2     01-01    137  |  95<L>  |  39<H>  ----------------------------<  127<H>  4.7   |  29  |  6.5<HH>    Ca    8.2<L>      01 Jan 2021 17:56    TPro  6.0  /  Alb  3.2<L>  /  TBili  0.9  /  DBili  x   /  AST  197<H>  /  ALT  33  /  AlkPhos  66  01-01    eGFR if Non African American: 8 mL/min/1.73M2 (01-01-21 @ 17:56)  eGFR if : 10 mL/min/1.73M2 (01-01-21 @ 17:56)  eGFR if Non African American: 10 mL/min/1.73M2 (01-01-21 @ 11:00)  eGFR if : 11 mL/min/1.73M2 (01-01-21 @ 11:00)    LIVER FUNCTIONS - ( 01 Jan 2021 17:56 )  Alb: 3.2 g/dL / Pro: 6.0 g/dL / ALK PHOS: 66 U/L / ALT: 33 U/L / AST: 197 U/L / GGT: x                 Lactate, Blood: 1.1 mmol/L (01-01-21 @ 17:56)  Lactate, Blood: 3.7 mmol/L (01-01-21 @ 11:00)  Blood Gas Venous - Lactate: 3.4 mmoL/L (01-01-21 @ 10:16)      INFECTIOUS DISEASES TESTING      RADIOLOGY & ADDITIONAL TESTS:  I have personally reviewed the last Chest xray  CXR      CT      CARDIOLOGY TESTING  12 Lead ECG:   Ventricular Rate 96 BPM    Atrial Rate 220 BPM    QRS Duration 110 ms    Q-T Interval 348 ms    QTC Calculation(Bazett) 439 ms    R Axis 116 degrees    T Axis 143 degrees    Diagnosis Line Atrial fibrillation  Incomplete right bundle branch block  Rightward axis  Possible Right ventricular hypertrophy  ST & T wave abnormality, consider lateral ischemia  Abnormal ECG    Confirmed by KYLE MICHAELS, GRIFFIN ([id number]) on 1/1/2021 1:11:53 PM (01-01-21 @ 11:31)      MEDICATIONS  atorvastatin 20 Oral at bedtime  dexAMETHasone  Injectable 6 IV Push daily  furosemide   Injectable 40 IV Push two times a day  levothyroxine 100 Oral daily  metoprolol succinate ER 25 Oral daily  pantoprazole    Tablet 40 Oral before breakfast      Weight  Weight (kg): 96.9 (01-01-21 @ 16:05)    ANTIBIOTICS:      ALLERGIES:  Orange (Other)  Originally Entered as [HIVES] reaction to [sulfur] (Unknown)  penicillin (Unknown)  sulfADIAZINE (Unknown)

## 2021-01-01 NOTE — ED PROVIDER NOTE - PHYSICAL EXAMINATION
Vital Signs: I have reviewed the initial vital signs.  Constitutional: well-nourished, appears stated age, no acute distress.  HEENT: Airway patent, moist MM, no erythema/swelling/deformity of oral structures. EOMI, PERRLA.  CV: regular rate, regular rhythm, well-perfused extremities, 2+ b/l DP and radial pulses equal.  Lungs: decreased breath sounds bilaterally, no increased WOB.  ABD: soft, NTND, no guarding or rebound, no pulsatile mass, no hernias.   MSK: Neck supple, nontender, nl ROM, no stepoff. Chest nontender. Back nontender in TLS spine or to b/l bony structures or flanks. Ext nontender, nl rom, no deformity.   INTEG: Skin warm, dry, no rash.  NEURO: A&Ox2, moving all extremities, normal speech  PSYCH: Calm, cooperative, normal affect and interaction.

## 2021-01-01 NOTE — ED PROVIDER NOTE - CARE PLAN
Principal Discharge DX:	Fever  Secondary Diagnosis:	Pneumonia  Secondary Diagnosis:	Elevated troponin  Secondary Diagnosis:	Fluid overload  Secondary Diagnosis:	ESRD (end stage renal disease) on dialysis  Secondary Diagnosis:	Hypoxia  Secondary Diagnosis:	Altered mental status

## 2021-01-01 NOTE — ED PROVIDER NOTE - ATTENDING CONTRIBUTION TO CARE
61yo man h/o ESRD on HD M/W/F, HTN, HLD, DM, CVA, afib on coumadin presents s/p fall at home. Pt reports generalized weakness for 2 days; this am he tried to get up out of bed and slid down bed onto his buttocks. Did not hit head, no LOC. Pt denies chest pain but reports cough over the past couple of days with mild SOB, no nausea, vomiting. On my exam pt is febrile and ill appearing, pulse ox drops with movement in the bed (improved to mid 90s on 3L NC). He answers simple questions but has poor attention, suggesting delirium. Lungs with b/l rales, CVS1S2 irregular, abd soft, NT, ND. Abrasion to L 5th toe, no active bleeding. Suspect infectious cause for weakness/fall (high suspicion for COVID-19 given oxygen requirement). Will check labs, CXR, head CT, admit.

## 2021-01-01 NOTE — H&P ADULT - ATTENDING COMMENTS
PATIENT SEEN AND EXAMINED, AGREE WITH ABOVE, FLUID OVERLOAD, ALTERED MS, COVID +. CARDIO, HD,  SDU, POOR PROGNOSIS

## 2021-01-01 NOTE — ED ADULT NURSE NOTE - CHIEF COMPLAINT QUOTE
Pt s/p fall due to weakness  this morning out of bed, on coumadin. pt has wound to right foot. pt denies any loc. denies head trauma. pt c/o generalized weakness, cough. denies any pain. gcs-15

## 2021-01-01 NOTE — ED PROVIDER NOTE - OBJECTIVE STATEMENT
60M hx Afib on coumadin, DM2, ESRD on HD MWF, HLD, HTN, CVA presents with weakness & fall. Patient notes when he woke up today, he felt generally weak, slid from the bed onto the floor landing on his bottom, denies head injury/loc. Patient notes that he has been having increased sputum production for the past few days, endorses mild shortness of breath without chest pain/fever/diarrhea/vomiting. Patient had full dialysis session wednesday, notes next session is tomorrow due to the holidays. Denies any focal weakness or pain currently.

## 2021-01-01 NOTE — ED ADULT NURSE NOTE - PMH
Anemia  chronic anemia - s/p transfusion 2018  Atrial fibrillation  on warfarin  Chronic leg pain    Diabetes mellitus, type 2    Dialysis patient  Mon, Wednesday, Friday ([facility])  ESRD (end stage renal disease)  2 yrs  High blood cholesterol    Hypertension    OA (osteoarthritis)    PAD (peripheral artery disease)  multiple toe amputations  Right sided weakness    SOB (shortness of breath) on exertion    Stroke  8 yrs ago  Thyroid cancer

## 2021-01-01 NOTE — CONSULT NOTE ADULT - ASSESSMENT
ASSESSMENT  61 yo M w/ PMH of Afib on coumadin, DM2, ESRD on HD MWF, HLD, HTN, CVA presents with weakness & fall. Patient notes when he woke up today, he felt generally weak, slid from the bed onto the floor landing on his bottom, denies head injury/loc. Patient notes that he has been having increased sputum production for the past few days, endorses mild shortness of breath without chest pain/fever/diarrhea/vomiting. Patient had full dialysis session wednesday, notes next session is tomorrow due to the holidays. Denies any focal weakness or pain currentl      IMPRESSION  #COVID-19 Pneumonia  - Unclear onset of symptoms, CXR with RLL opacity, possible super-imposed pneumonia  - D-Dimer Assay, Quantitative: 377: Manufacturers recommended Cut off for VTE is 230 ng/ml D-DU ng/mL DDU (01.01.21 @ 17:56)    #ESRD on HD  #DM II  #HTN  #CVA  #Obesity BMI (kg/m2): 30.7  #DM   #Abx allergy: penicillin (Unknown) - tolerate cefepime   sulfADIAZINE (Unknown)        RECOMMENDATIONS  - Dexamethasone 6mg daily x at least 10 days or until discharge (whichever is longer)  - check COVID antibodies  - continue vancomycin by levels, please obtain level prior to dialysis session  - continue cefepime 1g q 24 hours  - trend inflammatory markers - procalcitonin will likely be elevated in setting of dialysis  - please obtain MRSA nares  - if with rapidly progressing O2 requirements, please call to discuss tocilizumab    Please call or message on Microsoft Teams if with any questions.  Spectra [phone]     ASSESSMENT  61 yo M w/ PMH of Afib on coumadin, DM2, ESRD on HD MWF, HLD, HTN, CVA presents with weakness & fall. Patient notes when he woke up today, he felt generally weak, slid from the bed onto the floor landing on his bottom, denies head injury/loc. Patient notes that he has been having increased sputum production for the past few days, endorses mild shortness of breath without chest pain/fever/diarrhea/vomiting. Patient had full dialysis session wednesday, notes next session is tomorrow due to the holidays. Denies any focal weakness or pain currentl      IMPRESSION  #COVID-19 Pneumonia  - Unclear onset of symptoms, CXR with RLL opacity, possible super-imposed pneumonia  - D-Dimer Assay, Quantitative: 377: Manufacturers recommended Cut off for VTE is 230 ng/ml D-DU ng/mL DDU (01.01.21 @ 17:56)    #ESRD on HD  #DM II  #HTN  #CVA  #Obesity BMI (kg/m2): 30.7  #DM   #Abx allergy: penicillin (Unknown) - told he had allergy as a child, tolerates cefepime   sulfADIAZINE (Unknown)      RECOMMENDATIONS  - Dexamethasone 6mg daily x at least 10 days or until discharge (whichever is longer)  - check COVID antibodies  - continue vancomycin by levels, please obtain level prior to dialysis session, goal 10-15  - continue cefepime 1g q 24 hours  - trend inflammatory markers - procalcitonin will likely be elevated in setting of dialysis  - please obtain MRSA nares  - if with rapidly progressing O2 requirements, please call to discuss tocilizumab    Please call or message on Microsoft Teams if with any questions.  Spectra [phone]

## 2021-01-01 NOTE — ED PROVIDER NOTE - CLINICAL SUMMARY MEDICAL DECISION MAKING FREE TEXT BOX
Labs with WBC 12.6, L shift and relative lymphopenia. INR is therapeutic, CMP with expected renal dysfunction but normal K. Lactate 3.7. BNP 70k and trop 1.2. Head CT is negative for acute injury, however pt continues to be confused with poor attention. CXR with patchy infiltrates c/w COVID--also appearance of RLL consolidation, will cover with abx for now as well. Pt not hydrated aggressively for sepsis due to baseline fluid overload; he will need to be dialyzed inpt. Pt admitted to ICU for further management; COVID swab +.

## 2021-01-01 NOTE — H&P ADULT - NSHPPHYSICALEXAM_GEN_ALL_CORE
PHYSICAL EXAM:  GENERAL: Obtunded. 60y M on 5L NC.   HEAD:  Atraumatic, Normocephalic  EYES: EOMI, conjunctiva clear and sclera white  NECK: Supple, No JVD  CHEST/LUNG: Clear to auscultation bilaterally; No wheeze; No crackles; No accessory muscles used  HEART: Regular rate and rhythm; No murmurs;   ABDOMEN: Soft, Nontender, Nondistended; Bowel sounds present; No guarding  EXTREMITIES: Pitting edema. Toes with lacerations. 2+ Peripheral Pulses, No cyanosis.  NEUROLOGY: Non-focal.

## 2021-01-02 NOTE — CONSULT NOTE ADULT - SUBJECTIVE AND OBJECTIVE BOX
NEPHROLOGY CONSULTATION NOTE    61 yo M w/ PMH of Afib on coumadin, DM2, ESRD on HD MWF, HLD, HTN, CVA presents with weakness & fall. Patient notes when he woke up today, he felt generally weak, slid from the bed onto the floor landing on his bottom, denies head injury/loc. Patient notes that he has been having increased sputum production for the past few days, endorses mild shortness of breath without chest pain/fever/diarrhea/vomiting. Patient had full dialysis session wednesday, notes next session is tomorrow due to the holidays. Denies any focal weakness or pain currently.     On my assessment, pt is obtunded and unable to provide HPI. HPI obtained by Sister who is his primary care taker. As per sister, pt has been having increased frequency of imbalance/fall since 2 days ago. Decreased PO intake.       PAST MEDICAL & SURGICAL HISTORY:  PAD (peripheral artery disease)  multiple toe amputations    Anemia  chronic anemia - s/p transfusion 2018  Thyroid cancer  Chronic leg pain  OA (osteoarthritis)  SOB (shortness of breath) on exertion  ESRD (end stage renal disease)  2 yrs  Atrial fibrillation  on warfarin  Right sided weakness  Stroke  8 yrs ago  Hypertension  High blood cholesterol  Diabetes mellitus, type 2  Dialysis patient  Mon, Wednesday, Friday ([facility])  Smoker  H/O thyroid nodule  H/O gastroesophageal reflux (GERD)  History of tonsillectomy  History of surgery  Multiple toe amputations (amp 4 1/2 left toes; has 1/2 left mid toe; amp right mid toe)  A-V fistula  left AVF      Allergies:  Orange (Other)  Originally Entered as [HIVES] reaction to [sulfur] (Unknown)  penicillin (Unknown)  sulfADIAZINE (Unknown)    Home Medications Reviewed    SOCIAL HISTORY:  Denies ETOH,Smoking,   FAMILY HISTORY:  FH: atrial fibrillation  mother    FH: heart disease  Mother      REVIEW OF SYSTEMS:  All other review of systems is negative unless indicated above.    PHYSICAL EXAM:  Constitutional: NAD  HEENT: anicteric sclera, oropharynx clear, MMM  Neck: No JVD  Respiratory: CTAB, no wheezes, rales or rhonchi  Cardiovascular: S1, S2, RRR  Gastrointestinal: BS+, soft, NT/ND  Extremities: No cyanosis or clubbing. 1+ peripheral edema  Neurological: lethargic  : No CVA tenderness. No gregg.   Skin: No rashes  Vascular Access: L arm AVF    Hospital Medications:   MEDICATIONS  (STANDING):  atorvastatin 20 milliGRAM(s) Oral at bedtime  cefepime   IVPB 500 milliGRAM(s) IV Intermittent every 24 hours  chlorhexidine 4% Liquid 1 Application(s) Topical <User Schedule>  dexAMETHasone  Injectable 6 milliGRAM(s) IV Push daily  furosemide   Injectable 40 milliGRAM(s) IV Push two times a day  guaifenesin/dextromethorphan  Syrup 10 milliLiter(s) Oral every 6 hours  levothyroxine 100 MICROGram(s) Oral daily  metoprolol succinate ER 25 milliGRAM(s) Oral daily  pantoprazole    Tablet 40 milliGRAM(s) Oral before breakfast        VITALS:  T(F): 101.5 (01-02-21 @ 08:00), Max: 101.9 (01-02-21 @ 06:00)  HR: 108 (01-02-21 @ 08:00)  BP: 140/68 (01-02-21 @ 08:00)  RR: 26 (01-02-21 @ 08:00)  SpO2: 97% (01-02-21 @ 08:00)  Wt(kg): --    01-01 @ 07:01  -  01-02 @ 07:00  --------------------------------------------------------  IN: 50 mL / OUT: 0 mL / NET: 50 mL      Height (cm): 177.8 (01-01 @ 16:05)  Weight (kg): 96.9 (01-01 @ 16:05)  BMI (kg/m2): 30.7 (01-01 @ 16:05)  BSA (m2): 2.15 (01-01 @ 16:05)    LABS:  01-02    134<L>  |  92<L>  |  46<H>  ----------------------------<  115<H>  4.9   |  28  |  7.0<HH>    Ca    8.6      02 Jan 2021 04:30  Phos  5.3     01-02  Mg     2.6     01-02    TPro  6.4  /  Alb  3.5  /  TBili  1.0  /  DBili      /  AST  355<H>  /  ALT  68<H>  /  AlkPhos  72  01-02                          10.2   15.63 )-----------( 153      ( 02 Jan 2021 04:30 )             32.7       Urine Studies:        RADIOLOGY & ADDITIONAL STUDIES:

## 2021-01-02 NOTE — CONSULT NOTE ADULT - ASSESSMENT
ESRD (due to DM) on HD MWF   - access via left arm AVF   - last HD Wednesday  s/p Fall  altered mental status   Covid-19 PNA / bacterial PNA  fluid overload  DM  HTN  CVA    plan:    HD today   left arm precautions  renal diet with sevelamer   dexamethasone   vanco / cefepime / f/u ID  covid isolation  cont toprol xl, may need to increase based on HR  can change lasix to 80mg iv q12h  d/w HD and icu nursing     ESRD (due to DM) on HD MWF   - access via left arm AVF   - last HD Wednesday  s/p Fall  altered mental status   Covid-19 PNA / bacterial PNA  fluid overload  DM  HTN  CVA    plan:    HD today   left arm precautions  renal diet with sevelamer   dexamethasone   vanco / cefepime / f/u ID  covid isolation  cont toprol xl, may need to increase based on HR  can change lasix to 80mg iv q12h  d/w HD and icu nursing      addendum:  seen on hd.  tolerating HD so far.  Will adjust uf to achieve euvolemia

## 2021-01-02 NOTE — CONSULT NOTE ADULT - SUBJECTIVE AND OBJECTIVE BOX
PODIATRY CONSULT   ROGER RODRIGUES is a 60y Male Patient is a 60y old  Male who presents with a chief complaint of s/p fall. (02 Jan 2021 10:03)    HPI:  59 yo M w/ PMH of Afib on coumadin, DM2, ESRD on HD MWF, HLD, HTN, CVA presents with weakness & fall. Patient notes when he woke up today, he felt generally weak, slid from the bed onto the floor landing on his bottom, denies head injury/loc. Patient notes that he has been having increased sputum production for the past few days, endorses mild shortness of breath without chest pain/fever/diarrhea/vomiting. Patient had full dialysis session wednesday, notes next session is tomorrow due to the holidays. Denies any focal weakness or pain currently.     On my assessment, pt is obtunded and unable to provide HPI. HPI obtained by Sister who is his primary care taker. As per sister, pt has been having increased frequency of imbalance/fall since 2 days ago. Decreased PO intake.     ICU Vital Signs Last 24 Hrs  T(C): 37.2 (01 Jan 2021 14:00), Max: 38.3 (01 Jan 2021 10:28)  T(F): 98.9 (01 Jan 2021 14:00), Max: 101 (01 Jan 2021 10:28)  HR: 78 (01 Jan 2021 14:00) (78 - 107)  BP: 109/60 (01 Jan 2021 14:00) (109/60 - 142/62)  RR: 18 (01 Jan 2021 14:00) (18 - 20)  SpO2: 97% (01 Jan 2021 14:00) (95% - 99%)    In ED, pt underwent CT head which is negative. CXR performed and shows possible RLL PNA. Given cefepime and vancomycin. Also found to be COVID+. no drips (01 Jan 2021 14:01)      FEVER PNA ELEVATED TROPONIN FLUID OVERLOAD ESRD HYPOXIA ALT MENTAL STATUS    PAD (peripheral artery disease)    Anemia    Thyroid cancer    Chronic leg pain    OA (osteoarthritis)    SOB (shortness of breath) on exertion    ESRD (end stage renal disease)    Atrial fibrillation    Right sided weakness    Stroke    Hypertension    High blood cholesterol    Diabetes mellitus, type 2    Dialysis patient        PMH: FEVER PNA ELEVATED TROPONIN FLUID OVERLOAD ESRD HYPOXIA ALT MENTAL STATUS    PAD (peripheral artery disease)    Anemia    Thyroid cancer    Chronic leg pain    OA (osteoarthritis)    SOB (shortness of breath) on exertion    ESRD (end stage renal disease)    Atrial fibrillation    Right sided weakness    Stroke    Hypertension    High blood cholesterol    Diabetes mellitus, type 2    Dialysis patient      PSH: Smoker    H/O thyroid nodule    H/O gastroesophageal reflux (GERD)    History of tonsillectomy    History of surgery    A-V fistula      Medication cefepime   IVPB 500 milliGRAM(s) IV Intermittent every 24 hours    Allergy: Orange (Other)  Originally Entered as [HIVES] reaction to [sulfur] (Unknown)  penicillin (Unknown)  sulfADIAZINE (Unknown)        Labs:                        10.2   15.63 )-----------( 153      ( 02 Jan 2021 04:30 )             32.7     PT/INR - ( 01 Jan 2021 11:00 )   PT: 30.60 sec;   INR: 2.66 ratio         PTT - ( 01 Jan 2021 11:00 )  PTT:41.9 sec  01-02    134<L>  |  92<L>  |  46<H>  ----------------------------<  115<H>  4.9   |  28  |  7.0<HH>    Ca    8.6      02 Jan 2021 04:30  Phos  5.3     01-02  Mg     2.6     01-02    TPro  6.4  /  Alb  3.5  /  TBili  1.0  /  DBili  x   /  AST  355<H>  /  ALT  68<H>  /  AlkPhos  72  01-02    CARDIAC MARKERS ( 01 Jan 2021 11:00 )  x     / 1.20 ng/mL / x     / x     / x              ROS:  [x]A ten point review of system was otherwise negative except as noted    Physical Exam - Lower Extremity Focused:   Derm:   Blood filled blisters noted on digits B/L. No sclincial signs of infection. No open lesions.   Vascular:   DP and PT pulses are mildly diminished. Cap re-fill time > than 3 sec to the digits.  Skin temperature is cool to the touch.   Neuro:  - Protective sensation moderately diminished.   MSK:   Manual Muscle strength +5/5 in all muscle compartments         Assessment:   Sanguinous blisters, B/L foot    Plan:  Chart reviewed and Patient evaluated  Discussed diagnosis and treatment with patient  Using #15 blade blisters were lanced with sanguinous drainage noted  Cleansed with normal saline  Applied Xeroform with dry sterile dressing / Kerlix  Cont. w/ LWC: As stated above  Wound Care Orders: As stated above, Q24h  WBAT  Lanced blisters are stable; pt. can f/u as o/p w/ Dr. Villarreal in clinic at [address] [place] [address]. [address] post d/c  Attending to f/u on Mon, 1/4       PODIATRY CONSULT   ROGER RODRIGUES is a 60y Male Patient is a 60y old  Male who presents with a chief complaint of s/p fall. (02 Jan 2021 10:03)    HPI:  59 yo M w/ PMH of Afib on coumadin, DM2, ESRD on HD MWF, HLD, HTN, CVA presents with weakness & fall. Patient notes when he woke up today, he felt generally weak, slid from the bed onto the floor landing on his bottom, denies head injury/loc. Patient notes that he has been having increased sputum production for the past few days, endorses mild shortness of breath without chest pain/fever/diarrhea/vomiting. Patient had full dialysis session wednesday, notes next session is tomorrow due to the holidays. Denies any focal weakness or pain currently.     On my assessment, pt is obtunded and unable to provide HPI. HPI obtained by Sister who is his primary care taker. As per sister, pt has been having increased frequency of imbalance/fall since 2 days ago. Decreased PO intake.     ICU Vital Signs Last 24 Hrs  T(C): 37.2 (01 Jan 2021 14:00), Max: 38.3 (01 Jan 2021 10:28)  T(F): 98.9 (01 Jan 2021 14:00), Max: 101 (01 Jan 2021 10:28)  HR: 78 (01 Jan 2021 14:00) (78 - 107)  BP: 109/60 (01 Jan 2021 14:00) (109/60 - 142/62)  RR: 18 (01 Jan 2021 14:00) (18 - 20)  SpO2: 97% (01 Jan 2021 14:00) (95% - 99%)    In ED, pt underwent CT head which is negative. CXR performed and shows possible RLL PNA. Given cefepime and vancomycin. Also found to be COVID+. no drips (01 Jan 2021 14:01)      FEVER PNA ELEVATED TROPONIN FLUID OVERLOAD ESRD HYPOXIA ALT MENTAL STATUS    PAD (peripheral artery disease)    Anemia    Thyroid cancer    Chronic leg pain    OA (osteoarthritis)    SOB (shortness of breath) on exertion    ESRD (end stage renal disease)    Atrial fibrillation    Right sided weakness    Stroke    Hypertension    High blood cholesterol    Diabetes mellitus, type 2    Dialysis patient        PMH: FEVER PNA ELEVATED TROPONIN FLUID OVERLOAD ESRD HYPOXIA ALT MENTAL STATUS    PAD (peripheral artery disease)    Anemia    Thyroid cancer    Chronic leg pain    OA (osteoarthritis)    SOB (shortness of breath) on exertion    ESRD (end stage renal disease)    Atrial fibrillation    Right sided weakness    Stroke    Hypertension    High blood cholesterol    Diabetes mellitus, type 2    Dialysis patient      PSH: Smoker    H/O thyroid nodule    H/O gastroesophageal reflux (GERD)    History of tonsillectomy    History of surgery    A-V fistula      Medication cefepime   IVPB 500 milliGRAM(s) IV Intermittent every 24 hours    Allergy: Orange (Other)  Originally Entered as [HIVES] reaction to [sulfur] (Unknown)  penicillin (Unknown)  sulfADIAZINE (Unknown)        Labs:                        10.2   15.63 )-----------( 153      ( 02 Jan 2021 04:30 )             32.7     PT/INR - ( 01 Jan 2021 11:00 )   PT: 30.60 sec;   INR: 2.66 ratio         PTT - ( 01 Jan 2021 11:00 )  PTT:41.9 sec  01-02    134<L>  |  92<L>  |  46<H>  ----------------------------<  115<H>  4.9   |  28  |  7.0<HH>    Ca    8.6      02 Jan 2021 04:30  Phos  5.3     01-02  Mg     2.6     01-02    TPro  6.4  /  Alb  3.5  /  TBili  1.0  /  DBili  x   /  AST  355<H>  /  ALT  68<H>  /  AlkPhos  72  01-02    CARDIAC MARKERS ( 01 Jan 2021 11:00 )  x     / 1.20 ng/mL / x     / x     / x              ROS:  [x]A ten point review of system was otherwise negative except as noted    Physical Exam - Lower Extremity Focused:   Derm:   Blood filled blisters noted on digits B/L. No sclincial signs of infection. No open lesions.   Vascular:   DP and PT pulses are mildly diminished.   Neuro:  - Protective sensation moderately diminished.   MSK:   Manual Muscle strength +5/5 in all muscle compartments         Assessment:   Sanguinous blisters, B/L foot    Plan:  Chart reviewed and Patient evaluated  Discussed diagnosis and treatment with patient  Using #15 blade blisters were lanced with sanguinous drainage noted  Cleansed with normal saline  Applied Xeroform with dry sterile dressing / Kerlix  Cont. w/ LWC: As stated above  Wound Care Orders: As stated above, Q24h  WBAT  Lanced blisters are stable; pt. can f/u as o/p w/ Dr. Villarreal in clinic at [address] [place] [address]. [address] post d/c  Attending to f/u on Mon, 1/4

## 2021-01-03 NOTE — PROGRESS NOTE ADULT - SUBJECTIVE AND OBJECTIVE BOX
OVERNIGHT EVENTS: events noted,, on 4 l NC, s/p HD 2.5 l out, sitting on the chair    Vital Signs Last 24 Hrs  T(C): 36.4 (03 Jan 2021 06:00), Max: 37.8 (02 Jan 2021 12:00)  T(F): 97.5 (03 Jan 2021 06:00), Max: 100 (02 Jan 2021 12:00)  HR: 86 (03 Jan 2021 08:00) (80 - 102)  BP: 120/66 (03 Jan 2021 06:00) (98/48 - 120/66)  BP(mean): 86 (03 Jan 2021 06:00) (74 - 87)  RR: 25 (03 Jan 2021 08:00) (16 - 25)  SpO2: 83% (03 Jan 2021 08:00) (83% - 100%)    PHYSICAL EXAMINATION:    GENERAL: comfortable    HEENT: Head is normocephalic and atraumatic.    NECK: Supple.    LUNGS: bl crackles    HEART: CHEL 3/6    ABDOMEN: Soft, nontender, and nondistended.      EXTREMITIES: Without any cyanosis, clubbing, rash, lesions or edema.    NEUROLOGIC: Grossly intact.    SKIN: No ulceration or induration present.      LABS:                        9.2    9.59  )-----------( 158      ( 03 Jan 2021 06:31 )             29.1     01-03    133<L>  |  90<L>  |  43<H>  ----------------------------<  317<H>  4.7   |  29  |  5.5<HH>    Ca    8.0<L>      03 Jan 2021 06:31  Phos  5.3     01-02  Mg     2.5     01-03    TPro  6.2  /  Alb  3.3<L>  /  TBili  0.9  /  DBili  x   /  AST  382<H>  /  ALT  89<H>  /  AlkPhos  75  01-03    PT/INR - ( 03 Jan 2021 06:31 )   PT: 29.30 sec;   INR: 2.55 ratio         PTT - ( 01 Jan 2021 11:00 )  PTT:41.9 sec      CARDIAC MARKERS ( 02 Jan 2021 11:00 )  x     / 2.08 ng/mL / x     / x     / x      CARDIAC MARKERS ( 01 Jan 2021 11:00 )  x     / 1.20 ng/mL / x     / x     / x            Serum Pro-Brain Natriuretic Peptide: >90132 pg/mL (01-01-21 @ 11:00)      Procalcitonin, Serum: 4.11 ng/mL (01-01-21 @ 17:56)        01-02-21 @ 07:01  -  01-03-21 @ 07:00  --------------------------------------------------------  IN: 290 mL / OUT: 2500 mL / NET: -2210 mL        MICROBIOLOGY:  Culture Results:   No growth to date. (01-01 @ 17:56)      MEDICATIONS  (STANDING):  atorvastatin 20 milliGRAM(s) Oral at bedtime  cefepime   IVPB 500 milliGRAM(s) IV Intermittent every 24 hours  chlorhexidine 4% Liquid 1 Application(s) Topical <User Schedule>  dexAMETHasone  Injectable 6 milliGRAM(s) IV Push daily  dextrose 40% Gel 15 Gram(s) Oral once  dextrose 5%. 1000 milliLiter(s) (50 mL/Hr) IV Continuous <Continuous>  dextrose 5%. 1000 milliLiter(s) (100 mL/Hr) IV Continuous <Continuous>  dextrose 50% Injectable 25 Gram(s) IV Push once  dextrose 50% Injectable 12.5 Gram(s) IV Push once  dextrose 50% Injectable 25 Gram(s) IV Push once  furosemide   Injectable 80 milliGRAM(s) IV Push two times a day  glucagon  Injectable 1 milliGRAM(s) IntraMuscular once  guaifenesin/dextromethorphan  Syrup 10 milliLiter(s) Oral every 6 hours  insulin lispro (ADMELOG) corrective regimen sliding scale   SubCutaneous three times a day before meals  levothyroxine 100 MICROGram(s) Oral daily  metoprolol succinate ER 25 milliGRAM(s) Oral daily  pantoprazole    Tablet 40 milliGRAM(s) Oral before breakfast  sevelamer carbonate 800 milliGRAM(s) Oral three times a day with meals    MEDICATIONS  (PRN):  acetaminophen   Tablet .. 650 milliGRAM(s) Oral every 6 hours PRN Temp greater or equal to 38C (100.4F), Mild Pain (1 - 3)      RADIOLOGY & ADDITIONAL STUDIES:

## 2021-01-03 NOTE — PROGRESS NOTE ADULT - ASSESSMENT
IMPRESSION:    s/p fall / head CT neg  ESRD MWF for HD  Acute hypoxemic resp failure / fluid overlaod  DM  NSTEMI increasing trop  Afib on coumadin  COVID PNA w/ ?superimposed bacteria PNA  altered MS      PLAN:    CNS: Avoid CNS depressant    HEENT: Oral care.    PULMONARY: Keep SpO2  88 to 92%. Titrate down NC.     CARDIOVASCULAR:  Trend CE. restart coumadin (INR > 2.5).  EKG repeat     GI: GI prophylaxis. po feeds    RENAL: check lytes and replete PRN. Nephro F/UP result    INFECTIOUS DISEASE: Dexamethasone 6 mg qD x 10 days. Check inflammatory markers, procal. keep ABX per ID    HEMATOLOGICAL: On coumadin.    ENDOCRINE:  Follow up FS.  Insulin protocol if needed.    MUSCULOSKELETAL: Increase as tolerated.    tele if cleared by cardio

## 2021-01-03 NOTE — PROVIDER CONTACT NOTE (OTHER) - BACKGROUND
Pt attempting to hit and bite staff during cares. Ongoing education and emotional support provided to pt without effect. Pt continues to attempt to get out of bed, though extremely unsteady and weak.

## 2021-01-03 NOTE — CONSULT NOTE ADULT - SUBJECTIVE AND OBJECTIVE BOX
HPI:  61 yo M w/ PMH of Afib on coumadin, DM2, ESRD on HD MWF, HLD, HTN, CVA presents with weakness & fall. Patient notes when he woke up today, he felt generally weak, slid from the bed onto the floor landing on his bottom, denies head injury/loc. Patient notes that he has been having increased sputum production for the past few days, endorses mild shortness of breath without chest pain/fever/diarrhea/vomiting. Patient had full dialysis session wednesday, notes next session is tomorrow due to the holidays. Denies any focal weakness or pain currently.     On my assessment, pt is obtunded and unable to provide HPI. HPI obtained by Sister who is his primary care taker. As per sister, pt has been having increased frequency of imbalance/fall since 2 days ago. Decreased PO intake.     ICU Vital Signs Last 24 Hrs  T(C): 37.2 (01 Jan 2021 14:00), Max: 38.3 (01 Jan 2021 10:28)  T(F): 98.9 (01 Jan 2021 14:00), Max: 101 (01 Jan 2021 10:28)  HR: 78 (01 Jan 2021 14:00) (78 - 107)  BP: 109/60 (01 Jan 2021 14:00) (109/60 - 142/62)  RR: 18 (01 Jan 2021 14:00) (18 - 20)  SpO2: 97% (01 Jan 2021 14:00) (95% - 99%)    In ED, pt underwent CT head which is negative. CXR performed and shows possible RLL PNA. Given cefepime and vancomycin. Also found to be COVID+. no drips (01 Jan 2021 14:01)      PAST MEDICAL & SURGICAL HISTORY  PAD (peripheral artery disease)  multiple toe amputations    Anemia  chronic anemia - s/p transfusion 2018    Thyroid cancer    Chronic leg pain    OA (osteoarthritis)    SOB (shortness of breath) on exertion    ESRD (end stage renal disease)  2 yrs    Atrial fibrillation  on warfarin    Right sided weakness    Stroke  8 yrs ago    Hypertension    High blood cholesterol    Diabetes mellitus, type 2    Dialysis patient  Mon, Wednesday, Friday ([facility])    Smoker    H/O thyroid nodule    H/O gastroesophageal reflux (GERD)    History of tonsillectomy    History of surgery  Multiple toe amputations (amp 4 1/2 left toes; has 1/2 left mid toe; amp right mid toe)    A-V fistula  left AVF        FAMILY HISTORY:  FAMILY HISTORY:  FH: atrial fibrillation  mother    FH: heart disease  Mother        SOCIAL HISTORY:  []smoker  []Alcohol  []Drug    ALLERGIES:  Orange (Other)  Originally Entered as [HIVES] reaction to [sulfur] (Unknown)  penicillin (Unknown)  sulfADIAZINE (Unknown)      MEDICATIONS:  MEDICATIONS  (STANDING):  atorvastatin 20 milliGRAM(s) Oral at bedtime  cefepime   IVPB 500 milliGRAM(s) IV Intermittent every 24 hours  chlorhexidine 4% Liquid 1 Application(s) Topical <User Schedule>  dexAMETHasone  Injectable 6 milliGRAM(s) IV Push daily  dextrose 40% Gel 15 Gram(s) Oral once  dextrose 5%. 1000 milliLiter(s) (50 mL/Hr) IV Continuous <Continuous>  dextrose 5%. 1000 milliLiter(s) (100 mL/Hr) IV Continuous <Continuous>  dextrose 50% Injectable 25 Gram(s) IV Push once  dextrose 50% Injectable 12.5 Gram(s) IV Push once  dextrose 50% Injectable 25 Gram(s) IV Push once  furosemide   Injectable 80 milliGRAM(s) IV Push two times a day  glucagon  Injectable 1 milliGRAM(s) IntraMuscular once  guaifenesin/dextromethorphan  Syrup 10 milliLiter(s) Oral every 6 hours  insulin lispro (ADMELOG) corrective regimen sliding scale   SubCutaneous three times a day before meals  levothyroxine 100 MICROGram(s) Oral daily  metoprolol succinate ER 25 milliGRAM(s) Oral daily  pantoprazole    Tablet 40 milliGRAM(s) Oral before breakfast  sevelamer carbonate 800 milliGRAM(s) Oral three times a day with meals  vancomycin  IVPB 1250 milliGRAM(s) IV Intermittent once  warfarin 1 milliGRAM(s) Oral once    MEDICATIONS  (PRN):  acetaminophen   Tablet .. 650 milliGRAM(s) Oral every 6 hours PRN Temp greater or equal to 38C (100.4F), Mild Pain (1 - 3)      HOME MEDICATIONS:  Home Medications:  atorvastatin 20 mg oral tablet: 1 tab(s) orally once a day (at bedtime) (01 Jan 2021 14:17)  furosemide 40 mg oral tablet: 1 tab(s) orally once a day (01 Jan 2021 14:17)  gabapentin 100 mg oral tablet: 1 tab(s) orally once a day (01 Jan 2021 14:17)  insulin lispro (concentrated) 200 units/mL subcutaneous solution: 7 unit(s) subcutaneous 3 times a day  5-10 units prn (01 Jan 2021 14:17)  Lantus 100 units/mL subcutaneous solution: 15 unit(s) subcutaneous once a day (at bedtime) (01 Jan 2021 14:17)  pantoprazole 40 mg oral delayed release tablet: 1 tab(s) orally once a day (01 Jan 2021 14:17)  pioglitazone 30 mg oral tablet: 1 tab(s) orally once a day (01 Jan 2021 14:17)  Renvela 800 mg oral tablet: 1 tab(s) orally 3 times a day (with meals) (01 Jan 2021 14:17)  Toprol-XL 25 mg oral tablet, extended release: 1 tab(s) orally once a day (01 Jan 2021 14:17)      VITALS:   T(F): 97.5 (01-03 @ 06:00), Max: 101.9 (01-02 @ 06:00)  HR: 86 (01-03 @ 08:00) (76 - 110)  BP: 141/87 (01-03 @ 08:00) (82/38 - 152/62)  BP(mean): 109 (01-03 @ 08:00) (51 - 109)  RR: 25 (01-03 @ 08:00) (16 - 29)  SpO2: 90% (01-03 @ 08:00) (90% - 100%)    I&O's Summary    02 Jan 2021 07:01  -  03 Jan 2021 07:00  --------------------------------------------------------  IN: 290 mL / OUT: 2500 mL / NET: -2210 mL        REVIEW OF SYSTEMS:  CONSTITUTIONAL: (+) weakness, fevers or chills  EYES: No visual changes  ENT: No vertigo or throat pain   NECK: No pain or stiffness  RESPIRATORY: (+) cough, wheezing, hemoptysis; (+) shortness of breath  CARDIOVASCULAR: No chest pain or palpitations  GASTROINTESTINAL: No abdominal or epigastric pain. No nausea, vomiting, or hematemesis; No diarrhea or constipation. No melena or hematochezia.  GENITOURINARY: No dysuria, frequency or hematuria  NEUROLOGICAL: No numbness or weakness  SKIN: No itching, no rashes  MSK: No pain    PHYSICAL EXAM:  NEURO: patient is awake , alert and oriented  GEN: Not in acute distress  NECK: no thyroid enlargement, no JVD  LUNGS: bilateral crackles   CARDIOVASCULAR: S1/S2 present, irregular, no murmurs or rubs, no carotid bruits,  + PP bilaterally  ABD: Soft, non-tender, non-distended, +BS  EXT: (+) CASSIE  SKIN: Intact    LABS:                        9.2    9.59  )-----------( 158      ( 03 Jan 2021 06:31 )             29.1     01-03    133<L>  |  90<L>  |  43<H>  ----------------------------<  317<H>  4.7   |  29  |  5.5<HH>    Ca    8.0<L>      03 Jan 2021 06:31  Phos  5.3     01-02  Mg     2.5     01-03    TPro  6.2  /  Alb  3.3<L>  /  TBili  0.9  /  DBili  x   /  AST  382<H>  /  ALT  89<H>  /  AlkPhos  75  01-03    PT/INR - ( 03 Jan 2021 06:31 )   PT: 29.30 sec;   INR: 2.55 ratio             CARDIAC MARKERS ( 02 Jan 2021 11:00 )  x     / 2.08 ng/mL / x     / x     / x            Troponin trend:            RADIOLOGY:  -CXR: Bilateral opacities    -TTE: pending    -CCTA:  -STRESS TEST:  -CATHETERIZATION:    ECG:  Afib/flutter at 96bpm, ST-T changes v3-v6 (same as previous ecg)    TELEMETRY EVENTS:

## 2021-01-03 NOTE — CONSULT NOTE ADULT - ASSESSMENT
IMPRESSION:  - COVID-19 pneumonia with respiratory failure and hypoxemia  - Fluid overload from ESRD  - NSTEMI - without chest pain or major ECG changes - can be Type II MI vs ACS  - (+) Fecal occult blood; stable hemoglobin  - Afib/flutter on coumadin    PLAN:  - Would load with aspirin 325mg once then 81mg daily if no contra-indication (Drop in H/H; GI bleeding...)  - Repeat ECG and cardiac enzymes  - Check 2d echo to assess LV function and wall motion abnormalities  - Treat underlying hypoxemia and infection  - Will follow IMPRESSION:  - COVID-19 pneumonia with respiratory failure and hypoxemia  - Fluid overload from ESRD  - NSTEMI - without chest pain or major ECG changes - likely Type II MI  - (+) Fecal occult blood; stable hemoglobin  - Afib/flutter on coumadin    PLAN:  - Would start 81mg daily if no contra-indication (Drop in H/H; GI bleeding...)  - continue anticoagulation  - Repeat ECG and cardiac enzymes including CK-MB and CPK  - Continue statin and betablockers  - Check 2d echo to assess LV function and wall motion abnormalities  - Treat underlying hypoxemia and infection  - Will Consider ischemic work-up once stable IMPRESSION:  - COVID-19 pneumonia with respiratory failure and hypoxemia  - Fluid overload from ESRD  - abnt trop: no definite primary ischemic event  - (+) Fecal occult blood; stable hemoglobin  - Afib/flutter on coumadin    PLAN:  - Would start 81mg daily if no contra-indication (Drop in H/H; GI bleeding...)  - continue anticoagulation  - Repeat ECG and cardiac enzymes including CK-MB and CPK  - Continue statin and betablockers  - Check 2d echo to assess LV function and wall motion abnormalities  - Treat underlying hypoxemia and infection  - Will Consider ischemic work-up once recovered from acute illness

## 2021-01-03 NOTE — PROVIDER CONTACT NOTE (OTHER) - ACTION/TREATMENT ORDERED:
MD made aware and ordered 12 lead EKG; EKG done and Haldol 5mg IM ordered x1 dose. Will continue to observe.

## 2021-01-03 NOTE — PROVIDER CONTACT NOTE (OTHER) - SITUATION
Pt noted with increased agitation/restlessness. Pt continues to try and climb out of bed, pulls off all monitoring devices and oxygen. Pt becomes increasingly agitated with staff redirection.

## 2021-01-03 NOTE — PROGRESS NOTE ADULT - SUBJECTIVE AND OBJECTIVE BOX
NEPHROLOGY FOLLOW UP NOTE    pt in icu  critically ill  HD yesterday  on 4L O2  fevers down      PAST MEDICAL & SURGICAL HISTORY:  PAD (peripheral artery disease)  multiple toe amputations    Anemia  chronic anemia - s/p transfusion 2018  Thyroid cancer  Chronic leg pain  OA (osteoarthritis)  SOB (shortness of breath) on exertion  ESRD (end stage renal disease)  2 yrs  Atrial fibrillation  on warfarin  Right sided weakness  Stroke  8 yrs ago  Hypertension  High blood cholesterol  Diabetes mellitus, type 2  Dialysis patient  Mon, Wednesday, Friday ([facility])  Smoker  H/O thyroid nodule  H/O gastroesophageal reflux (GERD)  History of tonsillectomy  History of surgery  Multiple toe amputations (amp 4 1/2 left toes; has 1/2 left mid toe; amp right mid toe)  A-V fistula  left AVF      Allergies:  Orange (Other)  Originally Entered as [HIVES] reaction to [sulfur] (Unknown)  penicillin (Unknown)  sulfADIAZINE (Unknown)    Home Medications Reviewed    SOCIAL HISTORY:  Denies ETOH,Smoking,   FAMILY HISTORY:  FH: atrial fibrillation  mother    FH: heart disease  Mother      REVIEW OF SYSTEMS:  All other review of systems is negative unless indicated above.    PHYSICAL EXAM:  Constitutional: NAD  HEENT: anicteric sclera, oropharynx clear, MMM  Neck: No JVD  Respiratory: CTAB, no wheezes, rales or rhonchi  Cardiovascular: S1, S2, RRR  Gastrointestinal: BS+, soft, NT/ND  Extremities: No cyanosis or clubbing. 1+ peripheral edema  Neurological: lethargic  : No CVA tenderness. No gregg.   Skin: No rashes  Vascular Access: L arm AVF    Hospital Medications:   MEDICATIONS  (STANDING):  atorvastatin 20 milliGRAM(s) Oral at bedtime  cefepime   IVPB 500 milliGRAM(s) IV Intermittent every 24 hours  chlorhexidine 4% Liquid 1 Application(s) Topical <User Schedule>  dexAMETHasone  Injectable 6 milliGRAM(s) IV Push daily  dextrose 40% Gel 15 Gram(s) Oral once  dextrose 5%. 1000 milliLiter(s) (50 mL/Hr) IV Continuous <Continuous>  dextrose 5%. 1000 milliLiter(s) (100 mL/Hr) IV Continuous <Continuous>  dextrose 50% Injectable 25 Gram(s) IV Push once  dextrose 50% Injectable 12.5 Gram(s) IV Push once  dextrose 50% Injectable 25 Gram(s) IV Push once  furosemide   Injectable 80 milliGRAM(s) IV Push two times a day  glucagon  Injectable 1 milliGRAM(s) IntraMuscular once  guaifenesin/dextromethorphan  Syrup 10 milliLiter(s) Oral every 6 hours  insulin lispro (ADMELOG) corrective regimen sliding scale   SubCutaneous three times a day before meals  levothyroxine 100 MICROGram(s) Oral daily  metoprolol succinate ER 25 milliGRAM(s) Oral daily  pantoprazole    Tablet 40 milliGRAM(s) Oral before breakfast  sevelamer carbonate 800 milliGRAM(s) Oral three times a day with meals  warfarin 1 milliGRAM(s) Oral once        VITALS:  T(F): 96.2 (01-03-21 @ 10:00), Max: 97.5 (01-03-21 @ 06:00)  HR: 82 (01-03-21 @ 12:00)  BP: 104/56 (01-03-21 @ 12:00)  RR: 26 (01-03-21 @ 12:00)  SpO2: 99% (01-03-21 @ 12:00)  Wt(kg): --    01-01 @ 07:01  -  01-02 @ 07:00  --------------------------------------------------------  IN: 50 mL / OUT: 0 mL / NET: 50 mL    01-02 @ 07:01  -  01-03 @ 07:00  --------------------------------------------------------  IN: 290 mL / OUT: 2500 mL / NET: -2210 mL          LABS:  01-03    133<L>  |  90<L>  |  43<H>  ----------------------------<  317<H>  4.7   |  29  |  5.5<HH>    Ca    8.0<L>      03 Jan 2021 06:31  Phos  5.3     01-02  Mg     2.5     01-03    TPro  6.2  /  Alb  3.3<L>  /  TBili  0.9  /  DBili      /  AST  382<H>  /  ALT  89<H>  /  AlkPhos  75  01-03                          9.2    9.59  )-----------( 158      ( 03 Jan 2021 06:31 )             29.1       Urine Studies:        RADIOLOGY & ADDITIONAL STUDIES:

## 2021-01-03 NOTE — PROGRESS NOTE ADULT - ASSESSMENT
ESRD (due to DM) on HD MWF   - access via left arm AVF   - last HD Saturday  s/p Fall  altered mental status   Covid-19 PNA / bacterial PNA on 4L O2  fluid overload  DM  HTN  afib on coumadin  CVA  NSTEMI    plan:    HD tomorrow  left arm precautions  renal diet with sevelamer   dexamethasone   vanco / cefepime / f/u ID  covid isolation  cont toprol xl   cont lasix to 80mg iv q12h  coumadin per icu team  f/u cardio / f/u pulm  for downgrade  icu care

## 2021-01-04 NOTE — PROGRESS NOTE ADULT - SUBJECTIVE AND OBJECTIVE BOX
SUBJECTIVE:    Patient is a 60y old Male who presents with a chief complaint of s/p fall. (04 Jan 2021 09:22). Currently admitted to medicine with the primary diagnosis of Alteration of general status secondary to acute hypoxic respiratory failure secondary to SARS-CoV-2 pneumonia.   Today is hospital day 3d. He is agitated.     PAST MEDICAL & SURGICAL HISTORY  PAD (peripheral artery disease)  multiple toe amputations    Anemia  chronic anemia - s/p transfusion 2018    Thyroid cancer    Chronic leg pain    OA (osteoarthritis)    SOB (shortness of breath) on exertion    ESRD (end stage renal disease)  2 yrs    Atrial fibrillation  on warfarin    Right sided weakness    Stroke  8 yrs ago    Hypertension    High blood cholesterol    Diabetes mellitus, type 2    Dialysis patient  Mon, Wednesday, Friday ([facility])    Smoker    H/O thyroid nodule    H/O gastroesophageal reflux (GERD)    History of tonsillectomy    History of surgery  Multiple toe amputations (amp 4 1/2 left toes; has 1/2 left mid toe; amp right mid toe)    A-V fistula  left AVF      SOCIAL HISTORY:  Negative for smoking/alcohol/drug use.     ALLERGIES:  Orange (Other)  Originally Entered as [HIVES] reaction to [sulfur] (Unknown)  penicillin (Unknown)  sulfADIAZINE (Unknown)    MEDICATIONS:  STANDING MEDICATIONS  aspirin enteric coated 81 milliGRAM(s) Oral daily  atorvastatin 20 milliGRAM(s) Oral at bedtime  azithromycin  IVPB 500 milliGRAM(s) IV Intermittent every 24 hours  cefepime   IVPB 500 milliGRAM(s) IV Intermittent every 24 hours  chlorhexidine 4% Liquid 1 Application(s) Topical <User Schedule>  dexAMETHasone  Injectable 6 milliGRAM(s) IV Push daily  dextrose 40% Gel 15 Gram(s) Oral once  dextrose 5%. 1000 milliLiter(s) IV Continuous <Continuous>  dextrose 5%. 1000 milliLiter(s) IV Continuous <Continuous>  dextrose 50% Injectable 25 Gram(s) IV Push once  dextrose 50% Injectable 12.5 Gram(s) IV Push once  dextrose 50% Injectable 25 Gram(s) IV Push once  epoetin raven-epbx (RETACRIT) Injectable 8000 Unit(s) IV Push <User Schedule>  furosemide   Injectable 80 milliGRAM(s) IV Push two times a day  glucagon  Injectable 1 milliGRAM(s) IntraMuscular once  guaifenesin/dextromethorphan  Syrup 10 milliLiter(s) Oral every 6 hours  insulin lispro (ADMELOG) corrective regimen sliding scale   SubCutaneous three times a day before meals  levothyroxine 100 MICROGram(s) Oral daily  metoprolol succinate ER 25 milliGRAM(s) Oral daily  pantoprazole    Tablet 40 milliGRAM(s) Oral before breakfast  sevelamer carbonate 800 milliGRAM(s) Oral three times a day with meals    PRN MEDICATIONS  acetaminophen   Tablet .. 650 milliGRAM(s) Oral every 6 hours PRN    VITALS:   T(F): 99.4  HR: 80  BP: 135/88  RR: 21  SpO2: 100%    LABS:                        9.2    9.59  )-----------( 158      ( 03 Jan 2021 06:31 )             29.1     01-03    133<L>  |  90<L>  |  43<H>  ----------------------------<  317<H>  4.7   |  29  |  5.5<HH>    Ca    8.0<L>      03 Jan 2021 06:31  Mg     2.5     01-03    TPro  6.2  /  Alb  3.3<L>  /  TBili  0.9  /  DBili  x   /  AST  382<H>  /  ALT  89<H>  /  AlkPhos  75  01-03    PT/INR - ( 04 Jan 2021 04:31 )   PT: 19.80 sec;   INR: 1.72 ratio         ABG - ( 04 Jan 2021 03:40 )  pH, Arterial: 7.32  pH, Blood: x     /  pCO2: 46    /  pO2: 121   / HCO3: 24    / Base Excess: -1.9  /  SaO2: 98        Troponin T, Serum: 1.62 ng/mL <HH> (01-03-21 @ 12:33)    Culture - Blood (collected 02 Jan 2021 04:30)  Source: .Blood Blood  Preliminary Report (03 Jan 2021 18:01):    No growth to date.    Culture - Blood (collected 01 Jan 2021 17:56)  Source: .Blood None  Preliminary Report (03 Jan 2021 07:01):    No growth to date.    CARDIAC MARKERS ( 03 Jan 2021 12:33 )  x     / 1.62 ng/mL / x     / x     / x          RADIOLOGY:    < from: Xray Chest 1 View- PORTABLE-Routine (Xray Chest 1 View- PORTABLE-Routine in AM.) (01.03.21 @ 06:31) >    Impression:    Unchanged bilateral pulmonary opacities.    < end of copied text >    PHYSICAL EXAM:  GEN: Agitated  LUNGS: Clear to auscultation bilaterally   HEART: S1/S2 present. RRR.   ABD: Soft, non-tender, non-distended. Bowel sounds present  EXT: NC/NC/NE/2+PP/[place]/Skin Intact.   NEURO: Agitated, awake, not alert, and orientation is not assessable

## 2021-01-04 NOTE — PROGRESS NOTE ADULT - SUBJECTIVE AND OBJECTIVE BOX
[place] NEPHROLOGY FOLLOW UP NOTE  --------------------------------------------------------------------------------  24 hour events/subjective: Patient examined earlier during dialysis. Appears comfortable.    PAST HISTORY  --------------------------------------------------------------------------------  No significant changes to PMH, PSH, FHx, SHx, unless otherwise noted    ALLERGIES & MEDICATIONS  --------------------------------------------------------------------------------  Allergies    Orange (Other)  Originally Entered as [HIVES] reaction to [sulfur] (Unknown)  penicillin (Unknown)  sulfADIAZINE (Unknown)    Standing Inpatient Medications  aspirin  chewable 81 milliGRAM(s) Oral daily  atorvastatin 20 milliGRAM(s) Oral at bedtime  azithromycin  IVPB 500 milliGRAM(s) IV Intermittent every 24 hours  cefepime   IVPB 500 milliGRAM(s) IV Intermittent every 24 hours  chlorhexidine 4% Liquid 1 Application(s) Topical <User Schedule>  dexAMETHasone  Injectable 6 milliGRAM(s) IV Push daily  dextrose 40% Gel 15 Gram(s) Oral once  dextrose 5%. 1000 milliLiter(s) IV Continuous <Continuous>  dextrose 5%. 1000 milliLiter(s) IV Continuous <Continuous>  dextrose 50% Injectable 25 Gram(s) IV Push once  dextrose 50% Injectable 12.5 Gram(s) IV Push once  dextrose 50% Injectable 25 Gram(s) IV Push once  epoetin raven-epbx (RETACRIT) Injectable 8000 Unit(s) IV Push <User Schedule>  fentaNYL   Infusion. 0.5 MICROgram(s)/kG/Hr IV Continuous <Continuous>  furosemide   Injectable 80 milliGRAM(s) IV Push two times a day  glucagon  Injectable 1 milliGRAM(s) IntraMuscular once  insulin lispro (ADMELOG) corrective regimen sliding scale   SubCutaneous three times a day before meals  levothyroxine 100 MICROGram(s) Oral daily  metoprolol succinate ER 25 milliGRAM(s) Oral daily  midazolam Infusion 0.02 mG/kG/Hr IV Continuous <Continuous>  nicotine -  14 mG/24Hr(s) Patch 1 patch Transdermal daily  norepinephrine Infusion 0.05 MICROgram(s)/kG/Min IV Continuous <Continuous>  sevelamer carbonate 800 milliGRAM(s) Oral three times a day with meals    PRN Inpatient Medications  acetaminophen   Tablet .. 650 milliGRAM(s) Oral every 6 hours PRN      VITALS/PHYSICAL EXAM  --------------------------------------------------------------------------------  T(C): 38.1 (01-04-21 @ 16:00), Max: 38.1 (01-04-21 @ 16:00)  HR: 78 (01-04-21 @ 17:46) (78 - 110)  BP: 96/38 (01-04-21 @ 17:30) (45/33 - 158/74)  RR: 19 (01-04-21 @ 17:45) (14 - 36)  SpO2: 100% (01-04-21 @ 17:46) (75% - 100%)  Wt(kg): --        01-03-21 @ 07:01  -  01-04-21 @ 07:00  --------------------------------------------------------  IN: 50 mL / OUT: 50 mL / NET: 0 mL    01-04-21 @ 07:01  -  01-04-21 @ 18:10  --------------------------------------------------------  IN: 278.3 mL / OUT: 1000 mL / NET: -721.7 mL      Physical Exam:  	Gen: NAD  	Pulm: B/L rales  	CV: RRR, S1S2  	Abd: +BS, soft, nontender/nondistended  	: No suprapubic tenderness  	LE: trace edema  	Vascular access: AVF    LABS/STUDIES  --------------------------------------------------------------------------------              9.2    9.59  >-----------<  158      [01-03-21 @ 06:31]              29.1     133  |  90  |  43  ----------------------------<  317      [01-03-21 @ 06:31]  4.7   |  29  |  5.5        Ca     8.0     [01-03-21 @ 06:31]      Mg     2.5     [01-03-21 @ 06:31]    TPro  6.2  /  Alb  3.3  /  TBili  0.9  /  DBili  x   /  AST  382  /  ALT  89  /  AlkPhos  75  [01-03-21 @ 06:31]    PT/INR: PT 19.80, INR 1.72       [01-04-21 @ 04:31]    Troponin 1.78      [01-04-21 @ 12:15]  CK 6997      [01-04-21 @ 12:15]    Creatinine Trend:  SCr 5.5 [01-03 @ 06:31]  SCr 7.0 [01-02 @ 04:30]  SCr 6.5 [01-01 @ 17:56]  SCr 5.8 [01-01 @ 11:00]        Ferritin 2748      [01-01-21 @ 17:56]  HbA1c 7.1      [05-21-19 @ 04:30]  TSH 4.57      [01-01-21 @ 17:56]

## 2021-01-04 NOTE — PROGRESS NOTE ADULT - ASSESSMENT
ESRD (due to DM) on HD MWF  s/p Fall  altered mental status   Covid-19 PNA / bacterial PNA   fluid overload  DM  HTN  afib on coumadin  CVA  NSTEMI    plan:    HD today: 3 hours, opti 160 dialyzer, 2K bath, 3L UF  left arm precautions  renal diet with sevelamer   dexamethasone   vanco / cefepime / f/u ID  covid isolation  cont toprol xl   cont lasix to 80mg iv q12h  f/u cardio / f/u pulm  icu care

## 2021-01-04 NOTE — PROGRESS NOTE ADULT - SUBJECTIVE AND OBJECTIVE BOX
Patient is a 60y old  Male who presents with a chief complaint of s/p fall. (03 Jan 2021 14:37)        Over Night Events:  On 100 NRB mask.  Altered MS.  Off pressors.          ROS:     All ROS are negative except HPI         PHYSICAL EXAM    ICU Vital Signs Last 24 Hrs  T(C): 37.4 (04 Jan 2021 08:00), Max: 37.4 (04 Jan 2021 08:00)  T(F): 99.4 (04 Jan 2021 08:00), Max: 99.4 (04 Jan 2021 08:00)  HR: 90 (04 Jan 2021 08:00) (80 - 110)  BP: 124/52 (04 Jan 2021 08:00) (90/49 - 140/110)  BP(mean): 65 (04 Jan 2021 08:00) (61 - 123)  ABP: --  ABP(mean): --  RR: 23 (04 Jan 2021 08:00) (19 - 33)  SpO2: 100% (04 Jan 2021 08:00) (75% - 100%)      CONSTITUTIONAL:  Ill appearing.   NAD    ENT:   Airway patent,   Mouth with normal mucosa.   No thrush    EYES:   Pupils equal,   Round and reactive to light.    CARDIAC:   Normal rate,   Regular rhythm.     edema      Vascular:  Normal systolic impulse  No Carotid bruits    RESPIRATORY:   No wheezing  Bilateral BS  Normal chest expansion  Not tachypneic,  No use of accessory muscles    GASTROINTESTINAL:  Abdomen soft,   Non-tender,   No guarding,   + BS    MUSCULOSKELETAL:   Range of motion is not limited,  No clubbing, cyanosis    NEUROLOGICAL:   Lethargic   Not following commands   No motor  deficits.    SKIN:   Skin normal color for race,   Warm and dry   No evidence of rash.    PSYCHIATRIC:   Lethargic   No apparent risk to self or others.    HEMATOLOGICAL:  No cervical  lymphadenopathy.  no inguinal lymphadenopathy      01-03-21 @ 07:01  -  01-04-21 @ 07:00  --------------------------------------------------------  IN:    IV PiggyBack: 50 mL  Total IN: 50 mL    OUT:    Voided (mL): 50 mL  Total OUT: 50 mL    Total NET: 0 mL          LABS:                            9.2    9.59  )-----------( 158      ( 03 Jan 2021 06:31 )             29.1                                               01-03    133<L>  |  90<L>  |  43<H>  ----------------------------<  317<H>  4.7   |  29  |  5.5<HH>    Ca    8.0<L>      03 Jan 2021 06:31  Mg     2.5     01-03    TPro  6.2  /  Alb  3.3<L>  /  TBili  0.9  /  DBili  x   /  AST  382<H>  /  ALT  89<H>  /  AlkPhos  75  01-03      PT/INR - ( 04 Jan 2021 04:31 )   PT: 19.80 sec;   INR: 1.72 ratio                                                    CARDIAC MARKERS ( 03 Jan 2021 12:33 )  x     / 1.62 ng/mL / x     / x     / x      CARDIAC MARKERS ( 02 Jan 2021 11:00 )  x     / 2.08 ng/mL / x     / x     / x                                                LIVER FUNCTIONS - ( 03 Jan 2021 06:31 )  Alb: 3.3 g/dL / Pro: 6.2 g/dL / ALK PHOS: 75 U/L / ALT: 89 U/L / AST: 382 U/L / GGT: x                                                  Culture - Blood (collected 02 Jan 2021 04:30)  Source: .Blood Blood  Preliminary Report (03 Jan 2021 18:01):    No growth to date.    Culture - Blood (collected 01 Jan 2021 17:56)  Source: .Blood None  Preliminary Report (03 Jan 2021 07:01):    No growth to date.                                                                                       ABG - ( 04 Jan 2021 03:40 )  pH, Arterial: 7.32  pH, Blood: x     /  pCO2: 46    /  pO2: 121   / HCO3: 24    / Base Excess: -1.9  /  SaO2: 98                  MEDICATIONS  (STANDING):  atorvastatin 20 milliGRAM(s) Oral at bedtime  cefepime   IVPB 500 milliGRAM(s) IV Intermittent every 24 hours  chlorhexidine 4% Liquid 1 Application(s) Topical <User Schedule>  dexAMETHasone  Injectable 6 milliGRAM(s) IV Push daily  dextrose 40% Gel 15 Gram(s) Oral once  dextrose 5%. 1000 milliLiter(s) (50 mL/Hr) IV Continuous <Continuous>  dextrose 5%. 1000 milliLiter(s) (100 mL/Hr) IV Continuous <Continuous>  dextrose 50% Injectable 25 Gram(s) IV Push once  dextrose 50% Injectable 12.5 Gram(s) IV Push once  dextrose 50% Injectable 25 Gram(s) IV Push once  epoetin raven-epbx (RETACRIT) Injectable 8000 Unit(s) IV Push <User Schedule>  furosemide   Injectable 80 milliGRAM(s) IV Push two times a day  glucagon  Injectable 1 milliGRAM(s) IntraMuscular once  guaifenesin/dextromethorphan  Syrup 10 milliLiter(s) Oral every 6 hours  insulin lispro (ADMELOG) corrective regimen sliding scale   SubCutaneous three times a day before meals  levothyroxine 100 MICROGram(s) Oral daily  metoprolol succinate ER 25 milliGRAM(s) Oral daily  pantoprazole    Tablet 40 milliGRAM(s) Oral before breakfast  sevelamer carbonate 800 milliGRAM(s) Oral three times a day with meals    MEDICATIONS  (PRN):  acetaminophen   Tablet .. 650 milliGRAM(s) Oral every 6 hours PRN Temp greater or equal to 38C (100.4F), Mild Pain (1 - 3)      New X-rays reviewed:                                                                                  ECHO    CXR interpreted by me:  Left pleural effusion

## 2021-01-04 NOTE — PROGRESS NOTE ADULT - ASSESSMENT
IMPRESSION:    s/p fall / head CT neg  ESRD MWF for HD  Acute hypoxemic resp failure possibly fluid overlaod  DM  NSTEMI  Afib on coumadin  COVID PNA w/ ?superimposed bacteria PNA  Altered MS      PLAN:    CNS: Avoid CNS depressant.  repeat CTH after HD     HEENT: Oral care.    PULMONARY: Keep SpO2  88 to 92%. Wean o2.      CARDIOVASCULAR:  Trend CE. FU with Cardiology.      GI: GI prophylaxis.  Might need NGT     RENAL: check lytes and replete PRN. Nephro F/UP result    INFECTIOUS DISEASE: Dexamethasone 6 mg D# 4  Add Azithromycin    HEMATOLOGICAL: DVT Prophylaxis  IV Heparin After CTH     ENDOCRINE:  Follow up FS.  Insulin protocol if needed.    MUSCULOSKELETAL: Increase as tolerated.    MICU     Prognosis poor

## 2021-01-04 NOTE — PROGRESS NOTE ADULT - ASSESSMENT
#altered mental status   #Covid-19 PNA with a suspected  bacterial PNA:   - Currently on NRBFM 15L/min   - Azithromycin 500mg IV once daily ( 1/4/2021)  - Cefepime 500mg IV once daily (1/1/2021)  - Dexamethasone 6mg IV once daily (1/1/2021)  - Continue with constant observation    #fluid overload  - HD session planned today  - After HD, if Oxygen is titratable --> Stable for CT brain  - Lasix 80mg IV q12hrs    #ESRD:   - HD  - EPO (retacrit) 8000 units IV push once weekly  - Sevelamer carbonate 800mg po q8hrs with meals    #Atrial fibrillation:  - Paroxysmal ? Currently NSRT  - No AC until CT brain is clear, if clear will start Hepatin gtt  - Metoprolol succinate ER 25mg po once daily     #NSTEMI:   - Type II MI, demand ischemia, likely secondary to hypoxia  - Aspirin 81mg po once daily   - Anticoagulation withheld for now, to be restarted after CT scan ( AMS, suspected possible Subdural hematoma or other IC process)    #Hypothyroidism:  - Continue with levothyroxine 100mcg po once daily      #altered mental status   #Covid-19 PNA with a suspected  bacterial PNA:   - Currently on NRBFM 15L/min   - Azithromycin 500mg IV once daily ( 1/4/2021)  - Cefepime 500mg IV once daily (1/1/2021)  - Dexamethasone 6mg IV once daily (1/1/2021)  - Continue with constant observation    #fluid overload  - HD session planned today  - After HD, if Oxygen is titratable --> Stable for CT brain  - Lasix 80mg IV q12hrs    #ESRD:   - HD today ( only 1L was removed, nephrology aware, will repeat session tomorrow)  - EPO (retacrit) 8000 units IV push once weekly  - Sevelamer carbonate 800mg po q8hrs with meals    #Atrial fibrillation:  - Paroxysmal ? Currently NSRT  - No AC until CT brain is clear, if clear will start Hepatin gtt  - Metoprolol succinate ER 25mg po once daily     #NSTEMI:   - Type II MI, demand ischemia, likely secondary to hypoxia  - Aspirin 81mg po once daily   - Anticoagulation withheld for now, to be restarted after CT scan ( AMS, suspected possible Subdural hematoma or other IC process)    #Hypothyroidism:  - Continue with levothyroxine 100mcg po once daily     -Spoke with the patient's sister Nedra Shea on [phone]:   The sister said that she is the health care proxy and will get the form from the house  Patient is full code now   - Concerns about Why the patient did not received Convalescent plasma, hydroxychloroquine and remdesivir.)   - Explained to her that the studies concerning convalescent plasma did not show improvement of mortality and they are best when given first 72 hours  - Explained that he has ESRD ( eGFR <30), and therefore remdesivir is contraindicated  - Explained to her that hydroxychloroquine is not an option for treatment at the moment as studies have showed that there is no benefit to mortality.

## 2021-01-04 NOTE — CHART NOTE - NSCHARTNOTEFT_GEN_A_CORE
The patient's respiratory status was worsening on a NRBFM, not very compliant/agitated.     ABGs during the night  aBlood Gas Profile - Arterial (01.04.21 @ 03:40)   pH, Arterial: 7.32: MD Cleary notified of ABG results via spectra 8019   pCO2, Arterial: 46: MD Cleary notified of ABG results via spectra 8019 mmHg   pO2, Arterial: 121: MD Cleary notified of ABG results via spectra 8019 mmHg   HCO3, Arterial: 24: MD Cleary notified of ABG results via spectra 8019 mmoL/L   Base Excess, Arterial: -1.9: MD Cleary notified of ABG results via spectra 8019 mmoL/L   Oxygen Saturation, Arterial: 98: MD Cleary notified of ABG results via spectra 8019 % Blood Gas Profile - Arterial (01.04.21 @ 14:44)     Repeat ABGs  pH, Arterial: 7.32: NRBM   pCO2, Arterial: 54: NRBM mmHg   pO2, Arterial: 64: NRBM mmHg   HCO3, Arterial: 28: NRBM mmoL/L   Base Excess, Arterial: 0.8: NRBM mmoL/L   Oxygen Saturation, Arterial: 94: NRBM %     case discussed with the Pulm/Crit fellow.   Decision for intubation   Family called and consented ( also called for code status earlier, full code).   Consented for central line insertion too.   The patient is intubated, right IJ central line inserted   A-line will be inserted   OG tube inserted  Chest Xray showed tube 8 cm above tracey will push down 3cms ( resp therapist informed)

## 2021-01-05 NOTE — PROGRESS NOTE ADULT - ASSESSMENT
#Covid-19 PNA with a suspected  bacterial PNA:   - The patient was intubated for increased alteration, hypoxemia and non-compliance with BiPAP  - Azithromycin 500mg IV once daily ( 1/4/2021)  - Cefepime 500mg IV once daily (1/1/2021)  - Dexamethasone 6mg IV once daily (1/1/2021)  - Continue with constant observation    #Altered Mental Status:   - EEG diffuse slowing, but no seizure activity, check the report above  - CT brain shows ventriculomegaly (check full report), no IC bleed, possible old infarct.    #fluid overload  - HD session planned today  - After HD, if Oxygen is titratable --> Stable for CT brain  - Lasix 80mg IV q12hrs    #ESRD:   - HD 1/4/2021 ( only 1L was removed, nephrology aware)   - Repeat HD on 1/5/2021 to get 2Liters off today ( will attempt trial from left access)  - EPO (retacrit) 8000 units IV push once weekly  - Sevelamer carbonate 800mg po q8hrs with meals    #Atrial fibrillation:  - Paroxysmal ? Currently NSRT  - Heparin gtt, follow up aPTT q6hrs until therapeutic   - Metoprolol succinate ER 25mg po once daily   - Will DC levophed  - CT brain no IC bleed or concerns of IC bleed ( Heparin drip started)    #NSTEMI:   - Type II MI, demand ischemia, likely secondary to hypoxia  - Aspirin 81mg po once daily   - CT brain no IC bleed or concerns of IC bleed ( Heparin drip started)  - Repeat CK-BM and CPK were stable    #Hypothyroidism:  - Continue with levothyroxine 100mcg po once daily     -Spoke with the patient's sister Nedra Shea on [phone]:   The sister said that she is the health care proxy and will get the form from the house  Patient is full code now   - Concerns about Why the patient did not received Convalescent plasma, hydroxychloroquine and remdesivir.)   - Explained to her that the studies concerning convalescent plasma did not show improvement of mortality and they are best when given first 72 hours  - Explained that he has ESRD ( eGFR <30), and therefore remdesivir is contraindicated  - Explained to her that hydroxychloroquine is not an option for treatment at the moment as studies have showed that there is no benefit to mortality.  #Covid-19 PNA with a suspected  bacterial PNA:   - The patient was intubated for increased alteration, hypoxemia and non-compliance with BiPAP  - Azithromycin 500mg IV once daily ( 1/4/2021)  - Cefepime 500mg IV once daily (1/1/2021)  - Dexamethasone 6mg IV once daily (1/1/2021)  - Continue with constant observation    #Altered Mental Status:   - EEG diffuse slowing, but no seizure activity, check the report above  - CT brain shows ventriculomegaly (check full report), no IC bleed, possible old infarct.    #fluid overload  - HD session planned today  - After HD, if Oxygen is titratable --> Stable for CT brain  - Lasix 80mg IV q12hrs    #ESRD:   - HD 1/4/2021 ( only 1L was removed, nephrology aware)   - Repeat HD on 1/5/2021 to get 2Liters off today ( will attempt trial from left access)  - EPO (retacrit) 8000 units IV push once weekly  - Sevelamer carbonate 800mg po q8hrs with meals    #Atrial fibrillation:  - Paroxysmal ? Currently NSRT  - Heparin gtt, follow up aPTT q6hrs until therapeutic   - Metoprolol succinate ER 25mg po once daily   - Will DC levophed  - CT brain no IC bleed or concerns of IC bleed ( Heparin drip started)    #NSTEMI:   - Type II MI, demand ischemia, likely secondary to hypoxia  - Aspirin 81mg po once daily   - CT brain no IC bleed or concerns of IC bleed ( Heparin drip started)  - Repeat CK-BM and CPK were stable    #Hypothyroidism:  - Continue with levothyroxine 100mcg po once daily     #Hyponatremia:   - Na: 131 (1/5/2021), 133 pn (1/4/2021)   - Likely hypervolemic hyponatremia secondary to fluid overload   - Continue with Lasix and dialysis sessions   - Will follow up sodium levels    - Spoke with the patient's sister Nedra Shea on [phone] ( on 1/4/2021):  The sister said that she is the health care proxy and will get the form from the house  Patient is full code now   - Concerns about Why the patient did not received Convalescent plasma, hydroxychloroquine and remdesivir.)   - Explained to her that the studies concerning convalescent plasma did not show improvement of mortality and they are best when given first 72 hours  - Explained that he has ESRD ( eGFR <30), and therefore remdesivir is contraindicated  - Explained to her that hydroxychloroquine is not an option for treatment at the moment as studies have showed that there is no benefit to mortality.

## 2021-01-05 NOTE — DIETITIAN INITIAL EVALUATION ADULT. - PERTINENT MEDS FT
aspirin, abx, d5w, insulin, fentanyl, versed, levophed, atorvastatin, decadron, levothyroxine, metoprolol, lasix.

## 2021-01-05 NOTE — DIETITIAN INITIAL EVALUATION ADULT. - OTHER CALCULATIONS
ABW: 96.9kg, IBW: 75.4kg  ---- CALORIE: 4618-4465 kcal/day ([id number] of 2182 x 60-70%)age considered;     PROTEIN: 97-120g/day (1.4-1.6 g/kg of IBW) poor renal function but on HD, being conservative at this time;     FLUID: per ICU team

## 2021-01-05 NOTE — PROGRESS NOTE ADULT - ASSESSMENT
IMPRESSION:    s/p fall / head CT neg  ESRD MWF for HD  Acute hypoxemic resp failure possibly fluid overlaod  DM  NSTEMI  Afib on coumadin  COVID PNA  Superimposed bacteria PNA  Altered MS CTH Negative X2       PLAN:    CNS: SAT. Neuro Eval     HEENT: Oral care.    PULMONARY: Vent changes.  RR 30.  PEEP 10-12.  Monitor PPL and DP     CARDIOVASCULAR:  I<O as tolerated.  FY U with cardiology      GI: GI prophylaxis.  OG Feeding.  Bowel regimen     RENAL: check lytes and replete PRN. Nephro F/UP result    INFECTIOUS DISEASE: Dexamethasone 6 mg D# 5,  Finish ABX course     HEMATOLOGICAL: DVT Prophylaxis  IV Heparin FU PTT     ENDOCRINE:  Follow up FS.  Insulin protocol if needed.    MUSCULOSKELETAL: Increase as tolerated.    MICU     Prognosis poor

## 2021-01-05 NOTE — PROGRESS NOTE ADULT - SUBJECTIVE AND OBJECTIVE BOX
[facility] FOLLOW UP NOTE  --------------------------------------------------------------------------------  24 hour events/subjective: Patient examined. Intubated.    PAST HISTORY  --------------------------------------------------------------------------------  No significant changes to PMH, PSH, FHx, SHx, unless otherwise noted    ALLERGIES & MEDICATIONS  --------------------------------------------------------------------------------  Allergies    [place] (Other)  Originally Entered as [HIVES] reaction to [sulfur] (Unknown)  penicillin (Unknown)  sulfADIAZINE (Unknown)    Standing Inpatient Medications  aspirin  chewable 81 milliGRAM(s) Oral daily  atorvastatin 20 milliGRAM(s) Oral at bedtime  azithromycin  IVPB 500 milliGRAM(s) IV Intermittent every 24 hours  cefepime   IVPB 500 milliGRAM(s) IV Intermittent every 24 hours  chlorhexidine 0.12% Liquid 15 milliLiter(s) Oral Mucosa every 12 hours  chlorhexidine 4% Liquid 1 Application(s) Topical <User Schedule>  dexAMETHasone  Injectable 6 milliGRAM(s) IV Push daily  dextrose 40% Gel 15 Gram(s) Oral once  dextrose 5%. 1000 milliLiter(s) IV Continuous <Continuous>  dextrose 5%. 1000 milliLiter(s) IV Continuous <Continuous>  dextrose 50% Injectable 25 Gram(s) IV Push once  dextrose 50% Injectable 12.5 Gram(s) IV Push once  dextrose 50% Injectable 25 Gram(s) IV Push once  epoetin raven-epbx (RETACRIT) Injectable 8000 Unit(s) IV Push <User Schedule>  fentaNYL   Infusion. 0.5 MICROgram(s)/kG/Hr IV Continuous <Continuous>  furosemide   Injectable 80 milliGRAM(s) IV Push two times a day  glucagon  Injectable 1 milliGRAM(s) IntraMuscular once  heparin  Infusion 1800 Unit(s)/Hr IV Continuous <Continuous>  insulin lispro (ADMELOG) corrective regimen sliding scale   SubCutaneous three times a day before meals  levothyroxine 100 MICROGram(s) Oral daily  metoprolol tartrate 12.5 milliGRAM(s) Oral two times a day  midazolam Infusion 0.02 mG/kG/Hr IV Continuous <Continuous>  nicotine -  14 mG/24Hr(s) Patch 1 patch Transdermal daily  norepinephrine Infusion 0.05 MICROgram(s)/kG/Min IV Continuous <Continuous>  pantoprazole   Suspension 40 milliGRAM(s) Oral before breakfast  polyethylene glycol 3350 17 Gram(s) Oral at bedtime  senna 2 Tablet(s) Oral at bedtime  sevelamer carbonate 800 milliGRAM(s) Oral three times a day with meals    PRN Inpatient Medications  acetaminophen   Tablet .. 650 milliGRAM(s) Oral every 6 hours PRN    VITALS/PHYSICAL EXAM  --------------------------------------------------------------------------------  T(C): 37.8 (01-05-21 @ 08:00), Max: 38.1 (01-04-21 @ 16:00)  HR: 80 (01-05-21 @ 10:40) (72 - 101)  BP: 111/47 (01-04-21 @ 18:15) (45/33 - 158/74)  RR: 32 (01-05-21 @ 10:40) (14 - 36)  SpO2: 99% (01-05-21 @ 10:40) (83% - 100%)    01-04-21 @ 07:01  -  01-05-21 @ 07:00  --------------------------------------------------------  IN: 569 mL / OUT: 1000 mL / NET: -431 mL    01-05-21 @ 07:01  -  01-05-21 @ 11:39  --------------------------------------------------------  IN: 174.6 mL / OUT: 0 mL / NET: 174.6 mL    Physical Exam:  	Gen: Intubated  	Pulm:  B/L rales  	CV: RRR, S1S2  	Abd: +BS, soft, nondistended  	LE: Warm, edema  	Vascular access: AVF    LABS/STUDIES  --------------------------------------------------------------------------------              8.8    11.99 >-----------<  165      [01-05-21 @ 05:30]              27.1     131  |  90  |  67  ----------------------------<  168      [01-05-21 @ 05:30]  4.9   |  25  |  6.9        Ca     7.7     [01-05-21 @ 05:30]      Mg     2.4     [01-05-21 @ 05:30]    TPro  6.0  /  Alb  3.1  /  TBili  1.0  /  DBili  x   /  AST  253  /  ALT  89  /  AlkPhos  74  [01-05-21 @ 05:30]    PT/INR: PT 15.60, INR 1.36       [01-05-21 @ 05:30]  PTT: 31.9       [01-05-21 @ 05:30]    Troponin 1.78      [01-04-21 @ 12:15]  CK 6283      [01-04-21 @ 18:23]    Creatinine Trend:  SCr 6.9 [01-05 @ 05:30]  SCr 5.5 [01-03 @ 06:31]  SCr 7.0 [01-02 @ 04:30]  SCr 6.5 [01-01 @ 17:56]  SCr 5.8 [01-01 @ 11:00]        Ferritin 2748      [01-01-21 @ 17:56]  HbA1c 7.1      [05-21-19 @ 04:30]  TSH 4.57      [01-01-21 @ 17:56]

## 2021-01-05 NOTE — PROGRESS NOTE ADULT - ASSESSMENT
ESRD (due to DM) on HD MWF  s/p Fall  altered mental status   Covid-19 PNA / bacterial PNA   fluid overload  DM  HTN  afib on coumadin  CVA  NSTEMI    plan:    HD today: 2 hours, opti 160 dialyzer, 2K bath, 2.5L UF  left arm precautions  renal diet with sevelamer   dexamethasone   vanco / cefepime / f/u ID  covid isolation  cont toprol xl   cont lasix 80mg iv q12h  f/u cardio / f/u pulm  icu care

## 2021-01-05 NOTE — PROGRESS NOTE ADULT - SUBJECTIVE AND OBJECTIVE BOX
Patient is a 60y old  Male who presents with a chief complaint of s/p fall. (04 Jan 2021 18:09)        Over Night Events:  Intubated yesterday.  Now sedated.  on Levophed.  Sedated Fentanyl and Versed.         ROS:     All ROS are negative except HPI         PHYSICAL EXAM    ICU Vital Signs Last 24 Hrs  T(C): 37.8 (05 Jan 2021 08:00), Max: 38.1 (04 Jan 2021 16:00)  T(F): 100.1 (05 Jan 2021 08:00), Max: 100.6 (04 Jan 2021 16:00)  HR: 78 (05 Jan 2021 09:00) (72 - 101)  BP: 111/47 (04 Jan 2021 18:15) (45/33 - 158/74)  BP(mean): 65 (04 Jan 2021 18:15) (38 - 108)  ABP: 122/46 (05 Jan 2021 09:00) (98/38 - 132/56)  ABP(mean): 72 (05 Jan 2021 09:00) (58 - 82)  RR: 26 (05 Jan 2021 09:00) (14 - 36)  SpO2: 97% (05 Jan 2021 09:00) (83% - 100%)      CONSTITUTIONAL:  Well nourished. Ill appearing.  NAD    ENT:   Airway patent,   Mouth with normal mucosa.   No thrush    EYES:   Pupils equal,   Round and reactive to light.    CARDIAC:   Normal rate,   Regular rhythm.    No edema      Vascular:  Normal systolic impulse  No Carotid bruits    RESPIRATORY:   No wheezing  Bilateral BS  Normal chest expansion  Not tachypneic,  No use of accessory muscles    GASTROINTESTINAL:  Abdomen soft,   Non-tender,   No guarding,   + BS    MUSCULOSKELETAL:   Range of motion is not limited,  No clubbing, cyanosis    NEUROLOGICAL:   Sedated   Arousable     SKIN:   Skin normal color for race,   Warm and dry   No evidence of rash.    PSYCHIATRIC:   Sedated   No apparent risk to self or others.    HEMATOLOGICAL:  No cervical  lymphadenopathy.  no inguinal lymphadenopathy      01-04-21 @ 07:01  -  01-05-21 @ 07:00  --------------------------------------------------------  IN:    FentaNYL: 144.8 mL    IV PiggyBack: 200 mL    Midazolam: 4 mL    Nepro with Carb Steady: 60 mL    Norepinephrine: 160.2 mL  Total IN: 569 mL    OUT:    Other (mL): 1000 mL  Total OUT: 1000 mL    Total NET: -431 mL      01-05-21 @ 07:01  -  01-05-21 @ 09:23  --------------------------------------------------------  IN:    FentaNYL: 29.1 mL    Midazolam: 12 mL    Nepro with Carb Steady: 60 mL    Norepinephrine: 31.5 mL  Total IN: 132.6 mL    OUT:  Total OUT: 0 mL    Total NET: 132.6 mL          LABS:                            8.8    11.99 )-----------( 165      ( 05 Jan 2021 05:30 )             27.1                                               01-05    131<L>  |  90<L>  |  67<HH>  ----------------------------<  168<H>  4.9   |  25  |  6.9<HH>    Ca    7.7<L>      05 Jan 2021 05:30  Mg     2.4     01-05    TPro  6.0  /  Alb  3.1<L>  /  TBili  1.0  /  DBili  x   /  AST  253<H>  /  ALT  89<H>  /  AlkPhos  74  01-05      PT/INR - ( 05 Jan 2021 05:30 )   PT: 15.60 sec;   INR: 1.36 ratio         PTT - ( 05 Jan 2021 05:30 )  PTT:31.9 sec                                           CARDIAC MARKERS ( 04 Jan 2021 18:23 )  x     / x     / 6283 U/L / x     / 18.3 ng/mL  CARDIAC MARKERS ( 04 Jan 2021 12:15 )  x     / 1.78 ng/mL / 6997 U/L / x     / 20.9 ng/mL  CARDIAC MARKERS ( 03 Jan 2021 12:33 )  x     / 1.62 ng/mL / x     / x     / x                                                LIVER FUNCTIONS - ( 05 Jan 2021 05:30 )  Alb: 3.1 g/dL / Pro: 6.0 g/dL / ALK PHOS: 74 U/L / ALT: 89 U/L / AST: 253 U/L / GGT: x                                                                                               Mode: AC/ CMV (Assist Control/ Continuous Mandatory Ventilation)  RR (machine): 26  TV (machine): 450  FiO2: 60  PEEP: 8  ITime: 1  MAP: 15  PIP: 26                                      ABG - ( 05 Jan 2021 03:34 )  pH, Arterial: 7.32  pH, Blood: x     /  pCO2: 53    /  pO2: 60    / HCO3: 27    / Base Excess: 0.2   /  SaO2: 89    PPL 19              MEDICATIONS  (STANDING):  aspirin  chewable 81 milliGRAM(s) Oral daily  atorvastatin 20 milliGRAM(s) Oral at bedtime  azithromycin  IVPB 500 milliGRAM(s) IV Intermittent every 24 hours  cefepime   IVPB 500 milliGRAM(s) IV Intermittent every 24 hours  chlorhexidine 0.12% Liquid 15 milliLiter(s) Oral Mucosa every 12 hours  chlorhexidine 4% Liquid 1 Application(s) Topical <User Schedule>  dexAMETHasone  Injectable 6 milliGRAM(s) IV Push daily  dextrose 40% Gel 15 Gram(s) Oral once  dextrose 5%. 1000 milliLiter(s) (50 mL/Hr) IV Continuous <Continuous>  dextrose 5%. 1000 milliLiter(s) (100 mL/Hr) IV Continuous <Continuous>  dextrose 50% Injectable 25 Gram(s) IV Push once  dextrose 50% Injectable 12.5 Gram(s) IV Push once  dextrose 50% Injectable 25 Gram(s) IV Push once  epoetin raven-epbx (RETACRIT) Injectable 8000 Unit(s) IV Push <User Schedule>  fentaNYL   Infusion. 0.5 MICROgram(s)/kG/Hr (4.85 mL/Hr) IV Continuous <Continuous>  furosemide   Injectable 80 milliGRAM(s) IV Push two times a day  glucagon  Injectable 1 milliGRAM(s) IntraMuscular once  insulin lispro (ADMELOG) corrective regimen sliding scale   SubCutaneous three times a day before meals  levothyroxine 100 MICROGram(s) Oral daily  metoprolol succinate ER 25 milliGRAM(s) Oral daily  midazolam Infusion 0.02 mG/kG/Hr (1.94 mL/Hr) IV Continuous <Continuous>  nicotine -  14 mG/24Hr(s) Patch 1 patch Transdermal daily  norepinephrine Infusion 0.05 MICROgram(s)/kG/Min (9.08 mL/Hr) IV Continuous <Continuous>  pantoprazole   Suspension 40 milliGRAM(s) Oral before breakfast  sevelamer carbonate 800 milliGRAM(s) Oral three times a day with meals    MEDICATIONS  (PRN):  acetaminophen   Tablet .. 650 milliGRAM(s) Oral every 6 hours PRN Temp greater or equal to 38C (100.4F), Mild Pain (1 - 3)      New X-rays reviewed:                                                                                  ECHO    CXR interpreted by me:  ET OK OG OK>  Bilateral infiltrates

## 2021-01-05 NOTE — DIETITIAN INITIAL EVALUATION ADULT. - REASON INDICATOR FOR ASSESSMENT
This is charted by Jose Mason, KATIA - this pt was found to be intubated to ventilator and started OG feeding. Spoken with RN extensively regarding my plan to change TF regimen. currently pt is on versed, fentanyl, and pressor IV. Ve 12.5 ,Temp 37.8c, /40, MAP 72. on NEPRO at 20cc/hr with a goal of 50cc/hr.  DIALYSIS ongoing.  LBM 1/3. wounds to skin. NO edema.

## 2021-01-05 NOTE — DIETITIAN INITIAL EVALUATION ADULT. - OTHER INFO
S/p fall p/w AMS 2/2 acute hypoxic resp failure 2/2 COVID19. AMS and agitation on IV abx. Decadron, fluid overload on HD> on lasix. on sevelamer. A fib with NSTEMI monitoring. Nephro is following.

## 2021-01-05 NOTE — DIETITIAN INITIAL EVALUATION ADULT. - PERTINENT LABORATORY DATA
1/4: h/h 8.5/27.1, Na 131, BUN 67, Cr 6.9, Glucose 168, Ca 7.7, Albumin 3.1, LFT elevated, HgbA1c 5.9

## 2021-01-05 NOTE — PROGRESS NOTE ADULT - SUBJECTIVE AND OBJECTIVE BOX
SUBJECTIVE:    Patient is a 60y old Male who presents with a chief complaint of fall. (05 Jan 2021 11:38). Currently admitted to medicine with the primary diagnosis of acute hypoxic respiratory failure and fever secondary to SARS-CoV-2 Pneumonia.  Today is hospital day 4d.     PAST MEDICAL & SURGICAL HISTORY  PAD (peripheral artery disease)  multiple toe amputations    Anemia  chronic anemia - s/p transfusion 2018    Thyroid cancer    Chronic leg pain    OA (osteoarthritis)    SOB (shortness of breath) on exertion    ESRD (end stage renal disease)  2 yrs    Atrial fibrillation  on warfarin    Right sided weakness    Stroke  8 yrs ago    Hypertension    High blood cholesterol    Diabetes mellitus, type 2    Dialysis patient  Mon, Wednesday, Friday ([facility])    Smoker    H/O thyroid nodule    H/O gastroesophageal reflux (GERD)    History of tonsillectomy    History of surgery  Multiple toe amputations (amp 4 1/2 left toes; has 1/2 left mid toe; amp right mid toe)    A-V fistula  left AVF      SOCIAL HISTORY:  Negative for smoking/alcohol/drug use.     ALLERGIES:  Orange (Other)  Originally Entered as [HIVES] reaction to [sulfur] (Unknown)  penicillin (Unknown)  sulfADIAZINE (Unknown)    MEDICATIONS:  STANDING MEDICATIONS  aspirin  chewable 81 milliGRAM(s) Oral daily  atorvastatin 20 milliGRAM(s) Oral at bedtime  azithromycin  IVPB 500 milliGRAM(s) IV Intermittent every 24 hours  cefepime   IVPB 500 milliGRAM(s) IV Intermittent every 24 hours  chlorhexidine 0.12% Liquid 15 milliLiter(s) Oral Mucosa every 12 hours  chlorhexidine 4% Liquid 1 Application(s) Topical <User Schedule>  dexAMETHasone  Injectable 6 milliGRAM(s) IV Push daily  dextrose 40% Gel 15 Gram(s) Oral once  dextrose 5%. 1000 milliLiter(s) IV Continuous <Continuous>  dextrose 5%. 1000 milliLiter(s) IV Continuous <Continuous>  dextrose 50% Injectable 25 Gram(s) IV Push once  dextrose 50% Injectable 12.5 Gram(s) IV Push once  dextrose 50% Injectable 25 Gram(s) IV Push once  epoetin raven-epbx (RETACRIT) Injectable 8000 Unit(s) IV Push <User Schedule>  fentaNYL   Infusion. 0.5 MICROgram(s)/kG/Hr IV Continuous <Continuous>  furosemide   Injectable 80 milliGRAM(s) IV Push two times a day  glucagon  Injectable 1 milliGRAM(s) IntraMuscular once  heparin  Infusion 1800 Unit(s)/Hr IV Continuous <Continuous>  insulin lispro (ADMELOG) corrective regimen sliding scale   SubCutaneous three times a day before meals  levothyroxine 100 MICROGram(s) Oral daily  metoprolol tartrate 12.5 milliGRAM(s) Oral two times a day  midazolam Infusion 0.02 mG/kG/Hr IV Continuous <Continuous>  norepinephrine Infusion 0.05 MICROgram(s)/kG/Min IV Continuous <Continuous>  pantoprazole   Suspension 40 milliGRAM(s) Oral before breakfast  polyethylene glycol 3350 17 Gram(s) Oral at bedtime  senna 2 Tablet(s) Oral at bedtime  sevelamer carbonate 800 milliGRAM(s) Oral three times a day with meals    PRN MEDICATIONS  acetaminophen   Tablet .. 650 milliGRAM(s) Oral every 6 hours PRN    VITALS:   T(F): 100  HR: 90  BP: 111/47  RR: 30  SpO2: 99%    LABS:                        8.8    11.99 )-----------( 165      ( 05 Jan 2021 05:30 )             27.1     01-05    131<L>  |  90<L>  |  67<HH>  ----------------------------<  168<H>  4.9   |  25  |  6.9<HH>    Ca    7.7<L>      05 Jan 2021 05:30  Mg     2.4     01-05    TPro  6.0  /  Alb  3.1<L>  /  TBili  1.0  /  DBili  x   /  AST  253<H>  /  ALT  89<H>  /  AlkPhos  74  01-05    PT/INR - ( 05 Jan 2021 05:30 )   PT: 15.60 sec;   INR: 1.36 ratio         PTT - ( 05 Jan 2021 11:00 )  PTT:58.0 sec    ABG - ( 05 Jan 2021 03:34 )  pH, Arterial: 7.32  pH, Blood: x     /  pCO2: 53    /  pO2: 60    / HCO3: 27    / Base Excess: 0.2   /  SaO2: 89        Creatine Kinase, Serum: 6283 U/L <H> (01-04-21 @ 18:23)      CARDIAC MARKERS ( 04 Jan 2021 18:23 )  x     / x     / 6283 U/L / x     / 18.3 ng/mL  CARDIAC MARKERS ( 04 Jan 2021 12:15 )  x     / 1.78 ng/mL / 6997 U/L / x     / 20.9 ng/mL      RADIOLOGY:    C< from: CT Head No Cont (01.04.21 @ 22:14) >  FINDINGS:    There is a stable focal hypodensity in the posterior limb of the left internal capsule likely representing a chronic lacunar infarct.    There is prominence of the sulci and sylvian fissures reflecting stable mild diffuse parenchymal volume loss. There is stable mild ventriculomegaly which may be accentuated by volume loss.    There is no evidence of acute territorial/transcortical infarction or intracranial hemorrhage. There is no space-occupying lesion or midline shift.    There are no extra-axial fluid collections.    The visualized intraorbital contents are normal. The imaged portions of the paranasal sinuses are aerated.The mastoid air cells are aerated. There is a stable right parietal scalp lesion dating back to 6/7/2017. Partially visualized parotid glands are normal.      IMPRESSION:    No acute intracranial pathology, stable since 1/1/2021.    Stable mild ventriculomegaly.    < end of copied text >      EE< from: EEG (01.03.21 @ 14:00) >  Impression  Abnormal due to the presence of: focal slowing as above, generalized slowing as above    Clinical Correlation & Recommendations  Consistent with diffuse cerebral electrophysiological dysfunction.  Secondary to none specific cause. Consistent with focal electrophysiological dysfunction.  Secondary to non specific cause.    < end of copied text >      PHYSICAL EXAM:  GEN: Intubated, sedated.   LUNGS: Clear to auscultation bilaterally   HEART: S1/S2 present. RRR.   ABD: Soft, non-tender, non-distended. Bowel sounds present  EXT: NC/NC/NE/2+PP/[place]/Skin Intact.   NEURO: intubated and sedated

## 2021-01-05 NOTE — DIETITIAN INITIAL EVALUATION ADULT. - ADD RECOMMEND
RD has considered HgbA1c of 5.9, with current elevated glucose likely from meds and acuity of stress, while no abnormal K and Phos at this time with Poor BUN/Cr on Dialysis. COnsidering pt with COVID19 intubated to vent as well, NEPRO is not feasible as it is a high FAT formula with high omega6:3 ratio. Therefore, please change it to PEPTAMEN AF at 45cc/hr with BENEPROTEIN x2/day. This regimen gives a total of 1400 kcal/ 97g protein/ 1800mg K/ 900mg Phos/ 874mL free water, additional flushes per ICU team.

## 2021-01-05 NOTE — DIETITIAN INITIAL EVALUATION ADULT. - CONTINUE CURRENT NUTRITION CARE PLAN
pt to meet and tolerate >85% of estimated kcal/protein via TF upn f/u in 3 days. ENteral nutrition. RD to monitor diet order, energy intake, NFPF (EN tolerance, electrolytes, glucose profile)

## 2021-01-05 NOTE — CONSULT NOTE ADULT - SUBJECTIVE AND OBJECTIVE BOX
HPI:  59 yo M w/ PMH of Afib on coumadin, DM2, ESRD on HD MWF, HLD, HTN, CVA presents with weakness & fall. Patient notes when he woke up today, he felt generally weak, slid from the bed onto the floor landing on his bottom, denies head injury/loc. Patient notes that he has been having increased sputum production for the past few days, endorses mild shortness of breath without chest pain/fever/diarrhea/vomiting. Patient had full dialysis session wednesday, notes next session is tomorrow due to the holidays. Denies any focal weakness or pain currently.     On my assessment, pt is obtunded and unable to provide HPI. HPI obtained by Sister who is his primary care taker. As per sister, pt has been having increased frequency of imbalance/fall since 2 days ago. Decreased PO intake.     ICU Vital Signs Last 24 Hrs  T(C): 37.2 (01 Jan 2021 14:00), Max: 38.3 (01 Jan 2021 10:28)  T(F): 98.9 (01 Jan 2021 14:00), Max: 101 (01 Jan 2021 10:28)  HR: 78 (01 Jan 2021 14:00) (78 - 107)  BP: 109/60 (01 Jan 2021 14:00) (109/60 - 142/62)  RR: 18 (01 Jan 2021 14:00) (18 - 20)  SpO2: 97% (01 Jan 2021 14:00) (95% - 99%)    In ED, pt underwent CT head which is negative. CXR performed and shows possible RLL PNA. Given cefepime and vancomycin. Also found to be COVID+. no drips (01 Jan 2021 14:01)    Patient intubated and sedated, unable to provide Hx. Above HPI obtained from chart review. Since being admitted, patient has had two unremarkable CTH and rEEG. Had to be intubated yesterday due to worsening respiratory status.      ROS:  Patient unable to participate in ROS due to neurologic status    Medications:  MEDICATIONS  (STANDING):  aspirin  chewable 81 milliGRAM(s) Oral daily  atorvastatin 20 milliGRAM(s) Oral at bedtime  azithromycin  IVPB 500 milliGRAM(s) IV Intermittent every 24 hours  cefepime   IVPB 500 milliGRAM(s) IV Intermittent every 24 hours  chlorhexidine 0.12% Liquid 15 milliLiter(s) Oral Mucosa every 12 hours  chlorhexidine 4% Liquid 1 Application(s) Topical <User Schedule>  dexAMETHasone  Injectable 6 milliGRAM(s) IV Push daily  dextrose 40% Gel 15 Gram(s) Oral once  dextrose 5%. 1000 milliLiter(s) (50 mL/Hr) IV Continuous <Continuous>  dextrose 5%. 1000 milliLiter(s) (100 mL/Hr) IV Continuous <Continuous>  dextrose 50% Injectable 25 Gram(s) IV Push once  dextrose 50% Injectable 12.5 Gram(s) IV Push once  dextrose 50% Injectable 25 Gram(s) IV Push once  epoetin raven-epbx (RETACRIT) Injectable 8000 Unit(s) IV Push <User Schedule>  fentaNYL   Infusion. 0.5 MICROgram(s)/kG/Hr (4.85 mL/Hr) IV Continuous <Continuous>  furosemide   Injectable 80 milliGRAM(s) IV Push two times a day  glucagon  Injectable 1 milliGRAM(s) IntraMuscular once  heparin  Infusion 1800 Unit(s)/Hr (18 mL/Hr) IV Continuous <Continuous>  insulin lispro (ADMELOG) corrective regimen sliding scale   SubCutaneous three times a day before meals  levothyroxine 100 MICROGram(s) Oral daily  metoprolol tartrate 12.5 milliGRAM(s) Oral two times a day  midazolam Infusion 0.02 mG/kG/Hr (1.94 mL/Hr) IV Continuous <Continuous>  norepinephrine Infusion 0.05 MICROgram(s)/kG/Min (9.08 mL/Hr) IV Continuous <Continuous>  pantoprazole   Suspension 40 milliGRAM(s) Oral before breakfast  polyethylene glycol 3350 17 Gram(s) Oral at bedtime  senna 2 Tablet(s) Oral at bedtime  sevelamer carbonate 800 milliGRAM(s) Oral three times a day with meals    MEDICATIONS  (PRN):  acetaminophen   Tablet .. 650 milliGRAM(s) Oral every 6 hours PRN Temp greater or equal to 38C (100.4F), Mild Pain (1 - 3)      Intake & output:  I&O's Summary    04 Jan 2021 07:01  -  05 Jan 2021 07:00  --------------------------------------------------------  IN: 569 mL / OUT: 1000 mL / NET: -431 mL    05 Jan 2021 07:01  -  05 Jan 2021 19:00  --------------------------------------------------------  IN: 938.9 mL / OUT: 3000 mL / NET: -2061.1 mL      Vital signs:  ICU Vital Signs Last 24 Hrs  T(C): 37.9 (05 Jan 2021 16:00), Max: 37.9 (05 Jan 2021 16:00)  T(F): 100.2 (05 Jan 2021 16:00), Max: 100.2 (05 Jan 2021 16:00)  HR: 74 (05 Jan 2021 17:45) (72 - 98)  BP: --  BP(mean): --  ABP: 90/42 (05 Jan 2021 17:45) (88/40 - 132/56)  ABP(mean): 58 (05 Jan 2021 17:45) (58 - 82)  RR: 6 (05 Jan 2021 17:45) (4 - 32)  SpO2: 100% (05 Jan 2021 17:45) (83% - 100%)    Vent settings:  Mode: AC/ CMV (Assist Control/ Continuous Mandatory Ventilation)  RR (machine): 30  TV (machine): 450  FiO2: 60  PEEP: 12  ITime: 1  MAP: 19  PIP: 27      Physical exam:  Gen: Ill-appearing male, intubated and sedated.  Mental status: Sedated. Does not open eyes to voice or pain. Grimaces/withdraws to painful stimuli. Does not follow commands.  Cranial nerves: Pupils 2-3mm. Left pupil nonreactive, right reactive. Does not track. (+) gag reflex. Face grossly symmetric.  Motor: Withdraws all extremities to painful stimuli equally. Slowly lowers upper extremities to bed when raised.  Sensation: Withdraws to painful stimuli      Labs:             8.8    11.99 )-----------( 165      ( 05 Jan 2021 05:30 )             27.1   01-05    131<L>  |  90<L>  |  67<HH>  ----------------------------<  168<H>  4.9   |  25  |  6.9<HH>    Ca    7.7<L>      05 Jan 2021 05:30  Mg     2.4     01-05    TPro  6.0  /  Alb  3.1<L>  /  TBili  1.0  /  DBili  x   /  AST  253<H>  /  ALT  89<H>  /  AlkPhos  74  01-05    PT/INR - ( 05 Jan 2021 05:30 )   PT: 15.60 sec;   INR: 1.36 ratio    PTT - ( 05 Jan 2021 17:07 )  PTT:178.4 sec    ABG - ( 05 Jan 2021 14:51 )  pH, Arterial: 7.34  pH, Blood: x     /  pCO2: 50    /  pO2: 129   / HCO3: 27    / Base Excess: 0.5   /  SaO2: 99        Blood Gas Arterial, Lactate (01.05.21 @ 14:51)   Blood Gas Arterial, Lactate: 1.2 mmoL/L     Imaging:  EXAM:  CT BRAIN          PROCEDURE DATE:  01/04/2021      IMPRESSION:  No acute intracranial pathology, stable since 1/1/2021.  Stable mild ventriculomegaly.        EXAM:  CT BRAIN          PROCEDURE DATE:  01/01/2021      IMPRESSION:  No CT evidence for acute intracranial pathology. Stable examination since June 7, 2017.        EXAM:  XR CHEST PORTABLE ROUTINE 1V          PROCEDURE DATE:  01/05/2021      Impression:  Unchanged diffuse bilateral patchy airspace opacities.       EXAM:  EEG AWAKE AND ASLEEP        PROCEDURE DATE:  01/03/2021        INTERPRETATION:  Exam Performed on: 16 Channel Digital Routine EEG done on [employer] recording system.    History  Altered mental state, Possible Seizures, Stroke  Covid, Fever, PNA, PAD, Anemia, Thyroid CA, SOB, ESRD,A-fib, HTN,Dm, Dialysis, Pneumonia,    Medications    Medication  Date  Tylenol  2021/01/03  Lipitor  2021/01/03  Maxipime  2021/01/03  decadron  2021/01/03  Lasix  2021/01/03  Synthroid  2021/01/03  Toprol  2021/01/03  Protonix  2021/01/03  Renvela  2021/01/03  Vacomycin  2021/01/03  Coumadin  2021/01/03  -  2021/01/03    State  Awake and Drowsy    Symmetry  Symmetric    Organization  Less than optimally organized    PDR  Continuous  Background: 6-7 hz    Generalized Slowing  Yes  mild - moderate    Focal Slowing  Yes  Left  mild  hemisphere    Breach Artifact:  No    Activation Procedure  Hyper Ventilation  No    Photic Stimulation  No    Epileptiform Activity  No    Events:  No    Impression  Abnormal due to the presence of: focal slowing as above, generalized slowing as above    Clinical Correlation & Recommendations  Consistent with diffuse cerebral electrophysiological dysfunction.  Secondary to none specific cause. Consistent with focal electrophysiological dysfunction.  Secondary to non specific cause.      Assessment: 59 yo M w/ PMH of Afib on coumadin, DM2, ESRD on HD MWF, HLD, HTN, CVA, thyroid CA s/p thyroidectomy presented on 1/1 with weakness & fall, report of SOB, increased sputum production, and decreased PO intake x2 days. Diagnosed with COVID PNA, likely superimposed bacterial PNA. Was obtunded upon presentation. Has since been intubated due to worsening respiratory function and is requiring sedation. Has had two head CTs with no intracranial pathology. Underwent rEEG that did not show seizure activity. No focal deficits on neuro exam, patient is appropriately responsive given sedation and underlying metabolic dysfunction in setting of active infection.      Recommendations:  No further imaging or EEG needed at this time  Management of medical issues per primary  Will re-evaluate once sedation requirements have decreased

## 2021-01-06 NOTE — PROGRESS NOTE ADULT - ASSESSMENT
#Covid-19 PNA with a suspected  bacterial PNA:   - The patient was intubated for increased alteration, hypoxemia and non-compliance with BiPAP  - Azithromycin 500mg IV once daily ( 1/4/2021)  - Cefepime 500mg IV once daily (1/1/2021)  - Dexamethasone 6mg IV once daily (1/1/2021)  - Continue with constant observation    #Altered Mental Status:   - EEG diffuse slowing, but no seizure activity, check the report above  - CT brain shows ventriculomegaly (check full report), no IC bleed, possible old infarct.  - neurology consulted and recs:   a. CT brain negative x2 for stroke or other structural pathology.  EEG negative for any epileptiform activity.  Suspect multifactorial metabolic encephalopathy in setting of COVID and ESRD and sedatives.  Wean sedation as able, can reassess if patient does not awaken after that time.  b. No further EEG or other tests   c. To be reassessed after sedation is weaned    #fluid overload  - HD session planned today  - After HD, if Oxygen is titratable --> Stable for CT brain  - Lasix 80mg IV q12hrs    #ESRD:   - HD 1/4/2021 ( only 1L was removed, nephrology aware)   - Repeat HD on 1/5/2021 to get 2Liters off today ( will attempt trial from left access)  - EPO (retacrit) 8000 units IV push once weekly  - Sevelamer carbonate 800mg po q8hrs with meals    #Atrial fibrillation:  - Paroxysmal ? Currently NSRT  - Heparin gtt, follow up aPTT q6hrs until therapeutic   - Metoprolol succinate ER 25mg po once daily   - Will DC levophed  - CT brain no IC bleed or concerns of IC bleed ( Heparin drip started)  - aPTT being followed     #NSTEMI:   - Type II MI, demand ischemia, likely secondary to hypoxia secondary to COVID-19 pneumonia  - Aspirin 81mg po once daily   - CT brain no IC bleed or concerns of IC bleed ( Heparin drip started)  - Repeat CK-BM and CPK were stable    #Hypothyroidism:  - Continue with levothyroxine 100mcg po once daily     #Hyponatremia:   - Na: 132 (1/6/2021)  - Likely hypervolemic hyponatremia secondary to fluid overload   - Continue with Lasix and dialysis sessions   - Will follow up sodium levels    - Spoke with the patient's sister Nedra Shea on [phone] ( on 1/4/2021), speaking with her for updates daily.   The sister said that she is the health care proxy and will get the form from the house  Patient is full code now   - Concerns about Why the patient did not received Convalescent plasma, hydroxychloroquine and remdesivir.)   - Explained to her that the studies concerning convalescent plasma did not show improvement of mortality and they are best when given first 72 hours  - Explained that he has ESRD ( eGFR <30), and therefore remdesivir is contraindicated  - Explained to her that hydroxychloroquine is not an option for treatment at the moment as studies have showed that there is no benefit to mortality.  #Covid-19 PNA with a suspected  bacterial PNA:   - The patient was intubated for increased alteration, hypoxemia and non-compliance with BiPAP  - Azithromycin 500mg IV once daily ( 1/4/2021)  - Cefepime 500mg IV once daily (1/1/2021)  - Dexamethasone 6mg IV once daily (1/1/2021)  - Continue with constant observation    #Altered Mental Status:   - EEG diffuse slowing, but no seizure activity, check the report above  - CT brain shows ventriculomegaly (check full report), no IC bleed, possible old infarct.  - neurology consulted and recs:   a. CT brain negative x2 for stroke or other structural pathology.  EEG negative for any epileptiform activity.  Suspect multifactorial metabolic encephalopathy in setting of COVID and ESRD and sedatives.  Wean sedation as able, can reassess if patient does not awaken after that time.  b. No further EEG or other tests   c. To be reassessed after sedation is weaned    #ESRD:   - HD 1/4/2021 ( only 1L was removed, nephrology aware)   - Repeat HD on 1/5/2021 to get 2Liters off today ( will attempt trial from left access)  - EPO (retacrit) 8000 units IV push once weekly  - Sevelamer carbonate 800mg po q8hrs with meals    #Atrial fibrillation:  - Paroxysmal ? Currently NSRT  - Heparin gtt, follow up aPTT q6hrs until therapeutic   - Metoprolol succinate ER 25mg po once daily   - Will DC levophed  - CT brain no IC bleed or concerns of IC bleed ( Heparin drip started)  - aPTT being followed     #NSTEMI:   - Type II MI, demand ischemia, likely secondary to hypoxia secondary to COVID-19 pneumonia  - Aspirin 81mg po once daily   - CT brain no IC bleed or concerns of IC bleed ( Heparin drip started)  - Repeat CK-BM and CPK were stable    #Hypothyroidism:  - Continue with levothyroxine 100mcg po once daily     #Hyponatremia:   - Na: 132 (1/6/2021)  - Likely hypervolemic hyponatremia secondary to fluid overload   - Continue with Lasix and dialysis sessions   - Will follow up sodium levels    - Spoke with the patient's sister Nedra Shea on [phone] ( on 1/4/2021), speaking with her for updates daily.   The sister said that she is the health care proxy and will get the form from the house  Patient is full code now   - Concerns about Why the patient did not received Convalescent plasma, hydroxychloroquine and remdesivir.)   - Explained to her that the studies concerning convalescent plasma did not show improvement of mortality and they are best when given first 72 hours  - Explained that he has ESRD ( eGFR <30), and therefore remdesivir is contraindicated  - Explained to her that hydroxychloroquine is not an option for treatment at the moment as studies have showed that there is no benefit to mortality.

## 2021-01-06 NOTE — PROGRESS NOTE ADULT - SUBJECTIVE AND OBJECTIVE BOX
SUBJECTIVE:    Patient is a 60y old Male who presents with a chief complaint of s/p fall. (06 Jan 2021 09:17). Currently admitted to medicine with the primary diagnosis of acute hypoxic respiratory failure and fever secondary to COVID-19 pneumonia.   Today is hospital day 5d.     PAST MEDICAL & SURGICAL HISTORY  PAD (peripheral artery disease)  multiple toe amputations    Anemia  chronic anemia - s/p transfusion 2018    Thyroid cancer    Chronic leg pain    OA (osteoarthritis)    SOB (shortness of breath) on exertion    ESRD (end stage renal disease)  2 yrs    Atrial fibrillation  on warfarin    Right sided weakness    Stroke  8 yrs ago    Hypertension    High blood cholesterol    Diabetes mellitus, type 2    Dialysis patient  Mon, Wednesday, Friday ([facility])    Smoker    H/O thyroid nodule    H/O gastroesophageal reflux (GERD)    History of tonsillectomy    History of surgery  Multiple toe amputations (amp 4 1/2 left toes; has 1/2 left mid toe; amp right mid toe)    A-V fistula  left AVF      SOCIAL HISTORY:  Negative for smoking/alcohol/drug use.     ALLERGIES:  Orange (Other)  Originally Entered as [HIVES] reaction to [sulfur] (Unknown)  penicillin (Unknown)  sulfADIAZINE (Unknown)    MEDICATIONS:  STANDING MEDICATIONS  aspirin  chewable 81 milliGRAM(s) Oral daily  atorvastatin 20 milliGRAM(s) Oral at bedtime  azithromycin  IVPB 500 milliGRAM(s) IV Intermittent every 24 hours  cefepime   IVPB 500 milliGRAM(s) IV Intermittent every 24 hours  chlorhexidine 0.12% Liquid 15 milliLiter(s) Oral Mucosa every 12 hours  chlorhexidine 4% Liquid 1 Application(s) Topical <User Schedule>  dexAMETHasone  Injectable 6 milliGRAM(s) IV Push daily  dexMEDEtomidine Infusion 0.5 MICROgram(s)/kG/Hr IV Continuous <Continuous>  dextrose 40% Gel 15 Gram(s) Oral once  dextrose 5%. 1000 milliLiter(s) IV Continuous <Continuous>  dextrose 5%. 1000 milliLiter(s) IV Continuous <Continuous>  dextrose 50% Injectable 25 Gram(s) IV Push once  dextrose 50% Injectable 12.5 Gram(s) IV Push once  dextrose 50% Injectable 25 Gram(s) IV Push once  epoetin raven-epbx (RETACRIT) Injectable 8000 Unit(s) IV Push <User Schedule>  fentaNYL   Infusion. 0.5 MICROgram(s)/kG/Hr IV Continuous <Continuous>  furosemide   Injectable 80 milliGRAM(s) IV Push two times a day  glucagon  Injectable 1 milliGRAM(s) IntraMuscular once  heparin  Infusion 1800 Unit(s)/Hr IV Continuous <Continuous>  insulin lispro (ADMELOG) corrective regimen sliding scale   SubCutaneous three times a day before meals  levothyroxine 100 MICROGram(s) Oral daily  metoprolol tartrate 12.5 milliGRAM(s) Oral two times a day  norepinephrine Infusion 0.05 MICROgram(s)/kG/Min IV Continuous <Continuous>  pantoprazole   Suspension 40 milliGRAM(s) Oral before breakfast  polyethylene glycol 3350 17 Gram(s) Oral at bedtime  senna 2 Tablet(s) Oral at bedtime  sevelamer carbonate 800 milliGRAM(s) Oral three times a day with meals    PRN MEDICATIONS  acetaminophen   Tablet .. 650 milliGRAM(s) Oral every 6 hours PRN    VITALS:   T(F): 99.1  HR: 92  BP: --  RR: 26  SpO2: 99%    LABS:                        9.2    8.11  )-----------( 184      ( 06 Jan 2021 05:40 )             27.9     01-06    132<L>  |  92<L>  |  67<HH>  ----------------------------<  339<H>  5.4<H>   |  26  |  6.2<HH>    Ca    7.4<L>      06 Jan 2021 05:40  Mg     2.4     01-06    TPro  6.0  /  Alb  2.9<L>  /  TBili  1.0  /  DBili  x   /  AST  144<H>  /  ALT  69<H>  /  AlkPhos  79  01-06    PT/INR - ( 06 Jan 2021 05:40 )   PT: 17.70 sec;   INR: 1.54 ratio         PTT - ( 06 Jan 2021 05:40 )  PTT:83.3 sec    ABG - ( 06 Jan 2021 03:30 )  pH, Arterial: 7.36  pH, Blood: x     /  pCO2: 48    /  pO2: 121   / HCO3: 28    / Base Excess: 1.8   /  SaO2: 98        CARDIAC MARKERS ( 04 Jan 2021 18:23 )  x     / x     / 6283 U/L / x     / 18.3 ng/mL  CARDIAC MARKERS ( 04 Jan 2021 12:15 )  x     / 1.78 ng/mL / 6997 U/L / x     / 20.9 ng/mL      RADIOLOGY:    < from: CT Head No Cont (01.04.21 @ 22:14) >  FINDINGS:    There is a stable focal hypodensity in the posterior limb of the left internal capsule likely representing a chronic lacunar infarct.    There is prominence of the sulci and sylvian fissures reflecting stable mild diffuse parenchymal volume loss. There is stable mild ventriculomegaly which may be accentuated by volume loss.    There is no evidence of acute territorial/transcortical infarction or intracranial hemorrhage. There is no space-occupying lesion or midline shift.    There are no extra-axial fluid collections.    The visualized intraorbital contents are normal. The imaged portions of the paranasal sinuses are aerated.The mastoid air cells are aerated. There is a stable right parietal scalp lesion dating back to 6/7/2017. Partially visualized parotid glands are normal.      IMPRESSION:    No acute intracranial pathology, stable since 1/1/2021.    Stable mild ventriculomegaly.    < end of copied text >    Chest Xray: Seen and examined by the critical care team, ET tube is 7.5 cm high as compared to the tracey, will pull down 3 cms. Will repeat CXR.     PHYSICAL EXAM:  GEN: Intubated, sedated.   LUNGS: Clear to auscultation bilaterally   HEART: S1/S2 present. RRR.   ABD: Soft, non-tender, non-distended. Bowel sounds present  EXT: NC/NC/NE/2+PP/[place]/Skin Intact.   NEURO: intubated and sedated    Intravenous access: Right IJ TLC  OG tube: In place

## 2021-01-06 NOTE — PROGRESS NOTE ADULT - ASSESSMENT
ESRD (due to DM) on HD MWF  s/p Fall  altered mental status   Covid-19 PNA / bacterial PNA   fluid overload  DM  HTN  afib on coumadin  CVA  NSTEMI    plan:    HD tomorrow: 3 hours, opti 160 dialyzer, 2K bath, 2L UF  left arm precautions  renal diet with sevelamer   dexamethasone   vanco / cefepime / f/u ID  covid isolation  cont toprol xl   cont lasix 80mg iv q12h  f/u cardio / f/u pulm  icu care

## 2021-01-06 NOTE — PROGRESS NOTE ADULT - ASSESSMENT
IMPRESSION:    s/p fall / head CT neg  ESRD MWF for HD  Acute hypoxemic resp failure possibly fluid overlaod  DM  NSTEMI  Afib on coumadin  COVID PNA  Superimposed bacteria PNA    PLAN:    CNS: SAT.    HEENT: Oral care.    PULMONARY: Vent changes.  FiO2 40%.    Monitor PPL and DP Advance ET 3 cms and repeat CXR     CARDIOVASCULAR:  I<O as tolerated.  Wean Pressors.      GI: GI prophylaxis.  OG Feeding.  Bowel regimen     RENAL: check lytes and replete PRN. Nephro F/UP result    INFECTIOUS DISEASE: Dexamethasone 6 mg D# 6,  Finish ABX course     HEMATOLOGICAL: DVT Prophylaxis  IV Heparin FU PTT     ENDOCRINE:  Follow up FS.  Insulin protocol if needed.    MUSCULOSKELETAL: Increase as tolerated.    MICU     Prognosis poor

## 2021-01-06 NOTE — PROGRESS NOTE ADULT - SUBJECTIVE AND OBJECTIVE BOX
[facility] FOLLOW UP NOTE  --------------------------------------------------------------------------------  24 hour events/subjective: Patient examined. Intubated.    PAST HISTORY  --------------------------------------------------------------------------------  No significant changes to PMH, PSH, FHx, SHx, unless otherwise noted    ALLERGIES & MEDICATIONS  --------------------------------------------------------------------------------  Allergies    Orange (Other)  Originally Entered as [HIVES] reaction to [sulfur] (Unknown)  penicillin (Unknown)  sulfADIAZINE (Unknown)    Standing Inpatient Medications  aspirin  chewable 81 milliGRAM(s) Oral daily  atorvastatin 20 milliGRAM(s) Oral at bedtime  azithromycin  IVPB 500 milliGRAM(s) IV Intermittent every 24 hours  cefepime   IVPB 500 milliGRAM(s) IV Intermittent every 24 hours  chlorhexidine 0.12% Liquid 15 milliLiter(s) Oral Mucosa every 12 hours  chlorhexidine 4% Liquid 1 Application(s) Topical <User Schedule>  dexAMETHasone  Injectable 6 milliGRAM(s) IV Push daily  dexMEDEtomidine Infusion 0.5 MICROgram(s)/kG/Hr IV Continuous <Continuous>  dextrose 40% Gel 15 Gram(s) Oral once  dextrose 5%. 1000 milliLiter(s) IV Continuous <Continuous>  dextrose 5%. 1000 milliLiter(s) IV Continuous <Continuous>  dextrose 50% Injectable 25 Gram(s) IV Push once  dextrose 50% Injectable 12.5 Gram(s) IV Push once  dextrose 50% Injectable 25 Gram(s) IV Push once  epoetin raven-epbx (RETACRIT) Injectable 8000 Unit(s) IV Push <User Schedule>  fentaNYL   Infusion. 0.5 MICROgram(s)/kG/Hr IV Continuous <Continuous>  furosemide   Injectable 80 milliGRAM(s) IV Push two times a day  glucagon  Injectable 1 milliGRAM(s) IntraMuscular once  heparin  Infusion 1800 Unit(s)/Hr IV Continuous <Continuous>  insulin lispro (ADMELOG) corrective regimen sliding scale   SubCutaneous three times a day before meals  insulin regular Infusion 1 Unit(s)/Hr IV Continuous <Continuous>  levothyroxine 100 MICROGram(s) Oral daily  metoprolol tartrate 12.5 milliGRAM(s) Oral two times a day  norepinephrine Infusion 0.05 MICROgram(s)/kG/Min IV Continuous <Continuous>  pantoprazole   Suspension 40 milliGRAM(s) Oral before breakfast  polyethylene glycol 3350 17 Gram(s) Oral at bedtime  senna 2 Tablet(s) Oral at bedtime  sevelamer carbonate 800 milliGRAM(s) Oral three times a day with meals    PRN Inpatient Medications  acetaminophen   Tablet .. 650 milliGRAM(s) Oral every 6 hours PRN      VITALS/PHYSICAL EXAM  --------------------------------------------------------------------------------  T(C): 37.3 (01-06-21 @ 12:00), Max: 37.9 (01-05-21 @ 16:00)  HR: 84 (01-06-21 @ 12:00) (72 - 98)  BP: --  RR: 30 (01-06-21 @ 12:00) (0 - 52)  SpO2: 88% (01-06-21 @ 12:00) (88% - 100%)  Wt(kg): --        01-05-21 @ 07:01  -  01-06-21 @ 07:00  --------------------------------------------------------  IN: 1577.2 mL / OUT: 3000 mL / NET: -1422.8 mL    01-06-21 @ 07:01  -  01-06-21 @ 12:26  --------------------------------------------------------  IN: 204.3 mL / OUT: 0 mL / NET: 204.3 mL      Physical Exam:  	Gen: Intubated  	Pulm:  B/L rales  	CV: RRR, S1S2  	Abd: +BS, soft, nondistended  	LE: Warm,  edema  	Vascular access: AVF    LABS/STUDIES  --------------------------------------------------------------------------------              9.2    8.11  >-----------<  184      [01-06-21 @ 05:40]              27.9     132  |  92  |  67  ----------------------------<  339      [01-06-21 @ 05:40]  5.4   |  26  |  6.2        Ca     7.4     [01-06-21 @ 05:40]      Mg     2.4     [01-06-21 @ 05:40]    TPro  6.0  /  Alb  2.9  /  TBili  1.0  /  DBili  x   /  AST  144  /  ALT  69  /  AlkPhos  79  [01-06-21 @ 05:40]    PT/INR: PT 17.70, INR 1.54       [01-06-21 @ 05:40]  PTT: 83.3       [01-06-21 @ 05:40]    CK 6283      [01-04-21 @ 18:23]    Creatinine Trend:  SCr 6.2 [01-06 @ 05:40]  SCr 6.9 [01-05 @ 05:30]  SCr 5.5 [01-03 @ 06:31]  SCr 7.0 [01-02 @ 04:30]  SCr 6.5 [01-01 @ 17:56]        Ferritin 2748      [01-01-21 @ 17:56]  HbA1c 7.1      [05-21-19 @ 04:30]  TSH 4.57      [01-01-21 @ 17:56]

## 2021-01-06 NOTE — PROGRESS NOTE ADULT - SUBJECTIVE AND OBJECTIVE BOX
Patient is a 60y old  Male who presents with a chief complaint of s/p fall. (05 Jan 2021 18:56)        Over Night Events:  Remains on MV.  ON LEvophed.  Sedated.          ROS:     All ROS are negative except HPI         PHYSICAL EXAM    ICU Vital Signs Last 24 Hrs  T(C): 37.3 (06 Jan 2021 04:00), Max: 37.9 (05 Jan 2021 16:00)  T(F): 99.1 (06 Jan 2021 04:00), Max: 100.2 (05 Jan 2021 16:00)  HR: 84 (06 Jan 2021 09:00) (72 - 98)  BP: --  BP(mean): --  ABP: 102/48 (06 Jan 2021 09:00) (84/42 - 120/54)  ABP(mean): 66 (06 Jan 2021 09:00) (56 - 74)  RR: 14 (06 Jan 2021 09:00) (0 - 32)  SpO2: 94% (06 Jan 2021 09:00) (94% - 100%)      CONSTITUTIONAL:  Ill appearing.   NAD    ENT:   Airway patent,   Mouth with normal mucosa.   No thrush    EYES:   Pupils equal,   Round and reactive to light.    CARDIAC:   Normal rate,   Regular rhythm.    edema      Vascular:  Normal systolic impulse  No Carotid bruits    RESPIRATORY:   No wheezing  Bilateral BS  Normal chest expansion  Not tachypneic,  No use of accessory muscles    GASTROINTESTINAL:  Abdomen soft,   Non-tender,   No guarding,   + BS    MUSCULOSKELETAL:   Range of motion is not limited,  No clubbing, cyanosis    NEUROLOGICAL:   Sedated  Withdraws to pain .    SKIN:   Skin normal color for race,   Warm and dry   No evidence of rash.    PSYCHIATRIC:   Sedated   No apparent risk to self or others.    HEMATOLOGICAL:  No cervical  lymphadenopathy.  no inguinal lymphadenopathy      01-05-21 @ 07:01  -  01-06-21 @ 07:00  --------------------------------------------------------  IN:    FentaNYL: 165.6 mL    Heparin: 108 mL    Heparin: 180 mL    IV PiggyBack: 200 mL    Midazolam: 52 mL    Nepro with Carb Steady: 710 mL    Norepinephrine: 161.6 mL  Total IN: 1577.2 mL    OUT:    Other (mL): 3000 mL  Total OUT: 3000 mL    Total NET: -1422.8 mL      01-06-21 @ 07:01  -  01-06-21 @ 09:18  --------------------------------------------------------  IN:    FentaNYL: 9.7 mL    Heparin: 18 mL    Midazolam: 2 mL    Nepro with Carb Steady: 50 mL    Norepinephrine: 6 mL  Total IN: 85.7 mL    OUT:  Total OUT: 0 mL    Total NET: 85.7 mL          LABS:                            9.2    8.11  )-----------( 184      ( 06 Jan 2021 05:40 )             27.9                                               01-06    132<L>  |  92<L>  |  67<HH>  ----------------------------<  339<H>  5.4<H>   |  26  |  6.2<HH>    Ca    7.4<L>      06 Jan 2021 05:40  Mg     2.4     01-06    TPro  6.0  /  Alb  2.9<L>  /  TBili  1.0  /  DBili  x   /  AST  144<H>  /  ALT  69<H>  /  AlkPhos  79  01-06      PT/INR - ( 06 Jan 2021 05:40 )   PT: 17.70 sec;   INR: 1.54 ratio         PTT - ( 06 Jan 2021 05:40 )  PTT:83.3 sec                                           CARDIAC MARKERS ( 04 Jan 2021 18:23 )  x     / x     / 6283 U/L / x     / 18.3 ng/mL  CARDIAC MARKERS ( 04 Jan 2021 12:15 )  x     / 1.78 ng/mL / 6997 U/L / x     / 20.9 ng/mL                                            LIVER FUNCTIONS - ( 06 Jan 2021 05:40 )  Alb: 2.9 g/dL / Pro: 6.0 g/dL / ALK PHOS: 79 U/L / ALT: 69 U/L / AST: 144 U/L / GGT: x                                                                                               Mode: AC/ CMV (Assist Control/ Continuous Mandatory Ventilation)  RR (machine): 30  TV (machine): 450  FiO2: 50  PEEP: 12  ITime: 1  MAP: 20  PIP: 30                                      ABG - ( 06 Jan 2021 03:30 )  pH, Arterial: 7.36  pH, Blood: x     /  pCO2: 48    /  pO2: 121   / HCO3: 28    / Base Excess: 1.8   /  SaO2: 98    PPl 20               MEDICATIONS  (STANDING):  aspirin  chewable 81 milliGRAM(s) Oral daily  atorvastatin 20 milliGRAM(s) Oral at bedtime  azithromycin  IVPB 500 milliGRAM(s) IV Intermittent every 24 hours  cefepime   IVPB 500 milliGRAM(s) IV Intermittent every 24 hours  chlorhexidine 0.12% Liquid 15 milliLiter(s) Oral Mucosa every 12 hours  chlorhexidine 4% Liquid 1 Application(s) Topical <User Schedule>  dexAMETHasone  Injectable 6 milliGRAM(s) IV Push daily  dextrose 40% Gel 15 Gram(s) Oral once  dextrose 5%. 1000 milliLiter(s) (50 mL/Hr) IV Continuous <Continuous>  dextrose 5%. 1000 milliLiter(s) (100 mL/Hr) IV Continuous <Continuous>  dextrose 50% Injectable 25 Gram(s) IV Push once  dextrose 50% Injectable 12.5 Gram(s) IV Push once  dextrose 50% Injectable 25 Gram(s) IV Push once  epoetin raven-epbx (RETACRIT) Injectable 8000 Unit(s) IV Push <User Schedule>  fentaNYL   Infusion. 0.5 MICROgram(s)/kG/Hr (4.85 mL/Hr) IV Continuous <Continuous>  furosemide   Injectable 80 milliGRAM(s) IV Push two times a day  glucagon  Injectable 1 milliGRAM(s) IntraMuscular once  heparin  Infusion 1800 Unit(s)/Hr (18 mL/Hr) IV Continuous <Continuous>  insulin lispro (ADMELOG) corrective regimen sliding scale   SubCutaneous three times a day before meals  levothyroxine 100 MICROGram(s) Oral daily  metoprolol tartrate 12.5 milliGRAM(s) Oral two times a day  midazolam Infusion 0.02 mG/kG/Hr (1.94 mL/Hr) IV Continuous <Continuous>  norepinephrine Infusion 0.05 MICROgram(s)/kG/Min (9.08 mL/Hr) IV Continuous <Continuous>  pantoprazole   Suspension 40 milliGRAM(s) Oral before breakfast  polyethylene glycol 3350 17 Gram(s) Oral at bedtime  senna 2 Tablet(s) Oral at bedtime  sevelamer carbonate 800 milliGRAM(s) Oral three times a day with meals    MEDICATIONS  (PRN):  acetaminophen   Tablet .. 650 milliGRAM(s) Oral every 6 hours PRN Temp greater or equal to 38C (100.4F), Mild Pain (1 - 3)      New X-rays reviewed:                                                                                  ECHO    CXR interpreted by me:  ET high.  OG oK>  Bilateral infiltrates

## 2021-01-06 NOTE — PROGRESS NOTE ADULT - ASSESSMENT
IMP:  - acute hypoxic resp failure  - covid 19 pneumonia  - ESRD on HD  - DM, fairly well controlled pta but marked hyperglycemia now    SUGGEST:  - estimated REE by PSE 2230 kcal/d and protein needs ~ 139 gm/d  - Nepro inappropriate in covid ARDS due to high n6fa content; current dose provides only 95 gm protein/d  - change feeds to Peptamen AF at 75 ml/h --> 137 gm protein (entirely whey source), 2160 kcal, meets recommended low n6:n3 ratio, 1500 total mg phos/d and 77 total mEq K per day  - glycemic control - if feeds given by pump drip then insulin should also be drip  - check 25oh vitamin D level and replete aggressively

## 2021-01-06 NOTE — PROGRESS NOTE ADULT - SUBJECTIVE AND OBJECTIVE BOX
61 yo M w/ PMH of Afib on coumadin, DM2, ESRD on HD MWF, HLD, HTN, CVA presents with weakness & fall. Patient notes when he woke up today, he felt generally weak, slid from the bed onto the floor landing on his bottom, denies head injury/loc. Patient notes that he has been having increased sputum production for the past few days, endorses mild shortness of breath without chest pain/fever/diarrhea/vomiting.  On admission pt found to be obtunded and unable to provide HPI. HPI obtained by Sister who is his primary care taker. As per sister, pt has been having increased frequency of imbalance/fall since 2 days ago. Decreased PO intake since prior to admission. pt found to be covid +  pt intubated 1/4    PMH:  chronic anemia   Thyroid cancer  Chronic leg pain  OA (osteoarthritis)  SOB (shortness of breath) on exertion  ESRD on HD  Atrial fibrillation on warfarin  Stroke 8 y ago w residual R sided weakness  Hypertension  High blood cholesterol  Diabetes mellitus, type 2  Smoker  H/O gastroesophageal reflux (GERD)  History of tonsillectomy  PAD s/p Multiple toe amputations (amp 4 1/2 left toes; has 1/2 left mid toe; amp right mid toe)   A-V fistula    Vital Signs Last 24 Hrs  T(C): 37.3 (06 Jan 2021 12:00), Max: 37.6 (05 Jan 2021 20:00)  T(F): 99.1 (06 Jan 2021 12:00), Max: 99.7 (05 Jan 2021 20:00)  HR: 75 (06 Jan 2021 16:40) (72 - 92)  BP: --  BP(mean): --  RR: 32 (06 Jan 2021 15:00) (0 - 52)  SpO2: 97% (06 Jan 2021 16:40) (88% - 100%)  Drug Dosing Weight  Height (cm): 177.8 (01 Jan 2021 16:05)  Weight (kg): 96.9 (01 Jan 2021 16:05)  BMI (kg/m2): 30.7 (01 Jan 2021 16:05)  BSA (m2): 2.15 (01 Jan 2021 16:05)  intubated, sedated  + on levo  abd ND  ext warm, + LE edema    MEDICATIONS  (STANDING):  aspirin  chewable 81 milliGRAM(s) Oral daily  atorvastatin 20 milliGRAM(s) Oral at bedtime  azithromycin  IVPB 500 milliGRAM(s) IV Intermittent every 24 hours  cefepime   IVPB 500 milliGRAM(s) IV Intermittent every 24 hours  chlorhexidine 0.12% Liquid 15 milliLiter(s) Oral Mucosa every 12 hours  chlorhexidine 4% Liquid 1 Application(s) Topical <User Schedule>  dexAMETHasone  Injectable 6 milliGRAM(s) IV Push daily  dexMEDEtomidine Infusion 0.5 MICROgram(s)/kG/Hr (12.1 mL/Hr) IV Continuous <Continuous>  epoetin raven-epbx (RETACRIT) Injectable 8000 Unit(s) IV Push <User Schedule>  fentaNYL   Infusion. 0.5 MICROgram(s)/kG/Hr (4.85 mL/Hr) IV Continuous <Continuous>  furosemide   Injectable 80 milliGRAM(s) IV Push two times a day  heparin  Infusion 1800 Unit(s)/Hr (15 mL/Hr) IV Continuous <Continuous>  insulin regular Infusion 1 Unit(s)/Hr (1 mL/Hr) IV Continuous <Continuous>  levothyroxine 100 MICROGram(s) Oral daily  metoprolol tartrate 12.5 milliGRAM(s) Oral two times a day  norepinephrine Infusion 0.05 MICROgram(s)/kG/Min (9.08 mL/Hr) IV Continuous <Continuous>  pantoprazole   Suspension 40 milliGRAM(s) Oral before breakfast  polyethylene glycol 3350 17 Gram(s) Oral at bedtime  senna 2 Tablet(s) Oral at bedtime  sevelamer carbonate 800 milliGRAM(s) Oral three times a day with meals                        9.2    8.11  )-----------( 184      ( 06 Jan 2021 05:40 )             27.9   01-06    132<L>  |  92<L>  |  67<HH>  ----------------------------<  339<H>  5.4<H>   |  26  |  6.2<HH>    Ca    7.4<L>      06 Jan 2021 05:40  Mg     2.4     01-06    TPro  6.0  /  Alb  2.9<L>  /  TBili  1.0  /  DBili  x   /  AST  144<H>  /  ALT  69<H>  /  AlkPhos  79  01-06  last phos on 1/2 was 5.3  Hb A1c on adm 5.9     POCT Blood Glucose.: 267 mg/dL (06 Jan 2021 16:13)  POCT Blood Glucose.: 268 mg/dL (06 Jan 2021 15:17)  POCT Blood Glucose.: 302 mg/dL (06 Jan 2021 14:37)  POCT Blood Glucose.: 355 mg/dL (06 Jan 2021 13:13)  POCT Blood Glucose.: 414 mg/dL (06 Jan 2021 12:04)  POCT Blood Glucose.: 416 mg/dL (06 Jan 2021 10:55)  POCT Blood Glucose.: 434 mg/dL (06 Jan 2021 10:54)  POCT Blood Glucose.: 334 mg/dL (06 Jan 2021 06:13)    < from: Xray Chest 1 View-PORTABLE IMMEDIATE (Xray Chest 1 View-PORTABLE IMMEDIATE .) (01.06.21 @ 12:48) >  Support devices: Patient is intubated. Enteric catheter extends below the hemidiaphragm. Telemetry leads overlie the thorax. Right-sided central venous catheter.    Cardiac/mediastinum/hilum: Unremarkable.    Lung parenchyma/Pleura:Bilateral opacities.    < end of copied text >

## 2021-01-07 NOTE — PROGRESS NOTE ADULT - SUBJECTIVE AND OBJECTIVE BOX
[facility] FOLLOW UP NOTE  --------------------------------------------------------------------------------  24 hour events/subjective: Patient examined during HD. Intubated.    PAST HISTORY  --------------------------------------------------------------------------------  No significant changes to PMH, PSH, FHx, SHx, unless otherwise noted    ALLERGIES & MEDICATIONS  --------------------------------------------------------------------------------  Allergies    Orange (Other)  Originally Entered as [HIVES] reaction to [sulfur] (Unknown)  penicillin (Unknown)  sulfADIAZINE (Unknown)    Intolerances      Standing Inpatient Medications  acetaminophen   Tablet .. 650 milliGRAM(s) Oral once  aspirin  chewable 81 milliGRAM(s) Oral daily  atorvastatin 20 milliGRAM(s) Oral at bedtime  azithromycin  IVPB 500 milliGRAM(s) IV Intermittent every 24 hours  cefepime   IVPB 500 milliGRAM(s) IV Intermittent every 24 hours  chlorhexidine 0.12% Liquid 15 milliLiter(s) Oral Mucosa every 12 hours  chlorhexidine 4% Liquid 1 Application(s) Topical <User Schedule>  dexAMETHasone  Injectable 6 milliGRAM(s) IV Push daily  dexMEDEtomidine Infusion 0.2 MICROgram(s)/kG/Hr IV Continuous <Continuous>  dextrose 40% Gel 15 Gram(s) Oral once  dextrose 5%. 1000 milliLiter(s) IV Continuous <Continuous>  dextrose 5%. 1000 milliLiter(s) IV Continuous <Continuous>  dextrose 50% Injectable 25 Gram(s) IV Push once  dextrose 50% Injectable 12.5 Gram(s) IV Push once  dextrose 50% Injectable 25 Gram(s) IV Push once  epoetin raven-epbx (RETACRIT) Injectable 8000 Unit(s) IV Push <User Schedule>  fentaNYL   Infusion. 0.5 MICROgram(s)/kG/Hr IV Continuous <Continuous>  furosemide   Injectable 80 milliGRAM(s) IV Push two times a day  glucagon  Injectable 1 milliGRAM(s) IntraMuscular once  heparin  Infusion 1800 Unit(s)/Hr IV Continuous <Continuous>  insulin regular Infusion 1 Unit(s)/Hr IV Continuous <Continuous>  levothyroxine 100 MICROGram(s) Oral daily  metoprolol tartrate 12.5 milliGRAM(s) Oral two times a day  norepinephrine Infusion 0.05 MICROgram(s)/kG/Min IV Continuous <Continuous>  pantoprazole   Suspension 40 milliGRAM(s) Oral before breakfast  polyethylene glycol 3350 17 Gram(s) Oral at bedtime  senna 2 Tablet(s) Oral at bedtime  sevelamer carbonate 800 milliGRAM(s) Oral three times a day with meals    PRN Inpatient Medications  acetaminophen   Tablet .. 650 milliGRAM(s) Oral every 6 hours PRN      VITALS/PHYSICAL EXAM  --------------------------------------------------------------------------------  T(C): 36.9 (01-07-21 @ 08:00), Max: 37.9 (01-06-21 @ 16:00)  HR: 74 (01-07-21 @ 10:00) (72 - 83)  BP: --  RR: 26 (01-07-21 @ 10:00) (16 - 33)  SpO2: 98% (01-07-21 @ 10:00) (90% - 100%)  Wt(kg): --        01-06-21 @ 07:01  -  01-07-21 @ 07:00  --------------------------------------------------------  IN: 2723.1 mL / OUT: 0 mL / NET: 2723.1 mL    01-07-21 @ 07:01  -  01-07-21 @ 12:03  --------------------------------------------------------  IN: 0 mL / OUT: 3000 mL / NET: -3000 mL      Physical Exam:  	Gen: Intubated  	Pulm: B/L supriya  	CV: RRR, S1S2  	Abd: +BS, soft, nondistended  	LE: Warm, edema  	Vascular access: AVF    LABS/STUDIES  --------------------------------------------------------------------------------              9.5    15.20 >-----------<  212      [01-07-21 @ 05:10]              29.3     133  |  93  |  87  ----------------------------<  279      [01-07-21 @ 05:10]  4.8   |  22  |  6.9        Ca     7.4     [01-07-21 @ 05:10]      Mg     2.4     [01-07-21 @ 05:10]    TPro  6.2  /  Alb  3.0  /  TBili  1.0  /  DBili  x   /  AST  108  /  ALT  63  /  AlkPhos  93  [01-07-21 @ 05:10]    PT/INR: PT 17.10, INR 1.49       [01-07-21 @ 05:10]  PTT: 89.7       [01-07-21 @ 05:10]      Creatinine Trend:  SCr 6.9 [01-07 @ 05:10]  SCr 6.2 [01-06 @ 05:40]  SCr 6.9 [01-05 @ 05:30]  SCr 5.5 [01-03 @ 06:31]  SCr 7.0 [01-02 @ 04:30]    Urinalysis - [01-06-21 @ 18:43]      Color Yellow / Appearance Slightly Turbid / SG 1.018 / pH 6.5      Gluc 100 mg/dL / Ketone Negative  / Bili Negative / Urobili <2 mg/dL       Blood Moderate / Protein 300 mg/dL / Leuk Est Small / Nitrite Negative       /  / Hyaline 114 / Gran  / Sq Epi  / Non Sq Epi 1 / Bacteria Negative      Ferritin 2748      [01-01-21 @ 17:56]  HbA1c 7.1      [05-21-19 @ 04:30]  TSH 4.57      [01-01-21 @ 17:56]

## 2021-01-07 NOTE — PROGRESS NOTE ADULT - ASSESSMENT
ESRD (due to DM) on HD MWF  s/p Fall  altered mental status   Covid-19 PNA / bacterial PNA   fluid overload  hyponatremia  DM  HTN  afib on coumadin  CVA  NSTEMI    plan:    HD today: 3 hours, opti 160 dialyzer, 2K bath, 3L UF  left arm precautions  renal diet with sevelamer   dexamethasone   vanco / cefepime / f/u ID  covid isolation  cont toprol xl   cont lasix 80mg iv q12h  f/u cardio / f/u pulm  icu care

## 2021-01-07 NOTE — PROGRESS NOTE ADULT - ASSESSMENT
ASSESSMENT  59 yo M w/ PMH of Afib on coumadin, DM2, ESRD on HD MWF, HLD, HTN, CVA presents with weakness & fall. Patient notes when he woke up today, he felt generally weak, slid from the bed onto the floor landing on his bottom, denies head injury/loc. Patient notes that he has been having increased sputum production for the past few days, endorses mild shortness of breath without chest pain/fever/diarrhea/vomiting. Patient had full dialysis session wednesday, notes next session is tomorrow due to the holidays. Denies any focal weakness or pain currentl      IMPRESSION  #COVID-19 Pneumonia  - Unclear onset of symptoms, CXR with RLL opacity, possible super-imposed pneumonia  - intubated 1/4  - D-Dimer Assay, Quantitative: 377  - CRP pending  - Ferritin, Serum: 2748: Test Repeated ng/mL (01.01.21 @ 17:56)  - Procalcitonin, Serum: 5.28 (01.06.21 @ 05:40)  -   #ESRD on HD  #DM II  #HTN  #CVA  #Obesity BMI (kg/m2): 30.7  #DM   #Abx allergy: penicillin (Unknown) - told he had allergy as a child, tolerates cefepime   sulfADIAZINE (Unknown)    RECOMMENDATIONS  - please repeat blood cultures  - can stop azithromycin  - continue cefepime for now while waiting for tracheal cultures  - Dexamethasone 6mg daily  - check COVID antibodies  - trend inflammatory markers - procalcitonin will likely be elevated in setting of dialysis    Please call or message on [employer] Teams if with any questions.  Spectra [phone]    This is a preliminary incomplete pended note, all final recommendations to follow after interview and examination of the patient.

## 2021-01-07 NOTE — PROGRESS NOTE ADULT - SUBJECTIVE AND OBJECTIVE BOX
ROGER RODRIGUES  60y, Male  Allergy: Orange (Other)  Originally Entered as [HIVES] reaction to [sulfur] (Unknown)  penicillin (Unknown)  sulfADIAZINE (Unknown)      LOS  6d    CHIEF COMPLAINT: s/p fall. (2021 17:14)      INTERVAL EVENTS/HPI  - No acute events overnight  - T(F): , Max: 100.3 (21 @ 16:00)  - WBC Count: 15.20 (21 @ 05:10)  WBC Count: 8.11 (21 @ 05:40)     - Creatinine, Serum: 6.9 (21 @ 05:10)  Creatinine, Serum: 6.2 (21 @ 05:40)       ROS  unable to obtain history secondary to patient's mental status     VITALS:  T(F): 97.6, Max: 100.3 (21 @ 16:00)  HR: 76  BP: --  RR: 20Vital Signs Last 24 Hrs  T(C): 36.4 (2021 04:00), Max: 37.9 (2021 16:00)  T(F): 97.6 (2021 04:00), Max: 100.3 (2021 16:00)  HR: 76 (2021 07:00) (72 - 92)  BP: --  BP(mean): --  RR: 20 (2021 07:00) (12 - 52)  SpO2: 98% (2021 07:00) (88% - 100%)    PHYSICAL EXAM:  Gen: intubated  HEENT: Normocephalic, atraumatic  Neck: supple, no lymphadenopathy  CV: Regular rate & regular rhythm  Lungs: decreased BS at bases, no fremitus  Abdomen: Soft, BS present  Ext: Warm, well perfused  Neuro: non focal, awake  Skin: no rash, no erythema  Lines: no phlebitis    FH: Non-contributory  Social Hx: Non-contributory    TESTS & MEASUREMENTS:                        9.5    15.20 )-----------( 212      ( 2021 05:10 )             29.3         133<L>  |  93<L>  |  87<HH>  ----------------------------<  279<H>  4.8   |  22  |  6.9<HH>    Ca    7.4<L>      2021 05:10  Mg     2.4         TPro  6.2  /  Alb  3.0<L>  /  TBili  1.0  /  DBili  x   /  AST  108<H>  /  ALT  63<H>  /  AlkPhos  93      eGFR if Non African American: 8 mL/min/1.73M2 (21 @ 05:10)  eGFR if African American: 9 mL/min/1.73M2 (21 @ 05:10)    LIVER FUNCTIONS - ( 2021 05:10 )  Alb: 3.0 g/dL / Pro: 6.2 g/dL / ALK PHOS: 93 U/L / ALT: 63 U/L / AST: 108 U/L / GGT: x           Urinalysis Basic - ( 2021 18:43 )    Color: Yellow / Appearance: Slightly Turbid / S.018 / pH: x  Gluc: x / Ketone: Negative  / Bili: Negative / Urobili: <2 mg/dL   Blood: x / Protein: 300 mg/dL / Nitrite: Negative   Leuk Esterase: Small / RBC: 178 /HPF /  /HPF   Sq Epi: x / Non Sq Epi: 1 /HPF / Bacteria: Negative        Culture - Sputum (collected 21 @ 18:30)  Source: .Sputum trache aspirate  Gram Stain (21 @ 06:09):    Moderate polymorphonuclear leukocytes per low power field    Few Squamous epithelial cells per low power field    Moderate Gram positive cocci in pairs seen per oil power field    Moderate Gram Variable Rods seen per oil power field    Culture - Blood (collected 21 @ 04:30)  Source: .Blood Blood  Preliminary Report (21 @ 18:01):    No growth to date.    Culture - Blood (collected 21 @ 17:56)  Source: .Blood None  Final Report (21 @ 07:00):    No Growth Final            INFECTIOUS DISEASES TESTING  Procalcitonin, Serum: 5.28 (21 @ 05:40)  MRSA PCR Result.: Negative (21 @ 21:32)  Procalcitonin, Serum: 4.11 (21 @ 17:56)  Rapid RVP Result: Detected (21 @ 11:00)      INFLAMMATORY MARKERS      RADIOLOGY & ADDITIONAL TESTS:  I have personally reviewed the last available Chest xray  CXR      CT      CARDIOLOGY TESTING  12 Lead ECG:   Ventricular Rate 90 BPM    Atrial Rate 208 BPM    QRS Duration 118 ms    Q-T Interval 390 ms    QTC Calculation(Bazett) 477 ms    R Axis 99 degrees    T Axis 157 degrees    Diagnosis Line Atrial flutter  Rightward axis  Incomplete right bundle branch block  T wave abnormality, consider inferolateral ischemia  Prolonged QT  Abnormal ECG    Confirmed by GRIFFIN WRIGHT MD ([id number]) on 2021 12:50:22 PM  Also confirmed by MARLA ADAMS MD [id number])  on 2021 12:51:07 PM (21 @ 20:03)  12 Lead ECG:   Ventricular Rate 81 BPM    Atrial Rate 202 BPM    QRS Duration 106 ms    Q-T Interval 414 ms    QTC Calculation(Bazett) 480 ms    R Axis 96 degrees    T Axis 145 degrees    Diagnosis Line Atrial flutter with variable A-V block  Rightward axis  Incomplete right bundle branch block  Septal infarct , age undetermined  ST & T wave abnormality, consider lateral ischemia  Abnormal ECG    Confirmed by GRIFFIN WRIGHT MD ([id number]) on 2021 12:31:13 PM (21 @ 10:49)      MEDICATIONS  acetaminophen   Tablet .. 650 Oral once  aspirin  chewable 81 Oral daily  atorvastatin 20 Oral at bedtime  azithromycin  IVPB 500 IV Intermittent every 24 hours  cefepime   IVPB 500 IV Intermittent every 24 hours  chlorhexidine 0.12% Liquid 15 Oral Mucosa every 12 hours  chlorhexidine 4% Liquid 1 Topical <User Schedule>  dexAMETHasone  Injectable 6 IV Push daily  dexMEDEtomidine Infusion 0.2 IV Continuous <Continuous>  dextrose 40% Gel 15 Oral once  dextrose 5%. 1000 IV Continuous <Continuous>  dextrose 5%. 1000 IV Continuous <Continuous>  dextrose 50% Injectable 25 IV Push once  dextrose 50% Injectable 12.5 IV Push once  dextrose 50% Injectable 25 IV Push once  epoetin raven-epbx (RETACRIT) Injectable 8000 IV Push <User Schedule>  fentaNYL   Infusion. 0.5 IV Continuous <Continuous>  furosemide   Injectable 80 IV Push two times a day  glucagon  Injectable 1 IntraMuscular once  heparin  Infusion 1800 IV Continuous <Continuous>  insulin regular Infusion 1 IV Continuous <Continuous>  levothyroxine 100 Oral daily  metoprolol tartrate 12.5 Oral two times a day  norepinephrine Infusion 0.05 IV Continuous <Continuous>  pantoprazole   Suspension 40 Oral before breakfast  polyethylene glycol 3350 17 Oral at bedtime  senna 2 Oral at bedtime  sevelamer carbonate 800 Oral three times a day with meals      WEIGHT  Weight (kg): 96.9 (21 @ 16:05)  Creatinine, Serum: 6.9 mg/dL (21 @ 05:10)      ANTIBIOTICS:  azithromycin  IVPB 500 milliGRAM(s) IV Intermittent every 24 hours  cefepime   IVPB 500 milliGRAM(s) IV Intermittent every 24 hours      All available historical records have been reviewed

## 2021-01-07 NOTE — PROGRESS NOTE ADULT - SUBJECTIVE AND OBJECTIVE BOX
Patient is a 60y old  Male who presents with a chief complaint of s/p fall. (2021 07:29)        Over Night Events:        ROS:     All ROS are negative except HPI         PHYSICAL EXAM    ICU Vital Signs Last 24 Hrs  T(C): 36.4 (2021 04:00), Max: 37.9 (2021 16:00)  T(F): 97.6 (2021 04:00), Max: 100.3 (2021 16:00)  HR: 78 (2021 09:24) (72 - 92)  BP: --  BP(mean): --  ABP: 120/50 (2021 07:00) (94/46 - 126/50)  ABP(mean): 74 (2021 07:00) (62 - 78)  RR: 20 (2021 07:00) (17 - 52)  SpO2: 98% (2021 09:24) (88% - 100%)      CONSTITUTIONAL:  Well nourished.  NAD    ENT:   Airway patent,   Mouth with normal mucosa.   No thrush    EYES:   Pupils equal,   Round and reactive to light.    CARDIAC:   Normal rate,   Regular rhythm.    No edema      Vascular:  Normal systolic impulse  No Carotid bruits    RESPIRATORY:   No wheezing  Bilateral BS  Normal chest expansion  Not tachypneic,  No use of accessory muscles    GASTROINTESTINAL:  Abdomen soft,   Non-tender,   No guarding,   + BS    MUSCULOSKELETAL:   Range of motion is not limited,  No clubbing, cyanosis    NEUROLOGICAL:   Alert and oriented   No motor  deficits.    SKIN:   Skin normal color for race,   Warm and dry and intact.   No evidence of rash.    PSYCHIATRIC:   Normal mood and affect.   No apparent risk to self or others.    HEMATOLOGICAL:  No cervical  lymphadenopathy.  no inguinal lymphadenopathy      21 @ 07:01  -  21 @ 07:00  --------------------------------------------------------  IN:    Dexmedetomidine: 127.3 mL    Dexmedetomidine: 116.4 mL    FentaNYL: 213.4 mL    Heparin: 336.7 mL    Insulin: 129 mL    IV PiggyBack: 50 mL    Midazolam: 2 mL    Nepro with Carb Steady: 720 mL    Norepinephrine: 128.3 mL    Peptamen A.F.: 900 mL  Total IN: 2723.1 mL    OUT:  Total OUT: 0 mL    Total NET: 2723.1 mL          LABS:                            9.5    15.20 )-----------( 212      ( 2021 05:10 )             29.3                                               01    133<L>  |  93<L>  |  87<HH>  ----------------------------<  279<H>  4.8   |  22  |  6.9<HH>    Ca    7.4<L>      2021 05:10  Mg     2.4         TPro  6.2  /  Alb  3.0<L>  /  TBili  1.0  /  DBili  x   /  AST  108<H>  /  ALT  63<H>  /  AlkPhos  93        PT/INR - ( 2021 05:10 )   PT: 17.10 sec;   INR: 1.49 ratio         PTT - ( 2021 05:10 )  PTT:89.7 sec                                       Urinalysis Basic - ( 2021 18:43 )    Color: Yellow / Appearance: Slightly Turbid / S.018 / pH: x  Gluc: x / Ketone: Negative  / Bili: Negative / Urobili: <2 mg/dL   Blood: x / Protein: 300 mg/dL / Nitrite: Negative   Leuk Esterase: Small / RBC: 178 /HPF /  /HPF   Sq Epi: x / Non Sq Epi: 1 /HPF / Bacteria: Negative                                                  LIVER FUNCTIONS - ( 2021 05:10 )  Alb: 3.0 g/dL / Pro: 6.2 g/dL / ALK PHOS: 93 U/L / ALT: 63 U/L / AST: 108 U/L / GGT: x                                                  Culture - Sputum (collected 2021 18:30)  Source: .Sputum trache aspirate  Gram Stain (2021 06:09):    Moderate polymorphonuclear leukocytes per low power field    Few Squamous epithelial cells per low power field    Moderate Gram positive cocci in pairs seen per oil power field    Moderate Gram Variable Rods seen per oil power field                                                   Mode: AC/ CMV (Assist Control/ Continuous Mandatory Ventilation)  RR (machine): 30  TV (machine): 450  FiO2: 40  PEEP: 12  ITime: 1  MAP: 21  PIP: 23                                      ABG - ( 2021 02:34 )  pH, Arterial: 7.30  pH, Blood: x     /  pCO2: 52    /  pO2: 56    / HCO3: 26    / Base Excess: -1.2  /  SaO2: 89                  MEDICATIONS  (STANDING):  acetaminophen   Tablet .. 650 milliGRAM(s) Oral once  aspirin  chewable 81 milliGRAM(s) Oral daily  atorvastatin 20 milliGRAM(s) Oral at bedtime  azithromycin  IVPB 500 milliGRAM(s) IV Intermittent every 24 hours  cefepime   IVPB 500 milliGRAM(s) IV Intermittent every 24 hours  chlorhexidine 0.12% Liquid 15 milliLiter(s) Oral Mucosa every 12 hours  chlorhexidine 4% Liquid 1 Application(s) Topical <User Schedule>  dexAMETHasone  Injectable 6 milliGRAM(s) IV Push daily  dexMEDEtomidine Infusion 0.2 MICROgram(s)/kG/Hr (4.85 mL/Hr) IV Continuous <Continuous>  dextrose 40% Gel 15 Gram(s) Oral once  dextrose 5%. 1000 milliLiter(s) (50 mL/Hr) IV Continuous <Continuous>  dextrose 5%. 1000 milliLiter(s) (100 mL/Hr) IV Continuous <Continuous>  dextrose 50% Injectable 25 Gram(s) IV Push once  dextrose 50% Injectable 12.5 Gram(s) IV Push once  dextrose 50% Injectable 25 Gram(s) IV Push once  epoetin raven-epbx (RETACRIT) Injectable 8000 Unit(s) IV Push <User Schedule>  fentaNYL   Infusion. 0.5 MICROgram(s)/kG/Hr (4.85 mL/Hr) IV Continuous <Continuous>  furosemide   Injectable 80 milliGRAM(s) IV Push two times a day  glucagon  Injectable 1 milliGRAM(s) IntraMuscular once  heparin  Infusion 1800 Unit(s)/Hr (12 mL/Hr) IV Continuous <Continuous>  insulin regular Infusion 1 Unit(s)/Hr (1 mL/Hr) IV Continuous <Continuous>  levothyroxine 100 MICROGram(s) Oral daily  metoprolol tartrate 12.5 milliGRAM(s) Oral two times a day  norepinephrine Infusion 0.05 MICROgram(s)/kG/Min (9.08 mL/Hr) IV Continuous <Continuous>  pantoprazole   Suspension 40 milliGRAM(s) Oral before breakfast  polyethylene glycol 3350 17 Gram(s) Oral at bedtime  senna 2 Tablet(s) Oral at bedtime  sevelamer carbonate 800 milliGRAM(s) Oral three times a day with meals    MEDICATIONS  (PRN):  acetaminophen   Tablet .. 650 milliGRAM(s) Oral every 6 hours PRN Temp greater or equal to 38C (100.4F), Mild Pain (1 - 3)      New X-rays reviewed:                                                                                  ECHO    CXR interpreted by me:       Patient is a 60y old  Male who presents with a chief complaint of s/p fall. (2021 07:29)        Over Night Events:  Remains on MV.  On Levophed 0.03.  on HD         ROS:     All ROS are negative except HPI         PHYSICAL EXAM    ICU Vital Signs Last 24 Hrs  T(C): 36.4 (2021 04:00), Max: 37.9 (2021 16:00)  T(F): 97.6 (2021 04:00), Max: 100.3 (2021 16:00)  HR: 78 (2021 09:24) (72 - 92)  BP: --  BP(mean): --  ABP: 120/50 (2021 07:00) (94/46 - 126/50)  ABP(mean): 74 (2021 07:00) (62 - 78)  RR: 20 (2021 07:00) (17 - 52)  SpO2: 98% (2021 09:24) (88% - 100%)      CONSTITUTIONAL:  Well nourished.  Ill appearing. NAD    ENT:   Airway patent,   Mouth with normal mucosa.   No thrush    EYES:   Pupils equal,   Round and reactive to light.    CARDIAC:   Normal rate,   Regular rhythm.    edema      Vascular:  Normal systolic impulse  No Carotid bruits    RESPIRATORY:   No wheezing  Bilateral BS  Normal chest expansion  Not tachypneic,  No use of accessory muscles    GASTROINTESTINAL:  Abdomen soft,   Non-tender,   No guarding,   + BS    MUSCULOSKELETAL:   Range of motion is not limited,  No clubbing, cyanosis    NEUROLOGICAL:   Sedated   Follows commands     SKIN:   Skin normal color for race,   Warm and dry   No evidence of rash.    PSYCHIATRIC:   Sedated   No apparent risk to self or others.    HEMATOLOGICAL:  No cervical  lymphadenopathy.  no inguinal lymphadenopathy      21 @ 07:01  -  21 @ 07:00  --------------------------------------------------------  IN:    Dexmedetomidine: 127.3 mL    Dexmedetomidine: 116.4 mL    FentaNYL: 213.4 mL    Heparin: 336.7 mL    Insulin: 129 mL    IV PiggyBack: 50 mL    Midazolam: 2 mL    Nepro with Carb Steady: 720 mL    Norepinephrine: 128.3 mL    Peptamen A.F.: 900 mL  Total IN: 2723.1 mL    OUT:  Total OUT: 0 mL    Total NET: 2723.1 mL          LABS:                            9.5    15.20 )-----------( 212      ( 2021 05:10 )             29.3                                               01-07    133<L>  |  93<L>  |  87<HH>  ----------------------------<  279<H>  4.8   |  22  |  6.9<HH>    Ca    7.4<L>      2021 05:10  Mg     2.4     -    TPro  6.2  /  Alb  3.0<L>  /  TBili  1.0  /  DBili  x   /  AST  108<H>  /  ALT  63<H>  /  AlkPhos  93  01-07      PT/INR - ( 2021 05:10 )   PT: 17.10 sec;   INR: 1.49 ratio         PTT - ( 2021 05:10 )  PTT:89.7 sec                                       Urinalysis Basic - ( 2021 18:43 )    Color: Yellow / Appearance: Slightly Turbid / S.018 / pH: x  Gluc: x / Ketone: Negative  / Bili: Negative / Urobili: <2 mg/dL   Blood: x / Protein: 300 mg/dL / Nitrite: Negative   Leuk Esterase: Small / RBC: 178 /HPF /  /HPF   Sq Epi: x / Non Sq Epi: 1 /HPF / Bacteria: Negative                                                  LIVER FUNCTIONS - ( 2021 05:10 )  Alb: 3.0 g/dL / Pro: 6.2 g/dL / ALK PHOS: 93 U/L / ALT: 63 U/L / AST: 108 U/L / GGT: x                                                  Culture - Sputum (collected 2021 18:30)  Source: .Sputum trache aspirate  Gram Stain (2021 06:09):    Moderate polymorphonuclear leukocytes per low power field    Few Squamous epithelial cells per low power field    Moderate Gram positive cocci in pairs seen per oil power field    Moderate Gram Variable Rods seen per oil power field                                                   Mode: AC/ CMV (Assist Control/ Continuous Mandatory Ventilation)  RR (machine): 30  TV (machine): 450  FiO2: 40  PEEP: 12  ITime: 1  MAP: 21  PIP: 23                                      ABG - ( 2021 02:34 )  pH, Arterial: 7.30  pH, Blood: x     /  pCO2: 52    /  pO2: 56    / HCO3: 26    / Base Excess: -1.2  /  SaO2: 89    PPL 23.  Sat 98               MEDICATIONS  (STANDING):  acetaminophen   Tablet .. 650 milliGRAM(s) Oral once  aspirin  chewable 81 milliGRAM(s) Oral daily  atorvastatin 20 milliGRAM(s) Oral at bedtime  azithromycin  IVPB 500 milliGRAM(s) IV Intermittent every 24 hours  cefepime   IVPB 500 milliGRAM(s) IV Intermittent every 24 hours  chlorhexidine 0.12% Liquid 15 milliLiter(s) Oral Mucosa every 12 hours  chlorhexidine 4% Liquid 1 Application(s) Topical <User Schedule>  dexAMETHasone  Injectable 6 milliGRAM(s) IV Push daily  dexMEDEtomidine Infusion 0.2 MICROgram(s)/kG/Hr (4.85 mL/Hr) IV Continuous <Continuous>  dextrose 40% Gel 15 Gram(s) Oral once  dextrose 5%. 1000 milliLiter(s) (50 mL/Hr) IV Continuous <Continuous>  dextrose 5%. 1000 milliLiter(s) (100 mL/Hr) IV Continuous <Continuous>  dextrose 50% Injectable 25 Gram(s) IV Push once  dextrose 50% Injectable 12.5 Gram(s) IV Push once  dextrose 50% Injectable 25 Gram(s) IV Push once  epoetin raven-epbx (RETACRIT) Injectable 8000 Unit(s) IV Push <User Schedule>  fentaNYL   Infusion. 0.5 MICROgram(s)/kG/Hr (4.85 mL/Hr) IV Continuous <Continuous>  furosemide   Injectable 80 milliGRAM(s) IV Push two times a day  glucagon  Injectable 1 milliGRAM(s) IntraMuscular once  heparin  Infusion 1800 Unit(s)/Hr (12 mL/Hr) IV Continuous <Continuous>  insulin regular Infusion 1 Unit(s)/Hr (1 mL/Hr) IV Continuous <Continuous>  levothyroxine 100 MICROGram(s) Oral daily  metoprolol tartrate 12.5 milliGRAM(s) Oral two times a day  norepinephrine Infusion 0.05 MICROgram(s)/kG/Min (9.08 mL/Hr) IV Continuous <Continuous>  pantoprazole   Suspension 40 milliGRAM(s) Oral before breakfast  polyethylene glycol 3350 17 Gram(s) Oral at bedtime  senna 2 Tablet(s) Oral at bedtime  sevelamer carbonate 800 milliGRAM(s) Oral three times a day with meals    MEDICATIONS  (PRN):  acetaminophen   Tablet .. 650 milliGRAM(s) Oral every 6 hours PRN Temp greater or equal to 38C (100.4F), Mild Pain (1 - 3)      New X-rays reviewed:                                                                                  ECHO    CXR interpreted by me:  ET high.  OG OK>  Bilateral infiltrates

## 2021-01-07 NOTE — PROGRESS NOTE ADULT - SUBJECTIVE AND OBJECTIVE BOX
SUBJECTIVE:    Patient is a 60y old Male who presents with a chief complaint of COVID-19 Pneumonia (2021 11:09)    Currently admitted to medicine with the primary diagnosis of Fever       Today is hospital day 6d. This morning he is resting comfortably in bed and reports no new issues or overnight events.     PAST MEDICAL & SURGICAL HISTORY  PAD (peripheral artery disease)  multiple toe amputations    Anemia  chronic anemia - s/p transfusion 2018    Thyroid cancer    Chronic leg pain    OA (osteoarthritis)    SOB (shortness of breath) on exertion    ESRD (end stage renal disease)  2 yrs    Atrial fibrillation  on warfarin    Right sided weakness    Stroke  8 yrs ago    Hypertension    High blood cholesterol    Diabetes mellitus, type 2    Dialysis patient  Mon, Wednesday, Friday ([facility])    Smoker    H/O thyroid nodule    H/O gastroesophageal reflux (GERD)    History of tonsillectomy    History of surgery  Multiple toe amputations (amp 4 1/2 left toes; has 1/2 left mid toe; amp right mid toe)    A-V fistula  left AVF      SOCIAL HISTORY:  Negative for smoking/alcohol/drug use.     ALLERGIES:  Orange (Other)  Originally Entered as [HIVES] reaction to [sulfur] (Unknown)  penicillin (Unknown)  sulfADIAZINE (Unknown)    MEDICATIONS:  STANDING MEDICATIONS  acetaminophen   Tablet .. 650 milliGRAM(s) Oral once  aspirin  chewable 81 milliGRAM(s) Oral daily  atorvastatin 20 milliGRAM(s) Oral at bedtime  azithromycin  IVPB 500 milliGRAM(s) IV Intermittent every 24 hours  cefepime   IVPB 500 milliGRAM(s) IV Intermittent every 24 hours  chlorhexidine 0.12% Liquid 15 milliLiter(s) Oral Mucosa every 12 hours  chlorhexidine 4% Liquid 1 Application(s) Topical <User Schedule>  dexAMETHasone  Injectable 6 milliGRAM(s) IV Push daily  dexMEDEtomidine Infusion 0.2 MICROgram(s)/kG/Hr IV Continuous <Continuous>  dextrose 40% Gel 15 Gram(s) Oral once  dextrose 5%. 1000 milliLiter(s) IV Continuous <Continuous>  dextrose 5%. 1000 milliLiter(s) IV Continuous <Continuous>  dextrose 50% Injectable 25 Gram(s) IV Push once  dextrose 50% Injectable 12.5 Gram(s) IV Push once  dextrose 50% Injectable 25 Gram(s) IV Push once  epoetin raven-epbx (RETACRIT) Injectable 8000 Unit(s) IV Push <User Schedule>  fentaNYL   Infusion. 0.5 MICROgram(s)/kG/Hr IV Continuous <Continuous>  furosemide   Injectable 80 milliGRAM(s) IV Push two times a day  glucagon  Injectable 1 milliGRAM(s) IntraMuscular once  heparin  Infusion 1800 Unit(s)/Hr IV Continuous <Continuous>  insulin regular Infusion 1 Unit(s)/Hr IV Continuous <Continuous>  levothyroxine 100 MICROGram(s) Oral daily  metoprolol tartrate 12.5 milliGRAM(s) Oral two times a day  norepinephrine Infusion 0.05 MICROgram(s)/kG/Min IV Continuous <Continuous>  pantoprazole   Suspension 40 milliGRAM(s) Oral before breakfast  polyethylene glycol 3350 17 Gram(s) Oral at bedtime  senna 2 Tablet(s) Oral at bedtime  sevelamer carbonate 800 milliGRAM(s) Oral three times a day with meals    PRN MEDICATIONS  acetaminophen   Tablet .. 650 milliGRAM(s) Oral every 6 hours PRN    VITALS:   T(F): 98.5  HR: 74  BP: --  RR: 26  SpO2: 98%    LABS:                        9.5    15.20 )-----------( 212      ( 2021 05:10 )             29.3     01-    133<L>  |  93<L>  |  87<HH>  ----------------------------<  279<H>  4.8   |  22  |  6.9<HH>    Ca    7.4<L>      2021 05:10  Mg     2.4         TPro  6.2  /  Alb  3.0<L>  /  TBili  1.0  /  DBili  x   /  AST  108<H>  /  ALT  63<H>  /  AlkPhos  93  -    PT/INR - ( 2021 05:10 )   PT: 17.10 sec;   INR: 1.49 ratio         PTT - ( 2021 05:10 )  PTT:89.7 sec  Urinalysis Basic - ( 2021 18:43 )    Color: Yellow / Appearance: Slightly Turbid / S.018 / pH: x  Gluc: x / Ketone: Negative  / Bili: Negative / Urobili: <2 mg/dL   Blood: x / Protein: 300 mg/dL / Nitrite: Negative   Leuk Esterase: Small / RBC: 178 /HPF /  /HPF   Sq Epi: x / Non Sq Epi: 1 /HPF / Bacteria: Negative      ABG - ( 2021 02:34 )  pH, Arterial: 7.30  pH, Blood: x     /  pCO2: 52    /  pO2: 56    / HCO3: 26    / Base Excess: -1.2  /  SaO2: 89          Culture - Sputum (collected 2021 18:30)  Source: .Sputum trache aspirate  Gram Stain (2021 06:09):    Moderate polymorphonuclear leukocytes per low power field    Few Squamous epithelial cells per low power field    Moderate Gram positive cocci in pairs seen per oil power field    Moderate Gram Variable Rods seen per oil power field      RADIOLOGY:    PHYSICAL EXAM:  GEN: No acute distress  LUNGS: Clear to auscultation bilaterally   HEART: S1/S2 present. RRR.   ABD: Soft, non-tender, non-distended. Bowel sounds present  EXT: NC/NC/NE/2+PP/[place]/Skin Intact.   NEURO: AAOX3    Intravenous access:   NG tube:   Garza Catheter:   Indwelling Urethral Catheter:     Connect To:  Straight Drainage/Gravity    Indication:  Urine Output Monitoring in Critically Ill (21 @ 05:16) (not performed) [Active]         SUBJECTIVE:    Patient is a 60y old Male who presents with a chief complaint of COVID-19 Pneumonia (2021 11:09). Currently admitted to medicine with the primary diagnosis of acute hypoxic respiratory failure and Fever secondary to COVID-19 pneumonia.   Today is hospital day 6d.     PAST MEDICAL & SURGICAL HISTORY  PAD (peripheral artery disease)  multiple toe amputations    Anemia  chronic anemia - s/p transfusion 2018    Thyroid cancer    Chronic leg pain    OA (osteoarthritis)    SOB (shortness of breath) on exertion    ESRD (end stage renal disease)  2 yrs    Atrial fibrillation  on warfarin    Right sided weakness    Stroke  8 yrs ago    Hypertension    High blood cholesterol    Diabetes mellitus, type 2    Dialysis patient  Mon, Wednesday, Friday ([facility])    Smoker    H/O thyroid nodule    H/O gastroesophageal reflux (GERD)    History of tonsillectomy    History of surgery  Multiple toe amputations (amp 4 1/2 left toes; has 1/2 left mid toe; amp right mid toe)    A-V fistula  left AVF      SOCIAL HISTORY:  Negative for smoking/alcohol/drug use.     ALLERGIES:  Orange (Other)  Originally Entered as [HIVES] reaction to [sulfur] (Unknown)  penicillin (Unknown)  sulfADIAZINE (Unknown)    MEDICATIONS:  STANDING MEDICATIONS  acetaminophen   Tablet .. 650 milliGRAM(s) Oral once  aspirin  chewable 81 milliGRAM(s) Oral daily  atorvastatin 20 milliGRAM(s) Oral at bedtime  azithromycin  IVPB 500 milliGRAM(s) IV Intermittent every 24 hours  cefepime   IVPB 500 milliGRAM(s) IV Intermittent every 24 hours  chlorhexidine 0.12% Liquid 15 milliLiter(s) Oral Mucosa every 12 hours  chlorhexidine 4% Liquid 1 Application(s) Topical <User Schedule>  dexAMETHasone  Injectable 6 milliGRAM(s) IV Push daily  dexMEDEtomidine Infusion 0.2 MICROgram(s)/kG/Hr IV Continuous <Continuous>  dextrose 40% Gel 15 Gram(s) Oral once  dextrose 5%. 1000 milliLiter(s) IV Continuous <Continuous>  dextrose 5%. 1000 milliLiter(s) IV Continuous <Continuous>  dextrose 50% Injectable 25 Gram(s) IV Push once  dextrose 50% Injectable 12.5 Gram(s) IV Push once  dextrose 50% Injectable 25 Gram(s) IV Push once  epoetin raven-epbx (RETACRIT) Injectable 8000 Unit(s) IV Push <User Schedule>  fentaNYL   Infusion. 0.5 MICROgram(s)/kG/Hr IV Continuous <Continuous>  furosemide   Injectable 80 milliGRAM(s) IV Push two times a day  glucagon  Injectable 1 milliGRAM(s) IntraMuscular once  heparin  Infusion 1800 Unit(s)/Hr IV Continuous <Continuous>  insulin regular Infusion 1 Unit(s)/Hr IV Continuous <Continuous>  levothyroxine 100 MICROGram(s) Oral daily  metoprolol tartrate 12.5 milliGRAM(s) Oral two times a day  norepinephrine Infusion 0.05 MICROgram(s)/kG/Min IV Continuous <Continuous>  pantoprazole   Suspension 40 milliGRAM(s) Oral before breakfast  polyethylene glycol 3350 17 Gram(s) Oral at bedtime  senna 2 Tablet(s) Oral at bedtime  sevelamer carbonate 800 milliGRAM(s) Oral three times a day with meals    PRN MEDICATIONS  acetaminophen   Tablet .. 650 milliGRAM(s) Oral every 6 hours PRN    VITALS:   T(F): 98.5  HR: 74  BP: --  RR: 26  SpO2: 98%    LABS:                        9.5    15.20 )-----------( 212      ( 2021 05:10 )             29.3     01-    133<L>  |  93<L>  |  87<HH>  ----------------------------<  279<H>  4.8   |  22  |  6.9<HH>    Ca    7.4<L>      2021 05:10  Mg     2.4     -    TPro  6.2  /  Alb  3.0<L>  /  TBili  1.0  /  DBili  x   /  AST  108<H>  /  ALT  63<H>  /  AlkPhos  93  01-07    PT/INR - ( 2021 05:10 )   PT: 17.10 sec;   INR: 1.49 ratio         PTT - ( 2021 05:10 )  PTT:89.7 sec  Urinalysis Basic - ( 2021 18:43 )    Color: Yellow / Appearance: Slightly Turbid / S.018 / pH: x  Gluc: x / Ketone: Negative  / Bili: Negative / Urobili: <2 mg/dL   Blood: x / Protein: 300 mg/dL / Nitrite: Negative   Leuk Esterase: Small / RBC: 178 /HPF /  /HPF   Sq Epi: x / Non Sq Epi: 1 /HPF / Bacteria: Negative    ABG - ( 2021 02:34 )  pH, Arterial: 7.30  pH, Blood: x     /  pCO2: 52    /  pO2: 56    / HCO3: 26    / Base Excess: -1.2  /  SaO2: 89        Culture - Sputum (collected 2021 18:30)  Source: .Sputum trache aspirate  Gram Stain (2021 06:09):    Moderate polymorphonuclear leukocytes per low power field    Few Squamous epithelial cells per low power field    Moderate Gram positive cocci in pairs seen per oil power field    Moderate Gram Variable Rods seen per oil power field    RADIOLOGY:    Chest Xray seen by the critical care team, ET tube is high, recommend pushing it downwards 3cms. Will follow up repeat chest xray.     PHYSICAL EXAM:  GEN: Intubated, sedated.   LUNGS: Clear to auscultation bilaterally   HEART: S1/S2 present. RRR.   ABD: Soft, non-tender, non-distended. Bowel sounds present  EXT: NC/NC/NE/2+PP/[place]/Skin Intact.   NEURO: intubated and sedated    Intravenous access: Right IJ TLC  OG tube: In place    Intravenous access:   NG tube:   Garza Catheter:   Indwelling Urethral Catheter:     Connect To:  Straight Drainage/Gravity    Indication:  Urine Output Monitoring in Critically Ill (21 @ 05:16) (not performed) [Active]

## 2021-01-07 NOTE — PROGRESS NOTE ADULT - ASSESSMENT
IMPRESSION:    s/p fall / head CT neg  ESRD MWF for HD  Acute hypoxemic resp failure possibly fluid overlaod  DM  NSTEMI  Afib on coumadin  COVID PNA  Superimposed bacteria PNA    PLAN:    CNS: SAT.    HEENT: Oral care.    PULMONARY: Vent changes.  FiO2 40%.    Monitor PPL and DP Advance ET 3 cms and repeat CXR     CARDIOVASCULAR:  I<O as tolerated.  Wean Pressors.      GI: GI prophylaxis.  OG Feeding.  Bowel regimen     RENAL: check lytes and replete PRN. Nephro F/UP result    INFECTIOUS DISEASE: Dexamethasone 6 mg D# 6,  Finish ABX course     HEMATOLOGICAL: DVT Prophylaxis  IV Heparin FU PTT     ENDOCRINE:  Follow up FS.  Insulin protocol if needed.    MUSCULOSKELETAL: Increase as tolerated.    MICU     Prognosis poor    IMPRESSION:    s/p fall / head CT neg  ESRD MWF for HD  Acute hypoxemic resp failure possibly fluid overlaod  NSTEMI  Afib on coumadin  COVID PNA  Superimposed bacteria PNA    PLAN:    CNS: SAT.    HEENT: Oral care.    PULMONARY: Vent changes. Wean o2.  Monitor PPL and DP Advance ET 3 cms and repeat CXR     CARDIOVASCULAR:  I<O as tolerated.  Wean Pressors.      GI: GI prophylaxis.  OG Feeding.  Bowel regimen     RENAL: check lytes and replete PRN. Nephro F/UP result    INFECTIOUS DISEASE: Dexamethasone 6 mg D# 7,  Finish ABX course     HEMATOLOGICAL: DVT Prophylaxis  IV Heparin FU PTT     ENDOCRINE:  Follow up FS.  Insulin protocol if needed.    MUSCULOSKELETAL: Increase as tolerated.    MICU     Prognosis poor

## 2021-01-07 NOTE — PROGRESS NOTE ADULT - ASSESSMENT
#Covid-19 PNA with a suspected  bacterial PNA:   - The patient was intubated for increased alteration, hypoxemia and non-compliance with BiPAP  - Azithromycin 500mg IV once daily ( 1/4/2021), DC on 1/8/2021  - Cefepime 500mg IV once daily (1/1/2021)  - Dexamethasone 6mg IV once daily (1/1/2021)    #Altered Mental Status:   - EEG diffuse slowing, but no seizure activity, check the report above  - CT brain shows ventriculomegaly (check full report), no IC bleed, possible old infarct.  - neurology consulted and recs:   a. CT brain negative x2 for stroke or other structural pathology.  EEG negative for any epileptiform activity.  Suspect multifactorial metabolic encephalopathy in setting of COVID and ESRD and sedatives.  Wean sedation as able, can reassess if patient does not awaken after that time.  b. No further EEG or other tests   c. To be reassessed after sedation is weaned    #Fluid overload  #ESRD:   - HD planned on 1/7/2021  - EPO (retacrit) 8000 units IV push once weekly  - Sevelamer carbonate 800mg po q8hrs with meals    #Atrial fibrillation:  - Paroxysmal ? Currently NSRT  - Heparin gtt, follow up aPTT q6hrs until therapeutic   - Metoprolol succinate ER 25mg po once daily   - Will DC levophed  - CT brain no IC bleed or concerns of IC bleed ( Heparin drip started)  - aPTT being followed     #NSTEMI:   - Type II MI, demand ischemia, likely secondary to hypoxia secondary to COVID-19 pneumonia  - Aspirin 81mg po once daily   - CT brain no IC bleed or concerns of IC bleed ( Heparin drip started)  - Repeat CK-BM and CPK were stable    #Hypothyroidism:  - Continue with levothyroxine 100mcg po once daily     #Hyponatremia:   - Na: 132 (1/6/2021)  - Likely hypervolemic hyponatremia secondary to fluid overload   - Continue with Lasix and dialysis sessions   - Will follow up sodium levels    - Spoke with the patient's sister Nedra Shea on [phone] ( on 1/4/2021), speaking with her for updates daily.   The sister said that she is the health care proxy and will get the form from the house  Patient is full code now   - Concerns about Why the patient did not received Convalescent plasma, hydroxychloroquine and remdesivir.)   - Explained to her that the studies concerning convalescent plasma did not show improvement of mortality and they are best when given first 72 hours  - Explained that he has ESRD ( eGFR <30), and therefore remdesivir is contraindicated  - Explained to her that hydroxychloroquine is not an option for treatment at the moment as studies have showed that there is no benefit to mortality.    #Covid-19 PNA with a suspected  bacterial PNA:   - The patient was intubated for increased alteration, hypoxemia and non-compliance with BiPAP  - Azithromycin 500mg IV once daily ( 1/4/2021), DC on 1/8/2021  - Cefepime 500mg IV once daily (1/1/2021)  - Dexamethasone 6mg IV once daily (1/1/2021)  - COVID-19 antibodies received on 1/4/2021 by [facility] ( called lab [id number] to follow up on it)    #Altered Mental Status:   - EEG diffuse slowing, but no seizure activity, check the report above  - CT brain shows ventriculomegaly (check full report), no IC bleed, possible old infarct.  - neurology consulted and recs:   a. CT brain negative x2 for stroke or other structural pathology.  EEG negative for any epileptiform activity.  Suspect multifactorial metabolic encephalopathy in setting of COVID and ESRD and sedatives.  Wean sedation as able, can reassess if patient does not awaken after that time.  b. No further EEG or other tests   c. To be reassessed after sedation is weaned    #Fluid overload  #ESRD:   - HD planned on 1/7/2021  - EPO (retacrit) 8000 units IV push once weekly  - Sevelamer carbonate 800mg po q8hrs with meals    #Atrial fibrillation:  - Paroxysmal ? Currently NSRT  - Heparin gtt, follow up aPTT q6hrs until therapeutic   - Metoprolol succinate ER 25mg po once daily   - Will DC levophed  - CT brain no IC bleed or concerns of IC bleed ( Heparin drip started)  - aPTT being followed     #NSTEMI:   - Type II MI, demand ischemia, likely secondary to hypoxia secondary to COVID-19 pneumonia  - Aspirin 81mg po once daily   - CT brain no IC bleed or concerns of IC bleed ( Heparin drip started)  - Repeat CK-BM and CPK were stable    #Hypothyroidism:  - Continue with levothyroxine 100mcg po once daily     #Hyponatremia:   - Na: 132 (1/6/2021)  - Likely hypervolemic hyponatremia secondary to fluid overload   - Continue with Lasix and dialysis sessions   - Will follow up sodium levels    - Spoke with the patient's sister Nedra Shea on [phone] ( on 1/4/2021), speaking with her for updates daily.   The sister said that she is the health care proxy and will get the form from the house  Patient is full code now   - Concerns about Why the patient did not received Convalescent plasma, hydroxychloroquine and remdesivir.)   - Explained to her that the studies concerning convalescent plasma did not show improvement of mortality and they are best when given first 72 hours  - Explained that he has ESRD ( eGFR <30), and therefore remdesivir is contraindicated  - Explained to her that hydroxychloroquine is not an option for treatment at the moment as studies have showed that there is no benefit to mortality.    #Covid-19 PNA with a suspected  bacterial PNA:   - The patient was intubated for increased alteration, hypoxemia and non-compliance with BiPAP  - Azithromycin 500mg IV once daily ( 1/4/2021), DC on 1/8/2021  - Cefepime 500mg IV once daily (1/1/2021)  - Dexamethasone 6mg IV once daily (1/1/2021)  - COVID-19 antibodies received on 1/4/2021 by [facility] ( called lab [id number] to follow up on it)  - will repeat blood cultures and resend procalcitonin     #Altered Mental Status:   - EEG diffuse slowing, but no seizure activity, check the report above  - CT brain shows ventriculomegaly (check full report), no IC bleed, possible old infarct.  - neurology consulted and recs:   a. CT brain negative x2 for stroke or other structural pathology.  EEG negative for any epileptiform activity.  Suspect multifactorial metabolic encephalopathy in setting of COVID and ESRD and sedatives.  Wean sedation as able, can reassess if patient does not awaken after that time.  b. No further EEG or other tests   c. To be reassessed after sedation is weaned    #Fluid overload  #ESRD:   - HD planned on 1/7/2021  - EPO (retacrit) 8000 units IV push once weekly  - Sevelamer carbonate 800mg po q8hrs with meals    #Atrial fibrillation:  - Paroxysmal ? Currently NSRT  - Heparin gtt, follow up aPTT q6hrs until therapeutic   - Metoprolol succinate ER 25mg po once daily   - Will DC levophed  - CT brain no IC bleed or concerns of IC bleed ( Heparin drip started)  - aPTT being followed     #NSTEMI:   - Type II MI, demand ischemia, likely secondary to hypoxia secondary to COVID-19 pneumonia  - Aspirin 81mg po once daily   - CT brain no IC bleed or concerns of IC bleed   - On heparin gtt   - Repeat CK-BM and CPK were stable    #Hypothyroidism:  - Continue with levothyroxine 100mcg po once daily     #Hyponatremia:   - Na: 132 (1/6/2021)  - Likely hypervolemic hyponatremia secondary to fluid overload   - Continue with Lasix and dialysis sessions   - Will follow up sodium levels    - Spoke with the patient's sister Nedra Shea on [phone] ( on 1/4/2021), speaking with her for updates daily.   The sister said that she is the health care proxy and will get the form from the house  Patient is full code now   - Concerns about Why the patient did not received Convalescent plasma, hydroxychloroquine and remdesivir.)   - Explained to her that the studies concerning convalescent plasma did not show improvement of mortality and they are best when given first 72 hours  - Explained that he has ESRD ( eGFR <30), and therefore remdesivir is contraindicated  - Explained to her that hydroxychloroquine is not an option for treatment at the moment as studies have showed that there is no benefit to mortality.

## 2021-01-08 NOTE — PROGRESS NOTE ADULT - ASSESSMENT
IMP:  - acute hypoxic resp failure  - covid 19 pneumonia  - ESRD on HD  - DM, fairly well controlled pta but marked hyperglycemia now    SUGGEST:  - estimated REE by PSE 2230 kcal/d and protein needs ~ 139 gm/d  - Nepro inappropriate in covid ARDS due to high n6fa content; current dose provides only 95 gm protein/d  - continue feeding Peptamen AF at 75 ml/h --> 137 gm protein (entirely whey source), 2160 kcal, meets recommended low n6:n3 ratio, 1500 total mg phos/d and 77 total mEq K per day  - glycemic control - insulin drip at one u/h when seen - starting to improve  - check 25oh vitamin D level and replete aggressively

## 2021-01-08 NOTE — PROGRESS NOTE ADULT - SUBJECTIVE AND OBJECTIVE BOX
pt seen earlier today and d/w Rn at bedside and with HO  + steroids  pt remains intubated, sedated on precedex,, + levo, + fever  for HD tomorrow  pt tolerating enteral feeds  rectal tube in place - scant light brown (not malabsorptive in appearance) stool output  abd soft, ND  Vital Signs Last 24 Hrs  T(C): 37.1 (08 Jan 2021 12:00), Max: 38.3 (08 Jan 2021 04:00)  T(F): 98.8 (08 Jan 2021 12:00), Max: 101 (08 Jan 2021 04:00)  HR: 80 (08 Jan 2021 19:00) (76 - 84)  RR: 29 (08 Jan 2021 19:00) (22 - 39)  SpO2: 100% (08 Jan 2021 19:00) (94% - 100%)    MEDICATIONS  (STANDING):  acetaminophen   Tablet .. 650 milliGRAM(s) Oral once  aspirin  chewable 81 milliGRAM(s) Oral daily  atorvastatin 20 milliGRAM(s) Oral at bedtime  cefepime   IVPB 500 milliGRAM(s) IV Intermittent every 24 hours  chlorhexidine 0.12% Liquid 15 milliLiter(s) Oral Mucosa every 12 hours  chlorhexidine 4% Liquid 1 Application(s) Topical <User Schedule>  dexAMETHasone  Injectable 6 milliGRAM(s) IV Push daily  dexMEDEtomidine Infusion 0.2 MICROgram(s)/kG/Hr (4.85 mL/Hr) IV Continuous <Continuous>  epoetin raven-epbx (RETACRIT) Injectable 8000 Unit(s) IV Push <User Schedule>  fentaNYL   Infusion 0.5 MICROgram(s)/kG/Hr (4.85 mL/Hr) IV Continuous <Continuous>  furosemide   Injectable 80 milliGRAM(s) IV Push two times a day  heparin  Infusion 1800 Unit(s)/Hr (12 mL/Hr) IV Continuous <Continuous>  insulin regular Infusion 1 Unit(s)/Hr (1 mL/Hr) IV Continuous <Continuous>  levothyroxine 100 MICROGram(s) Oral daily  metoprolol tartrate 12.5 milliGRAM(s) Oral two times a day  norepinephrine Infusion 0.05 MICROgram(s)/kG/Min (9.08 mL/Hr) IV Continuous <Continuous>  pantoprazole   Suspension 40 milliGRAM(s) Oral before breakfast  senna 2 Tablet(s) Oral at bedtime                        9.3    14.66 )-----------( 205      ( 08 Jan 2021 04:40 )             29.1   01-08    140  |  100  |  79<HH>  ----------------------------<  120<H>  4.4   |  24  |  6.6<HH>    Ca    7.8<L>      08 Jan 2021 04:40  Mg     2.4     01-08    TPro  6.3  /  Alb  2.9<L>  /  TBili  0.8  /  DBili  x   /  AST  70<H>  /  ALT  52<H>  /  AlkPhos  88  01-08    POCT Blood Glucose.: 204 mg/dL (08 Jan 2021 18:40)  POCT Blood Glucose.: 172 mg/dL (08 Jan 2021 13:14)  POCT Blood Glucose.: 175 mg/dL (08 Jan 2021 11:59)  POCT Blood Glucose.: 177 mg/dL (08 Jan 2021 09:00)  POCT Blood Glucose.: 196 mg/dL (08 Jan 2021 07:57)  POCT Blood Glucose.: 212 mg/dL (08 Jan 2021 06:56)  POCT Blood Glucose.: 173 mg/dL (08 Jan 2021 06:04)  POCT Blood Glucose.: 164 mg/dL (08 Jan 2021 05:19)  POCT Blood Glucose.: 122 mg/dL (08 Jan 2021 04:16)  POCT Blood Glucose.: 187 mg/dL (08 Jan 2021 03:04)  POCT Blood Glucose.: 238 mg/dL (08 Jan 2021 02:05)  POCT Blood Glucose.: 275 mg/dL (08 Jan 2021 01:05)  POCT Blood Glucose.: 323 mg/dL (08 Jan 2021 00:10)  POCT Blood Glucose.: 337 mg/dL (07 Jan 2021 23:24)  POCT Blood Glucose.: 309 mg/dL (07 Jan 2021 22:31)  POCT Blood Glucose.: 237 mg/dL (07 Jan 2021 21:24)  POCT Blood Glucose.: 137 mg/dL (07 Jan 2021 20:23)    ABG - ( 08 Jan 2021 13:50 )  pH, Arterial: 7.36  pH, Blood: x     /  pCO2: 45    /  pO2: 70    / HCO3: 26    / Base Excess: 0.1   /  SaO2: 94

## 2021-01-08 NOTE — PROGRESS NOTE ADULT - ASSESSMENT
#Covid-19 PNA with a suspected  bacterial PNA:   - The patient was intubated for increased alteration, hypoxemia and non-compliance with BiPAP  - DC azithromycin today  - Cefepime 500mg IV once daily (1/1/2021)  - Dexamethasone 6mg IV once daily (1/1/2021)  - COVID-19 antibodies received on 1/4/2021 by [facility] ( called lab [id number] to follow up on it)  - will repeat blood cultures and resend procalcitonin   - Repeat procalcitonin: 4.89 (on 1/7) , 5.28 (on 1/6), 4.11 (on 1/4)    #Altered Mental Status:   - EEG diffuse slowing, but no seizure activity, check the report above  - CT brain shows ventriculomegaly (check full report), no IC bleed, possible old infarct.  - neurology consulted and recs:   a. CT brain negative x2 for stroke or other structural pathology.  EEG negative for any epileptiform activity.  Suspect multifactorial metabolic encephalopathy in setting of COVID and ESRD and sedatives.  Wean sedation as able, can reassess if patient does not awaken after that time.  b. No further EEG or other tests   c. To be reassessed after sedation is weaned    #Fluid overload  #ESRD:   - HD planned on 1/7/2021  - EPO (retacrit) 8000 units IV push once weekly  - Sevelamer carbonate 800mg po q8hrs with meals  - Acidotic, will start Sodium Bicarbonate    #Atrial fibrillation:  - Paroxysmal ? Currently NSRT  - Heparin gtt, follow up aPTT q6hrs until therapeutic   - Metoprolol tartrate 12.5mg po q12hrs    - Will DC levophed  - CT brain no IC bleed or concerns of IC bleed ( Heparin drip started)  - aPTT being followed     #NSTEMI:   - Type II MI, demand ischemia, likely secondary to hypoxia secondary to COVID-19 pneumonia  - Aspirin 81mg po once daily   - CT brain no IC bleed or concerns of IC bleed   - On heparin gtt   - Repeat CK-BM and CPK were stable    #Hypothyroidism:  - Continue with levothyroxine 100mcg po once daily     #Hyponatremia:   - Resolved  - Na: 140 (1/8/2021)  - Likely hypervolemic hyponatremia secondary to fluid overload   - Continue with Lasix and dialysis sessions   - Will follow up sodium levels    - Spoke with the patient's sister Nedra Shea on [phone] ( on 1/4/2021), speaking with her for updates daily.   The sister said that she is the health care proxy and will get the form from the house  Patient is full code now   - Concerns about Why the patient did not received Convalescent plasma, hydroxychloroquine and remdesivir.)   - Explained to her that the studies concerning convalescent plasma did not show improvement of mortality and they are best when given first 72 hours  - Explained that he has ESRD ( eGFR <30), and therefore remdesivir is contraindicated  - Explained to her that hydroxychloroquine is not an option for treatment at the moment as studies have showed that there is no benefit to mortality.  #Covid-19 PNA with a suspected  bacterial PNA:   - The patient was intubated for increased alteration, hypoxemia and non-compliance with BiPAP  - DC azithromycin today  - Cefepime 500mg IV once daily (1/1/2021)  - Dexamethasone 6mg IV once daily (1/1/2021)  - COVID-19 antibodies received on 1/4/2021 by [facility] ( called lab [id number] to follow up on it)  - will repeat blood cultures and resend procalcitonin   - Repeat procalcitonin: 4.89 (on 1/7) , 5.28 (on 1/6), 4.11 (on 1/4)  - Covi- antibodies: reported negative --> Will give 2 units of convalescent plasma   - s/p tocilizumab 400mg IV once on 1/8/2021, and repeat ferritin after 48 hours    #Altered Mental Status:   - EEG diffuse slowing, but no seizure activity, check the report above  - CT brain shows ventriculomegaly (check full report), no IC bleed, possible old infarct.  - neurology consulted and recs:   a. CT brain negative x2 for stroke or other structural pathology.  EEG negative for any epileptiform activity.  Suspect multifactorial metabolic encephalopathy in setting of COVID and ESRD and sedatives.  Wean sedation as able, can reassess if patient does not awaken after that time.  b. No further EEG or other tests   c. To be reassessed after sedation is weaned    #Fluid overload  #ESRD:   - HD planned on 1/7/2021  - EPO (retacrit) 8000 units IV push once weekly  - Sevelamer carbonate 800mg po q8hrs with meals  - Acidotic, will start Sodium Bicarbonate    #Atrial fibrillation:  - Paroxysmal ? Currently NSRT  - Heparin gtt, follow up aPTT q6hrs until therapeutic   - Metoprolol tartrate 12.5mg po q12hrs    - Will DC levophed  - CT brain no IC bleed or concerns of IC bleed ( Heparin drip started)  - aPTT being followed     #NSTEMI:   - Type II MI, demand ischemia, likely secondary to hypoxia secondary to COVID-19 pneumonia  - Aspirin 81mg po once daily   - CT brain no IC bleed or concerns of IC bleed   - On heparin gtt   - Repeat CK-BM and CPK were stable    #Hypothyroidism:  - Continue with levothyroxine 100mcg po once daily     #Hyponatremia:   - Resolved  - Na: 140 (1/8/2021)  - Likely hypervolemic hyponatremia secondary to fluid overload   - Continue with Lasix and dialysis sessions   - Will follow up sodium levels    - Spoke with the patient's sister Nedra Shea on [phone] ( on 1/4/2021), speaking with her for updates daily.   The sister said that she is the health care proxy and will get the form from the house  Patient is full code now   - Concerns about Why the patient did not received Convalescent plasma, hydroxychloroquine and remdesivir.)   - Explained to her that the studies concerning convalescent plasma did not show improvement of mortality and they are best when given first 72 hours  - Explained that he has ESRD ( eGFR <30), and therefore remdesivir is contraindicated  - Explained to her that hydroxychloroquine is not an option for treatment at the moment as studies have showed that there is no benefit to mortality.  #Covid-19 PNA with a suspected  bacterial PNA:   - The patient was intubated for increased alteration, hypoxemia and non-compliance with BiPAP  - DC azithromycin today  - Cefepime 500mg IV once daily (1/1/2021)  - Dexamethasone 6mg IV once daily (1/1/2021)  - COVID-19 antibodies received on 1/4/2021 by [facility] ( called lab [id number] to follow up on it)  - will repeat blood cultures and resend procalcitonin   - Repeat procalcitonin: 4.89 (on 1/7) , 5.28 (on 1/6), 4.11 (on 1/4)  - Covi- antibodies: reported negative --> Will give 2 units of convalescent plasma   - s/p tocilizumab 400mg IV once on 1/8/2021, and repeat ferritin after 48 hours    #Altered Mental Status:   - EEG diffuse slowing, but no seizure activity, check the report above  - CT brain shows ventriculomegaly (check full report), no IC bleed, possible old infarct.  - neurology consulted and recs:   a. CT brain negative x2 for stroke or other structural pathology.  EEG negative for any epileptiform activity.  Suspect multifactorial metabolic encephalopathy in setting of COVID and ESRD and sedatives.  Wean sedation as able, can reassess if patient does not awaken after that time.  b. No further EEG or other tests   c. To be reassessed after sedation is weaned    #Fluid overload  #ESRD:   - HD planned on 1/7/2021  - EPO (retacrit) 8000 units IV push once weekly  - DC sevelamer as per nephro   - Will DC Sodium bicarbonate as per nephro    #Atrial fibrillation:  - Paroxysmal ? Currently NSRT  - Heparin gtt, follow up aPTT q6hrs until therapeutic   - Metoprolol tartrate 12.5mg po q12hrs    - Will DC levophed  - CT brain no IC bleed or concerns of IC bleed ( Heparin drip started)  - aPTT being followed     #NSTEMI:   - Type II MI, demand ischemia, likely secondary to hypoxia secondary to COVID-19 pneumonia  - Aspirin 81mg po once daily   - CT brain no IC bleed or concerns of IC bleed   - On heparin gtt   - Repeat CK-BM and CPK were stable    #Hypothyroidism:  - Continue with levothyroxine 100mcg po once daily     #Hyponatremia:   - Resolved  - Na: 140 (1/8/2021)  - Likely hypervolemic hyponatremia secondary to fluid overload   - Continue with Lasix and dialysis sessions   - Will follow up sodium levels    - Spoke with the patient's sister Nedra Shea on [phone] ( on 1/4/2021), speaking with her for updates daily.   The sister said that she is the health care proxy and will get the form from the house  Patient is full code now   - Concerns about Why the patient did not received Convalescent plasma, hydroxychloroquine and remdesivir.)   - Explained to her that the studies concerning convalescent plasma did not show improvement of mortality and they are best when given first 72 hours  - Explained that he has ESRD ( eGFR <30), and therefore remdesivir is contraindicated  - Explained to her that hydroxychloroquine is not an option for treatment at the moment as studies have showed that there is no benefit to mortality.

## 2021-01-08 NOTE — PROGRESS NOTE ADULT - ASSESSMENT
IMPRESSION:    s/p fall / head CT neg  ESRD MWF for HD  Acute hypoxemic resp failure possibly fluid overlaod  NSTEMI  Afib on coumadin  COVID PNA  Superimposed bacteria PNA    PLAN:    CNS: SAT.    HEENT: Oral care.    PULMONARY: Vent changes. Wean O2.  Monitor PPL and DP.  .      CARDIOVASCULAR:  I<O as tolerated.        GI: GI prophylaxis.  OG Feeding.  Bowel regimen     RENAL: check lytes and replete PRN. Nephro F/UP result.  NaHCo3.      INFECTIOUS DISEASE: Dexamethasone 6 mg D# 8,  Finish ABX course     HEMATOLOGICAL: DVT Prophylaxis  IV Heparin FU PTT     ENDOCRINE:  Follow up FS.  Insulin protocol if needed.    MUSCULOSKELETAL: Increase as tolerated.    MICU     Prognosis poor

## 2021-01-08 NOTE — PROGRESS NOTE ADULT - SUBJECTIVE AND OBJECTIVE BOX
Patient is a 60y old  Male who presents with a chief complaint of s/p fall. (2021 12:02)        Over Night Events:  Remains on MV.  Off pressors.  Sedated.          ROS:     All ROS are negative except HPI         PHYSICAL EXAM    ICU Vital Signs Last 24 Hrs  T(C): 37.5 (2021 08:00), Max: 38.3 (2021 04:00)  T(F): 99.5 (2021 08:00), Max: 101 (2021 04:00)  HR: 82 (2021 09:30) (74 - 880)  BP: --  BP(mean): --  ABP: 102/44 (2021 09:30) (102/44 - 176/74)  ABP(mean): 64 (2021 09:30) (62 - 108)  RR: 25 (2021 09:30) (19 - 39)  SpO2: 100% (2021 09:30) (67% - 100%)      CONSTITUTIONAL:  Well nourished.  Ill appearing. NAD    ENT:   Airway patent,   Mouth with normal mucosa.   No thrush    EYES:   Pupils equal,   Round and reactive to light.    CARDIAC:   Normal rate,   Regular rhythm.    No edema      Vascular:  Normal systolic impulse  No Carotid bruits    RESPIRATORY:   No wheezing  Bilateral BS  Normal chest expansion  Not tachypneic,  No use of accessory muscles    GASTROINTESTINAL:  Abdomen soft,   Non-tender,   No guarding,   + BS    MUSCULOSKELETAL:   Range of motion is not limited,  No clubbing, cyanosis    NEUROLOGICAL:   Sedated     SKIN:   Skin normal color for race,   Warm and dry   No evidence of rash.    PSYCHIATRIC:   Sedated   No apparent risk to self or others.    HEMATOLOGICAL:  No cervical  lymphadenopathy.  no inguinal lymphadenopathy      21 @ 07:01  -  - @ 07:00  --------------------------------------------------------  IN:    Dexmedetomidine: 818.2 mL    FentaNYL: 193.5 mL    Heparin: 300 mL    Insulin: 104 mL    IV PiggyBack: 300 mL    Norepinephrine: 97.9 mL    Peptamen A.F.: 1800 mL  Total IN: 3613.6 mL    OUT:    Other (mL): 3000 mL    Rectal Tube (mL): 500 mL    Voided (mL): 0 mL  Total OUT: 3500 mL    Total NET: 113.6 mL      21 @ 07:01  -  21 @ 09:43  --------------------------------------------------------  IN:    Dexmedetomidine: 77 mL    FentaNYL: 15 mL    Heparin: 36 mL    Insulin: 4 mL    IV PiggyBack: 250 mL    Norepinephrine: 2 mL    Peptamen A.F.: 225 mL  Total IN: 609 mL    OUT:  Total OUT: 0 mL    Total NET: 609 mL          LABS:                            9.3    14.66 )-----------( 205      ( 2021 04:40 )             29.1                                                   140  |  100  |  79<HH>  ----------------------------<  120<H>  4.4   |  24  |  6.6<HH>    Ca    7.8<L>      2021 04:40  Mg     2.4         TPro  6.3  /  Alb  2.9<L>  /  TBili  0.8  /  DBili  x   /  AST  70<H>  /  ALT  52<H>  /  AlkPhos  88  -      PT/INR - ( 2021 04:40 )   PT: 18.20 sec;   INR: 1.58 ratio         PTT - ( 2021 04:40 )  PTT:78.6 sec                                       Urinalysis Basic - ( 2021 18:43 )    Color: Yellow / Appearance: Slightly Turbid / S.018 / pH: x  Gluc: x / Ketone: Negative  / Bili: Negative / Urobili: <2 mg/dL   Blood: x / Protein: 300 mg/dL / Nitrite: Negative   Leuk Esterase: Small / RBC: 178 /HPF /  /HPF   Sq Epi: x / Non Sq Epi: 1 /HPF / Bacteria: Negative                                                  LIVER FUNCTIONS - ( 2021 04:40 )  Alb: 2.9 g/dL / Pro: 6.3 g/dL / ALK PHOS: 88 U/L / ALT: 52 U/L / AST: 70 U/L / GGT: x                                                  Culture - Sputum (collected 2021 18:30)  Source: .Sputum trache aspirate  Gram Stain (2021 06:09):    Moderate polymorphonuclear leukocytes per low power field    Few Squamous epithelial cells per low power field    Moderate Gram positive cocci in pairs seen per oil power field    Moderate Gram Variable Rods seen per oil power field  Preliminary Report (2021 18:58):    Normal Respiratory Rosetta present                                                   Mode: AC/ CMV (Assist Control/ Continuous Mandatory Ventilation)  RR (machine): 30  TV (machine): 450  FiO2: 40  PEEP: 12  ITime: 1  MAP: 19  PIP: 27                                      ABG - ( 2021 02:37 )  pH, Arterial: 7.26  pH, Blood: x     /  pCO2: 58    /  pO2: 64    / HCO3: 26    / Base Excess: -2.3  /  SaO2: 89                  MEDICATIONS  (STANDING):  acetaminophen   Tablet .. 650 milliGRAM(s) Oral once  aspirin  chewable 81 milliGRAM(s) Oral daily  atorvastatin 20 milliGRAM(s) Oral at bedtime  azithromycin  IVPB 500 milliGRAM(s) IV Intermittent every 24 hours  cefepime   IVPB 500 milliGRAM(s) IV Intermittent every 24 hours  chlorhexidine 0.12% Liquid 15 milliLiter(s) Oral Mucosa every 12 hours  chlorhexidine 4% Liquid 1 Application(s) Topical <User Schedule>  dexAMETHasone  Injectable 6 milliGRAM(s) IV Push daily  dexMEDEtomidine Infusion 0.2 MICROgram(s)/kG/Hr (4.85 mL/Hr) IV Continuous <Continuous>  dextrose 40% Gel 15 Gram(s) Oral once  dextrose 5%. 1000 milliLiter(s) (50 mL/Hr) IV Continuous <Continuous>  dextrose 5%. 1000 milliLiter(s) (100 mL/Hr) IV Continuous <Continuous>  dextrose 50% Injectable 25 Gram(s) IV Push once  dextrose 50% Injectable 12.5 Gram(s) IV Push once  dextrose 50% Injectable 25 Gram(s) IV Push once  epoetin raven-epbx (RETACRIT) Injectable 8000 Unit(s) IV Push <User Schedule>  fentaNYL   Infusion 0.5 MICROgram(s)/kG/Hr (4.85 mL/Hr) IV Continuous <Continuous>  furosemide   Injectable 80 milliGRAM(s) IV Push two times a day  glucagon  Injectable 1 milliGRAM(s) IntraMuscular once  heparin  Infusion 1800 Unit(s)/Hr (12 mL/Hr) IV Continuous <Continuous>  insulin regular Infusion 1 Unit(s)/Hr (1 mL/Hr) IV Continuous <Continuous>  levothyroxine 100 MICROGram(s) Oral daily  metoprolol tartrate 12.5 milliGRAM(s) Oral two times a day  norepinephrine Infusion 0.05 MICROgram(s)/kG/Min (9.08 mL/Hr) IV Continuous <Continuous>  pantoprazole   Suspension 40 milliGRAM(s) Oral before breakfast  polyethylene glycol 3350 17 Gram(s) Oral at bedtime  senna 2 Tablet(s) Oral at bedtime  sevelamer carbonate 800 milliGRAM(s) Oral three times a day with meals    MEDICATIONS  (PRN):  acetaminophen   Tablet .. 650 milliGRAM(s) Oral every 6 hours PRN Temp greater or equal to 38C (100.4F), Mild Pain (1 - 3)      New X-rays reviewed:                                                                                  ECHO    CXR interpreted by me:  ET OG OK>  Bilateral infiltrates

## 2021-01-08 NOTE — PROGRESS NOTE ADULT - ASSESSMENT
ESRD (due to DM) on HD MWF  s/p Fall  altered mental status   Covid-19 PNA / bacterial PNA   fluid overload  hyponatremia  DM  HTN  afib on coumadin  CVA  NSTEMI    plan:    HD tomorrow: 3 hours, opti 160 dialyzer, 2K bath, 3L UF  DC sevelamer  DC NaHCO3  left arm precautions  renal diet with sevelamer   dexamethasone   covid isolation  cont toprol xl   cont lasix 80mg iv q12h  f/u cardio / f/u pulm  icu care

## 2021-01-08 NOTE — PROGRESS NOTE ADULT - SUBJECTIVE AND OBJECTIVE BOX
ROGER RODRIGUES  60y, Male  Allergy: Orange (Other)  Originally Entered as [HIVES] reaction to [sulfur] (Unknown)  penicillin (Unknown)  sulfADIAZINE (Unknown)      LOS  7d    CHIEF COMPLAINT: COVID-19 pneumonia (2021 10:20)      INTERVAL EVENTS/HPI  - No acute events overnight  - remains febrile  - T(F): , Max: 101 (21 @ 04:00)   - WBC Count: 14.66 (21 @ 04:40)  WBC Count: 15.20 (21 @ 05:10)     - Creatinine, Serum: 6.6 (21 @ 04:40)  Creatinine, Serum: 6.9 (21 @ 05:10)       ROS  unable to obtain history secondary to patient's mental status and/or sedation    VITALS:  T(F): 98.8, Max: 101 (21 @ 04:00)  HR: 76  BP: --  RR: 30Vital Signs Last 24 Hrs  T(C): 37.1 (2021 12:00), Max: 38.3 (2021 04:00)  T(F): 98.8 (2021 12:00), Max: 101 (2021 04:00)  HR: 76 (2021 13:30) (76 - 84)  BP: --  BP(mean): --  RR: 30 (2021 13:30) (22 - 39)  SpO2: 100% (2021 13:30) (94% - 100%)    PHYSICAL EXAM:  Gen: intubated  HEENT: Normocephalic, atraumatic  Neck: supple, no lymphadenopathy  CV: Regular rate & regular rhythm  Lungs: decreased BS at bases, no fremitus  Abdomen: Soft, BS present  Ext: Warm, well perfused  Neuro: non focal, awake  Skin: no rash, no erythema  Lines: no phlebitis    FH: Non-contributory  Social Hx: Non-contributory    TESTS & MEASUREMENTS:                        9.3    14.66 )-----------( 205      ( 2021 04:40 )             29.1     01-08    140  |  100  |  79<HH>  ----------------------------<  120<H>  4.4   |  24  |  6.6<HH>    Ca    7.8<L>      2021 04:40  Mg     2.4         TPro  6.3  /  Alb  2.9<L>  /  TBili  0.8  /  DBili  x   /  AST  70<H>  /  ALT  52<H>  /  AlkPhos  88      eGFR if Non African American: 8 mL/min/1.73M2 (21 @ 04:40)  eGFR if African American: 10 mL/min/1.73M2 (21 @ 04:40)    LIVER FUNCTIONS - ( 2021 04:40 )  Alb: 2.9 g/dL / Pro: 6.3 g/dL / ALK PHOS: 88 U/L / ALT: 52 U/L / AST: 70 U/L / GGT: x           Urinalysis Basic - ( 2021 18:43 )    Color: Yellow / Appearance: Slightly Turbid / S.018 / pH: x  Gluc: x / Ketone: Negative  / Bili: Negative / Urobili: <2 mg/dL   Blood: x / Protein: 300 mg/dL / Nitrite: Negative   Leuk Esterase: Small / RBC: 178 /HPF /  /HPF   Sq Epi: x / Non Sq Epi: 1 /HPF / Bacteria: Negative        Culture - Sputum (collected 21 @ 18:30)  Source: .Sputum trache aspirate  Gram Stain (21 @ 06:09):    Moderate polymorphonuclear leukocytes per low power field    Few Squamous epithelial cells per low power field    Moderate Gram positive cocci in pairs seen per oil power field    Moderate Gram Variable Rods seen per oil power field  Preliminary Report (21 @ 18:58):    Normal Respiratory Rosetta present    Culture - Blood (collected 21 @ 04:30)  Source: .Blood Blood  Final Report (21 @ 18:00):    No Growth Final    Culture - Blood (collected 21 @ 17:56)  Source: .Blood None  Final Report (21 @ 07:00):    No Growth Final            INFECTIOUS DISEASES TESTING  Procalcitonin, Serum: 6.13 (21 @ 04:40)  Procalcitonin, Serum: 4.89 (21 @ 12:49)  Procalcitonin, Serum: 5.28 (21 @ 05:40)  MRSA PCR Result.: Negative (21 @ 21:32)  Procalcitonin, Serum: 4.11 (21 @ 17:56)  Rapid RVP Result: Detected (21 @ 11:00)      INFLAMMATORY MARKERS      RADIOLOGY & ADDITIONAL TESTS:  I have personally reviewed the last available Chest xray  CXR  Xray Chest 1 View- PORTABLE-Urgent:   EXAM:  XR CHEST PORTABLE URGENT 1V            PROCEDURE DATE:  2021            INTERPRETATION:  Clinical History / Reason for exam: Fever    Comparison : Chest radiograph:  2021    Technique/Positioning: Frontal view of the chest    Findings:    Support devices: There is an endotracheal tube terminating 8.3 cm above the tracey. There is an enteric tube coursing below the diaphragm. There is a right internal jugular central venous catheter terminating at the SVC.    Cardiac/mediastinum/hilum: No significant change    Lung parenchyma/Pleura: Stable bilateral opacities and effusions. No evidence of pneumothorax.    Skeleton/soft tissues: No significant change.    Impression:    Stable bilateral opacities and effusions. No evidence ofpneumothorax.                      MARCELL BUI MD; Attending Radiologist  This document has been electronically signed. 2021 11:22AM (21 @ 18:45)      CT      CARDIOLOGY TESTING  12 Lead ECG:   Ventricular Rate 90 BPM    Atrial Rate 208 BPM    QRS Duration 118 ms    Q-T Interval 390 ms    QTC Calculation(Bazett) 477 ms    R Axis 99 degrees    T Axis 157 degrees    Diagnosis Line Atrial flutter  Rightward axis  Incomplete right bundle branch block  T wave abnormality, consider inferolateral ischemia  Prolonged QT  Abnormal ECG    Confirmed by GRIFFIN WRIGHT MD ([id number]) on 2021 12:50:22 PM  Also confirmed by MARLA ADAMS MD ([id number])  on 2021 12:51:07 PM (21 @ 20:03)  12 Lead ECG:   Ventricular Rate 81 BPM    Atrial Rate 202 BPM    QRS Duration 106 ms    Q-T Interval 414 ms    QTC Calculation(Bazett) 480 ms    R Axis 96 degrees    T Axis 145 degrees    Diagnosis Line Atrial flutter with variable A-V block  Rightward axis  Incomplete right bundle branch block  Septal infarct , age undetermined  ST & T wave abnormality, consider lateral ischemia  Abnormal ECG    Confirmed by GRIFFIN WRIGHT MD ([id number]) on 2021 12:31:13 PM (21 @ 10:49)      MEDICATIONS  acetaminophen   Tablet .. 650 Oral once  aspirin  chewable 81 Oral daily  atorvastatin 20 Oral at bedtime  cefepime   IVPB 500 IV Intermittent every 24 hours  chlorhexidine 0.12% Liquid 15 Oral Mucosa every 12 hours  chlorhexidine 4% Liquid 1 Topical <User Schedule>  dexAMETHasone  Injectable 6 IV Push daily  dexMEDEtomidine Infusion 0.2 IV Continuous <Continuous>  dextrose 40% Gel 15 Oral once  dextrose 5%. 1000 IV Continuous <Continuous>  dextrose 5%. 1000 IV Continuous <Continuous>  dextrose 50% Injectable 25 IV Push once  dextrose 50% Injectable 12.5 IV Push once  dextrose 50% Injectable 25 IV Push once  epoetin raven-epbx (RETACRIT) Injectable 8000 IV Push <User Schedule>  fentaNYL   Infusion 0.5 IV Continuous <Continuous>  furosemide   Injectable 80 IV Push two times a day  glucagon  Injectable 1 IntraMuscular once  heparin  Infusion 1800 IV Continuous <Continuous>  insulin regular Infusion 1 IV Continuous <Continuous>  levothyroxine 100 Oral daily  metoprolol tartrate 12.5 Oral two times a day  norepinephrine Infusion 0.05 IV Continuous <Continuous>  pantoprazole   Suspension 40 Oral before breakfast  senna 2 Oral at bedtime  sevelamer carbonate 800 Oral three times a day with meals  sodium bicarbonate 650 Oral every 6 hours      WEIGHT  Weight (kg): 96.9 (21 @ 16:05)  Creatinine, Serum: 6.6 mg/dL (21 @ 04:40)      ANTIBIOTICS:  cefepime   IVPB 500 milliGRAM(s) IV Intermittent every 24 hours      All available historical records have been reviewed

## 2021-01-08 NOTE — CHART NOTE - NSCHARTNOTEFT_GEN_A_CORE
RD was following the patient but now that nutrition support team has taken the patient, RD to sign off

## 2021-01-08 NOTE — PROGRESS NOTE ADULT - ASSESSMENT
ASSESSMENT  59 yo M w/ PMH of Afib on coumadin, DM2, ESRD on HD MWF, HLD, HTN, CVA presents with weakness & fall. Patient notes when he woke up today, he felt generally weak, slid from the bed onto the floor landing on his bottom, denies head injury/loc. Patient notes that he has been having increased sputum production for the past few days, endorses mild shortness of breath without chest pain/fever/diarrhea/vomiting. Patient had full dialysis session wednesday, notes next session is tomorrow due to the holidays. Denies any focal weakness or pain currentl      IMPRESSION  #COVID-19 Pneumonia  - Unclear onset of symptoms, CXR with RLL opacity, possible super-imposed pneumonia  - intubated 1/4  - D-Dimer Assay, Quantitative: 377  - CRP pending  - Ferritin, Serum: 2748: Test Repeated ng/mL (01.01.21 @ 17:56)  - Procalcitonin, Serum: 5.28 (01.06.21 @ 05:40)  -  #ESRD on HD  #DM II  #HTN  #CVA  #Obesity BMI (kg/m2): 30.7  #DM   #Abx allergy: penicillin (Unknown) - told he had allergy as a child, tolerates cefepime   sulfADIAZINE (Unknown)    RECOMMENDATIONS  - follow-up blood Cx 1/7  - noted COVID-ab 1/4 are negative; unlikely to receive much benefit, but can offer 1U convalescent plasma  - limited literature for benefit of tocilizumab in patients on organ support, but can give 400 mg dose x 1  - plan for 7 days total of antibiotics if blood Cx 1/7 remains negative  - Dexamethasone 6mg daily  - trend inflammatory markers - procalcitonin will likely be elevated in setting of dialysis    Please call or message on Microsoft Teams if with any questions.  Spectra [phone]

## 2021-01-08 NOTE — PROGRESS NOTE ADULT - SUBJECTIVE AND OBJECTIVE BOX
[facility] FOLLOW UP NOTE  --------------------------------------------------------------------------------  24 hour events/subjective: Patient examined. Intubated.    PAST HISTORY  --------------------------------------------------------------------------------  No significant changes to PMH, PSH, FHx, SHx, unless otherwise noted    ALLERGIES & MEDICATIONS  --------------------------------------------------------------------------------  Allergies    Orange (Other)  Originally Entered as [HIVES] reaction to [sulfur] (Unknown)  penicillin (Unknown)  sulfADIAZINE (Unknown)    Intolerances      Standing Inpatient Medications  acetaminophen   Tablet .. 650 milliGRAM(s) Oral once  aspirin  chewable 81 milliGRAM(s) Oral daily  atorvastatin 20 milliGRAM(s) Oral at bedtime  cefepime   IVPB 500 milliGRAM(s) IV Intermittent every 24 hours  chlorhexidine 0.12% Liquid 15 milliLiter(s) Oral Mucosa every 12 hours  chlorhexidine 4% Liquid 1 Application(s) Topical <User Schedule>  dexAMETHasone  Injectable 6 milliGRAM(s) IV Push daily  dexMEDEtomidine Infusion 0.2 MICROgram(s)/kG/Hr IV Continuous <Continuous>  dextrose 40% Gel 15 Gram(s) Oral once  dextrose 5%. 1000 milliLiter(s) IV Continuous <Continuous>  dextrose 5%. 1000 milliLiter(s) IV Continuous <Continuous>  dextrose 50% Injectable 25 Gram(s) IV Push once  dextrose 50% Injectable 12.5 Gram(s) IV Push once  dextrose 50% Injectable 25 Gram(s) IV Push once  epoetin raven-epbx (RETACRIT) Injectable 8000 Unit(s) IV Push <User Schedule>  fentaNYL   Infusion 0.5 MICROgram(s)/kG/Hr IV Continuous <Continuous>  furosemide   Injectable 80 milliGRAM(s) IV Push two times a day  glucagon  Injectable 1 milliGRAM(s) IntraMuscular once  heparin  Infusion 1800 Unit(s)/Hr IV Continuous <Continuous>  insulin regular Infusion 1 Unit(s)/Hr IV Continuous <Continuous>  levothyroxine 100 MICROGram(s) Oral daily  metoprolol tartrate 12.5 milliGRAM(s) Oral two times a day  norepinephrine Infusion 0.05 MICROgram(s)/kG/Min IV Continuous <Continuous>  pantoprazole   Suspension 40 milliGRAM(s) Oral before breakfast  senna 2 Tablet(s) Oral at bedtime  sevelamer carbonate 800 milliGRAM(s) Oral three times a day with meals  sodium bicarbonate 650 milliGRAM(s) Oral every 6 hours  tocilizumab IVPB 400 milliGRAM(s) IV Intermittent once    PRN Inpatient Medications  acetaminophen   Tablet .. 650 milliGRAM(s) Oral every 6 hours PRN      VITALS/PHYSICAL EXAM  --------------------------------------------------------------------------------  T(C): 37.1 (01-08-21 @ 12:00), Max: 38.3 (01-08-21 @ 04:00)  HR: 76 (01-08-21 @ 13:30) (76 - 84)  BP: --  RR: 30 (01-08-21 @ 13:30) (22 - 39)  SpO2: 100% (01-08-21 @ 13:30) (94% - 100%)  Wt(kg): --        01-07-21 @ 07:01  -  01-08-21 @ 07:00  --------------------------------------------------------  IN: 3613.6 mL / OUT: 3500 mL / NET: 113.6 mL    01-08-21 @ 07:01  -  01-08-21 @ 15:39  --------------------------------------------------------  IN: 1097 mL / OUT: 0 mL / NET: 1097 mL      Physical Exam:  	Gen: Intubated  	Pulm: B/L rales  	CV: RRR, S1S2  	Abd: +BS, soft, nondistended  	LE: Warm,  edema  	Vascular access: AVF    LABS/STUDIES  --------------------------------------------------------------------------------              9.3    14.66 >-----------<  205      [01-08-21 @ 04:40]              29.1     140  |  100  |  79  ----------------------------<  120      [01-08-21 @ 04:40]  4.4   |  24  |  6.6        Ca     7.8     [01-08-21 @ 04:40]      Mg     2.4     [01-08-21 @ 04:40]    TPro  6.3  /  Alb  2.9  /  TBili  0.8  /  DBili  x   /  AST  70  /  ALT  52  /  AlkPhos  88  [01-08-21 @ 04:40]    PT/INR: PT 18.20, INR 1.58       [01-08-21 @ 04:40]  PTT: 78.1       [01-08-21 @ 10:09]      Creatinine Trend:  SCr 6.6 [01-08 @ 04:40]  SCr 6.9 [01-07 @ 05:10]  SCr 6.2 [01-06 @ 05:40]  SCr 6.9 [01-05 @ 05:30]  SCr 5.5 [01-03 @ 06:31]    Urinalysis - [01-06-21 @ 18:43]      Color Yellow / Appearance Slightly Turbid / SG 1.018 / pH 6.5      Gluc 100 mg/dL / Ketone Negative  / Bili Negative / Urobili <2 mg/dL       Blood Moderate / Protein 300 mg/dL / Leuk Est Small / Nitrite Negative       /  / Hyaline 114 / Gran  / Sq Epi  / Non Sq Epi 1 / Bacteria Negative      Ferritin 2748      [01-01-21 @ 17:56]  HbA1c 7.1      [05-21-19 @ 04:30]  TSH 4.57      [01-01-21 @ 17:56]

## 2021-01-08 NOTE — PROGRESS NOTE ADULT - SUBJECTIVE AND OBJECTIVE BOX
SUBJECTIVE:    Patient is a 60y old Male who presents with a chief complaint of s/p fall. (2021 09:40). Currently admitted to medicine with the primary diagnosis of acute hypoxic respiratory failure secondary to COVID-19 pneumonia.   Today is hospital day 7d.     PAST MEDICAL & SURGICAL HISTORY  PAD (peripheral artery disease)  multiple toe amputations    Anemia  chronic anemia - s/p transfusion 2018    Thyroid cancer    Chronic leg pain    OA (osteoarthritis)    SOB (shortness of breath) on exertion    ESRD (end stage renal disease)  2 yrs    Atrial fibrillation  on warfarin    Right sided weakness    Stroke  8 yrs ago    Hypertension    High blood cholesterol    Diabetes mellitus, type 2    Dialysis patient  Mon, Wednesday, Friday ([facility])    Smoker    H/O thyroid nodule    H/O gastroesophageal reflux (GERD)    History of tonsillectomy    History of surgery  Multiple toe amputations (amp 4 1/2 left toes; has 1/2 left mid toe; amp right mid toe)    A-V fistula  left AVF      SOCIAL HISTORY:  Negative for smoking/alcohol/drug use.     ALLERGIES:  Orange (Other)  Originally Entered as [HIVES] reaction to [sulfur] (Unknown)  penicillin (Unknown)  sulfADIAZINE (Unknown)    MEDICATIONS:  STANDING MEDICATIONS  acetaminophen   Tablet .. 650 milliGRAM(s) Oral once  aspirin  chewable 81 milliGRAM(s) Oral daily  atorvastatin 20 milliGRAM(s) Oral at bedtime  cefepime   IVPB 500 milliGRAM(s) IV Intermittent every 24 hours  chlorhexidine 0.12% Liquid 15 milliLiter(s) Oral Mucosa every 12 hours  chlorhexidine 4% Liquid 1 Application(s) Topical <User Schedule>  dexAMETHasone  Injectable 6 milliGRAM(s) IV Push daily  dexMEDEtomidine Infusion 0.2 MICROgram(s)/kG/Hr IV Continuous <Continuous>  dextrose 40% Gel 15 Gram(s) Oral once  dextrose 5%. 1000 milliLiter(s) IV Continuous <Continuous>  dextrose 5%. 1000 milliLiter(s) IV Continuous <Continuous>  dextrose 50% Injectable 25 Gram(s) IV Push once  dextrose 50% Injectable 12.5 Gram(s) IV Push once  dextrose 50% Injectable 25 Gram(s) IV Push once  epoetin raven-epbx (RETACRIT) Injectable 8000 Unit(s) IV Push <User Schedule>  fentaNYL   Infusion 0.5 MICROgram(s)/kG/Hr IV Continuous <Continuous>  furosemide   Injectable 80 milliGRAM(s) IV Push two times a day  glucagon  Injectable 1 milliGRAM(s) IntraMuscular once  heparin  Infusion 1800 Unit(s)/Hr IV Continuous <Continuous>  insulin regular Infusion 1 Unit(s)/Hr IV Continuous <Continuous>  levothyroxine 100 MICROGram(s) Oral daily  metoprolol tartrate 12.5 milliGRAM(s) Oral two times a day  norepinephrine Infusion 0.05 MICROgram(s)/kG/Min IV Continuous <Continuous>  pantoprazole   Suspension 40 milliGRAM(s) Oral before breakfast  senna 2 Tablet(s) Oral at bedtime  sevelamer carbonate 800 milliGRAM(s) Oral three times a day with meals  sodium bicarbonate 650 milliGRAM(s) Oral every 6 hours    PRN MEDICATIONS  acetaminophen   Tablet .. 650 milliGRAM(s) Oral every 6 hours PRN    VITALS:   T(F): 99.5  HR: 82  BP: --  RR: 25  SpO2: 100%    LABS:                        9.3    14.66 )-----------( 205      ( 2021 04:40 )             29.1     01-    140  |  100  |  79<HH>  ----------------------------<  120<H>  4.4   |  24  |  6.6<HH>    Ca    7.8<L>      2021 04:40  Mg     2.4     -    TPro  6.3  /  Alb  2.9<L>  /  TBili  0.8  /  DBili  x   /  AST  70<H>  /  ALT  52<H>  /  AlkPhos  88  01-08  PT/INR - ( 2021 04:40 )   PT: 18.20 sec;   INR: 1.58 ratio       PTT - ( 2021 04:40 )  PTT:78.6 sec  Urinalysis Basic - ( 2021 18:43 )  Color: Yellow / Appearance: Slightly Turbid / S.018 / pH: x  Gluc: x / Ketone: Negative  / Bili: Negative / Urobili: <2 mg/dL   Blood: x / Protein: 300 mg/dL / Nitrite: Negative   Leuk Esterase: Small / RBC: 178 /HPF /  /HPF   Sq Epi: x / Non Sq Epi: 1 /HPF / Bacteria: Negative    ABG - ( 2021 02:37 )  pH, Arterial: 7.26  pH, Blood: x     /  pCO2: 58    /  pO2: 64    / HCO3: 26    / Base Excess: -2.3  /  SaO2: 89        Culture - Sputum (collected 2021 18:30)  Source: .Sputum trache aspirate  Gram Stain (2021 06:09):    Moderate polymorphonuclear leukocytes per low power field    Few Squamous epithelial cells per low power field    Moderate Gram positive cocci in pairs seen per oil power field    Moderate Gram Variable Rods seen per oil power field  Preliminary Report (2021 18:58):    Normal Respiratory Rosetta present      RADIOLOGY:    < from: Xray Chest 1 View-PORTABLE IMMEDIATE (Xray Chest 1 View-PORTABLE IMMEDIATE .) (21 @ 16:32) >  Impression:    Bilateral opacities unchanged. No pleural effusion or air leak    < end of copied text >    < from: CT Head No Cont (21 @ 22:14) >  IMPRESSION:    No acute intracranial pathology, stable since 2021.    Stable mild ventriculomegaly.    < end of copied text >    PHYSICAL EXAM:  GEN: Intubated, sedated.   LUNGS: Clear to auscultation bilaterally   HEART: S1/S2 present. RRR.   ABD: Soft, non-tender, mildly distended. Bowel sounds present  EXT: NC/NC/NE/2+PP/[place]/Skin Intact.   NEURO: intubated and sedated    Intravenous access: Right IJ TLC  OG tube: In place  Garza Catheter:   Indwelling Urethral Catheter:     Connect To:  Straight Drainage/Gravity    Indication:  Urine Output Monitoring in Critically Ill (21 @ 05:16) (not performed) [Active]

## 2021-01-09 NOTE — PROGRESS NOTE ADULT - SUBJECTIVE AND OBJECTIVE BOX
Patient is a 60y old  Male who presents with a chief complaint of s/p fall. (08 Jan 2021 19:09)      Over Night Events:  Patient seen and examined.   still intubated on precedex and fenatyl     ROS:  See HPI    PHYSICAL EXAM    ICU Vital Signs Last 24 Hrs  T(C): 37.4 (09 Jan 2021 08:00), Max: 37.4 (09 Jan 2021 08:00)  T(F): 99.4 (09 Jan 2021 08:00), Max: 99.4 (09 Jan 2021 08:00)  HR: 78 (09 Jan 2021 08:00) (76 - 84)  BP: --  BP(mean): --  ABP: 134/58 (09 Jan 2021 08:00) (98/52 - 142/54)  ABP(mean): 86 (09 Jan 2021 08:00) (62 - 86)  RR: 36 (09 Jan 2021 08:00) (15 - 39)  SpO2: 99% (09 Jan 2021 08:00) (96% - 100%)      General: on sedation agitatd when lwoer sedation   HEENT:     et tube            Lymph Nodes: NO cervical LN   Lungs: Bilateral BS  Cardiovascular: Regular   Abdomen: Soft, Positive BS  Extremities: No clubbing   Skin: warm   Neurological:  positiv eagg  Musculoskeletal: move all ext     I&O's Detail    08 Jan 2021 07:01  -  09 Jan 2021 07:00  --------------------------------------------------------  IN:    Dexmedetomidine: 756 mL    FentaNYL: 209.7 mL    Heparin: 288 mL    Insulin: 104 mL    IV PiggyBack: 450 mL    Norepinephrine: 2 mL    Peptamen A.F.: 1800 mL    Plasma: 202 mL  Total IN: 3811.7 mL    OUT:    Rectal Tube (mL): 700 mL  Total OUT: 700 mL    Total NET: 3111.7 mL      09 Jan 2021 07:01  -  09 Jan 2021 08:45  --------------------------------------------------------  IN:    Dexmedetomidine: 29 mL    FentaNYL: 9.6 mL    Heparin: 12 mL    Insulin: 13 mL    Peptamen A.F.: 75 mL  Total IN: 138.6 mL    OUT:    Norepinephrine: 0 mL  Total OUT: 0 mL    Total NET: 138.6 mL          LABS:                          9.2    11.31 )-----------( 213      ( 09 Jan 2021 04:30 )             28.6         09 Jan 2021 04:30    135    |  96     |  100    ----------------------------<  283    4.6     |  21     |  7.3      Ca    7.3        09 Jan 2021 04:30  Mg     2.5       09 Jan 2021 04:30    TPro  5.9    /  Alb  2.8    /  TBili  0.8    /  DBili  x      /  AST  45     /  ALT  40     /  AlkPhos  84     09 Jan 2021 04:30  Amylase x     lipase x                                                 PT/INR - ( 09 Jan 2021 04:30 )   PT: 17.90 sec;   INR: 1.56 ratio         PTT - ( 09 Jan 2021 04:30 )  PTT:70.5 sec                                                                                                   Culture - Blood (collected 07 Jan 2021 14:00)  Source: .Blood Blood-Peripheral  Preliminary Report (08 Jan 2021 22:02):    No growth to date.    Culture - Sputum (collected 06 Jan 2021 18:30)  Source: .Sputum trache aspirate  Gram Stain (07 Jan 2021 06:09):    Moderate polymorphonuclear leukocytes per low power field    Few Squamous epithelial cells per low power field    Moderate Gram positive cocci in pairs seen per oil power field    Moderate Gram Variable Rods seen per oil power field  Final Report (08 Jan 2021 19:15):    Normal Respiratory Rosetta present                                                   Mode: AC/ CMV (Assist Control/ Continuous Mandatory Ventilation)  RR (machine): 30  TV (machine): 500  FiO2: 40  PEEP: 12  ITime: 1  MAP: 20  PIP: 31                                      ABG - ( 09 Jan 2021 01:46 )  pH, Arterial: 7.28  pH, Blood: x     /  pCO2: 49    /  pO2: 83    / HCO3: 23    / Base Excess: -3.5  /  SaO2: 95                  MEDICATIONS  (STANDING):  acetaminophen   Tablet .. 650 milliGRAM(s) Oral once  aspirin  chewable 81 milliGRAM(s) Oral daily  atorvastatin 20 milliGRAM(s) Oral at bedtime  cefepime   IVPB 500 milliGRAM(s) IV Intermittent every 24 hours  chlorhexidine 0.12% Liquid 15 milliLiter(s) Oral Mucosa every 12 hours  chlorhexidine 4% Liquid 1 Application(s) Topical <User Schedule>  dexAMETHasone  Injectable 6 milliGRAM(s) IV Push daily  dexMEDEtomidine Infusion 0.2 MICROgram(s)/kG/Hr (4.85 mL/Hr) IV Continuous <Continuous>  dextrose 40% Gel 15 Gram(s) Oral once  dextrose 5%. 1000 milliLiter(s) (50 mL/Hr) IV Continuous <Continuous>  dextrose 5%. 1000 milliLiter(s) (100 mL/Hr) IV Continuous <Continuous>  dextrose 50% Injectable 25 Gram(s) IV Push once  dextrose 50% Injectable 12.5 Gram(s) IV Push once  dextrose 50% Injectable 25 Gram(s) IV Push once  epoetin raven-epbx (RETACRIT) Injectable 8000 Unit(s) IV Push <User Schedule>  fentaNYL   Infusion 0.5 MICROgram(s)/kG/Hr (4.85 mL/Hr) IV Continuous <Continuous>  furosemide   Injectable 80 milliGRAM(s) IV Push two times a day  glucagon  Injectable 1 milliGRAM(s) IntraMuscular once  heparin  Infusion 1800 Unit(s)/Hr (12 mL/Hr) IV Continuous <Continuous>  insulin regular Infusion 1 Unit(s)/Hr (1 mL/Hr) IV Continuous <Continuous>  levothyroxine 100 MICROGram(s) Oral daily  metoprolol tartrate 12.5 milliGRAM(s) Oral two times a day  norepinephrine Infusion 0.05 MICROgram(s)/kG/Min (9.08 mL/Hr) IV Continuous <Continuous>  pantoprazole   Suspension 40 milliGRAM(s) Oral before breakfast  senna 2 Tablet(s) Oral at bedtime    MEDICATIONS  (PRN):  acetaminophen   Tablet .. 650 milliGRAM(s) Oral every 6 hours PRN Temp greater or equal to 38C (100.4F), Mild Pain (1 - 3)          Xrays:  TLC:  OG:  ET tube:    high                                                                                    ECHO:  CAM ICU:

## 2021-01-09 NOTE — PROGRESS NOTE ADULT - ASSESSMENT
#Covid-19 PNA with a suspected  bacterial PNA:   - The patient was intubated for increased alteration, hypoxemia and non-compliance with BiPAP  - DC azithromycin today  - Cefepime 500mg IV once daily (1/1/2021)  - Dexamethasone 6mg IV once daily (1/1/2021)  - COVID-19 antibodies received on 1/4/2021 by [place] lab ( called lab [id number] to follow up on it)  - Covid-antibodies: reported negative --> Will give 2 units of convalescent plasma   - will repeat blood cultures and resend procalcitonin   - Repeat procalcitonin: 4.89 (on 1/7) , 5.28 (on 1/6), 4.11 (on 1/4)  - s/p tocilizumab 400mg IV once on 1/8/2021, and repeat ferritin after 48 hours    #Altered Mental Status:   - EEG diffuse slowing, but no seizure activity, check the report above  - CT brain shows ventriculomegaly (check full report), no IC bleed, possible old infarct.  - neurology consulted and recs:   a. CT brain negative x2 for stroke or other structural pathology.  EEG negative for any epileptiform activity.  Suspect multifactorial metabolic encephalopathy in setting of COVID and ESRD and sedatives.  Wean sedation as able, can reassess if patient does not awaken after that time.  b. No further EEG or other tests   c. To be reassessed after sedation is weaned    #Fluid overload  #ESRD:   - HDbeing done on 1/9/2021  - EPO (retacrit) 8000 units IV push once weekly  - DC sevelamer as per nephro   - Will DC Sodium bicarbonate as per nephro    #Atrial fibrillation:  - Paroxysmal ? Currently NSRT, well rate controlled.   - Heparin gtt, follow up aPTT q6hrs until therapeutic   - Metoprolol tartrate 12.5mg po q12hrs    - Will DC levophed  - CT brain no IC bleed or concerns of IC bleed ( Heparin drip started)  - aPTT being followed     #NSTEMI:   - Type II MI, demand ischemia, likely secondary to hypoxia secondary to COVID-19 pneumonia  - Aspirin 81mg po once daily   - CT brain no IC bleed or concerns of IC bleed   - On heparin gtt   - Repeat CK-BM and CPK were stable    #Hypothyroidism:  - Continue with levothyroxine 100mcg po once daily     #Hyponatremia:   - Resolved  - Na: 140 (1/8/2021)  - Likely hypervolemic hyponatremia secondary to fluid overload   - Continue with Lasix and dialysis sessions   - Will follow up sodium levels  - Spoke with the patient's sister Nedra Shea on [phone] ( on 1/4/2021), speaking with her for updates daily.   The sister said that she is the health care proxy and will get the form from the house  Patient is full code now   - Concerns about Why the patient did not received, hydroxychloroquine and remdesivir ( explained that he is ESRD and that with eGFR <30 the medication is contraindicated.)   - Explained that he has ESRD ( eGFR <30), and therefore remdesivir is contraindicated  - Explained to her that hydroxychloroquine is not an option for treatment at the moment as studies have showed that there is no benefit to mortality.

## 2021-01-09 NOTE — PROGRESS NOTE ADULT - ASSESSMENT
ESRD (due to DM) on HD MWF  s/p Fall  altered mental status   Covid-19 PNA / bacterial PNA   fluid overload  hyponatremia  DM  HTN  afib on coumadin  CVA  NSTEMI    plan:    HD ongoing : 3 hours, opti 160 dialyzer, 2K bath, 3L UF  BP stable  vent care  dexamethasone   covid isolation  cont toprol xl   cont lasix 80mg iv q12h  f/u cardio / f/u pulm  icu care

## 2021-01-09 NOTE — PROGRESS NOTE ADULT - SUBJECTIVE AND OBJECTIVE BOX
[facility] FOLLOW UP NOTE  --------------------------------------------------------------------------------  Chief Complaint/24 hour events/subjective:    pt sen in HD, on vent, tolerated well    PAST HISTORY  --------------------------------------------------------------------------------  No significant changes to PMH, PSH, FHx, SHx, unless otherwise noted    ALLERGIES & MEDICATIONS  --------------------------------------------------------------------------------  Allergies    Orange (Other)  Originally Entered as [HIVES] reaction to [sulfur] (Unknown)  penicillin (Unknown)  sulfADIAZINE (Unknown)    Intolerances      Standing Inpatient Medications  acetaminophen   Tablet .. 650 milliGRAM(s) Oral once  aspirin  chewable 81 milliGRAM(s) Oral daily  atorvastatin 20 milliGRAM(s) Oral at bedtime  cefepime   IVPB 500 milliGRAM(s) IV Intermittent every 24 hours  chlorhexidine 0.12% Liquid 15 milliLiter(s) Oral Mucosa every 12 hours  chlorhexidine 4% Liquid 1 Application(s) Topical <User Schedule>  dexAMETHasone  Injectable 6 milliGRAM(s) IV Push daily  dexMEDEtomidine Infusion 0.2 MICROgram(s)/kG/Hr IV Continuous <Continuous>  dextrose 40% Gel 15 Gram(s) Oral once  dextrose 5%. 1000 milliLiter(s) IV Continuous <Continuous>  dextrose 5%. 1000 milliLiter(s) IV Continuous <Continuous>  dextrose 50% Injectable 25 Gram(s) IV Push once  dextrose 50% Injectable 12.5 Gram(s) IV Push once  dextrose 50% Injectable 25 Gram(s) IV Push once  epoetin raven-epbx (RETACRIT) Injectable 8000 Unit(s) IV Push <User Schedule>  fentaNYL   Infusion 0.5 MICROgram(s)/kG/Hr IV Continuous <Continuous>  furosemide   Injectable 80 milliGRAM(s) IV Push two times a day  glucagon  Injectable 1 milliGRAM(s) IntraMuscular once  heparin  Infusion 1800 Unit(s)/Hr IV Continuous <Continuous>  insulin regular Infusion 1 Unit(s)/Hr IV Continuous <Continuous>  levothyroxine 100 MICROGram(s) Oral daily  metoprolol tartrate 12.5 milliGRAM(s) Oral two times a day  norepinephrine Infusion 0.05 MICROgram(s)/kG/Min IV Continuous <Continuous>  pantoprazole   Suspension 40 milliGRAM(s) Oral before breakfast  senna 2 Tablet(s) Oral at bedtime    PRN Inpatient Medications  acetaminophen   Tablet .. 650 milliGRAM(s) Oral every 6 hours PRN      REVIEW OF SYSTEMS  --------------------------------------------------------------------------------    All other systems were reviewed and are negative, except as noted.    VITALS/PHYSICAL EXAM  --------------------------------------------------------------------------------  T(C): 37.5 (01-09-21 @ 12:00), Max: 37.5 (01-09-21 @ 12:00)  HR: 78 (01-09-21 @ 14:00) (76 - 84)  BP: --  RR: 30 (01-09-21 @ 14:00) (15 - 41)  SpO2: 100% (01-09-21 @ 14:00) (96% - 100%)  Wt(kg): --        01-08-21 @ 07:01  -  01-09-21 @ 07:00  --------------------------------------------------------  IN: 3811.7 mL / OUT: 700 mL / NET: 3111.7 mL    01-09-21 @ 07:01  -  01-09-21 @ 14:07  --------------------------------------------------------  IN: 944.2 mL / OUT: 2000 mL / NET: -1055.8 mL      Physical Exam:    	Gen: on vent  	Pulm: B/L BS  	CV: S1S2; no rub  	Abd: +BS, soft, nontender/nondistended  	LE:  no  edema      LABS/STUDIES  --------------------------------------------------------------------------------              9.2    11.31 >-----------<  213      [01-09-21 @ 04:30]              28.6     135  |  96  |  100  ----------------------------<  283      [01-09-21 @ 04:30]  4.6   |  21  |  7.3        Ca     7.3     [01-09-21 @ 04:30]      Mg     2.5     [01-09-21 @ 04:30]    TPro  5.9  /  Alb  2.8  /  TBili  0.8  /  DBili  x   /  AST  45  /  ALT  40  /  AlkPhos  84  [01-09-21 @ 04:30]    PT/INR: PT 17.90, INR 1.56       [01-09-21 @ 04:30]  PTT: 70.5       [01-09-21 @ 04:30]      Creatinine Trend:  SCr 7.3 [01-09 @ 04:30]  SCr 6.6 [01-08 @ 04:40]  SCr 6.9 [01-07 @ 05:10]  SCr 6.2 [01-06 @ 05:40]  SCr 6.9 [01-05 @ 05:30]    Urinalysis - [01-06-21 @ 18:43]      Color Yellow / Appearance Slightly Turbid / SG 1.018 / pH 6.5      Gluc 100 mg/dL / Ketone Negative  / Bili Negative / Urobili <2 mg/dL       Blood Moderate / Protein 300 mg/dL / Leuk Est Small / Nitrite Negative       /  / Hyaline 114 / Gran  / Sq Epi  / Non Sq Epi 1 / Bacteria Negative      Ferritin 2748      [01-01-21 @ 17:56]  HbA1c 7.1      [05-21-19 @ 04:30]  TSH 4.57      [01-01-21 @ 17:56]

## 2021-01-09 NOTE — PROGRESS NOTE ADULT - SUBJECTIVE AND OBJECTIVE BOX
SUBJECTIVE:    Patient is a 60y old Male who presents with a chief complaint of s/p fall. (09 Jan 2021 08:45)    Currently admitted to medicine with the primary diagnosis of Fever       Today is hospital day 8d. This morning he is resting comfortably in bed and reports no new issues or overnight events.     PAST MEDICAL & SURGICAL HISTORY  PAD (peripheral artery disease)  multiple toe amputations    Anemia  chronic anemia - s/p transfusion 2018    Thyroid cancer    Chronic leg pain    OA (osteoarthritis)    SOB (shortness of breath) on exertion    ESRD (end stage renal disease)  2 yrs    Atrial fibrillation  on warfarin    Right sided weakness    Stroke  8 yrs ago    Hypertension    High blood cholesterol    Diabetes mellitus, type 2    Dialysis patient  Mon, Wednesday, Friday ([facility])    Smoker    H/O thyroid nodule    H/O gastroesophageal reflux (GERD)    History of tonsillectomy    History of surgery  Multiple toe amputations (amp 4 1/2 left toes; has 1/2 left mid toe; amp right mid toe)    A-V fistula  left AVF      SOCIAL HISTORY:  Negative for smoking/alcohol/drug use.     ALLERGIES:  Orange (Other)  Originally Entered as [HIVES] reaction to [sulfur] (Unknown)  penicillin (Unknown)  sulfADIAZINE (Unknown)    MEDICATIONS:  STANDING MEDICATIONS  acetaminophen   Tablet .. 650 milliGRAM(s) Oral once  aspirin  chewable 81 milliGRAM(s) Oral daily  atorvastatin 20 milliGRAM(s) Oral at bedtime  cefepime   IVPB 500 milliGRAM(s) IV Intermittent every 24 hours  chlorhexidine 0.12% Liquid 15 milliLiter(s) Oral Mucosa every 12 hours  chlorhexidine 4% Liquid 1 Application(s) Topical <User Schedule>  dexAMETHasone  Injectable 6 milliGRAM(s) IV Push daily  dexMEDEtomidine Infusion 0.2 MICROgram(s)/kG/Hr IV Continuous <Continuous>  dextrose 40% Gel 15 Gram(s) Oral once  dextrose 5%. 1000 milliLiter(s) IV Continuous <Continuous>  dextrose 5%. 1000 milliLiter(s) IV Continuous <Continuous>  dextrose 50% Injectable 25 Gram(s) IV Push once  dextrose 50% Injectable 12.5 Gram(s) IV Push once  dextrose 50% Injectable 25 Gram(s) IV Push once  epoetin raevn-epbx (RETACRIT) Injectable 8000 Unit(s) IV Push <User Schedule>  fentaNYL   Infusion 0.5 MICROgram(s)/kG/Hr IV Continuous <Continuous>  furosemide   Injectable 80 milliGRAM(s) IV Push two times a day  glucagon  Injectable 1 milliGRAM(s) IntraMuscular once  heparin  Infusion 1800 Unit(s)/Hr IV Continuous <Continuous>  insulin regular Infusion 1 Unit(s)/Hr IV Continuous <Continuous>  levothyroxine 100 MICROGram(s) Oral daily  metoprolol tartrate 12.5 milliGRAM(s) Oral two times a day  norepinephrine Infusion 0.05 MICROgram(s)/kG/Min IV Continuous <Continuous>  pantoprazole   Suspension 40 milliGRAM(s) Oral before breakfast  senna 2 Tablet(s) Oral at bedtime    PRN MEDICATIONS  acetaminophen   Tablet .. 650 milliGRAM(s) Oral every 6 hours PRN    VITALS:   T(F): 99.4  HR: 82  BP: --  RR: 31  SpO2: 98%    LABS:                        9.2    11.31 )-----------( 213      ( 09 Jan 2021 04:30 )             28.6     01-09    135  |  96<L>  |  100<HH>  ----------------------------<  283<H>  4.6   |  21  |  7.3<HH>    Ca    7.3<L>      09 Jan 2021 04:30  Mg     2.5     01-09    TPro  5.9<L>  /  Alb  2.8<L>  /  TBili  0.8  /  DBili  x   /  AST  45<H>  /  ALT  40  /  AlkPhos  84  01-09    PT/INR - ( 09 Jan 2021 04:30 )   PT: 17.90 sec;   INR: 1.56 ratio    PTT - ( 09 Jan 2021 04:30 )  PTT:70.5 sec    ABG - ( 09 Jan 2021 01:46 )  pH, Arterial: 7.28  pH, Blood: x     /  pCO2: 49    /  pO2: 83    / HCO3: 23    / Base Excess: -3.5  /  SaO2: 95        Culture - Blood (collected 07 Jan 2021 14:00)  Source: .Blood Blood-Peripheral  Preliminary Report (08 Jan 2021 22:02):    No growth to date.    Culture - Sputum (collected 06 Jan 2021 18:30)  Source: .Sputum trache aspirate  Gram Stain (07 Jan 2021 06:09):    Moderate polymorphonuclear leukocytes per low power field    Few Squamous epithelial cells per low power field    Moderate Gram positive cocci in pairs seen per oil power field    Moderate Gram Variable Rods seen per oil power field  Final Report (08 Jan 2021 19:15):    Normal Respiratory Rosetta present    RADIOLOGY:    < from: Xray Chest 1 View- PORTABLE-Routine (Xray Chest 1 View- PORTABLE-Routine in AM.) (01.08.21 @ 05:53) >  Impression:    Bilateral opacities unchanged. No pleural effusion or air leak    < end of copied text >      PHYSICAL EXAM:  GEN: Intubated, sedated.   LUNGS: Clear to auscultation bilaterally   HEART: S1/S2 present. RRR.   ABD: Soft, non-tender, mildly distended. Bowel sounds present  EXT: NC/NC/NE/2+PP/[place]/Skin Intact.   NEURO: intubated and sedated    Intravenous access:   NG tube:   Garza Catheter:   Indwelling Urethral Catheter:     Connect To:  Straight Drainage/Gravity    Indication:  Urine Output Monitoring in Critically Ill (01-09-21 @ 00:14) (not performed) [Active]  Indwelling Urethral Catheter:     Connect To:  Straight Drainage/Gravity    Indication:  Urine Output Monitoring in Critically Ill (01-07-21 @ 05:16) (not performed) [Active]

## 2021-01-09 NOTE — PROGRESS NOTE ADULT - ASSESSMENT
IMPRESSION:    s/p fall / head CT neg  ESRD MWF for HD  Acute hypoxemic resp failure possibly fluid overlaod  NSTEMI  Afib on coumadin  COVID PNA  Superimposed bacteria PNA    PLAN:    CNS: SAT. precedex , fenatnyl     HEENT: Oral care.    PULMONARY: Vent changes. Wean O2.  Monitor PPL and DP.  .    drop peep to 10 push et tube 1 cm in   CARDIOVASCULAR:  I<O as tolerated.        GI: GI prophylaxis.  OG Feeding.  Bowel regimen     RENAL: check lytes and replete PRN. Nephro F/UP result.  NaHCo3.    on HD   INFECTIOUS DISEASE: Dexamethasone 6 mg D# 8,  Finish ABX course     HEMATOLOGICAL: DVT Prophylaxis  IV Heparin FU PTT     ENDOCRINE:  Follow up FS.  Insulin protocol if needed.    MUSCULOSKELETAL: Increase as tolerated.    MICU     Prognosis poor

## 2021-01-10 NOTE — PROGRESS NOTE ADULT - SUBJECTIVE AND OBJECTIVE BOX
Patient is a 60y old  Male who presents with a chief complaint of s/p fall. (09 Jan 2021 14:07)      Over Night Events:  Patient seen and examined.   precedex , fentanyl   heparind rip     ROS:  See HPI    PHYSICAL EXAM    ICU Vital Signs Last 24 Hrs  T(C): 37.3 (10 Kwame 2021 04:00), Max: 37.5 (09 Jan 2021 12:00)  T(F): 99.1 (10 Kwame 2021 04:00), Max: 99.5 (09 Jan 2021 12:00)  HR: 82 (10 Kwame 2021 07:00) (76 - 86)  BP: --  BP(mean): --  ABP: 106/46 (10 Kwame 2021 07:00) (90/44 - 154/58)  ABP(mean): 68 (10 Kwame 2021 07:00) (60 - 92)  RR: 34 (10 Kwame 2021 07:00) (15 - 41)  SpO2: 100% (10 Kwame 2021 07:00) (97% - 100%)      General: awake   HEENT:     et tube            Lymph Nodes: NO cervical LN   Lungs: Bilateral BS  Cardiovascular: Regular   Abdomen: Soft, Positive BS  Extremities: No clubbing   Skin: warm   Neurological: no focal   Musculoskeletal: move all ext     I&O's Detail    09 Jan 2021 07:01  -  10 Kwame 2021 07:00  --------------------------------------------------------  IN:    Dexmedetomidine: 627.2 mL    FentaNYL: 280.2 mL    Heparin: 292 mL    Insulin: 124 mL    IV PiggyBack: 50 mL    Peptamen A.F.: 1800 mL  Total IN: 3173.4 mL    OUT:    Norepinephrine: 0 mL    Other (mL): 2000 mL    Rectal Tube (mL): 100 mL  Total OUT: 2100 mL    Total NET: 1073.4 mL          LABS:                          9.1    20.10 )-----------( 222      ( 10 Kwame 2021 04:30 )             28.3         10 Kwame 2021 04:30    140    |  100    |  89     ----------------------------<  171    5.0     |  26     |  6.0      Ca    7.5        10 Kwame 2021 04:30  Phos  3.4       10 Kwame 2021 04:30  Mg     2.3       10 Kwame 2021 04:30    TPro  5.6    /  Alb  2.7    /  TBili  0.7    /  DBili  x      /  AST  42     /  ALT  37     /  AlkPhos  80     10 Kwame 2021 04:30  Amylase x     lipase x                                                 PT/INR - ( 09 Jan 2021 04:30 )   PT: 17.90 sec;   INR: 1.56 ratio         PTT - ( 10 Kwame 2021 04:30 )  PTT:46.7 sec                                                                                                   Culture - Blood (collected 07 Jan 2021 14:00)  Source: .Blood Blood-Peripheral  Preliminary Report (08 Jan 2021 22:02):    No growth to date.                                                   Mode: AC/ CMV (Assist Control/ Continuous Mandatory Ventilation)  RR (machine): 30  TV (machine): 500  FiO2: 40  PEEP: 10  ITime: 1  MAP: 20  PIP: 31                                      ABG - ( 10 Kwame 2021 03:17 )  pH, Arterial: 7.39  pH, Blood: x     /  pCO2: 43    /  pO2: 80    / HCO3: 26    / Base Excess: 1.0   /  SaO2: 96                  MEDICATIONS  (STANDING):  acetaminophen   Tablet .. 650 milliGRAM(s) Oral once  aspirin  chewable 81 milliGRAM(s) Oral daily  atorvastatin 20 milliGRAM(s) Oral at bedtime  cefepime   IVPB 500 milliGRAM(s) IV Intermittent every 24 hours  chlorhexidine 0.12% Liquid 15 milliLiter(s) Oral Mucosa every 12 hours  chlorhexidine 4% Liquid 1 Application(s) Topical <User Schedule>  dexAMETHasone  Injectable 6 milliGRAM(s) IV Push daily  dexMEDEtomidine Infusion 0.2 MICROgram(s)/kG/Hr (4.85 mL/Hr) IV Continuous <Continuous>  dextrose 40% Gel 15 Gram(s) Oral once  dextrose 5%. 1000 milliLiter(s) (50 mL/Hr) IV Continuous <Continuous>  dextrose 5%. 1000 milliLiter(s) (100 mL/Hr) IV Continuous <Continuous>  dextrose 50% Injectable 25 Gram(s) IV Push once  dextrose 50% Injectable 12.5 Gram(s) IV Push once  dextrose 50% Injectable 25 Gram(s) IV Push once  epoetin raven-epbx (RETACRIT) Injectable 8000 Unit(s) IV Push <User Schedule>  fentaNYL   Infusion 0.5 MICROgram(s)/kG/Hr (4.85 mL/Hr) IV Continuous <Continuous>  furosemide   Injectable 80 milliGRAM(s) IV Push two times a day  glucagon  Injectable 1 milliGRAM(s) IntraMuscular once  heparin  Infusion 1800 Unit(s)/Hr (14 mL/Hr) IV Continuous <Continuous>  insulin regular Infusion 1 Unit(s)/Hr (1 mL/Hr) IV Continuous <Continuous>  levothyroxine 100 MICROGram(s) Oral daily  metoprolol tartrate 12.5 milliGRAM(s) Oral two times a day  norepinephrine Infusion 0.05 MICROgram(s)/kG/Min (9.08 mL/Hr) IV Continuous <Continuous>  pantoprazole   Suspension 40 milliGRAM(s) Oral before breakfast  senna 2 Tablet(s) Oral at bedtime    MEDICATIONS  (PRN):  acetaminophen   Tablet .. 650 milliGRAM(s) Oral every 6 hours PRN Temp greater or equal to 38C (100.4F), Mild Pain (1 - 3)          Xrays:  TLC:  OG:  ET tube:                                                                                    b/l opacity more RLL    ECHO:  CAM ICU:

## 2021-01-10 NOTE — PROGRESS NOTE ADULT - ASSESSMENT
IMPRESSION:    s/p fall / head CT neg  ESRD MWF for HD  Acute hypoxemic resp failure possibly fluid overlaod  NSTEMI  Afib on coumadin  COVID PNA  Superimposed bacteria PNA    PLAN:    CNS: SAT. precedex , fenatnyl     HEENT: Oral care.    PULMONARY: Vent changes. Wean O2.  Monitor PPL and DP.  .    drop peep to 10 push et tube 1 cm in   CARDIOVASCULAR:  I<O as tolerated.        GI: GI prophylaxis.  OG Feeding.  Bowel regimen     RENAL: check lytes and replete PRN. Nephro F/UP result.  NaHCo3.    on HD   INFECTIOUS DISEASE: Dexamethasone 6 mg D# 8,    repeat nasal mrsa, fungitel,   bld cx   given vanco once   id follow     HEMATOLOGICAL: DVT Prophylaxis  IV Heparin FU PTT     ENDOCRINE:  Follow up FS.  Insulin protocol if needed.    MUSCULOSKELETAL: Increase as tolerated.    MICU     Prognosis poor

## 2021-01-10 NOTE — PROGRESS NOTE ADULT - SUBJECTIVE AND OBJECTIVE BOX
[facility] FOLLOW UP NOTE  --------------------------------------------------------------------------------  Chief Complaint/24 hour events/subjective:    pt seen, on vent, s/p HD on sat    PAST HISTORY  --------------------------------------------------------------------------------  No significant changes to PMH, PSH, FHx, SHx, unless otherwise noted    ALLERGIES & MEDICATIONS  --------------------------------------------------------------------------------  Allergies    Orange (Other)  Originally Entered as [HIVES] reaction to [sulfur] (Unknown)  penicillin (Unknown)  sulfADIAZINE (Unknown)    Intolerances      Standing Inpatient Medications  acetaminophen   Tablet .. 650 milliGRAM(s) Oral once  aspirin  chewable 81 milliGRAM(s) Oral daily  atorvastatin 20 milliGRAM(s) Oral at bedtime  cefepime   IVPB 500 milliGRAM(s) IV Intermittent every 24 hours  chlorhexidine 0.12% Liquid 15 milliLiter(s) Oral Mucosa every 12 hours  chlorhexidine 4% Liquid 1 Application(s) Topical <User Schedule>  dexAMETHasone  Injectable 6 milliGRAM(s) IV Push daily  dexMEDEtomidine Infusion 0.2 MICROgram(s)/kG/Hr IV Continuous <Continuous>  dextrose 40% Gel 15 Gram(s) Oral once  dextrose 5%. 1000 milliLiter(s) IV Continuous <Continuous>  dextrose 5%. 1000 milliLiter(s) IV Continuous <Continuous>  dextrose 50% Injectable 25 Gram(s) IV Push once  dextrose 50% Injectable 12.5 Gram(s) IV Push once  dextrose 50% Injectable 25 Gram(s) IV Push once  epoetin ravne-epbx (RETACRIT) Injectable 8000 Unit(s) IV Push <User Schedule>  fentaNYL   Infusion 0.5 MICROgram(s)/kG/Hr IV Continuous <Continuous>  furosemide   Injectable 80 milliGRAM(s) IV Push two times a day  glucagon  Injectable 1 milliGRAM(s) IntraMuscular once  heparin  Infusion 1800 Unit(s)/Hr IV Continuous <Continuous>  insulin regular  human recombinant. 10 Unit(s) IV Push once  insulin regular Infusion 1 Unit(s)/Hr IV Continuous <Continuous>  levothyroxine 100 MICROGram(s) Oral daily  metoprolol tartrate 12.5 milliGRAM(s) Oral two times a day  norepinephrine Infusion 0.05 MICROgram(s)/kG/Min IV Continuous <Continuous>  pantoprazole   Suspension 40 milliGRAM(s) Oral before breakfast  senna 2 Tablet(s) Oral at bedtime    PRN Inpatient Medications  acetaminophen   Tablet .. 650 milliGRAM(s) Oral every 6 hours PRN      REVIEW OF SYSTEMS  --------------------------------------------------------------------------------    All other systems were reviewed and are negative, except as noted.    VITALS/PHYSICAL EXAM  --------------------------------------------------------------------------------  T(C): 37.1 (01-10-21 @ 08:00), Max: 37.4 (01-09-21 @ 20:00)  HR: 76 (01-10-21 @ 12:00) (76 - 116)  BP: --  RR: 30 (01-10-21 @ 12:00) (15 - 41)  SpO2: 100% (01-10-21 @ 12:00) (95% - 100%)  Wt(kg): --        01-09-21 @ 07:01  -  01-10-21 @ 07:00  --------------------------------------------------------  IN: 3173.4 mL / OUT: 2100 mL / NET: 1073.4 mL    01-10-21 @ 07:01  -  01-10-21 @ 12:53  --------------------------------------------------------  IN: 1381.4 mL / OUT: 0 mL / NET: 1381.4 mL      Physical Exam:    	Gen: on vent, no distress   	Pulm: B/L BS  	CV: S1S2; no rub  	Abd: +BS, soft, nontender/nondistended  	LE:  no  edema      LABS/STUDIES  --------------------------------------------------------------------------------              9.1    20.10 >-----------<  222      [01-10-21 @ 04:30]              28.3     140  |  100  |  89  ----------------------------<  171      [01-10-21 @ 04:30]  5.0   |  26  |  6.0        Ca     7.5     [01-10-21 @ 04:30]      Mg     2.3     [01-10-21 @ 04:30]      Phos  3.4     [01-10-21 @ 04:30]    TPro  5.6  /  Alb  2.7  /  TBili  0.7  /  DBili  x   /  AST  42  /  ALT  37  /  AlkPhos  80  [01-10-21 @ 04:30]    PT/INR: PT 17.90, INR 1.56       [01-09-21 @ 04:30]  PTT: 46.7       [01-10-21 @ 04:30]      Creatinine Trend:  SCr 6.0 [01-10 @ 04:30]  SCr 7.3 [01-09 @ 04:30]  SCr 6.6 [01-08 @ 04:40]  SCr 6.9 [01-07 @ 05:10]  SCr 6.2 [01-06 @ 05:40]    Urinalysis - [01-06-21 @ 18:43]      Color Yellow / Appearance Slightly Turbid / SG 1.018 / pH 6.5      Gluc 100 mg/dL / Ketone Negative  / Bili Negative / Urobili <2 mg/dL       Blood Moderate / Protein 300 mg/dL / Leuk Est Small / Nitrite Negative       /  / Hyaline 114 / Gran  / Sq Epi  / Non Sq Epi 1 / Bacteria Negative      Ferritin 2748      [01-01-21 @ 17:56]  HbA1c 7.1      [05-21-19 @ 04:30]  TSH 4.57      [01-01-21 @ 17:56]

## 2021-01-10 NOTE — PROGRESS NOTE ADULT - ASSESSMENT
ESRD (due to DM) on HD MWF  s/p Fall  altered mental status   Covid-19 PNA / bacterial PNA   fluid overload  hyponatremia  DM  HTN  afib on coumadin  CVA  NSTEMI    plan:    next HD on Tuesday : 3 hours, opti 160 dialyzer, 2K bath, 3L UF  vent care  on dexamethasone   covid isolation  f/u cardio / f/u pulm  poor prognosis

## 2021-01-11 NOTE — PROGRESS NOTE ADULT - ASSESSMENT
IMPRESSION:    s/p fall / head CT neg  ESRD MWF for HD  Acute hypoxemic resp failure  NSTEMI  Afib on coumadin  severe COVID PNA  Superimposed bacteria PNA  inrease WBC/ diarrhea ro C diff    PLAN:    CNS: SAT. precedex, fentanyl if needed    HEENT: Oral care.    PULMONARY: Vent changes. Wean O2.  Monitor PPL and DP.  , increase rate, PEEP 8 repeat CXR    CARDIOVASCULAR:  I<O as tolerated.        GI: GI prophylaxis.  OG Feeding.  Bowel regimen     RENAL: check lytes and replete PRN. Nephro F/UP result  on HD , dc lasix    INFECTIOUS DISEASE: Dexamethasone 6 mg finished  stool for c diff  pancx, procal    HEMATOLOGICAL: DVT Prophylaxis  IV Heparin FU PTT     ENDOCRINE:  Follow up FS.  Insulin protocol if needed.    MUSCULOSKELETAL: Increase as tolerated.    MICU     Prognosis poor

## 2021-01-11 NOTE — PROGRESS NOTE ADULT - SUBJECTIVE AND OBJECTIVE BOX
Patient is a 60y old  Male who presents with a chief complaint of s/p fall. (11 Jan 2021 13:23)  pt seen and evaluated   remain vented /sedated  on OGT feed  on pressor    ICU Vital Signs Last 24 Hrs  T(C): 37.1 (11 Jan 2021 12:00), Max: 37.8 (11 Jan 2021 08:00)  T(F): 98.8 (11 Jan 2021 12:00), Max: 100 (11 Jan 2021 08:00)  HR: 84 (11 Jan 2021 15:00) (76 - 112)  ABP: 124/56 (11 Jan 2021 15:00) (98/42 - 146/62)  ABP(mean): 80 (11 Jan 2021 15:00) (60 - 92)  RR: 33 (11 Jan 2021 15:00) (10 - 34)  SpO2: 100% (11 Jan 2021 15:00) (99% - 100%)      Drug Dosing Weight  Height (cm): 177.8 (01 Jan 2021 16:05)  Weight (kg): 96.9 (01 Jan 2021 16:05)  BMI (kg/m2): 30.7 (01 Jan 2021 16:05)  BSA (m2): 2.15 (01 Jan 2021 16:05)    I&O's Detail    10 Kwame 2021 07:01  -  11 Jan 2021 07:00  --------------------------------------------------------  IN:    Dexmedetomidine: 551.2 mL    Dexmedetomidine: 75 mL    FentaNYL: 587.2 mL    Glucerna: 1200 mL    Heparin: 336 mL    Insulin: 136 mL    IV PiggyBack: 500 mL    Norepinephrine: 60 mL    Peptamen A.F.: 600 mL  Total IN: 4045.4 mL    OUT:    Rectal Tube (mL): 500 mL    Voided (mL): 0 mL  Total OUT: 500 mL    Total NET: 3545.4 mL      11 Jan 2021 07:01  -  11 Jan 2021 16:05  --------------------------------------------------------  IN:    Dexmedetomidine: 200 mL    FentaNYL: 200 mL    Glucerna: 600 mL    Heparin: 112 mL    Insulin: 31 mL    IV PiggyBack: 50 mL    Norepinephrine: 24 mL  Total IN: 1217 mL    OUT:  Total OUT: 0 mL    Total NET: 1217 mL     PHYSICAL EXAM:  Constitutional:  remain vented/ sedated   OGT feed in [facility]  Gastrointestinal:  soft     MEDICATIONS  (STANDING):  acetaminophen   Tablet .. 650 milliGRAM(s) Oral once  aspirin  chewable 81 milliGRAM(s) Oral daily  atorvastatin 20 milliGRAM(s) Oral at bedtime  chlorhexidine 0.12% Liquid 15 milliLiter(s) Oral Mucosa every 12 hours  chlorhexidine 4% Liquid 1 Application(s) Topical <User Schedule>  dexMEDEtomidine Infusion 0.2 MICROgram(s)/kG/Hr (4.85 mL/Hr) IV Continuous <Continuous>  dextrose 40% Gel 15 Gram(s) Oral once  dextrose 5%. 1000 milliLiter(s) (50 mL/Hr) IV Continuous <Continuous>  dextrose 5%. 1000 milliLiter(s) (100 mL/Hr) IV Continuous <Continuous>  dextrose 50% Injectable 25 Gram(s) IV Push once  dextrose 50% Injectable 12.5 Gram(s) IV Push once  dextrose 50% Injectable 25 Gram(s) IV Push once  epoetin raven-epbx (RETACRIT) Injectable 8000 Unit(s) IV Push <User Schedule>  fentaNYL   Infusion 0.5 MICROgram(s)/kG/Hr (4.85 mL/Hr) IV Continuous <Continuous>  furosemide   Injectable 80 milliGRAM(s) IV Push two times a day  glucagon  Injectable 1 milliGRAM(s) IntraMuscular once  heparin  Infusion 1800 Unit(s)/Hr (14 mL/Hr) IV Continuous <Continuous>  insulin regular Infusion 1 Unit(s)/Hr (1 mL/Hr) IV Continuous <Continuous>  levothyroxine 100 MICROGram(s) Oral daily  meropenem  IVPB      metoprolol tartrate 12.5 milliGRAM(s) Oral two times a day  norepinephrine Infusion 0.05 MICROgram(s)/kG/Min (9.08 mL/Hr) IV Continuous <Continuous>  pantoprazole   Suspension 40 milliGRAM(s) Oral before breakfast  senna 2 Tablet(s) Oral at bedtime      Diet, NPO with Tube Feed:   Tube Feeding Modality: Orogastric  Peptamen A.F. Formula  Total Volume for 24 Hours (mL): 1800  Continuous  Until Goal Tube Feed Rate (mL per Hour): 75  Tube Feed Duration (in Hours): 24  Tube Feed Start Time: 13:15 (01-11-21 @ 13:00)      LABS  01-11    138  |  99  |  106<HH>  ----------------------------<  206<H>  5.6<H>   |  21  |  7.3<HH>    Ca    7.2<L>      11 Jan 2021 09:26  Phos  3.9     01-11  Mg     2.5     01-11    TPro  5.8<L>  /  Alb  2.7<L>  /  TBili  0.8  /  DBili  x   /  AST  53<H>  /  ALT  37  /  AlkPhos  93  01-11                          8.9    25.50 )-----------( 287      ( 11 Jan 2021 05:00 )             27.3     CAPILLARY BLOOD GLUCOSE  POCT Blood Glucose.: 101 mg/dL (11 Jan 2021 15:22)  POCT Blood Glucose.: 107 mg/dL (11 Jan 2021 14:45)  POCT Blood Glucose.: 164 mg/dL (11 Jan 2021 12:33)  POCT Blood Glucose.: 186 mg/dL (11 Jan 2021 11:09)  POCT Blood Glucose.: 213 mg/dL (11 Jan 2021 10:23)   RADIOLOGY STUDIES  < from: Xray Chest 1 View-PORTABLE IMMEDIATE (Xray Chest 1 View-PORTABLE IMMEDIATE .) (01.11.21 @ 10:37) >  Impression:  Bilateral opacifications, stable. Support devices as described.             Patient is a 60y old  Male who presents with a chief complaint of s/p fall. (11 Jan 2021 13:23)  pt seen and evaluated   remains vented /sedated on precedex and fentanyl  drips include heparin and levo and insulin at 4 u/h, when seen this morning  on OGT feed    ICU Vital Signs Last 24 Hrs  T(C): 37.1 (11 Jan 2021 12:00), Max: 37.8 (11 Jan 2021 08:00)  T(F): 98.8 (11 Jan 2021 12:00), Max: 100 (11 Jan 2021 08:00)  HR: 84 (11 Jan 2021 15:00) (76 - 112)  ABP: 124/56 (11 Jan 2021 15:00) (98/42 - 146/62)  ABP(mean): 80 (11 Jan 2021 15:00) (60 - 92)  RR: 33 (11 Jan 2021 15:00) (10 - 34)  SpO2: 100% (11 Jan 2021 15:00) (99% - 100%)    Drug Dosing Weight  Height (cm): 177.8 (01 Jan 2021 16:05)  Weight (kg): 96.9 (01 Jan 2021 16:05)  BMI (kg/m2): 30.7 (01 Jan 2021 16:05)  BSA (m2): 2.15 (01 Jan 2021 16:05)    I&O's Detail    10 Kwame 2021 07:01  -  11 Jan 2021 07:00  --------------------------------------------------------  IN:    Dexmedetomidine: 551.2 mL    Dexmedetomidine: 75 mL    FentaNYL: 587.2 mL    Glucerna: 1200 mL    Heparin: 336 mL    Insulin: 136 mL    IV PiggyBack: 500 mL    Norepinephrine: 60 mL    Peptamen A.F.: 600 mL  Total IN: 4045.4 mL    OUT:    Rectal Tube (mL): 500 mL    Voided (mL): 0 mL  Total OUT: 500 mL    Total NET: 3545.4 mL      11 Jan 2021 07:01  -  11 Jan 2021 16:05  --------------------------------------------------------  IN:    Dexmedetomidine: 200 mL    FentaNYL: 200 mL    Glucerna: 600 mL    Heparin: 112 mL    Insulin: 31 mL    IV PiggyBack: 50 mL    Norepinephrine: 24 mL  Total IN: 1217 mL    OUT:  Total OUT: 0 mL    Total NET: 1217 mL     PHYSICAL EXAM:  Constitutional:  remain vented/ sedated   OGT feed in place [facility] [facility]  Gastrointestinal:  soft     MEDICATIONS  (STANDING):  acetaminophen   Tablet .. 650 milliGRAM(s) Oral once  aspirin  chewable 81 milliGRAM(s) Oral daily  atorvastatin 20 milliGRAM(s) Oral at bedtime  chlorhexidine 0.12% Liquid 15 milliLiter(s) Oral Mucosa every 12 hours  chlorhexidine 4% Liquid 1 Application(s) Topical <User Schedule>  dexMEDEtomidine Infusion 0.2 MICROgram(s)/kG/Hr (4.85 mL/Hr) IV Continuous <Continuous>  epoetin raven-epbx (RETACRIT) Injectable 8000 Unit(s) IV Push <User Schedule>  fentaNYL   Infusion 0.5 MICROgram(s)/kG/Hr (4.85 mL/Hr) IV Continuous <Continuous>  furosemide   Injectable 80 milliGRAM(s) IV Push two times a day  heparin  Infusion 1800 Unit(s)/Hr (14 mL/Hr) IV Continuous <Continuous>  insulin regular Infusion 1 Unit(s)/Hr (1 mL/Hr) IV Continuous <Continuous>  levothyroxine 100 MICROGram(s) Oral daily  meropenem  IVPB      metoprolol tartrate 12.5 milliGRAM(s) Oral two times a day  norepinephrine Infusion 0.05 MICROgram(s)/kG/Min (9.08 mL/Hr) IV Continuous <Continuous>  pantoprazole   Suspension 40 milliGRAM(s) Oral before breakfast  senna 2 Tablet(s) Oral at bedtime    Diet, NPO with Tube Feed:   Tube Feeding Modality: Orogastric  Peptamen A.F. Formula  Total Volume for 24 Hours (mL): 1800  Continuous  Until Goal Tube Feed Rate (mL per Hour): 75  Tube Feed Duration (in Hours): 24  Tube Feed Start Time: 13:15 (01-11-21 @ 13:00)    LABS  01-11    138  |  99  |  106<HH>  ----------------------------<  206<H>  5.6<H>   |  21  |  7.3<HH>    Ca    7.2<L>      11 Jan 2021 09:26  Phos  3.9     01-11  Mg     2.5     01-11    TPro  5.8<L>  /  Alb  2.7<L>  /  TBili  0.8  /  DBili  x   /  AST  53<H>  /  ALT  37  /  AlkPhos  93  01-11                          8.9    25.50 )-----------( 287      ( 11 Jan 2021 05:00 )             27.3     CAPILLARY BLOOD GLUCOSE  POCT Blood Glucose.: 101 mg/dL (11 Jan 2021 15:22)  POCT Blood Glucose.: 107 mg/dL (11 Jan 2021 14:45)  POCT Blood Glucose.: 164 mg/dL (11 Jan 2021 12:33)  POCT Blood Glucose.: 186 mg/dL (11 Jan 2021 11:09)  POCT Blood Glucose.: 213 mg/dL (11 Jan 2021 10:23)     RADIOLOGY STUDIES  < from: Xray Chest 1 View-PORTABLE IMMEDIATE (Xray Chest 1 View-PORTABLE IMMEDIATE .) (01.11.21 @ 10:37) >  Impression:  Bilateral opacifications, stable. Support devices as described.

## 2021-01-11 NOTE — PROGRESS NOTE ADULT - ASSESSMENT
ASSESSMENT/PLAN  IMP:  - acute hypoxic resp failure  - covid 19 pneumonia  - ESRD on HD  - DM,   elevated WBC   hyperkalemia     SUGGEST:  - estimated REE by PSE 2230 kcal/d and protein needs ~ 139 gm/d  - continue feeding Peptamen AF at 75 ml/h --> 137 gm protein (entirely whey source), 2160 kcal, meets recommended low n6:n3 ratio, 1500 total mg phos/d and 77 total mEq K per day  - glycemic control - insulin drip at one u/h when seen - starting to improve  - check 25oh vitamin D level and replete aggressively ASSESSMENT/PLAN  IMP:  - acute hypoxic resp failure  - covid 19 pneumonia  - ESRD on HD  - DM,   elevated WBC   hyperkalemia     SUGGEST:  - estimated REE by PSE 2230 kcal/d and protein needs ~ 139 gm/d  - continue feeding Peptamen AF at 75 ml/h --> 137 gm protein (entirely whey source), 2160 kcal, meets recommended low n6:n3 ratio, 1500 total mg phos/d and 77 total mEq K per day  - glycemic control - insulin drip at one u/h when seen - starting to improve  - check 25oh vitamin D level and replete aggressively  - discussed with residents and fellow - formula with high n6 content not ideal for any ARDS pt including covid (refer to CCN / ASPEN guidelines 4/1/20 and JPEN 9/20). Note that suggested formula is also low carb content.

## 2021-01-11 NOTE — PROGRESS NOTE ADULT - SUBJECTIVE AND OBJECTIVE BOX
SUBJECTIVE:    Patient is a 60y old Male who presents with a chief complaint of s/p fall. (11 Jan 2021 09:06)    Currently admitted to medicine with the primary diagnosis of Fever       Today is hospital day 10d. This morning he is resting comfortably in bed and reports no new issues or overnight events.     PAST MEDICAL & SURGICAL HISTORY  PAD (peripheral artery disease)  multiple toe amputations    Anemia  chronic anemia - s/p transfusion 2018    Thyroid cancer    Chronic leg pain    OA (osteoarthritis)    SOB (shortness of breath) on exertion    ESRD (end stage renal disease)  2 yrs    Atrial fibrillation  on warfarin    Right sided weakness    Stroke  8 yrs ago    Hypertension    High blood cholesterol    Diabetes mellitus, type 2    Dialysis patient  Mon, Wednesday, Friday ([facility])    Smoker    H/O thyroid nodule    H/O gastroesophageal reflux (GERD)    History of tonsillectomy    History of surgery  Multiple toe amputations (amp 4 1/2 left toes; has 1/2 left mid toe; amp right mid toe)    A-V fistula  left AVF      SOCIAL HISTORY:  Negative for smoking/alcohol/drug use.     ALLERGIES:  Orange (Other)  Originally Entered as [HIVES] reaction to [sulfur] (Unknown)  penicillin (Unknown)  sulfADIAZINE (Unknown)    MEDICATIONS:  STANDING MEDICATIONS  acetaminophen   Tablet .. 650 milliGRAM(s) Oral once  aspirin  chewable 81 milliGRAM(s) Oral daily  atorvastatin 20 milliGRAM(s) Oral at bedtime  chlorhexidine 0.12% Liquid 15 milliLiter(s) Oral Mucosa every 12 hours  chlorhexidine 4% Liquid 1 Application(s) Topical <User Schedule>  dexMEDEtomidine Infusion 0.2 MICROgram(s)/kG/Hr IV Continuous <Continuous>  dextrose 40% Gel 15 Gram(s) Oral once  dextrose 5%. 1000 milliLiter(s) IV Continuous <Continuous>  dextrose 5%. 1000 milliLiter(s) IV Continuous <Continuous>  dextrose 50% Injectable 25 Gram(s) IV Push once  dextrose 50% Injectable 12.5 Gram(s) IV Push once  dextrose 50% Injectable 25 Gram(s) IV Push once  epoetin raven-epbx (RETACRIT) Injectable 8000 Unit(s) IV Push <User Schedule>  fentaNYL   Infusion 0.5 MICROgram(s)/kG/Hr IV Continuous <Continuous>  furosemide   Injectable 80 milliGRAM(s) IV Push two times a day  glucagon  Injectable 1 milliGRAM(s) IntraMuscular once  heparin  Infusion 1800 Unit(s)/Hr IV Continuous <Continuous>  insulin regular Infusion 1 Unit(s)/Hr IV Continuous <Continuous>  levothyroxine 100 MICROGram(s) Oral daily  metoprolol tartrate 12.5 milliGRAM(s) Oral two times a day  norepinephrine Infusion 0.05 MICROgram(s)/kG/Min IV Continuous <Continuous>  pantoprazole   Suspension 40 milliGRAM(s) Oral before breakfast  senna 2 Tablet(s) Oral at bedtime  vancomycin    Solution 250 milliGRAM(s) Oral every 6 hours    PRN MEDICATIONS  acetaminophen   Tablet .. 650 milliGRAM(s) Oral every 6 hours PRN    VITALS:   T(F): 100  HR: 76  BP: --  RR: 18  SpO2: 100%    LABS:                        8.9    25.50 )-----------( 287      ( 11 Jan 2021 05:00 )             27.3     01-11    142  |  100  |  106<HH>  ----------------------------<  197<H>  5.4<H>   |  22  |  7.0<HH>    Ca    7.2<L>      11 Jan 2021 05:00  Phos  3.4     01-10  Mg     2.5     01-11    TPro  5.7<L>  /  Alb  2.7<L>  /  TBili  0.8  /  DBili  x   /  AST  44<H>  /  ALT  38  /  AlkPhos  91  01-11    PTT - ( 11 Jan 2021 05:00 )  PTT:58.2 sec    ABG - ( 11 Jan 2021 03:33 )  pH, Arterial: 7.29  pH, Blood: x     /  pCO2: 49    /  pO2: 105   / HCO3: 24    / Base Excess: -3.1  /  SaO2: 97        RADIOLOGY:    Chest Xray seen and assessed by the critical care team, ET tube is high, wioll be pulled down 4 cms    PHYSICAL EXAM:  GEN: Intubated, sedated.   LUNGS: Clear to auscultation bilaterally   HEART: S1/S2 present. RRR.   ABD: Soft, non-tender, mildly distended. Bowel sounds present  EXT: NC/NC/NE/2+PP/[place]/Skin Intact.   NEURO: intubated and sedated    Intravenous access:   NG tube:   Garza Catheter:   Indwelling Urethral Catheter:     Connect To:  Straight Drainage/Gravity    Indication:  Urine Output Monitoring in Critically Ill (01-11-21 @ 04:44) (not performed) [Active]  Indwelling Urethral Catheter:     Connect To:  Straight Drainage/Gravity    Indication:  Urine Output Monitoring in Critically Ill (01-09-21 @ 00:14) (not performed) [Active]  Indwelling Urethral Catheter:     Connect To:  Straight Drainage/Gravity    Indication:  Urine Output Monitoring in Critically Ill (01-07-21 @ 05:16) (not performed) [Active]         SUBJECTIVE:    Patient is a 60y old Male who presents with a chief complaint of s/p fall. (11 Jan 2021 09:06). Currently admitted to medicine with the primary diagnosis of acute hypoxic respiratory failure secondary to SARS-CoV-2 pneumonia.   Today is hospital day 10d.     PAST MEDICAL & SURGICAL HISTORY  PAD (peripheral artery disease)  multiple toe amputations    Anemia  chronic anemia - s/p transfusion 2018    Thyroid cancer    Chronic leg pain    OA (osteoarthritis)    SOB (shortness of breath) on exertion    ESRD (end stage renal disease)  2 yrs    Atrial fibrillation  on warfarin    Right sided weakness    Stroke  8 yrs ago    Hypertension    High blood cholesterol    Diabetes mellitus, type 2    Dialysis patient  Mon, Wednesday, Friday ([facility])    Smoker    H/O thyroid nodule    H/O gastroesophageal reflux (GERD)    History of tonsillectomy    History of surgery  Multiple toe amputations (amp 4 1/2 left toes; has 1/2 left mid toe; amp right mid toe)    A-V fistula  left AVF      SOCIAL HISTORY:  Negative for smoking/alcohol/drug use.     ALLERGIES:  Orange (Other)  Originally Entered as [HIVES] reaction to [sulfur] (Unknown)  penicillin (Unknown)  sulfADIAZINE (Unknown)    MEDICATIONS:  STANDING MEDICATIONS  acetaminophen   Tablet .. 650 milliGRAM(s) Oral once  aspirin  chewable 81 milliGRAM(s) Oral daily  atorvastatin 20 milliGRAM(s) Oral at bedtime  chlorhexidine 0.12% Liquid 15 milliLiter(s) Oral Mucosa every 12 hours  chlorhexidine 4% Liquid 1 Application(s) Topical <User Schedule>  dexMEDEtomidine Infusion 0.2 MICROgram(s)/kG/Hr IV Continuous <Continuous>  dextrose 40% Gel 15 Gram(s) Oral once  dextrose 5%. 1000 milliLiter(s) IV Continuous <Continuous>  dextrose 5%. 1000 milliLiter(s) IV Continuous <Continuous>  dextrose 50% Injectable 25 Gram(s) IV Push once  dextrose 50% Injectable 12.5 Gram(s) IV Push once  dextrose 50% Injectable 25 Gram(s) IV Push once  epoetin raven-epbx (RETACRIT) Injectable 8000 Unit(s) IV Push <User Schedule>  fentaNYL   Infusion 0.5 MICROgram(s)/kG/Hr IV Continuous <Continuous>  furosemide   Injectable 80 milliGRAM(s) IV Push two times a day  glucagon  Injectable 1 milliGRAM(s) IntraMuscular once  heparin  Infusion 1800 Unit(s)/Hr IV Continuous <Continuous>  insulin regular Infusion 1 Unit(s)/Hr IV Continuous <Continuous>  levothyroxine 100 MICROGram(s) Oral daily  metoprolol tartrate 12.5 milliGRAM(s) Oral two times a day  norepinephrine Infusion 0.05 MICROgram(s)/kG/Min IV Continuous <Continuous>  pantoprazole   Suspension 40 milliGRAM(s) Oral before breakfast  senna 2 Tablet(s) Oral at bedtime  vancomycin    Solution 250 milliGRAM(s) Oral every 6 hours    PRN MEDICATIONS  acetaminophen   Tablet .. 650 milliGRAM(s) Oral every 6 hours PRN    VITALS:   T(F): 100  HR: 76  BP: --  RR: 18  SpO2: 100%    LABS:                        8.9    25.50 )-----------( 287      ( 11 Jan 2021 05:00 )             27.3     01-11    142  |  100  |  106<HH>  ----------------------------<  197<H>  5.4<H>   |  22  |  7.0<HH>    Ca    7.2<L>      11 Jan 2021 05:00  Phos  3.4     01-10  Mg     2.5     01-11    TPro  5.7<L>  /  Alb  2.7<L>  /  TBili  0.8  /  DBili  x   /  AST  44<H>  /  ALT  38  /  AlkPhos  91  01-11    PTT - ( 11 Jan 2021 05:00 )  PTT:58.2 sec    ABG - ( 11 Jan 2021 03:33 )  pH, Arterial: 7.29  pH, Blood: x     /  pCO2: 49    /  pO2: 105   / HCO3: 24    / Base Excess: -3.1  /  SaO2: 97        RADIOLOGY:    Chest Xray seen and assessed by the critical care team, ET tube is high, wioll be pulled down 4 cms    PHYSICAL EXAM:  GEN: Intubated, sedated.   LUNGS: Clear to auscultation bilaterally   HEART: S1/S2 present. RRR.   ABD: Soft, non-tender, mildly distended. Bowel sounds present  EXT: NC/NC/NE/2+PP/[place]/Skin Intact.   NEURO: intubated and sedated    Intravenous access:   NG tube:   Garza Catheter:   Indwelling Urethral Catheter:     Connect To:  Straight Drainage/Gravity    Indication:  Urine Output Monitoring in Critically Ill (01-11-21 @ 04:44) (not performed) [Active]  Indwelling Urethral Catheter:     Connect To:  Straight Drainage/Gravity    Indication:  Urine Output Monitoring in Critically Ill (01-09-21 @ 00:14) (not performed) [Active]  Indwelling Urethral Catheter:     Connect To:  Straight Drainage/Gravity    Indication:  Urine Output Monitoring in Critically Ill (01-07-21 @ 05:16) (not performed) [Active]

## 2021-01-11 NOTE — PROGRESS NOTE ADULT - ASSESSMENT
#Covid-19 PNA with a suspected  bacterial PNA:   - The patient was intubated for increased alteration, hypoxemia and non-compliance with BiPAP  - Azithromycin discontinued on 1/8/2021   - Cefepime discontinued on 1/11/2021  - Dexamethasone 6mg IV once daily (1/1/2021)  - COVID-19 antibodies received on 1/4/2021 by [place] [facility] ( called lab [id number] to follow up on it)  - Covid-antibodies: reported negative --> Will give 2 units of convalescent plasma   - will repeat blood cultures and resend procalcitonin   - Repeat procalcitonin: 4.89 (on 1/7) , 5.28 (on 1/6), 4.11 (on 1/4)  - s/p tocilizumab 400mg IV once on 1/8/2021, ferritin sent on 1/10 ( still pending, will follow up)    #Leukocytosis:   - Multifactorial ( Possible overlying infection, steroids)   - Diarrhea present   - Will send C.diff studies   - Start Vancomycin 125mg po q6hrs   - If leukocytosis persists --> Will Start IV Vancomycin and meropenem  - DC cefepime  - Latest procalcitonin  on 1/8: 6.31 ( Procalcitonin on 1/7: 4.89)    #Altered Mental Status:   - EEG diffuse slowing, but no seizure activity, check the report above  - CT brain shows ventriculomegaly (check full report), no IC bleed, possible old infarct.  - neurology consulted and recs:   a. CT brain negative x2 for stroke or other structural pathology.  EEG negative for any epileptiform activity.  Suspect multifactorial metabolic encephalopathy in setting of COVID and ESRD and sedatives.  Wean sedation as able, can reassess if patient does not awaken after that time.  b. No further EEG or other tests   c. To be reassessed after sedation is weaned    #Fluid overload  #ESRD:   - HDbeing done on 1/9/2021  - EPO (retacrit) 8000 units IV push once weekly  - DC sevelamer as per nephro   - Will DC Sodium bicarbonate as per nephro    #Atrial fibrillation:  - Paroxysmal ? Currently NSRT, well rate controlled.   - Heparin gtt, follow up aPTT q6hrs until therapeutic   - Metoprolol tartrate 12.5mg po q12hrs    - Will DC levophed  - CT brain no IC bleed or concerns of IC bleed ( Heparin drip started)  - aPTT being followed     #NSTEMI:   - Type II MI, demand ischemia, likely secondary to hypoxia secondary to COVID-19 pneumonia  - Aspirin 81mg po once daily   - CT brain no IC bleed or concerns of IC bleed   - On heparin gtt ( for atrial fibrillation now)  - Repeat CK-BM and CPK were stable    #Hypothyroidism:  - Continue with levothyroxine 100mcg po once daily     #Hyponatremia:   - Resolved  - Likely hypervolemic hyponatremia secondary to fluid overload   - Continue with Lasix and dialysis sessions     - Spoke with the patient's sister Nedra Shea on [phone] ( on 1/4/2021), speaking with her for updates daily.   The sister said that she is the health care proxy and will get the form from the house  Patient is full code now   - Concerns about Why the patient did not received, hydroxychloroquine and remdesivir ( explained that he is ESRD and that with eGFR <30 the medication is contraindicated.)   - Explained that he has ESRD ( eGFR <30), and therefore remdesivir is contraindicated  - Explained to her that hydroxychloroquine is not an option for treatment at the moment as studies have showed that there is no benefit to mortality.  #Covid-19 PNA with a suspected  bacterial PNA:   - The patient was intubated for increased alteration, hypoxemia and non-compliance with BiPAP  - Azithromycin discontinued on 1/8/2021   - Cefepime discontinued on 1/11/2021  - Dexamethasone 6mg IV once daily (1/1/2021)  - COVID-19 antibodies received on 1/4/2021 by [place] lab ( called lab [id number] to follow up on it)  - Covid-antibodies: reported negative --> Will give 2 units of convalescent plasma   - will repeat blood cultures and resend procalcitonin   - Repeat procalcitonin: 4.89 (on 1/7) , 5.28 (on 1/6), 4.11 (on 1/4)  - s/p tocilizumab 400mg IV once on 1/8/2021, ferritin sent on 1/10 ( still pending, will follow up)    #Leukocytosis:   #Suspected C.diff infection  - WBC increasing ( 1/11: 25,000   1/10: 20,010   on 1/9: 11,310   - Multifactorial ( Possible overlying infection, steroids)   - Diarrhea present   - Will send C.diff studies   - Start Vancomycin 250mg po q6hrs   - If leukocytosis persists --> Will Start IV Vancomycin and meropenem  - DC cefepime  - Latest procalcitonin  on 1/8: 6.31 ( Procalcitonin on 1/7: 4.89)    #Altered Mental Status:   - EEG diffuse slowing, but no seizure activity, check the report above  - CT brain shows ventriculomegaly (check full report), no IC bleed, possible old infarct.  - neurology consulted and recs:   a. CT brain negative x2 for stroke or other structural pathology.  EEG negative for any epileptiform activity.  Suspect multifactorial metabolic encephalopathy in setting of COVID and ESRD and sedatives.  Wean sedation as able, can reassess if patient does not awaken after that time.  b. No further EEG or other tests   c. To be reassessed after sedation is weaned    #Fluid overload  #ESRD:   - HDbeing done on 1/9/2021  - EPO (retacrit) 8000 units IV push once weekly  - DC sevelamer as per nephro   - Will DC Sodium bicarbonate as per nephro    #Atrial fibrillation:  - Paroxysmal ? Currently NSRT, well rate controlled.   - Heparin gtt, follow up aPTT q6hrs until therapeutic   - Metoprolol tartrate 12.5mg po q12hrs    - Will DC levophed  - CT brain no IC bleed or concerns of IC bleed ( Heparin drip started)  - aPTT being followed     #NSTEMI:   - Type II MI, demand ischemia, likely secondary to hypoxia secondary to COVID-19 pneumonia  - Aspirin 81mg po once daily   - CT brain no IC bleed or concerns of IC bleed   - On heparin gtt ( for atrial fibrillation now)  - Repeat CK-BM and CPK were stable    #Hypothyroidism:  - Continue with levothyroxine 100mcg po once daily     #Hyponatremia:   - Resolved  - Likely hypervolemic hyponatremia secondary to fluid overload   - Continue with Lasix and dialysis sessions     - Spoke with the patient's sister Nedra Shea on [phone] ( on 1/4/2021), speaking with her for updates daily.   The sister said that she is the health care proxy and will get the form from the house  Patient is full code now   - Concerns about Why the patient did not received, hydroxychloroquine and remdesivir ( explained that he is ESRD and that with eGFR <30 the medication is contraindicated.)   - Explained that he has ESRD ( eGFR <30), and therefore remdesivir is contraindicated  - Explained to her that hydroxychloroquine is not an option for treatment at the moment as studies have showed that there is no benefit to mortality.  #Covid-19 PNA with a suspected  bacterial PNA:   - The patient was intubated for increased alteration, hypoxemia and non-compliance with BiPAP  - Azithromycin discontinued on 1/8/2021   - Cefepime discontinued on 1/11/2021  - Dexamethasone 6mg IV once daily (1/1/2021)  - COVID-19 antibodies received on 1/4/2021 by [place] lab ( called lab [id number] to follow up on it)  - Covid-antibodies: reported negative --> Will give 2 units of convalescent plasma   - will repeat blood cultures and resend procalcitonin   - Repeat procalcitonin: 4.89 (on 1/7) , 5.28 (on 1/6), 4.11 (on 1/4)  - s/p tocilizumab 400mg IV once on 1/8/2021, ferritin sent on 1/10 ( still pending, will follow up)    #Leukocytosis:   #Suspected C.diff infection  - WBC increasing ( 1/11: 25,000   1/10: 20,010   on 1/9: 11,310   - Multifactorial ( Possible overlying infection, steroids)   - Diarrhea present   - Will send C.diff studies --> C.diff negativ  - Will DC po vancomycin  - DC cefepime  - Latest procalcitonin  on 1/8: 6.31 ( Procalcitonin on 1/7: 4.89)  - Will send start Vancomycin ( received aq dose yesterday and will give another dose after HD)  - Started Meropenem 0.5g IV once daily ( renally adjusted dose)    #Altered Mental Status:   - EEG diffuse slowing, but no seizure activity, check the report above  - CT brain shows ventriculomegaly (check full report), no IC bleed, possible old infarct.  - neurology consulted and recs:   a. CT brain negative x2 for stroke or other structural pathology.  EEG negative for any epileptiform activity.  Suspect multifactorial metabolic encephalopathy in setting of COVID and ESRD and sedatives.  Wean sedation as able, can reassess if patient does not awaken after that time.  b. No further EEG or other tests   c. To be reassessed after sedation is weaned    #Fluid overload  #ESRD:   - HD as per nephrology   - EPO (retacrit) 8000 units IV push once weekly  - DC sevelamer as per nephro   - Will DC Sodium bicarbonate as per nephro    #Atrial fibrillation:  - Paroxysmal ? Currently NSRT, well rate controlled.   - Heparin gtt, follow up aPTT q6hrs until therapeutic   - Metoprolol tartrate 12.5mg po q12hrs    - Will DC levophed  - CT brain no IC bleed or concerns of IC bleed ( Heparin drip started)  - aPTT being followed     #NSTEMI:   - Type II MI, demand ischemia, likely secondary to hypoxia secondary to COVID-19 pneumonia  - Aspirin 81mg po once daily   - CT brain no IC bleed or concerns of IC bleed   - On heparin gtt ( for atrial fibrillation now)  - Repeat CK-BM and CPK were stable    #Hypothyroidism:  - Continue with levothyroxine 100mcg po once daily     #Hyponatremia:   - Resolved  - Likely hypervolemic hyponatremia secondary to fluid overload   - Continue with Lasix and dialysis sessions     - Spoke with the patient's sister Nedra Shea on [phone] ( on 1/4/2021), speaking with her for updates daily.   The sister said that she is the health care proxy and will get the form from the house  Patient is full code now   - Concerns about Why the patient did not received, hydroxychloroquine and remdesivir ( explained that he is ESRD and that with eGFR <30 the medication is contraindicated.)   - Explained that he has ESRD ( eGFR <30), and therefore remdesivir is contraindicated  - Explained to her that hydroxychloroquine is not an option for treatment at the moment as studies have showed that there is no benefit to mortality.

## 2021-01-11 NOTE — PROGRESS NOTE ADULT - SUBJECTIVE AND OBJECTIVE BOX
[place] [facility] FOLLOW UP NOTE  --------------------------------------------------------------------------------  24 hour events/subjective: Patient examined. Intubated.    PAST HISTORY  --------------------------------------------------------------------------------  No significant changes to PMH, PSH, FHx, SHx, unless otherwise noted    ALLERGIES & MEDICATIONS  --------------------------------------------------------------------------------  Allergies    Orange (Other)  Originally Entered as [HIVES] reaction to [sulfur] (Unknown)  penicillin (Unknown)  sulfADIAZINE (Unknown)    Standing Inpatient Medications  acetaminophen   Tablet .. 650 milliGRAM(s) Oral once  aspirin  chewable 81 milliGRAM(s) Oral daily  atorvastatin 20 milliGRAM(s) Oral at bedtime  chlorhexidine 0.12% Liquid 15 milliLiter(s) Oral Mucosa every 12 hours  chlorhexidine 4% Liquid 1 Application(s) Topical <User Schedule>  dexMEDEtomidine Infusion 0.2 MICROgram(s)/kG/Hr IV Continuous <Continuous>  dextrose 40% Gel 15 Gram(s) Oral once  dextrose 5%. 1000 milliLiter(s) IV Continuous <Continuous>  dextrose 5%. 1000 milliLiter(s) IV Continuous <Continuous>  dextrose 50% Injectable 25 Gram(s) IV Push once  dextrose 50% Injectable 12.5 Gram(s) IV Push once  dextrose 50% Injectable 25 Gram(s) IV Push once  epoetin raven-epbx (RETACRIT) Injectable 8000 Unit(s) IV Push <User Schedule>  fentaNYL   Infusion 0.5 MICROgram(s)/kG/Hr IV Continuous <Continuous>  furosemide   Injectable 80 milliGRAM(s) IV Push two times a day  glucagon  Injectable 1 milliGRAM(s) IntraMuscular once  heparin  Infusion 1800 Unit(s)/Hr IV Continuous <Continuous>  insulin regular Infusion 1 Unit(s)/Hr IV Continuous <Continuous>  levothyroxine 100 MICROGram(s) Oral daily  meropenem  IVPB      meropenem  IVPB 500 milliGRAM(s) IV Intermittent once  metoprolol tartrate 12.5 milliGRAM(s) Oral two times a day  norepinephrine Infusion 0.05 MICROgram(s)/kG/Min IV Continuous <Continuous>  pantoprazole   Suspension 40 milliGRAM(s) Oral before breakfast  senna 2 Tablet(s) Oral at bedtime    PRN Inpatient Medications  acetaminophen   Tablet  650 milliGRAM(s) Oral every 6 hours PRN    VITALS/PHYSICAL EXAM  --------------------------------------------------------------------------------  T(C): 37.8 (01-11-21 @ 08:00), Max: 37.8 (01-11-21 @ 08:00)  HR: 84 (01-11-21 @ 11:15) (76 - 112)  BP: --  RR: 33 (01-11-21 @ 11:15) (10 - 34)  SpO2: 100% (01-11-21 @ 11:15) (99% - 100%)    01-10-21 @ 07:01  -  01-11-21 @ 07:00  --------------------------------------------------------  IN: 4045.4 mL / OUT: 500 mL / NET: 3545.4 mL    01-11-21 @ 07:01  -  01-11-21 @ 13:24  --------------------------------------------------------  IN: 585 mL / OUT: 0 mL / NET: 585 mL    Physical Exam:  	Gen: Intubated  	Pulm: CTA B/L  	CV: RRR, S1S2  	Abd: +BS, soft, nondistended  	LE: Warm, trace edema  	Vascular access: AVF    LABS/STUDIES  --------------------------------------------------------------------------------              8.9    25.50 >-----------<  287      [01-11-21 @ 05:00]              27.3     142  |  100  |  106  ----------------------------<  197      [01-11-21 @ 05:00]  5.4   |  22  |  7.0        Ca     7.2     [01-11-21 @ 05:00]      Mg     2.5     [01-11-21 @ 05:00]      Phos  3.4     [01-10-21 @ 04:30]    TPro  5.7  /  Alb  2.7  /  TBili  0.8  /  DBili  x   /  AST  44  /  ALT  38  /  AlkPhos  91  [01-11-21 @ 05:00]    PTT: 58.2       [01-11-21 @ 05:00]    Creatinine Trend:  SCr 7.0 [01-11 @ 05:00]  SCr 6.0 [01-10 @ 04:30]  SCr 7.3 [01-09 @ 04:30]  SCr 6.6 [01-08 @ 04:40]  SCr 6.9 [01-07 @ 05:10]    Urinalysis - [01-06-21 @ 18:43]      Color Yellow / Appearance Slightly Turbid / SG 1.018 / pH 6.5      Gluc 100 mg/dL / Ketone Negative  / Bili Negative / Urobili <2 mg/dL       Blood Moderate / Protein 300 mg/dL / Leuk Est Small / Nitrite Negative       /  / Hyaline 114 / Gran  / Sq Epi  / Non Sq Epi 1 / Bacteria Negative    Ferritin 2748      [01-01-21 @ 17:56]  HbA1c 7.1      [05-21-19 @ 04:30]  TSH 4.57      [01-01-21 @ 17:56]

## 2021-01-11 NOTE — PROGRESS NOTE ADULT - SUBJECTIVE AND OBJECTIVE BOX
OVERNIGHT EVENTS: events noted, on levophed 0.03, precdex, fentanyl, IV heaprin, insulin, for HD, diarrhea    Vital Signs Last 24 Hrs  T(C): 37.8 (11 Jan 2021 08:00), Max: 37.8 (11 Jan 2021 08:00)  T(F): 100 (11 Jan 2021 08:00), Max: 100 (11 Jan 2021 08:00)  HR: 88 (11 Jan 2021 08:45) (76 - 116)  RR: 15 (11 Jan 2021 08:45) (15 - 32)  SpO2: 100% (11 Jan 2021 08:45) (96% - 100%)    PHYSICAL EXAMINATION:    GENERAL: Ill looking    HEENT: Head is normocephalic and atraumatic.     NECK: Supple.    LUNGS: bl crackles    HEART: CHEL 3/6    ABDOMEN: Soft, nontender, and nondistended.      EXTREMITIES: Without any cyanosis, clubbing, rash, lesions or edema.    NEUROLOGIC: R SIDED WEAKNESS    SKIN: DTI sacrum, heel ulcer      LABS:                        8.9    25.50 )-----------( 287      ( 11 Jan 2021 05:00 )             27.3     01-11    142  |  100  |  106<HH>  ----------------------------<  197<H>  5.4<H>   |  22  |  x     Ca    7.2<L>      11 Jan 2021 05:00  Phos  3.4     01-10  Mg     2.5     01-11    TPro  5.7<L>  /  Alb  2.7<L>  /  TBili  0.8  /  DBili  x   /  AST  44<H>  /  ALT  38  /  AlkPhos  91  01-11    PTT - ( 11 Jan 2021 05:00 )  PTT:58.2 sec    ABG - ( 11 Jan 2021 03:33 )  pH, Arterial: 7.29  pH, Blood: x     /  pCO2: 49    /  pO2: 105   / HCO3: 24    / Base Excess: -3.1  /  SaO2: 97      30/500/40/10  plateau 26                          01-10-21 @ 07:01  -  01-11-21 @ 07:00  --------------------------------------------------------  IN: 4045.4 mL / OUT: 500 mL / NET: 3545.4 mL        MICROBIOLOGY:  Culture Results:   No growth to date. (01-07 @ 14:00)      MEDICATIONS  (STANDING):  acetaminophen   Tablet .. 650 milliGRAM(s) Oral once  aspirin  chewable 81 milliGRAM(s) Oral daily  atorvastatin 20 milliGRAM(s) Oral at bedtime  cefepime   IVPB 500 milliGRAM(s) IV Intermittent every 24 hours  chlorhexidine 0.12% Liquid 15 milliLiter(s) Oral Mucosa every 12 hours  chlorhexidine 4% Liquid 1 Application(s) Topical <User Schedule>  dexMEDEtomidine Infusion 0.2 MICROgram(s)/kG/Hr (4.85 mL/Hr) IV Continuous <Continuous>  dextrose 40% Gel 15 Gram(s) Oral once  dextrose 5%. 1000 milliLiter(s) (50 mL/Hr) IV Continuous <Continuous>  dextrose 5%. 1000 milliLiter(s) (100 mL/Hr) IV Continuous <Continuous>  dextrose 50% Injectable 25 Gram(s) IV Push once  dextrose 50% Injectable 12.5 Gram(s) IV Push once  dextrose 50% Injectable 25 Gram(s) IV Push once  epoetin raven-epbx (RETACRIT) Injectable 8000 Unit(s) IV Push <User Schedule>  fentaNYL   Infusion 0.5 MICROgram(s)/kG/Hr (4.85 mL/Hr) IV Continuous <Continuous>  furosemide   Injectable 80 milliGRAM(s) IV Push two times a day  glucagon  Injectable 1 milliGRAM(s) IntraMuscular once  heparin  Infusion 1800 Unit(s)/Hr (14 mL/Hr) IV Continuous <Continuous>  insulin regular Infusion 1 Unit(s)/Hr (1 mL/Hr) IV Continuous <Continuous>  levothyroxine 100 MICROGram(s) Oral daily  metoprolol tartrate 12.5 milliGRAM(s) Oral two times a day  norepinephrine Infusion 0.05 MICROgram(s)/kG/Min (9.08 mL/Hr) IV Continuous <Continuous>  pantoprazole   Suspension 40 milliGRAM(s) Oral before breakfast  senna 2 Tablet(s) Oral at bedtime    MEDICATIONS  (PRN):  acetaminophen   Tablet .. 650 milliGRAM(s) Oral every 6 hours PRN Temp greater or equal to 38C (100.4F), Mild Pain (1 - 3)      RADIOLOGY & ADDITIONAL STUDIES:

## 2021-01-11 NOTE — ADVANCED PRACTICE NURSE CONSULT - ASSESSMENT
59 yo M w/ PMH of Afib on coumadin, DM2, ESRD on HD MWF, HLD, HTN, CVA presents (1/1) with weakness & fall. Patient notes when he woke up today, he felt generally weak, slid from the bed onto the floor landing on his bottom, denies head injury/loc. CT head negative. CXR -possible RLL PNA, found to be COVID+.   Currently admitted to medicine with the primary diagnosis of acute hypoxic respiratory failure secondary to SARS-CoV-2 pneumonia. In ICU, Acute hypoxemic resp failure; NSTEMI; Afib on coumadin  severe COVID PNA; Superimposed bacteria PNA; increase WBC/ diarrhea ro C diff.    Received patient in ICU, laying supine in bed, turned to left  side (pillow under right side & underneath BLEs elevating heels), HOB elevated 30 degrees. Pt intubated, sedated, multiple drips infusing including pressors, primary RN Brigitte made aware of purpose of WOCN visit, agreeable to consult. With assistance from RN, turned patient to left side for skin assessment.     Type of wound: Deep tissue pressure injury (DTPI); evolving-likely r/t hypoxia & hypoperfusion d/t respiratory failure, severe Covid infection, microvascular Covid skin changes, pressor usage, ? skin failure, ? Cliff terminal ulceration     Location: coccyx-b/l upper buttock area  Wound measurements: 10cm x 11cm x 0.1cm, true depth indeterminable at this time    Tunneling/Undermining: none   Wound bed: opening deep purple tissue   Wound edges: attached, flush, irregular   Periwound: blanchable erythema    Wound exudate: none   Wound odor: none   Induration, erythema, warmth:   Wound pain: no signs present     Patient bedbound, immobile, anuric-on HD, fecal management system/DignisShield in place Incontinent of urine and stool. Receiving TF via OGT,

## 2021-01-11 NOTE — ADVANCED PRACTICE NURSE CONSULT - RECOMMEDATIONS
1. DTPI coccyx-b/l upper buttock-Cleanse wound bed w/ normal saline, gently pat dry.  Apply thin layer of Coloplast Triad hydrophilic ointment to absorb wound exudate, provide protective layer, and assist w/ autolytic debridement of devitalized tissue. Cover w/ dry gauze dressing, and foam Allevyn dressing. Apply Triad & change dressing q12h and prn for strike-through drainage or soiling. If top layer of Triad soiled, gently remove w/ perineal cleanser and re-apply ointment. Do not scrub off as this may cause further skin breakdown. Do not apply foam Allevyn dressing directly over Triad (use gauze in between) as ointment gets absorbed into dressing as opposed to being in direct contact w/ wound bed.  -Assess skin/wound qshift, report changes to primary provider.     Additional recs:   -Continue turning/positioning patient from side-to-side q2h while in bed, or in accordance w/ pt's plan of care. Continue utilizing pillows and/or z-rishabh fluidized positioner to assist w/ turning/positioning.   -Continue to offload heels from bed surface with soft pillow under calfs or by applying offloading boots to BLEs.   -Continue applying Coloplast Jennifer Protect moisture barrier cream to buttock and perineal area daily and prn after each incontinent episode.    -Continue utilizing one underpad underneath patient to contain incontinence episodes; change pad when saturated/soiled.   -Consider utilizing fecal management system/rectal tube/DigniShield (if patient candidate; MD order required) to contain loose fecal incontinence.   -Continue nutrition consult for optimal wound healing & nutritional status.   -Continue tight glucose control for optimal wound healing.     Plan of Care: Primary RN Brigitte at bedside &  aware of above recs. Spoke w/ covering/primary ICU MD in regards to above. No further needs/recs from [facility] service at this time. Staff RN to perform routine skin/wound assessment and manage wound care. Questions or concerns or if wound worsens and reconsult needed, please contact [facility], [facility] #[phone].

## 2021-01-11 NOTE — PROGRESS NOTE ADULT - ASSESSMENT
ESRD (due to DM) on HD TTS  s/p Fall  altered mental status   Covid-19 PNA / bacterial PNA   fluid overload  hyponatremia  DM  HTN  afib on coumadin  CVA  NSTEMI    plan:    HD tomorrow: 3 hours, opti 160 dialyzer, 1K bath, 3L UF  left arm precautions  renal diet with sevelamer   dexamethasone   covid isolation q12h  f/u cardio / f/u pulm  icu care

## 2021-01-12 NOTE — PROGRESS NOTE ADULT - ASSESSMENT
ASSESSMENT  59 yo M w/ PMH of Afib on coumadin, DM2, ESRD on HD MWF, HLD, HTN, CVA presents with weakness & fall. Patient notes when he woke up today, he felt generally weak, slid from the bed onto the floor landing on his bottom, denies head injury/loc. Patient notes that he has been having increased sputum production for the past few days, endorses mild shortness of breath without chest pain/fever/diarrhea/vomiting. Patient had full dialysis session wednesday, notes next session is tomorrow due to the holidays. Denies any focal weakness or pain currentl      IMPRESSION  #COVID-19 Pneumonia  - Unclear onset of symptoms, CXR with RLL opacity, possible super-imposed pneumonia  - intubated 1/4  - D-Dimer Assay, Quantitative: 377  - CRP pending  - Ferritin, Serum: 2748: Test Repeated ng/mL (01.01.21 @ 17:56)  - Procalcitonin, Serum: 5.28 (01.06.21 @ 05:40)  - s/p Toci 1/18  - s/p Plasma    #ESRD on HD  #DM II  #HTN  #CVA  #Obesity BMI (kg/m2): 30.7  #DM   #Abx allergy: penicillin (Unknown) - told he had allergy as a child, tolerates cefepime   sulfADIAZINE (Unknown)    RECOMMENDATIONS  - on cefepime from 1/2-1/11, now on meropenem; if no significant improvement, would plan for 5 days  - consider KUB to evaluate for ileus  - follow-up blood Cx from 1/11  - Dexamethasone 6mg daily  - noted elevated Ferritin from 1/10; please repeat another ferritin  - very poor prognosis     Please call or message on [employer] Teams if with any questions.  [facility] [phone]

## 2021-01-12 NOTE — PROGRESS NOTE ADULT - SUBJECTIVE AND OBJECTIVE BOX
[place] [facility] FOLLOW UP NOTE  --------------------------------------------------------------------------------  24 hour events/subjective: Patient examined. Intubated.    PAST HISTORY  --------------------------------------------------------------------------------  No significant changes to PMH, PSH, FHx, SHx, unless otherwise noted    ALLERGIES & MEDICATIONS  --------------------------------------------------------------------------------  Allergies    Orange (Other)  Originally Entered as [HIVES] reaction to [sulfur] (Unknown)  penicillin (Unknown)  sulfADIAZINE (Unknown)    Intolerances      Standing Inpatient Medications  acetaminophen   Tablet .. 650 milliGRAM(s) Oral once  aspirin  chewable 81 milliGRAM(s) Oral daily  atorvastatin 20 milliGRAM(s) Oral at bedtime  chlorhexidine 0.12% Liquid 15 milliLiter(s) Oral Mucosa every 12 hours  chlorhexidine 4% Liquid 1 Application(s) Topical <User Schedule>  dexMEDEtomidine Infusion 0.2 MICROgram(s)/kG/Hr IV Continuous <Continuous>  dextrose 40% Gel 15 Gram(s) Oral once  dextrose 5%. 1000 milliLiter(s) IV Continuous <Continuous>  dextrose 5%. 1000 milliLiter(s) IV Continuous <Continuous>  dextrose 50% Injectable 25 Gram(s) IV Push once  dextrose 50% Injectable 12.5 Gram(s) IV Push once  dextrose 50% Injectable 25 Gram(s) IV Push once  epoetin raven-epbx (RETACRIT) Injectable 8000 Unit(s) IV Push <User Schedule>  fentaNYL   Infusion 0.5 MICROgram(s)/kG/Hr IV Continuous <Continuous>  glucagon  Injectable 1 milliGRAM(s) IntraMuscular once  heparin  Infusion 1800 Unit(s)/Hr IV Continuous <Continuous>  insulin regular Infusion 1 Unit(s)/Hr IV Continuous <Continuous>  levothyroxine 100 MICROGram(s) Oral daily  meropenem  IVPB 500 milliGRAM(s) IV Intermittent every 24 hours  meropenem  IVPB      metoprolol tartrate 12.5 milliGRAM(s) Oral two times a day  norepinephrine Infusion 0.05 MICROgram(s)/kG/Min IV Continuous <Continuous>  pantoprazole   Suspension 40 milliGRAM(s) Oral before breakfast  senna 2 Tablet(s) Oral at bedtime    PRN Inpatient Medications  acetaminophen   Tablet .. 650 milliGRAM(s) Oral every 6 hours PRN      VITALS/PHYSICAL EXAM  --------------------------------------------------------------------------------  T(C): 37.3 (01-12-21 @ 08:00), Max: 37.3 (01-12-21 @ 08:00)  HR: 84 (01-12-21 @ 09:05) (74 - 104)  BP: --  RR: 26 (01-12-21 @ 08:45) (17 - 36)  SpO2: 97% (01-12-21 @ 09:05) (92% - 100%)  Wt(kg): --        01-11-21 @ 07:01  -  01-12-21 @ 07:00  --------------------------------------------------------  IN: 3533.1 mL / OUT: 100 mL / NET: 3433.1 mL    01-12-21 @ 07:01  -  01-12-21 @ 10:04  --------------------------------------------------------  IN: 216.6 mL / OUT: 0 mL / NET: 216.6 mL      Physical Exam:  	Gen: Intubated  	Pulm: B/L errol supriya  	CV: RRR, S1S2  	Abd: +BS, soft, nondistended  	LE: Warm,  no edema  	Vascular access: AVF    LABS/STUDIES  --------------------------------------------------------------------------------              9.0    27.28 >-----------<  305      [01-12-21 @ 05:23]              28.9     140  |  99  |  127  ----------------------------<  84      [01-12-21 @ 05:23]  6.1   |  20  |  7.5        Ca     7.3     [01-12-21 @ 05:23]      Mg     2.5     [01-12-21 @ 05:23]      Phos  4.5     [01-12-21 @ 05:23]    TPro  5.8  /  Alb  2.8  /  TBili  0.8  /  DBili  x   /  AST  39  /  ALT  37  /  AlkPhos  88  [01-12-21 @ 05:23]      PTT: 59.7       [01-12-21 @ 05:23]      Creatinine Trend:  SCr 7.5 [01-12 @ 05:23]  SCr 7.3 [01-11 @ 09:26]  SCr 7.0 [01-11 @ 05:00]  SCr 6.0 [01-10 @ 04:30]  SCr 7.3 [01-09 @ 04:30]    Urinalysis - [01-06-21 @ 18:43]      Color Yellow / Appearance Slightly Turbid / SG 1.018 / pH 6.5      Gluc 100 mg/dL / Ketone Negative  / Bili Negative / Urobili <2 mg/dL       Blood Moderate / Protein 300 mg/dL / Leuk Est Small / Nitrite Negative       /  / Hyaline 114 / Gran  / Sq Epi  / Non Sq Epi 1 / Bacteria Negative      Ferritin 4895      [01-10-21 @ 06:30]  HbA1c 7.1      [05-21-19 @ 04:30]  TSH 4.57      [01-01-21 @ 17:56]

## 2021-01-12 NOTE — PROGRESS NOTE ADULT - SUBJECTIVE AND OBJECTIVE BOX
OVERNIGHT EVENTS: still intubated, ventilated, on precedex, fentanyl, levophed 0.02, heparin, insulin drip, for HD, C DIFF    Vital Signs Last 24 Hrs  T(C): 37.3 (12 Jan 2021 08:00), Max: 37.3 (12 Jan 2021 08:00)  T(F): 99.1 (12 Jan 2021 08:00), Max: 99.1 (12 Jan 2021 08:00)  HR: 84 (12 Jan 2021 08:45) (74 - 104)    RR: 26 (12 Jan 2021 08:45) (17 - 36)  SpO2: 96% (12 Jan 2021 08:45) (92% - 100%)    PHYSICAL EXAMINATION:    GENERAL: ill looking, follows commands    HEENT: Head is normocephalic and atraumatic.    NECK: Supple.    LUNGS: bl crackles    HEART: Regular rate and rhythm without murmur.    ABDOMEN: Soft, nontender, and nondistended.      EXTREMITIES: Without any cyanosis, clubbing, rash, lesions or edema.    NEUROLOGIC: Grossly intact.    SKIN: No ulceration or induration present.      LABS:                        9.0    27.28 )-----------( 305      ( 12 Jan 2021 05:23 )             28.9     01-12    140  |  99  |  127<HH>  ----------------------------<  84  6.1<HH>   |  20  |  7.5<HH>    Ca    7.3<L>      12 Jan 2021 05:23  Phos  4.5     01-12  Mg     2.5     01-12    TPro  5.8<L>  /  Alb  2.8<L>  /  TBili  0.8  /  DBili  x   /  AST  39  /  ALT  37  /  AlkPhos  88  01-12    PTT - ( 12 Jan 2021 05:23 )  PTT:59.7 sec    ABG - ( 12 Jan 2021 03:15 )  pH, Arterial: 7.30  pH, Blood: x     /  pCO2: 49    /  pO2: 112   / HCO3: 24    / Base Excess: -2.7  /  SaO2: 99          450/34/40/8  la 0.8          D-Dimer Assay, Quantitative: 685 ng/mL DDU (01-12-21 @ 05:23)        Procalcitonin, Serum: 4.70 ng/mL (01-10-21 @ 11:21)        01-11-21 @ 07:01  -  01-12-21 @ 07:00  --------------------------------------------------------  IN: 3533.1 mL / OUT: 100 mL / NET: 3433.1 mL        MICROBIOLOGY:      MEDICATIONS  (STANDING):  acetaminophen   Tablet .. 650 milliGRAM(s) Oral once  aspirin  chewable 81 milliGRAM(s) Oral daily  atorvastatin 20 milliGRAM(s) Oral at bedtime  chlorhexidine 0.12% Liquid 15 milliLiter(s) Oral Mucosa every 12 hours  chlorhexidine 4% Liquid 1 Application(s) Topical <User Schedule>  dexMEDEtomidine Infusion 0.2 MICROgram(s)/kG/Hr (4.85 mL/Hr) IV Continuous <Continuous>  dextrose 40% Gel 15 Gram(s) Oral once  dextrose 5%. 1000 milliLiter(s) (50 mL/Hr) IV Continuous <Continuous>  dextrose 5%. 1000 milliLiter(s) (100 mL/Hr) IV Continuous <Continuous>  dextrose 50% Injectable 25 Gram(s) IV Push once  dextrose 50% Injectable 12.5 Gram(s) IV Push once  dextrose 50% Injectable 25 Gram(s) IV Push once  epoetin raven-epbx (RETACRIT) Injectable 8000 Unit(s) IV Push <User Schedule>  fentaNYL   Infusion 0.5 MICROgram(s)/kG/Hr (4.85 mL/Hr) IV Continuous <Continuous>  glucagon  Injectable 1 milliGRAM(s) IntraMuscular once  heparin  Infusion 1800 Unit(s)/Hr (14 mL/Hr) IV Continuous <Continuous>  insulin regular Infusion 1 Unit(s)/Hr (1 mL/Hr) IV Continuous <Continuous>  levothyroxine 100 MICROGram(s) Oral daily  meropenem  IVPB 500 milliGRAM(s) IV Intermittent every 24 hours  meropenem  IVPB      metoprolol tartrate 12.5 milliGRAM(s) Oral two times a day  norepinephrine Infusion 0.05 MICROgram(s)/kG/Min (9.08 mL/Hr) IV Continuous <Continuous>  pantoprazole   Suspension 40 milliGRAM(s) Oral before breakfast  senna 2 Tablet(s) Oral at bedtime    MEDICATIONS  (PRN):  acetaminophen   Tablet .. 650 milliGRAM(s) Oral every 6 hours PRN Temp greater or equal to 38C (100.4F), Mild Pain (1 - 3)      RADIOLOGY & ADDITIONAL STUDIES:

## 2021-01-12 NOTE — PROGRESS NOTE ADULT - ASSESSMENT
ESRD (due to DM) on HD TTS  s/p Fall  altered mental status   Covid-19 PNA / bacterial PNA   fluid overload  hyponatremia  hyperkalemia  DM  HTN  afib on coumadin  CVA  NSTEMI    plan:    HD today: 3 hours, opti 160 dialyzer, 1K bath, 3L UF  left arm precautions  covid isolation  f/u cardio / f/u pulm  icu care

## 2021-01-12 NOTE — PROGRESS NOTE ADULT - ASSESSMENT
#Covid-19 PNA with a suspected  bacterial PNA:   - The patient was intubated for increased alteration, hypoxemia and non-compliance with BiPAP  - Azithromycin discontinued on 1/8/2021   - Cefepime discontinued on 1/11/2021  - Dexamethasone 6mg IV once daily (1/1/2021),   - COVID-19 antibodies received on 1/4/2021 by [facility] ( called lab [id number] to follow up on it)  - Covid antibodies: reported negative --> s/p 2 units of Convalescent plasma ( 1/8 and 1/12)  - Repeat procalcitonin: 4.7 (1/10), 6.13 (on 1/8) , 4.89 (on 1/7), 4.11 (on 1/4)  - s/p tocilizumab 400mg IV once on 1/8/2021  - Ferritin 1/10/2021 ( 48 hours after tocilizumab): 4895 ( increased compared to ferritin on 1/1/2021)  - Trial os SAT and possible SBT after HD    #Hyperkalemia:   - K:6.1   - Given Insulin, dextrose and Lowkelma  - Receiving a session of Dialysis  - Will follow up BMP at 16:00     #Leukocytosis:   #Suspected C.diff infection  - WBC still increasing ( 1/11: 25,000   1/10: 20,010   on 1/9: 11,310   - Multifactorial ( Possible overlying infection, steroids)   - Diarrhea present   - Will send C.diff studies --> C.diff negative  - Will DC po vancomycin  - DC cefepime  - Latest procalcitonin  on 1/8: 6.31 ( Procalcitonin on 1/7: 4.89)  - Will send start Vancomycin ( received aq dose on 1/10/2021 and will give another dose after HD)  - Started Meropenem 0.5g IV once daily ( renally adjusted dose)    #Altered Mental Status:   - EEG diffuse slowing, but no seizure activity, check the report above  - CT brain shows ventriculomegaly (check full report), no IC bleed, possible old infarct.  - neurology consulted and recs:   a. CT brain negative x2 for stroke or other structural pathology.  EEG negative for any epileptiform activity.  Suspect multifactorial metabolic encephalopathy in setting of COVID and ESRD and sedatives.  Wean sedation as able, can reassess if patient does not awaken after that time.  b. No further EEG or other tests   c. To be reassessed after sedation is weaned    #Fluid overload  #ESRD:   - HD as per nephrology   - EPO (retacrit) 8000 units IV push once weekly  - DC sevelamer as per nephro   - Will DC Sodium bicarbonate as per nephro    #Atrial fibrillation:  - Paroxysmal ? Currently NSRT, well rate controlled.   - Heparin gtt, follow up aPTT q6hrs until therapeutic   - Metoprolol tartrate 12.5mg po q12hrs    - Will DC levophed  - CT brain no IC bleed or concerns of IC bleed ( Heparin drip started)  - aPTT being followed, all within therapeutic range    #NSTEMI:   - Type II MI, demand ischemia, likely secondary to hypoxia secondary to COVID-19 pneumonia  - Aspirin 81mg po once daily   - CT brain no IC bleed or concerns of IC bleed   - On heparin gtt ( for atrial fibrillation now)  - Repeat CK-MB and CPK were stable    #Hypothyroidism:  - Continue with levothyroxine 100mcg po once daily     #Hyponatremia:   - Resolved  - Likely hypervolemic hyponatremia secondary to fluid overload   - Continue with Lasix and dialysis sessions     - Spoke with the patient's sister Nedra Shea on [phone] ( on 1/4/2021), speaking with her for updates daily.   The sister said that she is the health care proxy and will get the form from the house  Patient is full code now   - Concerns about Why the patient did not received, hydroxychloroquine and remdesivir ( explained that he is ESRD and that with eGFR <30 the medication is contraindicated.)   - Explained that he has ESRD ( eGFR <30), and therefore remdesivir is contraindicated  - Explained to her that hydroxychloroquine is not an option for treatment at the moment as studies have showed that there is no benefit to mortality.  #Covid-19 PNA with a suspected  bacterial PNA:   - The patient was intubated for increased alteration, hypoxemia and non-compliance with BiPAP  - Azithromycin discontinued on 2021   - Cefepime discontinued on 2021  - Dexamethasone 6mg IV once daily (2021),   - COVID-19 antibodies received on 2021 by [facility] ( called lab [id number] to follow up on it)  - Covid antibodies: reported negative --> s/p 2 units of Convalescent plasma (  and )  - Repeat procalcitonin: 4.7 (1/10), 6.13 (on ) , 4.89 (on ), 4.11 (on )  - s/p tocilizumab 400mg IV once on 2021  - Ferritin 1/10/2021 ( 48 hours after tocilizumab): 4895 ( increased compared to ferritin on 2021)  - Trial os SAT and possible SBT after HD    #Hyperkalemia:   - K:6.1   - Given Insulin, dextrose and Lowkelma  - Receiving a session of Dialysis  - Will follow up BMP at 16:00     #Leukocytosis:   #Suspected C.diff infection  - WBC still increasing ( : 25,000   1/10: 20,010   on : 11,310   - Latest procalcitonin  on : 6.31 ( Procalcitonin on : 4.89)  - Multifactorial ( Possible overlying infection, steroids)   - Diarrhea present   - Will send C.diff studies --> C.diff negative  - Will DC po vancomycin  - DC cefepime  - Started Meropenem 0.5g IV once daily ( renally adjusted dose)  - Vancomycin trough level this mornin.9   - Will give vancomycin 1.5g IV once after HD on 2021    #Altered Mental Status:   - EEG diffuse slowing, but no seizure activity, check the report above  - CT brain shows ventriculomegaly (check full report), no IC bleed, possible old infarct.  - neurology consulted and recs:   a. CT brain negative x2 for stroke or other structural pathology.  EEG negative for any epileptiform activity.  Suspect multifactorial metabolic encephalopathy in setting of COVID and ESRD and sedatives.  Wean sedation as able, can reassess if patient does not awaken after that time.  b. No further EEG or other tests   c. To be reassessed after sedation is weaned    #Fluid overload  #ESRD:   - HD as per nephrology   - EPO (retacrit) 8000 units IV push once weekly  - DC sevelamer as per nephro   - Will DC Sodium bicarbonate as per nephro    #Atrial fibrillation:  - Paroxysmal ? Currently NSRT, well rate controlled.   - Heparin gtt, follow up aPTT q6hrs until therapeutic   - Metoprolol tartrate 12.5mg po q12hrs    - Will DC levophed  - CT brain no IC bleed or concerns of IC bleed ( Heparin drip started)  - aPTT being followed, all within therapeutic range    #NSTEMI:   - Type II MI, demand ischemia, likely secondary to hypoxia secondary to COVID-19 pneumonia  - Aspirin 81mg po once daily   - CT brain no IC bleed or concerns of IC bleed   - On heparin gtt ( for atrial fibrillation now)  - Repeat CK-MB and CPK were stable    #Hypothyroidism:  - Continue with levothyroxine 100mcg po once daily     #Hyponatremia:   - Resolved  - Likely hypervolemic hyponatremia secondary to fluid overload   - Continue with Lasix and dialysis sessions     - Spoke with the patient's sister Nedra Shea on [phone] ( on 2021), speaking with her for updates daily.   The sister said that she is the health care proxy and will get the form from the house  Patient is full code now   - Concerns about Why the patient did not received, hydroxychloroquine and remdesivir ( explained that he is ESRD and that with eGFR <30 the medication is contraindicated.)   - Explained that he has ESRD ( eGFR <30), and therefore remdesivir is contraindicated  - Explained to her that hydroxychloroquine is not an option for treatment at the moment as studies have showed that there is no benefit to mortality.

## 2021-01-12 NOTE — PROGRESS NOTE ADULT - SUBJECTIVE AND OBJECTIVE BOX
ROGER RODRIGUES  60y, Male  Allergy: Orange (Other)  Originally Entered as [HIVES] reaction to [sulfur] (Unknown)  penicillin (Unknown)  sulfADIAZINE (Unknown)      LOS  11d    CHIEF COMPLAINT: s/p fall. (11 Jan 2021 16:04)      INTERVAL EVENTS/HPI  - T(F): , Max: 98.9 (01-11-21 @ 16:00)    - WBC Count: 27.28 (01-12-21 @ 05:23)  WBC Count: 25.50 (01-11-21 @ 05:00)     - Creatinine, Serum: 7.5 (01-12-21 @ 05:23)  Creatinine, Serum: 7.3 (01-11-21 @ 09:26)       ROS  unable to obtain history secondary to patient's mental status and/or sedation      VITALS:  T(F): 98.3, Max: 98.9 (01-11-21 @ 16:00)  HR: 84  BP: --  RR: 34Vital Signs Last 24 Hrs  T(C): 36.8 (12 Jan 2021 04:00), Max: 37.2 (11 Jan 2021 16:00)  T(F): 98.3 (12 Jan 2021 04:00), Max: 98.9 (11 Jan 2021 16:00)  HR: 84 (12 Jan 2021 06:45) (74 - 104)  BP: --  BP(mean): --  RR: 34 (12 Jan 2021 06:45) (10 - 36)  SpO2: 98% (12 Jan 2021 06:45) (92% - 100%)    PHYSICAL EXAM:  Gen: NAD, resting in bed  HEENT: Normocephalic, atraumatic  Neck: supple, no lymphadenopathy  CV: Regular rate & regular rhythm  Lungs: decreased BS at bases, no fremitus  Abdomen: Soft, BS present  Ext: Warm, well perfused  Neuro: non focal, awake  Skin: no rash, no erythema  Lines: no phlebitis    FH: Non-contributory  Social Hx: Non-contributory    TESTS & MEASUREMENTS:                        9.0    27.28 )-----------( 305      ( 12 Jan 2021 05:23 )             28.9     01-12    140  |  99  |  127<HH>  ----------------------------<  84  6.1<HH>   |  20  |  7.5<HH>    Ca    7.3<L>      12 Jan 2021 05:23  Phos  4.5     01-12  Mg     2.5     01-12    TPro  5.8<L>  /  Alb  2.8<L>  /  TBili  0.8  /  DBili  x   /  AST  39  /  ALT  37  /  AlkPhos  88  01-12    eGFR if : 8 mL/min/1.73M2 (01-12-21 @ 05:23)  eGFR if Non African American: 7 mL/min/1.73M2 (01-12-21 @ 05:23)  eGFR if Non African American: 7 mL/min/1.73M2 (01-11-21 @ 09:26)  eGFR if : 8 mL/min/1.73M2 (01-11-21 @ 09:26)    LIVER FUNCTIONS - ( 12 Jan 2021 05:23 )  Alb: 2.8 g/dL / Pro: 5.8 g/dL / ALK PHOS: 88 U/L / ALT: 37 U/L / AST: 39 U/L / GGT: x               Culture - Blood (collected 01-07-21 @ 14:00)  Source: .Blood Blood-Peripheral  Preliminary Report (01-08-21 @ 22:02):    No growth to date.    Culture - Sputum (collected 01-06-21 @ 18:30)  Source: .Sputum trache aspirate  Gram Stain (01-07-21 @ 06:09):    Moderate polymorphonuclear leukocytes per low power field    Few Squamous epithelial cells per low power field    Moderate Gram positive cocci in pairs seen per oil power field    Moderate Gram Variable Rods seen per oil power field  Final Report (01-08-21 @ 19:15):    Normal Respiratory Rosetta present    Culture - Blood (collected 01-02-21 @ 04:30)  Source: .Blood Blood  Final Report (01-07-21 @ 18:00):    No Growth Final    Culture - Blood (collected 01-01-21 @ 17:56)  Source: .Blood None  Final Report (01-07-21 @ 07:00):    No Growth Final            INFECTIOUS DISEASES TESTING  Procalcitonin, Serum: 4.70 (01-10-21 @ 11:21)  MRSA PCR Result.: NotDetec (01-10-21 @ 10:27)  Procalcitonin, Serum: 6.13 (01-08-21 @ 04:40)  Procalcitonin, Serum: 4.89 (01-07-21 @ 12:49)  Procalcitonin, Serum: 5.28 (01-06-21 @ 05:40)  MRSA PCR Result.: Negative (01-02-21 @ 21:32)  Procalcitonin, Serum: 4.11 (01-01-21 @ 17:56)  Rapid RVP Result: Detected (01-01-21 @ 11:00)      INFLAMMATORY MARKERS      RADIOLOGY & ADDITIONAL TESTS:  I have personally reviewed the last available Chest xray  CXR      CT      CARDIOLOGY TESTING  12 Lead ECG:   Ventricular Rate 90 BPM    Atrial Rate 208 BPM    QRS Duration 118 ms    Q-T Interval 390 ms    QTC Calculation(Bazett) 477 ms    R Axis 99 degrees    T Axis 157 degrees    Diagnosis Line Atrial flutter  Rightward axis  Incomplete right bundle branch block  T wave abnormality, consider inferolateral ischemia  Prolonged QT  Abnormal ECG    Confirmed by GRIFFIN WRIGHT MD ([id number]) on 1/4/2021 12:50:22 PM  Also confirmed by MARLA ADAMS MD [id number])  on 1/4/2021 12:51:07 PM (01-03-21 @ 20:03)  12 Lead ECG:   Ventricular Rate 81 BPM    Atrial Rate 202 BPM    QRS Duration 106 ms    Q-T Interval 414 ms    QTC Calculation(Bazett) 480 ms    R Axis 96 degrees    T Axis 145 degrees    Diagnosis Line Atrial flutter with variable A-V block  Rightward axis  Incomplete right bundle branch block  Septal infarct , age undetermined  ST & T wave abnormality, consider lateral ischemia  Abnormal ECG    Confirmed by GRIFFIN WRIGHT MD ([id number]) on 1/4/2021 12:31:13 PM (01-03-21 @ 10:49)      MEDICATIONS  acetaminophen   Tablet .. 650 Oral once  aspirin  chewable 81 Oral daily  atorvastatin 20 Oral at bedtime  chlorhexidine 0.12% Liquid 15 Oral Mucosa every 12 hours  chlorhexidine 4% Liquid 1 Topical <User Schedule>  dexMEDEtomidine Infusion 0.2 IV Continuous <Continuous>  dextrose 40% Gel 15 Oral once  dextrose 5%. 1000 IV Continuous <Continuous>  dextrose 5%. 1000 IV Continuous <Continuous>  dextrose 50% Injectable 25 IV Push once  dextrose 50% Injectable 12.5 IV Push once  dextrose 50% Injectable 25 IV Push once  epoetin raven-epbx (RETACRIT) Injectable 8000 IV Push <User Schedule>  fentaNYL   Infusion 0.5 IV Continuous <Continuous>  glucagon  Injectable 1 IntraMuscular once  heparin  Infusion 1800 IV Continuous <Continuous>  insulin regular Infusion 1 IV Continuous <Continuous>  levothyroxine 100 Oral daily  meropenem  IVPB 500 IV Intermittent every 24 hours  meropenem  IVPB     metoprolol tartrate 12.5 Oral two times a day  norepinephrine Infusion 0.05 IV Continuous <Continuous>  pantoprazole   Suspension 40 Oral before breakfast  senna 2 Oral at bedtime      WEIGHT  Weight (kg): 96.9 (01-01-21 @ 16:05)  Creatinine, Serum: 7.5 mg/dL (01-12-21 @ 05:23)  Creatinine, Serum: 7.3 mg/dL (01-11-21 @ 09:26)      ANTIBIOTICS:  meropenem  IVPB 500 milliGRAM(s) IV Intermittent every 24 hours  meropenem  IVPB          All available historical records have been reviewed  MEDICATIONS  (PRN):  azithromycin  IVPB   255 mL/Hr IV Intermittent (01-08-21 @ 09:17)   255 mL/Hr IV Intermittent (01-07-21 @ 09:18)   255 mL/Hr IV Intermittent (01-06-21 @ 08:48)   255 mL/Hr IV Intermittent (01-05-21 @ 09:57)   255 mL/Hr IV Intermittent (01-04-21 @ 12:12)    cefepime   IVPB   100 mL/Hr IV Intermittent (01-11-21 @ 04:06)   100 mL/Hr IV Intermittent (01-10-21 @ 05:41)   100 mL/Hr IV Intermittent (01-09-21 @ 05:02)   100 mL/Hr IV Intermittent (01-08-21 @ 05:29)   100 mL/Hr IV Intermittent (01-07-21 @ 04:41)   100 mL/Hr IV Intermittent (01-06-21 @ 06:28)   100 mL/Hr IV Intermittent (01-05-21 @ 05:02)   100 mL/Hr IV Intermittent (01-04-21 @ 05:35)   100 mL/Hr IV Intermittent (01-03-21 @ 05:02)   100 mL/Hr IV Intermittent (01-02-21 @ 05:20)    cefepime   IVPB   100 mL/Hr IV Intermittent (01-01-21 @ 13:00)    meropenem  IVPB   100 mL/Hr IV Intermittent (01-11-21 @ 14:43)    vancomycin  IVPB   300 mL/Hr IV Intermittent (01-10-21 @ 09:54)    vancomycin  IVPB   166.67 mL/Hr IV Intermittent (01-03-21 @ 13:38)    vancomycin  IVPB   166.67 mL/Hr IV Intermittent (01-01-21 @ 18:29)        meropenem  IVPB 500 milliGRAM(s) IV Intermittent every 24 hours  meropenem  IVPB

## 2021-01-12 NOTE — PROGRESS NOTE ADULT - ASSESSMENT
IMPRESSION:    s/p fall / head CT neg  ESRD MWF for HD  Acute hypoxemic resp failure  NSTEMI  Afib on coumadin  severe COVID PNA  Superimposed bacteria PNA  inrease WBC/ diarrhea ro C diff -    PLAN:    CNS: SAT. precedex,     HEENT: Oral care.    PULMONARY: Vent changes. Wean O2.  Monitor PPL and DP.  , increase rate, PEEP 8 SBT, push ETT    CARDIOVASCULAR:  I<O as tolerated.        GI: GI prophylaxis.  OG Feeding.  Bowel regimen     RENAL: check lytes and replete PRN. Nephro F/UP result  on HD , dc lasix    INFECTIOUS DISEASE: Dexamethasone 6 mg finished    pancx, procal    HEMATOLOGICAL: DVT Prophylaxis  IV Heparin FU PTT     ENDOCRINE:  Follow up FS.  Insulin protocol if needed.    MUSCULOSKELETAL: Increase as tolerated.    MICU     Prognosis poor

## 2021-01-12 NOTE — PROGRESS NOTE ADULT - SUBJECTIVE AND OBJECTIVE BOX
SUBJECTIVE:    Patient is a 60y old Male who presents with a chief complaint of s/p fall. (12 Jan 2021 08:08). Currently admitted to medicine with the primary diagnosis of Acute hypoxic respiratory failure secondary to SARS-CoV-2 pneumonia.   Today is hospital day 11d.     PAST MEDICAL & SURGICAL HISTORY  PAD (peripheral artery disease)  multiple toe amputations    Anemia  chronic anemia - s/p transfusion 2018    Thyroid cancer    Chronic leg pain    OA (osteoarthritis)    SOB (shortness of breath) on exertion    ESRD (end stage renal disease)  2 yrs    Atrial fibrillation  on warfarin    Right sided weakness    Stroke  8 yrs ago    Hypertension    High blood cholesterol    Diabetes mellitus, type 2    Dialysis patient  Mon, Wednesday, Friday ([facility])    Smoker    H/O thyroid nodule    H/O gastroesophageal reflux (GERD)    History of tonsillectomy    History of surgery  Multiple toe amputations (amp 4 1/2 left toes; has 1/2 left mid toe; amp right mid toe)    A-V fistula  left AVF      SOCIAL HISTORY:  Negative for smoking/alcohol/drug use.     ALLERGIES:  Orange (Other)  Originally Entered as [HIVES] reaction to [sulfur] (Unknown)  penicillin (Unknown)  sulfADIAZINE (Unknown)    MEDICATIONS:  STANDING MEDICATIONS  acetaminophen   Tablet .. 650 milliGRAM(s) Oral once  aspirin  chewable 81 milliGRAM(s) Oral daily  atorvastatin 20 milliGRAM(s) Oral at bedtime  chlorhexidine 0.12% Liquid 15 milliLiter(s) Oral Mucosa every 12 hours  chlorhexidine 4% Liquid 1 Application(s) Topical <User Schedule>  dexMEDEtomidine Infusion 0.2 MICROgram(s)/kG/Hr IV Continuous <Continuous>  dextrose 40% Gel 15 Gram(s) Oral once  dextrose 5%. 1000 milliLiter(s) IV Continuous <Continuous>  dextrose 5%. 1000 milliLiter(s) IV Continuous <Continuous>  dextrose 50% Injectable 25 Gram(s) IV Push once  dextrose 50% Injectable 12.5 Gram(s) IV Push once  dextrose 50% Injectable 25 Gram(s) IV Push once  epoetin raven-epbx (RETACRIT) Injectable 8000 Unit(s) IV Push <User Schedule>  fentaNYL   Infusion 0.5 MICROgram(s)/kG/Hr IV Continuous <Continuous>  glucagon  Injectable 1 milliGRAM(s) IntraMuscular once  heparin  Infusion 1800 Unit(s)/Hr IV Continuous <Continuous>  insulin regular Infusion 1 Unit(s)/Hr IV Continuous <Continuous>  levothyroxine 100 MICROGram(s) Oral daily  meropenem  IVPB 500 milliGRAM(s) IV Intermittent every 24 hours  meropenem  IVPB      metoprolol tartrate 12.5 milliGRAM(s) Oral two times a day  norepinephrine Infusion 0.05 MICROgram(s)/kG/Min IV Continuous <Continuous>  pantoprazole   Suspension 40 milliGRAM(s) Oral before breakfast  senna 2 Tablet(s) Oral at bedtime    PRN MEDICATIONS  acetaminophen   Tablet .. 650 milliGRAM(s) Oral every 6 hours PRN    VITALS:   T(F): 98.3  HR: 84  BP: --  RR: 34  SpO2: 98%    LABS:                        9.0    27.28 )-----------( 305      ( 12 Jan 2021 05:23 )             28.9     01-12    140  |  99  |  127<HH>  ----------------------------<  84  6.1<HH>   |  20  |  7.5<HH>    Ca    7.3<L>      12 Jan 2021 05:23  Phos  4.5     01-12  Mg     2.5     01-12    TPro  5.8<L>  /  Alb  2.8<L>  /  TBili  0.8  /  DBili  x   /  AST  39  /  ALT  37  /  AlkPhos  88  01-12    PTT - ( 12 Jan 2021 05:23 )  PTT:59.7 sec    ABG - ( 12 Jan 2021 03:15 )  pH, Arterial: 7.30  pH, Blood: x     /  pCO2: 49    /  pO2: 112   / HCO3: 24    / Base Excess: -2.7  /  SaO2: 99        RADIOLOGY:      < from: Xray Chest 1 View-PORTABLE IMMEDIATE (Xray Chest 1 View-PORTABLE IMMEDIATE .) (01.11.21 @ 10:37) >  Impression:    Bilateral opacifications, stable. Support devices as described.    < end of copied text >      PHYSICAL EXAM:  GEN: Intubated, sedated. Wakes up and responds to simple commands.   LUNGS: Clear to auscultation bilaterally   HEART: S1/S2 present. RRR.   ABD: Soft, non-tender, mildly distended. Bowel sounds present  EXT: NC/NC/NE/2+PP/[place]/Skin Intact.   NEURO: intubated and lightly sedated,  Wakes up and responds to simple commands.     Intravenous access:   NG tube:   Garza Catheter:   Indwelling Urethral Catheter:     Connect To:  Straight Drainage/Gravity    Indication:  Urine Output Monitoring in Critically Ill (01-12-21 @ 03:20) (not performed) [Active]  Indwelling Urethral Catheter:     Connect To:  Straight Drainage/Gravity    Indication:  Urine Output Monitoring in Critically Ill (01-11-21 @ 04:44) (not performed) [Active]  Indwelling Urethral Catheter:     Connect To:  Straight Drainage/Gravity    Indication:  Urine Output Monitoring in Critically Ill (01-09-21 @ 00:14) (not performed) [Active]  Indwelling Urethral Catheter:     Connect To:  Straight Drainage/Gravity    Indication:  Urine Output Monitoring in Critically Ill (01-07-21 @ 05:16) (not performed) [Active]

## 2021-01-13 NOTE — PROGRESS NOTE ADULT - ASSESSMENT
IMPRESSION:    s/p fall / head CT neg  ESRD MWF for HD  Acute hypoxemic resp failure  NSTEMI  Afib on coumadin  severe COVID PNA  Superimposed bacteria PNA  inrease WBC/ diarrhea  C diff -    PLAN:    CNS: SAT. precedex, FENTANYL    HEENT: Oral care.    PULMONARY: Vent changes. Wean O2.  Monitor PPL and DP.  , increase rate, PEEP 8 SBT failed     CARDIOVASCULAR:  I<O as tolerated.        GI: GI prophylaxis.  OG Feeding.  Bowel regimen     RENAL: check lytes and replete PRN. Nephro F/UP result  on HD    INFECTIOUS DISEASE: Dexamethasone 6 mg finished    HEMATOLOGICAL: DVT Prophylaxis  IV Heparin FU PTT     ENDOCRINE:  Follow up FS.  Insulin protocol if needed.    MUSCULOSKELETAL: Increase as tolerated.    MICU     Prognosis poor

## 2021-01-13 NOTE — PROGRESS NOTE ADULT - SUBJECTIVE AND OBJECTIVE BOX
OVERNIGHT EVENTS: still intubated, ventialted, on levophed 0.03, precedex, fent, hep, insulin drip, sp hd 3 L OUT    Vital Signs Last 24 Hrs  T(C): 36.9 (13 Jan 2021 08:00), Max: 37.8 (13 Jan 2021 04:00)  T(F): 98.5 (13 Jan 2021 08:00), Max: 100 (13 Jan 2021 04:00)  HR: 90 (13 Jan 2021 08:30) (78 - 120)  BP: 121/58 (12 Jan 2021 21:45) (121/58 - 121/58)  BP(mean): 78 (12 Jan 2021 21:45) (78 - 78)  RR: 34 (13 Jan 2021 08:30) (15 - 35)  SpO2: 96% (13 Jan 2021 08:30) (91% - 100%)    PHYSICAL EXAMINATION:    GENERAL: ill looking    HEENT: Head is normocephalic and atraumatic.     NECK: Supple.    LUNGS: bl crackles    HEART: Irregular, CHEL 3/6    ABDOMEN: Soft, nontender, and nondistended.      EXTREMITIES: Without any cyanosis, clubbing, rash, lesions or edema.    NEUROLOGIC: unchanged    SKIN: DTI      LABS:                        9.1    26.23 )-----------( 275      ( 13 Jan 2021 05:30 )             28.7     01-13    138  |  98  |  89<HH>  ----------------------------<  274<H>  4.9   |  23  |  6.4<HH>    Ca    7.7<L>      13 Jan 2021 05:30  Phos  4.1     01-13  Mg     2.3     01-13    TPro  5.9<L>  /  Alb  3.1<L>  /  TBili  1.1  /  DBili  x   /  AST  45<H>  /  ALT  37  /  AlkPhos  94  01-13    PTT - ( 13 Jan 2021 05:30 )  PTT:47.5 sec    ABG - ( 12 Jan 2021 03:15 )  pH, Arterial: 7.30  pH, Blood: x     /  pCO2: 49    /  pO2: 112   / HCO3: 24    / Base Excess: -2.7  /  SaO2: 99        450/34/40/8    7.28/51/75/ 0.9  plateau 21                  Procalcitonin, Serum: 4.70 ng/mL (01-10-21 @ 11:21)        01-12-21 @ 07:01  -  01-13-21 @ 07:00  --------------------------------------------------------  IN: 4225.7 mL / OUT: 3500 mL / NET: 725.7 mL    01-13-21 @ 07:01  -  01-13-21 @ 09:08  --------------------------------------------------------  IN: 145.7 mL / OUT: 0 mL / NET: 145.7 mL        MICROBIOLOGY:  Culture Results:   No growth to date. (01-11 @ 11:00)      MEDICATIONS  (STANDING):  acetaminophen   Tablet .. 650 milliGRAM(s) Oral once  aspirin  chewable 81 milliGRAM(s) Oral daily  atorvastatin 20 milliGRAM(s) Oral at bedtime  chlorhexidine 0.12% Liquid 15 milliLiter(s) Oral Mucosa every 12 hours  chlorhexidine 4% Liquid 1 Application(s) Topical <User Schedule>  dexMEDEtomidine Infusion 0.2 MICROgram(s)/kG/Hr (4.85 mL/Hr) IV Continuous <Continuous>  dextrose 40% Gel 15 Gram(s) Oral once  dextrose 5%. 1000 milliLiter(s) (50 mL/Hr) IV Continuous <Continuous>  dextrose 5%. 1000 milliLiter(s) (100 mL/Hr) IV Continuous <Continuous>  dextrose 50% Injectable 25 Gram(s) IV Push once  dextrose 50% Injectable 12.5 Gram(s) IV Push once  dextrose 50% Injectable 25 Gram(s) IV Push once  epoetin raven-epbx (RETACRIT) Injectable 8000 Unit(s) IV Push <User Schedule>  fentaNYL   Infusion 0.5 MICROgram(s)/kG/Hr (4.85 mL/Hr) IV Continuous <Continuous>  glucagon  Injectable 1 milliGRAM(s) IntraMuscular once  heparin  Infusion 1800 Unit(s)/Hr (16 mL/Hr) IV Continuous <Continuous>  insulin regular Infusion 1 Unit(s)/Hr (1 mL/Hr) IV Continuous <Continuous>  levothyroxine 100 MICROGram(s) Oral daily  meropenem  IVPB 500 milliGRAM(s) IV Intermittent every 24 hours  meropenem  IVPB      metoprolol tartrate 12.5 milliGRAM(s) Oral two times a day  norepinephrine Infusion 0.05 MICROgram(s)/kG/Min (9.08 mL/Hr) IV Continuous <Continuous>  pantoprazole   Suspension 40 milliGRAM(s) Oral before breakfast  senna 2 Tablet(s) Oral at bedtime    MEDICATIONS  (PRN):  acetaminophen   Tablet .. 650 milliGRAM(s) Oral every 6 hours PRN Temp greater or equal to 38C (100.4F), Mild Pain (1 - 3)      RADIOLOGY & ADDITIONAL STUDIES:

## 2021-01-13 NOTE — PROGRESS NOTE ADULT - ASSESSMENT
ASSESSMENT/PLAN  - acute hypoxic resp failure  - covid 19 pneumonia  - ESRD on HD  - DM,   elevated WBC     SUGGEST:  - estimated REE by PSE 2230 kcal/d and protein needs ~ 139 gm/d  - continue feeding Peptamen AF at 75 ml/h --> 137 gm protein (entirely whey source), 2160 kcal, meets recommended low n6:n3 ratio, 1500 total mg phos/d and 77 total mEq K per day  - glycemic control - insulin drip at one u/h when seen - starting to improve  - check 25oh vitamin D level and replete aggressively  -  formula with high n6 content not ideal for any ARDS pt including covid (refer to CCN / ASPEN guidelines 4/1/20 and JPEN 9/20). Note that suggested formula is also low carb content. ASSESSMENT/PLAN  - acute hypoxic resp failure  - covid 19 pneumonia  - ESRD on HD  - DM,   elevated WBC     SUGGEST:  - estimated REE by PSE 2230 kcal/d and protein needs ~ 139 gm/d  - continue feeding Peptamen AF at 75 ml/h --> 137 gm protein (entirely whey source), 2160 kcal, meets recommended low n6:n3 ratio, 1500 total mg phos/d and 77 total mEq K per day  - glycemic control - insulin drip at one u/h when seen - starting to improve - and remember that current feeding Rx is LOW % carb  - check 25oh vitamin D level and replete aggressively  -  formula with high n6 content not ideal for any ARDS pt including covid (refer to CCN / ASPEN guidelines 4/1/20 and JPEN 9/20). Note that suggested formula is also low carb content.

## 2021-01-13 NOTE — PROGRESS NOTE ADULT - SUBJECTIVE AND OBJECTIVE BOX
[facility] FOLLOW UP NOTE  --------------------------------------------------------------------------------  24 hour events/subjective: Patient examined. Intubated.    PAST HISTORY  --------------------------------------------------------------------------------  No significant changes to PMH, PSH, FHx, SHx, unless otherwise noted    ALLERGIES & MEDICATIONS  --------------------------------------------------------------------------------  Allergies    Orange (Other)  Originally Entered as [HIVES] reaction to [sulfur] (Unknown)  penicillin (Unknown)  sulfADIAZINE (Unknown)    Standing Inpatient Medications  acetaminophen   Tablet .. 650 milliGRAM(s) Oral once  aspirin  chewable 81 milliGRAM(s) Oral daily  atorvastatin 20 milliGRAM(s) Oral at bedtime  chlorhexidine 0.12% Liquid 15 milliLiter(s) Oral Mucosa every 12 hours  chlorhexidine 4% Liquid 1 Application(s) Topical <User Schedule>  dexMEDEtomidine Infusion 0.2 MICROgram(s)/kG/Hr IV Continuous <Continuous>  dextrose 40% Gel 15 Gram(s) Oral once  dextrose 5%. 1000 milliLiter(s) IV Continuous <Continuous>  dextrose 5%. 1000 milliLiter(s) IV Continuous <Continuous>  dextrose 50% Injectable 25 Gram(s) IV Push once  dextrose 50% Injectable 12.5 Gram(s) IV Push once  dextrose 50% Injectable 25 Gram(s) IV Push once  epoetin raven-epbx (RETACRIT) Injectable 8000 Unit(s) IV Push <User Schedule>  fentaNYL   Infusion 0.5 MICROgram(s)/kG/Hr IV Continuous <Continuous>  glucagon  Injectable 1 milliGRAM(s) IntraMuscular once  heparin  Infusion 1800 Unit(s)/Hr IV Continuous <Continuous>  insulin regular Infusion 1 Unit(s)/Hr IV Continuous <Continuous>  levothyroxine 100 MICROGram(s) Oral daily  meropenem  IVPB 500 milliGRAM(s) IV Intermittent every 24 hours  meropenem  IVPB      metoprolol tartrate 12.5 milliGRAM(s) Oral two times a day  norepinephrine Infusion 0.05 MICROgram(s)/kG/Min IV Continuous <Continuous>  pantoprazole   Suspension 40 milliGRAM(s) Oral before breakfast  senna 2 Tablet(s) Oral at bedtime    PRN Inpatient Medications  acetaminophen   Tablet .. 650 milliGRAM(s) Oral every 6 hours PRN    VITALS/PHYSICAL EXAM  --------------------------------------------------------------------------------  T(C): 36.9 (01-13-21 @ 08:00), Max: 37.8 (01-13-21 @ 04:00)  HR: 84 (01-13-21 @ 11:15) (80 - 120)  BP: 186/66 (01-13-21 @ 10:30) (121/58 - 186/66)  RR: 33 (01-13-21 @ 11:15) (15 - 35)  SpO2: 98% (01-13-21 @ 11:15) (91% - 100%)    01-12-21 @ 07:01  -  01-13-21 @ 07:00  --------------------------------------------------------  IN: 4225.7 mL / OUT: 3500 mL / NET: 725.7 mL    01-13-21 @ 07:01  -  01-13-21 @ 12:26  --------------------------------------------------------  IN: 581.2 mL / OUT: 0 mL / NET: 581.2 mL    Physical Exam:  	Gen: NAD  	Pulm: CTA B/L  	CV: RRR, S1S2  	Abd: +BS, soft, nondistended  	LE: Warm,  edema  	Vascular access: AVF    LABS/STUDIES  --------------------------------------------------------------------------------              9.1    26.23 >-----------<  275      [01-13-21 @ 05:30]              28.7     138  |  98  |  89  ----------------------------<  274      [01-13-21 @ 05:30]  4.9   |  23  |  6.4        Ca     7.7     [01-13-21 @ 05:30]      Mg     2.3     [01-13-21 @ 05:30]      Phos  4.1     [01-13-21 @ 05:30]    TPro  5.9  /  Alb  3.1  /  TBili  1.1  /  DBili  x   /  AST  45  /  ALT  37  /  AlkPhos  94  [01-13-21 @ 05:30]    PTT: 47.5       [01-13-21 @ 05:30]    Creatinine Trend:  SCr 6.4 [01-13 @ 05:30]  SCr 5.5 [01-12 @ 17:05]  SCr 7.5 [01-12 @ 05:23]  SCr 7.3 [01-11 @ 09:26]  SCr 7.0 [01-11 @ 05:00]    Urinalysis - [01-06-21 @ 18:43]      Color Yellow / Appearance Slightly Turbid / SG 1.018 / pH 6.5      Gluc 100 mg/dL / Ketone Negative  / Bili Negative / Urobili <2 mg/dL       Blood Moderate / Protein 300 mg/dL / Leuk Est Small / Nitrite Negative       /  / Hyaline 114 / Gran  / Sq Epi  / Non Sq Epi 1 / Bacteria Negative    Ferritin 4895      [01-10-21 @ 06:30]  HbA1c 7.1      [05-21-19 @ 04:30]  TSH 4.57      [01-01-21 @ 17:56]

## 2021-01-13 NOTE — PROGRESS NOTE ADULT - SUBJECTIVE AND OBJECTIVE BOX
Patient is a 60y old  Male who presents with a chief complaint of s/p fall. (13 Jan 2021 12:25)  pt seen and evaluated   remain vented /sedated  on OGT feed    ICU Vital Signs Last 24 Hrs  T(C): 36.9 (13 Jan 2021 12:00), Max: 37.8 (13 Jan 2021 04:00)  T(F): 98.5 (13 Jan 2021 12:00), Max: 100 (13 Jan 2021 04:00)  HR: 88 (13 Jan 2021 15:30) (80 - 120)  BP: 186/66 (13 Jan 2021 10:30) (121/58 - 186/66)  BP(mean): 88 (13 Jan 2021 10:30) (78 - 88)  ABP: 106/46 (13 Jan 2021 15:30) (88/38 - 174/56)  ABP(mean): 66 (13 Jan 2021 15:30) (56 - 106)  RR: 33 (13 Jan 2021 15:30) (15 - 34)  SpO2: 99% (13 Jan 2021 15:30) (91% - 100%)      Drug Dosing Weight  Height (cm): 177.8 (01 Jan 2021 16:05)  Weight (kg): 96.9 (01 Jan 2021 16:05)  BMI (kg/m2): 30.7 (01 Jan 2021 16:05)  BSA (m2): 2.15 (01 Jan 2021 16:05)    I&O's Detail    12 Jan 2021 07:01  -  13 Jan 2021 07:00  --------------------------------------------------------  IN:    Dexmedetomidine: 542.4 mL    FentaNYL: 463.8 mL    Heparin: 322 mL    Insulin: 63 mL    IV PiggyBack: 550 mL    Norepinephrine: 249.5 mL    Peptamen A.F.: 1725 mL    Plasma: 310 mL  Total IN: 4225.7 mL    OUT:    Other (mL): 3000 mL    Rectal Tube (mL): 500 mL    Voided (mL): 0 mL  Total OUT: 3500 mL    Total NET: 725.7 mL      13 Jan 2021 07:01  -  13 Jan 2021 15:55  --------------------------------------------------------  IN:    Dexmedetomidine: 194.4 mL    FentaNYL: 131.2 mL    Heparin: 128 mL    Insulin: 24 mL    IV PiggyBack: 50 mL    Norepinephrine: 71 mL    Peptamen A.F.: 600 mL  Total IN: 1198.6 mL    OUT:  Total OUT: 0 mL    Total NET: 1198.6 mL    PHYSICAL EXAM:  Constitutional:  remain vented/ sedated   OGT feed in place [place]  Gastrointestinal:  soft   rectal tube in place    MEDICATIONS  (STANDING):  acetaminophen   Tablet .. 650 milliGRAM(s) Oral once  aspirin  chewable 81 milliGRAM(s) Oral daily  atorvastatin 20 milliGRAM(s) Oral at bedtime  chlorhexidine 0.12% Liquid 15 milliLiter(s) Oral Mucosa every 12 hours  chlorhexidine 4% Liquid 1 Application(s) Topical <User Schedule>  dexMEDEtomidine Infusion 0.2 MICROgram(s)/kG/Hr (4.85 mL/Hr) IV Continuous <Continuous>  dextrose 40% Gel 15 Gram(s) Oral once  dextrose 5%. 1000 milliLiter(s) (50 mL/Hr) IV Continuous <Continuous>  dextrose 5%. 1000 milliLiter(s) (100 mL/Hr) IV Continuous <Continuous>  dextrose 50% Injectable 25 Gram(s) IV Push once  dextrose 50% Injectable 12.5 Gram(s) IV Push once  dextrose 50% Injectable 25 Gram(s) IV Push once  epoetin raven-epbx (RETACRIT) Injectable 8000 Unit(s) IV Push <User Schedule>  fentaNYL   Infusion 0.5 MICROgram(s)/kG/Hr (4.85 mL/Hr) IV Continuous <Continuous>  glucagon  Injectable 1 milliGRAM(s) IntraMuscular once  heparin  Infusion 1800 Unit(s)/Hr (16 mL/Hr) IV Continuous <Continuous>  insulin regular Infusion 1 Unit(s)/Hr (1 mL/Hr) IV Continuous <Continuous>  levothyroxine 100 MICROGram(s) Oral daily  meropenem  IVPB 500 milliGRAM(s) IV Intermittent every 24 hours  meropenem  IVPB      metoprolol tartrate 12.5 milliGRAM(s) Oral two times a day  norepinephrine Infusion 0.05 MICROgram(s)/kG/Min (9.08 mL/Hr) IV Continuous <Continuous>  pantoprazole   Suspension 40 milliGRAM(s) Oral before breakfast  senna 2 Tablet(s) Oral at bedtime      Diet, NPO with Tube Feed:   Tube Feeding Modality: Orogastric  Peptamen A.F. Formula  Total Volume for 24 Hours (mL): 1800  Continuous  Until Goal Tube Feed Rate (mL per Hour): 75  Tube Feed Duration (in Hours): 24  Tube Feed Start Time: 13:15 (01-11-21 @ 13:00)      LABS  01-13    138  |  98  |  89<HH>  ----------------------------<  274<H>  4.9   |  23  |  6.4<HH>    Ca    7.7<L>      13 Jan 2021 05:30  Phos  4.1     01-13  Mg     2.3     01-13    TPro  5.9<L>  /  Alb  3.1<L>  /  TBili  1.1  /  DBili  x   /  AST  45<H>  /  ALT  37  /  AlkPhos  94  01-13                          9.1    26.23 )-----------( 275      ( 13 Jan 2021 05:30 )             28.7     CAPILLARY BLOOD GLUCOSE  POCT Blood Glucose.: 165 mg/dL (13 Jan 2021 14:10)  POCT Blood Glucose.: 113 mg/dL (13 Jan 2021 12:43)  POCT Blood Glucose.: 167 mg/dL (13 Jan 2021 10:31)  POCT Blood Glucose.: 138 mg/dL (13 Jan 2021 07:47)  POCT Blood Glucose.: 190 mg/dL (13 Jan 2021 06:33)   RADIOLOGY STUDIES  < from: Xray Chest 1 View- PORTABLE-Routine (Xray Chest 1 View- PORTABLE-Routine in AM.) (01.13.21 @ 08:49) >  Impression:  Stable bilateral opacities. No definite pneumothorax.  Lines and support devices as described above.   Patient is a 60y old  Male who presents with a chief complaint of s/p fall. (13 Jan 2021 12:25)  pt seen and evaluated   remain vented /sedated  on OGT feed    ICU Vital Signs Last 24 Hrs  T(C): 36.9 (13 Jan 2021 12:00), Max: 37.8 (13 Jan 2021 04:00)  T(F): 98.5 (13 Jan 2021 12:00), Max: 100 (13 Jan 2021 04:00)  HR: 88 (13 Jan 2021 15:30) (80 - 120)  BP: 186/66 (13 Jan 2021 10:30) (121/58 - 186/66)  BP(mean): 88 (13 Jan 2021 10:30) (78 - 88)  ABP: 106/46 (13 Jan 2021 15:30) (88/38 - 174/56)  ABP(mean): 66 (13 Jan 2021 15:30) (56 - 106)  RR: 33 (13 Jan 2021 15:30) (15 - 34)  SpO2: 99% (13 Jan 2021 15:30) (91% - 100%)    Drug Dosing Weight  Height (cm): 177.8 (01 Jan 2021 16:05)  Weight (kg): 96.9 (01 Jan 2021 16:05)  BMI (kg/m2): 30.7 (01 Jan 2021 16:05)  BSA (m2): 2.15 (01 Jan 2021 16:05)    I&O's Detail    12 Jan 2021 07:01  -  13 Jan 2021 07:00  --------------------------------------------------------  IN:    Dexmedetomidine: 542.4 mL    FentaNYL: 463.8 mL    Heparin: 322 mL    Insulin: 63 mL    IV PiggyBack: 550 mL    Norepinephrine: 249.5 mL    Peptamen A.F.: 1725 mL    Plasma: 310 mL  Total IN: 4225.7 mL    OUT:    Other (mL): 3000 mL    Rectal Tube (mL): 500 mL    Voided (mL): 0 mL  Total OUT: 3500 mL    Total NET: 725.7 mL      13 Jan 2021 07:01  -  13 Jan 2021 15:55  --------------------------------------------------------  IN:    Dexmedetomidine: 194.4 mL    FentaNYL: 131.2 mL    Heparin: 128 mL    Insulin: 24 mL    IV PiggyBack: 50 mL    Norepinephrine: 71 mL    Peptamen A.F.: 600 mL  Total IN: 1198.6 mL    OUT:  Total OUT: 0 mL - incomplete record    Total NET: 1198.6 mL    PHYSICAL EXAM:  Constitutional:  remain vented/ sedated   OGT feed in place [place] [facility]  Gastrointestinal:  soft   rectal tube in place but empty when pt seen this morning    MEDICATIONS  (STANDING):  acetaminophen   Tablet .. 650 milliGRAM(s) Oral once  aspirin  chewable 81 milliGRAM(s) Oral daily  atorvastatin 20 milliGRAM(s) Oral at bedtime  chlorhexidine 0.12% Liquid 15 milliLiter(s) Oral Mucosa every 12 hours  chlorhexidine 4% Liquid 1 Application(s) Topical <User Schedule>  dexMEDEtomidine Infusion 0.2 MICROgram(s)/kG/Hr (4.85 mL/Hr) IV Continuous <Continuous>  epoetin raven-epbx (RETACRIT) Injectable 8000 Unit(s) IV Push <User Schedule>  fentaNYL   Infusion 0.5 MICROgram(s)/kG/Hr (4.85 mL/Hr) IV Continuous <Continuous>  glucagon  Injectable 1 milliGRAM(s) IntraMuscular once  heparin  Infusion 1800 Unit(s)/Hr (16 mL/Hr) IV Continuous <Continuous>  insulin regular Infusion 1 Unit(s)/Hr (1 mL/Hr) IV Continuous <Continuous>  levothyroxine 100 MICROGram(s) Oral daily  meropenem  IVPB 500 milliGRAM(s) IV Intermittent every 24 hours  metoprolol tartrate 12.5 milliGRAM(s) Oral two times a day  norepinephrine Infusion 0.05 MICROgram(s)/kG/Min (9.08 mL/Hr) IV Continuous <Continuous>  pantoprazole   Suspension 40 milliGRAM(s) Oral before breakfast  senna 2 Tablet(s) Oral at bedtime      Diet, NPO with Tube Feed:   Tube Feeding Modality: Orogastric  Peptamen A.F. Formula  Total Volume for 24 Hours (mL): 1800  Continuous  Until Goal Tube Feed Rate (mL per Hour): 75  Tube Feed Duration (in Hours): 24  Tube Feed Start Time: 13:15 (01-11-21 @ 13:00)      LABS  01-13    138  |  98  |  89<HH>  ----------------------------<  274<H>  4.9   |  23  |  6.4<HH>    Ca    7.7<L>      13 Jan 2021 05:30  Phos  4.1     01-13  Mg     2.3     01-13    TPro  5.9<L>  /  Alb  3.1<L>  /  TBili  1.1  /  DBili  x   /  AST  45<H>  /  ALT  37  /  AlkPhos  94  01-13                          9.1    26.23 )-----------( 275      ( 13 Jan 2021 05:30 )             28.7     CAPILLARY BLOOD GLUCOSE  POCT Blood Glucose.: 165 mg/dL (13 Jan 2021 14:10)  POCT Blood Glucose.: 113 mg/dL (13 Jan 2021 12:43)  POCT Blood Glucose.: 167 mg/dL (13 Jan 2021 10:31)  POCT Blood Glucose.: 138 mg/dL (13 Jan 2021 07:47)  POCT Blood Glucose.: 190 mg/dL (13 Jan 2021 06:33)   RADIOLOGY STUDIES  < from: Xray Chest 1 View- PORTABLE-Routine (Xray Chest 1 View- PORTABLE-Routine in AM.) (01.13.21 @ 08:49) >  Impression:  Stable bilateral opacities. No definite pneumothorax.  Lines and support devices as described above.

## 2021-01-13 NOTE — PROGRESS NOTE ADULT - SUBJECTIVE AND OBJECTIVE BOX
SUBJECTIVE:    Patient is a 60y old Male who presents with a chief complaint of s/p fall. (12 Jan 2021 08:08). Currently admitted to medicine with the primary diagnosis of Acute hypoxic respiratory failure secondary to SARS-CoV-2 pneumonia.   Today is hospital day 12d.     PAST MEDICAL & SURGICAL HISTORY  PAD (peripheral artery disease)  multiple toe amputations    Anemia  chronic anemia - s/p transfusion 2018    Thyroid cancer    Chronic leg pain    OA (osteoarthritis)    SOB (shortness of breath) on exertion    ESRD (end stage renal disease)  2 yrs    Atrial fibrillation  on warfarin    Right sided weakness    Stroke  8 yrs ago    Hypertension    High blood cholesterol    Diabetes mellitus, type 2    Dialysis patient  Mon, Wednesday, Friday ([facility])    Smoker    H/O thyroid nodule    H/O gastroesophageal reflux (GERD)    History of tonsillectomy    History of surgery  Multiple toe amputations (amp 4 1/2 left toes; has 1/2 left mid toe; amp right mid toe)    A-V fistula  left AVF      SOCIAL HISTORY:  Negative for smoking/alcohol/drug use.     ALLERGIES:  Orange (Other)  Originally Entered as [HIVES] reaction to [sulfur] (Unknown)  penicillin (Unknown)  sulfADIAZINE (Unknown)    MEDICATIONS:  STANDING MEDICATIONS  acetaminophen   Tablet .. 650 milliGRAM(s) Oral once  aspirin  chewable 81 milliGRAM(s) Oral daily  atorvastatin 20 milliGRAM(s) Oral at bedtime  chlorhexidine 0.12% Liquid 15 milliLiter(s) Oral Mucosa every 12 hours  chlorhexidine 4% Liquid 1 Application(s) Topical <User Schedule>  dexMEDEtomidine Infusion 0.2 MICROgram(s)/kG/Hr IV Continuous <Continuous>  dextrose 40% Gel 15 Gram(s) Oral once  dextrose 5%. 1000 milliLiter(s) IV Continuous <Continuous>  dextrose 5%. 1000 milliLiter(s) IV Continuous <Continuous>  dextrose 50% Injectable 25 Gram(s) IV Push once  dextrose 50% Injectable 12.5 Gram(s) IV Push once  dextrose 50% Injectable 25 Gram(s) IV Push once  epoetin raven-epbx (RETACRIT) Injectable 8000 Unit(s) IV Push <User Schedule>  fentaNYL   Infusion 0.5 MICROgram(s)/kG/Hr IV Continuous <Continuous>  glucagon  Injectable 1 milliGRAM(s) IntraMuscular once  heparin  Infusion 1800 Unit(s)/Hr IV Continuous <Continuous>  insulin regular Infusion 1 Unit(s)/Hr IV Continuous <Continuous>  levothyroxine 100 MICROGram(s) Oral daily  meropenem  IVPB 500 milliGRAM(s) IV Intermittent every 24 hours  meropenem  IVPB      metoprolol tartrate 12.5 milliGRAM(s) Oral two times a day  norepinephrine Infusion 0.05 MICROgram(s)/kG/Min IV Continuous <Continuous>  pantoprazole   Suspension 40 milliGRAM(s) Oral before breakfast  senna 2 Tablet(s) Oral at bedtime    PRN MEDICATIONS  acetaminophen   Tablet .. 650 milliGRAM(s) Oral every 6 hours PRN    VITALS:   T(F): 98.5  HR: 88  BP: 121/58  RR: 32  SpO2: 98%    LABS:                        9.1    26.23 )-----------( 275      ( 13 Jan 2021 05:30 )             28.7     01-13    138  |  98  |  89<HH>  ----------------------------<  274<H>  4.9   |  23  |  6.4<HH>    Ca    7.7<L>      13 Jan 2021 05:30  Phos  4.1     01-13  Mg     2.3     01-13    TPro  5.9<L>  /  Alb  3.1<L>  /  TBili  1.1  /  DBili  x   /  AST  45<H>  /  ALT  37  /  AlkPhos  94  01-13    PTT - ( 13 Jan 2021 05:30 )  PTT:47.5 sec    ABG - ( 12 Jan 2021 03:15 )  pH, Arterial: 7.30  pH, Blood: x     /  pCO2: 49    /  pO2: 112   / HCO3: 24    / Base Excess: -2.7  /  SaO2: 99        Culture - Blood (collected 11 Jan 2021 11:00)  Source: .Blood Blood-Venous  Preliminary Report (13 Jan 2021 01:01):    No growth to date.    RADIOLOGY:    < from: Xray Chest 1 View- PORTABLE-Routine (Xray Chest 1 View- PORTABLE-Routine in AM.) (01.12.21 @ 05:11) >  Impression:    No significant radiographic change on frontal view.    Support devices as above.    < end of copied text >      PHYSICAL EXAM:  GEN: Intubated, sedated. Wakes up and responds to simple commands.   LUNGS: Clear to auscultation bilaterally   HEART: S1/S2 present. RRR.   ABD: Soft, non-tender, mildly distended. Bowel sounds present  EXT: NC/NC/NE/2+PP/[place]/Skin Intact.   NEURO: intubated and lightly sedated,  Wakes up and responds to simple commands.     Intravenous access:   NG tube:   Garza Catheter:   Indwelling Urethral Catheter:     Connect To:  Straight Drainage/Gravity    Indication:  Urine Output Monitoring in Critically Ill (01-13-21 @ 01:43) (not performed) [Active]  Indwelling Urethral Catheter:     Connect To:  Straight Drainage/Gravity    Indication:  Urine Output Monitoring in Critically Ill (01-12-21 @ 03:20) (not performed) [Active]  Indwelling Urethral Catheter:     Connect To:  Straight Drainage/Gravity    Indication:  Urine Output Monitoring in Critically Ill (01-11-21 @ 04:44) (not performed) [Active]  Indwelling Urethral Catheter:     Connect To:  Straight Drainage/Gravity    Indication:  Urine Output Monitoring in Critically Ill (01-09-21 @ 00:14) (not performed) [Active]  Indwelling Urethral Catheter:     Connect To:  Straight Drainage/Gravity    Indication:  Urine Output Monitoring in Critically Ill (01-07-21 @ 05:16) (not performed) [Active]

## 2021-01-13 NOTE — PROGRESS NOTE ADULT - ASSESSMENT
ESRD (due to DM) on HD TTS  s/p Fall  altered mental status   Covid-19 PNA / bacterial PNA   fluid overload  hyponatremia  hyperkalemia  DM  HTN  afib on coumadin  CVA  NSTEMI    plan:    HD tomorrow: 3 hours, opti 160 dialyzer, 1K bath, 3L UF  left arm precautions  covid isolation  f/u cardio / f/u pulm  icu care

## 2021-01-13 NOTE — PROGRESS NOTE ADULT - ASSESSMENT
#Covid-19 PNA with a suspected  bacterial PNA:   - The patient was intubated for increased alteration, hypoxemia and non-compliance with BiPAP  - Azithromycin discontinued on 1/8/2021   - Cefepime discontinued on 1/11/2021  - Dexamethasone 6mg IV once daily (1/1/2021), stopped at after 10 days.   - COVID-19 antibodies received on 1/4/2021 by [place] [facility] ( called lab [phone] to follow up on it)  - Covid antibodies: reported negative --> s/p 2 units of Convalescent plasma ( 1/8/2021 and 1/12/2021)  - Repeat procalcitonin: 4.7 (1/10), 6.13 (on 1/8) , 4.89 (on 1/7), 4.11 (on 1/4)  - s/p tocilizumab 400mg IV once on 1/8/2021  - Ferritin 1/10/2021 ( 48 hours after tocilizumab): 4895 ( increased compared to ferritin on 1/1/2021)  - SBT yesterday failed ( was on a CPAP for 30 minutes, after which he started becoming tachycardic and hypertensive )    #Hyperkalemia:   - Resolved  - Today's K+: 4.9   - Will follow up    #Leukocytosis:   #Suspected C.diff infection  - WBC still increasing ( 1/11: 25,000   1/10: 20,010   on 1/9: 11,310   - Latest procalcitonin  on 1/8: 6.31 ( Procalcitonin on 1/7: 4.89)  - Multifactorial ( Possible overlying infection, steroids)   - Diarrhea present   - Will send C.diff studies --> C.diff negative  - Will DC po vancomycin  - DC cefepime  - Started Meropenem 0.5g IV once daily ( renally adjusted dose)  - Vancomycin trough level 1/12/2021: 21.9   - Will give vancomycin 1.5g IV once after HD on 1/12/2021    #Altered Mental Status:   - EEG diffuse slowing, but no seizure activity, check the report above  - CT brain shows ventriculomegaly (check full report), no IC bleed, possible old infarct.  - neurology consulted and recs:   a. CT brain negative x2 for stroke or other structural pathology.  EEG negative for any epileptiform activity.  Suspect multifactorial metabolic encephalopathy in setting of COVID and ESRD and sedatives.  Wean sedation as able, can reassess if patient does not awaken after that time.  b. No further EEG or other tests   c. To be reassessed after sedation is weaned    #Fluid overload  #ESRD:   - HD as per nephrology   - EPO (retacrit) 8000 units IV push once weekly  - DC sevelamer as per nephro   - Will DC Sodium bicarbonate as per nephro    #Atrial fibrillation:  - Paroxysmal ? Currently NSRT, well rate controlled.   - Heparin gtt, follow up aPTT q6hrs until therapeutic   - Metoprolol tartrate 12.5mg po q12hrs    - Will DC levophed  - CT brain no IC bleed or concerns of IC bleed ( Heparin drip started)  - aPTT being followed, all within therapeutic range    #NSTEMI:   - Type II MI, demand ischemia, likely secondary to hypoxia secondary to COVID-19 pneumonia  - Aspirin 81mg po once daily   - CT brain no IC bleed or concerns of IC bleed   - On heparin gtt ( for atrial fibrillation now)  - Repeat CK-MB and CPK were stable    #Hypothyroidism:  - Continue with levothyroxine 100mcg po once daily     #Hyponatremia:   - Resolved  - Likely hypervolemic hyponatremia secondary to fluid overload   - Continue with Lasix and dialysis sessions     - Spoke with the patient's sister Nedra Shea on [phone] ( on 1/4/2021), speaking with her for updates daily.   The sister said that she is the health care proxy and will get the form from the house  Patient is full code now   - Concerns about Why the patient did not received, hydroxychloroquine and remdesivir ( explained that he is ESRD and that with eGFR <30 the medication is contraindicated.)   - Explained that he has ESRD ( eGFR <30), and therefore remdesivir is contraindicated  - Explained to her that hydroxychloroquine is not an option for treatment at the moment as studies have showed that there is no benefit to mortality.  #Covid-19 PNA with a suspected  bacterial PNA:   - The patient was intubated for increased alteration, hypoxemia and non-compliance with BiPAP  - Azithromycin discontinued on 1/8/2021   - Cefepime discontinued on 1/11/2021  - Dexamethasone 6mg IV once daily (1/1/2021), stopped at after 10 days.   - COVID-19 antibodies received on 1/4/2021 by [place] [facility] ( called lab [id number] to follow up on it)  - Covid antibodies: reported negative --> s/p 2 units of Convalescent plasma ( 1/8/2021 and 1/12/2021)  - Repeat procalcitonin: 4.7 (1/10), 6.13 (on 1/8) , 4.89 (on 1/7), 4.11 (on 1/4)  - s/p tocilizumab 400mg IV once on 1/8/2021  - Ferritin 1/10/2021 ( 48 hours after tocilizumab): 4895 ( increased compared to ferritin on 1/1/2021)  - SBT yesterday failed ( was on a CPAP for 30 minutes, after which he started becoming tachycardic and hypertensive )    #Hyperkalemia:   - Resolved  - Today's K+: 4.9   - Will follow up    #Leukocytosis:   #Suspected C.diff infection  - WBC still increasing ( 1/11: 25,000   1/10: 20,010   on 1/9: 11,310   - Latest procalcitonin  on 1/8: 6.31 ( Procalcitonin on 1/7: 4.89)  - Multifactorial ( Possible overlying infection, steroids)   - Diarrhea present   - Will send C.diff studies --> C.diff negative  - Will DC po vancomycin  - DC cefepime  - Started Meropenem 0.5g IV once daily ( renally adjusted dose)  - Vancomycin trough level 1/12/2021: 21.9   - Will give vancomycin 1.5g IV once after HD on 1/12/2021  - Follow up Fungitel ( Still pending)    #Altered Mental Status:   - EEG diffuse slowing, but no seizure activity, check the report above  - CT brain shows ventriculomegaly (check full report), no IC bleed, possible old infarct.  - neurology consulted and recs:   a. CT brain negative x2 for stroke or other structural pathology.  EEG negative for any epileptiform activity.  Suspect multifactorial metabolic encephalopathy in setting of COVID and ESRD and sedatives.  Wean sedation as able, can reassess if patient does not awaken after that time.  b. No further EEG or other tests   c. To be reassessed after sedation is weaned    #Fluid overload  #ESRD:   - HD as per nephrology   - EPO (retacrit) 8000 units IV push once weekly  - DC sevelamer as per nephro   - Will DC Sodium bicarbonate as per nephro    #Atrial fibrillation:  - Paroxysmal ? Currently NSRT, well rate controlled.   - Heparin gtt, follow up aPTT q6hrs until therapeutic   - Metoprolol tartrate 12.5mg po q12hrs    - Will DC levophed  - CT brain no IC bleed or concerns of IC bleed ( Heparin drip started)  - aPTT being followed, all within therapeutic range    #NSTEMI:   - Type II MI, demand ischemia, likely secondary to hypoxia secondary to COVID-19 pneumonia  - Aspirin 81mg po once daily   - CT brain no IC bleed or concerns of IC bleed   - On heparin gtt ( for atrial fibrillation now)  - Repeat CK-MB and CPK were stable    #Hypothyroidism:  - Continue with levothyroxine 100mcg po once daily     #Hyponatremia:   - Resolved  - Likely hypervolemic hyponatremia secondary to fluid overload   - Continue with Lasix and dialysis sessions     - Spoke with the patient's sister Nedra Shea on [phone] ( on 1/4/2021), speaking with her for updates daily.   The sister said that she is the health care proxy and will get the form from the house  Patient is full code now   - Concerns about Why the patient did not received, hydroxychloroquine and remdesivir ( explained that he is ESRD and that with eGFR <30 the medication is contraindicated.)   - Explained that he has ESRD ( eGFR <30), and therefore remdesivir is contraindicated  - Explained to her that hydroxychloroquine is not an option for treatment at the moment as studies have showed that there is no benefit to mortality.  #Covid-19 PNA with a suspected  bacterial PNA:   - The patient was intubated for increased alteration, hypoxemia and non-compliance with BiPAP  - Azithromycin discontinued on 1/8/2021   - Cefepime discontinued on 1/11/2021  - Dexamethasone 6mg IV once daily (1/1/2021), stopped at after 10 days.   - COVID-19 antibodies received on 1/4/2021 by [place] lab ( called lab [phone] to follow up on it)  - Covid antibodies: reported negative --> s/p 2 units of Convalescent plasma ( 1/8/2021 and 1/12/2021)  - Repeat procalcitonin: 4.7 (1/10), 6.13 (on 1/8) , 4.89 (on 1/7), 4.11 (on 1/4)  - s/p tocilizumab 400mg IV once on 1/8/2021  - Ferritin 1/10/2021 ( 48 hours after tocilizumab): 4895 ( increased compared to ferritin on 1/1/2021)  - SBT 1/12/2021 failed ( was on a CPAP for 30 minutes, after which he started becoming tachycardic and hypertensive )    #Hyperkalemia:   - Resolved  - Today's K+: 4.9   - Will follow up    #Leukocytosis:   #Suspected C.diff infection  - WBC still increasing ( 1/11: 25,000   1/10: 20,010   on 1/9: 11,310   - Latest procalcitonin  on 1/8: 6.31 ( Procalcitonin on 1/7: 4.89)  - Multifactorial ( Possible overlying infection, steroids)   - Diarrhea present   - Will send C.diff studies --> C.diff negative  - Will DC po vancomycin  - DC cefepime  - Started Meropenem 0.5g IV once daily ( renally adjusted dose)  - Vancomycin trough level 1/12/2021: 21.9   - Will give vancomycin 1.5g IV once after HD on 1/12/2021  - Follow up Fungitel ( Still pending)    #Altered Mental Status:   - EEG diffuse slowing, but no seizure activity, check the report above  - CT brain shows ventriculomegaly (check full report), no IC bleed, possible old infarct.  - neurology consulted and recs:   a. CT brain negative x2 for stroke or other structural pathology.  EEG negative for any epileptiform activity.  Suspect multifactorial metabolic encephalopathy in setting of COVID and ESRD and sedatives.  Wean sedation as able, can reassess if patient does not awaken after that time.  b. No further EEG or other tests   c. To be reassessed after sedation is weaned    #Fluid overload  #ESRD:   - HD as per nephrology   - EPO (retacrit) 8000 units IV push once weekly  - DC sevelamer as per nephro   - Will DC Sodium bicarbonate as per nephro    #Atrial fibrillation:  - Paroxysmal ? Currently NSRT, well rate controlled.   - Heparin gtt, follow up aPTT q6hrs until therapeutic   - Metoprolol tartrate 12.5mg po q12hrs    - Will DC levophed  - CT brain no IC bleed or concerns of IC bleed ( Heparin drip started)  - aPTT being followed, all within therapeutic range    #NSTEMI:   - Type II MI, demand ischemia, likely secondary to hypoxia secondary to COVID-19 pneumonia  - Aspirin 81mg po once daily   - CT brain no IC bleed or concerns of IC bleed   - On heparin gtt ( for atrial fibrillation now)  - Repeat CK-MB and CPK were stable    #Hypothyroidism:  - Continue with levothyroxine 100mcg po once daily     #Hyponatremia:   - Resolved  - Likely hypervolemic hyponatremia secondary to fluid overload   - Continue with Lasix and dialysis sessions     - Spoke with the patient's sister Nedra Shea on [phone] ( on 1/4/2021), speaking with her for updates daily.   The sister said that she is the health care proxy and will get the form from the house  Patient is full code now   - Concerns about Why the patient did not received, hydroxychloroquine and remdesivir ( explained that he is ESRD and that with eGFR <30 the medication is contraindicated.)   - Explained that he has ESRD ( eGFR <30), and therefore remdesivir is contraindicated  - Explained to her that hydroxychloroquine is not an option for treatment at the moment as studies have showed that there is no benefit to mortality.

## 2021-01-14 NOTE — PROGRESS NOTE ADULT - SUBJECTIVE AND OBJECTIVE BOX
Patient is a 60y old  Male who presents with a chief complaint of s/p fall. (14 Jan 2021 13:47)  pt seen and evaluated   remain vented /sedated  on OGT feed  on pressor    ICU Vital Signs Last 24 Hrs  T(C): 37.3 (14 Jan 2021 04:00), Max: 37.3 (13 Jan 2021 16:00)  T(F): 99.1 (14 Jan 2021 04:00), Max: 99.2 (13 Jan 2021 16:00)  HR: 111 (14 Jan 2021 14:26) (78 - 125)  BP: 132/66 (14 Jan 2021 12:05) (132/66 - 132/66)  ABP: 146/56 (14 Jan 2021 13:30) (74/36 - 156/64)  ABP(mean): 86 (14 Jan 2021 13:30) (50 - 96)  RR: 33 (14 Jan 2021 13:30) (20 - 34)  SpO2: 97% (14 Jan 2021 14:26) (97% - 100%)      Drug Dosing Weight  Height (cm): 177.8 (01 Jan 2021 16:05)  Weight (kg): 96.9 (01 Jan 2021 16:05)  BMI (kg/m2): 30.7 (01 Jan 2021 16:05)  BSA (m2): 2.15 (01 Jan 2021 16:05)    I&O's Detail    13 Jan 2021 07:01  -  14 Jan 2021 07:00  --------------------------------------------------------  IN:    Dexmedetomidine: 460.2 mL    FentaNYL: 487.6 mL    Heparin: 384 mL    Insulin: 58 mL    IV PiggyBack: 50 mL    Norepinephrine: 296 mL    Peptamen A.F.: 1800 mL  Total IN: 3535.8 mL    OUT:    Rectal Tube (mL): 1200 mL  Total OUT: 1200 mL    Total NET: 2335.8 mL      14 Jan 2021 07:01  -  14 Jan 2021 15:38  --------------------------------------------------------  IN:    Dexmedetomidine: 84 mL    FentaNYL: 114 mL    Heparin: 126 mL    Insulin: 15 mL    Norepinephrine: 184 mL    Peptamen A.F.: 525 mL  Total IN: 1048 mL    OUT:    Other (mL): 2500 mL  Total OUT: 2500 mL    Total NET: -1452 mL    PHYSICAL EXAM:  Constitutional:  apremain vented/ sedated   OGT feed in place salegeovanny nunez  Gastrointestinal:  soft   rectal tube in place   MEDICATIONS  (STANDING):  acetaminophen   Tablet .. 650 milliGRAM(s) Oral once  aspirin  chewable 81 milliGRAM(s) Oral daily  atorvastatin 20 milliGRAM(s) Oral at bedtime  chlorhexidine 0.12% Liquid 15 milliLiter(s) Oral Mucosa every 12 hours  chlorhexidine 4% Liquid 1 Application(s) Topical <User Schedule>  dexMEDEtomidine Infusion 0.2 MICROgram(s)/kG/Hr (4.85 mL/Hr) IV Continuous <Continuous>  dextrose 40% Gel 15 Gram(s) Oral once  dextrose 5%. 1000 milliLiter(s) (50 mL/Hr) IV Continuous <Continuous>  dextrose 5%. 1000 milliLiter(s) (100 mL/Hr) IV Continuous <Continuous>  dextrose 50% Injectable 25 Gram(s) IV Push once  dextrose 50% Injectable 12.5 Gram(s) IV Push once  dextrose 50% Injectable 25 Gram(s) IV Push once  epoetin raven-epbx (RETACRIT) Injectable 8000 Unit(s) IV Push <User Schedule>  fentaNYL   Infusion 0.5 MICROgram(s)/kG/Hr (4.85 mL/Hr) IV Continuous <Continuous>  glucagon  Injectable 1 milliGRAM(s) IntraMuscular once  heparin  Infusion 1800 Unit(s)/Hr (18 mL/Hr) IV Continuous <Continuous>  insulin regular Infusion 1 Unit(s)/Hr (1 mL/Hr) IV Continuous <Continuous>  levothyroxine 100 MICROGram(s) Oral daily  meropenem  IVPB 500 milliGRAM(s) IV Intermittent every 24 hours  meropenem  IVPB      metoprolol tartrate 12.5 milliGRAM(s) Oral two times a day  midazolam Injectable 4 milliGRAM(s) IV Push once  norepinephrine Infusion 0.05 MICROgram(s)/kG/Min (9.08 mL/Hr) IV Continuous <Continuous>  pantoprazole   Suspension 40 milliGRAM(s) Oral before breakfast  senna 2 Tablet(s) Oral at bedtime  vancomycin    Solution 125 milliGRAM(s) Oral every 6 hours      Diet, NPO with Tube Feed:   Tube Feeding Modality: Orogastric  Peptamen A.F. Formula  Total Volume for 24 Hours (mL): 1800  Continuous  Until Goal Tube Feed Rate (mL per Hour): 75  Tube Feed Duration (in Hours): 24  Tube Feed Start Time: 13:15 (01-11-21 @ 13:00)      LABS  01-14    141  |  103  |  106<HH>  ----------------------------<  93  5.3<H>   |  23  |  6.8<HH>    Ca    7.4<L>      14 Jan 2021 04:06  Phos  4.9     01-14  Mg     2.5     01-14    TPro  5.8<L>  /  Alb  2.8<L>  /  TBili  0.8  /  DBili  x   /  AST  39  /  ALT  31  /  AlkPhos  90  01-14                          8.6    28.02 )-----------( 295      ( 14 Jan 2021 04:06 )             26.8     CAPILLARY BLOOD GLUCOSE  POCT Blood Glucose.: 76 mg/dL (14 Jan 2021 12:47)  POCT Blood Glucose.: 132 mg/dL (14 Jan 2021 09:17)  POCT Blood Glucose.: 223 mg/dL (14 Jan 2021 05:59)  POCT Blood Glucose.: 97 mg/dL (14 Jan 2021 03:52)  POCT Blood Glucose.: 141 mg/dL (14 Jan 2021 02:07)   RADIOLOGY STUDIES  < from: Xray Chest 1 View- PORTABLE-Routine (Xray Chest 1 View- PORTABLE-Routine in AM.) (01.14.21 @ 06:07) >  Impression:  Stable bilateral lung opacities

## 2021-01-14 NOTE — CONSULT NOTE ADULT - SUBJECTIVE AND OBJECTIVE BOX
ROGER RODRIGUES [id number]  60y Male  13d    HPI:  59 yo M w/ PMH of Afib on coumadin, DM2, ESRD on HD MWF, HLD, HTN, CVA presents with weakness & fall. Patient notes when he woke up today, he felt generally weak, slid from the bed onto the floor landing on his bottom, denies head injury/loc. Patient notes that he has been having increased sputum production for the past few days, endorses mild shortness of breath without chest pain/fever/diarrhea/vomiting. Patient had full dialysis session wednesday, notes next session is tomorrow due to the holidays. Denies any focal weakness or pain currently. In ED, pt underwent CT head which was negative. CXR performed and shows possible RLL PNA. Given cefepime and vancomycin. Also found to be COVID+.     Hospital Course: Patient was intubated on 1/4 due to acute hypoxic respiratory failure secondary to COVID+ PNA. Since then patient was successfully weaned to minimal vent settings while undergoing treatment for COVID pneumonia. Patient has failed SBT twice, once on 1/12 and became hemodynamically unstable while on CPAP. He was SBT today as well but became agitated and bit the tube causing a leak, needed replacement of ETT. Per primary team the family has expressed desire for tracheostomy and PEG placement.     Reason for Consult: Tracheostomy and PEG placement     PAST MEDICAL & SURGICAL HISTORY:  PAD (peripheral artery disease)  multiple toe amputations  Anemia  chronic anemia - s/p transfusion 2018  Thyroid cancer  Chronic leg pain  OA (osteoarthritis)  SOB (shortness of breath) on exertion  ESRD (end stage renal disease)  2 yrs  Atrial fibrillation  on warfarin  Right sided weakness  Stroke  8 yrs ago  Hypertension  High blood cholesterol  Diabetes mellitus, type 2  Dialysis patient  Mon, Wednesday, Friday ([facility])  Smoker  H/O thyroid nodule  H/O gastroesophageal reflux (GERD)  History of tonsillectomy  History of surgery  Multiple toe amputations (amp 4 1/2 left toes; has 1/2 left mid toe; amp right mid toe)  A-V fistula  left AVF    MEDICATIONS  (STANDING):  acetaminophen   Tablet .. 650 milliGRAM(s) Oral once  aspirin  chewable 81 milliGRAM(s) Oral daily  atorvastatin 20 milliGRAM(s) Oral at bedtime  chlorhexidine 0.12% Liquid 15 milliLiter(s) Oral Mucosa every 12 hours  chlorhexidine 4% Liquid 1 Application(s) Topical <User Schedule>  dexMEDEtomidine Infusion 0.2 MICROgram(s)/kG/Hr (4.85 mL/Hr) IV Continuous <Continuous>  dextrose 40% Gel 15 Gram(s) Oral once  dextrose 5%. 1000 milliLiter(s) (50 mL/Hr) IV Continuous <Continuous>  dextrose 5%. 1000 milliLiter(s) (100 mL/Hr) IV Continuous <Continuous>  dextrose 50% Injectable 25 Gram(s) IV Push once  dextrose 50% Injectable 12.5 Gram(s) IV Push once  dextrose 50% Injectable 25 Gram(s) IV Push once  epoetin raven-epbx (RETACRIT) Injectable 8000 Unit(s) IV Push <User Schedule>  fentaNYL   Infusion 0.5 MICROgram(s)/kG/Hr (4.85 mL/Hr) IV Continuous <Continuous>  glucagon  Injectable 1 milliGRAM(s) IntraMuscular once  heparin  Infusion 1800 Unit(s)/Hr (18 mL/Hr) IV Continuous <Continuous>  insulin regular Infusion 1 Unit(s)/Hr (1 mL/Hr) IV Continuous <Continuous>  levothyroxine 100 MICROGram(s) Oral daily  meropenem  IVPB 500 milliGRAM(s) IV Intermittent every 24 hours  meropenem  IVPB      metoprolol tartrate 12.5 milliGRAM(s) Oral two times a day  norepinephrine Infusion 0.05 MICROgram(s)/kG/Min (9.08 mL/Hr) IV Continuous <Continuous>  pantoprazole   Suspension 40 milliGRAM(s) Oral before breakfast  senna 2 Tablet(s) Oral at bedtime  vancomycin    Solution 125 milliGRAM(s) Oral every 6 hours    MEDICATIONS  (PRN):  acetaminophen   Tablet .. 650 milliGRAM(s) Oral every 6 hours PRN Temp greater or equal to 38C (100.4F), Mild Pain (1 - 3)    Allergies    Orange (Other)  Originally Entered as [HIVES] reaction to [sulfur] (Unknown)  penicillin (Unknown)  sulfADIAZINE (Unknown)    REVIEW OF SYSTEMS    [ ] A ten-point review of systems was otherwise negative except as noted.  [ ] Due to altered mental status/intubation, subjective information were not able to be obtained from the patient. History was obtained, to the extent possible, from review of the chart and collateral sources of information.    Vital Signs Last 24 Hrs  T(C): 37.3 (14 Jan 2021 04:00), Max: 37.3 (14 Jan 2021 04:00)  T(F): 99.1 (14 Jan 2021 04:00), Max: 99.1 (14 Jan 2021 04:00)  HR: 114 (14 Jan 2021 15:00) (78 - 125)  BP: 132/66 (14 Jan 2021 12:05) (132/66 - 132/66)  RR: 33 (14 Jan 2021 15:00) (20 - 34)  SpO2: 98% (14 Jan 2021 15:00) (97% - 100%)    PHYSICAL EXAM:  GENERAL: NAD, well-appearing  CHEST/LUNG: Clear to auscultation bilaterally  HEART: Regular rate and rhythm  ABDOMEN: Soft, Nontender, Nondistended;   EXTREMITIES:  No clubbing, cyanosis, or edema    LABS:    POCT Blood Glucose.: 270 mg/dL (14 Jan 2021 16:09)  POCT Blood Glucose.: 76 mg/dL (14 Jan 2021 12:47)  POCT Blood Glucose.: 132 mg/dL (14 Jan 2021 09:17)  POCT Blood Glucose.: 223 mg/dL (14 Jan 2021 05:59)  POCT Blood Glucose.: 97 mg/dL (14 Jan 2021 03:52)  POCT Blood Glucose.: 141 mg/dL (14 Jan 2021 02:07)  POCT Blood Glucose.: 188 mg/dL (14 Jan 2021 00:39)  POCT Blood Glucose.: 180 mg/dL (13 Jan 2021 22:20)  POCT Blood Glucose.: 169 mg/dL (13 Jan 2021 19:57)  POCT Blood Glucose.: 104 mg/dL (13 Jan 2021 17:44)                        8.6    28.02 )-----------( 295      ( 14 Jan 2021 04:06 )             26.8       Auto Neutrophil %: 78.5 % (01-14-21 @ 04:06)  Auto Immature Granulocyte %: 11.7 % (01-14-21 @ 04:06)    01-14    141  |  103  |  106<HH>  ----------------------------<  93  5.3<H>   |  23  |  6.8<HH>    Calcium, Total Serum: 7.4 mg/dL (01-14-21 @ 04:06)    LFTs:             5.8  | 0.8  | 39       ------------------[90      ( 14 Jan 2021 04:06 )  2.8  | x    | 31          Blood Gas Arterial, Lactate: 0.7 mmoL/L (01-14-21 @ 02:47)  Blood Gas Arterial, Lactate: 0.8 mmoL/L (01-12-21 @ 03:15)    ABG - ( 14 Jan 2021 02:47 )  pH: 7.27  /  pCO2: 51    /  pO2: 111   / HCO3: 24    / Base Excess: -3.1  /  SaO2: 98        ABG - ( 12 Jan 2021 03:15 )  pH: 7.30  /  pCO2: 49    /  pO2: 112   / HCO3: 24    / Base Excess: -2.7  /  SaO2: 99        ABG - ( 11 Jan 2021 14:17 )  pH: 7.28  /  pCO2: 51    /  pO2: 99    / HCO3: 24    / Base Excess: -3.2  /  SaO2: 97        Coags:  x      ----< x       ( 14 Jan 2021 12:58 )     69.6     RADIOLOGY & ADDITIONAL STUDIES:  < from: Xray Chest 1 View- PORTABLE-Routine (Xray Chest 1 View- PORTABLE-Routine in AM.) (01.14.21 @ 06:07) >  Impression:  Stable bilateral lung opacities

## 2021-01-14 NOTE — CONSULT NOTE ADULT - ATTENDING COMMENTS
Patient seen today with neurocritical care team BRYAN Noriega.       I was physically present for the key portions of the evaluation and management (E/M) service provided.  I agree with the above history, physical, and plan with the following additions/modifications    Patient with exam notable for grimmacing to noxious stim and bilateral weak arm withdrawal, no attending or command following.  CT head negative x2 for stroke or other structural pathology.  EEG negative for any epileptiform activity.  Suspect multifactorial metabolic encephalopathy in setting of COVID and ESRD and sedatives.  Wean sedation as able, can reassess if patient does not awaken after that time.    Rafael Candelario MD  Attending Neurointensivist
pt independently seen on 1/6  small blood blister appreciated on lateral aspect of right 3rd toe-->left intact  capillary refill diminished  no signs of infection stemming from the feet  f/u with vascular as outpatient
COVID Pneumonia   for trache/PEG next week

## 2021-01-14 NOTE — PROGRESS NOTE ADULT - ASSESSMENT
ASSESSMENT/PLAN  - acute hypoxic resp failure  - covid 19 pneumonia  - ESRD on HD  - DM,   elevated WBC     SUGGEST:  - estimated REE by PSE 2230 kcal/d and protein needs ~ 139 gm/d  - continue feeding Peptamen AF at 75 ml/h --> 137 gm protein (entirely whey source), 2160 kcal, meets recommended low n6:n3 ratio, 1500 total mg phos/d and 77 total mEq K per day  - glycemic control - insulin drip at one u/h when seen - starting to improve - and remember that current feeding Rx is LOW % carb  - check 25oh vitamin D level and replete aggressively  -  formula with high n6 content not ideal for any ARDS pt including covid (refer to CCN / ASPEN guidelines 4/1/20 and JPEN 9/20). Note that suggested formula is also low carb content.

## 2021-01-14 NOTE — PROGRESS NOTE ADULT - SUBJECTIVE AND OBJECTIVE BOX
OVERNIGHT EVENTS: still intubated, ventialted, on fentanyl, precedex, levoph 0.11, insulun,, hepa    Vital Signs Last 24 Hrs  T(C): 37.3 (14 Jan 2021 04:00), Max: 37.3 (13 Jan 2021 16:00)  T(F): 99.1 (14 Jan 2021 04:00), Max: 99.2 (13 Jan 2021 16:00)  HR: 94 (14 Jan 2021 09:00) (78 - 114)  BP: 186/66 (13 Jan 2021 10:30) (186/66 - 186/66)  BP(mean): 88 (13 Jan 2021 10:30) (88 - 88)  RR: 33 (14 Jan 2021 09:00) (20 - 34)  SpO2: 100% (14 Jan 2021 09:00) (97% - 100%)    PHYSICAL EXAMINATION:    GENERAL: ill looking    HEENT: Head is normocephalic and atraumatic.     NECK: Supple.    LUNGS: bl crackles    HEART: CHEL 3/6    ABDOMEN: Soft, nontender, and nondistended.      EXTREMITIES: swelling+    NEUROLOGIC: Grossly intact.    SKIN: No ulceration or induration present.      LABS:                        8.6    28.02 )-----------( 295      ( 14 Jan 2021 04:06 )             26.8     01-14    141  |  103  |  106<HH>  ----------------------------<  93  5.3<H>   |  23  |  6.8<HH>    Ca    7.4<L>      14 Jan 2021 04:06  Phos  4.9     01-14  Mg     2.5     01-14    TPro  5.8<L>  /  Alb  2.8<L>  /  TBili  0.8  /  DBili  x   /  AST  39  /  ALT  31  /  AlkPhos  90  01-14    PTT - ( 14 Jan 2021 04:06 )  PTT:47.9 sec    ABG - ( 14 Jan 2021 02:47 )  pH, Arterial: 7.27  pH, Blood: x     /  pCO2: 51    /  pO2: 111   / HCO3: 24    / Base Excess: -3.1  /  SaO2: 98        450/34/40/8                  Procalcitonin, Serum: 3.47 ng/mL (01-13-21 @ 18:55)        01-13-21 @ 07:01  -  01-14-21 @ 07:00  --------------------------------------------------------  IN: 3535.8 mL / OUT: 1200 mL / NET: 2335.8 mL    01-14-21 @ 07:01  -  01-14-21 @ 09:32  --------------------------------------------------------  IN: 375 mL / OUT: 0 mL / NET: 375 mL        MICROBIOLOGY:  Culture Results:   No growth to date. (01-11 @ 11:00)      MEDICATIONS  (STANDING):  acetaminophen   Tablet .. 650 milliGRAM(s) Oral once  aspirin  chewable 81 milliGRAM(s) Oral daily  atorvastatin 20 milliGRAM(s) Oral at bedtime  chlorhexidine 0.12% Liquid 15 milliLiter(s) Oral Mucosa every 12 hours  chlorhexidine 4% Liquid 1 Application(s) Topical <User Schedule>  dexMEDEtomidine Infusion 0.2 MICROgram(s)/kG/Hr (4.85 mL/Hr) IV Continuous <Continuous>  dextrose 40% Gel 15 Gram(s) Oral once  dextrose 5%. 1000 milliLiter(s) (50 mL/Hr) IV Continuous <Continuous>  dextrose 5%. 1000 milliLiter(s) (100 mL/Hr) IV Continuous <Continuous>  dextrose 50% Injectable 25 Gram(s) IV Push once  dextrose 50% Injectable 12.5 Gram(s) IV Push once  dextrose 50% Injectable 25 Gram(s) IV Push once  epoetin raven-epbx (RETACRIT) Injectable 8000 Unit(s) IV Push <User Schedule>  fentaNYL   Infusion 0.5 MICROgram(s)/kG/Hr (4.85 mL/Hr) IV Continuous <Continuous>  glucagon  Injectable 1 milliGRAM(s) IntraMuscular once  heparin  Infusion 1800 Unit(s)/Hr (18 mL/Hr) IV Continuous <Continuous>  insulin regular Infusion 1 Unit(s)/Hr (1 mL/Hr) IV Continuous <Continuous>  levothyroxine 100 MICROGram(s) Oral daily  meropenem  IVPB 500 milliGRAM(s) IV Intermittent every 24 hours  meropenem  IVPB      metoprolol tartrate 12.5 milliGRAM(s) Oral two times a day  norepinephrine Infusion 0.05 MICROgram(s)/kG/Min (9.08 mL/Hr) IV Continuous <Continuous>  pantoprazole   Suspension 40 milliGRAM(s) Oral before breakfast  senna 2 Tablet(s) Oral at bedtime    MEDICATIONS  (PRN):  acetaminophen   Tablet .. 650 milliGRAM(s) Oral every 6 hours PRN Temp greater or equal to 38C (100.4F), Mild Pain (1 - 3)      RADIOLOGY & ADDITIONAL STUDIES:

## 2021-01-14 NOTE — PROGRESS NOTE ADULT - SUBJECTIVE AND OBJECTIVE BOX
SUBJECTIVE:    Patient is a 60y old Male who presents with a chief complaint of s/p fall. (13 Jan 2021 15:55)    Currently admitted to medicine with the primary diagnosis of Fever       Today is hospital day 13d. This morning he is resting comfortably in bed and reports no new issues or overnight events.     PAST MEDICAL & SURGICAL HISTORY  PAD (peripheral artery disease)  multiple toe amputations    Anemia  chronic anemia - s/p transfusion 2018    Thyroid cancer    Chronic leg pain    OA (osteoarthritis)    SOB (shortness of breath) on exertion    ESRD (end stage renal disease)  2 yrs    Atrial fibrillation  on warfarin    Right sided weakness    Stroke  8 yrs ago    Hypertension    High blood cholesterol    Diabetes mellitus, type 2    Dialysis patient  Mon, Wednesday, Friday ([facility])    Smoker    H/O thyroid nodule    H/O gastroesophageal reflux (GERD)    History of tonsillectomy    History of surgery  Multiple toe amputations (amp 4 1/2 left toes; has 1/2 left mid toe; amp right mid toe)    A-V fistula  left AVF      SOCIAL HISTORY:  Negative for smoking/alcohol/drug use.     ALLERGIES:  Orange (Other)  Originally Entered as [HIVES] reaction to [sulfur] (Unknown)  penicillin (Unknown)  sulfADIAZINE (Unknown)    MEDICATIONS:  STANDING MEDICATIONS  acetaminophen   Tablet .. 650 milliGRAM(s) Oral once  aspirin  chewable 81 milliGRAM(s) Oral daily  atorvastatin 20 milliGRAM(s) Oral at bedtime  chlorhexidine 0.12% Liquid 15 milliLiter(s) Oral Mucosa every 12 hours  chlorhexidine 4% Liquid 1 Application(s) Topical <User Schedule>  dexMEDEtomidine Infusion 0.2 MICROgram(s)/kG/Hr IV Continuous <Continuous>  dextrose 40% Gel 15 Gram(s) Oral once  dextrose 5%. 1000 milliLiter(s) IV Continuous <Continuous>  dextrose 5%. 1000 milliLiter(s) IV Continuous <Continuous>  dextrose 50% Injectable 25 Gram(s) IV Push once  dextrose 50% Injectable 12.5 Gram(s) IV Push once  dextrose 50% Injectable 25 Gram(s) IV Push once  epoetin raven-epbx (RETACRIT) Injectable 8000 Unit(s) IV Push <User Schedule>  fentaNYL   Infusion 0.5 MICROgram(s)/kG/Hr IV Continuous <Continuous>  glucagon  Injectable 1 milliGRAM(s) IntraMuscular once  heparin  Infusion 1800 Unit(s)/Hr IV Continuous <Continuous>  insulin regular Infusion 1 Unit(s)/Hr IV Continuous <Continuous>  levothyroxine 100 MICROGram(s) Oral daily  meropenem  IVPB 500 milliGRAM(s) IV Intermittent every 24 hours  meropenem  IVPB      metoprolol tartrate 12.5 milliGRAM(s) Oral two times a day  norepinephrine Infusion 0.05 MICROgram(s)/kG/Min IV Continuous <Continuous>  pantoprazole   Suspension 40 milliGRAM(s) Oral before breakfast  senna 2 Tablet(s) Oral at bedtime    PRN MEDICATIONS  acetaminophen   Tablet .. 650 milliGRAM(s) Oral every 6 hours PRN    VITALS:   T(F): 99.1  HR: 112  BP: 186/66  RR: 33  SpO2: 100%    LABS:                        8.6    28.02 )-----------( 295      ( 14 Jan 2021 04:06 )             26.8     01-14    141  |  103  |  106<HH>  ----------------------------<  93  5.3<H>   |  23  |  6.8<HH>    Ca    7.4<L>      14 Jan 2021 04:06  Phos  4.9     01-14  Mg     2.5     01-14    TPro  5.8<L>  /  Alb  2.8<L>  /  TBili  0.8  /  DBili  x   /  AST  39  /  ALT  31  /  AlkPhos  90  01-14    PTT - ( 14 Jan 2021 04:06 )  PTT:47.9 sec    ABG - ( 14 Jan 2021 02:47 )  pH, Arterial: 7.27  pH, Blood: x     /  pCO2: 51    /  pO2: 111   / HCO3: 24    / Base Excess: -3.1  /  SaO2: 98        Culture - Blood (collected 11 Jan 2021 11:00)  Source: .Blood Blood-Venous  Preliminary Report (13 Jan 2021 01:01):    No growth to date.  RADIOLOGY:    < from: Xray Chest 1 View- PORTABLE-Routine (Xray Chest 1 View- PORTABLE-Routine in AM.) (01.13.21 @ 08:49) >  Impression:  Stable bilateral opacities. No definite pneumothorax.    Lines and support devices as described above.    < end of copied text >      PHYSICAL EXAM:  GEN: Intubated, sedated. Wakes up and responds to simple commands.   LUNGS: Clear to auscultation bilaterally   HEART: S1/S2 present. RRR.   ABD: Soft, non-tender, mildly distended. Bowel sounds present  EXT: NC/NC/NE/2+PP/[place]/Skin Intact.   NEURO: intubated and lightly sedated,  Wakes up and responds to simple commands.     Intravenous access:   NG tube:   Garza Catheter:   Indwelling Urethral Catheter:     Connect To:  Straight Drainage/Gravity    Indication:  Urine Output Monitoring in Critically Ill (01-14-21 @ 03:13) (not performed) [Active]  Indwelling Urethral Catheter:     Connect To:  Straight Drainage/Gravity    Indication:  Urine Output Monitoring in Critically Ill (01-13-21 @ 01:43) (not performed) [Active]  Indwelling Urethral Catheter:     Connect To:  Straight Drainage/Gravity    Indication:  Urine Output Monitoring in Critically Ill (01-12-21 @ 03:20) (not performed) [Active]  Indwelling Urethral Catheter:     Connect To:  Straight Drainage/Gravity    Indication:  Urine Output Monitoring in Critically Ill (01-11-21 @ 04:44) (not performed) [Active]  Indwelling Urethral Catheter:     Connect To:  Straight Drainage/Gravity    Indication:  Urine Output Monitoring in Critically Ill (01-09-21 @ 00:14) (not performed) [Active]  Indwelling Urethral Catheter:     Connect To:  Straight Drainage/Gravity    Indication:  Urine Output Monitoring in Critically Ill (01-07-21 @ 05:16) (not performed) [Active]         SUBJECTIVE:    Patient is a 60y old Male who presents with a chief complaint of s/p fall. (13 Jan 2021 15:55). Currently admitted to medicine with the primary diagnosis of acute hypoxic respiratory failure secondary to SARS-CoV-2 pneumonia.   Today is hospital day 13d.     PAST MEDICAL & SURGICAL HISTORY  PAD (peripheral artery disease)  multiple toe amputations    Anemia  chronic anemia - s/p transfusion 2018    Thyroid cancer    Chronic leg pain    OA (osteoarthritis)    SOB (shortness of breath) on exertion    ESRD (end stage renal disease)  2 yrs    Atrial fibrillation  on warfarin    Right sided weakness    Stroke  8 yrs ago    Hypertension    High blood cholesterol    Diabetes mellitus, type 2    Dialysis patient  Mon, Wednesday, Friday ([facility])    Smoker    H/O thyroid nodule    H/O gastroesophageal reflux (GERD)    History of tonsillectomy    History of surgery  Multiple toe amputations (amp 4 1/2 left toes; has 1/2 left mid toe; amp right mid toe)    A-V fistula  left AVF      SOCIAL HISTORY:  Negative for smoking/alcohol/drug use.     ALLERGIES:  Orange (Other)  Originally Entered as [HIVES] reaction to [sulfur] (Unknown)  penicillin (Unknown)  sulfADIAZINE (Unknown)    MEDICATIONS:  STANDING MEDICATIONS  acetaminophen   Tablet .. 650 milliGRAM(s) Oral once  aspirin  chewable 81 milliGRAM(s) Oral daily  atorvastatin 20 milliGRAM(s) Oral at bedtime  chlorhexidine 0.12% Liquid 15 milliLiter(s) Oral Mucosa every 12 hours  chlorhexidine 4% Liquid 1 Application(s) Topical <User Schedule>  dexMEDEtomidine Infusion 0.2 MICROgram(s)/kG/Hr IV Continuous <Continuous>  dextrose 40% Gel 15 Gram(s) Oral once  dextrose 5%. 1000 milliLiter(s) IV Continuous <Continuous>  dextrose 5%. 1000 milliLiter(s) IV Continuous <Continuous>  dextrose 50% Injectable 25 Gram(s) IV Push once  dextrose 50% Injectable 12.5 Gram(s) IV Push once  dextrose 50% Injectable 25 Gram(s) IV Push once  epoetin raven-epbx (RETACRIT) Injectable 8000 Unit(s) IV Push <User Schedule>  fentaNYL   Infusion 0.5 MICROgram(s)/kG/Hr IV Continuous <Continuous>  glucagon  Injectable 1 milliGRAM(s) IntraMuscular once  heparin  Infusion 1800 Unit(s)/Hr IV Continuous <Continuous>  insulin regular Infusion 1 Unit(s)/Hr IV Continuous <Continuous>  levothyroxine 100 MICROGram(s) Oral daily  meropenem  IVPB 500 milliGRAM(s) IV Intermittent every 24 hours  meropenem  IVPB      metoprolol tartrate 12.5 milliGRAM(s) Oral two times a day  norepinephrine Infusion 0.05 MICROgram(s)/kG/Min IV Continuous <Continuous>  pantoprazole   Suspension 40 milliGRAM(s) Oral before breakfast  senna 2 Tablet(s) Oral at bedtime    PRN MEDICATIONS  acetaminophen   Tablet .. 650 milliGRAM(s) Oral every 6 hours PRN    VITALS:   T(F): 99.1  HR: 112  BP: 186/66  RR: 33  SpO2: 100%    LABS:                        8.6    28.02 )-----------( 295      ( 14 Jan 2021 04:06 )             26.8     01-14    141  |  103  |  106<HH>  ----------------------------<  93  5.3<H>   |  23  |  6.8<HH>    Ca    7.4<L>      14 Jan 2021 04:06  Phos  4.9     01-14  Mg     2.5     01-14    TPro  5.8<L>  /  Alb  2.8<L>  /  TBili  0.8  /  DBili  x   /  AST  39  /  ALT  31  /  AlkPhos  90  01-14    PTT - ( 14 Jan 2021 04:06 )  PTT:47.9 sec    ABG - ( 14 Jan 2021 02:47 )  pH, Arterial: 7.27  pH, Blood: x     /  pCO2: 51    /  pO2: 111   / HCO3: 24    / Base Excess: -3.1  /  SaO2: 98        Culture - Blood (collected 11 Jan 2021 11:00)  Source: .Blood Blood-Venous  Preliminary Report (13 Jan 2021 01:01):    No growth to date.  RADIOLOGY:    < from: Xray Chest 1 View- PORTABLE-Routine (Xray Chest 1 View- PORTABLE-Routine in AM.) (01.13.21 @ 08:49) >  Impression:  Stable bilateral opacities. No definite pneumothorax.    Lines and support devices as described above.    < end of copied text >      PHYSICAL EXAM:  GEN: Intubated, sedated. Wakes up and responds to simple commands.   LUNGS: Clear to auscultation bilaterally   HEART: S1/S2 present. RRR.   ABD: Soft, non-tender, mildly distended. Bowel sounds present  EXT: NC/NC/NE/2+PP/[place]/Skin Intact.   NEURO: intubated and lightly sedated,  Wakes up and responds to simple commands.     Intravenous access:   NG tube:   Garza Catheter:   Indwelling Urethral Catheter:     Connect To:  Straight Drainage/Gravity    Indication:  Urine Output Monitoring in Critically Ill (01-14-21 @ 03:13) (not performed) [Active]  Indwelling Urethral Catheter:     Connect To:  Straight Drainage/Gravity    Indication:  Urine Output Monitoring in Critically Ill (01-13-21 @ 01:43) (not performed) [Active]  Indwelling Urethral Catheter:     Connect To:  Straight Drainage/Gravity    Indication:  Urine Output Monitoring in Critically Ill (01-12-21 @ 03:20) (not performed) [Active]  Indwelling Urethral Catheter:     Connect To:  Straight Drainage/Gravity    Indication:  Urine Output Monitoring in Critically Ill (01-11-21 @ 04:44) (not performed) [Active]  Indwelling Urethral Catheter:     Connect To:  Straight Drainage/Gravity    Indication:  Urine Output Monitoring in Critically Ill (01-09-21 @ 00:14) (not performed) [Active]  Indwelling Urethral Catheter:     Connect To:  Straight Drainage/Gravity    Indication:  Urine Output Monitoring in Critically Ill (01-07-21 @ 05:16) (not performed) [Active]

## 2021-01-14 NOTE — CONSULT NOTE ADULT - ASSESSMENT
59 yo M w/ PMH of Afib on coumadin, DM2, ESRD on HD MWF, HLD, HTN, CVA presents with weakness & fall on 1/1, found to be positive for COVID on admission. Patient was intubated 1/4 for hypoxic respiratory failure 2/2 COVID pneumonia.     Plan:    59 yo M w/ PMH of Afib on coumadin, DM2, ESRD on HD MWF, HLD, HTN, CVA presents with weakness & fall on 1/1, found to be positive for COVID on admission. Patient was intubated 1/4 for hypoxic respiratory failure 2/2 COVID pneumonia.     Plan:   -Trach and PEG likely early next week  -Obtain pre-op labs (type and screen, coags)  -Will need to hold heparin gtt prior to procedure  -Plan discussed with Dr. Yan

## 2021-01-14 NOTE — PROGRESS NOTE ADULT - ASSESSMENT
IMPRESSION:    s/p fall / head CT neg  ESRD MWF for HD  Acute hypoxemic resp failure  NSTEMI  Afib on coumadin  severe COVID PNA  Superimposed bacteria PNA  inrease WBC/ diarrhea  C diff -    PLAN:    CNS: SAT. precedex, FENTANYL    HEENT: Oral care.    PULMONARY: Vent changes. Wean O2.  Monitor PPL and DP.  , increase rate, PEEP 8 SBT     CARDIOVASCULAR:  I<O as tolerated.        GI: GI prophylaxis.  OG Feeding.  Bowel regimen     RENAL: check lytes and replete PRN. Nephro F/UP result  on HD    INFECTIOUS DISEASE: Dexamethasone 6 mg finished    HEMATOLOGICAL: DVT Prophylaxis  IV Heparin FU PTT     ENDOCRINE:  Follow up FS.  Insulin protocol if needed.    MUSCULOSKELETAL: Increase as tolerated.    MICU     Prognosis poor

## 2021-01-14 NOTE — PROGRESS NOTE ADULT - SUBJECTIVE AND OBJECTIVE BOX
[facility] FOLLOW UP NOTE  --------------------------------------------------------------------------------  24 hour events/subjective: Patient examined. Intubated.    PAST HISTORY  --------------------------------------------------------------------------------  No significant changes to PMH, PSH, FHx, SHx, unless otherwise noted    ALLERGIES & MEDICATIONS  --------------------------------------------------------------------------------  Allergies    Orange (Other)  Originally Entered as [HIVES] reaction to [sulfur] (Unknown)  penicillin (Unknown)  sulfADIAZINE (Unknown)    Intolerances      Standing Inpatient Medications  acetaminophen   Tablet .. 650 milliGRAM(s) Oral once  aspirin  chewable 81 milliGRAM(s) Oral daily  atorvastatin 20 milliGRAM(s) Oral at bedtime  chlorhexidine 0.12% Liquid 15 milliLiter(s) Oral Mucosa every 12 hours  chlorhexidine 4% Liquid 1 Application(s) Topical <User Schedule>  dexMEDEtomidine Infusion 0.2 MICROgram(s)/kG/Hr IV Continuous <Continuous>  dextrose 40% Gel 15 Gram(s) Oral once  dextrose 5%. 1000 milliLiter(s) IV Continuous <Continuous>  dextrose 5%. 1000 milliLiter(s) IV Continuous <Continuous>  dextrose 50% Injectable 25 Gram(s) IV Push once  dextrose 50% Injectable 12.5 Gram(s) IV Push once  dextrose 50% Injectable 25 Gram(s) IV Push once  epoetin raven-epbx (RETACRIT) Injectable 8000 Unit(s) IV Push <User Schedule>  fentaNYL   Infusion 0.5 MICROgram(s)/kG/Hr IV Continuous <Continuous>  glucagon  Injectable 1 milliGRAM(s) IntraMuscular once  heparin  Infusion 1800 Unit(s)/Hr IV Continuous <Continuous>  insulin regular Infusion 1 Unit(s)/Hr IV Continuous <Continuous>  levothyroxine 100 MICROGram(s) Oral daily  meropenem  IVPB 500 milliGRAM(s) IV Intermittent every 24 hours  meropenem  IVPB      metoprolol tartrate 12.5 milliGRAM(s) Oral two times a day  norepinephrine Infusion 0.05 MICROgram(s)/kG/Min IV Continuous <Continuous>  pantoprazole   Suspension 40 milliGRAM(s) Oral before breakfast  senna 2 Tablet(s) Oral at bedtime  vancomycin    Solution 125 milliGRAM(s) Oral every 6 hours    PRN Inpatient Medications  acetaminophen   Tablet .. 650 milliGRAM(s) Oral every 6 hours PRN      VITALS/PHYSICAL EXAM  --------------------------------------------------------------------------------  T(C): 37.3 (01-14-21 @ 04:00), Max: 37.3 (01-13-21 @ 16:00)  HR: 102 (01-14-21 @ 10:00) (78 - 125)  BP: 132/66 (01-14-21 @ 12:05) (132/66 - 132/66)  RR: 34 (01-14-21 @ 10:00) (20 - 34)  SpO2: 100% (01-14-21 @ 10:00) (97% - 100%)  Wt(kg): --        01-13-21 @ 07:01  -  01-14-21 @ 07:00  --------------------------------------------------------  IN: 3535.8 mL / OUT: 1200 mL / NET: 2335.8 mL    01-14-21 @ 07:01  -  01-14-21 @ 13:48  --------------------------------------------------------  IN: 375 mL / OUT: 2500 mL / NET: -2125 mL      Physical Exam:  	Gen: Intubated  	Pulm: CTA B/L  	CV: RRR, S1S2  	Abd: +BS, soft, nontender/nondistended  	: No suprapubic tenderness  	LE: Warm, edema  	Vascular access: AVF    LABS/STUDIES  --------------------------------------------------------------------------------              8.6    28.02 >-----------<  295      [01-14-21 @ 04:06]              26.8     141  |  103  |  106  ----------------------------<  93      [01-14-21 @ 04:06]  5.3   |  23  |  6.8        Ca     7.4     [01-14-21 @ 04:06]      Mg     2.5     [01-14-21 @ 04:06]      Phos  4.9     [01-14-21 @ 04:06]    TPro  5.8  /  Alb  2.8  /  TBili  0.8  /  DBili  x   /  AST  39  /  ALT  31  /  AlkPhos  90  [01-14-21 @ 04:06]    PTT: 47.9       [01-14-21 @ 04:06]    Creatinine Trend:  SCr 6.8 [01-14 @ 04:06]  SCr 6.4 [01-13 @ 05:30]  SCr 5.5 [01-12 @ 17:05]  SCr 7.5 [01-12 @ 05:23]  SCr 7.3 [01-11 @ 09:26]    Urinalysis - [01-06-21 @ 18:43]      Color Yellow / Appearance Slightly Turbid / SG 1.018 / pH 6.5      Gluc 100 mg/dL / Ketone Negative  / Bili Negative / Urobili <2 mg/dL       Blood Moderate / Protein 300 mg/dL / Leuk Est Small / Nitrite Negative       /  / Hyaline 114 / Gran  / Sq Epi  / Non Sq Epi 1 / Bacteria Negative    Ferritin 2812      [01-13-21 @ 10:50]  HbA1c 7.1      [05-21-19 @ 04:30]  TSH 4.57      [01-01-21 @ 17:56]

## 2021-01-14 NOTE — PROGRESS NOTE ADULT - ASSESSMENT
#Covid-19 PNA with a suspected  bacterial PNA:   - The patient was intubated for increased alteration, hypoxemia and non-compliance with BiPAP  - Azithromycin discontinued on 1/8/2021   - Cefepime discontinued on 1/11/2021  - Dexamethasone 6mg IV once daily (1/1/2021), stopped at after 10 days.   - COVID-19 antibodies received on 1/4/2021 by [place] [facility] ( called lab 9400 to follow up on it)  - Covid antibodies: reported negative --> s/p 2 units of Convalescent plasma ( 1/8/2021 and 1/12/2021)  - Repeat procalcitonin: 4.7 (1/10), 6.13 (on 1/8) , 4.89 (on 1/7), 4.11 (on 1/4)  - s/p tocilizumab 400mg IV once on 1/8/2021  - Ferritin 1/10/2021 ( 48 hours after tocilizumab): 4895 ( increased compared to ferritin on 1/1/2021)  - SBT 1/12/2021 failed ( was on a CPAP for 30 minutes, after which he started becoming tachycardic and hypertensive )      #Leukocytosis:   #Suspected C.diff infection  - WBC still increasing ( 1/11: 25,000   1/10: 20,010   on 1/9: 11,310   - Latest procalcitonin  on 1/8: 6.31 ( Procalcitonin on 1/7: 4.89)  - Multifactorial ( Possible overlying infection, steroids)   - Diarrhea present   - Will send C.diff studies --> C.diff negative  - Will DC po vancomycin  - DC cefepime  - Started Meropenem 0.5g IV once daily ( renally adjusted dose)  - Vancomycin trough level 1/13/2021: 27.4    - Will give vancomycin 1.5g IV once after HD on 1/12/2021  - Follow up Fungitel ( Still pending)    #Altered Mental Status:   - EEG diffuse slowing, but no seizure activity, check the report above  - CT brain shows ventriculomegaly (check full report), no IC bleed, possible old infarct.  - neurology consulted and recs:   a. CT brain negative x2 for stroke or other structural pathology.  EEG negative for any epileptiform activity.  Suspect multifactorial metabolic encephalopathy in setting of COVID and ESRD and sedatives.  Wean sedation as able, can reassess if patient does not awaken after that time.  b. No further EEG or other tests   c. To be reassessed after sedation is weaned    #Fluid overload  #ESRD:   - HD as per nephrology   - EPO (retacrit) 8000 units IV push once weekly  - DC sevelamer as per nephro   - Will DC Sodium bicarbonate as per nephro    #Atrial fibrillation:  - Paroxysmal ? Currently NSRT, well rate controlled.   - Heparin gtt, follow up aPTT q6hrs until therapeutic   - Metoprolol tartrate 12.5mg po q12hrs    - Will DC levophed  - CT brain no IC bleed or concerns of IC bleed ( Heparin drip started)  - aPTT being followed, all within therapeutic range    #NSTEMI:   - Type II MI, demand ischemia, likely secondary to hypoxia secondary to COVID-19 pneumonia  - Aspirin 81mg po once daily   - CT brain no IC bleed or concerns of IC bleed   - On heparin gtt ( for atrial fibrillation now)  - Repeat CK-MB and CPK were stable    #Hypothyroidism:  - Continue with levothyroxine 100mcg po once daily     #Hyponatremia:   - Resolved  - Likely hypervolemic hyponatremia secondary to fluid overload   - Continue with Lasix and dialysis sessions     - Spoke with the patient's sister Nedra Shea on [phone] ( on 1/4/2021), speaking with her for updates daily.   The sister said that she is the health care proxy and will get the form from the house  Patient is full code now   - Concerns about Why the patient did not received, hydroxychloroquine and remdesivir ( explained that he is ESRD and that with eGFR <30 the medication is contraindicated.)   - Explained that he has ESRD ( eGFR <30), and therefore remdesivir is contraindicated  - Explained to her that hydroxychloroquine is not an option for treatment at the moment as studies have showed that there is no benefit to mortality.  #Covid-19 PNA with a suspected  bacterial PNA:   - The patient was intubated for increased alteration, hypoxemia and non-compliance with BiPAP  - Azithromycin discontinued on 1/8/2021   - Cefepime discontinued on 1/11/2021  - Dexamethasone 6mg IV once daily (1/1/2021), stopped at after 10 days.   - COVID-19 antibodies received on 1/4/2021 by [facility] ( called lab 9400 to follow up on it)  - Covid antibodies: reported negative --> s/p 2 units of Convalescent plasma ( 1/8/2021 and 1/12/2021)  - Repeat procalcitonin: 4.7 (1/10), 6.13 (on 1/8) , 4.89 (on 1/7), 4.11 (on 1/4)  - s/p tocilizumab 400mg IV once on 1/8/2021  - Ferritin 1/10/2021 ( 48 hours after tocilizumab): 4895 ( increased compared to ferritin on 1/1/2021)  - SBT 1/12/2021 failed ( was on a CPAP for 30 minutes, after which he started becoming tachycardic and hypertensive )    #Leukocytosis:   #Suspected C.diff infection  - WBC still increasing ( 1/11: 25,000   1/10: 20,010   on 1/9: 11,310   - Latest procalcitonin  on 1/8: 6.31 ( Procalcitonin on 1/7: 4.89)  - Multifactorial ( Possible overlying infection, steroids)   - Diarrhea present   - Will send C.diff studies --> C.diff negative  - Will DC po vancomycin  - DC cefepime  - Started Meropenem 0.5g IV once daily ( renally adjusted dose)  - Vancomycin trough level 1/13/2021: 27.4    - Will dose vancomycin after HD  - Fungitell ( sent on 1/10/2021, resulted on 1/14/2021): 38 ( negative)  - Will remove right IJ line and reinsert another one  - Will start Vancomycin 125mg po q6hrs     #Altered Mental Status:   - EEG diffuse slowing, but no seizure activity, check the report above  - CT brain shows ventriculomegaly (check full report), no IC bleed, possible old infarct.  - neurology consulted and recs:   a. CT brain negative x2 for stroke or other structural pathology.  EEG negative for any epileptiform activity.  Suspect multifactorial metabolic encephalopathy in setting of COVID and ESRD and sedatives.  Wean sedation as able, can reassess if patient does not awaken after that time.  b. No further EEG or other tests   c. To be reassessed after sedation is weaned    #Fluid overload  #ESRD:   - HD as per nephrology   - EPO (retacrit) 8000 units IV push once weekly  - DC sevelamer as per nephro   - Will DC Sodium bicarbonate as per nephro    #Atrial fibrillation:  - Paroxysmal ? Currently NSRT, well rate controlled.   - Heparin gtt, follow up aPTT q6hrs until therapeutic   - Metoprolol tartrate 12.5mg po q12hrs    - Will DC levophed  - CT brain no IC bleed or concerns of IC bleed ( Heparin drip started)  - aPTT being followed, all within therapeutic range    #NSTEMI:   - Type II MI, demand ischemia, likely secondary to hypoxia secondary to COVID-19 pneumonia  - Aspirin 81mg po once daily   - CT brain no IC bleed or concerns of IC bleed   - On heparin gtt ( for atrial fibrillation now)  - Repeat CK-MB and CPK were stable    #Hypothyroidism:  - Continue with levothyroxine 100mcg po once daily     #Hyponatremia:   - Resolved  - Likely hypervolemic hyponatremia secondary to fluid overload   - Continue with Lasix and dialysis sessions     - Spoke with the patient's sister Nedra Shea on [phone] ( on 1/4/2021), speaking with her for updates daily.   The sister said that she is the health care proxy and will get the form from the house  Patient is full code now   - Concerns about Why the patient did not received, hydroxychloroquine and remdesivir ( explained that he is ESRD and that with eGFR <30 the medication is contraindicated.)   - Explained that he has ESRD ( eGFR <30), and therefore remdesivir is contraindicated  - Explained to her that hydroxychloroquine is not an option for treatment at the moment as studies have showed that there is no benefit to mortality.  #Covid-19 PNA with a suspected  bacterial PNA:   - The patient was intubated for increased alteration, hypoxemia and non-compliance with BiPAP  - Azithromycin discontinued on 1/8/2021   - Cefepime discontinued on 1/11/2021  - Dexamethasone 6mg IV once daily (1/1/2021), stopped at after 10 days.   - COVID-19 antibodies received on 1/4/2021 by [facility] ( called lab 9400 to follow up on it)  - Covid antibodies: reported negative --> s/p 2 units of Convalescent plasma ( 1/8/2021 and 1/12/2021)  - Repeat procalcitonin: 4.7 (1/10), 6.13 (on 1/8) , 4.89 (on 1/7), 4.11 (on 1/4)  - s/p tocilizumab 400mg IV once on 1/8/2021  - Ferritin 1/10/2021 ( 48 hours after tocilizumab): 4895 ( increased compared to ferritin on 1/1/2021)  - SBT 1/12/2021 failed ( was on a CPAP for 30 minutes, after which he started becoming tachycardic and hypertensive )    #Leukocytosis:   #Suspected C.diff infection  - WBC still increasing ( 1/11: 25,000   1/10: 20,010   on 1/9: 11,310   - Latest procalcitonin  on 1/8: 6.31 ( Procalcitonin on 1/7: 4.89)  - Multifactorial ( Possible overlying infection, steroids)   - Diarrhea present   - Will send C.diff studies --> C.diff negative  - Will DC po vancomycin  - DC cefepime  - Started Meropenem 0.5g IV once daily ( renally adjusted dose)  - Vancomycin trough level 1/14/2021: 25.7, will redose Vancomycin on 1/14/2021 post HD accordingly  - Will dose vancomycin after HD  - Fungitell ( sent on 1/10/2021, resulted on 1/14/2021): 38 ( negative)  - Will remove right IJ line and reinsert another one  - Will start Vancomycin 125mg po q6hrs     #Altered Mental Status:   - EEG diffuse slowing, but no seizure activity, check the report above  - CT brain shows ventriculomegaly (check full report), no IC bleed, possible old infarct.  - neurology consulted and recs:   a. CT brain negative x2 for stroke or other structural pathology.  EEG negative for any epileptiform activity.  Suspect multifactorial metabolic encephalopathy in setting of COVID and ESRD and sedatives.  Wean sedation as able, can reassess if patient does not awaken after that time.  b. No further EEG or other tests   c. To be reassessed after sedation is weaned    #Fluid overload  #ESRD:   - HD as per nephrology   - EPO (retacrit) 8000 units IV push once weekly  - DC sevelamer as per nephro   - Will DC Sodium bicarbonate as per nephro    #Atrial fibrillation:  - Paroxysmal ? Currently NSRT, well rate controlled.   - Heparin gtt, follow up aPTT q6hrs until therapeutic   - Metoprolol tartrate 12.5mg po q12hrs    - Will DC levophed  - CT brain no IC bleed or concerns of IC bleed ( Heparin drip started)  - aPTT being followed, all within therapeutic range    #NSTEMI:   - Type II MI, demand ischemia, likely secondary to hypoxia secondary to COVID-19 pneumonia  - Aspirin 81mg po once daily   - CT brain no IC bleed or concerns of IC bleed   - On heparin gtt ( for atrial fibrillation now)  - Repeat CK-MB and CPK were stable    #Hypothyroidism:  - Continue with levothyroxine 100mcg po once daily     #Hyponatremia:   - Resolved  - Likely hypervolemic hyponatremia secondary to fluid overload   - Continue with Lasix and dialysis sessions     - Spoke with the patient's sister Nedra Shea on [phone] ( on 1/4/2021), speaking with her for updates daily.   The sister said that she is the health care proxy and will get the form from the house  Patient is full code now   - Concerns about Why the patient did not received, hydroxychloroquine and remdesivir ( explained that he is ESRD and that with eGFR <30 the medication is contraindicated.)   - Explained that he has ESRD ( eGFR <30), and therefore remdesivir is contraindicated  - Explained to her that hydroxychloroquine is not an option for treatment at the moment as studies have showed that there is no benefit to mortality.  #Covid-19 PNA with a suspected  bacterial PNA:   - The patient was intubated for increased alteration, hypoxemia and non-compliance with BiPAP  - Azithromycin discontinued on 1/8/2021   - Cefepime discontinued on 1/11/2021  - Dexamethasone 6mg IV once daily (1/1/2021), stopped at after 10 days.   - COVID-19 antibodies received on 1/4/2021 by [place] [facility] ( called lab 9400 to follow up on it)  - Covid antibodies: reported negative --> s/p 2 units of Convalescent plasma ( 1/8/2021 and 1/12/2021)  - Repeat procalcitonin: 4.7 (1/10), 6.13 (on 1/8) , 4.89 (on 1/7), 4.11 (on 1/4)  - s/p tocilizumab 400mg IV once on 1/8/2021  - Ferritin 1/10/2021 ( 48 hours after tocilizumab): 4895 ( increased compared to ferritin on 1/1/2021)  - SBT 1/12/2021 failed ( was on a CPAP for 30 minutes, after which he started becoming tachycardic and hypertensive)   - Another Trial of SBT on 1/14/2021 post HD, if it fails will call surgery to schedule Tracheostomy and PEG tube insertion    #Leukocytosis:   #Suspected C.diff infection  - WBC still increasing ( 1/11: 25,000   1/10: 20,010   on 1/9: 11,310   - Latest procalcitonin  on 1/8: 6.31 ( Procalcitonin on 1/7: 4.89)  - Multifactorial ( Possible overlying infection, steroids)   - Diarrhea present   - Will send C.diff studies --> C.diff negative  - Will DC po vancomycin  - DC cefepime  - Started Meropenem 0.5g IV once daily ( renally adjusted dose)  - Vancomycin trough level 1/14/2021: 25.7, will redose Vancomycin on 1/14/2021 post HD accordingly  - Will dose vancomycin after HD  - Fungitell ( sent on 1/10/2021, resulted on 1/14/2021): 38 ( negative)  - Will remove right IJ line and reinsert another one  - Will start Vancomycin 125mg po q6hrs     #Altered Mental Status:   - EEG diffuse slowing, but no seizure activity, check the report above  - CT brain shows ventriculomegaly (check full report), no IC bleed, possible old infarct.  - neurology consulted and recs:   a. CT brain negative x2 for stroke or other structural pathology.  EEG negative for any epileptiform activity.  Suspect multifactorial metabolic encephalopathy in setting of COVID and ESRD and sedatives.  Wean sedation as able, can reassess if patient does not awaken after that time.  b. No further EEG or other tests   c. To be reassessed after sedation is weaned    #Fluid overload  #ESRD:   - HD as per nephrology   - EPO (retacrit) 8000 units IV push once weekly  - DC sevelamer as per nephro   - Will DC Sodium bicarbonate as per nephro    #Atrial fibrillation:  - Paroxysmal ? Currently NSRT, well rate controlled.   - Heparin gtt, follow up aPTT q6hrs until therapeutic   - Metoprolol tartrate 12.5mg po q12hrs    - Will DC levophed  - CT brain no IC bleed or concerns of IC bleed ( Heparin drip started)  - aPTT being followed, all within therapeutic range    #NSTEMI:   - Type II MI, demand ischemia, likely secondary to hypoxia secondary to COVID-19 pneumonia  - Aspirin 81mg po once daily   - CT brain no IC bleed or concerns of IC bleed   - On heparin gtt ( for atrial fibrillation now)  - Repeat CK-MB and CPK were stable    #Hypothyroidism:  - Continue with levothyroxine 100mcg po once daily     #Hyponatremia:   - Resolved  - Likely hypervolemic hyponatremia secondary to fluid overload   - Continue with Lasix and dialysis sessions     - Spoke with the patient's sister Nedra Shea on [phone] ( on 1/4/2021), speaking with her for updates daily.   The sister said that she is the health care proxy and will get the form from the house  Patient is full code now   - Concerns about Why the patient did not received, hydroxychloroquine and remdesivir ( explained that he is ESRD and that with eGFR <30 the medication is contraindicated.)   - Explained that he has ESRD ( eGFR <30), and therefore remdesivir is contraindicated  - Explained to her that hydroxychloroquine is not an option for treatment at the moment as studies have showed that there is no benefit to mortality.  #Covid-19 PNA with a suspected  bacterial PNA:   - The patient was intubated for increased alteration, hypoxemia and non-compliance with BiPAP  - Azithromycin discontinued on 1/8/2021   - Cefepime discontinued on 1/11/2021  - Dexamethasone 6mg IV once daily (1/1/2021), stopped at after 10 days.   - COVID-19 antibodies received on 1/4/2021 by [facility] ( called lab 9400 to follow up on it)  - Covid antibodies: reported negative --> s/p 2 units of Convalescent plasma ( 1/8/2021 and 1/12/2021)  - Repeat procalcitonin: 4.7 (1/10), 6.13 (on 1/8) , 4.89 (on 1/7), 4.11 (on 1/4)  - s/p tocilizumab 400mg IV once on 1/8/2021  - Ferritin 1/10/2021 ( 48 hours after tocilizumab): 4895 ( increased compared to ferritin on 1/1/2021)  - SBT 1/12/2021 failed ( was on a CPAP for 30 minutes, after which he started becoming tachycardic and hypertensive)   - SBT 1/14/2021 failed, the patient was agitated, bit the ET tube --> Leakage -- > Reintubation/change of ET tube, will therefore consult surgery for tracheostomy and PEG tube    #Leukocytosis:   #Suspected C.diff infection  - WBC still increasing ( 1/11: 25,000   1/10: 20,010   on 1/9: 11,310   - Latest procalcitonin  on 1/8: 6.31 ( Procalcitonin on 1/7: 4.89)  - Multifactorial ( Possible overlying infection, steroids)   - Diarrhea present   - Will send C.diff studies --> C.diff negative  - Will DC po vancomycin  - DC cefepime  - Started Meropenem 0.5g IV once daily ( renally adjusted dose)  - Vancomycin trough level 1/14/2021: 25.7, will redose Vancomycin on 1/14/2021 post HD accordingly  - Will dose vancomycin after HD  - Fungitell ( sent on 1/10/2021, resulted on 1/14/2021): 38 ( negative)  - Will remove right IJ line and reinsert another one  - Will start Vancomycin 125mg po q6hrs     #Altered Mental Status:   - EEG diffuse slowing, but no seizure activity, check the report above  - CT brain shows ventriculomegaly (check full report), no IC bleed, possible old infarct.  - neurology consulted and recs:   a. CT brain negative x2 for stroke or other structural pathology.  EEG negative for any epileptiform activity.  Suspect multifactorial metabolic encephalopathy in setting of COVID and ESRD and sedatives.  Wean sedation as able, can reassess if patient does not awaken after that time.  b. No further EEG or other tests   c. To be reassessed after sedation is weaned    #Fluid overload  #ESRD:   - HD as per nephrology   - EPO (retacrit) 8000 units IV push once weekly  - DC sevelamer as per nephro   - Will DC Sodium bicarbonate as per nephro    #Atrial fibrillation:  - Paroxysmal ? Currently NSRT, well rate controlled.   - Heparin gtt, follow up aPTT q6hrs until therapeutic   - Metoprolol tartrate 12.5mg po q12hrs    - Will DC levophed  - CT brain no IC bleed or concerns of IC bleed ( Heparin drip started)  - aPTT being followed, all within therapeutic range    #NSTEMI:   - Type II MI, demand ischemia, likely secondary to hypoxia secondary to COVID-19 pneumonia  - Aspirin 81mg po once daily   - CT brain no IC bleed or concerns of IC bleed   - On heparin gtt ( for atrial fibrillation now)  - Repeat CK-MB and CPK were stable    #Hypothyroidism:  - Continue with levothyroxine 100mcg po once daily     #Hyponatremia:   - Resolved  - Likely hypervolemic hyponatremia secondary to fluid overload   - Continue with Lasix and dialysis sessions     - Spoke with the patient's sister Nedra Seha on [phone] ( on 1/4/2021), speaking with her for updates daily.   The sister said that she is the health care proxy and will get the form from the house  Patient is full code now   - Concerns about Why the patient did not received, hydroxychloroquine and remdesivir ( explained that he is ESRD and that with eGFR <30 the medication is contraindicated.)   - Explained that he has ESRD ( eGFR <30), and therefore remdesivir is contraindicated  - Explained to her that hydroxychloroquine is not an option for treatment at the moment as studies have showed that there is no benefit to mortality.  #Covid-19 PNA with a suspected  bacterial PNA:   - The patient was intubated for increased alteration, hypoxemia and non-compliance with BiPAP  - Azithromycin discontinued on 1/8/2021   - Cefepime discontinued on 1/11/2021  - Dexamethasone 6mg IV once daily (1/1/2021), stopped at after 10 days.   - COVID-19 antibodies received on 1/4/2021 by [facility] ( called lab 9400 to follow up on it)  - Covid antibodies: reported negative --> s/p 2 units of Convalescent plasma ( 1/8/2021 and 1/12/2021)  - Repeat procalcitonin: 4.7 (1/10), 6.13 (on 1/8) , 4.89 (on 1/7), 4.11 (on 1/4)  - s/p tocilizumab 400mg IV once on 1/8/2021  - Ferritin 1/10/2021 ( 48 hours after tocilizumab): 4895 ( increased compared to ferritin on 1/1/2021)  - SBT 1/12/2021 failed ( was on a CPAP for 30 minutes, after which he started becoming tachycardic and hypertensive)   - SBT 1/14/2021 failed, the patient was agitated, bit the ET tube --> Leakage -- > Reintubation/change of ET tube, will therefore consult surgery for tracheostomy and PEG tube  - Sister spoken to Nedra Phelan: [phone] about the tracheostomy and PEG tube, Sister is agreeable.     #Leukocytosis:   #Suspected C.diff infection  - WBC still increasing ( 1/11: 25,000   1/10: 20,010   on 1/9: 11,310   - Latest procalcitonin  on 1/8: 6.31 ( Procalcitonin on 1/7: 4.89)  - Multifactorial ( Possible overlying infection, steroids)   - Diarrhea present   - Will send C.diff studies --> C.diff negative  - Will DC po vancomycin  - DC cefepime  - Started Meropenem 0.5g IV once daily ( renally adjusted dose)  - Vancomycin trough level 1/14/2021: 25.7, will redose Vancomycin on 1/14/2021 post HD accordingly  - Will dose vancomycin after HD  - Fungitell ( sent on 1/10/2021, resulted on 1/14/2021): 38 ( negative)  - Will remove right IJ line and reinsert another one  - Will start Vancomycin 125mg po q6hrs     #Altered Mental Status:   - EEG diffuse slowing, but no seizure activity, check the report above  - CT brain shows ventriculomegaly (check full report), no IC bleed, possible old infarct.  - neurology consulted and recs:   a. CT brain negative x2 for stroke or other structural pathology.  EEG negative for any epileptiform activity.  Suspect multifactorial metabolic encephalopathy in setting of COVID and ESRD and sedatives.  Wean sedation as able, can reassess if patient does not awaken after that time.  b. No further EEG or other tests   c. To be reassessed after sedation is weaned    #Fluid overload  #ESRD:   - HD as per nephrology   - EPO (retacrit) 8000 units IV push once weekly  - DC sevelamer as per nephro   - Will DC Sodium bicarbonate as per nephro    #Atrial fibrillation:  - Paroxysmal ? Currently NSRT, well rate controlled.   - Heparin gtt, follow up aPTT q6hrs until therapeutic   - Metoprolol tartrate 12.5mg po q12hrs    - Will DC levophed  - CT brain no IC bleed or concerns of IC bleed ( Heparin drip started)  - aPTT being followed, all within therapeutic range    #NSTEMI:   - Type II MI, demand ischemia, likely secondary to hypoxia secondary to COVID-19 pneumonia  - Aspirin 81mg po once daily   - CT brain no IC bleed or concerns of IC bleed   - On heparin gtt ( for atrial fibrillation now)  - Repeat CK-MB and CPK were stable    #Hypothyroidism:  - Continue with levothyroxine 100mcg po once daily     #Hyponatremia:   - Resolved  - Likely hypervolemic hyponatremia secondary to fluid overload   - Continue with Lasix and dialysis sessions     - Spoke with the patient's sister Nedra Shea on [phone] ( on 1/4/2021), speaking with her for updates daily.   The sister said that she is the health care proxy and will get the form from the house  Patient is full code now   - Concerns about Why the patient did not received, hydroxychloroquine and remdesivir ( explained that he is ESRD and that with eGFR <30 the medication is contraindicated.)   - Explained that he has ESRD ( eGFR <30), and therefore remdesivir is contraindicated  - Explained to her that hydroxychloroquine is not an option for treatment at the moment as studies have showed that there is no benefit to mortality.  #Covid-19 PNA with a suspected  bacterial PNA:   - The patient was intubated for increased alteration, hypoxemia and non-compliance with BiPAP  - Azithromycin discontinued on 1/8/2021   - Cefepime discontinued on 1/11/2021  - Dexamethasone 6mg IV once daily (1/1/2021), stopped at after 10 days.   - COVID-19 antibodies received on 1/4/2021 by [facility] ( called lab 9400 to follow up on it)  - Covid antibodies: reported negative --> s/p 2 units of Convalescent plasma ( 1/8/2021 and 1/12/2021)  - Repeat procalcitonin: 4.7 (1/10), 6.13 (on 1/8) , 4.89 (on 1/7), 4.11 (on 1/4)  - s/p tocilizumab 400mg IV once on 1/8/2021  - Ferritin 1/10/2021 ( 48 hours after tocilizumab): 4895 ( increased compared to ferritin on 1/1/2021)  - SBT 1/12/2021 failed ( was on a CPAP for 30 minutes, after which he started becoming tachycardic and hypertensive)   - SBT 1/14/2021 failed, the patient was agitated, bit the ET tube --> Leakage -- > Reintubation/change of ET tube, will therefore consult surgery for tracheostomy and PEG tube  - Sister spoken to Nedra Zhane: [phone] about the tracheostomy and PEG tube, Sister is agreeable.     #Leukocytosis:   #Suspected C.diff infection  - WBC still increasing ( 1/11: 25,000   1/10: 20,010   on 1/9: 11,310   - Latest procalcitonin  on 1/8: 6.31 ( Procalcitonin on 1/7: 4.89)  - Multifactorial ( Possible overlying infection, steroids)   - Diarrhea present   - Will send C.diff studies --> C.diff negative  - Will DC po vancomycin  - DC cefepime  - Started Meropenem 0.5g IV once daily ( renally adjusted dose)  - Vancomycin trough level 1/14/2021: 25.7, Next vancomycin dose should be given on Saturday 1/16/2021 750mg IV once post-HD  - Will dose vancomycin after HD  - Fungitell ( sent on 1/10/2021, resulted on 1/14/2021): 38 ( negative)  - Will remove right IJ line and reinsert another one  - Will start Vancomycin 125mg po q6hrs     #Altered Mental Status:   - EEG diffuse slowing, but no seizure activity, check the report above  - CT brain shows ventriculomegaly (check full report), no IC bleed, possible old infarct.  - neurology consulted and recs:   a. CT brain negative x2 for stroke or other structural pathology.  EEG negative for any epileptiform activity.  Suspect multifactorial metabolic encephalopathy in setting of COVID and ESRD and sedatives.  Wean sedation as able, can reassess if patient does not awaken after that time.  b. No further EEG or other tests   c. To be reassessed after sedation is weaned    #Fluid overload  #ESRD:   - HD as per nephrology   - EPO (retacrit) 8000 units IV push once weekly  - DC sevelamer as per nephro   - Will DC Sodium bicarbonate as per nephro    #Atrial fibrillation:  - Paroxysmal ? Currently NSRT, well rate controlled.   - Heparin gtt, follow up aPTT q6hrs until therapeutic   - Metoprolol tartrate 12.5mg po q12hrs    - Will DC levophed  - CT brain no IC bleed or concerns of IC bleed ( Heparin drip started)  - aPTT being followed, all within therapeutic range    #NSTEMI:   - Type II MI, demand ischemia, likely secondary to hypoxia secondary to COVID-19 pneumonia  - Aspirin 81mg po once daily   - CT brain no IC bleed or concerns of IC bleed   - On heparin gtt ( for atrial fibrillation now)  - Repeat CK-MB and CPK were stable    #Hypothyroidism:  - Continue with levothyroxine 100mcg po once daily     #Hyponatremia:   - Resolved  - Likely hypervolemic hyponatremia secondary to fluid overload   - Continue with Lasix and dialysis sessions     - Spoke with the patient's sister Nedra Shea on [phone] ( on 1/4/2021), speaking with her for updates daily.   The sister said that she is the health care proxy and will get the form from the house  Patient is full code now   - Concerns about Why the patient did not received, hydroxychloroquine and remdesivir ( explained that he is ESRD and that with eGFR <30 the medication is contraindicated.)   - Explained that he has ESRD ( eGFR <30), and therefore remdesivir is contraindicated  - Explained to her that hydroxychloroquine is not an option for treatment at the moment as studies have showed that there is no benefit to mortality.  #Covid-19 PNA with a suspected  bacterial PNA:   - The patient was intubated for increased alteration, hypoxemia and non-compliance with BiPAP  - Azithromycin discontinued on 1/8/2021   - Cefepime discontinued on 1/11/2021  - Dexamethasone 6mg IV once daily (1/1/2021), stopped at after 10 days.   - COVID-19 antibodies received on 1/4/2021 by [facility] ( called lab [id number] to follow up on it)  - Covid antibodies: reported negative --> s/p 2 units of Convalescent plasma ( 1/8/2021 and 1/12/2021)  - Repeat procalcitonin: 4.7 (1/10), 6.13 (on 1/8) , 4.89 (on 1/7), 4.11 (on 1/4)  - s/p tocilizumab 400mg IV once on 1/8/2021  - Ferritin 1/10/2021 ( 48 hours after tocilizumab): 4895 ( increased compared to ferritin on 1/1/2021)  - SBT 1/12/2021 failed ( was on a CPAP for 30 minutes, after which he started becoming tachycardic and hypertensive)   - SBT 1/14/2021 failed, the patient was agitated, bit the ET tube --> Leakage -- > Reintubation/change of ET tube, will therefore consult surgery for tracheostomy and PEG tube  - Sister spoken to Endra Zhane: [phone] about the tracheostomy and PEG tube, Sister is agreeable.     #Leukocytosis:   #Suspected C.diff infection  - WBC still increasing ( 1/11: 25,000   1/10: 20,010   on 1/9: 11,310   - Latest procalcitonin  on 1/8: 6.31 ( Procalcitonin on 1/7: 4.89)  - Multifactorial ( Possible overlying infection, steroids)   - Diarrhea present   - Will send C.diff studies --> C.diff negative  - Will DC po vancomycin  - DC cefepime  - Started Meropenem 0.5g IV once daily ( renally adjusted dose)  - Vancomycin trough level 1/14/2021: 25.7, Next vancomycin dose should be given on Saturday 1/16/2021 750mg IV once post-HD  - Will dose vancomycin after HD  - Fungitell ( sent on 1/10/2021, resulted on 1/14/2021): 38 ( negative)  - Will remove right IJ line and reinsert another one  - Will start Vancomycin 125mg po q6hrs     #Altered Mental Status:   - EEG diffuse slowing, but no seizure activity, check the report above  - CT brain shows ventriculomegaly (check full report), no IC bleed, possible old infarct.  - neurology consulted and recs:   a. CT brain negative x2 for stroke or other structural pathology.  EEG negative for any epileptiform activity.  Suspect multifactorial metabolic encephalopathy in setting of COVID and ESRD and sedatives.  Wean sedation as able, can reassess if patient does not awaken after that time.  b. No further EEG or other tests   c. To be reassessed after sedation is weaned    #Fluid overload  #ESRD:   - HD as per nephrology   - EPO (retacrit) 8000 units IV push once weekly  - DC sevelamer as per nephro   - Will DC Sodium bicarbonate as per nephro    #Atrial fibrillation:  - Paroxysmal ? Currently NSRT, well rate controlled.   - Heparin gtt, follow up aPTT q6hrs until therapeutic   - Metoprolol tartrate 12.5mg po q12hrs    - Will DC levophed  - CT brain no IC bleed or concerns of IC bleed ( Heparin drip started)  - aPTT being followed, all within therapeutic range    #NSTEMI:   - Type II MI, demand ischemia, likely secondary to hypoxia secondary to COVID-19 pneumonia  - Aspirin 81mg po once daily   - CT brain no IC bleed or concerns of IC bleed   - On heparin gtt ( for atrial fibrillation now)  - Repeat CK-MB and CPK were stable    #Hypothyroidism:  - Continue with levothyroxine 100mcg po once daily     #Hyponatremia:   - Resolved  - Likely hypervolemic hyponatremia secondary to fluid overload   - Continue with Lasix and dialysis sessions     - Spoke with the patient's sister Nedra Shea on [phone] ( on 1/4/2021), speaking with her for updates daily.   The sister said that she is the health care proxy and will get the form from the house  Patient is full code now   - Concerns about Why the patient did not received, hydroxychloroquine and remdesivir ( explained that he is ESRD and that with eGFR <30 the medication is contraindicated.)   - Explained that he has ESRD ( eGFR <30), and therefore remdesivir is contraindicated  - Explained to her that hydroxychloroquine is not an option for treatment at the moment as studies have showed that there is no benefit to mortality.

## 2021-01-15 NOTE — PROGRESS NOTE ADULT - ASSESSMENT
Assessment:  60y Male patient admitted covid PNA consulted for trach and peg  Patient seen and examined at bedside. NAD.     Plan:      -Plan for trach and peg monday 1/18  - Monitor vitals and pre op on sunday  -Need consent  - Monitor labs and replete as necessary        Date/Time: 01-15-21 @ 08:58

## 2021-01-15 NOTE — PROGRESS NOTE ADULT - SUBJECTIVE AND OBJECTIVE BOX
OVERNIGHT EVENTS: still intuabted, ventilated, precedex, fenta, hep, levophed, failed weaning    Vital Signs Last 24 Hrs  T(C): 37.4 (15 Kwame 2021 04:00), Max: 37.8 (14 Jan 2021 20:00)  T(F): 99.4 (15 Kwame 2021 04:00), Max: 100 (14 Jan 2021 20:00)  HR: 80 (15 Kwame 2021 07:56) (40 - 120)  BP: 132/66 (14 Jan 2021 12:05) (132/66 - 132/66)  RR: 33 (15 Kwame 2021 07:00) (27 - 34)  SpO2: 100% (15 Kwaem 2021 07:56) (72% - 100%)    PHYSICAL EXAMINATION:    GENERAL: ill looking    HEENT: Head is normocephalic and atraumatic.     NECK: Supple.    LUNGS: dec bs both bases    HEART: Regular rate and rhythm without murmur.    ABDOMEN: Soft, nontender, and nondistended.      EXTREMITIES: Without any cyanosis, clubbing, rash, lesions or edema.    NEUROLOGIC: Grossly intact.    SKIN: sacral ulcer      LABS:                        7.3    33.61 )-----------( 252      ( 15 Kwame 2021 04:30 )             22.9     01-15    140  |  99  |  84<HH>  ----------------------------<  75  4.6   |  24  |  5.7<HH>    Ca    7.7<L>      15 Kwame 2021 04:30  Phos  5.0     01-15  Mg     2.4     01-15    TPro  5.4<L>  /  Alb  2.7<L>  /  TBili  0.8  /  DBili  x   /  AST  43<H>  /  ALT  32  /  AlkPhos  86  01-15    PTT - ( 15 Kwame 2021 04:30 )  PTT:67.6 sec    ABG - ( 15 Kwame 2021 03:43 )  pH, Arterial: 7.26  pH, Blood: x     /  pCO2: 60    /  pO2: 74    / HCO3: 27    / Base Excess: -0.7  /  SaO2: 93          450/34/40/8  plateua 28          D-Dimer Assay, Quantitative: 1290 ng/mL DDU (01-15-21 @ 04:30)        Procalcitonin, Serum: 3.47 ng/mL (01-13-21 @ 18:55)        01-14-21 @ 07:01  -  01-15-21 @ 07:00  --------------------------------------------------------  IN: 2403 mL / OUT: 2550 mL / NET: -147 mL        MICROBIOLOGY:  Culture Results:   No growth to date. (01-11 @ 11:00)      MEDICATIONS  (STANDING):  acetaminophen   Tablet .. 650 milliGRAM(s) Oral once  aspirin  chewable 81 milliGRAM(s) Oral daily  atorvastatin 20 milliGRAM(s) Oral at bedtime  chlorhexidine 0.12% Liquid 15 milliLiter(s) Oral Mucosa every 12 hours  chlorhexidine 4% Liquid 1 Application(s) Topical <User Schedule>  dexMEDEtomidine Infusion 0.2 MICROgram(s)/kG/Hr (4.85 mL/Hr) IV Continuous <Continuous>  dextrose 40% Gel 15 Gram(s) Oral once  dextrose 5%. 1000 milliLiter(s) (50 mL/Hr) IV Continuous <Continuous>  dextrose 5%. 1000 milliLiter(s) (100 mL/Hr) IV Continuous <Continuous>  dextrose 50% Injectable 25 Gram(s) IV Push once  dextrose 50% Injectable 12.5 Gram(s) IV Push once  dextrose 50% Injectable 25 Gram(s) IV Push once  epoetin raven-epbx (RETACRIT) Injectable 8000 Unit(s) IV Push <User Schedule>  fentaNYL   Infusion 0.501 MICROgram(s)/kG/Hr (4.85 mL/Hr) IV Continuous <Continuous>  glucagon  Injectable 1 milliGRAM(s) IntraMuscular once  heparin  Infusion 1800 Unit(s)/Hr (18 mL/Hr) IV Continuous <Continuous>  insulin regular Infusion 1 Unit(s)/Hr (1 mL/Hr) IV Continuous <Continuous>  levothyroxine 100 MICROGram(s) Oral daily  meropenem  IVPB 500 milliGRAM(s) IV Intermittent every 24 hours  meropenem  IVPB      metoprolol tartrate 12.5 milliGRAM(s) Oral two times a day  norepinephrine Infusion 0.05 MICROgram(s)/kG/Min (9.08 mL/Hr) IV Continuous <Continuous>  pantoprazole   Suspension 40 milliGRAM(s) Oral before breakfast  senna 2 Tablet(s) Oral at bedtime  vancomycin    Solution 125 milliGRAM(s) Oral every 6 hours    MEDICATIONS  (PRN):  acetaminophen   Tablet .. 650 milliGRAM(s) Oral every 6 hours PRN Temp greater or equal to 38C (100.4F), Mild Pain (1 - 3)      RADIOLOGY & ADDITIONAL STUDIES:

## 2021-01-15 NOTE — PROGRESS NOTE ADULT - ASSESSMENT
ESRD (due to DM) on HD TTS  s/p Fall  altered mental status   Covid-19 PNA / bacterial PNA   fluid overload  hyponatremia  hyperkalemia  DM  HTN  afib on coumadin  CVA  NSTEMI    plan:    HD tomorrow: 3 hours, opti 160 dialyzer, 2K bath, 3L UF  left arm precautions  covid isolation  f/u cardio / f/u pulm  icu care

## 2021-01-15 NOTE — PROGRESS NOTE ADULT - SUBJECTIVE AND OBJECTIVE BOX
SUBJECTIVE:    Patient is a 60y old Male who presents with a chief complaint of s/p fall. (15 Kwame 2021 09:06). Currently admitted to medicine with the primary diagnosis of acute hypoxic respiratory failure secondary to SARS-CoV-2 Pneumonia.   Today is hospital day 14d.     PAST MEDICAL & SURGICAL HISTORY  PAD (peripheral artery disease)  multiple toe amputations    Anemia  chronic anemia - s/p transfusion 2018    Thyroid cancer    Chronic leg pain    OA (osteoarthritis)    SOB (shortness of breath) on exertion    ESRD (end stage renal disease)  2 yrs    Atrial fibrillation  on warfarin    Right sided weakness    Stroke  8 yrs ago    Hypertension    High blood cholesterol    Diabetes mellitus, type 2    Dialysis patient  Mon, Wednesday, Friday ([facility])    Smoker    H/O thyroid nodule    H/O gastroesophageal reflux (GERD)    History of tonsillectomy    History of surgery  Multiple toe amputations (amp 4 1/2 left toes; has 1/2 left mid toe; amp right mid toe)    A-V fistula  left AVF      SOCIAL HISTORY:  Negative for smoking/alcohol/drug use.     ALLERGIES:  Orange (Other)  Originally Entered as [HIVES] reaction to [sulfur] (Unknown)  penicillin (Unknown)  sulfADIAZINE (Unknown)    MEDICATIONS:  STANDING MEDICATIONS  acetaminophen   Tablet .. 650 milliGRAM(s) Oral once  aspirin  chewable 81 milliGRAM(s) Oral daily  atorvastatin 20 milliGRAM(s) Oral at bedtime  chlorhexidine 0.12% Liquid 15 milliLiter(s) Oral Mucosa every 12 hours  chlorhexidine 4% Liquid 1 Application(s) Topical <User Schedule>  dexMEDEtomidine Infusion 0.2 MICROgram(s)/kG/Hr IV Continuous <Continuous>  dextrose 40% Gel 15 Gram(s) Oral once  dextrose 5%. 1000 milliLiter(s) IV Continuous <Continuous>  dextrose 5%. 1000 milliLiter(s) IV Continuous <Continuous>  dextrose 50% Injectable 25 Gram(s) IV Push once  dextrose 50% Injectable 12.5 Gram(s) IV Push once  dextrose 50% Injectable 25 Gram(s) IV Push once  epoetin raven-epbx (RETACRIT) Injectable 8000 Unit(s) IV Push <User Schedule>  fentaNYL   Infusion 0.501 MICROgram(s)/kG/Hr IV Continuous <Continuous>  glucagon  Injectable 1 milliGRAM(s) IntraMuscular once  heparin  Infusion 1800 Unit(s)/Hr IV Continuous <Continuous>  insulin regular Infusion 1 Unit(s)/Hr IV Continuous <Continuous>  levothyroxine 100 MICROGram(s) Oral daily  meropenem  IVPB 500 milliGRAM(s) IV Intermittent every 24 hours  meropenem  IVPB      metoprolol tartrate 12.5 milliGRAM(s) Oral two times a day  norepinephrine Infusion 0.05 MICROgram(s)/kG/Min IV Continuous <Continuous>  pantoprazole   Suspension 40 milliGRAM(s) Oral before breakfast  senna 2 Tablet(s) Oral at bedtime    PRN MEDICATIONS  acetaminophen   Tablet .. 650 milliGRAM(s) Oral every 6 hours PRN    VITALS:   T(F): 99.4  HR: 84  BP: 132/66  RR: 33  SpO2: 100%    LABS:                        7.3    33.61 )-----------( 252      ( 15 Kwame 2021 04:30 )             22.9     01-15    140  |  99  |  84<HH>  ----------------------------<  75  4.6   |  24  |  5.7<HH>    Ca    7.7<L>      15 Kwame 2021 04:30  Phos  5.0     01-15  Mg     2.4     01-15    TPro  5.4<L>  /  Alb  2.7<L>  /  TBili  0.8  /  DBili  x   /  AST  43<H>  /  ALT  32  /  AlkPhos  86  01-15    PTT - ( 15 Kwame 2021 04:30 )  PTT:67.6 sec    ABG - ( 15 Kwame 2021 03:43 )  pH, Arterial: 7.26  pH, Blood: x     /  pCO2: 60    /  pO2: 74    / HCO3: 27    / Base Excess: -0.7  /  SaO2: 93          RADIOLOGY:    < from: Xray Chest 1 View- PORTABLE-Routine (Xray Chest 1 View- PORTABLE-Routine in AM.) (01.14.21 @ 06:07) >    Impression:    Stable bilateral lung opacities    < end of copied text >      PHYSICAL EXAM:  GEN: No acute distress  LUNGS: Clear to auscultation bilaterally   HEART: S1/S2 present. RRR.   ABD: Soft, non-tender, non-distended. Bowel sounds present  EXT: NC/NC/NE/2+PP/[place]/Skin Intact.   NEURO: AAOX3    Intravenous access:   NG tube:   Garza Catheter:   Indwelling Urethral Catheter:     Connect To:  Straight Drainage/Gravity    Indication:  Urine Output Monitoring in Critically Ill (01-15-21 @ 04:47) (not performed) [Active]  Indwelling Urethral Catheter:     Connect To:  Straight Drainage/Gravity    Indication:  Urine Output Monitoring in Critically Ill (01-14-21 @ 03:13) (not performed) [Active]  Indwelling Urethral Catheter:     Connect To:  Straight Drainage/Gravity    Indication:  Urine Output Monitoring in Critically Ill (01-13-21 @ 01:43) (not performed) [Active]  Indwelling Urethral Catheter:     Connect To:  Straight Drainage/Gravity    Indication:  Urine Output Monitoring in Critically Ill (01-12-21 @ 03:20) (not performed) [Active]  Indwelling Urethral Catheter:     Connect To:  Straight Drainage/Gravity    Indication:  Urine Output Monitoring in Critically Ill (01-11-21 @ 04:44) (not performed) [Active]  Indwelling Urethral Catheter:     Connect To:  Straight Drainage/Gravity    Indication:  Urine Output Monitoring in Critically Ill (01-09-21 @ 00:14) (not performed) [Active]  Indwelling Urethral Catheter:     Connect To:  Straight Drainage/Gravity    Indication:  Urine Output Monitoring in Critically Ill (01-07-21 @ 05:16) (not performed) [Active]         SUBJECTIVE:    Patient is a 60y old Male who presents with a chief complaint of s/p fall. (15 Kwame 2021 09:06). Currently admitted to medicine with the primary diagnosis of acute hypoxic respiratory failure secondary to SARS-CoV-2 Pneumonia.   Today is hospital day 14d.     PAST MEDICAL & SURGICAL HISTORY  PAD (peripheral artery disease)  multiple toe amputations    Anemia  chronic anemia - s/p transfusion 2018    Thyroid cancer    Chronic leg pain    OA (osteoarthritis)    SOB (shortness of breath) on exertion    ESRD (end stage renal disease)  2 yrs    Atrial fibrillation  on warfarin    Right sided weakness    Stroke  8 yrs ago    Hypertension    High blood cholesterol    Diabetes mellitus, type 2    Dialysis patient  Mon, Wednesday, Friday ([facility])    Smoker    H/O thyroid nodule    H/O gastroesophageal reflux (GERD)    History of tonsillectomy    History of surgery  Multiple toe amputations (amp 4 1/2 left toes; has 1/2 left mid toe; amp right mid toe)    A-V fistula  left AVF      SOCIAL HISTORY:  Negative for smoking/alcohol/drug use.     ALLERGIES:  Orange (Other)  Originally Entered as [HIVES] reaction to [sulfur] (Unknown)  penicillin (Unknown)  sulfADIAZINE (Unknown)    MEDICATIONS:  STANDING MEDICATIONS  acetaminophen   Tablet .. 650 milliGRAM(s) Oral once  aspirin  chewable 81 milliGRAM(s) Oral daily  atorvastatin 20 milliGRAM(s) Oral at bedtime  chlorhexidine 0.12% Liquid 15 milliLiter(s) Oral Mucosa every 12 hours  chlorhexidine 4% Liquid 1 Application(s) Topical <User Schedule>  dexMEDEtomidine Infusion 0.2 MICROgram(s)/kG/Hr IV Continuous <Continuous>  dextrose 40% Gel 15 Gram(s) Oral once  dextrose 5%. 1000 milliLiter(s) IV Continuous <Continuous>  dextrose 5%. 1000 milliLiter(s) IV Continuous <Continuous>  dextrose 50% Injectable 25 Gram(s) IV Push once  dextrose 50% Injectable 12.5 Gram(s) IV Push once  dextrose 50% Injectable 25 Gram(s) IV Push once  epoetin raven-epbx (RETACRIT) Injectable 8000 Unit(s) IV Push <User Schedule>  fentaNYL   Infusion 0.501 MICROgram(s)/kG/Hr IV Continuous <Continuous>  glucagon  Injectable 1 milliGRAM(s) IntraMuscular once  heparin  Infusion 1800 Unit(s)/Hr IV Continuous <Continuous>  insulin regular Infusion 1 Unit(s)/Hr IV Continuous <Continuous>  levothyroxine 100 MICROGram(s) Oral daily  meropenem  IVPB 500 milliGRAM(s) IV Intermittent every 24 hours  meropenem  IVPB      metoprolol tartrate 12.5 milliGRAM(s) Oral two times a day  norepinephrine Infusion 0.05 MICROgram(s)/kG/Min IV Continuous <Continuous>  pantoprazole   Suspension 40 milliGRAM(s) Oral before breakfast  senna 2 Tablet(s) Oral at bedtime    PRN MEDICATIONS  acetaminophen   Tablet .. 650 milliGRAM(s) Oral every 6 hours PRN    VITALS:   T(F): 99.4  HR: 84  BP: 132/66  RR: 33  SpO2: 100%    LABS:                        7.3    33.61 )-----------( 252      ( 15 Kwame 2021 04:30 )             22.9     01-15    140  |  99  |  84<HH>  ----------------------------<  75  4.6   |  24  |  5.7<HH>    Ca    7.7<L>      15 Kwame 2021 04:30  Phos  5.0     01-15  Mg     2.4     01-15    TPro  5.4<L>  /  Alb  2.7<L>  /  TBili  0.8  /  DBili  x   /  AST  43<H>  /  ALT  32  /  AlkPhos  86  01-15    PTT - ( 15 Kwame 2021 04:30 )  PTT:67.6 sec    ABG - ( 15 Kwame 2021 03:43 )  pH, Arterial: 7.26  pH, Blood: x     /  pCO2: 60    /  pO2: 74    / HCO3: 27    / Base Excess: -0.7  /  SaO2: 93          RADIOLOGY:    < from: Xray Chest 1 View- PORTABLE-Routine (Xray Chest 1 View- PORTABLE-Routine in AM.) (01.14.21 @ 06:07) >    Impression:    Stable bilateral lung opacities    < end of copied text >      PHYSICAL EXAM:  GEN: intubated and sedated  LUNGS: Clear to auscultation bilaterally   HEART: S1/S2 present. RRR.   ABD: Soft, non-tender, non-distended. Bowel sounds present  EXT: NC/NC/NE/2+PP/[place]/Skin Intact.   NEURO: Intubated and seated, awakens up when called by his name, withdraws to painful stimuli.     Intravenous access:   NG tube:   Garza Catheter:   Indwelling Urethral Catheter:     Connect To:  Straight Drainage/Gravity    Indication:  Urine Output Monitoring in Critically Ill (01-15-21 @ 04:47) (not performed) [Active]  Indwelling Urethral Catheter:     Connect To:  Straight Drainage/Gravity    Indication:  Urine Output Monitoring in Critically Ill (01-14-21 @ 03:13) (not performed) [Active]  Indwelling Urethral Catheter:     Connect To:  Straight Drainage/Gravity    Indication:  Urine Output Monitoring in Critically Ill (01-13-21 @ 01:43) (not performed) [Active]  Indwelling Urethral Catheter:     Connect To:  Straight Drainage/Gravity    Indication:  Urine Output Monitoring in Critically Ill (01-12-21 @ 03:20) (not performed) [Active]  Indwelling Urethral Catheter:     Connect To:  Straight Drainage/Gravity    Indication:  Urine Output Monitoring in Critically Ill (01-11-21 @ 04:44) (not performed) [Active]  Indwelling Urethral Catheter:     Connect To:  Straight Drainage/Gravity    Indication:  Urine Output Monitoring in Critically Ill (01-09-21 @ 00:14) (not performed) [Active]  Indwelling Urethral Catheter:     Connect To:  Straight Drainage/Gravity    Indication:  Urine Output Monitoring in Critically Ill (01-07-21 @ 05:16) (not performed) [Active]

## 2021-01-15 NOTE — PROGRESS NOTE ADULT - SUBJECTIVE AND OBJECTIVE BOX
[place] NEPHROLOGY FOLLOW UP NOTE  --------------------------------------------------------------------------------  24 hour events/subjective: Patient examined. Intubated.    PAST HISTORY  --------------------------------------------------------------------------------  No significant changes to PMH, PSH, FHx, SHx, unless otherwise noted    ALLERGIES & MEDICATIONS  --------------------------------------------------------------------------------  Allergies    Orange (Other)  Originally Entered as [HIVES] reaction to [sulfur] (Unknown)  penicillin (Unknown)  sulfADIAZINE (Unknown)    Intolerances      Standing Inpatient Medications  acetaminophen   Tablet .. 650 milliGRAM(s) Oral once  aspirin  chewable 81 milliGRAM(s) Oral daily  atorvastatin 20 milliGRAM(s) Oral at bedtime  chlorhexidine 0.12% Liquid 15 milliLiter(s) Oral Mucosa every 12 hours  chlorhexidine 4% Liquid 1 Application(s) Topical <User Schedule>  dexMEDEtomidine Infusion 0.2 MICROgram(s)/kG/Hr IV Continuous <Continuous>  dextrose 40% Gel 15 Gram(s) Oral once  dextrose 5%. 1000 milliLiter(s) IV Continuous <Continuous>  dextrose 5%. 1000 milliLiter(s) IV Continuous <Continuous>  dextrose 50% Injectable 25 Gram(s) IV Push once  dextrose 50% Injectable 12.5 Gram(s) IV Push once  dextrose 50% Injectable 25 Gram(s) IV Push once  epoetin raven-epbx (RETACRIT) Injectable 8000 Unit(s) IV Push <User Schedule>  fentaNYL   Infusion 0.501 MICROgram(s)/kG/Hr IV Continuous <Continuous>  glucagon  Injectable 1 milliGRAM(s) IntraMuscular once  heparin  Infusion 1800 Unit(s)/Hr IV Continuous <Continuous>  insulin regular Infusion 1 Unit(s)/Hr IV Continuous <Continuous>  levothyroxine 100 MICROGram(s) Oral daily  meropenem  IVPB 500 milliGRAM(s) IV Intermittent every 24 hours  meropenem  IVPB      metoprolol tartrate 12.5 milliGRAM(s) Oral two times a day  norepinephrine Infusion 0.05 MICROgram(s)/kG/Min IV Continuous <Continuous>  pantoprazole   Suspension 40 milliGRAM(s) Oral before breakfast  senna 2 Tablet(s) Oral at bedtime    PRN Inpatient Medications  acetaminophen   Tablet .. 650 milliGRAM(s) Oral every 6 hours PRN      VITALS/PHYSICAL EXAM  --------------------------------------------------------------------------------  T(C): 37.4 (01-15-21 @ 04:00), Max: 37.8 (01-14-21 @ 20:00)  HR: 90 (01-15-21 @ 10:00) (40 - 120)  BP: --  RR: 33 (01-15-21 @ 10:00) (27 - 34)  SpO2: 100% (01-15-21 @ 10:00) (72% - 100%)  Wt(kg): --        01-14-21 @ 07:01  -  01-15-21 @ 07:00  --------------------------------------------------------  IN: 2673 mL / OUT: 2550 mL / NET: 123 mL    01-15-21 @ 07:01  -  01-15-21 @ 13:06  --------------------------------------------------------  IN: 615 mL / OUT: 0 mL / NET: 615 mL      Physical Exam:  	Gen: Intubated  	Pulm: CTA B/L  	CV: RRR, S1S2  	Abd: +BS, soft, nondistended  	LE: Warm,  edema  	Vascular access: AVF    LABS/STUDIES  --------------------------------------------------------------------------------              7.5    32.69 >-----------<  267      [01-15-21 @ 12:06]              23.2     140  |  99  |  84  ----------------------------<  75      [01-15-21 @ 04:30]  4.6   |  24  |  5.7        Ca     7.7     [01-15-21 @ 04:30]      Mg     2.4     [01-15-21 @ 04:30]      Phos  5.0     [01-15-21 @ 04:30]    TPro  5.4  /  Alb  2.7  /  TBili  0.8  /  DBili  x   /  AST  43  /  ALT  32  /  AlkPhos  86  [01-15-21 @ 04:30]      PTT: 67.6       [01-15-21 @ 04:30]      Creatinine Trend:  SCr 5.7 [01-15 @ 04:30]  SCr 6.8 [01-14 @ 04:06]  SCr 6.4 [01-13 @ 05:30]  SCr 5.5 [01-12 @ 17:05]  SCr 7.5 [01-12 @ 05:23]    Urinalysis - [01-06-21 @ 18:43]      Color Yellow / Appearance Slightly Turbid / SG 1.018 / pH 6.5      Gluc 100 mg/dL / Ketone Negative  / Bili Negative / Urobili <2 mg/dL       Blood Moderate / Protein 300 mg/dL / Leuk Est Small / Nitrite Negative       /  / Hyaline 114 / Gran  / Sq Epi  / Non Sq Epi 1 / Bacteria Negative      Ferritin 2812      [01-13-21 @ 10:50]  HbA1c 7.1      [05-21-19 @ 04:30]  TSH 4.57      [01-01-21 @ 17:56]

## 2021-01-15 NOTE — PROGRESS NOTE ADULT - SUBJECTIVE AND OBJECTIVE BOX
Progress Note: General Surgery  Patient: ROGER RODRIGUES , 60y (1960)Male   MRN: [id number]  Location: [place] [address] A  Visit: 01-01-21 Inpatient  Date: 01-15-21 @ 08:58    Admit Diagnosis/Chief Complaint: covid PNA    Procedure/Diagnosis:  acute respiratory failure    Events/ 24h: No acute events overnight. Pain controlled.    Vitals: T(F): 99.4 (01-15-21 @ 04:00), Max: 100 (01-14-21 @ 20:00)  HR: 80 (01-15-21 @ 07:56)  BP: 132/66 (01-14-21 @ 12:05) (132/66 - 132/66)  RR: 33 (01-15-21 @ 07:00)  SpO2: 100% (01-15-21 @ 07:56)  RR (machine): 34, TV (machine): 450, FiO2: 40, PEEP: 8, PIP: 30  In:   01-14-21 @ 07:01  -  01-15-21 @ 07:00  --------------------------------------------------------  IN: 2403 mL      Out:   01-14-21 @ 07:01  -  01-15-21 @ 07:00  --------------------------------------------------------  OUT:    Other (mL): 2500 mL    Rectal Tube (mL): 50 mL  Total OUT: 2550 mL        Net:   01-14-21 @ 07:01  -  01-15-21 @ 07:00  --------------------------------------------------------  NET: -147 mL        Diet: Diet, NPO with Tube Feed:   Tube Feeding Modality: Orogastric  Peptamen A.F. Formula  Total Volume for 24 Hours (mL): 1800  Continuous  Until Goal Tube Feed Rate (mL per Hour): 75  Tube Feed Duration (in Hours): 24  Tube Feed Start Time: 13:15 (01-11-21 @ 13:00)    IV Fluids: dextrose 5%. 1000 milliLiter(s) (50 mL/Hr) IV Continuous <Continuous>  dextrose 5%. 1000 milliLiter(s) (100 mL/Hr) IV Continuous <Continuous>      PHYSICAL EXAM:  GENERAL: Intubated i=on vent  CHEST/LUNG: Clear to auscultation bilaterally  HEART: Regular rate and rhythm  ABDOMEN: Soft, Nontender, Nondistended;   EXTREMITIES:  No clubbing, cyanosis, or edema        Medications: [Standing]  acetaminophen   Tablet .. 650 milliGRAM(s) Oral once  aspirin  chewable 81 milliGRAM(s) Oral daily  atorvastatin 20 milliGRAM(s) Oral at bedtime  chlorhexidine 0.12% Liquid 15 milliLiter(s) Oral Mucosa every 12 hours  chlorhexidine 4% Liquid 1 Application(s) Topical <User Schedule>  dexMEDEtomidine Infusion 0.2 MICROgram(s)/kG/Hr (4.85 mL/Hr) IV Continuous <Continuous>  dextrose 40% Gel 15 Gram(s) Oral once  dextrose 5%. 1000 milliLiter(s) (50 mL/Hr) IV Continuous <Continuous>  dextrose 5%. 1000 milliLiter(s) (100 mL/Hr) IV Continuous <Continuous>  dextrose 50% Injectable 25 Gram(s) IV Push once  dextrose 50% Injectable 12.5 Gram(s) IV Push once  dextrose 50% Injectable 25 Gram(s) IV Push once  epoetin raven-epbx (RETACRIT) Injectable 8000 Unit(s) IV Push <User Schedule>  fentaNYL   Infusion 0.501 MICROgram(s)/kG/Hr (4.85 mL/Hr) IV Continuous <Continuous>  glucagon  Injectable 1 milliGRAM(s) IntraMuscular once  heparin  Infusion 1800 Unit(s)/Hr (18 mL/Hr) IV Continuous <Continuous>  insulin regular Infusion 1 Unit(s)/Hr (1 mL/Hr) IV Continuous <Continuous>  levothyroxine 100 MICROGram(s) Oral daily  meropenem  IVPB 500 milliGRAM(s) IV Intermittent every 24 hours  meropenem  IVPB      metoprolol tartrate 12.5 milliGRAM(s) Oral two times a day  norepinephrine Infusion 0.05 MICROgram(s)/kG/Min (9.08 mL/Hr) IV Continuous <Continuous>  pantoprazole   Suspension 40 milliGRAM(s) Oral before breakfast  senna 2 Tablet(s) Oral at bedtime  vancomycin    Solution 125 milliGRAM(s) Oral every 6 hours    DVT Prophylaxis: heparin  Infusion 1800 Unit(s)/Hr IV Continuous <Continuous>    GI Prophylaxis: pantoprazole   Suspension 40 milliGRAM(s) Oral before breakfast    Antibiotics: meropenem  IVPB 500 milliGRAM(s) IV Intermittent every 24 hours  meropenem  IVPB      vancomycin    Solution 125 milliGRAM(s) Oral every 6 hours    Anticoagulation:   Medications:[PRN]  acetaminophen   Tablet .. 650 milliGRAM(s) Oral every 6 hours PRN      Labs:                        7.3    33.61 )-----------( 252      ( 15 Kwame 2021 04:30 )             22.9     01-15    140  |  99  |  84<HH>  ----------------------------<  75  4.6   |  24  |  5.7<HH>    Ca    7.7<L>      15 Kwame 2021 04:30  Phos  5.0     01-15  Mg     2.4     01-15    TPro  5.4<L>  /  Alb  2.7<L>  /  TBili  0.8  /  DBili  x   /  AST  43<H>  /  ALT  32  /  AlkPhos  86  01-15    LIVER FUNCTIONS - ( 15 Kwame 2021 04:30 )  Alb: 2.7 g/dL / Pro: 5.4 g/dL / ALK PHOS: 86 U/L / ALT: 32 U/L / AST: 43 U/L / GGT: x           PTT - ( 15 Kwame 2021 04:30 )  PTT:67.6 sec  ABG - ( 15 Kwame 2021 03:43 )  pH: 7.26  /  pCO2: 60    /  pO2: 74    / HCO3: 27    / Base Excess: -0.7  /  SaO2: 93                      Urine/Micro:        Imaging:       < from: Xray Chest 1 View- PORTABLE-Routine (Xray Chest 1 View- PORTABLE-Routine in AM.) (01.14.21 @ 06:07) >  Impression:    Stable bilateral lung opacities    < end of copied text >

## 2021-01-15 NOTE — PROGRESS NOTE ADULT - SUBJECTIVE AND OBJECTIVE BOX
Patient is a 60y old  Male who presents with a chief complaint of s/p fall. (15 Kwame 2021 13:05)  remain vented /sedated  on OGT feed  on pressor      ICU Vital Signs Last 24 Hrs  T(C): 37.4 (15 Kwame 2021 04:00), Max: 37.8 (14 Jan 2021 20:00)  T(F): 99.4 (15 Kwame 2021 04:00), Max: 100 (14 Jan 2021 20:00)  HR: 90 (15 Kwame 2021 10:00) (40 - 120)  ABP: 122/46 (15 Kwame 2021 10:00) (102/42 - 162/68)  ABP(mean): 72 (15 Kwame 2021 10:00) (62 - 104)  RR: 33 (15 Kwame 2021 10:00) (27 - 34)  SpO2: 100% (15 Kwame 2021 10:00) (72% - 100%)      Drug Dosing Weight  Height (cm): 177.8 (01 Jan 2021 16:05)  Weight (kg): 96.9 (01 Jan 2021 16:05)  BMI (kg/m2): 30.7 (01 Jan 2021 16:05)  BSA (m2): 2.15 (01 Jan 2021 16:05)    I&O's Detail    14 Jan 2021 07:01  -  15 Kwame 2021 07:00  --------------------------------------------------------  IN:    Dexmedetomidine: 192 mL    FentaNYL: 285 mL    FentaNYL: 24 mL    Heparin: 432 mL    Insulin: 66 mL    Norepinephrine: 399 mL    Peptamen A.F.: 1275 mL  Total IN: 2673 mL    OUT:    Other (mL): 2500 mL    Rectal Tube (mL): 50 mL  Total OUT: 2550 mL    Total NET: 123 mL      15 Kwame 2021 07:01  -  15 Kwame 2021 13:52  --------------------------------------------------------  IN:    Dexmedetomidine: 80 mL    FentaNYL: 96 mL    Heparin: 72 mL    Insulin: 3 mL    Norepinephrine: 64 mL    Peptamen A.F.: 300 mL  Total IN: 615 mL    OUT:  Total OUT: 0 mL    Total NET: 615 mL     PHYSICAL EXAM:  Constitutional:  remain vented/ sedated   OGT feed in place constance nunez  Gastrointestinal:  soft   rectal tube in place  MEDICATIONS  (STANDING):  acetaminophen   Tablet .. 650 milliGRAM(s) Oral once  aspirin  chewable 81 milliGRAM(s) Oral daily  atorvastatin 20 milliGRAM(s) Oral at bedtime  chlorhexidine 0.12% Liquid 15 milliLiter(s) Oral Mucosa every 12 hours  chlorhexidine 4% Liquid 1 Application(s) Topical <User Schedule>  dexMEDEtomidine Infusion 0.2 MICROgram(s)/kG/Hr (4.85 mL/Hr) IV Continuous <Continuous>  dextrose 40% Gel 15 Gram(s) Oral once  dextrose 5%. 1000 milliLiter(s) (50 mL/Hr) IV Continuous <Continuous>  dextrose 5%. 1000 milliLiter(s) (100 mL/Hr) IV Continuous <Continuous>  dextrose 50% Injectable 25 Gram(s) IV Push once  dextrose 50% Injectable 12.5 Gram(s) IV Push once  dextrose 50% Injectable 25 Gram(s) IV Push once  epoetin raven-epbx (RETACRIT) Injectable 8000 Unit(s) IV Push <User Schedule>  fentaNYL   Infusion 0.501 MICROgram(s)/kG/Hr (4.85 mL/Hr) IV Continuous <Continuous>  glucagon  Injectable 1 milliGRAM(s) IntraMuscular once  heparin  Infusion 1800 Unit(s)/Hr (18 mL/Hr) IV Continuous <Continuous>  insulin regular Infusion 1 Unit(s)/Hr (1 mL/Hr) IV Continuous <Continuous>  levothyroxine 100 MICROGram(s) Oral daily  meropenem  IVPB 500 milliGRAM(s) IV Intermittent every 24 hours  meropenem  IVPB      metoprolol tartrate 12.5 milliGRAM(s) Oral two times a day  norepinephrine Infusion 0.05 MICROgram(s)/kG/Min (9.08 mL/Hr) IV Continuous <Continuous>  pantoprazole   Suspension 40 milliGRAM(s) Oral before breakfast  senna 2 Tablet(s) Oral at bedtime      Diet, NPO with Tube Feed:   Tube Feeding Modality: Orogastric  Peptamen A.F. Formula  Total Volume for 24 Hours (mL): 1800  Continuous  Until Goal Tube Feed Rate (mL per Hour): 75  Tube Feed Duration (in Hours): 24  Tube Feed Start Time: 13:15 (01-11-21 @ 13:00)      LABS  01-15    140  |  99  |  84<HH>  ----------------------------<  75  4.6   |  24  |  5.7<HH>    Ca    7.7<L>      15 Kwame 2021 04:30  Phos  5.0     01-15  Mg     2.4     01-15    TPro  5.4<L>  /  Alb  2.7<L>  /  TBili  0.8  /  DBili  x   /  AST  43<H>  /  ALT  32  /  AlkPhos  86  01-15                          7.5    32.69 )-----------( 267      ( 15 Kwame 2021 12:06 )             23.2     CAPILLARY BLOOD GLUCOSE  POCT Blood Glucose.: 166 mg/dL (15 Kwame 2021 12:32)  POCT Blood Glucose.: 221 mg/dL (15 Kwame 2021 09:44)  POCT Blood Glucose.: 82 mg/dL (15 Kwame 2021 06:37)  POCT Blood Glucose.: 94 mg/dL (15 Kwame 2021 04:06)  POCT Blood Glucose.: 145 mg/dL (15 Kwame 2021 03:23)   RADIOLOGY STUDIES  < from: Xray Chest 1 View- PORTABLE-Urgent (Xray Chest 1 View- PORTABLE-Urgent .) (01.14.21 @ 18:02) >  Impression:  Bilateral opacities unchanged. No pleural effusion or air leak

## 2021-01-15 NOTE — PROGRESS NOTE ADULT - ASSESSMENT
#Covid-19 PNA with a suspected  bacterial PNA:   - The patient was intubated for increased alteration, hypoxemia and non-compliance with BiPAP  - Azithromycin discontinued on 2021   - Cefepime discontinued on 2021  - Dexamethasone 6mg IV once daily (2021), stopped at after 10 days.   - COVID-19 antibodies received on 2021 by [place] [facility] ( called lab [id number] to follow up on it)  - Covid antibodies: reported negative --> s/p 2 units of Convalescent plasma ( 2021 and 2021)  - Repeat procalcitonin: 4.7 (1/10), 6.13 (on ) , 4.89 (on ), 4.11 (on )  - s/p tocilizumab 400mg IV once on 2021  - Ferritin 1/10/2021 ( 48 hours after tocilizumab): 4895 ( increased compared to ferritin on 2021)  - SBT 2021 failed ( was on a CPAP for 30 minutes, after which he started becoming tachycardic and hypertensive)   - SBT 2021 failed, the patient was agitated, bit the ET tube --> Leakage -- > Reintubation/change of ET tube, will therefore consult surgery for tracheostomy and PEG tube  - Sister spoken to Nedra Phelan: [phone] about the tracheostomy and PEG tube, Sister is agreeable.     #Leukocytosis:   #Suspected C.diff infection  - WBC still increasin/15/2021:  ( : 25,000   1/10: 20,010   on : 11,310   - Latest procalcitonin  on : 6.31 ( Procalcitonin on : 4.89)  - Multifactorial ( Possible overlying infection, steroids)   - Diarrhea present   - Will send C.diff studies --> C.diff negative, DC po Vancomycin  - DC cefepime  - Started Meropenem 0.5g IV once daily ( renally adjusted dose)  - Vancomycin trough level 2021: 25.7, Next vancomycin dose should be given on Saturday 2021 750mg IV once post-HD  - Will dose vancomycin after HD  - Fungitell ( sent on 1/10/2021, resulted on 2021): 38 ( negative)  - Sacral Ulcer, Stage Iv, will get Burn team for evaluation can possibly be the source of leukocytosis    #Altered Mental Status:   - EEG diffuse slowing, but no seizure activity, check the report above  - CT brain shows ventriculomegaly (check full report), no IC bleed, possible old infarct.  - neurology consulted and recs:   a. CT brain negative x2 for stroke or other structural pathology.  EEG negative for any epileptiform activity.  Suspect multifactorial metabolic encephalopathy in setting of COVID and ESRD and sedatives.  Wean sedation as able, can reassess if patient does not awaken after that time.  b. No further EEG or other tests   c. To be reassessed after sedation is weaned    #Fluid overload  #ESRD:   - HD as per nephrology   - EPO (retacrit) 8000 units IV push once weekly  - DC sevelamer as per nephro   - Will DC Sodium bicarbonate as per nephro    #Atrial fibrillation:  - Paroxysmal ? Currently NSRT, well rate controlled.   - Heparin gtt, follow up aPTT q6hrs until therapeutic   - Metoprolol tartrate 12.5mg po q12hrs    - Will DC levophed  - CT brain no IC bleed or concerns of IC bleed ( Heparin drip started)  - aPTT being followed, all within therapeutic range    #NSTEMI:   - Type II MI, demand ischemia, likely secondary to hypoxia secondary to COVID-19 pneumonia  - Aspirin 81mg po once daily   - CT brain no IC bleed or concerns of IC bleed   - On heparin gtt ( for atrial fibrillation now)  - Repeat CK-MB and CPK were stable    #Hypothyroidism:  - Continue with levothyroxine 100mcg po once daily     #Hyponatremia:   - Resolved  - Likely hypervolemic hyponatremia secondary to fluid overload   - Continue with Lasix and dialysis sessions     #Sacral Ulcer:   - Stage IV Sacra ulcer  - Call Burn team for evaluation and possible debridement    - Spoke with the patient's sister Nedra Shea on [phone] ( on 2021), speaking with her for updates daily.   The sister said that she is the health care proxy and will get the form from the house  Patient is full code now   - Concerns about Why the patient did not received, hydroxychloroquine and remdesivir ( explained that he is ESRD and that with eGFR <30 the medication is contraindicated.)   - Explained that he has ESRD ( eGFR <30), and therefore remdesivir is contraindicated  - Explained to her that hydroxychloroquine is not an option for treatment at the moment as studies have showed that there is no benefit to mortality.  #Covid-19 PNA with a suspected  bacterial PNA:   - The patient was intubated for increased alteration, hypoxemia and non-compliance with BiPAP  - Azithromycin discontinued on 2021   - Cefepime discontinued on 2021  - Dexamethasone 6mg IV once daily (2021), stopped at after 10 days.   - COVID-19 antibodies received on 2021 by [facility] ( called lab [id number] to follow up on it)  - Covid antibodies: reported negative --> s/p 2 units of Convalescent plasma ( 2021 and 2021)  - Repeat procalcitonin: 4.7 (1/10), 6.13 (on ) , 4.89 (on ), 4.11 (on )  - s/p tocilizumab 400mg IV once on 2021  - Ferritin 1/10/2021 ( 48 hours after tocilizumab): 4895 ( increased compared to ferritin on 2021)  - SBT 2021 failed ( was on a CPAP for 30 minutes, after which he started becoming tachycardic and hypertensive)   - SBT 2021 failed, the patient was agitated, bit the ET tube --> Leakage -- > Reintubation/change of ET tube, will therefore consult surgery for tracheostomy and PEG tube  - Sister spoken to Nedra Phelan: [phone] about the tracheostomy and PEG tube, Sister is agreeable.     #Leukocytosis:   #Suspected C.diff infection  - WBC still increasin/15/2021:  ( : 25,000   1/10: 20,010   on : 11,310   - Latest procalcitonin  on : 6.31 ( Procalcitonin on : 4.89)  - Multifactorial ( Possible overlying infection, steroids)   - Diarrhea present, C.diff studies sent --> C.diff negative, DC po Vancomycin  - DC cefepime  - Started Meropenem 0.5g IV once daily ( renally adjusted dose)  - Vancomycin trough level 2021: 25.7, Next vancomycin dose should be given on 2021 750mg IV once post-HD  - Fungitell ( sent on 1/10/2021, resulted on 2021): 38 ( negative)  - Sacral Ulcer, Stage Iv, will get Burn team for evaluation can possibly be the source of leukocytosis.     #Altered Mental Status:   - EEG diffuse slowing, but no seizure activity, check the report above  - CT brain shows ventriculomegaly (check full report), no IC bleed, possible old infarct.  - neurology consulted and recs:   a. CT brain negative x2 for stroke or other structural pathology.  EEG negative for any epileptiform activity.  Suspect multifactorial metabolic encephalopathy in setting of COVID and ESRD and sedatives.  Wean sedation as able, can reassess if patient does not awaken after that time.  b. No further EEG or other tests   c. To be reassessed after sedation is weaned    #Fluid overload  #ESRD:   - HD as per nephrology   - EPO (retacrit) 8000 units IV push once weekly  - DC sevelamer as per nephro   - Will DC Sodium bicarbonate as per nephro    #Atrial fibrillation:  - Paroxysmal ? Currently NSRT, well rate controlled.   - Heparin gtt, follow up aPTT q6hrs until therapeutic   - Metoprolol tartrate 12.5mg po q12hrs    - Will DC levophed  - CT brain no IC bleed or concerns of IC bleed ( Heparin drip started)  - aPTT being followed, all within therapeutic range    #NSTEMI:   - Type II MI, demand ischemia, likely secondary to hypoxia secondary to COVID-19 pneumonia  - Aspirin 81mg po once daily   - CT brain no IC bleed or concerns of IC bleed   - On heparin gtt ( for atrial fibrillation now)  - Repeat CK-MB and CPK were stable    #Hypothyroidism:  - Continue with levothyroxine 100mcg po once daily     #Hyponatremia:   - Resolved  - Likely hypervolemic hyponatremia secondary to fluid overload   - Continue with Lasix and dialysis sessions     #Sacral Ulcer:   - Stage IV Sacra ulcer  - Call Burn team for evaluation and possible debridement as it may be the source of sacral ulcer    - Spoke with the patient's sister Nedra Shea on [phone] ( on 2021), speaking with her for updates daily.   The sister said that she is the health care proxy and will get the form from the house  Patient is full code now   - Concerns about Why the patient did not received, hydroxychloroquine and remdesivir ( explained that he is ESRD and that with eGFR <30 the medication is contraindicated.)   - Explained that he has ESRD ( eGFR <30), and therefore remdesivir is contraindicated  - Explained to her that hydroxychloroquine is not an option for treatment at the moment as studies have showed that there is no benefit to mortality.  #Covid-19 PNA with a suspected  bacterial PNA:   - The patient was intubated for increased alteration, hypoxemia and non-compliance with BiPAP  - Azithromycin discontinued on 2021   - Cefepime discontinued on 2021  - Dexamethasone 6mg IV once daily (2021), stopped at after 10 days.   - COVID-19 antibodies received on 2021 by [facility] ( called lab [id number] to follow up on it)  - Covid antibodies: reported negative --> s/p 2 units of Convalescent plasma ( 2021 and 2021)  - Repeat procalcitonin: 4.7 (1/10), 6.13 (on ) , 4.89 (on ), 4.11 (on )  - s/p tocilizumab 400mg IV once on 2021  - Ferritin 1/10/2021 ( 48 hours after tocilizumab): 4895 ( increased compared to ferritin on 2021)  - SBT 2021 failed ( was on a CPAP for 30 minutes, after which he started becoming tachycardic and hypertensive)   - SBT 2021 failed, the patient was agitated, bit the ET tube --> Leakage -- > Reintubation/change of ET tube, will therefore consult surgery for tracheostomy and PEG tube  - Sister spoken to Nedra Zhane: [phone] about the tracheostomy and PEG tube, Sister is agreeable.   - Surgery consulted, Tracheostomy and PEG are planned to be done on 2021    #Leukocytosis:   #Suspected C.diff infection  - WBC still increasin/15/2021:  ( : 25,000   1/10: 20,010   on : 11,310   - Latest procalcitonin  on : 6.31 ( Procalcitonin on : 4.89)  - Multifactorial ( Possible overlying infection, steroids)   - Diarrhea present, C.diff studies sent --> C.diff negative, DC po Vancomycin  - DC cefepime  - Started Meropenem 0.5g IV once daily ( renally adjusted dose)  - Vancomycin trough level 2021: 25.7, Next vancomycin dose should be given on 2021 750mg IV once post-HD  - Fungitell ( sent on 1/10/2021, resulted on 2021): 38 ( negative)  - Sacral Ulcer, Stage Iv, will get Burn team for evaluation can possibly be the source of leukocytosis.     #Altered Mental Status:   - EEG diffuse slowing, but no seizure activity, check the report above  - CT brain shows ventriculomegaly (check full report), no IC bleed, possible old infarct.  - neurology consulted and recs:   a. CT brain negative x2 for stroke or other structural pathology.  EEG negative for any epileptiform activity.  Suspect multifactorial metabolic encephalopathy in setting of COVID and ESRD and sedatives.  Wean sedation as able, can reassess if patient does not awaken after that time.  b. No further EEG or other tests   c. To be reassessed after sedation is weaned    #Fluid overload  #ESRD:   - HD as per nephrology   - EPO (retacrit) 8000 units IV push once weekly  - DC sevelamer as per nephro   - Will DC Sodium bicarbonate as per nephro    #Atrial fibrillation:  - Paroxysmal ? Currently NSRT, well rate controlled.   - Heparin gtt, follow up aPTT q6hrs until therapeutic   - Metoprolol tartrate 12.5mg po q12hrs    - Will DC levophed  - CT brain no IC bleed or concerns of IC bleed ( Heparin drip started)  - aPTT being followed, all within therapeutic range    #NSTEMI:   - Type II MI, demand ischemia, likely secondary to hypoxia secondary to COVID-19 pneumonia  - Aspirin 81mg po once daily   - CT brain no IC bleed or concerns of IC bleed   - On heparin gtt ( for atrial fibrillation now)  - Repeat CK-MB and CPK were stable    #Hypothyroidism:  - Continue with levothyroxine 100mcg po once daily     #Hyponatremia:   - Resolved  - Likely hypervolemic hyponatremia secondary to fluid overload   - Continue with Lasix and dialysis sessions     #Sacral Ulcer:   - Stage IV Sacra ulcer  - Call Burn team for evaluation and possible debridement as it may be the source of sacral ulcer    - Spoke with the patient's sister Nedra Shea on [phone] ( on 2021), speaking with her for updates daily.   The sister said that she is the health care proxy and will get the form from the house  Patient is full code now   - Concerns about Why the patient did not received, hydroxychloroquine and remdesivir ( explained that he is ESRD and that with eGFR <30 the medication is contraindicated.)   - Explained that he has ESRD ( eGFR <30), and therefore remdesivir is contraindicated  - Explained to her that hydroxychloroquine is not an option for treatment at the moment as studies have showed that there is no benefit to mortality.

## 2021-01-16 NOTE — CONSULT NOTE ADULT - SUBJECTIVE AND OBJECTIVE BOX
60y  Male  HPI:  61 yo M w/ PMH of Afib on coumadin, DM2, ESRD on HD MWF, HLD, HTN, CVA presents with weakness & fall. Patient notes when he woke up today, he felt generally weak, slid from the bed onto the floor landing on his bottom, denies head injury/loc. Patient notes that he has been having increased sputum production for the past few days, endorses mild shortness of breath without chest pain/fever/diarrhea/vomiting. Patient had full dialysis session wednesday, notes next session is tomorrow due to the holidays. Denies any focal weakness or pain currently.     On my assessment, pt is obtunded and unable to provide HPI. HPI obtained by Sister who is his primary care taker. As per sister, pt has been having increased frequency of imbalance/fall since 2 days ago. Decreased PO intake.     ICU Vital Signs Last 24 Hrs  T(C): 37.2 (01 Jan 2021 14:00), Max: 38.3 (01 Jan 2021 10:28)  T(F): 98.9 (01 Jan 2021 14:00), Max: 101 (01 Jan 2021 10:28)  HR: 78 (01 Jan 2021 14:00) (78 - 107)  BP: 109/60 (01 Jan 2021 14:00) (109/60 - 142/62)  RR: 18 (01 Jan 2021 14:00) (18 - 20)  SpO2: 97% (01 Jan 2021 14:00) (95% - 99%)    In ED, pt underwent CT head which is negative. CXR performed and shows possible RLL PNA. Given cefepime and vancomycin. Also found to be COVID+. no drips (01 Jan 2021 14:01)    Hospital course***  Allergies    Orange (Other)  Originally Entered as [HIVES] reaction to [sulfur] (Unknown)  penicillin (Unknown)  sulfADIAZINE (Unknown)    Intolerances      PAST MEDICAL & SURGICAL HISTORY:  PAD (peripheral artery disease)  multiple toe amputations    Anemia  chronic anemia - s/p transfusion 2018    Thyroid cancer    Chronic leg pain    OA (osteoarthritis)    SOB (shortness of breath) on exertion    ESRD (end stage renal disease)  2 yrs    Atrial fibrillation  on warfarin    Right sided weakness    Stroke  8 yrs ago    Hypertension    High blood cholesterol    Diabetes mellitus, type 2    Dialysis patient  Mon, Wednesday, Friday ([address])    Smoker    H/O thyroid nodule    H/O gastroesophageal reflux (GERD)    History of tonsillectomy    History of surgery  Multiple toe amputations (amp 4 1/2 left toes; has 1/2 left mid toe; amp right mid toe)    A-V fistula  left AVF        Labs:                        7.6    30.94 )-----------( 300      ( 16 Jan 2021 13:46 )             23.6     Culture - Blood (collected 14 Jan 2021 12:58)  Source: .Blood Blood  Preliminary Report (16 Jan 2021 01:02):    No growth to date.        PE:  PHYSICAL EXAM:  Full thickness wound to sacrum with necrtic area  serosang dc

## 2021-01-16 NOTE — PROGRESS NOTE ADULT - ASSESSMENT
IMPRESSION:    ESRD MWF for HD  Acute hypoxemic resp failure failed weaning  NSTEMI  Afib was on coumadin  severe COVID PNA  Superimposed bacteria PNA  inrease WBC/ diarrhea  C diff -  sacral ulcer    PLAN:    CNS: SAT.     HEENT: Oral care.    PULMONARY: Vent changes. Wean O2.  Monitor PPL and DP.  Trach next week     CARDIOVASCULAR:  I<O as tolerated.        GI: GI prophylaxis.  OG Feeding.  Bowel regimen     RENAL: check lytes and replete PRN. Nephro F/UP result    INFECTIOUS DISEASE:  Continue ABX.  Send wound cultures.  FU with Burn     HEMATOLOGICAL: DVT Prophylaxis  IV Heparin FU PTT     ENDOCRINE:  Follow up FS.  Insulin protocol if needed.    MUSCULOSKELETAL:  bed rest     DC Myla and TLC     MICU     Prognosis poor

## 2021-01-16 NOTE — CHART NOTE - NSCHARTNOTEFT_GEN_A_CORE
Spoke with patient's sister Nedra who had called ICU, gave medical update. Discussed plan for trach monday and obtained consent for central line placement

## 2021-01-16 NOTE — CONSULT NOTE ADULT - ASSESSMENT
ASSESSMENT:  UNstageable sacral wound    RECOMMENDATION:  Wound care - Santyl/ Wet Gauze w/ NS/DPD BID  Possible Surgical debridement next week  IV abx as indicated/ per ID  Offloading/ positional changes  Will follow.

## 2021-01-16 NOTE — PROGRESS NOTE ADULT - SUBJECTIVE AND OBJECTIVE BOX
Patient is a 60y old  Male who presents with a chief complaint of s/p fall. (16 Jan 2021 01:40)        Over Night Events:  remains on MV.  On Low dose levophed.  sedated             ROS:     All ROS are negative except HPI         PHYSICAL EXAM    ICU Vital Signs Last 24 Hrs  T(C): 36.9 (16 Jan 2021 08:00), Max: 37.6 (16 Jan 2021 04:00)  T(F): 98.5 (16 Jan 2021 08:00), Max: 99.6 (16 Jan 2021 04:00)  HR: 84 (16 Jan 2021 09:00) (74 - 152)  BP: 100/48 (16 Jan 2021 02:00) (92/46 - 134/53)  BP(mean): 65 (16 Jan 2021 02:00) (60 - 88)  ABP: 104/44 (16 Jan 2021 09:00) (90/40 - 150/62)  ABP(mean): 64 (16 Jan 2021 09:00) (58 - 92)  RR: 33 (16 Jan 2021 09:00) (19 - 34)  SpO2: 100% (16 Jan 2021 09:00) (94% - 100%)      CONSTITUTIONAL:  Well nourished.  Ill appearing.  NAD    ENT:   Airway patent,   Mouth with normal mucosa.   No thrush    EYES:   Pupils equal,   Round and reactive to light.    CARDIAC:   Normal rate,   Regular rhythm.     edema      Vascular:  Normal systolic impulse  No Carotid bruits    RESPIRATORY:   No wheezing  Bilateral BS  Normal chest expansion  Not tachypneic,  No use of accessory muscles    GASTROINTESTINAL:  Abdomen soft,   Non-tender,   No guarding,   + BS    MUSCULOSKELETAL:   Range of motion is not limited,  No clubbing, cyanosis    NEUROLOGICAL:   Sedated   No motor  deficits.    SKIN:   Skin normal color for race,   Warm and dry   No evidence of rash.    PSYCHIATRIC:   Sedated   No apparent risk to self or others.    HEMATOLOGICAL:  No cervical  lymphadenopathy.  no inguinal lymphadenopathy      01-15-21 @ 07:01  -  01-16-21 @ 07:00  --------------------------------------------------------  IN:    Dexmedetomidine: 560 mL    FentaNYL: 579.6 mL    Heparin: 426 mL    Insulin: 51 mL    Norepinephrine: 317 mL    Peptamen A.F.: 1800 mL  Total IN: 3733.6 mL    OUT:    Rectal Tube (mL): 250 mL  Total OUT: 250 mL    Total NET: 3483.6 mL      01-16-21 @ 07:01  -  01-16-21 @ 09:27  --------------------------------------------------------  IN:    Dexmedetomidine: 48.6 mL    FentaNYL: 48.6 mL    Heparin: 36 mL    Insulin: 5 mL    Peptamen A.F.: 150 mL  Total IN: 288.2 mL    OUT:  Total OUT: 0 mL    Total NET: 288.2 mL          LABS:                            7.5    32.69 )-----------( 267      ( 15 Kwame 2021 12:06 )             23.2                                               01-15    140  |  99  |  84<HH>  ----------------------------<  75  4.6   |  24  |  5.7<HH>    Ca    7.7<L>      15 Kwame 2021 04:30  Phos  5.0     01-15  Mg     2.4     01-15    TPro  5.4<L>  /  Alb  2.7<L>  /  TBili  0.8  /  DBili  x   /  AST  43<H>  /  ALT  32  /  AlkPhos  86  01-15      PTT - ( 15 Kwame 2021 18:20 )  PTT:61.6 sec                                                                                     LIVER FUNCTIONS - ( 15 Kwame 2021 04:30 )  Alb: 2.7 g/dL / Pro: 5.4 g/dL / ALK PHOS: 86 U/L / ALT: 32 U/L / AST: 43 U/L / GGT: x                                                  Culture - Blood (collected 14 Jan 2021 12:58)  Source: .Blood Blood  Preliminary Report (16 Jan 2021 01:02):    No growth to date.                                                   Mode: AC/ CMV (Assist Control/ Continuous Mandatory Ventilation)  RR (machine): 34  TV (machine): 450  FiO2: 40  PEEP: 8  ITime: 1  MAP: 20  PIP: 34                                      ABG - ( 16 Jan 2021 03:14 )  pH, Arterial: 7.31  pH, Blood: x     /  pCO2: 49    /  pO2: 97    / HCO3: 25    / Base Excess: -1.3  /  SaO2: 97                  MEDICATIONS  (STANDING):  acetaminophen   Tablet .. 650 milliGRAM(s) Oral once  aspirin  chewable 81 milliGRAM(s) Oral daily  atorvastatin 20 milliGRAM(s) Oral at bedtime  chlorhexidine 0.12% Liquid 15 milliLiter(s) Oral Mucosa every 12 hours  chlorhexidine 4% Liquid 1 Application(s) Topical <User Schedule>  collagenase Ointment 1 Application(s) Topical two times a day  dexMEDEtomidine Infusion 0.2 MICROgram(s)/kG/Hr (4.85 mL/Hr) IV Continuous <Continuous>  dextrose 40% Gel 15 Gram(s) Oral once  dextrose 5%. 1000 milliLiter(s) (50 mL/Hr) IV Continuous <Continuous>  dextrose 5%. 1000 milliLiter(s) (100 mL/Hr) IV Continuous <Continuous>  dextrose 50% Injectable 25 Gram(s) IV Push once  dextrose 50% Injectable 12.5 Gram(s) IV Push once  dextrose 50% Injectable 25 Gram(s) IV Push once  epoetin raven-epbx (RETACRIT) Injectable 8000 Unit(s) IV Push <User Schedule>  fentaNYL   Infusion 0.501 MICROgram(s)/kG/Hr (4.85 mL/Hr) IV Continuous <Continuous>  glucagon  Injectable 1 milliGRAM(s) IntraMuscular once  heparin  Infusion 1800 Unit(s)/Hr (18 mL/Hr) IV Continuous <Continuous>  insulin regular Infusion 1 Unit(s)/Hr (1 mL/Hr) IV Continuous <Continuous>  levothyroxine 100 MICROGram(s) Oral daily  meropenem  IVPB 500 milliGRAM(s) IV Intermittent every 24 hours  meropenem  IVPB      metoprolol tartrate 12.5 milliGRAM(s) Oral two times a day  norepinephrine Infusion 0.05 MICROgram(s)/kG/Min (9.08 mL/Hr) IV Continuous <Continuous>  pantoprazole   Suspension 40 milliGRAM(s) Oral before breakfast  senna 2 Tablet(s) Oral at bedtime  vancomycin  IVPB 750 milliGRAM(s) IV Intermittent once    MEDICATIONS  (PRN):  acetaminophen   Tablet .. 650 milliGRAM(s) Oral every 6 hours PRN Temp greater or equal to 38C (100.4F), Mild Pain (1 - 3)      New X-rays reviewed:                                                                                  ECHO    CXR interpreted by me:  ET OG OK>  Bilateral infiltrates

## 2021-01-16 NOTE — PROGRESS NOTE ADULT - SUBJECTIVE AND OBJECTIVE BOX
patient seen and examined earlier today.  patient had HMD earlier today without difficulty.  patient is intubated.  sedated.  does open eyes in response to his name.  on P/e - T 99.1 pulse 96 /53 patient on fentanyl drip, heprin infusion, insulin infusion and norepinehrine and dexmedetomidine.  patient receiving peptamen 75 cc/hour.  has diarrhea, watery, has rectal tube  conj pale. no jaundice  neck supple.  has right IJ catheter with no redness or drainage from the site.  lungs clear bilaterally  heart has irregular rhythm. pos. systolic murmur  abd. has pos. BS soft   extremities show pos. edema upper and LE. no cyanosis    Labs (pre HMD)  wbc 30,900 Hg 7.6 hct 23.6 platelet count 300  Na 139 K 4.2 Cl 101 CO2 25 BUN 65 creatinine 4.7 Ca 8.0 alb 2.7 LFT's normal

## 2021-01-16 NOTE — PROGRESS NOTE ADULT - ASSESSMENT
Assessment:  60y Male patient admitted covid PNA consulted for trach and peg  Patient seen and examined at bedside. NAD.     Plan:      -Plan for trach and peg monday 1/18  - Monitor vitals and pre op on sunday  -consented  - Monitor labs and replete as necessary        Date/Time: 01-16-21 @ 01:42

## 2021-01-16 NOTE — PROGRESS NOTE ADULT - ASSESSMENT
Severe Covid Pneumonia   acute hypoxic respiratoyr failure sec. to severe covid pneumonia  ESRD - patient s/p HMD today  diarrhea - C. diff r/o - ?related to feeds vs. COVID   Anemia of chronic disease (CKD)    P:  HMD T-th-Sat - remove fluid if tolerated.  ICU care  evaluation of diarrhea  rest of care per primary team

## 2021-01-16 NOTE — PROGRESS NOTE ADULT - SUBJECTIVE AND OBJECTIVE BOX
Progress Note: General Surgery  Patient: ROGER RODRIGUES , 60y (1960)Male   MRN: [id number]  Location: [place] [address] A  Visit: 01-01-21 Inpatient  Date: 01-16-21 @ 01:42    Admit Diagnosis/Chief Complaint: covid PNA    Procedure/Diagnosis:  acute respiratory failure    Events/ 24h: No acute events overnight. Pain controlled.  T(C): 37.4 (01-15-21 @ 04:00), Max: 37.4 (01-15-21 @ 04:00)  HR: 79 (01-15-21 @ 21:05) (78 - 100)  BP: 122/50 (01-15-21 @ 18:00) (109/52 - 123/59)  RR: 34 (01-15-21 @ 18:00) (27 - 34)  SpO2: 100% (01-15-21 @ 21:05) (100% - 100%)  In:   01-14-21 @ 07:01  -  01-15-21 @ 07:00  --------------------------------------------------------  IN: 2403 mL      Out:   01-14-21 @ 07:01  -  01-15-21 @ 07:00  --------------------------------------------------------  OUT:    Other (mL): 2500 mL    Rectal Tube (mL): 50 mL  Total OUT: 2550 mL        Net:   01-14-21 @ 07:01  -  01-15-21 @ 07:00  --------------------------------------------------------  NET: -147 mL        Diet: Diet, NPO with Tube Feed:   Tube Feeding Modality: Orogastric  Peptamen A.F. Formula  Total Volume for 24 Hours (mL): 1800  Continuous  Until Goal Tube Feed Rate (mL per Hour): 75  Tube Feed Duration (in Hours): 24  Tube Feed Start Time: 13:15 (01-11-21 @ 13:00)    IV Fluids: dextrose 5%. 1000 milliLiter(s) (50 mL/Hr) IV Continuous <Continuous>  dextrose 5%. 1000 milliLiter(s) (100 mL/Hr) IV Continuous <Continuous>      PHYSICAL EXAM:  GENERAL: Intubated on vent  CHEST/LUNG: Clear to auscultation bilaterally  HEART: Regular rate and rhythm  ABDOMEN: Soft, Nontender, Nondistended;   EXTREMITIES:  No clubbing, cyanosis, or edema        Medications: [Standing]  acetaminophen   Tablet .. 650 milliGRAM(s) Oral once  aspirin  chewable 81 milliGRAM(s) Oral daily  atorvastatin 20 milliGRAM(s) Oral at bedtime  chlorhexidine 0.12% Liquid 15 milliLiter(s) Oral Mucosa every 12 hours  chlorhexidine 4% Liquid 1 Application(s) Topical <User Schedule>  dexMEDEtomidine Infusion 0.2 MICROgram(s)/kG/Hr (4.85 mL/Hr) IV Continuous <Continuous>  dextrose 40% Gel 15 Gram(s) Oral once  dextrose 5%. 1000 milliLiter(s) (50 mL/Hr) IV Continuous <Continuous>  dextrose 5%. 1000 milliLiter(s) (100 mL/Hr) IV Continuous <Continuous>  dextrose 50% Injectable 25 Gram(s) IV Push once  dextrose 50% Injectable 12.5 Gram(s) IV Push once  dextrose 50% Injectable 25 Gram(s) IV Push once  epoetin raven-epbx (RETACRIT) Injectable 8000 Unit(s) IV Push <User Schedule>  fentaNYL   Infusion 0.501 MICROgram(s)/kG/Hr (4.85 mL/Hr) IV Continuous <Continuous>  glucagon  Injectable 1 milliGRAM(s) IntraMuscular once  heparin  Infusion 1800 Unit(s)/Hr (18 mL/Hr) IV Continuous <Continuous>  insulin regular Infusion 1 Unit(s)/Hr (1 mL/Hr) IV Continuous <Continuous>  levothyroxine 100 MICROGram(s) Oral daily  meropenem  IVPB 500 milliGRAM(s) IV Intermittent every 24 hours  meropenem  IVPB      metoprolol tartrate 12.5 milliGRAM(s) Oral two times a day  norepinephrine Infusion 0.05 MICROgram(s)/kG/Min (9.08 mL/Hr) IV Continuous <Continuous>  pantoprazole   Suspension 40 milliGRAM(s) Oral before breakfast  senna 2 Tablet(s) Oral at bedtime  vancomycin    Solution 125 milliGRAM(s) Oral every 6 hours    DVT Prophylaxis: heparin  Infusion 1800 Unit(s)/Hr IV Continuous <Continuous>    GI Prophylaxis: pantoprazole   Suspension 40 milliGRAM(s) Oral before breakfast    Antibiotics: meropenem  IVPB 500 milliGRAM(s) IV Intermittent every 24 hours  meropenem  IVPB      vancomycin    Solution 125 milliGRAM(s) Oral every 6 hours    Anticoagulation:   Medications:[PRN]  acetaminophen   Tablet .. 650 milliGRAM(s) Oral every 6 hours PRN      Labs:                        7.3    33.61 )-----------( 252      ( 15 Kwame 2021 04:30 )             22.9     01-15    140  |  99  |  84<HH>  ----------------------------<  75  4.6   |  24  |  5.7<HH>    Ca    7.7<L>      15 Kwame 2021 04:30  Phos  5.0     01-15  Mg     2.4     01-15    TPro  5.4<L>  /  Alb  2.7<L>  /  TBili  0.8  /  DBili  x   /  AST  43<H>  /  ALT  32  /  AlkPhos  86  01-15    LIVER FUNCTIONS - ( 15 Kwame 2021 04:30 )  Alb: 2.7 g/dL / Pro: 5.4 g/dL / ALK PHOS: 86 U/L / ALT: 32 U/L / AST: 43 U/L / GGT: x           PTT - ( 15 Kwame 2021 04:30 )  PTT:67.6 sec  ABG - ( 15 Kwame 2021 03:43 )  pH: 7.26  /  pCO2: 60    /  pO2: 74    / HCO3: 27    / Base Excess: -0.7  /  SaO2: 93                      Urine/Micro:        Imaging:       < from: Xray Chest 1 View- PORTABLE-Routine (Xray Chest 1 View- PORTABLE-Routine in AM.) (01.14.21 @ 06:07) >  Impression:    Stable bilateral lung opacities    < end of copied text >

## 2021-01-17 NOTE — PROGRESS NOTE ADULT - SUBJECTIVE AND OBJECTIVE BOX
patient having new central line placed.  A line was DC earlier today  still having diarrhea.  patient remains intubated.  sedated.    on P/e - T 98.9 pulse 78 /48 patient on fentanyl drip, heparin infusion (temporarily stopped for inserton of line), insulin infusion and norepinehrine and dexmedetomidine.  patient receiving peptamen 75 cc/hour.  has diarrhea, watery, has rectal tube  conj pale. no jaundice  neck supple.  has right IJ catheter with no redness or drainage from the site.  lungs clear bilaterally  heart has irregular rhythm. pos. systolic murmur  abd. has pos. BS soft   extremities show pos. edema upper and LE. no cyanosis    labs today:  wbc 25.65 (improved) Hg 7.4 hct 23.3 platelet count 301K  Na 141 K 5.3 Cl 101 Co2 23 BUN 77  creatinine 5.2  Ca 7.8 alb 2.4 LFT"s normal

## 2021-01-17 NOTE — PROCEDURE NOTE - NSPOSTPRCRAD_GEN_A_CORE
central line located in the/central line located in the superior vena cava/no pneumothorax/post-procedure radiography performed
post-procedure radiography performed

## 2021-01-17 NOTE — PROGRESS NOTE ADULT - ASSESSMENT
Severe Covid Pneumonia   acute hypoxic respiratoyr failure sec. to severe covid pneumonia  ESRD - patient next HMD for Tues.  diarrhea - C. diff r/o - ?related to feeds vs. COVID   Anemia of chronic disease (CKD)    P:  HMD T-th-Sat - remove fluid if tolerated.  ICU care  evaluation and treatment of diarrhea  rest of care per primary team  poor prognosis

## 2021-01-17 NOTE — PROGRESS NOTE ADULT - SUBJECTIVE AND OBJECTIVE BOX
Patient is a 60y old  Male who presents with a chief complaint of s/p fall. (16 Jan 2021 17:50)        Over Night Events:  Remains on MV.  Sedated.  On Levophed         ROS:     All ROS are negative except HPI         PHYSICAL EXAM    ICU Vital Signs Last 24 Hrs  T(C): 37.2 (17 Jan 2021 08:00), Max: 37.7 (16 Jan 2021 16:00)  T(F): 98.9 (17 Jan 2021 08:00), Max: 99.9 (16 Jan 2021 16:00)  HR: 82 (17 Jan 2021 08:30) (76 - 114)  BP: 119/50 (17 Jan 2021 05:30) (110/52 - 149/55)  BP(mean): 73 (17 Jan 2021 05:30) (65 - 97)  ABP: 114/46 (17 Jan 2021 08:30) (96/42 - 152/58)  ABP(mean): 70 (17 Jan 2021 08:30) (42 - 92)  RR: 33 (17 Jan 2021 08:30) (19 - 42)  SpO2: 100% (17 Jan 2021 08:30) (78% - 100%)      CONSTITUTIONAL:  Well nourished.  Ill appearing. NAD    ENT:   Airway patent,   Mouth with normal mucosa.   No thrush    EYES:   Pupils equal,   Round and reactive to light.    CARDIAC:   Normal rate,   Regular rhythm.     edema      Vascular:  Normal systolic impulse  No Carotid bruits    RESPIRATORY:   No wheezing  Bilateral BS  Normal chest expansion  Not tachypneic,  No use of accessory muscles    GASTROINTESTINAL:  Abdomen soft,   Non-tender,   No guarding,   + BS    MUSCULOSKELETAL:   Range of motion is not limited,  No clubbing, cyanosis    NEUROLOGICAL:   Sedated     SKIN:   Skin normal color for race,   Warm and dry   No evidence of rash.    PSYCHIATRIC:   Sedated   No apparent risk to self or others.    HEMATOLOGICAL:  No cervical  lymphadenopathy.  no inguinal lymphadenopathy      01-16-21 @ 07:01  -  01-17-21 @ 07:00  --------------------------------------------------------  IN:    Dexmedetomidine: 691.5 mL    FentaNYL: 588 mL    Heparin: 450 mL    Insulin: 62 mL    IV PiggyBack: 300 mL    Norepinephrine: 222.2 mL    Peptamen A.F.: 1875 mL  Total IN: 4188.7 mL    OUT:    Other (mL): 3000 mL    Rectal Tube (mL): 300 mL  Total OUT: 3300 mL    Total NET: 888.7 mL      01-17-21 @ 07:01  -  01-17-21 @ 09:21  --------------------------------------------------------  IN:    Dexmedetomidine: 36.3 mL    FentaNYL: 29 mL    Heparin: 18 mL    Insulin: 10 mL    Norepinephrine: 12 mL  Total IN: 105.3 mL    OUT:  Total OUT: 0 mL    Total NET: 105.3 mL          LABS:                            7.4    25.65 )-----------( 301      ( 17 Jan 2021 04:30 )             23.3                                               01-17    141  |  101  |  77<HH>  ----------------------------<  155<H>  5.3<H>   |  23  |  5.2<HH>    Ca    7.8<L>      17 Jan 2021 04:30    TPro  5.2<L>  /  Alb  2.4<L>  /  TBili  0.7  /  DBili  x   /  AST  33  /  ALT  25  /  AlkPhos  106  01-17      PTT - ( 17 Jan 2021 04:30 )  PTT:55.0 sec                                                                                     LIVER FUNCTIONS - ( 17 Jan 2021 04:30 )  Alb: 2.4 g/dL / Pro: 5.2 g/dL / ALK PHOS: 106 U/L / ALT: 25 U/L / AST: 33 U/L / GGT: x                                                  Culture - Blood (collected 14 Jan 2021 12:58)  Source: .Blood Blood  Preliminary Report (16 Jan 2021 01:02):    No growth to date.                                                   Mode: AC/ CMV (Assist Control/ Continuous Mandatory Ventilation)  RR (machine): 34  TV (machine): 450  FiO2: 40  PEEP: 8  ITime: 1  MAP: 22  PIP: 39                                      ABG - ( 17 Jan 2021 03:01 )  pH, Arterial: 7.35  pH, Blood: x     /  pCO2: 45    /  pO2: 145   / HCO3: 25    / Base Excess: -0.7  /  SaO2: 99    PPL 25               MEDICATIONS  (STANDING):  acetaminophen   Tablet .. 650 milliGRAM(s) Oral once  aspirin  chewable 81 milliGRAM(s) Oral daily  atorvastatin 20 milliGRAM(s) Oral at bedtime  chlorhexidine 0.12% Liquid 15 milliLiter(s) Oral Mucosa every 12 hours  chlorhexidine 4% Liquid 1 Application(s) Topical <User Schedule>  collagenase Ointment 1 Application(s) Topical two times a day  collagenase Ointment 1 Application(s) Topical two times a day  dexMEDEtomidine Infusion 0.2 MICROgram(s)/kG/Hr (4.85 mL/Hr) IV Continuous <Continuous>  dextrose 40% Gel 15 Gram(s) Oral once  dextrose 5%. 1000 milliLiter(s) (50 mL/Hr) IV Continuous <Continuous>  dextrose 5%. 1000 milliLiter(s) (100 mL/Hr) IV Continuous <Continuous>  dextrose 50% Injectable 25 Gram(s) IV Push once  dextrose 50% Injectable 12.5 Gram(s) IV Push once  dextrose 50% Injectable 25 Gram(s) IV Push once  epoetin raven-epbx (RETACRIT) Injectable 8000 Unit(s) IV Push <User Schedule>  fentaNYL   Infusion 0.501 MICROgram(s)/kG/Hr (4.85 mL/Hr) IV Continuous <Continuous>  glucagon  Injectable 1 milliGRAM(s) IntraMuscular once  heparin  Infusion 1800 Unit(s)/Hr (18 mL/Hr) IV Continuous <Continuous>  insulin regular Infusion 1 Unit(s)/Hr (1 mL/Hr) IV Continuous <Continuous>  levothyroxine 100 MICROGram(s) Oral daily  meropenem  IVPB 500 milliGRAM(s) IV Intermittent every 24 hours  meropenem  IVPB      metoprolol tartrate 12.5 milliGRAM(s) Oral two times a day  norepinephrine Infusion 0.05 MICROgram(s)/kG/Min (9.08 mL/Hr) IV Continuous <Continuous>  pantoprazole   Suspension 40 milliGRAM(s) Oral before breakfast  senna 2 Tablet(s) Oral at bedtime    MEDICATIONS  (PRN):  acetaminophen   Tablet .. 650 milliGRAM(s) Oral every 6 hours PRN Temp greater or equal to 38C (100.4F), Mild Pain (1 - 3)      New X-rays reviewed:                                                                                  ECHO    CXR interpreted by me:  ET High.  OG OK>  bilateral infiltrates

## 2021-01-17 NOTE — PROGRESS NOTE ADULT - ASSESSMENT
IMPRESSION:    ESRD MWF for HD  Acute hypoxemic resp failure failed weaning  NSTEMI  Afib was on coumadin  severe COVID PNA  Superimposed bacteria PNA  inrease WBC/ diarrhea  C diff -  sacral ulcer    PLAN:    CNS: SAT.     HEENT: Oral care.    PULMONARY: Vent changes. Wean O2.  Monitor PPL and DP.  Trach in am     CARDIOVASCULAR:  I<O as tolerated.        GI: GI prophylaxis.  OG Feeding.  Bowel regimen.  PEG am.  NPO after midnight    RENAL: check lytes and replete PRN. Nephro F/UP result    INFECTIOUS DISEASE:  Continue ABX.  Send wound cultures.  FU with Burn     HEMATOLOGICAL: DVT Prophylaxis  IV Heparin FU PTT.  Hold IV haprin prior to TLC and For surgey am     ENDOCRINE:  Follow up FS.  Insulin protocol if needed.    MUSCULOSKELETAL:  bed rest     DC [place] and TLC     MICU     Prognosis poor

## 2021-01-17 NOTE — PROCEDURE NOTE - NSINFORMCONSENT_GEN_A_CORE
Benefits, risks, and possible complications of procedure explained to patient/caregiver who verbalized understanding and gave verbal consent.
Benefits, risks, and possible complications of procedure explained to patient/caregiver who verbalized understanding and gave verbal consent.
This was an emergent procedure.

## 2021-01-18 NOTE — PROGRESS NOTE ADULT - ASSESSMENT
Assessment:  60y Male patient admitted with covid pneumonia.   Patient seen and examined at bedside. NAD. Trach and Peg this morning    Plan:  Preopped  Heparin drip stopped at 3 AM  NPO at midnight  Booked and Consented    Date/Time: 01-18-21 @ 03:03

## 2021-01-18 NOTE — PRE-ANESTHESIA EVALUATION ADULT - NSANTHADDINFOFT_GEN_ALL_CORE
59 y/o man evaluated for tracheostomy and PEG.  ASA 4.  Plan GA.  Plan, benefits, foreseeable risks, viable alternatives discussed with patient's sister and all her pertinent questions answered and she understands and elects to proceed.

## 2021-01-18 NOTE — PROGRESS NOTE ADULT - ASSESSMENT
59 yo male with PMHx of afib on coumadin, DM2, ESRD on HD MWF, HLD, HTN, CVA, who presented w/ weakness and fall.     #Acute hypoxemic respiratory failure   #Covid-19 PNA  - s/p intubation   - s/p Azithromycin discontinued on 1/8/2021, s/p Cefepime discontinued on 1/11/2021  - s/p Dexamethasone 6mg IV once daily (1/1/2021) x10 days  - Covid antibodies: reported negative --> s/p 2 units of Convalescent plasma ( 1/8/2021 and 1/12/2021)  - f/u inflammatory markers   - s/p tocilizumab 400mg IV x1 on 1/8/2021  - Ferritin 1/10/2021 ( 48 hours after tocilizumab): 4895 ( increased compared to ferritin on 1/1/2021)  - SBT 1/12/2021 failed ( was on a CPAP for 30 minutes, after which he started becoming tachycardic and hypertensive)   - SBT 1/14/2021 failed, the patient was agitated, bit the ET tube --> Leakage -- > Reintubation/change of ET tube, will therefore consult surgery for tracheostomy and PEG tube  - Trach and peg today w/ surgery     #Leukocytosis   #Sacral ulcer, heel ulcers   - Multifactorial ( Possible overlying infection, steroids)   - Latest procalcitonin  on 1/8: 6.31 ( Procalcitonin on 1/7: 4.89)  - Diarrhea present, C.diff negative, DC po Vancomycin  - DC cefepime  - Started Meropenem 0.5g IV once daily (renally adjusted dose)  - Vancomycin trough level 1/14/2021: 25.7, Next vancomycin dose should be given on Saturday 1/16/2021 750mg IV once post-HD  - Fungitell ( sent on 1/10/2021, resulted on 1/14/2021): 38 ( negative)  - f/u burn     #Altered Mental Status:   - EEG diffuse slowing, but no seizure activity, check the report above  - CT brain shows ventriculomegaly (check full report), no IC bleed, possible old infarct.  - neurology consulted and recs:   a. CT brain negative x2 for stroke or other structural pathology.  EEG negative for any epileptiform activity.  Suspect multifactorial metabolic encephalopathy in setting of COVID and ESRD and sedatives.  Wean sedation as able, can reassess if patient does not awaken after that time.  b. No further EEG or other tests   c. To be reassessed after sedation is weaned    #Fluid overload  #ESRD:   - HD as per nephrology   - EPO (retacrit) 8000 units IV push once weekly  - DC sevelamer as per nephro   - Will DC Sodium bicarbonate as per nephro    #Atrial fibrillation:  - Paroxysmal ? Currently NSRT, well rate controlled.   - Heparin gtt, follow up aPTT q6hrs until therapeutic   - Metoprolol tartrate 12.5mg po q12hrs    - Will DC levophed  - CT brain no IC bleed or concerns of IC bleed ( Heparin drip started)  - aPTT being followed, all within therapeutic range    #NSTEMI:   - Type II MI, demand ischemia, likely secondary to hypoxia secondary to COVID-19 pneumonia  - Aspirin 81mg po once daily   - CT brain no IC bleed or concerns of IC bleed   - On heparin gtt ( for atrial fibrillation now)  - Repeat CK-MB and CPK were stable    #Hypothyroidism:  - Continue with levothyroxine 100mcg po once daily     #Hyponatremia:   - Resolved  - Likely hypervolemic hyponatremia secondary to fluid overload   - Continue with Lasix and dialysis sessions     #Sacral Ulcer:   - Stage IV Sacra ulcer  - Call Burn team for evaluation and possible debridement as it may be the source of sacral ulcer   61 yo male with PMHx of afib on coumadin, DM2, ESRD on HD MWF, HLD, HTN, CVA, who presented w/ weakness and fall.     #Acute hypoxemic respiratory failure   #Covid-19 PNA  #Superimposed bacterial PNA   - s/p intubation   - s/p Azithromycin discontinued on 1/8/2021, s/p Cefepime discontinued on 1/11/2021  - s/p Dexamethasone 6mg IV once daily (1/1/2021) x10 days  - Covid antibodies: reported negative --> s/p 2 units of Convalescent plasma ( 1/8/2021 and 1/12/2021)  - f/u inflammatory markers   - s/p tocilizumab 400mg IV x1 on 1/8/2021  - Ferritin 1/10/2021 ( 48 hours after tocilizumab): 4895 ( increased compared to ferritin on 1/1/2021)  - SBT 1/12/2021 failed ( was on a CPAP for 30 minutes, after which he started becoming tachycardic and hypertensive)   - SBT 1/14/2021 failed, the patient was agitated, bit the ET tube --> Leakage -- > Reintubation/change of ET tube, will therefore consult surgery for tracheostomy and PEG tube  - Trach and peg today w/ surgery   - started on sodium bicarb     #Leukocytosis   #Sacral ulcer, heel ulcers   - Multifactorial ( Possible overlying infection, steroids)   - Latest procalcitonin  on 1/8: 6.31 ( Procalcitonin on 1/7: 4.89)  - Diarrhea present, C.diff negative, DC po Vancomycin  - DC cefepime  - Started Meropenem 0.5g IV once daily (renally adjusted dose)  - Vancomycin trough level 1/14/2021: 25.7, Next vancomycin dose should be given on Saturday 1/16/2021 750mg IV once post-HD  - Fungitell ( sent on 1/10/2021, resulted on 1/14/2021): 38 ( negative)  - f/u burn:  Wound care - Santyl/ Wet Gauze w/ NS/DPD BID  Possible Surgical debridement next week  IV abx as indicated/ per ID  Offloading/ positional changes  Will follow    #Hyperkalemia  -increased Lokelma 10 q8h     #Altered Mental Status- improved  - EEG diffuse slowing, but no seizure activity, check the report above  - CT brain shows ventriculomegaly (check full report), no IC bleed, possible old infarct.  - neurology consulted and recs:   a. CT brain negative x2 for stroke or other structural pathology.  EEG negative for any epileptiform activity.  Suspect multifactorial metabolic encephalopathy in setting of COVID and ESRD and sedatives.  Wean sedation as able, can reassess if patient does not awaken after that time.  b. No further EEG or other tests   c. To be reassessed after sedation is weaned    #ESRD on HD   - EPO (retacrit) 8000 units IV push once weekly  - nephro following, f/u reccs     #Atrial fibrillation  - Paroxysmal ? Currently NSRT, well rate controlled.   - on Heparin gtt, holding home coumadin, follow up aPTT   - Metoprolol tartrate 12.5mg po q12hrs    - Will DC levophed  - CT brain no IC bleed or concerns of IC bleed     #NSTEMI  - Type II MI, demand ischemia, likely secondary to hypoxia secondary to COVID-19 pneumonia  - Aspirin 81mg po once daily   - CT brain no IC bleed or concerns of IC bleed   - On heparin gtt ( for atrial fibrillation now)  - Repeat CK-MB and CPK were stable    #Hypothyroidism: Continue with levothyroxine 100mcg po once daily     Diet: NPO for trach/peg- f/u surgery reccs for restarting feeds  DVT ppx: Heparin gtt, restart today monitor ptt   GI ppx: Protonix 40 qd    61 yo male with PMHx of afib on coumadin, DM2, ESRD on HD MWF, HLD, HTN, CVA, who presented w/ weakness and fall.     #Acute hypoxemic respiratory failure   #Covid-19 PNA  #Superimposed bacterial PNA   - s/p intubation   - s/p Azithromycin discontinued on 1/8/2021, s/p Cefepime discontinued on 1/11/2021  - s/p Dexamethasone 6mg IV once daily (1/1/2021) x10 days  - Covid antibodies: reported negative --> s/p 2 units of Convalescent plasma ( 1/8/2021 and 1/12/2021)  - f/u inflammatory markers   - s/p tocilizumab 400mg IV x1 on 1/8/2021  - Ferritin 1/10/2021 ( 48 hours after tocilizumab): 4895 ( increased compared to ferritin on 1/1/2021)  - SBT 1/12/2021 failed ( was on a CPAP for 30 minutes, after which he started becoming tachycardic and hypertensive)   - SBT 1/14/2021 failed, the patient was agitated, bit the ET tube --> Leakage -- > Reintubation/change of ET tube, will therefore consult surgery for tracheostomy and PEG tube  - Trach and peg today w/ surgery   - started on sodium bicarb     #Leukocytosis   #Sacral ulcer, heel ulcers   - Multifactorial ( Possible overlying infection, steroids)   - Latest procalcitonin  on 1/8: 6.31 ( Procalcitonin on 1/7: 4.89)  - Diarrhea present, C.diff negative, DC po Vancomycin  - DC cefepime  - Started Meropenem 0.5g IV once daily (renally adjusted dose)  - Vancomycin trough level 1/14/2021: 25.7, Next vancomycin dose should be given on Saturday 1/16/2021 750mg IV once post-HD  - Fungitell ( sent on 1/10/2021, resulted on 1/14/2021): 38 ( negative)  - f/u burn:  Wound care - Santyl/ Wet Gauze w/ NS/DPD BID  Possible Surgical debridement next week  IV abx as indicated/ per ID  Offloading/ positional changes  Will follow    #Hyperkalemia  -increased Lokelma 10 q8h     #Altered Mental Status- improved  - EEG diffuse slowing, but no seizure activity, check the report above  - CT brain shows ventriculomegaly (check full report), no IC bleed, possible old infarct.  - neurology consulted and recs:   a. CT brain negative x2 for stroke or other structural pathology.  EEG negative for any epileptiform activity.  Suspect multifactorial metabolic encephalopathy in setting of COVID and ESRD and sedatives.  Wean sedation as able, can reassess if patient does not awaken after that time.  b. No further EEG or other tests   c. To be reassessed after sedation is weaned    #ESRD on HD   - EPO (retacrit) 8000 units IV push once weekly  - nephro following, f/u reccs     #Atrial fibrillation  - Paroxysmal ? Currently NSRT, well rate controlled.   - on Heparin gtt, holding home coumadin, follow up aPTT   - Metoprolol tartrate 12.5mg po q12hrs    - Will DC levophed  - CT brain no IC bleed or concerns of IC bleed     #NSTEMI  - Type II MI, demand ischemia, likely secondary to hypoxia secondary to COVID-19 pneumonia  - Aspirin 81mg po once daily   - CT brain no IC bleed or concerns of IC bleed   - On heparin gtt ( for atrial fibrillation now)  - Repeat CK-MB and CPK were stable    #Hypothyroidism: Continue with levothyroxine 100mcg po once daily     Diet: NPO for trach/peg- f/u surgery reccs for restarting feeds  DVT ppx: Heparin gtt, restart today monitor ptt   GI ppx: Protonix 40 qd     Updated patient's sister Nedra Shea on [phone] regarding patient's condition.

## 2021-01-18 NOTE — PROGRESS NOTE ADULT - SUBJECTIVE AND OBJECTIVE BOX
SUBJECTIVE:    Patient is a 60y old Male who presents with a chief complaint of s/p fall. (18 Jan 2021 09:02)    Currently admitted to medicine with the primary diagnosis of Fever    Today is hospital day 17d. This morning he is awake, following commands. Heparin gtt held overnight for trach/peg this AM.       PAST MEDICAL & SURGICAL HISTORY  PAD (peripheral artery disease)  multiple toe amputations    Anemia  chronic anemia - s/p transfusion 2018    Thyroid cancer    Chronic leg pain    OA (osteoarthritis)    SOB (shortness of breath) on exertion    ESRD (end stage renal disease)  2 yrs    Atrial fibrillation  on warfarin    Right sided weakness    Stroke  8 yrs ago    Hypertension    High blood cholesterol    Diabetes mellitus, type 2    Dialysis patient  Mon, Wednesday, Friday ([facility])    Smoker    H/O thyroid nodule    H/O gastroesophageal reflux (GERD)    History of tonsillectomy    History of surgery  Multiple toe amputations (amp 4 1/2 left toes; has 1/2 left mid toe; amp right mid toe)    A-V fistula  left AVF      SOCIAL HISTORY:  Negative for smoking/alcohol/drug use.     ALLERGIES:  Orange (Other)  Originally Entered as [HIVES] reaction to [sulfur] (Unknown)  penicillin (Unknown)  sulfADIAZINE (Unknown)    MEDICATIONS:  STANDING MEDICATIONS  acetaminophen   Tablet .. 650 milliGRAM(s) Oral once  aspirin  chewable 81 milliGRAM(s) Oral daily  atorvastatin 20 milliGRAM(s) Oral at bedtime  chlorhexidine 0.12% Liquid 15 milliLiter(s) Oral Mucosa every 12 hours  chlorhexidine 4% Liquid 1 Application(s) Topical <User Schedule>  collagenase Ointment 1 Application(s) Topical two times a day  dexMEDEtomidine Infusion 0.2 MICROgram(s)/kG/Hr IV Continuous <Continuous>  dextrose 40% Gel 15 Gram(s) Oral once  dextrose 5%. 1000 milliLiter(s) IV Continuous <Continuous>  dextrose 5%. 1000 milliLiter(s) IV Continuous <Continuous>  dextrose 50% Injectable 25 Gram(s) IV Push once  dextrose 50% Injectable 12.5 Gram(s) IV Push once  dextrose 50% Injectable 25 Gram(s) IV Push once  epoetin raven-epbx (RETACRIT) Injectable 8000 Unit(s) IV Push <User Schedule>  fentaNYL   Infusion 0.501 MICROgram(s)/kG/Hr IV Continuous <Continuous>  glucagon  Injectable 1 milliGRAM(s) IntraMuscular once  insulin regular Infusion 1 Unit(s)/Hr IV Continuous <Continuous>  levothyroxine 100 MICROGram(s) Oral daily  meropenem  IVPB 500 milliGRAM(s) IV Intermittent every 24 hours  meropenem  IVPB      metoprolol tartrate 12.5 milliGRAM(s) Oral two times a day  norepinephrine Infusion 0.05 MICROgram(s)/kG/Min IV Continuous <Continuous>  pantoprazole   Suspension 40 milliGRAM(s) Oral before breakfast  senna 2 Tablet(s) Oral at bedtime  sodium bicarbonate 650 milliGRAM(s) Oral every 8 hours  sodium chloride 0.9%. 1000 milliLiter(s) IV Continuous <Continuous>  sodium zirconium cyclosilicate 10 Gram(s) Oral three times a day    PRN MEDICATIONS  acetaminophen   Tablet .. 650 milliGRAM(s) Oral every 6 hours PRN    VITALS:   T(F): 98.4  HR: 72  BP: 89/44  RR: 33  SpO2: 96%    PHYSICAL EXAM:  GEN: Intubated, awake   LUNGS: Decreased breath sounds, mild wheezing   HEART: S1/S2 present. RRR.   ABD: Soft, non-tender, non-distended. Bowel sounds present.  EXT: Anasarca   NEURO: on sedation, following commands     Garza catheter present     LABS:                        7.6    22.80 )-----------( 339      ( 18 Jan 2021 04:45 )             24.0     01-18    139  |  103  |  93<HH>  ----------------------------<  178<H>  4.9   |  21  |  5.7<HH>    Ca    8.0<L>      18 Jan 2021 09:55  Phos  7.2     01-18  Mg     2.5     01-18    TPro  5.5<L>  /  Alb  2.8<L>  /  TBili  1.0  /  DBili  x   /  AST  37  /  ALT  26  /  AlkPhos  102  01-18    PT/INR - ( 18 Jan 2021 04:45 )   PT: 13.60 sec;   INR: 1.18 ratio         PTT - ( 18 Jan 2021 04:45 )  PTT:27.9 sec    ABG - ( 18 Jan 2021 03:00 )  pH, Arterial: 7.33  pH, Blood: x     /  pCO2: 42    /  pO2: 103   / HCO3: 22    / Base Excess: -3.8  /  SaO2: 98          RADIOLOGY:  < from: Xray Chest 1 View- PORTABLE-Routine (Xray Chest 1 View- PORTABLE-Routine in AM.) (01.18.21 @ 06:14) >  EXAM:  XR CHEST PORTABLE ROUTINE 1V          PROCEDURE DATE:  01/18/2021      INTERPRETATION:  Clinical History / Reason for exam: Covid. Shortness of breath.    Comparison : Chest radiograph January 17, 2021.    Technique/Positioning: Adequate.    Findings:    Support devices: ETT with its tip above the tracey, NGT with its tip below the diaphragm and a left IJ line with its tip overlying the SVC.    Cardiac/mediastinum/hilum: Cardiomegaly, unchanged.    Lung parenchyma/Pleura: Bilateral opacities, unchanged. No pneumothorax is seen.    Skeleton/soft tissues: Stable    Impression:    Cardiomegaly and bilateral opacities, unchanged.    CHARITY EWDARDS MD; Attending Radiologist  This document has been electronically signed. Jan 18 2021  7:07AM    < end of copied text >

## 2021-01-18 NOTE — PROGRESS NOTE ADULT - ASSESSMENT
IMPRESSION:    ESRD MWF for HD  Acute hypoxemic resp failure failed weaning  NSTEMI  Afib was on coumadin  Severe COVID PNA  Superimposed bacteria PNA  Sacral ulcer    PLAN:    CNS: SAT.     HEENT: Oral care.    PULMONARY: Vent changes. Wean O2.  Monitor PPL and DP.  Wean o2.  Awaiting trach and PEG     CARDIOVASCULAR:  I<O as tolerated.        GI: GI prophylaxis.  OG Feeding.  Bowel regimen.  Feeding per Surgery    RENAL: check lytes and replete PRN. Nephro F/UP result.  K therapy.  Repeat VBG for K      INFECTIOUS DISEASE:  Continue ABX.  Send wound cultures.  FU with Burn     HEMATOLOGICAL: DVT Prophylaxis  IV Heparin FU PTT.       ENDOCRINE:  Follow up FS.  Insulin protocol if needed.    MUSCULOSKELETAL:  bed rest.  FU with Burn     MICU     Prognosis poor

## 2021-01-18 NOTE — PROGRESS NOTE ADULT - SUBJECTIVE AND OBJECTIVE BOX
Progress Note: General Surgery  Patient: ROGER RODRIGUES , 60y (1960)Male   MRN: [id number]  Location: [place] [address] A  Visit: 01-01-21 Inpatient  Date: 01-18-21 @ 03:03    Admit Diagnosis/Chief Complaint: COVID pneumonia, consulted for trach and peg. OR this morning    Vitals: T(F): 98.9 (01-18-21 @ 00:00), Max: 99.4 (01-17-21 @ 12:00)  HR: 80 (01-18-21 @ 00:00)  BP: 111/49 (01-18-21 @ 00:00) (78/41 - 157/117)  RR: 19 (01-18-21 @ 00:00)  SpO2: 100% (01-18-21 @ 00:00)  RR (machine): 34, TV (machine): 450, FiO2: 40, PEEP: 8, PIP: 42  In:   01-16-21 @ 07:01  -  01-17-21 @ 07:00  --------------------------------------------------------  IN: 4188.7 mL    01-17-21 @ 07:01  -  01-18-21 @ 03:03  --------------------------------------------------------  IN: 1777.7 mL      Out:   01-16-21 @ 07:01  -  01-17-21 @ 07:00  --------------------------------------------------------  OUT:    Other (mL): 3000 mL    Rectal Tube (mL): 300 mL  Total OUT: 3300 mL      01-17-21 @ 07:01  -  01-18-21 @ 03:03  --------------------------------------------------------  OUT:    Rectal Tube (mL): 100 mL  Total OUT: 100 mL        Net:   01-16-21 @ 07:01  -  01-17-21 @ 07:00  --------------------------------------------------------  NET: 888.7 mL    01-17-21 @ 07:01  -  01-18-21 @ 03:03  --------------------------------------------------------  NET: 1677.7 mL        Diet: Diet, NPO after Midnight:      NPO Start Date: 18-Jan-2021,   NPO Start Time: 23:59  Except Medications (01-18-21 @ 00:44)  Diet, NPO after Midnight:      NPO Start Date: 17-Jan-2021,   NPO Start Time: 23:59 (01-17-21 @ 09:26)  Diet, NPO with Tube Feed:   Tube Feeding Modality: Orogastric  Peptamen A.F. Formula  Total Volume for 24 Hours (mL): 1800  Continuous  Until Goal Tube Feed Rate (mL per Hour): 75  Tube Feed Duration (in Hours): 24  Tube Feed Start Time: 13:15 (01-11-21 @ 13:00)    IV Fluids: dextrose 5%. 1000 milliLiter(s) (50 mL/Hr) IV Continuous <Continuous>  dextrose 5%. 1000 milliLiter(s) (100 mL/Hr) IV Continuous <Continuous>      Physical Examination:  General Appearance: NAD, intubated  HEENT: EOMI, sclera non-icteric.  Heart: RRR   Lungs: CTABL. intubated  Abdomen:  Soft, nontender, nondistended.   MSK/Extremities: Warm & well-perfused.   Skin: Warm, dry. No jaundice.       Medications: [Standing]  acetaminophen   Tablet .. 650 milliGRAM(s) Oral once  aspirin  chewable 81 milliGRAM(s) Oral daily  atorvastatin 20 milliGRAM(s) Oral at bedtime  chlorhexidine 0.12% Liquid 15 milliLiter(s) Oral Mucosa every 12 hours  chlorhexidine 4% Liquid 1 Application(s) Topical <User Schedule>  collagenase Ointment 1 Application(s) Topical two times a day  dexMEDEtomidine Infusion 0.2 MICROgram(s)/kG/Hr (4.85 mL/Hr) IV Continuous <Continuous>  dextrose 40% Gel 15 Gram(s) Oral once  dextrose 5%. 1000 milliLiter(s) (50 mL/Hr) IV Continuous <Continuous>  dextrose 5%. 1000 milliLiter(s) (100 mL/Hr) IV Continuous <Continuous>  dextrose 50% Injectable 25 Gram(s) IV Push once  dextrose 50% Injectable 12.5 Gram(s) IV Push once  dextrose 50% Injectable 25 Gram(s) IV Push once  epoetin raven-epbx (RETACRIT) Injectable 8000 Unit(s) IV Push <User Schedule>  fentaNYL   Infusion 0.501 MICROgram(s)/kG/Hr (4.85 mL/Hr) IV Continuous <Continuous>  glucagon  Injectable 1 milliGRAM(s) IntraMuscular once  insulin regular Infusion 1 Unit(s)/Hr (1 mL/Hr) IV Continuous <Continuous>  levothyroxine 100 MICROGram(s) Oral daily  meropenem  IVPB 500 milliGRAM(s) IV Intermittent every 24 hours  meropenem  IVPB      metoprolol tartrate 12.5 milliGRAM(s) Oral two times a day  norepinephrine Infusion 0.05 MICROgram(s)/kG/Min (9.08 mL/Hr) IV Continuous <Continuous>  pantoprazole   Suspension 40 milliGRAM(s) Oral before breakfast  senna 2 Tablet(s) Oral at bedtime    DVT Prophylaxis:   GI Prophylaxis: pantoprazole   Suspension 40 milliGRAM(s) Oral before breakfast    Antibiotics: meropenem  IVPB 500 milliGRAM(s) IV Intermittent every 24 hours  meropenem  IVPB        Anticoagulation:   Medications:[PRN]  acetaminophen   Tablet .. 650 milliGRAM(s) Oral every 6 hours PRN      Labs:                        7.1    20.62 )-----------( 326      ( 17 Jan 2021 21:50 )             22.5     01-17    138  |  100  |  89<HH>  ----------------------------<  178<H>  5.3<H>   |  22  |  6.0<HH>    Ca    8.3<L>      17 Jan 2021 21:50  Mg     2.5     01-17    TPro  5.6<L>  /  Alb  2.4<L>  /  TBili  0.8  /  DBili  x   /  AST  32  /  ALT  25  /  AlkPhos  106  01-17    LIVER FUNCTIONS - ( 17 Jan 2021 21:50 )  Alb: 2.4 g/dL / Pro: 5.6 g/dL / ALK PHOS: 106 U/L / ALT: 25 U/L / AST: 32 U/L / GGT: x           PT/INR - ( 17 Jan 2021 21:50 )   PT: 14.20 sec;   INR: 1.23 ratio         PTT - ( 17 Jan 2021 21:50 )  PTT:32.5 sec  ABG - ( 17 Jan 2021 03:01 )  pH: 7.35  /  pCO2: 45    /  pO2: 145   / HCO3: 25    / Base Excess: -0.7  /  SaO2: 99

## 2021-01-18 NOTE — CHART NOTE - NSCHARTNOTEFT_GEN_A_CORE
Post Operative Note  Patient: ROGER RODRIGUES 60y (1960) Male   MRN: [id number]  Location: [facility] [address]  Visit: 01-01-21 Inpatient  Date: 01-18-21 @ 16:43    Procedure: S/P Trach and PEG      Objective:  Vitals: T(F): 98.4 (01-18-21 @ 11:50), Max: 98.9 (01-18-21 @ 00:00)  HR: 88 (01-18-21 @ 16:00)  BP: 85/48 (01-18-21 @ 16:00) (78/41 - 129/59)  RR: 33 (01-18-21 @ 16:00)  SpO2: 100% (01-18-21 @ 16:00)  Vent Settings: Mode: AC/ CMV (Assist Control/ Continuous Mandatory Ventilation), RR (machine): 34, TV (machine): 450, FiO2: 40, PEEP: 8, MAP: 18, PIP: 30    In:   01-17-21 @ 07:01  -  01-18-21 @ 07:00  --------------------------------------------------------  IN: 3249.9 mL    01-18-21 @ 07:01  -  01-18-21 @ 16:43  --------------------------------------------------------  IN: 1029.5 mL      IV Fluids: dextrose 5%. 1000 milliLiter(s) (50 mL/Hr) IV Continuous <Continuous>  dextrose 5%. 1000 milliLiter(s) (100 mL/Hr) IV Continuous <Continuous>  sodium bicarbonate 650 milliGRAM(s) Oral every 8 hours  sodium chloride 0.9%. 1000 milliLiter(s) (50 mL/Hr) IV Continuous <Continuous>      Out:   01-17-21 @ 07:01  -  01-18-21 @ 07:00  --------------------------------------------------------  OUT: 200 mL    01-18-21 @ 07:01  -  01-18-21 @ 16:43  --------------------------------------------------------  OUT: 0 mL      EBL:     Voided Urine:   01-17-21 @ 07:01  -  01-18-21 @ 07:00  --------------------------------------------------------  OUT: 200 mL    01-18-21 @ 07:01  -  01-18-21 @ 16:43  --------------------------------------------------------  OUT: 0 mL      Garza Catheter: yes no   Drains:   JERRY:    ,   Chest Tube:      NG Tube:       Physical Examination:  General Appearance: Trach in place, vented  HEENT: EOMI, sclera non-icteric.  Heart: normal chest expansion  Lungs: CTABL  Abdomen:  Soft, PEG in place      Medications: [Standing]  acetaminophen   Tablet .. 650 milliGRAM(s) Oral every 6 hours PRN  acetaminophen   Tablet .. 650 milliGRAM(s) Oral once  aspirin  chewable 81 milliGRAM(s) Oral daily  atorvastatin 20 milliGRAM(s) Oral at bedtime  chlorhexidine 0.12% Liquid 15 milliLiter(s) Oral Mucosa every 12 hours  chlorhexidine 4% Liquid 1 Application(s) Topical <User Schedule>  collagenase Ointment 1 Application(s) Topical two times a day  dexMEDEtomidine Infusion 0.2 MICROgram(s)/kG/Hr IV Continuous <Continuous>  dextrose 40% Gel 15 Gram(s) Oral once  dextrose 5%. 1000 milliLiter(s) IV Continuous <Continuous>  dextrose 5%. 1000 milliLiter(s) IV Continuous <Continuous>  dextrose 50% Injectable 25 Gram(s) IV Push once  dextrose 50% Injectable 12.5 Gram(s) IV Push once  dextrose 50% Injectable 25 Gram(s) IV Push once  epoetin raven-epbx (RETACRIT) Injectable 8000 Unit(s) IV Push <User Schedule>  fentaNYL   Infusion 0.501 MICROgram(s)/kG/Hr IV Continuous <Continuous>  glucagon  Injectable 1 milliGRAM(s) IntraMuscular once  heparin  Infusion 1800 Unit(s)/Hr IV Continuous <Continuous>  insulin regular Infusion 1 Unit(s)/Hr IV Continuous <Continuous>  levothyroxine 100 MICROGram(s) Oral daily  metoprolol tartrate 12.5 milliGRAM(s) Oral two times a day  norepinephrine Infusion 0.05 MICROgram(s)/kG/Min IV Continuous <Continuous>  pantoprazole   Suspension 40 milliGRAM(s) Oral before breakfast  senna 2 Tablet(s) Oral at bedtime  sodium bicarbonate 650 milliGRAM(s) Oral every 8 hours  sodium chloride 0.9%. 1000 milliLiter(s) IV Continuous <Continuous>  sodium zirconium cyclosilicate 10 Gram(s) Oral three times a day    Medications: [PRN]  acetaminophen   Tablet .. 650 milliGRAM(s) Oral every 6 hours PRN  acetaminophen   Tablet .. 650 milliGRAM(s) Oral once  aspirin  chewable 81 milliGRAM(s) Oral daily  atorvastatin 20 milliGRAM(s) Oral at bedtime  chlorhexidine 0.12% Liquid 15 milliLiter(s) Oral Mucosa every 12 hours  chlorhexidine 4% Liquid 1 Application(s) Topical <User Schedule>  collagenase Ointment 1 Application(s) Topical two times a day  dexMEDEtomidine Infusion 0.2 MICROgram(s)/kG/Hr IV Continuous <Continuous>  dextrose 40% Gel 15 Gram(s) Oral once  dextrose 5%. 1000 milliLiter(s) IV Continuous <Continuous>  dextrose 5%. 1000 milliLiter(s) IV Continuous <Continuous>  dextrose 50% Injectable 25 Gram(s) IV Push once  dextrose 50% Injectable 12.5 Gram(s) IV Push once  dextrose 50% Injectable 25 Gram(s) IV Push once  epoetin raven-epbx (RETACRIT) Injectable 8000 Unit(s) IV Push <User Schedule>  fentaNYL   Infusion 0.501 MICROgram(s)/kG/Hr IV Continuous <Continuous>  glucagon  Injectable 1 milliGRAM(s) IntraMuscular once  heparin  Infusion 1800 Unit(s)/Hr IV Continuous <Continuous>  insulin regular Infusion 1 Unit(s)/Hr IV Continuous <Continuous>  levothyroxine 100 MICROGram(s) Oral daily  metoprolol tartrate 12.5 milliGRAM(s) Oral two times a day  norepinephrine Infusion 0.05 MICROgram(s)/kG/Min IV Continuous <Continuous>  pantoprazole   Suspension 40 milliGRAM(s) Oral before breakfast  senna 2 Tablet(s) Oral at bedtime  sodium bicarbonate 650 milliGRAM(s) Oral every 8 hours  sodium chloride 0.9%. 1000 milliLiter(s) IV Continuous <Continuous>  sodium zirconium cyclosilicate 10 Gram(s) Oral three times a day    Labs:                        7.6    22.80 )-----------( 339      ( 18 Jan 2021 04:45 )             24.0     01-18    139  |  103  |  93<HH>  ----------------------------<  178<H>  4.9   |  21  |  5.7<HH>    Ca    8.0<L>      18 Jan 2021 09:55  Phos  7.2     01-18  Mg     2.5     01-18    TPro  5.5<L>  /  Alb  2.8<L>  /  TBili  1.0  /  DBili  x   /  AST  37  /  ALT  26  /  AlkPhos  102  01-18    PT/INR - ( 18 Jan 2021 04:45 )   PT: 13.60 sec;   INR: 1.18 ratio         PTT - ( 18 Jan 2021 04:45 )  PTT:27.9 sec      Imaging:  No post-op imaging studies    Assessment:  60yMale patient S/P Trach and PEG    Plan:    Start PEG feeds 1/19/2021 @ 12pm  Monitor vent and vitals  Restart Heparin drip 6 hours post procedure- restart @ 8pm 1/19  Date/Time: 01-18-21 @ 16:43

## 2021-01-18 NOTE — PROGRESS NOTE ADULT - SUBJECTIVE AND OBJECTIVE BOX
Patient is a 60y old  Male who presents with a chief complaint of s/p fall. (18 Jan 2021 03:03)        Over Night Events:  Remains on MV.  On Levophed 0.03.  For trach and PEG.  Sedated         ROS:     All ROS are negative except HPI         PHYSICAL EXAM    ICU Vital Signs Last 24 Hrs  T(C): 37.1 (18 Jan 2021 08:46), Max: 37.4 (17 Jan 2021 12:00)  T(F): 98.7 (18 Jan 2021 08:38), Max: 99.4 (17 Jan 2021 12:00)  HR: 76 (18 Jan 2021 08:46) (72 - 108)  BP: 115/49 (18 Jan 2021 08:46) (78/41 - 157/117)  BP(mean): 71 (18 Jan 2021 08:46) (55 - 145)  ABP: 122/50 (17 Jan 2021 14:00) (116/46 - 154/54)  ABP(mean): 74 (17 Jan 2021 14:00) (70 - 90)  RR: 34 (18 Jan 2021 08:46) (7 - 34)  SpO2: 100% (18 Jan 2021 08:46) (94% - 100%)      CONSTITUTIONAL:  Well nourished. Ill appearing.  NAD    ENT:   Airway patent,   Mouth with normal mucosa.   No thrush    EYES:   Pupils equal,   Round and reactive to light.    CARDIAC:   Normal rate,   Regular rhythm.    edema      Vascular:  Normal systolic impulse  No Carotid bruits    RESPIRATORY:   No wheezing  Bilateral BS  Normal chest expansion  Not tachypneic,  No use of accessory muscles    GASTROINTESTINAL:  Abdomen soft,   Non-tender,   No guarding,   + BS    MUSCULOSKELETAL:   Range of motion is not limited,  No clubbing, cyanosis    NEUROLOGICAL:   Sedated   Follows commands     SKIN:   Skin normal color for race,   Warm and dry  No evidence of rash.    PSYCHIATRIC:   Sedated   No apparent risk to self or others.    HEMATOLOGICAL:  No cervical  lymphadenopathy.  no inguinal lymphadenopathy      01-17-21 @ 07:01  -  01-18-21 @ 07:00  --------------------------------------------------------  IN:    Dexmedetomidine: 847.2 mL    FentaNYL: 616.7 mL    Heparin: 234 mL    Insulin: 65 mL    Norepinephrine: 12 mL    Peptamen A.F.: 1275 mL    sodium chloride 0.9%: 200 mL  Total IN: 3249.9 mL    OUT:    Rectal Tube (mL): 200 mL  Total OUT: 200 mL    Total NET: 3049.9 mL      01-18-21 @ 07:01  -  01-18-21 @ 09:03  --------------------------------------------------------  IN:    Dexmedetomidine: 72.6 mL    FentaNYL: 48.6 mL    Insulin: 2 mL    Norepinephrine: 12 mL    sodium chloride 0.9%: 100 mL  Total IN: 235.2 mL    OUT:  Total OUT: 0 mL    Total NET: 235.2 mL          LABS:                            7.6    22.80 )-----------( 339      ( 18 Jan 2021 04:45 )             24.0                                               01-18    139  |  100  |  97<HH>  ----------------------------<  210<H>  6.0<HH>   |  21  |  6.0<HH>    Ca    8.5      18 Jan 2021 04:45  Phos  7.2     01-18  Mg     2.5     01-18    TPro  5.5<L>  /  Alb  2.8<L>  /  TBili  1.0  /  DBili  x   /  AST  37  /  ALT  26  /  AlkPhos  102  01-18      PT/INR - ( 18 Jan 2021 04:45 )   PT: 13.60 sec;   INR: 1.18 ratio         PTT - ( 18 Jan 2021 04:45 )  PTT:27.9 sec                                                                                     LIVER FUNCTIONS - ( 18 Jan 2021 04:45 )  Alb: 2.8 g/dL / Pro: 5.5 g/dL / ALK PHOS: 102 U/L / ALT: 26 U/L / AST: 37 U/L / GGT: x                                                                                               Mode: AC/ CMV (Assist Control/ Continuous Mandatory Ventilation)  RR (machine): 34  TV (machine): 450  FiO2: 40  PEEP: 8  ITime: 1  MAP: 22  PIP: 42                                      ABG - ( 18 Jan 2021 03:00 )  pH, Arterial: 7.33  pH, Blood: x     /  pCO2: 42    /  pO2: 103   / HCO3: 22    / Base Excess: -3.8  /  SaO2: 98                  MEDICATIONS  (STANDING):  acetaminophen   Tablet .. 650 milliGRAM(s) Oral once  aspirin  chewable 81 milliGRAM(s) Oral daily  atorvastatin 20 milliGRAM(s) Oral at bedtime  chlorhexidine 0.12% Liquid 15 milliLiter(s) Oral Mucosa every 12 hours  chlorhexidine 4% Liquid 1 Application(s) Topical <User Schedule>  collagenase Ointment 1 Application(s) Topical two times a day  dexMEDEtomidine Infusion 0.2 MICROgram(s)/kG/Hr (4.85 mL/Hr) IV Continuous <Continuous>  dextrose 40% Gel 15 Gram(s) Oral once  dextrose 5%. 1000 milliLiter(s) (50 mL/Hr) IV Continuous <Continuous>  dextrose 5%. 1000 milliLiter(s) (100 mL/Hr) IV Continuous <Continuous>  dextrose 50% Injectable 25 Gram(s) IV Push once  dextrose 50% Injectable 12.5 Gram(s) IV Push once  dextrose 50% Injectable 25 Gram(s) IV Push once  epoetin raven-epbx (RETACRIT) Injectable 8000 Unit(s) IV Push <User Schedule>  fentaNYL   Infusion 0.501 MICROgram(s)/kG/Hr (4.85 mL/Hr) IV Continuous <Continuous>  glucagon  Injectable 1 milliGRAM(s) IntraMuscular once  insulin regular Infusion 1 Unit(s)/Hr (1 mL/Hr) IV Continuous <Continuous>  levothyroxine 100 MICROGram(s) Oral daily  meropenem  IVPB 500 milliGRAM(s) IV Intermittent every 24 hours  meropenem  IVPB      metoprolol tartrate 12.5 milliGRAM(s) Oral two times a day  norepinephrine Infusion 0.05 MICROgram(s)/kG/Min (9.08 mL/Hr) IV Continuous <Continuous>  pantoprazole   Suspension 40 milliGRAM(s) Oral before breakfast  senna 2 Tablet(s) Oral at bedtime  sodium chloride 0.9%. 1000 milliLiter(s) (50 mL/Hr) IV Continuous <Continuous>    MEDICATIONS  (PRN):  acetaminophen   Tablet .. 650 milliGRAM(s) Oral every 6 hours PRN Temp greater or equal to 38C (100.4F), Mild Pain (1 - 3)      New X-rays reviewed:                                                                                  ECHO    CXR interpreted by me:  ET OG OK>  bilateral infiltrates

## 2021-01-18 NOTE — PROGRESS NOTE ADULT - SUBJECTIVE AND OBJECTIVE BOX
[place] [facility] NEPHROLOGY FOLLOW UP NOTE  --------------------------------------------------------------------------------  24 hour events/subjective: Patient examined. Intubated.    PAST HISTORY  --------------------------------------------------------------------------------  No significant changes to PMH, PSH, FHx, SHx, unless otherwise noted    ALLERGIES & MEDICATIONS  --------------------------------------------------------------------------------  Allergies    Orange (Other)  Originally Entered as [HIVES] reaction to [sulfur] (Unknown)  penicillin (Unknown)  sulfADIAZINE (Unknown)    Standing Inpatient Medications  acetaminophen   Tablet .. 650 milliGRAM(s) Oral once  aspirin  chewable 81 milliGRAM(s) Oral daily  atorvastatin 20 milliGRAM(s) Oral at bedtime  chlorhexidine 0.12% Liquid 15 milliLiter(s) Oral Mucosa every 12 hours  chlorhexidine 4% Liquid 1 Application(s) Topical <User Schedule>  collagenase Ointment 1 Application(s) Topical two times a day  dexMEDEtomidine Infusion 0.2 MICROgram(s)/kG/Hr IV Continuous <Continuous>  dextrose 40% Gel 15 Gram(s) Oral once  dextrose 5%. 1000 milliLiter(s) IV Continuous <Continuous>  dextrose 5%. 1000 milliLiter(s) IV Continuous <Continuous>  dextrose 50% Injectable 25 Gram(s) IV Push once  dextrose 50% Injectable 12.5 Gram(s) IV Push once  dextrose 50% Injectable 25 Gram(s) IV Push once  epoetin raven-epbx (RETACRIT) Injectable 8000 Unit(s) IV Push <User Schedule>  fentaNYL   Infusion 0.501 MICROgram(s)/kG/Hr IV Continuous <Continuous>  glucagon  Injectable 1 milliGRAM(s) IntraMuscular once  heparin  Infusion 1800 Unit(s)/Hr IV Continuous <Continuous>  insulin regular Infusion 1 Unit(s)/Hr IV Continuous <Continuous>  levothyroxine 100 MICROGram(s) Oral daily  metoprolol tartrate 12.5 milliGRAM(s) Oral two times a day  norepinephrine Infusion 0.05 MICROgram(s)/kG/Min IV Continuous <Continuous>  pantoprazole   Suspension 40 milliGRAM(s) Oral before breakfast  senna 2 Tablet(s) Oral at bedtime  sodium bicarbonate 650 milliGRAM(s) Oral every 8 hours  sodium chloride 0.9%. 1000 milliLiter(s) IV Continuous <Continuous>  sodium zirconium cyclosilicate 10 Gram(s) Oral three times a day    PRN Inpatient Medications  acetaminophen   Tablet .. 650 milliGRAM(s) Oral every 6 hours PRN      VITALS/PHYSICAL EXAM  --------------------------------------------------------------------------------  T(C): 36.9 (01-18-21 @ 11:50), Max: 37.2 (01-18-21 @ 00:00)  HR: 88 (01-18-21 @ 16:00) (54 - 108)  BP: 85/48 (01-18-21 @ 16:00) (78/41 - 129/59)  RR: 33 (01-18-21 @ 16:00) (13 - 34)  SpO2: 100% (01-18-21 @ 16:00) (96% - 100%)  Wt(kg): --  Height (cm): 177.8 (01-18-21 @ 08:46)  Weight (kg): 96.9 (01-18-21 @ 08:46)  BMI (kg/m2): 30.7 (01-18-21 @ 08:46)  BSA (m2): 2.15 (01-18-21 @ 08:46)      01-17-21 @ 07:01  -  01-18-21 @ 07:00  --------------------------------------------------------  IN: 3249.9 mL / OUT: 200 mL / NET: 3049.9 mL    01-18-21 @ 07:01  -  01-18-21 @ 16:38  --------------------------------------------------------  IN: 1029.5 mL / OUT: 0 mL / NET: 1029.5 mL      Physical Exam:  	Gen: Intubated  	Pulm: CTA B/L  	CV: RRR, S1S2  	Abd: +BS, soft, nondistended  	: No suprapubic tenderness  	LE: Warm,  edema  	Vascular access:    LABS/STUDIES  --------------------------------------------------------------------------------              7.6    22.80 >-----------<  339      [01-18-21 @ 04:45]              24.0     139  |  103  |  93  ----------------------------<  178      [01-18-21 @ 09:55]  4.9   |  21  |  5.7        Ca     8.0     [01-18-21 @ 09:55]      Mg     2.5     [01-18-21 @ 04:45]      Phos  7.2     [01-18-21 @ 04:45]    TPro  5.5  /  Alb  2.8  /  TBili  1.0  /  DBili  x   /  AST  37  /  ALT  26  /  AlkPhos  102  [01-18-21 @ 04:45]    PT/INR: PT 13.60, INR 1.18       [01-18-21 @ 04:45]  PTT: 27.9       [01-18-21 @ 04:45]      Creatinine Trend:  SCr 5.7 [01-18 @ 09:55]  SCr 6.0 [01-18 @ 04:45]  SCr 6.0 [01-17 @ 21:50]  SCr 5.2 [01-17 @ 04:30]  SCr 4.7 [01-16 @ 13:46]    Urinalysis - [01-06-21 @ 18:43]      Color Yellow / Appearance Slightly Turbid / SG 1.018 / pH 6.5      Gluc 100 mg/dL / Ketone Negative  / Bili Negative / Urobili <2 mg/dL       Blood Moderate / Protein 300 mg/dL / Leuk Est Small / Nitrite Negative       /  / Hyaline 114 / Gran  / Sq Epi  / Non Sq Epi 1 / Bacteria Negative    Ferritin 1696      [01-15-21 @ 04:30]  Vitamin D (25OH) 45      [01-15-21 @ 12:06]  HbA1c 7.1      [05-21-19 @ 04:30]  TSH 4.57      [01-01-21 @ 17:56]

## 2021-01-19 NOTE — PROGRESS NOTE ADULT - ASSESSMENT
Assessment:  60y Male patient admitted S/P Trach na PEG  Patient seen and examined at bedside. Trach in place currently on vent .     Plan:  ***  - Monitor vitals and vent settings  - Monitor labs and replete as necessary  -start PEG tube feeds at 12am today  -Heparin drip may be restarted  -Please call surgery if needed           Date/Time: 01-19-21 @ 10:38

## 2021-01-19 NOTE — CHART NOTE - NSCHARTNOTEFT_GEN_A_CORE
Attempted to see patient today 1/19 to re-evaluate sacral wound. Patient was receiving hemodialysis and could not be turned to assess wound.

## 2021-01-19 NOTE — PROGRESS NOTE ADULT - ASSESSMENT
IMPRESSION:    ESRD MWF for HD  Acute hypoxemic resp failure failed weaning SP trach and PEG   NSTEMI  Afib was on coumadin  Severe COVID PNA  Superimposed bacteria PNA  Sacral ulcer    PLAN:    CNS: SAT.  Seroquel.     HEENT: Oral care.    PULMONARY: Vent changes. Wean O2.  Monitor PPL and DP.  Wean O2.      CARDIOVASCULAR:  I<O as tolerated.   DC IVF.     GI: GI prophylaxis.  PEG Feeding after KUB.      RENAL: check lytes and replete PRN. Nephro F/UP result.  K therapy.      INFECTIOUS DISEASE:  Continue ABX.  Send wound cultures.  FU with Burn     HEMATOLOGICAL: DVT Prophylaxis.         ENDOCRINE:  Follow up FS.  Insulin protocol if needed.    MUSCULOSKELETAL:  bed rest.  FU with Burn     MICU     Prognosis poor

## 2021-01-19 NOTE — PROGRESS NOTE ADULT - SUBJECTIVE AND OBJECTIVE BOX
Patient is a 60y old  Male who presents with a chief complaint of s/p fall. (18 Jan 2021 16:36)        Over Night Events:  SP trach and PEG.  On Vent.  on HD>  On Levophed         ROS:     All ROS are negative except HPI         PHYSICAL EXAM    ICU Vital Signs Last 24 Hrs  T(C): 36.2 (19 Jan 2021 08:00), Max: 36.9 (18 Jan 2021 11:50)  T(F): 97.2 (19 Jan 2021 08:00), Max: 98.4 (18 Jan 2021 11:50)  HR: 78 (19 Jan 2021 09:00) (54 - 108)  BP: 127/57 (19 Jan 2021 09:00) (79/41 - 140/68)  BP(mean): 83 (19 Jan 2021 09:00) (55 - 97)  ABP: --  ABP(mean): --  RR: 23 (19 Jan 2021 09:00) (0 - 34)  SpO2: 100% (19 Jan 2021 09:00) (96% - 100%)      CONSTITUTIONAL:  Well nourished.  IN mild distress off sedation     ENT:   Airway patent,   Mouth with normal mucosa.   No thrush    EYES:   Pupils equal,   Round and reactive to light.    CARDIAC:   Tachy  Regular rhythm.    No edema      Vascular:  Normal systolic impulse  No Carotid bruits    RESPIRATORY:   No wheezing  Bilateral BS  Normal chest expansion   tachypneic,  No use of accessory muscles    GASTROINTESTINAL:  Abdomen soft,   Non-tender,   No guarding,   + BS    MUSCULOSKELETAL:   Range of motion is not limited,  No clubbing, cyanosis    NEUROLOGICAL:   Sedated     SKIN:   Skin normal color for race,   Warm and dry and intact.   No evidence of rash.    PSYCHIATRIC:   Sedated   No apparent risk to self or others.    HEMATOLOGICAL:  No cervical  lymphadenopathy.  no inguinal lymphadenopathy      01-18-21 @ 07:01  -  01-19-21 @ 07:00  --------------------------------------------------------  IN:    Dexmedetomidine: 522.8 mL    FentaNYL: 414 mL    Heparin: 313 mL    Insulin: 14 mL    IV PiggyBack: 50 mL    Norepinephrine: 116 mL    sodium chloride 0.9%: 1150 mL  Total IN: 2579.8 mL    OUT:    Rectal Tube (mL): 50 mL  Total OUT: 50 mL    Total NET: 2529.8 mL      01-19-21 @ 07:01  -  01-19-21 @ 09:23  --------------------------------------------------------  IN:    Dexmedetomidine: 48.6 mL    FentaNYL: 9.7 mL    Heparin: 57 mL    Insulin: 6 mL    Norepinephrine: 27 mL    sodium chloride 0.9%: 150 mL  Total IN: 298.3 mL    OUT:  Total OUT: 0 mL    Total NET: 298.3 mL          LABS:                            6.8    23.22 )-----------( 348      ( 19 Jan 2021 05:00 )             22.3                                               01-19    140  |  101  |  102<HH>  ----------------------------<  167<H>  5.7<H>   |  16<L>  |  6.4<HH>    Ca    8.2<L>      19 Jan 2021 05:00  Phos  8.0     01-19  Mg     2.6     01-19    TPro  5.0<L>  /  Alb  2.5<L>  /  TBili  0.7  /  DBili  x   /  AST  38  /  ALT  23  /  AlkPhos  104  01-19      PT/INR - ( 18 Jan 2021 04:45 )   PT: 13.60 sec;   INR: 1.18 ratio         PTT - ( 19 Jan 2021 05:00 )  PTT:51.6 sec                                                                                     LIVER FUNCTIONS - ( 19 Jan 2021 05:00 )  Alb: 2.5 g/dL / Pro: 5.0 g/dL / ALK PHOS: 104 U/L / ALT: 23 U/L / AST: 38 U/L / GGT: x                                                                                               Mode: AC/ CMV (Assist Control/ Continuous Mandatory Ventilation)  RR (machine): 34  TV (machine): 450  FiO2: 40  PEEP: 8  ITime: 1  MAP: 16  PIP: 22                                      ABG - ( 18 Jan 2021 03:00 )  pH, Arterial: 7.33  pH, Blood: x     /  pCO2: 42    /  pO2: 103   / HCO3: 22    / Base Excess: -3.8  /  SaO2: 98                  MEDICATIONS  (STANDING):  acetaminophen   Tablet .. 650 milliGRAM(s) Oral once  aspirin  chewable 81 milliGRAM(s) Oral daily  atorvastatin 20 milliGRAM(s) Oral at bedtime  chlorhexidine 0.12% Liquid 15 milliLiter(s) Oral Mucosa every 12 hours  chlorhexidine 4% Liquid 1 Application(s) Topical <User Schedule>  collagenase Ointment 1 Application(s) Topical two times a day  dexMEDEtomidine Infusion 0.2 MICROgram(s)/kG/Hr (4.85 mL/Hr) IV Continuous <Continuous>  dextrose 40% Gel 15 Gram(s) Oral once  dextrose 5%. 1000 milliLiter(s) (50 mL/Hr) IV Continuous <Continuous>  dextrose 5%. 1000 milliLiter(s) (100 mL/Hr) IV Continuous <Continuous>  dextrose 50% Injectable 25 Gram(s) IV Push once  dextrose 50% Injectable 12.5 Gram(s) IV Push once  dextrose 50% Injectable 25 Gram(s) IV Push once  epoetin raven-epbx (RETACRIT) Injectable 8000 Unit(s) IV Push <User Schedule>  fentaNYL   Infusion 0.501 MICROgram(s)/kG/Hr (4.85 mL/Hr) IV Continuous <Continuous>  glucagon  Injectable 1 milliGRAM(s) IntraMuscular once  heparin  Infusion 1800 Unit(s)/Hr (19 mL/Hr) IV Continuous <Continuous>  insulin regular Infusion 1 Unit(s)/Hr (1 mL/Hr) IV Continuous <Continuous>  levothyroxine 100 MICROGram(s) Oral daily  metoprolol tartrate 12.5 milliGRAM(s) Oral two times a day  norepinephrine Infusion 0.05 MICROgram(s)/kG/Min (9.08 mL/Hr) IV Continuous <Continuous>  pantoprazole   Suspension 40 milliGRAM(s) Oral before breakfast  senna 2 Tablet(s) Oral at bedtime  sodium bicarbonate 650 milliGRAM(s) Oral every 8 hours  sodium chloride 0.9%. 1000 milliLiter(s) (50 mL/Hr) IV Continuous <Continuous>  sodium zirconium cyclosilicate 10 Gram(s) Oral three times a day    MEDICATIONS  (PRN):  acetaminophen   Tablet .. 650 milliGRAM(s) Oral every 6 hours PRN Temp greater or equal to 38C (100.4F), Mild Pain (1 - 3)      New X-rays reviewed:                                                                                  ECHO    CXR interpreted by me:  Trach OK>  Bilateral infiltrates

## 2021-01-19 NOTE — PROGRESS NOTE ADULT - SUBJECTIVE AND OBJECTIVE BOX
Progress Note: General Surgery  Patient: ROGER RODRIGUES , 60y (1960)Male   MRN: [id number]  Location: [place] [address] [address]  Visit: 01-01-21 Inpatient  Date: 01-19-21 @ 10:26    Admit Diagnosis/Chief Complaint: Acute respiratory failure due to COVID-19        Procedure/Diagnosis: Acute respiratory failure due to COVID-19     S/P Insertion, PEG tube    Open tracheostomy        Events/ 24h: No acute events overnight s/p trach and PEG restarted Hep drip. Pain controlled.    Vitals: T(F): 97.2 (01-19-21 @ 08:00), Max: 98.4 (01-18-21 @ 11:50)  HR: 90 (01-19-21 @ 10:00)  BP: 111/50 (01-19-21 @ 10:00) (79/41 - 140/68)  RR: 23 (01-19-21 @ 10:00)  SpO2: 95% (01-19-21 @ 10:00)  RR (machine): 34, TV (machine): 450, FiO2: 40, PEEP: 8, PIP: 22  In:   01-18-21 @ 07:01  -  01-19-21 @ 07:00  --------------------------------------------------------  IN: 2579.8 mL    01-19-21 @ 07:01 - 01-19-21 @ 10:26  --------------------------------------------------------  IN: 359.3 mL      Out:   01-18-21 @ 07:01  -  01-19-21 @ 07:00  --------------------------------------------------------  OUT:    Rectal Tube (mL): 50 mL  Total OUT: 50 mL      01-19-21 @ 07:01  -  01-19-21 @ 10:26  --------------------------------------------------------  OUT:  Total OUT: 0 mL        Net:   01-18-21 @ 07:01  -  01-19-21 @ 07:00  --------------------------------------------------------  NET: 2529.8 mL    01-19-21 @ 07:01  -  01-19-21 @ 10:26  --------------------------------------------------------  NET: 359.3 mL        Diet: Diet, NPO:   Except Medications (01-18-21 @ 14:03)    IV Fluids: dextrose 5%. 1000 milliLiter(s) (50 mL/Hr) IV Continuous <Continuous>  dextrose 5%. 1000 milliLiter(s) (100 mL/Hr) IV Continuous <Continuous>  sodium bicarbonate 650 milliGRAM(s) Oral every 8 hours      Physical Examination:  General Appearance: Trach in place on vent  HEENT: EOMI, sclera non-icteric.  Heart: RRR   Lungs: CTABL.   Abdomen:  Soft,         Medications: [Standing]  acetaminophen   Tablet .. 650 milliGRAM(s) Oral once  aspirin  chewable 81 milliGRAM(s) Oral daily  atorvastatin 20 milliGRAM(s) Oral at bedtime  chlorhexidine 0.12% Liquid 15 milliLiter(s) Oral Mucosa every 12 hours  chlorhexidine 4% Liquid 1 Application(s) Topical <User Schedule>  collagenase Ointment 1 Application(s) Topical two times a day  dexMEDEtomidine Infusion 0.2 MICROgram(s)/kG/Hr (4.85 mL/Hr) IV Continuous <Continuous>  dextrose 40% Gel 15 Gram(s) Oral once  dextrose 5%. 1000 milliLiter(s) (50 mL/Hr) IV Continuous <Continuous>  dextrose 5%. 1000 milliLiter(s) (100 mL/Hr) IV Continuous <Continuous>  dextrose 50% Injectable 25 Gram(s) IV Push once  dextrose 50% Injectable 12.5 Gram(s) IV Push once  dextrose 50% Injectable 25 Gram(s) IV Push once  epoetin raven-epbx (RETACRIT) Injectable 8000 Unit(s) IV Push <User Schedule>  fentaNYL   Infusion 0.501 MICROgram(s)/kG/Hr (4.85 mL/Hr) IV Continuous <Continuous>  glucagon  Injectable 1 milliGRAM(s) IntraMuscular once  heparin   Injectable 5000 Unit(s) SubCutaneous every 8 hours  insulin glargine Injectable (LANTUS) 25 Unit(s) SubCutaneous at bedtime  insulin lispro (ADMELOG) corrective regimen sliding scale   SubCutaneous three times a day before meals  insulin lispro Injectable (ADMELOG) 8 Unit(s) SubCutaneous three times a day before meals  insulin regular Infusion 1 Unit(s)/Hr (1 mL/Hr) IV Continuous <Continuous>  levothyroxine 100 MICROGram(s) Oral daily  meropenem  IVPB 500 milliGRAM(s) IV Intermittent once  meropenem  IVPB      metoprolol tartrate 12.5 milliGRAM(s) Oral two times a day  norepinephrine Infusion 0.05 MICROgram(s)/kG/Min (9.08 mL/Hr) IV Continuous <Continuous>  pantoprazole   Suspension 40 milliGRAM(s) Oral before breakfast  QUEtiapine 50 milliGRAM(s) Oral two times a day  senna 2 Tablet(s) Oral at bedtime  sodium bicarbonate 650 milliGRAM(s) Oral every 8 hours  sodium zirconium cyclosilicate 10 Gram(s) Oral three times a day  vancomycin  IVPB 750 milliGRAM(s) IV Intermittent <User Schedule>    DVT Prophylaxis: heparin   Injectable 5000 Unit(s) SubCutaneous every 8 hours    GI Prophylaxis: pantoprazole   Suspension 40 milliGRAM(s) Oral before breakfast    Antibiotics: meropenem  IVPB 500 milliGRAM(s) IV Intermittent once  meropenem  IVPB      vancomycin  IVPB 750 milliGRAM(s) IV Intermittent <User Schedule>    Anticoagulation:   Medications:[PRN]  acetaminophen   Tablet .. 650 milliGRAM(s) Oral every 6 hours PRN      Labs:                        6.8    23.22 )-----------( 348      ( 19 Jan 2021 05:00 )             22.3     01-19    140  |  101  |  102<HH>  ----------------------------<  167<H>  5.7<H>   |  16<L>  |  6.4<HH>    Ca    8.2<L>      19 Jan 2021 05:00  Phos  8.0     01-19  Mg     2.6     01-19    TPro  5.0<L>  /  Alb  2.5<L>  /  TBili  0.7  /  DBili  x   /  AST  38  /  ALT  23  /  AlkPhos  104  01-19    LIVER FUNCTIONS - ( 19 Jan 2021 05:00 )  Alb: 2.5 g/dL / Pro: 5.0 g/dL / ALK PHOS: 104 U/L / ALT: 23 U/L / AST: 38 U/L / GGT: x           PT/INR - ( 18 Jan 2021 04:45 )   PT: 13.60 sec;   INR: 1.18 ratio         PTT - ( 19 Jan 2021 05:00 )  PTT:51.6 sec  ABG - ( 18 Jan 2021 03:00 )  pH: 7.33  /  pCO2: 42    /  pO2: 103   / HCO3: 22    / Base Excess: -3.8  /  SaO2: 98                      Urine/Micro:        Imaging:   ***  < from: Xray Chest 1 View- PORTABLE-Routine (Xray Chest 1 View- PORTABLE-Routine in AM.) (01.18.21 @ 06:14) >  Impression:    Cardiomegaly and bilateral opacities, unchanged.    < end of copied text >

## 2021-01-19 NOTE — PROGRESS NOTE ADULT - ASSESSMENT
61 yo male with PMHx of afib on coumadin, DM2, ESRD on HD MWF, HLD, HTN, CVA, who presented w/ weakness and fall.     #Acute hypoxemic respiratory failure   #Covid-19 PNA  #Superimposed bacterial PNA   - s/p intubation   - s/p Azithromycin discontinued on 1/8/2021, s/p Cefepime discontinued on 1/11/2021  - s/p Dexamethasone 6mg IV once daily (1/1/2021) x10 days  - Covid antibodies: reported negative --> s/p 2 units of Convalescent plasma ( 1/8/2021 and 1/12/2021)  - f/u inflammatory markers   - s/p tocilizumab 400mg IV x1 on 1/8/2021  - Ferritin 1/10/2021 ( 48 hours after tocilizumab): 4895 ( increased compared to ferritin on 1/1/2021)  - SBT 1/12/2021 failed ( was on a CPAP for 30 minutes, after which he started becoming tachycardic and hypertensive)   - SBT 1/14/2021 failed, the patient was agitated, bit the ET tube --> Leakage -- > Reintubation/change of ET tube, will therefore consult surgery for tracheostomy and PEG tube  - s/p trach and peg  - on sodium bicarb   - c/w lucinda and vanc during HD     #Anemia   -Hb dropped 6.8 from 7.6   - holding heparin gtt  - s/p 1u pRBC   - monitor Hb     #Leukocytosis   #Sacral ulcer, heel ulcers   - Multifactorial ( Possible overlying infection, steroids)   - Latest procalcitonin  on 1/8: 6.31 ( Procalcitonin on 1/7: 4.89)  - Diarrhea present, C.diff negative, DC po Vancomycin  - DC cefepime  - Started Meropenem 0.5g IV once daily (renally adjusted dose)  - Vancomycin trough level 1/14/2021: 25.7, Next vancomycin dose should be given on Saturday 1/16/2021 750mg IV once post-HD  - Fungitell ( sent on 1/10/2021, resulted on 1/14/2021): 38 ( negative)  - f/u burn    #Hyperkalemia  -increased Lokelma 10 q8h     #Altered Mental Status- improved  - EEG diffuse slowing, but no seizure activity, check the report above  - CT brain shows ventriculomegaly (check full report), no IC bleed, possible old infarct.  - neurology consulted and recs:   a. CT brain negative x2 for stroke or other structural pathology.  EEG negative for any epileptiform activity.  Suspect multifactorial metabolic encephalopathy in setting of COVID and ESRD and sedatives.  Wean sedation as able, can reassess if patient does not awaken after that time.  b. No further EEG or other tests   c. To be reassessed after sedation is weaned    #ESRD on HD   - EPO (retacrit) 8000 units IV push once weekly  - nephro following, f/u reccs     #Atrial fibrillation  - Paroxysmal ? Currently NSRT, well rate controlled.   - on Heparin gtt, holding home coumadin, follow up aPTT   - Metoprolol tartrate 12.5mg po q12hrs    - Will DC levophed  - CT brain no IC bleed or concerns of IC bleed     #NSTEMI  - Type II MI, demand ischemia, likely secondary to hypoxia secondary to COVID-19 pneumonia  - Aspirin 81mg po once daily   - CT brain no IC bleed or concerns of IC bleed   - On heparin gtt ( for atrial fibrillation now)  - Repeat CK-MB and CPK were stable    #Hypothyroidism: Continue with levothyroxine 100mcg po once daily     Diet: NPO for now   DVT ppx: holding Heparin gtt  GI ppx: Protonix 40 qd    59 yo male with PMHx of afib on coumadin, DM2, ESRD on HD MWF, HLD, HTN, CVA, who presented w/ weakness and fall.     #Acute hypoxemic respiratory failure   #Covid-19 PNA  #Superimposed bacterial PNA   - s/p intubation   - s/p Azithromycin discontinued on 1/8/2021, s/p Cefepime discontinued on 1/11/2021  - s/p Dexamethasone 6mg IV once daily (1/1/2021) x10 days  - Covid antibodies: reported negative --> s/p 2 units of Convalescent plasma ( 1/8/2021 and 1/12/2021)  - f/u inflammatory markers   - s/p tocilizumab 400mg IV x1 on 1/8/2021  - Ferritin 1/10/2021 ( 48 hours after tocilizumab): 4895 ( increased compared to ferritin on 1/1/2021)  - SBT 1/12/2021 failed ( was on a CPAP for 30 minutes, after which he started becoming tachycardic and hypertensive)   - SBT 1/14/2021 failed, the patient was agitated, bit the ET tube --> Leakage -- > Reintubation/change of ET tube, will therefore consult surgery for tracheostomy and PEG tube  - s/p trach and peg  - on sodium bicarb   - c/w lucinda and vanc during HD     #Anemia   -Hb dropped 6.8 from 7.6   - holding heparin gtt  - s/p 1u pRBC   - monitor Hb     #Leukocytosis   #Sacral ulcer, heel ulcers   - Multifactorial ( Possible overlying infection, steroids)   - Latest procalcitonin  on 1/8: 6.31 ( Procalcitonin on 1/7: 4.89)  - Diarrhea present, C.diff negative, DC po Vancomycin  - DC cefepime  - Started Meropenem 0.5g IV once daily (renally adjusted dose)  - Vancomycin trough level 1/14/2021: 25.7, Next vancomycin dose should be given on Saturday 1/16/2021 750mg IV once post-HD  - Fungitell ( sent on 1/10/2021, resulted on 1/14/2021): 38 ( negative)  - f/u burn    #Hyperkalemia  -increased Lokelma 10 q8h     #Altered Mental Status- improved  - EEG diffuse slowing, but no seizure activity, check the report above  - CT brain shows ventriculomegaly (check full report), no IC bleed, possible old infarct.  - neurology consulted and recs:   a. CT brain negative x2 for stroke or other structural pathology.  EEG negative for any epileptiform activity.  Suspect multifactorial metabolic encephalopathy in setting of COVID and ESRD and sedatives.  Wean sedation as able, can reassess if patient does not awaken after that time.  b. No further EEG or other tests   c. To be reassessed after sedation is weaned    #ESRD on HD   - EPO (retacrit) 8000 units IV push once weekly  - nephro following, f/u reccs     #Atrial fibrillation  - Paroxysmal ? Currently NSRT, well rate controlled.   - on Heparin gtt, holding home coumadin, follow up aPTT   - Metoprolol tartrate 12.5mg po q12hrs    - Will DC levophed  - CT brain no IC bleed or concerns of IC bleed     #NSTEMI  - Type II MI, demand ischemia, likely secondary to hypoxia secondary to COVID-19 pneumonia  - Aspirin 81mg po once daily   - CT brain no IC bleed or concerns of IC bleed   - On heparin gtt ( for atrial fibrillation now)  - Repeat CK-MB and CPK were stable    #Hypothyroidism: Continue with levothyroxine 100mcg po once daily     Diet: NPO for now   DVT ppx: holding Heparin gtt  GI ppx: Protonix 40 qd     Updated patient's sister Nedra Shea on [phone] regarding patient's condition.  -S/P Solumedrol 04/03/2020-04/07/2020  -S/P Plaquenil 03/31-04/04  -Transferred from [facility] for Convalescent Plasma Transfusion->S/p plasma transfusion 4/24-CRP and ferritin slightly decreasing.  No further plans for convalescent plasma treatments.   -Anakinra d/w ID but holding off 2/2 having already received immunosuppression Eculizumab ([employer]) for MG

## 2021-01-19 NOTE — PROGRESS NOTE ADULT - ASSESSMENT
ESRD (due to DM) on HD TTS  s/p Fall  altered mental status   Covid-19 PNA / bacterial PNA   fluid overload  hyponatremia  hyperkalemia  DM  HTN  afib on coumadin  CVA  NSTEMI    plan:    DC IVF  HD today: 3 hours, opti 160 dialyzer, 2K/1Kbath, 3L UF  left arm precautions  covid isolation  f/u cardio / f/u pulm  icu care

## 2021-01-19 NOTE — PROGRESS NOTE ADULT - SUBJECTIVE AND OBJECTIVE BOX
[facility]PHROLOG[facility] FOLLOW UP NOTE  --------------------------------------------------------------------------------  24 hour events/subjective: Patient examined during HD. Trached.    PAST HISTORY  --------------------------------------------------------------------------------  No significant changes to PMH, PSH, FHx, SHx, unless otherwise noted    ALLERGIES & MEDICATIONS  --------------------------------------------------------------------------------  Allergies    Orange (Other)  Originally Entered as [HIVES] reaction to [sulfur] (Unknown)  penicillin (Unknown)  sulfADIAZINE (Unknown)    Standing Inpatient Medications  acetaminophen   Tablet .. 650 milliGRAM(s) Oral once  aspirin  chewable 81 milliGRAM(s) Oral daily  atorvastatin 20 milliGRAM(s) Oral at bedtime  chlorhexidine 0.12% Liquid 15 milliLiter(s) Oral Mucosa every 12 hours  chlorhexidine 4% Liquid 1 Application(s) Topical <User Schedule>  collagenase Ointment 1 Application(s) Topical two times a day  dexMEDEtomidine Infusion 0.2 MICROgram(s)/kG/Hr IV Continuous <Continuous>  dextrose 40% Gel 15 Gram(s) Oral once  dextrose 5%. 1000 milliLiter(s) IV Continuous <Continuous>  dextrose 5%. 1000 milliLiter(s) IV Continuous <Continuous>  dextrose 50% Injectable 25 Gram(s) IV Push once  dextrose 50% Injectable 12.5 Gram(s) IV Push once  dextrose 50% Injectable 25 Gram(s) IV Push once  epoetin raven-epbx (RETACRIT) Injectable 8000 Unit(s) IV Push <User Schedule>  fentaNYL   Infusion 0.501 MICROgram(s)/kG/Hr IV Continuous <Continuous>  glucagon  Injectable 1 milliGRAM(s) IntraMuscular once  heparin   Injectable 5000 Unit(s) SubCutaneous every 8 hours  insulin glargine Injectable (LANTUS) 25 Unit(s) SubCutaneous at bedtime  insulin lispro (ADMELOG) corrective regimen sliding scale   SubCutaneous three times a day before meals  insulin lispro Injectable (ADMELOG) 8 Unit(s) SubCutaneous three times a day before meals  insulin regular Infusion 1 Unit(s)/Hr IV Continuous <Continuous>  levothyroxine 100 MICROGram(s) Oral daily  meropenem  IVPB      metoprolol tartrate 12.5 milliGRAM(s) Oral two times a day  norepinephrine Infusion 0.05 MICROgram(s)/kG/Min IV Continuous <Continuous>  pantoprazole   Suspension 40 milliGRAM(s) Oral before breakfast  QUEtiapine 50 milliGRAM(s) Oral two times a day  senna 2 Tablet(s) Oral at bedtime  sodium bicarbonate 650 milliGRAM(s) Oral every 8 hours  sodium zirconium cyclosilicate 10 Gram(s) Oral three times a day  vancomycin  IVPB 750 milliGRAM(s) IV Intermittent <User Schedule>    PRN Inpatient Medications  acetaminophen   Tablet .. 650 milliGRAM(s) Oral every 6 hours PRN      VITALS/PHYSICAL EXAM  --------------------------------------------------------------------------------  T(C): 36.7 (01-19-21 @ 12:00), Max: 36.7 (01-18-21 @ 16:00)  HR: 74 (01-19-21 @ 14:00) (70 - 108)  BP: 120/60 (01-19-21 @ 14:00) (85/48 - 159/56)  RR: 18 (01-19-21 @ 14:00) (0 - 34)  SpO2: 100% (01-19-21 @ 14:00) (95% - 100%)  Wt(kg): --  Height (cm): 177.8 (01-18-21 @ 08:46)  Weight (kg): 96.9 (01-18-21 @ 08:46)  BMI (kg/m2): 30.7 (01-18-21 @ 08:46)  BSA (m2): 2.15 (01-18-21 @ 08:46)      01-18-21 @ 07:01  -  01-19-21 @ 07:00  --------------------------------------------------------  IN: 2579.8 mL / OUT: 50 mL / NET: 2529.8 mL    01-19-21 @ 07:01  -  01-19-21 @ 15:18  --------------------------------------------------------  IN: 910.3 mL / OUT: 3000 mL / NET: -2089.7 mL      Physical Exam:  	Gen: trached  	Pulm: B/L rales  	CV: RRR, S1S2  	Abd: +BS, soft, nondistended  	: Scrotal edema  	LE: Warm,  edema  	Vascular access: AVF    LABS/STUDIES  --------------------------------------------------------------------------------              6.8    23.22 >-----------<  348      [01-19-21 @ 05:00]              22.3     140  |  101  |  102  ----------------------------<  167      [01-19-21 @ 05:00]  5.7   |  16  |  6.4        Ca     8.2     [01-19-21 @ 05:00]      Mg     2.6     [01-19-21 @ 05:00]      Phos  8.0     [01-19-21 @ 05:00]    TPro  5.0  /  Alb  2.5  /  TBili  0.7  /  DBili  x   /  AST  38  /  ALT  23  /  AlkPhos  104  [01-19-21 @ 05:00]    PT/INR: PT 13.60, INR 1.18       [01-18-21 @ 04:45]  PTT: 51.6       [01-19-21 @ 05:00]      Creatinine Trend:  SCr 6.4 [01-19 @ 05:00]  SCr 5.7 [01-18 @ 09:55]  SCr 6.0 [01-18 @ 04:45]  SCr 6.0 [01-17 @ 21:50]  SCr 5.2 [01-17 @ 04:30]    Urinalysis - [01-06-21 @ 18:43]      Color Yellow / Appearance Slightly Turbid / SG 1.018 / pH 6.5      Gluc 100 mg/dL / Ketone Negative  / Bili Negative / Urobili <2 mg/dL       Blood Moderate / Protein 300 mg/dL / Leuk Est Small / Nitrite Negative       /  / Hyaline 114 / Gran  / Sq Epi  / Non Sq Epi 1 / Bacteria Negative      Ferritin 1696      [01-15-21 @ 04:30]  Vitamin D (25OH) 45      [01-15-21 @ 12:06]  HbA1c 7.1      [05-21-19 @ 04:30]  TSH 4.57      [01-01-21 @ 17:56]

## 2021-01-19 NOTE — PROGRESS NOTE ADULT - SUBJECTIVE AND OBJECTIVE BOX
SUBJECTIVE:    Patient is a 60y old Male who presents with a chief complaint of s/p fall. (19 Jan 2021 10:25)    Currently admitted to medicine with the primary diagnosis of Fever       Today is hospital day 18d. Hb dropped to 6.8 this AM.       PAST MEDICAL & SURGICAL HISTORY  PAD (peripheral artery disease)  multiple toe amputations    Anemia  chronic anemia - s/p transfusion 2018    Thyroid cancer    Chronic leg pain    OA (osteoarthritis)    SOB (shortness of breath) on exertion    ESRD (end stage renal disease)  2 yrs    Atrial fibrillation  on warfarin    Right sided weakness    Stroke  8 yrs ago    Hypertension    High blood cholesterol    Diabetes mellitus, type 2    Dialysis patient  Mon, Wednesday, Friday ([facility])    Smoker    H/O thyroid nodule    H/O gastroesophageal reflux (GERD)    History of tonsillectomy    History of surgery  Multiple toe amputations (amp 4 1/2 left toes; has 1/2 left mid toe; amp right mid toe)    A-V fistula  left AVF      SOCIAL HISTORY:  Negative for smoking/alcohol/drug use.     ALLERGIES:  Orange (Other)  Originally Entered as [HIVES] reaction to [sulfur] (Unknown)  penicillin (Unknown)  sulfADIAZINE (Unknown)    MEDICATIONS:  STANDING MEDICATIONS  acetaminophen   Tablet .. 650 milliGRAM(s) Oral once  aspirin  chewable 81 milliGRAM(s) Oral daily  atorvastatin 20 milliGRAM(s) Oral at bedtime  chlorhexidine 0.12% Liquid 15 milliLiter(s) Oral Mucosa every 12 hours  chlorhexidine 4% Liquid 1 Application(s) Topical <User Schedule>  collagenase Ointment 1 Application(s) Topical two times a day  dexMEDEtomidine Infusion 0.2 MICROgram(s)/kG/Hr IV Continuous <Continuous>  dextrose 40% Gel 15 Gram(s) Oral once  dextrose 5%. 1000 milliLiter(s) IV Continuous <Continuous>  dextrose 5%. 1000 milliLiter(s) IV Continuous <Continuous>  dextrose 50% Injectable 25 Gram(s) IV Push once  dextrose 50% Injectable 12.5 Gram(s) IV Push once  dextrose 50% Injectable 25 Gram(s) IV Push once  epoetin raven-epbx (RETACRIT) Injectable 8000 Unit(s) IV Push <User Schedule>  fentaNYL   Infusion 0.501 MICROgram(s)/kG/Hr IV Continuous <Continuous>  glucagon  Injectable 1 milliGRAM(s) IntraMuscular once  heparin   Injectable 5000 Unit(s) SubCutaneous every 8 hours  insulin glargine Injectable (LANTUS) 25 Unit(s) SubCutaneous at bedtime  insulin lispro (ADMELOG) corrective regimen sliding scale   SubCutaneous three times a day before meals  insulin lispro Injectable (ADMELOG) 8 Unit(s) SubCutaneous three times a day before meals  insulin regular Infusion 1 Unit(s)/Hr IV Continuous <Continuous>  levothyroxine 100 MICROGram(s) Oral daily  meropenem  IVPB      metoprolol tartrate 12.5 milliGRAM(s) Oral two times a day  norepinephrine Infusion 0.05 MICROgram(s)/kG/Min IV Continuous <Continuous>  pantoprazole   Suspension 40 milliGRAM(s) Oral before breakfast  QUEtiapine 50 milliGRAM(s) Oral two times a day  senna 2 Tablet(s) Oral at bedtime  sodium bicarbonate 650 milliGRAM(s) Oral every 8 hours  sodium zirconium cyclosilicate 10 Gram(s) Oral three times a day  vancomycin  IVPB 750 milliGRAM(s) IV Intermittent <User Schedule>    PRN MEDICATIONS  acetaminophen   Tablet .. 650 milliGRAM(s) Oral every 6 hours PRN    VITALS:   T(F): 98.1  HR: 92  BP: 149/50  RR: 34  SpO2: 97%    PHYSICAL EXAM:  GEN: Intubated, awake   LUNGS: Decreased breath sounds, mild wheezing   HEART: S1/S2 present. RRR.   ABD: Soft, non-tender, non-distended. Bowel sounds present.  EXT: Anasarca   NEURO: on sedation, following commands     Garza catheter present     LABS:                        6.8    23.22 )-----------( 348      ( 19 Jan 2021 05:00 )             22.3     01-19    140  |  101  |  102<HH>  ----------------------------<  167<H>  5.7<H>   |  16<L>  |  6.4<HH>    Ca    8.2<L>      19 Jan 2021 05:00  Phos  8.0     01-19  Mg     2.6     01-19    TPro  5.0<L>  /  Alb  2.5<L>  /  TBili  0.7  /  DBili  x   /  AST  38  /  ALT  23  /  AlkPhos  104  01-19    PT/INR - ( 18 Jan 2021 04:45 )   PT: 13.60 sec;   INR: 1.18 ratio         PTT - ( 19 Jan 2021 05:00 )  PTT:51.6 sec    ABG - ( 18 Jan 2021 03:00 )  pH, Arterial: 7.33  pH, Blood: x     /  pCO2: 42    /  pO2: 103   / HCO3: 22    / Base Excess: -3.8  /  SaO2: 98        RADIOLOGY:  EXAM:  XR KUB 1 VIEW          PROCEDURE DATE:  01/19/2021      INTERPRETATION:  Clinical History/Reason For Exam: PEG placement.    Comparison:None.    Technique: Frontal view of the abdomen was obtained.    Findings/  Impression:  Gaseous distention of the stomach. PEG tubes seen overlying the stomach.    ANYA VELA MD; Attending Radiologist  This document has been electronically signed. Jan 19 2021 12:52PM      EXAM:  XR CHEST PORTABLE ROUTINE 1V          PROCEDURE DATE:  01/19/2021      INTERPRETATION:  CLINICAL HISTORY: covid pna.    COMPARISON: 1/18/2021.    TECHNIQUE: Portable frontal chest radiograph. Adequate positioning.    FINDINGS/  IMPRESSION:    Support devices: Interval placement of a tracheostomy tube. Endotracheal tube removed. Enteric tube has been removed. There is a left IJ line that remains in place.    Cardiac/mediastinum/hilum: Stable.    Lung parenchyma/Pleura: Stable bilateral mixed interstitial/alveolar opacities. No pneumothorax.    Skeleton/soft tissues: Stable.    BAYRON MUSA MD; Attending Radiologist  This document has been electronically signed. Jan 19 2021 12:02PM

## 2021-01-20 NOTE — PROGRESS NOTE ADULT - ASSESSMENT
ASSESSMENT/PLAN  - acute hypoxic resp failure  - covid 19 pneumonia  - ESRD on HD  - DM,   elevated WBC   hyperphosphatemia    SUGGEST:  - estimated REE by PSE 2230 kcal/d and protein needs ~ 139 gm/d  -spoke with medical team/ when ok to restart tube feed   - continue feeding Peptamen AF at 75 ml/h --> 137 gm protein (entirely whey source), 2160 kcal, meets recommended low n6:n3 ratio, 1500 total mg phos/d and 77 total mEq K per day  - glycemic control - insulin drip at one u/h when seen - starting to improve - and remember that current feeding Rx is LOW % carb  -   formula with high n6 content not ideal for any ARDS pt including covid (refer to CCN / ASPEN guidelines 4/1/20 and JPEN 9/20). Note that suggested formula is also low carb content.

## 2021-01-20 NOTE — PROGRESS NOTE ADULT - ASSESSMENT
ESRD (due to DM) on HD TTS  s/p Fall  altered mental status   Covid-19 PNA / bacterial PNA   fluid overload  hyponatremia  hyperkalemia  DM  HTN  afib on coumadin  CVA  NSTEMI    plan:    HD tomorrow: 3 hours, opti 200 dialyzer, 2K/1Kbath, 3L UF  left arm precautions  covid isolation  f/u cardio / f/u pulm  icu care

## 2021-01-20 NOTE — PROGRESS NOTE ADULT - SUBJECTIVE AND OBJECTIVE BOX
[facility] NEPHROLOGY FOLLOW UP NOTE  --------------------------------------------------------------------------------  24 hour events/subjective: Patient examined. Trached.    PAST HISTORY  --------------------------------------------------------------------------------  No significant changes to PMH, PSH, FHx, SHx, unless otherwise noted    ALLERGIES & MEDICATIONS  --------------------------------------------------------------------------------  Allergies    Orange (Other)  Originally Entered as [HIVES] reaction to [sulfur] (Unknown)  penicillin (Unknown)  sulfADIAZINE (Unknown)    Intolerances      Standing Inpatient Medications  acetaminophen   Tablet .. 650 milliGRAM(s) Oral once  aspirin  chewable 81 milliGRAM(s) Oral daily  atorvastatin 20 milliGRAM(s) Oral at bedtime  chlorhexidine 0.12% Liquid 15 milliLiter(s) Oral Mucosa every 12 hours  chlorhexidine 4% Liquid 1 Application(s) Topical <User Schedule>  clonazePAM  Tablet 1 milliGRAM(s) Oral every 12 hours  collagenase Ointment 1 Application(s) Topical two times a day  dexMEDEtomidine Infusion 0.2 MICROgram(s)/kG/Hr IV Continuous <Continuous>  dextrose 40% Gel 15 Gram(s) Oral once  dextrose 5%. 1000 milliLiter(s) IV Continuous <Continuous>  dextrose 5%. 1000 milliLiter(s) IV Continuous <Continuous>  dextrose 50% Injectable 25 Gram(s) IV Push once  dextrose 50% Injectable 12.5 Gram(s) IV Push once  dextrose 50% Injectable 25 Gram(s) IV Push once  epoetin raven-epbx (RETACRIT) Injectable 8000 Unit(s) IV Push <User Schedule>  fentaNYL   Infusion 0.501 MICROgram(s)/kG/Hr IV Continuous <Continuous>  glucagon  Injectable 1 milliGRAM(s) IntraMuscular once  heparin  Infusion 1800 Unit(s)/Hr IV Continuous <Continuous>  insulin glargine Injectable (LANTUS) 25 Unit(s) SubCutaneous at bedtime  insulin lispro (ADMELOG) corrective regimen sliding scale   SubCutaneous three times a day before meals  insulin lispro Injectable (ADMELOG) 8 Unit(s) SubCutaneous three times a day before meals  levothyroxine 100 MICROGram(s) Oral daily  meropenem  IVPB      meropenem  IVPB 500 milliGRAM(s) IV Intermittent every 24 hours  metoprolol tartrate 12.5 milliGRAM(s) Oral two times a day  norepinephrine Infusion 0.05 MICROgram(s)/kG/Min IV Continuous <Continuous>  pantoprazole   Suspension 40 milliGRAM(s) Oral before breakfast  QUEtiapine 50 milliGRAM(s) Oral two times a day  senna 2 Tablet(s) Oral at bedtime  sodium bicarbonate 650 milliGRAM(s) Oral every 8 hours  sodium zirconium cyclosilicate 10 Gram(s) Oral three times a day  vancomycin  IVPB 750 milliGRAM(s) IV Intermittent <User Schedule>    PRN Inpatient Medications  acetaminophen   Tablet .. 650 milliGRAM(s) Oral every 6 hours PRN      VITALS/PHYSICAL EXAM  --------------------------------------------------------------------------------  T(C): 35.9 (01-20-21 @ 08:00), Max: 37 (01-19-21 @ 16:00)  HR: 56 (01-20-21 @ 12:00) (54 - 92)  BP: 122/54 (01-20-21 @ 11:45) (89/48 - 150/59)  RR: 16 (01-20-21 @ 11:45) (0 - 34)  SpO2: 100% (01-20-21 @ 12:00) (85% - 100%)  Wt(kg): --    Weight (kg): 97 (01-20-21 @ 08:00)      01-19-21 @ 07:01  -  01-20-21 @ 07:00  --------------------------------------------------------  IN: 1951.3 mL / OUT: 3600 mL / NET: -1648.7 mL    01-20-21 @ 07:01  -  01-20-21 @ 12:19  --------------------------------------------------------  IN: 311 mL / OUT: 0 mL / NET: 311 mL      Physical Exam:  	Gen: NAD  	Pulm: CTA B/L  	CV: RRR, S1S2  	Abd: +BS, soft, nontender/nondistended  	: No suprapubic tenderness  	LE: Warm, FROM, no clubbing, intact strength; no edema  	Vascular access:    LABS/STUDIES  --------------------------------------------------------------------------------              8.7    16.36 >-----------<  346      [01-20-21 @ 04:50]              26.6     144  |  104  |  88  ----------------------------<  78      [01-20-21 @ 04:50]  5.0   |  21  |  5.8        Ca     8.3     [01-20-21 @ 04:50]      Mg     2.5     [01-20-21 @ 04:50]      Phos  7.2     [01-20-21 @ 04:50]    TPro  5.4  /  Alb  2.8  /  TBili  0.8  /  DBili  x   /  AST  30  /  ALT  22  /  AlkPhos  97  [01-20-21 @ 04:50]      PTT: 29.0       [01-20-21 @ 04:50]      Creatinine Trend:  SCr 5.8 [01-20 @ 04:50]  SCr 6.4 [01-19 @ 05:00]  SCr 5.7 [01-18 @ 09:55]  SCr 6.0 [01-18 @ 04:45]  SCr 6.0 [01-17 @ 21:50]    Urinalysis - [01-06-21 @ 18:43]      Color Yellow / Appearance Slightly Turbid / SG 1.018 / pH 6.5      Gluc 100 mg/dL / Ketone Negative  / Bili Negative / Urobili <2 mg/dL       Blood Moderate / Protein 300 mg/dL / Leuk Est Small / Nitrite Negative       /  / Hyaline 114 / Gran  / Sq Epi  / Non Sq Epi 1 / Bacteria Negative      Ferritin 1696      [01-15-21 @ 04:30]  Vitamin D (25OH) 45      [01-15-21 @ 12:06]  HbA1c 7.1      [05-21-19 @ 04:30]  TSH 4.57      [01-01-21 @ 17:56]

## 2021-01-20 NOTE — CONSULT NOTE ADULT - CONSULT REASON
AMS prior to intubation. Neg EEG
bleeding toes
Tracheostomy and PEG placement
Sacral wound
COVID-19 Pneumonia
ESRD
Sacrum
positive troponins

## 2021-01-20 NOTE — CONSULT NOTE ADULT - CONSULT REQUESTED DATE/TIME
02-Jan-2021 10:03
05-Jan-2021 09:34
14-Jan-2021 16:57
02-Jan-2021 12:32
16-Jan-2021 12:30
01-Jan-2021 19:33
03-Jan-2021 12:23
20-Jan-2021 15:28

## 2021-01-20 NOTE — CONSULT NOTE ADULT - ASSESSMENT
Patient is a 60y old  Male who presents with sacral ulcer  - Plan for bedside debridement   - LWC: santyl/dakins WTD

## 2021-01-20 NOTE — PROGRESS NOTE ADULT - ASSESSMENT
59 yo male with PMHx of afib on coumadin, DM2, ESRD on HD MWF, HLD, HTN, CVA, who presented w/ weakness and fall.     #Acute hypoxemic respiratory failure   #Covid-19 PNA  #Superimposed bacterial PNA   - s/p intubation -> trach  - s/p Azithromycin discontinued on 1/8/2021, s/p Cefepime discontinued on 1/11/2021  - s/p Dexamethasone 6mg IV once daily (1/1/2021) x10 days  - Covid antibodies: reported negative --> s/p 2 units of Convalescent plasma ( 1/8/2021 and 1/12/2021)  - f/u inflammatory markers   - s/p tocilizumab 400mg IV x1 on 1/8/2021  - c/w lucinda and vanc during HD   - wean off sedation w/ klonopin and pain control prn     #Ileus  - s/p PEG 1/18  - xray w/ gasseous distention of stomach  - PEG to suction   - holding feeds   - serial abd xrays     #Upper extremity edema R>L  - f/u arterial and venous duplex  - loosen restraints    #Acute on chronic anemia   - Hb dropped 6.8 from 7.6, currently stable 8.7  - c/w heparin gtt, transition to coumadin   - s/p 1u pRBC 1/19  - monitor     #Leukocytosis   #Sacral ulcer, heel ulcers   - Multifactorial ( Possible overlying infection, steroids)   - diarrhea resolved   - c/w lucinda and vanc while on HD, monitor vanc levels   - f/u burn    #Hyperkalemia- resolved  -c/w Lokelma 10 q8h   - monitor     #ESRD on HD   - EPO (retacrit) 8000 units IV push once weekly  - nephro following, f/u reccs   - on sodium bicarb     #Atrial fibrillation  - Paroxysmal ? Currently NSRT, well rate controlled.   - on Heparin gtt, holding home coumadin, follow up aPTT, transition to coumadin    - Metoprolol tartrate 12.5mg po q12hrs    - Will DC levophed  - CT brain no IC bleed or concerns of IC bleed     #NSTEMI  - Type II MI, demand ischemia, likely secondary to hypoxia secondary to COVID-19 pneumonia  - Aspirin 81mg po once daily   - CT brain no IC bleed or concerns of IC bleed   - On heparin gtt (for atrial fibrillation)  - Repeat CK-MB and CPK were stable    #Hypothyroidism: Continue with levothyroxine 100mcg po once daily     #Altered Mental Status- improved  - EEG diffuse slowing, but no seizure activity, check the report above  - CT brain shows ventriculomegaly (check full report), no IC bleed, possible old infarct.  - neurology consulted and recs:   a. CT brain negative x2 for stroke or other structural pathology.  EEG negative for any epileptiform activity.  Suspect multifactorial metabolic encephalopathy in setting of COVID and ESRD and sedatives.  Wean sedation as able, can reassess if patient does not awaken after that time.  b. No further EEG or other tests   c. To be reassessed after sedation is weaned    Diet: NPO for now   DVT ppx: on Heparin gtt, monitor ptt   GI ppx: Protonix 40 qd        61 yo male with PMHx of afib on coumadin, DM2, ESRD on HD MWF, HLD, HTN, CVA, who presented w/ weakness and fall.     #Acute hypoxemic respiratory failure   #Covid-19 PNA  #Superimposed bacterial PNA   - s/p intubation -> trach  - s/p Azithromycin discontinued on 1/8/2021, s/p Cefepime discontinued on 1/11/2021  - s/p Dexamethasone 6mg IV once daily (1/1/2021) x10 days  - Covid antibodies: reported negative --> s/p 2 units of Convalescent plasma ( 1/8/2021 and 1/12/2021)  - f/u inflammatory markers   - s/p tocilizumab 400mg IV x1 on 1/8/2021  - c/w lucinda and vanc during HD   - wean off sedation w/ klonopin and pain control prn     #Ileus  - s/p PEG 1/18  - xray w/ gasseous distention of stomach  - PEG to suction   - holding feeds   - serial abd xrays     #Upper extremity edema R>L  - f/u arterial and venous duplex  - loosen restraints    #Acute on chronic anemia   - Hb dropped 6.8 from 7.6, currently stable 8.7  - c/w heparin gtt, transition to coumadin   - s/p 1u pRBC 1/19  - monitor     #Leukocytosis   #Sacral ulcer, heel ulcers   - Multifactorial ( Possible overlying infection, steroids)   - diarrhea resolved   - c/w lucinda and vanc while on HD, monitor vanc levels   - f/u burn    #Hyperkalemia- resolved  -c/w Lokelma 10 q8h   - monitor     #ESRD on HD   - EPO (retacrit) 8000 units IV push once weekly  - nephro following, f/u reccs   - on sodium bicarb     #Atrial fibrillation  - Paroxysmal ? Currently NSRT, well rate controlled.   - on Heparin gtt, holding home coumadin, follow up aPTT, transition to coumadin    - Metoprolol tartrate 12.5mg po q12hrs    - Will DC levophed  - CT brain no IC bleed or concerns of IC bleed     #NSTEMI  - Type II MI, demand ischemia, likely secondary to hypoxia secondary to COVID-19 pneumonia  - Aspirin 81mg po once daily   - CT brain no IC bleed or concerns of IC bleed   - On heparin gtt (for atrial fibrillation)  - Repeat CK-MB and CPK were stable    #Hypothyroidism: Continue with levothyroxine 100mcg po once daily     #Altered Mental Status- improved  - EEG diffuse slowing, but no seizure activity, check the report above  - CT brain shows ventriculomegaly (check full report), no IC bleed, possible old infarct.  - neurology consulted and recs:   a. CT brain negative x2 for stroke or other structural pathology.  EEG negative for any epileptiform activity.  Suspect multifactorial metabolic encephalopathy in setting of COVID and ESRD and sedatives.  Wean sedation as able, can reassess if patient does not awaken after that time.  b. No further EEG or other tests   c. To be reassessed after sedation is weaned    Diet: NPO for now   DVT ppx: on Heparin gtt, monitor ptt   GI ppx: Protonix 40 qd     Updated patient's sister Nedra Shea on [phone] regarding patient's condition

## 2021-01-20 NOTE — PROGRESS NOTE ADULT - SUBJECTIVE AND OBJECTIVE BOX
Patient is a 60y old  Male who presents with a chief complaint of s/p fall. (20 Jan 2021 12:18)  pt seen and evaluated   remain vented via trach/sedated  event noted/ npo now for abdominal distention and ileus  on pressor  renal f/u noted HD for tomorrow  ICU Vital Signs Last 24 Hrs  T(C): 36.1 (20 Jan 2021 12:00), Max: 37 (19 Jan 2021 16:00)  T(F): 97 (20 Jan 2021 12:00), Max: 98.6 (19 Jan 2021 16:00)  HR: 54 (20 Jan 2021 14:45) (54 - 84)  BP: 103/52 (20 Jan 2021 14:45) (89/48 - 150/59)  BP(mean): 75 (20 Jan 2021 14:45) (59 - 97)  RR: 33 (20 Jan 2021 14:45) (0 - 34)  SpO2: 99% (20 Jan 2021 14:45) (85% - 100%)      Drug Dosing Weight  Height (cm): 177.8 (18 Jan 2021 08:46)  Weight (kg): 97 (20 Jan 2021 08:00)  BMI (kg/m2): 30.7 (20 Jan 2021 08:00)  BSA (m2): 2.15 (20 Jan 2021 08:00)    I&O's Detail    19 Jan 2021 07:01  -  20 Jan 2021 07:00  --------------------------------------------------------  IN:    Dexmedetomidine: 696.6 mL    FentaNYL: 261.7 mL    Heparin: 57 mL    Insulin: 8 mL    Norepinephrine: 166 mL    PRBCs (Packed Red Blood Cells): 612 mL    sodium chloride 0.9%: 150 mL  Total IN: 1951.3 mL    OUT:    Other (mL): 3000 mL    PEG (Percutaneous Endoscopic Gastrostomy) Tube (mL): 600 mL    Rectal Tube (mL): 0 mL  Total OUT: 3600 mL    Total NET: -1648.7 mL      20 Jan 2021 07:01  -  20 Jan 2021 15:09  --------------------------------------------------------  IN:    Dexmedetomidine: 230.7 mL    FentaNYL: 116 mL    Heparin: 144 mL    IV PiggyBack: 50 mL    Norepinephrine: 36 mL  Total IN: 576.7 mL    OUT:  Total OUT: 0 mL    Total NET: 576.7 mL     PHYSICAL EXAM:  Constitutional:  remain vented/ sedated   OGT feed in place constance nunez  Gastrointestinal:  distended   rectal tube in place  MEDICATIONS  (STANDING):  acetaminophen   Tablet .. 650 milliGRAM(s) Oral once  aspirin  chewable 81 milliGRAM(s) Oral daily  atorvastatin 20 milliGRAM(s) Oral at bedtime  chlorhexidine 0.12% Liquid 15 milliLiter(s) Oral Mucosa every 12 hours  chlorhexidine 4% Liquid 1 Application(s) Topical <User Schedule>  clonazePAM  Tablet 1 milliGRAM(s) Oral every 12 hours  collagenase Ointment 1 Application(s) Topical two times a day  dextrose 40% Gel 15 Gram(s) Oral once  dextrose 5%. 1000 milliLiter(s) (50 mL/Hr) IV Continuous <Continuous>  dextrose 5%. 1000 milliLiter(s) (100 mL/Hr) IV Continuous <Continuous>  dextrose 50% Injectable 25 Gram(s) IV Push once  dextrose 50% Injectable 12.5 Gram(s) IV Push once  dextrose 50% Injectable 25 Gram(s) IV Push once  epoetin raven-epbx (RETACRIT) Injectable 8000 Unit(s) IV Push <User Schedule>  glucagon  Injectable 1 milliGRAM(s) IntraMuscular once  heparin  Infusion 1800 Unit(s)/Hr (18 mL/Hr) IV Continuous <Continuous>  insulin glargine Injectable (LANTUS) 20 Unit(s) SubCutaneous at bedtime  insulin lispro (ADMELOG) corrective regimen sliding scale   SubCutaneous three times a day before meals  insulin lispro Injectable (ADMELOG) 8 Unit(s) SubCutaneous three times a day before meals  lactated ringers Bolus 250 milliLiter(s) IV Bolus once  levothyroxine 100 MICROGram(s) Oral daily  meropenem  IVPB      meropenem  IVPB 500 milliGRAM(s) IV Intermittent every 24 hours  metoprolol tartrate 12.5 milliGRAM(s) Oral two times a day  norepinephrine Infusion 0.05 MICROgram(s)/kG/Min (9.08 mL/Hr) IV Continuous <Continuous>  pantoprazole   Suspension 40 milliGRAM(s) Oral before breakfast  QUEtiapine 50 milliGRAM(s) Oral two times a day  senna 2 Tablet(s) Oral at bedtime  sodium bicarbonate 650 milliGRAM(s) Oral every 8 hours  sodium zirconium cyclosilicate 10 Gram(s) Oral three times a day  vancomycin  IVPB 750 milliGRAM(s) IV Intermittent <User Schedule>      Diet, NPO (01-19-21 @ 14:00)      LABS  01-20    144  |  104  |  88<HH>  ----------------------------<  78  5.0   |  21  |  5.8<HH>    Ca    8.3<L>      20 Jan 2021 04:50  Phos  7.2     01-20  Mg     2.5     01-20    TPro  5.4<L>  /  Alb  2.8<L>  /  TBili  0.8  /  DBili  x   /  AST  30  /  ALT  22  /  AlkPhos  97  01-20                          8.7    16.36 )-----------( 346      ( 20 Jan 2021 04:50 )             26.6     Vitamin D, 25-Hydroxy (01.15.21 @ 12:06)   Vitamin D, 25-Hydroxy: 45: 30 - 80 ng/mL Optimum Levels   CAPILLARY BLOOD GLUCOSE  POCT Blood Glucose.: 67 mg/dL (20 Jan 2021 12:35)  POCT Blood Glucose.: 73 mg/dL (20 Jan 2021 06:19)   RADIOLOGY STUDIES  < from: Xray Chest 1 View-PORTABLE IMMEDIATE (Xray Chest 1 View-PORTABLE IMMEDIATE .) (01.20.21 @ 10:41) >  Impression:  Bilateral opacifications. Support devices as described.

## 2021-01-20 NOTE — PROGRESS NOTE ADULT - SUBJECTIVE AND OBJECTIVE BOX
SUBJECTIVE:    Patient is a 60y old Male who presents with a chief complaint of s/p fall. (20 Jan 2021 09:24)    Currently admitted to medicine with the primary diagnosis of Fever       Today is hospital day 19d. Feeds were held yesterday for abdominal distention/ileus. PEG connected to suction with output of 600ml.     PAST MEDICAL & SURGICAL HISTORY  PAD (peripheral artery disease)  multiple toe amputations    Anemia  chronic anemia - s/p transfusion 2018    Thyroid cancer    Chronic leg pain    OA (osteoarthritis)    SOB (shortness of breath) on exertion    ESRD (end stage renal disease)  2 yrs    Atrial fibrillation  on warfarin    Right sided weakness    Stroke  8 yrs ago    Hypertension    High blood cholesterol    Diabetes mellitus, type 2    Dialysis patient  Mon, Wednesday, Friday ([facility])    Smoker    H/O thyroid nodule    H/O gastroesophageal reflux (GERD)    History of tonsillectomy    History of surgery  Multiple toe amputations (amp 4 1/2 left toes; has 1/2 left mid toe; amp right mid toe)    A-V fistula  left AVF      SOCIAL HISTORY:  Negative for smoking/alcohol/drug use.     ALLERGIES:  Orange (Other)  Originally Entered as [HIVES] reaction to [sulfur] (Unknown)  penicillin (Unknown)  sulfADIAZINE (Unknown)    MEDICATIONS:  STANDING MEDICATIONS  acetaminophen   Tablet .. 650 milliGRAM(s) Oral once  aspirin  chewable 81 milliGRAM(s) Oral daily  atorvastatin 20 milliGRAM(s) Oral at bedtime  chlorhexidine 0.12% Liquid 15 milliLiter(s) Oral Mucosa every 12 hours  chlorhexidine 4% Liquid 1 Application(s) Topical <User Schedule>  clonazePAM  Tablet 1 milliGRAM(s) Oral every 12 hours  collagenase Ointment 1 Application(s) Topical two times a day  dexMEDEtomidine Infusion 0.2 MICROgram(s)/kG/Hr IV Continuous <Continuous>  dextrose 40% Gel 15 Gram(s) Oral once  dextrose 5%. 1000 milliLiter(s) IV Continuous <Continuous>  dextrose 5%. 1000 milliLiter(s) IV Continuous <Continuous>  dextrose 50% Injectable 25 Gram(s) IV Push once  dextrose 50% Injectable 12.5 Gram(s) IV Push once  dextrose 50% Injectable 25 Gram(s) IV Push once  epoetin raven-epbx (RETACRIT) Injectable 8000 Unit(s) IV Push <User Schedule>  fentaNYL   Infusion 0.501 MICROgram(s)/kG/Hr IV Continuous <Continuous>  glucagon  Injectable 1 milliGRAM(s) IntraMuscular once  heparin  Infusion 1800 Unit(s)/Hr IV Continuous <Continuous>  insulin glargine Injectable (LANTUS) 25 Unit(s) SubCutaneous at bedtime  insulin lispro (ADMELOG) corrective regimen sliding scale   SubCutaneous three times a day before meals  insulin lispro Injectable (ADMELOG) 8 Unit(s) SubCutaneous three times a day before meals  levothyroxine 100 MICROGram(s) Oral daily  meropenem  IVPB      meropenem  IVPB 500 milliGRAM(s) IV Intermittent every 24 hours  metoprolol tartrate 12.5 milliGRAM(s) Oral two times a day  norepinephrine Infusion 0.05 MICROgram(s)/kG/Min IV Continuous <Continuous>  pantoprazole   Suspension 40 milliGRAM(s) Oral before breakfast  QUEtiapine 50 milliGRAM(s) Oral two times a day  senna 2 Tablet(s) Oral at bedtime  sodium bicarbonate 650 milliGRAM(s) Oral every 8 hours  sodium zirconium cyclosilicate 10 Gram(s) Oral three times a day  vancomycin  IVPB 750 milliGRAM(s) IV Intermittent <User Schedule>    PRN MEDICATIONS  acetaminophen   Tablet .. 650 milliGRAM(s) Oral every 6 hours PRN    VITALS:   T(F): 96.7  HR: 56  BP: 102/49  RR: 6  SpO2: 100%    PHYSICAL EXAM:  GEN: Trach in place, awake, NAD  LUNGS: Decreased breath sounds, mild wheezing   HEART: S1/S2 present.   ABD: Soft, non-tender, peg in place, bowel sounds present.  EXT: Edema of upper extremities R >L, right hand cold to touch, bilateral lower extremity edema   NEURO: on sedation, following commands     Garza catheter present     LABS:                        8.7    16.36 )-----------( 346      ( 20 Jan 2021 04:50 )             26.6     01-20    144  |  104  |  88<HH>  ----------------------------<  78  5.0   |  21  |  5.8<HH>    Ca    8.3<L>      20 Jan 2021 04:50  Phos  7.2     01-20  Mg     2.5     01-20    TPro  5.4<L>  /  Alb  2.8<L>  /  TBili  0.8  /  DBili  x   /  AST  30  /  ALT  22  /  AlkPhos  97  01-20    PTT - ( 20 Jan 2021 04:50 )  PTT:29.0 sec      RADIOLOGY:  < from: Xray Kidney Ureter Bladder (01.20.21 @ 04:58) >  XAM:  XR KUB 1 VIEW          PROCEDURE DATE:  01/20/2021      INTERPRETATION:  Clinical History / Reason for exam: Abdominal distention.    Technique: 2 abdominal x-rays obtained.    Comparison made with abdominal x-ray January 19, 2021 at5:37PM.    Findings:    No significant change in gaseous distention of stomach, post gastrostomy tube placement. No significant change in mild distention of large bowel with stool and gas; no evidence of small bowel obstruction.  Stable osseous structures.    Impression:  No significant change in gaseous distention of stomach, post gastrostomy tube placement. No significant change in mild distention of large bowel with stool and gas; no evidence of small bowel obstruction.    ADELA DAMICO M.D., ATTENDING RADIOLOGIST  This document has been electronically signed. Jan 20 2021  8:28AM    < end of copied text >

## 2021-01-20 NOTE — PROGRESS NOTE ADULT - ASSESSMENT
IMPRESSION:    ESRD MWF for HD  Acute hypoxemic resp failure failed weaning SP trach and PEG   NSTEMI  Afib was on coumadin  Severe COVID PNA  Superimposed bacteria PNA  Sacral ulcer    PLAN:    CNS: SAT.  Seroquel.     HEENT: Oral care.    PULMONARY: Vent changes. Wean O2.  Monitor PPL and DP.  Wean O2.      CARDIOVASCULAR:  I<O as tolerated.      GI: GI prophylaxis.  PEG Feeding after CXR.  Reglan .      RENAL: check lytes and replete PRN. Nephro F/UP result.      INFECTIOUS DISEASE:  Continue ABX.  DC Vanc if cultures negative     HEMATOLOGICAL: DVT Prophylaxis.  FU PTT     ENDOCRINE:  Follow up FS.  Insulin protocol if needed.    MUSCULOSKELETAL:  bed rest.  FU with Burn     MICU     Prognosis poor

## 2021-01-20 NOTE — CONSULT NOTE ADULT - SUBJECTIVE AND OBJECTIVE BOX
Patient is a 60y old  Male who presents with a chief complaint of s/p fall. Burn team consulted for evaluation of sacral ulcer, patient evaluated at bedside.     INTERVAL HPI/OVERNIGHT EVENTS:  ICU Vital Signs Last 24 Hrs  T(C): 36.1 (20 Jan 2021 12:00), Max: 37 (19 Jan 2021 16:00)  T(F): 97 (20 Jan 2021 12:00), Max: 98.6 (19 Jan 2021 16:00)  HR: 54 (20 Jan 2021 14:45) (54 - 84)  BP: 103/52 (20 Jan 2021 14:45) (89/48 - 150/59)  BP(mean): 75 (20 Jan 2021 14:45) (59 - 97)  RR: 33 (20 Jan 2021 14:45) (0 - 34)  SpO2: 99% (20 Jan 2021 14:45) (85% - 100%)      MEDICATIONS  (STANDING):  acetaminophen   Tablet .. 650 milliGRAM(s) Oral once  aspirin  chewable 81 milliGRAM(s) Oral daily  atorvastatin 20 milliGRAM(s) Oral at bedtime  chlorhexidine 0.12% Liquid 15 milliLiter(s) Oral Mucosa every 12 hours  chlorhexidine 4% Liquid 1 Application(s) Topical <User Schedule>  clonazePAM  Tablet 1 milliGRAM(s) Oral every 12 hours  collagenase Ointment 1 Application(s) Topical two times a day  dextrose 40% Gel 15 Gram(s) Oral once  dextrose 5%. 1000 milliLiter(s) (50 mL/Hr) IV Continuous <Continuous>  dextrose 5%. 1000 milliLiter(s) (100 mL/Hr) IV Continuous <Continuous>  dextrose 50% Injectable 25 Gram(s) IV Push once  dextrose 50% Injectable 12.5 Gram(s) IV Push once  dextrose 50% Injectable 25 Gram(s) IV Push once  epoetin raven-epbx (RETACRIT) Injectable 8000 Unit(s) IV Push <User Schedule>  glucagon  Injectable 1 milliGRAM(s) IntraMuscular once  heparin  Infusion 1800 Unit(s)/Hr (18 mL/Hr) IV Continuous <Continuous>  insulin glargine Injectable (LANTUS) 20 Unit(s) SubCutaneous at bedtime  insulin lispro (ADMELOG) corrective regimen sliding scale   SubCutaneous three times a day before meals  insulin lispro Injectable (ADMELOG) 8 Unit(s) SubCutaneous three times a day before meals  lactated ringers Bolus 250 milliLiter(s) IV Bolus once  levothyroxine 100 MICROGram(s) Oral daily  meropenem  IVPB      meropenem  IVPB 500 milliGRAM(s) IV Intermittent every 24 hours  metoprolol tartrate 12.5 milliGRAM(s) Oral two times a day  norepinephrine Infusion 0.05 MICROgram(s)/kG/Min (9.08 mL/Hr) IV Continuous <Continuous>  pantoprazole   Suspension 40 milliGRAM(s) Oral before breakfast  QUEtiapine 50 milliGRAM(s) Oral two times a day  senna 2 Tablet(s) Oral at bedtime  sodium bicarbonate 650 milliGRAM(s) Oral every 8 hours  sodium zirconium cyclosilicate 10 Gram(s) Oral three times a day  vancomycin  IVPB 750 milliGRAM(s) IV Intermittent <User Schedule>    MEDICATIONS  (PRN):  acetaminophen   Tablet .. 650 milliGRAM(s) Oral every 6 hours PRN Temp greater or equal to 38C (100.4F), Mild Pain (1 - 3)      PHYSICAL EXAM:  GENERAL: well built, well nourished  HEAD:  Atraumatic, Normocephalic  Wound: ~[id number] wound to sacrum with adherent black necrotic tissue. No purulent drainage or foul odor noted.

## 2021-01-20 NOTE — PROGRESS NOTE ADULT - SUBJECTIVE AND OBJECTIVE BOX
Patient is a 60y old  Male who presents with a chief complaint of s/p fall. (19 Jan 2021 15:18)        Over Night Events:  Remains On MV.  Sedated.  On levophed.  PEG to suction         ROS:     All ROS are negative except HPI         PHYSICAL EXAM    ICU Vital Signs Last 24 Hrs  T(C): 35.9 (20 Jan 2021 08:00), Max: 37 (19 Jan 2021 16:00)  T(F): 96.7 (20 Jan 2021 08:00), Max: 98.6 (19 Jan 2021 16:00)  HR: 66 (20 Jan 2021 09:00) (54 - 92)  BP: 111/51 (20 Jan 2021 09:00) (89/48 - 159/56)  BP(mean): 68 (20 Jan 2021 09:00) (63 - 97)  ABP: --  ABP(mean): --  RR: 16 (20 Jan 2021 09:00) (0 - 34)  SpO2: 100% (20 Jan 2021 09:00) (85% - 100%)      CONSTITUTIONAL:  Well nourished. Ill appearing  NAD    ENT:   Airway patent,   Mouth with normal mucosa.   No thrush    EYES:   Pupils equal,   Round and reactive to light.    CARDIAC:   Normal rate,   Regular rhythm.     edema      Vascular:  Normal systolic impulse  No Carotid bruits    RESPIRATORY:   No wheezing  Bilateral BS  Normal chest expansion  Not tachypneic,  No use of accessory muscles    GASTROINTESTINAL:  Abdomen soft,   Non-tender,   No guarding,   + BS    MUSCULOSKELETAL:   Range of motion is not limited,  No clubbing, cyanosis  Right hand cold and swollen     NEUROLOGICAL:   Sedated   Follows commands     SKIN:   Skin normal color for race,   Warm and dry  No evidence of rash.    PSYCHIATRIC:   No apparent risk to self or others.    HEMATOLOGICAL:  No cervical  lymphadenopathy.  no inguinal lymphadenopathy      01-19-21 @ 07:01  -  01-20-21 @ 07:00  --------------------------------------------------------  IN:    Dexmedetomidine: 696.6 mL    FentaNYL: 261.7 mL    Heparin: 57 mL    Insulin: 8 mL    Norepinephrine: 166 mL    PRBCs (Packed Red Blood Cells): 612 mL    sodium chloride 0.9%: 150 mL  Total IN: 1951.3 mL    OUT:    Other (mL): 3000 mL    PEG (Percutaneous Endoscopic Gastrostomy) Tube (mL): 600 mL    Rectal Tube (mL): 0 mL  Total OUT: 3600 mL    Total NET: -1648.7 mL      01-20-21 @ 07:01  -  01-20-21 @ 09:24  --------------------------------------------------------  IN:    Dexmedetomidine: 24.3 mL    FentaNYL: 14.5 mL    Heparin: 18 mL    Norepinephrine: 6 mL  Total IN: 62.8 mL    OUT:  Total OUT: 0 mL    Total NET: 62.8 mL          LABS:                            8.7    16.36 )-----------( 346      ( 20 Jan 2021 04:50 )             26.6                                               01-20    144  |  104  |  88<HH>  ----------------------------<  78  5.0   |  21  |  5.8<HH>    Ca    8.3<L>      20 Jan 2021 04:50  Phos  7.2     01-20  Mg     2.5     01-20    TPro  5.4<L>  /  Alb  2.8<L>  /  TBili  0.8  /  DBili  x   /  AST  30  /  ALT  22  /  AlkPhos  97  01-20      PTT - ( 20 Jan 2021 04:50 )  PTT:29.0 sec                                                                                     LIVER FUNCTIONS - ( 20 Jan 2021 04:50 )  Alb: 2.8 g/dL / Pro: 5.4 g/dL / ALK PHOS: 97 U/L / ALT: 22 U/L / AST: 30 U/L / GGT: x                                                                                               Mode: AC/ CMV (Assist Control/ Continuous Mandatory Ventilation)  RR (machine): 34  TV (machine): 450  FiO2: 30  PEEP: 8  ITime: 1  MAP: 18  PIP: 29                                          MEDICATIONS  (STANDING):  acetaminophen   Tablet .. 650 milliGRAM(s) Oral once  aspirin  chewable 81 milliGRAM(s) Oral daily  atorvastatin 20 milliGRAM(s) Oral at bedtime  chlorhexidine 0.12% Liquid 15 milliLiter(s) Oral Mucosa every 12 hours  chlorhexidine 4% Liquid 1 Application(s) Topical <User Schedule>  collagenase Ointment 1 Application(s) Topical two times a day  dexMEDEtomidine Infusion 0.2 MICROgram(s)/kG/Hr (4.85 mL/Hr) IV Continuous <Continuous>  dextrose 40% Gel 15 Gram(s) Oral once  dextrose 5%. 1000 milliLiter(s) (50 mL/Hr) IV Continuous <Continuous>  dextrose 5%. 1000 milliLiter(s) (100 mL/Hr) IV Continuous <Continuous>  dextrose 50% Injectable 25 Gram(s) IV Push once  dextrose 50% Injectable 12.5 Gram(s) IV Push once  dextrose 50% Injectable 25 Gram(s) IV Push once  epoetin raven-epbx (RETACRIT) Injectable 8000 Unit(s) IV Push <User Schedule>  fentaNYL   Infusion 0.501 MICROgram(s)/kG/Hr (4.85 mL/Hr) IV Continuous <Continuous>  glucagon  Injectable 1 milliGRAM(s) IntraMuscular once  heparin  Infusion 1800 Unit(s)/Hr (18 mL/Hr) IV Continuous <Continuous>  insulin glargine Injectable (LANTUS) 25 Unit(s) SubCutaneous at bedtime  insulin lispro (ADMELOG) corrective regimen sliding scale   SubCutaneous three times a day before meals  insulin lispro Injectable (ADMELOG) 8 Unit(s) SubCutaneous three times a day before meals  levothyroxine 100 MICROGram(s) Oral daily  meropenem  IVPB      meropenem  IVPB 500 milliGRAM(s) IV Intermittent every 24 hours  metoprolol tartrate 12.5 milliGRAM(s) Oral two times a day  norepinephrine Infusion 0.05 MICROgram(s)/kG/Min (9.08 mL/Hr) IV Continuous <Continuous>  pantoprazole   Suspension 40 milliGRAM(s) Oral before breakfast  QUEtiapine 50 milliGRAM(s) Oral two times a day  senna 2 Tablet(s) Oral at bedtime  sodium bicarbonate 650 milliGRAM(s) Oral every 8 hours  sodium zirconium cyclosilicate 10 Gram(s) Oral three times a day  vancomycin  IVPB 750 milliGRAM(s) IV Intermittent <User Schedule>    MEDICATIONS  (PRN):  acetaminophen   Tablet .. 650 milliGRAM(s) Oral every 6 hours PRN Temp greater or equal to 38C (100.4F), Mild Pain (1 - 3)      New X-rays reviewed:                                                                                  ECHO    CXR interpreted by me:

## 2021-01-21 NOTE — PROGRESS NOTE ADULT - ASSESSMENT
ASSESSMENT/PLAN  - acute hypoxic resp failure  - covid 19 pneumonia  - ESRD on HD  - DM,   elevated WBC   hyperphosphatemia/hypokalemia    SUGGEST:  - estimated REE by PSE 2230 kcal/d and protein needs ~ 139 gm/d  - continue feeding Peptamen AF at 75 ml/h --> 137 gm protein (entirely whey source), 2160 kcal, meets recommended low n6:n3 ratio, 1500 total mg phos/d and 77 total mEq K per day  - glycemic control - insulin drip at one u/h when seen - starting to improve - and remember that current feeding Rx is LOW % carb  -   formula with high n6 content not ideal for any ARDS pt including covid (refer to CCN / ASPEN guidelines 4/1/20 and JPEN 9/20). Note that suggested formula is also low carb content.

## 2021-01-21 NOTE — PROGRESS NOTE ADULT - SUBJECTIVE AND OBJECTIVE BOX
Patient is a 60y old  Male who presents with a chief complaint of s/p fall. (21 Jan 2021 10:31)  pt seen and evaluated   remain vented via trach/sedated  event noted/ npo now for abdominal distention and ileus  on pressor    ICU Vital Signs Last 24 Hrs  T(C): 35.2 (21 Jan 2021 12:00), Max: 36.1 (20 Jan 2021 16:00)  T(F): 95.4 (21 Jan 2021 12:00), Max: 97 (20 Jan 2021 16:00)  HR: 112 (21 Jan 2021 14:45) (52 - 114)  BP: 117/60 (21 Jan 2021 14:45) (75/47 - 139/67)  BP(mean): 77 (21 Jan 2021 14:45) (55 - 100)  RR: 26 (21 Jan 2021 14:45) (6 - 34)  SpO2: 95% (21 Jan 2021 14:45) (91% - 100%)      Drug Dosing Weight  Height (cm): 177.8 (21 Jan 2021 08:00)  Weight (kg): 97 (21 Jan 2021 08:00)  BMI (kg/m2): 30.7 (21 Jan 2021 08:00)  BSA (m2): 2.15 (21 Jan 2021 08:00)    I&O's Detail    20 Jan 2021 07:01  -  21 Jan 2021 07:00  --------------------------------------------------------  IN:    Dexmedetomidine: 303.1 mL    Dexmedetomidine: 230.7 mL    FentaNYL: 116 mL    FentaNYL: 232 mL    Heparin: 432 mL    IV PiggyBack: 50 mL    Lactated Ringers Bolus: 250 mL    Norepinephrine: 60 mL  Total IN: 1673.8 mL    OUT:    PEG (Percutaneous Endoscopic Gastrostomy) Tube (mL): 200 mL    Peptamen A.F.: 0 mL  Total OUT: 200 mL    Total NET: 1473.8 mL      21 Jan 2021 07:01  -  21 Jan 2021 15:26  --------------------------------------------------------  IN:    Dexmedetomidine: 42.5 mL    Enteral Tube Flush: 50 mL    FentaNYL: 39 mL    Heparin: 85 mL    IV PiggyBack: 250 mL    Norepinephrine: 21.9 mL    Peptamen A.F.: 75 mL  Total IN: 563.4 mL    OUT:    Other (mL): 3500 mL  Total OUT: 3500 mL    Total NET: -2936.6 mL     PHYSICAL EXAM:  Constitutional:  remain vented/ sedated   OGT feed in place salegeovanny sump  Gastrointestinal:  soft, distended   rectal tube in place  MEDICATIONS  (STANDING):  acetaminophen   Tablet .. 650 milliGRAM(s) Oral once  aspirin  chewable 81 milliGRAM(s) Oral daily  atorvastatin 20 milliGRAM(s) Oral at bedtime  chlorhexidine 0.12% Liquid 15 milliLiter(s) Oral Mucosa every 12 hours  chlorhexidine 4% Liquid 1 Application(s) Topical <User Schedule>  clonazePAM  Tablet 1 milliGRAM(s) Oral every 12 hours  collagenase Ointment 1 Application(s) Topical two times a day  Dakins Solution - 1/2 Strength 1 Application(s) Topical two times a day  dexMEDEtomidine Infusion 0.2 MICROgram(s)/kG/Hr (4.85 mL/Hr) IV Continuous <Continuous>  dextrose 40% Gel 15 Gram(s) Oral once  dextrose 5%. 1000 milliLiter(s) (50 mL/Hr) IV Continuous <Continuous>  dextrose 5%. 1000 milliLiter(s) (100 mL/Hr) IV Continuous <Continuous>  dextrose 50% Injectable 25 Gram(s) IV Push once  dextrose 50% Injectable 12.5 Gram(s) IV Push once  dextrose 50% Injectable 25 Gram(s) IV Push once  epoetin raven-epbx (RETACRIT) Injectable 8000 Unit(s) IV Push <User Schedule>  fentaNYL   Infusion 0.5 MICROgram(s)/kG/Hr (4.85 mL/Hr) IV Continuous <Continuous>  glucagon  Injectable 1 milliGRAM(s) IntraMuscular once  heparin  Infusion 1800 Unit(s)/Hr (17 mL/Hr) IV Continuous <Continuous>  insulin glargine Injectable (LANTUS) 20 Unit(s) SubCutaneous at bedtime  insulin lispro (ADMELOG) corrective regimen sliding scale   SubCutaneous three times a day before meals  insulin lispro Injectable (ADMELOG) 8 Unit(s) SubCutaneous three times a day before meals  levothyroxine 100 MICROGram(s) Oral daily  lidocaine 1%/epinephrine 1:100,000 Inj 1 Vial(s) Local Injection once  meropenem  IVPB      meropenem  IVPB 500 milliGRAM(s) IV Intermittent every 24 hours  metoclopramide Injectable 5 milliGRAM(s) IV Push three times a day  metoprolol tartrate 12.5 milliGRAM(s) Oral two times a day  midodrine 10 milliGRAM(s) Oral every 8 hours  norepinephrine Infusion 0.05 MICROgram(s)/kG/Min (9.08 mL/Hr) IV Continuous <Continuous>  pantoprazole   Suspension 40 milliGRAM(s) Oral before breakfast  polyethylene glycol 3350 17 Gram(s) Oral at bedtime  QUEtiapine 50 milliGRAM(s) Oral two times a day  senna 2 Tablet(s) Oral at bedtime      Diet, NPO with Tube Feed:   Tube Feeding Modality: Gastrostomy  Peptamen A.F. Formula  Total Volume for 24 Hours (mL): 1800  Continuous  Until Goal Tube Feed Rate (mL per Hour): 75  Tube Feed Duration (in Hours): 24  Tube Feed Start Time: 13:15 (01-21-21 @ 11:24)      LABS  01-21    137  |  100  |  52<H>  ----------------------------<  152<H>  3.3<L>   |  25  |  3.6<H>    Ca    8.0<L>      21 Jan 2021 12:31  Phos  7.6     01-21  Mg     2.5     01-21    TPro  4.7<L>  /  Alb  2.2<L>  /  TBili  0.6  /  DBili  x   /  AST  25  /  ALT  16  /  AlkPhos  86  01-21                          8.8    7.75  )-----------( 300      ( 21 Jan 2021 04:50 )             27.3     CAPILLARY BLOOD GLUCOSE  POCT Blood Glucose.: 160 mg/dL (21 Jan 2021 14:55)  POCT Blood Glucose.: 114 mg/dL (21 Jan 2021 11:07)  POCT Blood Glucose.: 79 mg/dL (21 Jan 2021 07:47)   RADIOLOGY STUDIES  < from: Xray Abdomen 1 View PORTABLE -Urgent (Xray Abdomen 1 View PORTABLE -Urgent .) (01.21.21 @ 09:09) >  Single image  Comparison 1/20/2021  The bowel gas pattern is normal. Gastric distention has resolved. No free air is seen.  Impression normal bowel gas pattern   Patient is a 60y old  Male who presents with a chief complaint of s/p fall. (21 Jan 2021 10:31)  pt seen and evaluated   remain vented via trach/sedated  event noted/ npo now for abdominal distention and ileus  on pressor    ICU Vital Signs Last 24 Hrs  T(C): 35.2 (21 Jan 2021 12:00), Max: 36.1 (20 Jan 2021 16:00)  T(F): 95.4 (21 Jan 2021 12:00), Max: 97 (20 Jan 2021 16:00)  HR: 112 (21 Jan 2021 14:45) (52 - 114)  BP: 117/60 (21 Jan 2021 14:45) (75/47 - 139/67)  BP(mean): 77 (21 Jan 2021 14:45) (55 - 100)  RR: 26 (21 Jan 2021 14:45) (6 - 34)  SpO2: 95% (21 Jan 2021 14:45) (91% - 100%)      Drug Dosing Weight  Height (cm): 177.8 (21 Jan 2021 08:00)  Weight (kg): 97 (21 Jan 2021 08:00)  BMI (kg/m2): 30.7 (21 Jan 2021 08:00)  BSA (m2): 2.15 (21 Jan 2021 08:00)    I&O's Detail    20 Jan 2021 07:01  -  21 Jan 2021 07:00  --------------------------------------------------------  IN:    Dexmedetomidine: 303.1 mL    Dexmedetomidine: 230.7 mL    FentaNYL: 116 mL    FentaNYL: 232 mL    Heparin: 432 mL    IV PiggyBack: 50 mL    Lactated Ringers Bolus: 250 mL    Norepinephrine: 60 mL  Total IN: 1673.8 mL    OUT:    PEG (Percutaneous Endoscopic Gastrostomy) Tube (mL): 200 mL    Peptamen A.F.: 0 mL  Total OUT: 200 mL    Total NET: 1473.8 mL      21 Jan 2021 07:01  -  21 Jan 2021 15:26  --------------------------------------------------------  IN:    Dexmedetomidine: 42.5 mL    Enteral Tube Flush: 50 mL    FentaNYL: 39 mL    Heparin: 85 mL    IV PiggyBack: 250 mL    Norepinephrine: 21.9 mL    Peptamen A.F.: 75 mL  Total IN: 563.4 mL    OUT:    Other (mL): 3500 mL  Total OUT: 3500 mL    Total NET: -2936.6 mL     PHYSICAL EXAM:  Constitutional:  remain vented/ sedated   Gastrointestinal:  soft, distended  +peg tube in place   rectal tube in place  MEDICATIONS  (STANDING):  acetaminophen   Tablet .. 650 milliGRAM(s) Oral once  aspirin  chewable 81 milliGRAM(s) Oral daily  atorvastatin 20 milliGRAM(s) Oral at bedtime  chlorhexidine 0.12% Liquid 15 milliLiter(s) Oral Mucosa every 12 hours  chlorhexidine 4% Liquid 1 Application(s) Topical <User Schedule>  clonazePAM  Tablet 1 milliGRAM(s) Oral every 12 hours  collagenase Ointment 1 Application(s) Topical two times a day  Dakins Solution - 1/2 Strength 1 Application(s) Topical two times a day  dexMEDEtomidine Infusion 0.2 MICROgram(s)/kG/Hr (4.85 mL/Hr) IV Continuous <Continuous>  dextrose 40% Gel 15 Gram(s) Oral once  dextrose 5%. 1000 milliLiter(s) (50 mL/Hr) IV Continuous <Continuous>  dextrose 5%. 1000 milliLiter(s) (100 mL/Hr) IV Continuous <Continuous>  dextrose 50% Injectable 25 Gram(s) IV Push once  dextrose 50% Injectable 12.5 Gram(s) IV Push once  dextrose 50% Injectable 25 Gram(s) IV Push once  epoetin raven-epbx (RETACRIT) Injectable 8000 Unit(s) IV Push <User Schedule>  fentaNYL   Infusion 0.5 MICROgram(s)/kG/Hr (4.85 mL/Hr) IV Continuous <Continuous>  glucagon  Injectable 1 milliGRAM(s) IntraMuscular once  heparin  Infusion 1800 Unit(s)/Hr (17 mL/Hr) IV Continuous <Continuous>  insulin glargine Injectable (LANTUS) 20 Unit(s) SubCutaneous at bedtime  insulin lispro (ADMELOG) corrective regimen sliding scale   SubCutaneous three times a day before meals  insulin lispro Injectable (ADMELOG) 8 Unit(s) SubCutaneous three times a day before meals  levothyroxine 100 MICROGram(s) Oral daily  lidocaine 1%/epinephrine 1:100,000 Inj 1 Vial(s) Local Injection once  meropenem  IVPB      meropenem  IVPB 500 milliGRAM(s) IV Intermittent every 24 hours  metoclopramide Injectable 5 milliGRAM(s) IV Push three times a day  metoprolol tartrate 12.5 milliGRAM(s) Oral two times a day  midodrine 10 milliGRAM(s) Oral every 8 hours  norepinephrine Infusion 0.05 MICROgram(s)/kG/Min (9.08 mL/Hr) IV Continuous <Continuous>  pantoprazole   Suspension 40 milliGRAM(s) Oral before breakfast  polyethylene glycol 3350 17 Gram(s) Oral at bedtime  QUEtiapine 50 milliGRAM(s) Oral two times a day  senna 2 Tablet(s) Oral at bedtime      Diet, NPO with Tube Feed:   Tube Feeding Modality: Gastrostomy  Peptamen A.F. Formula  Total Volume for 24 Hours (mL): 1800  Continuous  Until Goal Tube Feed Rate (mL per Hour): 75  Tube Feed Duration (in Hours): 24  Tube Feed Start Time: 13:15 (01-21-21 @ 11:24)      LABS  01-21    137  |  100  |  52<H>  ----------------------------<  152<H>  3.3<L>   |  25  |  3.6<H>    Ca    8.0<L>      21 Jan 2021 12:31  Phos  7.6     01-21  Mg     2.5     01-21    TPro  4.7<L>  /  Alb  2.2<L>  /  TBili  0.6  /  DBili  x   /  AST  25  /  ALT  16  /  AlkPhos  86  01-21                          8.8    7.75  )-----------( 300      ( 21 Jan 2021 04:50 )             27.3     CAPILLARY BLOOD GLUCOSE  POCT Blood Glucose.: 160 mg/dL (21 Jan 2021 14:55)  POCT Blood Glucose.: 114 mg/dL (21 Jan 2021 11:07)  POCT Blood Glucose.: 79 mg/dL (21 Jan 2021 07:47)   RADIOLOGY STUDIES  < from: Xray Abdomen 1 View PORTABLE -Urgent (Xray Abdomen 1 View PORTABLE -Urgent .) (01.21.21 @ 09:09) >  Single image  Comparison 1/20/2021  The bowel gas pattern is normal. Gastric distention has resolved. No free air is seen.  Impression normal bowel gas pattern

## 2021-01-21 NOTE — PROGRESS NOTE ADULT - SUBJECTIVE AND OBJECTIVE BOX
Patient is a 60y old  Male who presents with a chief complaint of s/p fall. (20 Jan 2021 15:28)        Over Night Events:  on MV.  Off pressors.  Sedated.          ROS:     All ROS are negative except HPI         PHYSICAL EXAM    ICU Vital Signs Last 24 Hrs  T(C): 35.4 (21 Jan 2021 08:00), Max: 36.1 (20 Jan 2021 12:00)  T(F): 95.8 (21 Jan 2021 08:00), Max: 97 (20 Jan 2021 12:00)  HR: 54 (21 Jan 2021 08:00) (52 - 84)  BP: 98/58 (21 Jan 2021 09:00) (85/50 - 128/57)  BP(mean): 66 (21 Jan 2021 08:00) (62 - 92)  ABP: --  ABP(mean): --  RR: 30 (21 Jan 2021 09:00) (6 - 34)  SpO2: 100% (21 Jan 2021 09:00) (91% - 100%)      CONSTITUTIONAL:  Well nourished.  Ill appearing.  NAD    ENT:   Airway patent,   Mouth with normal mucosa.   No thrush    EYES:   Pupils equal,   Round and reactive to light.    CARDIAC:   Normal rate,   Regular rhythm.    No edema      Vascular:  Normal systolic impulse  No Carotid bruits    RESPIRATORY:   No wheezing  Bilateral BS  Normal chest expansion  Not tachypneic,  No use of accessory muscles    GASTROINTESTINAL:  Abdomen soft,   Non-tender,   No guarding,   + BS    MUSCULOSKELETAL:   Range of motion is not limited,  No clubbing, cyanosis    NEUROLOGICAL:   Sedated  Follows commands   No motor  deficits.    SKIN:   Skin normal color for race,   Warm and dry  No evidence of rash.    PSYCHIATRIC:   Sedated   No apparent risk to self or others.    HEMATOLOGICAL:  No cervical  lymphadenopathy.  no inguinal lymphadenopathy      01-20-21 @ 07:01  -  01-21-21 @ 07:00  --------------------------------------------------------  IN:    Dexmedetomidine: 303.1 mL    Dexmedetomidine: 230.7 mL    FentaNYL: 232 mL    FentaNYL: 116 mL    Heparin: 432 mL    IV PiggyBack: 50 mL    Lactated Ringers Bolus: 250 mL    Norepinephrine: 60 mL  Total IN: 1673.8 mL    OUT:    PEG (Percutaneous Endoscopic Gastrostomy) Tube (mL): 200 mL    Peptamen A.F.: 0 mL  Total OUT: 200 mL    Total NET: 1473.8 mL      01-21-21 @ 07:01  -  01-21-21 @ 09:16  --------------------------------------------------------  IN:    Dexmedetomidine: 12.1 mL    FentaNYL: 14.5 mL    Heparin: 17 mL  Total IN: 43.6 mL    OUT:    Norepinephrine: 0 mL  Total OUT: 0 mL    Total NET: 43.6 mL          LABS:                            8.8    7.75  )-----------( 300      ( 21 Jan 2021 04:50 )             27.3                                               01-21    141  |  102  |  89<HH>  ----------------------------<  66<L>  4.5   |  21  |  5.9<HH>    Ca    7.8<L>      21 Jan 2021 04:50  Phos  7.6     01-21  Mg     2.5     01-21    TPro  4.7<L>  /  Alb  2.2<L>  /  TBili  0.6  /  DBili  x   /  AST  25  /  ALT  16  /  AlkPhos  86  01-21      PTT - ( 21 Jan 2021 04:50 )  PTT:88.5 sec                                                                                     LIVER FUNCTIONS - ( 21 Jan 2021 04:50 )  Alb: 2.2 g/dL / Pro: 4.7 g/dL / ALK PHOS: 86 U/L / ALT: 16 U/L / AST: 25 U/L / GGT: x                                                                                               Mode: AC/ CMV (Assist Control/ Continuous Mandatory Ventilation)  RR (machine): 34  TV (machine): 450  FiO2: 30  PEEP: 8  ITime: 1  MAP: 17  PIP: 24                                          MEDICATIONS  (STANDING):  acetaminophen   Tablet .. 650 milliGRAM(s) Oral once  aspirin  chewable 81 milliGRAM(s) Oral daily  atorvastatin 20 milliGRAM(s) Oral at bedtime  chlorhexidine 0.12% Liquid 15 milliLiter(s) Oral Mucosa every 12 hours  chlorhexidine 4% Liquid 1 Application(s) Topical <User Schedule>  clonazePAM  Tablet 1 milliGRAM(s) Oral every 12 hours  collagenase Ointment 1 Application(s) Topical two times a day  Dakins Solution - 1/2 Strength 1 Application(s) Topical two times a day  dexMEDEtomidine Infusion 0.2 MICROgram(s)/kG/Hr (4.85 mL/Hr) IV Continuous <Continuous>  dextrose 40% Gel 15 Gram(s) Oral once  dextrose 5%. 1000 milliLiter(s) (50 mL/Hr) IV Continuous <Continuous>  dextrose 5%. 1000 milliLiter(s) (100 mL/Hr) IV Continuous <Continuous>  dextrose 50% Injectable 25 Gram(s) IV Push once  dextrose 50% Injectable 12.5 Gram(s) IV Push once  dextrose 50% Injectable 25 Gram(s) IV Push once  epoetin raven-epbx (RETACRIT) Injectable 8000 Unit(s) IV Push <User Schedule>  fentaNYL   Infusion 0.5 MICROgram(s)/kG/Hr (4.85 mL/Hr) IV Continuous <Continuous>  glucagon  Injectable 1 milliGRAM(s) IntraMuscular once  heparin  Infusion 1800 Unit(s)/Hr (17 mL/Hr) IV Continuous <Continuous>  insulin glargine Injectable (LANTUS) 20 Unit(s) SubCutaneous at bedtime  insulin lispro (ADMELOG) corrective regimen sliding scale   SubCutaneous three times a day before meals  insulin lispro Injectable (ADMELOG) 8 Unit(s) SubCutaneous three times a day before meals  levothyroxine 100 MICROGram(s) Oral daily  lidocaine 1%/epinephrine 1:100,000 Inj 1 Vial(s) Local Injection once  meropenem  IVPB      meropenem  IVPB 500 milliGRAM(s) IV Intermittent every 24 hours  metoprolol tartrate 12.5 milliGRAM(s) Oral two times a day  norepinephrine Infusion 0.05 MICROgram(s)/kG/Min (9.08 mL/Hr) IV Continuous <Continuous>  pantoprazole   Suspension 40 milliGRAM(s) Oral before breakfast  QUEtiapine 50 milliGRAM(s) Oral two times a day  senna 2 Tablet(s) Oral at bedtime  sodium bicarbonate 650 milliGRAM(s) Oral every 8 hours  sodium zirconium cyclosilicate 10 Gram(s) Oral three times a day  vancomycin  IVPB 750 milliGRAM(s) IV Intermittent <User Schedule>    MEDICATIONS  (PRN):  acetaminophen   Tablet .. 650 milliGRAM(s) Oral every 6 hours PRN Temp greater or equal to 38C (100.4F), Mild Pain (1 - 3)      New X-rays reviewed:                                                                                  ECHO    CXR interpreted by me:  Bibasilar infiltrates

## 2021-01-21 NOTE — PROGRESS NOTE ADULT - ASSESSMENT
ESRD (due to DM) on HD TTS  s/p Fall  altered mental status   Covid-19 PNA / bacterial PNA   fluid overload  hyponatremia  hyperkalemia  DM  HTN  afib on coumadin  CVA  NSTEMI    plan:    HD today: 3 hours, opti 200 dialyzer, 2Kbath, 3.5L UF  DC Lokelma  DC bicarb  BMP post-HD to rule out recirculation  left arm precautions  covid isolation  f/u cardio / f/u pulm  icu care

## 2021-01-21 NOTE — PROGRESS NOTE ADULT - ASSESSMENT
59 yo male with PMHx of afib on coumadin, DM2, ESRD on HD MWF, HLD, HTN, CVA, who presented w/ weakness and fall.     #Acute hypoxemic respiratory failure   #Covid-19 PNA  #Superimposed bacterial PNA   - s/p intubation -> trach  - s/p Azithromycin discontinued on 1/8/2021, s/p Cefepime discontinued on 1/11/2021  - s/p Dexamethasone 6mg IV once daily (1/1/2021) x10 days  - Covid antibodies: reported negative --> s/p 2 units of Convalescent plasma ( 1/8/2021 and 1/12/2021)  - f/u inflammatory markers   - s/p tocilizumab 400mg IV x1 on 1/8/2021  - on lucinda and vanc during HD, d/c vanc   - wean off sedation w/ klonopin and pain control prn     #?Ileus- resolved   - s/p PEG 1/18  - xray 10/19 w/ gasseous distention of stomach  - PEG was connected to suction, feeds were held  - Xray 10/21 appears normal, restart feeds  - bowel regimen      #Upper extremity edema R>L  - arterial and venous duplex- negative   - loosen restraints    #Acute on chronic anemia   - Hb dropped 6.8 from 7.6, currently stable 8.7  - c/w heparin gtt, transition to coumadin post debridement    - s/p 1u pRBC 1/19  - monitor     #Leukocytosis- resolved    #Sacral ulcer, heel ulcers   - Multifactorial (Possible overlying infection, ulcers, steroids)   - diarrhea resolved   - c/w lucinda   - f/u burn: plan for bedside debridement     #Hyperkalemia- resolved  -c/w Lokelma 10 q8h   - on HD   - monitor     #ESRD on HD   - EPO (retacrit) 8000 units IV push once weekly  - nephro following, f/u reccs   - on sodium bicarb     #Atrial fibrillation  - Paroxysmal ? Currently NSRT, well rate controlled.   - on Heparin gtt, holding home coumadin, follow up aPTT, transition to coumadin post debridement   - Metoprolol tartrate 12.5mg po q12hrs    - dc levo, start on midodrine   - CT brain no IC bleed or concerns of IC bleed     #NSTEMI  - Type II MI, demand ischemia, likely secondary to hypoxia secondary to COVID-19 pneumonia  - Aspirin 81mg po once daily   - CT brain no IC bleed or concerns of IC bleed   - On heparin gtt (for atrial fibrillation)  - Repeat CK-MB and CPK were stable    #Hypothyroidism: Continue with levothyroxine 100mcg po once daily     #Altered Mental Status- improved  - EEG diffuse slowing, but no seizure activity, check the report above  - CT brain shows ventriculomegaly (check full report), no IC bleed, possible old infarct.  - neurology consulted and recs:   a. CT brain negative x2 for stroke or other structural pathology.  EEG negative for any epileptiform activity.  Suspect multifactorial metabolic encephalopathy in setting of COVID and ESRD and sedatives.  Wean sedation as able, can reassess if patient does not awaken after that time.  b. No further EEG or other tests   c. To be reassessed after sedation is weaned    Diet: PEG feeds   DVT ppx: on Heparin gtt, monitor ptt   GI ppx: Protonix 40 qd     Updated patient's sister Nedra Shea on [phone] regarding patient's condition

## 2021-01-21 NOTE — PROGRESS NOTE ADULT - SUBJECTIVE AND OBJECTIVE BOX
[place] NEPHROLOGY FOLLOW UP NOTE  --------------------------------------------------------------------------------  24 hour events/subjective: Patient examined during HD. Trached.    PAST HISTORY  --------------------------------------------------------------------------------  No significant changes to PMH, PSH, FHx, SHx, unless otherwise noted    ALLERGIES & MEDICATIONS  --------------------------------------------------------------------------------  Allergies    Orange (Other)  Originally Entered as [HIVES] reaction to [sulfur] (Unknown)  penicillin (Unknown)  sulfADIAZINE (Unknown)      Standing Inpatient Medications  acetaminophen   Tablet .. 650 milliGRAM(s) Oral once  aspirin  chewable 81 milliGRAM(s) Oral daily  atorvastatin 20 milliGRAM(s) Oral at bedtime  chlorhexidine 0.12% Liquid 15 milliLiter(s) Oral Mucosa every 12 hours  chlorhexidine 4% Liquid 1 Application(s) Topical <User Schedule>  clonazePAM  Tablet 1 milliGRAM(s) Oral every 12 hours  collagenase Ointment 1 Application(s) Topical two times a day  Dakins Solution - 1/2 Strength 1 Application(s) Topical two times a day  dexMEDEtomidine Infusion 0.2 MICROgram(s)/kG/Hr IV Continuous <Continuous>  dextrose 40% Gel 15 Gram(s) Oral once  dextrose 5%. 1000 milliLiter(s) IV Continuous <Continuous>  dextrose 5%. 1000 milliLiter(s) IV Continuous <Continuous>  dextrose 50% Injectable 25 Gram(s) IV Push once  dextrose 50% Injectable 12.5 Gram(s) IV Push once  dextrose 50% Injectable 25 Gram(s) IV Push once  epoetin raven-epbx (RETACRIT) Injectable 8000 Unit(s) IV Push <User Schedule>  fentaNYL   Infusion 0.5 MICROgram(s)/kG/Hr IV Continuous <Continuous>  glucagon  Injectable 1 milliGRAM(s) IntraMuscular once  heparin  Infusion 1800 Unit(s)/Hr IV Continuous <Continuous>  insulin glargine Injectable (LANTUS) 20 Unit(s) SubCutaneous at bedtime  insulin lispro (ADMELOG) corrective regimen sliding scale   SubCutaneous three times a day before meals  insulin lispro Injectable (ADMELOG) 8 Unit(s) SubCutaneous three times a day before meals  levothyroxine 100 MICROGram(s) Oral daily  lidocaine 1%/epinephrine 1:100,000 Inj 1 Vial(s) Local Injection once  meropenem  IVPB      meropenem  IVPB 500 milliGRAM(s) IV Intermittent every 24 hours  metoclopramide Injectable 5 milliGRAM(s) IV Push three times a day  metoprolol tartrate 12.5 milliGRAM(s) Oral two times a day  midodrine 10 milliGRAM(s) Oral every 8 hours  norepinephrine Infusion 0.05 MICROgram(s)/kG/Min IV Continuous <Continuous>  pantoprazole   Suspension 40 milliGRAM(s) Oral before breakfast  QUEtiapine 50 milliGRAM(s) Oral two times a day  senna 2 Tablet(s) Oral at bedtime  sodium bicarbonate 650 milliGRAM(s) Oral every 8 hours  sodium zirconium cyclosilicate 10 Gram(s) Oral three times a day    PRN Inpatient Medications  acetaminophen   Tablet .. 650 milliGRAM(s) Oral every 6 hours PRN    VITALS/PHYSICAL EXAM  --------------------------------------------------------------------------------  T(C): 35.4 (01-21-21 @ 08:00), Max: 36.1 (01-20-21 @ 12:00)  HR: 58 (01-21-21 @ 09:45) (52 - 84)  BP: 131/63 (01-21-21 @ 09:45) (75/47 - 131/63)  RR: 34 (01-21-21 @ 09:45) (6 - 34)  SpO2: 100% (01-21-21 @ 09:45) (91% - 100%)  Height (cm): 177.8 (01-21-21 @ 08:00)  Weight (kg): 97 (01-21-21 @ 08:00)  BMI (kg/m2): 30.7 (01-21-21 @ 08:00)  BSA (m2): 2.15 (01-21-21 @ 08:00)    01-20-21 @ 07:01  -  01-21-21 @ 07:00  --------------------------------------------------------  IN: 1673.8 mL / OUT: 200 mL / NET: 1473.8 mL    01-21-21 @ 07:01  -  01-21-21 @ 10:31  --------------------------------------------------------  IN: 43.6 mL / OUT: 0 mL / NET: 43.6 mL      Physical Exam:  	Gen: NAD  	Pulm: CTA B/L  	CV: RRR, S1S2  	Abd: +BS, soft, nontender/nondistended  	: No suprapubic tenderness  	LE: Warm,  no edema  	Vascular access: AVF    LABS/STUDIES  --------------------------------------------------------------------------------              8.8    7.75  >-----------<  300      [01-21-21 @ 04:50]              27.3     141  |  102  |  89  ----------------------------<  66      [01-21-21 @ 04:50]  4.5   |  21  |  5.9        Ca     7.8     [01-21-21 @ 04:50]      Mg     2.5     [01-21-21 @ 04:50]      Phos  7.6     [01-21-21 @ 04:50]    TPro  4.7  /  Alb  2.2  /  TBili  0.6  /  DBili  x   /  AST  25  /  ALT  16  /  AlkPhos  86  [01-21-21 @ 04:50]    PTT: 88.5       [01-21-21 @ 04:50]    Creatinine Trend:  SCr 5.9 [01-21 @ 04:50]  SCr 5.8 [01-20 @ 04:50]  SCr 6.4 [01-19 @ 05:00]  SCr 5.7 [01-18 @ 09:55]  SCr 6.0 [01-18 @ 04:45]    Urinalysis - [01-06-21 @ 18:43]      Color Yellow / Appearance Slightly Turbid / SG 1.018 / pH 6.5      Gluc 100 mg/dL / Ketone Negative  / Bili Negative / Urobili <2 mg/dL       Blood Moderate / Protein 300 mg/dL / Leuk Est Small / Nitrite Negative       /  / Hyaline 114 / Gran  / Sq Epi  / Non Sq Epi 1 / Bacteria Negative    Ferritin 1382      [01-20-21 @ 08:10]  Vitamin D (25OH) 45      [01-15-21 @ 12:06]  HbA1c 7.1      [05-21-19 @ 04:30]  TSH 4.57      [01-01-21 @ 17:56]

## 2021-01-21 NOTE — PROGRESS NOTE ADULT - SUBJECTIVE AND OBJECTIVE BOX
SUBJECTIVE:    Patient is a 60y old Male who presents with a chief complaint of s/p fall. (21 Jan 2021 09:16)    Currently admitted to medicine with the primary diagnosis of Fever       Today is hospital day 20d. This morning he is resting in bed, no acute events overnight.       PAST MEDICAL & SURGICAL HISTORY  PAD (peripheral artery disease)  multiple toe amputations    Anemia  chronic anemia - s/p transfusion 2018    Thyroid cancer    Chronic leg pain    OA (osteoarthritis)    SOB (shortness of breath) on exertion    ESRD (end stage renal disease)  2 yrs    Atrial fibrillation  on warfarin    Right sided weakness    Stroke  8 yrs ago    Hypertension    High blood cholesterol    Diabetes mellitus, type 2    Dialysis patient  Mon, Wednesday, Friday ([facility])    Smoker    H/O thyroid nodule    H/O gastroesophageal reflux (GERD)    History of tonsillectomy    History of surgery  Multiple toe amputations (amp 4 1/2 left toes; has 1/2 left mid toe; amp right mid toe)    A-V fistula  left AVF      SOCIAL HISTORY:  Negative for smoking/alcohol/drug use.     ALLERGIES:  Orange (Other)  Originally Entered as [HIVES] reaction to [sulfur] (Unknown)  penicillin (Unknown)  sulfADIAZINE (Unknown)    MEDICATIONS:  STANDING MEDICATIONS  acetaminophen   Tablet .. 650 milliGRAM(s) Oral once  aspirin  chewable 81 milliGRAM(s) Oral daily  atorvastatin 20 milliGRAM(s) Oral at bedtime  chlorhexidine 0.12% Liquid 15 milliLiter(s) Oral Mucosa every 12 hours  chlorhexidine 4% Liquid 1 Application(s) Topical <User Schedule>  clonazePAM  Tablet 1 milliGRAM(s) Oral every 12 hours  collagenase Ointment 1 Application(s) Topical two times a day  Dakins Solution - 1/2 Strength 1 Application(s) Topical two times a day  dexMEDEtomidine Infusion 0.2 MICROgram(s)/kG/Hr IV Continuous <Continuous>  dextrose 40% Gel 15 Gram(s) Oral once  dextrose 5%. 1000 milliLiter(s) IV Continuous <Continuous>  dextrose 5%. 1000 milliLiter(s) IV Continuous <Continuous>  dextrose 50% Injectable 25 Gram(s) IV Push once  dextrose 50% Injectable 12.5 Gram(s) IV Push once  dextrose 50% Injectable 25 Gram(s) IV Push once  epoetin raven-epbx (RETACRIT) Injectable 8000 Unit(s) IV Push <User Schedule>  fentaNYL   Infusion 0.5 MICROgram(s)/kG/Hr IV Continuous <Continuous>  glucagon  Injectable 1 milliGRAM(s) IntraMuscular once  heparin  Infusion 1800 Unit(s)/Hr IV Continuous <Continuous>  insulin glargine Injectable (LANTUS) 20 Unit(s) SubCutaneous at bedtime  insulin lispro (ADMELOG) corrective regimen sliding scale   SubCutaneous three times a day before meals  insulin lispro Injectable (ADMELOG) 8 Unit(s) SubCutaneous three times a day before meals  levothyroxine 100 MICROGram(s) Oral daily  lidocaine 1%/epinephrine 1:100,000 Inj 1 Vial(s) Local Injection once  meropenem  IVPB      meropenem  IVPB 500 milliGRAM(s) IV Intermittent every 24 hours  metoclopramide Injectable 5 milliGRAM(s) IV Push three times a day  metoprolol tartrate 12.5 milliGRAM(s) Oral two times a day  midodrine 10 milliGRAM(s) Oral every 8 hours  norepinephrine Infusion 0.05 MICROgram(s)/kG/Min IV Continuous <Continuous>  pantoprazole   Suspension 40 milliGRAM(s) Oral before breakfast  QUEtiapine 50 milliGRAM(s) Oral two times a day  senna 2 Tablet(s) Oral at bedtime  sodium bicarbonate 650 milliGRAM(s) Oral every 8 hours  sodium zirconium cyclosilicate 10 Gram(s) Oral three times a day    PRN MEDICATIONS  acetaminophen   Tablet .. 650 milliGRAM(s) Oral every 6 hours PRN    VITALS:   T(F): 95.8  HR: 58  BP: 131/63  RR: 34  SpO2: 100%    PHYSICAL EXAM:  GEN: Trach in place, awake, NAD  LUNGS: Decreased breath sounds, mild wheezing   HEART: S1/S2 present.   ABD: Soft, non-tender, non-distended, peg in place, bowel sounds present.  EXT: Edema of upper extremities and lower extremities   NEURO: on sedation, following commands     Garza catheter present     LABS:                        8.8    7.75  )-----------( 300      ( 21 Jan 2021 04:50 )             27.3     01-21    141  |  102  |  89<HH>  ----------------------------<  66<L>  4.5   |  21  |  5.9<HH>    Ca    7.8<L>      21 Jan 2021 04:50  Phos  7.6     01-21  Mg     2.5     01-21    TPro  4.7<L>  /  Alb  2.2<L>  /  TBili  0.6  /  DBili  x   /  AST  25  /  ALT  16  /  AlkPhos  86  01-21    PTT - ( 21 Jan 2021 04:50 )  PTT:88.5 sec      RADIOLOGY:  < from: Xray Abdomen 1 View PORTABLE -Urgent (Xray Abdomen 1 View PORTABLE -Urgent .) (01.21.21 @ 09:09) >  EXAM:  XR ABDOMEN PORTABLE URGENT 1V          PROCEDURE DATE:  01/21/2021      INTERPRETATION:  Clinical History / Reason for exam: Abdominal distention  Single image  Comparison 1/20/2021  The bowel gas pattern is normal. Gastric distention has resolved. No free air is seen.  Impression normal bowel gas pattern    EMIL JOSEPH MD; Attending Radiologist  This document has been electronically signed. Jan 21 2021  9:21AM    < end of copied text >    < from: Xray Chest 1 View- PORTABLE-Routine (Xray Chest 1 View- PORTABLE-Routine in AM.) (01.21.21 @ 06:39) >  EXAM:  XR CHEST PORTABLE ROUTINE 1V          PROCEDURE DATE:  01/21/2021      INTERPRETATION:  Clinical History / Reason for exam: Line placement.    Comparison : Chest radiograph January 20, 2021.    Technique/Positioning: Single frontal chest x-ray obtained.    Findings:    Support devices: Stable tracheostomy tube, left IJ central venous catheter.    Cardiac/mediastinum/hilum: Unchanged.    Lung parenchyma/Pleura: Stable bilateral opacities. No pneumothorax..    Skeleton/soft tissues: Unchanged.    Impression:  Stable bilateral opacities.  Stable support devices.    ADELA DAMICO M.D., ATTENDING RADIOLOGIST  This document has been electronically signed. Jan 21 2021  9:12AM    < end of copied text >      < from: VA Duplex Upper Ext Vein Scan, Right (01.20.21 @ 11:34) >    Impression:    No evidence of deep or superficial thrombosis in the right upper extremity.    ICD-10:M79.89    < end of copied text >    < from: VA Duplex Upper Extrem Arterial Limited, Right (01.20.21 @ 11:36) >    Impression:    Normal arterial flow in right upper extremity.    ICD-10: I70.228    < end of copied text >

## 2021-01-21 NOTE — PROGRESS NOTE ADULT - ASSESSMENT
IMPRESSION:    ESRD MWF for HD  Acute hypoxemic resp failure failed weaning SP trach and PEG   NSTEMI  Afib was on coumadin  Severe COVID PNA  Superimposed bacteria PNA  Sacral ulcer    PLAN:    CNS: SAT.  Seroquel.     HEENT: Oral care.    PULMONARY: Vent changes. Wean O2.  Monitor PPL and DP.  Wean O2.  PS wean.      CARDIOVASCULAR:  I<O as tolerated.      GI: GI prophylaxis.  PEG Feeding.  Reglan .      RENAL: check lytes and replete PRN. Nephro F/UP result.      INFECTIOUS DISEASE:  DC Vanc      HEMATOLOGICAL: DVT Prophylaxis.  FU PTT     ENDOCRINE:  Follow up FS.  Insulin protocol if needed.    MUSCULOSKELETAL:  bed rest.  FU with Burn     MICU     Prognosis poor

## 2021-01-22 NOTE — PROGRESS NOTE ADULT - ASSESSMENT
ASSESSMENT/PLAN  - acute hypoxic resp failure  - covid 19 pneumonia  - ESRD on HD  - DM,   elevated WBC   hyperphosphatemia    SUGGEST:  - estimated REE by PSE 2230 kcal/d and protein needs ~ 139 gm/d  - continue feeding Peptamen AF at 75 ml/h --> 137 gm protein (entirely whey source), 2160 kcal, meets recommended low n6:n3 ratio, 1500 total mg phos/d and 77 total mEq K per day  - glycemic control - insulin drip at one u/h when seen - starting to improve - and remember that current feeding Rx is LOW % carb  -   formula with high n6 content not ideal for any ARDS pt including covid (refer to CCN / ASPEN guidelines 4/1/20 and JPEN 9/20). Note that suggested formula is also low carb content. ASSESSMENT/PLAN  - acute hypoxic resp failure  - covid 19 pneumonia  - ESRD on HD  - DM,   elevated WBC   hyperphosphatemia    SUGGEST:  - estimated REE by PSE 2230 kcal/d and protein needs ~ 139 gm/d  - continue feeding Peptamen AF at 75 ml/h --> 137 gm protein (entirely whey source), 2160 kcal, meets recommended low n6:n3 ratio, 1500 total mg phos/d and 77 total mEq K per day  - glycemic control - insulin drip at one u/h when seen - starting to improve - and remember that current feeding Rx is LOW % carb  -   formula with high n6 content not ideal for any ARDS pt including covid (refer to CCN / ASPEN guidelines 4/1/20 and JPEN 9/20). Note that suggested formula is also low carb content.    d/w resident this afternoon:  - consider speech/ swallow evel  - if pt permitted po diet, change GT feeds to 375 ml Peptamen AF to infuse over 45 minutes AFTER each attempted po meal  - will revise GT feedings depending on po intake

## 2021-01-22 NOTE — PROGRESS NOTE ADULT - ASSESSMENT
59 yo male with PMHx of afib on coumadin, DM2, ESRD on HD MWF, HLD, HTN, CVA, who presented w/ weakness and fall.     #Acute hypoxemic respiratory failure   #Covid-19 PNA  #Superimposed bacterial PNA   - s/p intubation -> trach  - per surgery, trach sutures can be removed by primary team on 1/25  - s/p Azithromycin discontinued on 1/8/2021, s/p Cefepime discontinued on 1/11/2021  - s/p Dexamethasone 6mg IV once daily (1/1/2021) x10 days  - Covid antibodies: reported negative --> s/p 2 units of Convalescent plasma ( 1/8/2021 and 1/12/2021)  - f/u inflammatory markers   - s/p tocilizumab 400mg IV x1 on 1/8/2021  - d/c lucinda and vanc   - wean off sedation w/ klonopin and pain control Methadone     #Leukocytosis  #Sacral ulcer, heel ulcers   - Multifactorial (Possible overlying infection, ulcers, steroids)   - diarrhea resolved   - d/c abx   - s/p debridement w/ burn, f/u wound culture   - wound care per burn     #?Ileus- resolved   - s/p PEG 1/18  - xray 10/19 w/ gasseous distention of stomach  - PEG was connected to suction, feeds were held  - Xray 10/21 w/o evidence of obstruction, restarted feeds    #Diarrhea  - watery   - holding bowel regimen   - monitor     #Upper extremity edema R>L  - arterial and venous duplex- negative   - loosened restraints    #Acute on chronic anemia   - s/p 1u pRBC 1/19  - monitor      #Hyperkalemia- resolved  #Hypokalemia   -s/p Lokelma 10 q8h   - on HD   - monitor     #ESRD on HD   - EPO (retacrit) 8000 units IV push once weekly  - nephro following, f/u reccs:  HD today: 3 hours, opti 200 dialyzer, 2Kbath, 3.5L UF  DC Lokelma  DC bicarb  BMP post-HD to rule out recirculation  left arm precautions  covid isolation  f/u cardio / f/u pulm  icu care     #Paroxysmal atrial fibrillation  - on Heparin gtt, holding home coumadin, follow up aPTT, transition to coumadin post debridement   - Metoprolol tartrate 12.5mg po q12hrs    - dc levo, started on midodrine   - CT brain no IC bleed or concerns of IC bleed     #NSTEMI  - Type II MI, demand ischemia, likely secondary to hypoxia secondary to COVID-19 pneumonia  - Aspirin 81mg po once daily   - CT brain no IC bleed or concerns of IC bleed   - On heparin gtt (for atrial fibrillation)  - Repeat CK-MB and CPK were stable    #Hypothyroidism: Continue with levothyroxine 100mcg po once daily     #Altered Mental Status- improved  - EEG diffuse slowing, but no seizure activity, check the report above  - CT brain shows ventriculomegaly (check full report), no IC bleed, possible old infarct.  - neurology consulted and recs:   a. CT brain negative x2 for stroke or other structural pathology.  EEG negative for any epileptiform activity.  Suspect multifactorial metabolic encephalopathy in setting of COVID and ESRD and sedatives.  Wean sedation as able, can reassess if patient does not awaken after that time.  b. No further EEG or other tests   c. To be reassessed after sedation is weaned    Diet: PEG feeds   DVT ppx: on Heparin gtt, monitor AM ptt   GI ppx: Protonix 40 qd  59 yo male with PMHx of afib on coumadin, DM2, ESRD on HD MWF, HLD, HTN, CVA, who presented w/ weakness and fall.     #Acute hypoxemic respiratory failure   #Covid-19 PNA  #Superimposed bacterial PNA   - s/p intubation -> trach  - per surgery, trach sutures can be removed by primary team on 1/25  - s/p Azithromycin discontinued on 1/8/2021, s/p Cefepime discontinued on 1/11/2021  - s/p Dexamethasone 6mg IV once daily (1/1/2021) x10 days  - Covid antibodies: reported negative --> s/p 2 units of Convalescent plasma ( 1/8/2021 and 1/12/2021)  - f/u inflammatory markers   - s/p tocilizumab 400mg IV x1 on 1/8/2021  - d/c lucinda and vanc   - wean off sedation w/ klonopin and pain control Methadone     #Leukocytosis  #Sacral ulcer, heel ulcers   - Multifactorial (Possible overlying infection, ulcers, steroids)   - diarrhea resolved   - d/c abx   - s/p debridement w/ burn, f/u wound culture   - wound care per burn     #?Ileus- resolved   - s/p PEG 1/18  - xray 10/19 w/ gasseous distention of stomach  - PEG was connected to suction, feeds were held  - Xray 10/21 w/o evidence of obstruction, restarted feeds    #Diarrhea  - watery   - holding bowel regimen   - monitor     #Upper extremity edema R>L  - arterial and venous duplex- negative   - loosened restraints    #Acute on chronic anemia   - s/p 1u pRBC 1/19  - monitor      #Hyperkalemia- resolved  #Hypokalemia   -s/p Lokelma 10 q8h   - on HD   - monitor     #ESRD on HD   - EPO (retacrit) 8000 units IV push once weekly  - nephro following, f/u reccs:  HD today: 3 hours, opti 200 dialyzer, 2Kbath, 3.5L UF  DC Lokelma  DC bicarb  BMP post-HD to rule out recirculation  left arm precautions  covid isolation  f/u cardio / f/u pulm  icu care     #Paroxysmal atrial fibrillation  - on Heparin gtt, holding home coumadin, follow up aPTT, transition to coumadin post debridement   - Metoprolol tartrate 12.5mg po q12hrs    - dc levo, started on midodrine   - CT brain no IC bleed or concerns of IC bleed     #NSTEMI  - Type II MI, demand ischemia, likely secondary to hypoxia secondary to COVID-19 pneumonia  - Aspirin 81mg po once daily   - CT brain no IC bleed or concerns of IC bleed   - On heparin gtt (for atrial fibrillation)  - Repeat CK-MB and CPK were stable    #Hypothyroidism: Continue with levothyroxine 100mcg po once daily     #Altered Mental Status- improved  - EEG diffuse slowing, but no seizure activity, check the report above  - CT brain shows ventriculomegaly (check full report), no IC bleed, possible old infarct.  - neurology consulted and recs:   a. CT brain negative x2 for stroke or other structural pathology.  EEG negative for any epileptiform activity.  Suspect multifactorial metabolic encephalopathy in setting of COVID and ESRD and sedatives.  Wean sedation as able, can reassess if patient does not awaken after that time.  b. No further EEG or other tests   c. To be reassessed after sedation is weaned    Diet: PEG feeds   DVT ppx: on Heparin gtt, monitor AM ptt   GI ppx: Protonix 40 qd     Updated patient's sister Nedra Shea on [phone] regarding patient's condition

## 2021-01-22 NOTE — CHART NOTE - NSCHARTNOTEFT_GEN_A_CORE
Pt seen at bedside for follow up of sacral wound. POD# 1 s/p debridement of sacral wound at bedside. Full thickness sacral wound about [id number] now with less necrotic tissue but some still present. Continue dressing changes with santyl and dakins twice a day, pt may require more debridement in the future.

## 2021-01-22 NOTE — SWALLOW BEDSIDE ASSESSMENT ADULT - SLP PERTINENT HISTORY OF CURRENT PROBLEM
59 y/o M initially presented s/p fall 1/1/21. imbalance and decreased po intake x2 days PTA. CTH 1/4 (-). COVID (+) 1/1, COVID (-) 1/20. s/p 2 units of convalescent plasma (1/8, 1/12). pt intubated 1/4, s/p trach and PEG placement 1/18. currently still requiring mechanical ventilation. Surgery planning on removing trach sutures 1/25.

## 2021-01-22 NOTE — SWALLOW BEDSIDE ASSESSMENT ADULT - SLP GENERAL OBSERVATIONS
Pt received lethargic, awoken to his name however falls back asleep immediately. Unable to follow commands or sustain arousal at this time. Pt received lethargic, awoken to his name however falls back asleep immediately. Unable to follow commands or sustain arousal at this time. +trach shiley #8 cuffed, mechanically vented.

## 2021-01-22 NOTE — PROGRESS NOTE ADULT - SUBJECTIVE AND OBJECTIVE BOX
Patient is a 60y old  Male who presents with a chief complaint of s/p fall. (22 Jan 2021 12:09)  pt seen and evaluated   remain vented via trach/sedated  on peg tube feed       ICU Vital Signs Last 24 Hrs  T(C): 36.1 (22 Jan 2021 12:00), Max: 36.7 (21 Jan 2021 20:00)  T(F): 96.9 (22 Jan 2021 12:00), Max: 98.1 (21 Jan 2021 20:00)  HR: 76 (22 Jan 2021 13:45) (76 - 118)  BP: 122/57 (22 Jan 2021 12:00) (97/46 - 158/79)  BP(mean): 78 (22 Jan 2021 12:00) (61 - 107)  RR: 33 (22 Jan 2021 12:00) (7 - 34)  SpO2: 98% (22 Jan 2021 13:45) (85% - 100%)      Drug Dosing Weight  Height (cm): 177.8 (21 Jan 2021 08:00)  Weight (kg): 97 (21 Jan 2021 08:00)  BMI (kg/m2): 30.7 (21 Jan 2021 08:00)  BSA (m2): 2.15 (21 Jan 2021 08:00)    I&O's Detail    21 Jan 2021 07:01  -  22 Jan 2021 07:00  --------------------------------------------------------  IN:    Dexmedetomidine: 225.9 mL    Enteral Tube Flush: 50 mL    FentaNYL: 82.8 mL    Heparin: 357 mL    IV PiggyBack: 250 mL    Norepinephrine: 25.5 mL    Peptamen A.F.: 1350 mL  Total IN: 2341.2 mL    OUT:    Other (mL): 3500 mL  Total OUT: 3500 mL    Total NET: -1158.8 mL      22 Jan 2021 07:01  -  22 Jan 2021 14:32  --------------------------------------------------------  IN:    Dexmedetomidine: 54.5 mL    Enteral Tube Flush: 50 mL    FentaNYL: 14.7 mL    Heparin: 90 mL    Peptamen A.F.: 375 mL  Total IN: 584.2 mL    OUT:  Total OUT: 0 mL    Total NET: 584.2 mL    PHYSICAL EXAM:  Constitutional:  remain vented via trach  Gastrointestinal:  soft, distended  peg tube in place   rectal tube in place  MEDICATIONS  (STANDING):  acetaminophen   Tablet .. 650 milliGRAM(s) Oral once  aspirin  chewable 81 milliGRAM(s) Oral daily  atorvastatin 20 milliGRAM(s) Oral at bedtime  chlorhexidine 0.12% Liquid 15 milliLiter(s) Oral Mucosa every 12 hours  chlorhexidine 4% Liquid 1 Application(s) Topical <User Schedule>  clonazePAM  Tablet 1 milliGRAM(s) Oral every 12 hours  collagenase Ointment 1 Application(s) Topical two times a day  Dakins Solution - 1/2 Strength 1 Application(s) Topical two times a day  dexMEDEtomidine Infusion 0.2 MICROgram(s)/kG/Hr (4.85 mL/Hr) IV Continuous <Continuous>  dextrose 40% Gel 15 Gram(s) Oral once  dextrose 5%. 1000 milliLiter(s) (50 mL/Hr) IV Continuous <Continuous>  dextrose 5%. 1000 milliLiter(s) (100 mL/Hr) IV Continuous <Continuous>  dextrose 50% Injectable 25 Gram(s) IV Push once  dextrose 50% Injectable 12.5 Gram(s) IV Push once  dextrose 50% Injectable 25 Gram(s) IV Push once  epoetin raven-epbx (RETACRIT) Injectable 8000 Unit(s) IV Push <User Schedule>  fentaNYL   Infusion 0.5 MICROgram(s)/kG/Hr (4.85 mL/Hr) IV Continuous <Continuous>  glucagon  Injectable 1 milliGRAM(s) IntraMuscular once  heparin  Infusion 1800 Unit(s)/Hr (18 mL/Hr) IV Continuous <Continuous>  insulin glargine Injectable (LANTUS) 20 Unit(s) SubCutaneous at bedtime  insulin lispro (ADMELOG) corrective regimen sliding scale   SubCutaneous three times a day before meals  insulin lispro Injectable (ADMELOG) 8 Unit(s) SubCutaneous three times a day before meals  levothyroxine 100 MICROGram(s) Oral daily  methadone    Tablet 5 milliGRAM(s) Oral every 12 hours  metoclopramide Injectable 5 milliGRAM(s) IV Push three times a day  metoprolol tartrate 12.5 milliGRAM(s) Oral two times a day  midodrine 10 milliGRAM(s) Oral every 8 hours  norepinephrine Infusion 0.05 MICROgram(s)/kG/Min (9.08 mL/Hr) IV Continuous <Continuous>  pantoprazole   Suspension 40 milliGRAM(s) Oral before breakfast  QUEtiapine 50 milliGRAM(s) Oral two times a day      Diet, NPO with Tube Feed:   Tube Feeding Modality: Gastrostomy  Peptamen A.F. Formula  Total Volume for 24 Hours (mL): 1800  Continuous  Until Goal Tube Feed Rate (mL per Hour): 75  Tube Feed Duration (in Hours): 24  Tube Feed Start Time: 13:15 (01-21-21 @ 11:24)      LABS  01-22    144  |  104  |  61<HH>  ----------------------------<  200<H>  3.9   |  25  |  5.0<HH>    Ca    8.2<L>      22 Jan 2021 05:00  Phos  6.7     01-22  Mg     2.4     01-22    TPro  5.2<L>  /  Alb  2.7<L>  /  TBili  0.8  /  DBili  x   /  AST  49<H>  /  ALT  24  /  AlkPhos  105  01-22                        7.8    12.24 )-----------( 226      ( 22 Jan 2021 05:00 )             24.4     CAPILLARY BLOOD GLUCOSE  POCT Blood Glucose.: 197 mg/dL (22 Jan 2021 10:45)  POCT Blood Glucose.: 206 mg/dL (22 Jan 2021 05:44)  POCT Blood Glucose.: 173 mg/dL (21 Jan 2021 21:40)  POCT Blood Glucose.: 160 mg/dL (21 Jan 2021 14:55)   RADIOLOGY STUDIES  < from: Xray Abdomen 1 View PORTABLE -Urgent (Xray Abdomen 1 View PORTABLE -Urgent .) (01.21.21 @ 09:09) >  INTERPRETATION:  Clinical History / Reason for exam: Abdominal distention  Single image  Comparison 1/20/2021  The bowel gas pattern is normal. Gastric distention has resolved. No free air is seen.  Impression normal bowel gas pattern   Patient is a 60y old  Male who presents with a chief complaint of s/p fall. (22 Jan 2021 12:09)  pt seen and evaluated   remains vented via trach/sedated on precedex and fentanyl  follows commands when wakened  on peg tube feedings     ICU Vital Signs Last 24 Hrs  T(C): 36.1 (22 Jan 2021 12:00), Max: 36.7 (21 Jan 2021 20:00)  T(F): 96.9 (22 Jan 2021 12:00), Max: 98.1 (21 Jan 2021 20:00)  HR: 76 (22 Jan 2021 13:45) (76 - 118)  BP: 122/57 (22 Jan 2021 12:00) (97/46 - 158/79)  BP(mean): 78 (22 Jan 2021 12:00) (61 - 107)  RR: 33 (22 Jan 2021 12:00) (7 - 34)  SpO2: 98% (22 Jan 2021 13:45) (85% - 100%)    Drug Dosing Weight  Height (cm): 177.8 (21 Jan 2021 08:00)  Weight (kg): 97 (21 Jan 2021 08:00)  BMI (kg/m2): 30.7 (21 Jan 2021 08:00)  BSA (m2): 2.15 (21 Jan 2021 08:00)    I&O's Detail    21 Jan 2021 07:01  -  22 Jan 2021 07:00  --------------------------------------------------------  IN:    Dexmedetomidine: 225.9 mL    Enteral Tube Flush: 50 mL    FentaNYL: 82.8 mL    Heparin: 357 mL    IV PiggyBack: 250 mL    Norepinephrine: 25.5 mL    Peptamen A.F.: 1350 mL  Total IN: 2341.2 mL    OUT:    Other (mL): 3500 mL  Total OUT: 3500 mL    Total NET: -1158.8 mL      22 Jan 2021 07:01  -  22 Jan 2021 14:32  --------------------------------------------------------  IN:    Dexmedetomidine: 54.5 mL    Enteral Tube Flush: 50 mL    FentaNYL: 14.7 mL    Heparin: 90 mL    Peptamen A.F.: 375 mL  Total IN: 584.2 mL    PHYSICAL EXAM:  Constitutional:  remain vented via trach  Gastrointestinal:  soft, distended  peg tube in place   rectal tube in place little output    MEDICATIONS  (STANDING):  acetaminophen   Tablet .. 650 milliGRAM(s) Oral once  aspirin  chewable 81 milliGRAM(s) Oral daily  atorvastatin 20 milliGRAM(s) Oral at bedtime  chlorhexidine 0.12% Liquid 15 milliLiter(s) Oral Mucosa every 12 hours  chlorhexidine 4% Liquid 1 Application(s) Topical <User Schedule>  clonazePAM  Tablet 1 milliGRAM(s) Oral every 12 hours  collagenase Ointment 1 Application(s) Topical two times a day  Dakins Solution - 1/2 Strength 1 Application(s) Topical two times a day  dexMEDEtomidine Infusion 0.2 MICROgram(s)/kG/Hr (4.85 mL/Hr) IV Continuous <Continuous>  epoetin raven-epbx (RETACRIT) Injectable 8000 Unit(s) IV Push <User Schedule>  fentaNYL   Infusion 0.5 MICROgram(s)/kG/Hr (4.85 mL/Hr) IV Continuous <Continuous>  glucagon  Injectable 1 milliGRAM(s) IntraMuscular once  heparin  Infusion 1800 Unit(s)/Hr (18 mL/Hr) IV Continuous <Continuous>  insulin glargine Injectable (LANTUS) 20 Unit(s) SubCutaneous at bedtime  insulin lispro (ADMELOG) corrective regimen sliding scale   SubCutaneous three times a day before meals  insulin lispro Injectable (ADMELOG) 8 Unit(s) SubCutaneous three times a day before meals  levothyroxine 100 MICROGram(s) Oral daily  methadone    Tablet 5 milliGRAM(s) Oral every 12 hours  metoclopramide Injectable 5 milliGRAM(s) IV Push three times a day  metoprolol tartrate 12.5 milliGRAM(s) Oral two times a day  midodrine 10 milliGRAM(s) Oral every 8 hours  norepinephrine Infusion 0.05 MICROgram(s)/kG/Min (9.08 mL/Hr) IV Continuous <Continuous>  pantoprazole   Suspension 40 milliGRAM(s) Oral before breakfast  QUEtiapine 50 milliGRAM(s) Oral two times a day      Diet, NPO with Tube Feed:   Tube Feeding Modality: Gastrostomy  Peptamen A.F. Formula  Total Volume for 24 Hours (mL): 1800  Continuous  Until Goal Tube Feed Rate (mL per Hour): 75  Tube Feed Duration (in Hours): 24  Tube Feed Start Time: 13:15 (01-21-21 @ 11:24)      LABS  01-22    144  |  104  |  61<HH>  ----------------------------<  200<H>  3.9   |  25  |  5.0<HH>    Ca    8.2<L>      22 Jan 2021 05:00  Phos  6.7     01-22  Mg     2.4     01-22    TPro  5.2<L>  /  Alb  2.7<L>  /  TBili  0.8  /  DBili  x   /  AST  49<H>  /  ALT  24  /  AlkPhos  105  01-22                        7.8    12.24 )-----------( 226      ( 22 Jan 2021 05:00 )             24.4     CAPILLARY BLOOD GLUCOSE  POCT Blood Glucose.: 197 mg/dL (22 Jan 2021 10:45)  POCT Blood Glucose.: 206 mg/dL (22 Jan 2021 05:44)  POCT Blood Glucose.: 173 mg/dL (21 Jan 2021 21:40)  POCT Blood Glucose.: 160 mg/dL (21 Jan 2021 14:55)   RADIOLOGY STUDIES  < from: Xray Abdomen 1 View PORTABLE -Urgent (Xray Abdomen 1 View PORTABLE -Urgent .) (01.21.21 @ 09:09) >  INTERPRETATION:  Clinical History / Reason for exam: Abdominal distention  Single image  Comparison 1/20/2021  The bowel gas pattern is normal. Gastric distention has resolved. No free air is seen.  Impression normal bowel gas pattern

## 2021-01-22 NOTE — PROGRESS NOTE ADULT - ASSESSMENT
IMPRESSION:    ESRD MWF for HD  Acute hypoxemic resp failure failed weaning SP trach and PEG   NSTEMI  Afib was on coumadin  Severe COVID PNA  Superimposed bacteria PNA  Sacral ulcer SP debridement     PLAN:    CNS: SAT.  Seroquel.     HEENT: Oral care.    PULMONARY: Vent changes. Wean O2.  Monitor PPL and DP.  Wean O2.  PS wean.      CARDIOVASCULAR:  I<O as tolerated.      GI: GI prophylaxis.  PEG Feeding.  Reglan .      RENAL: check lytes and replete PRN. Nephro F/UP result.      INFECTIOUS DISEASE:  DC ABX     HEMATOLOGICAL: DVT Prophylaxis.  FU PTT     ENDOCRINE:  Follow up FS.  Insulin protocol if needed.    MUSCULOSKELETAL:  bed rest.  Wound Care      MICU     Prognosis poor

## 2021-01-22 NOTE — PROGRESS NOTE ADULT - SUBJECTIVE AND OBJECTIVE BOX
[facility] FOLLOW UP NOTE  --------------------------------------------------------------------------------  24 hour events/subjective: Patient examined. Trached.    PAST HISTORY  --------------------------------------------------------------------------------  No significant changes to PMH, PSH, FHx, SHx, unless otherwise noted    ALLERGIES & MEDICATIONS  --------------------------------------------------------------------------------  Allergies    Orange (Other)  Originally Entered as [HIVES] reaction to [sulfur] (Unknown)  penicillin (Unknown)  sulfADIAZINE (Unknown)    Intolerances      Standing Inpatient Medications  acetaminophen   Tablet .. 650 milliGRAM(s) Oral once  aspirin  chewable 81 milliGRAM(s) Oral daily  atorvastatin 20 milliGRAM(s) Oral at bedtime  chlorhexidine 0.12% Liquid 15 milliLiter(s) Oral Mucosa every 12 hours  chlorhexidine 4% Liquid 1 Application(s) Topical <User Schedule>  clonazePAM  Tablet 1 milliGRAM(s) Oral every 12 hours  collagenase Ointment 1 Application(s) Topical two times a day  Dakins Solution - 1/2 Strength 1 Application(s) Topical two times a day  dexMEDEtomidine Infusion 0.2 MICROgram(s)/kG/Hr IV Continuous <Continuous>  dextrose 40% Gel 15 Gram(s) Oral once  dextrose 5%. 1000 milliLiter(s) IV Continuous <Continuous>  dextrose 5%. 1000 milliLiter(s) IV Continuous <Continuous>  dextrose 50% Injectable 25 Gram(s) IV Push once  dextrose 50% Injectable 12.5 Gram(s) IV Push once  dextrose 50% Injectable 25 Gram(s) IV Push once  epoetin raven-epbx (RETACRIT) Injectable 8000 Unit(s) IV Push <User Schedule>  fentaNYL   Infusion 0.5 MICROgram(s)/kG/Hr IV Continuous <Continuous>  glucagon  Injectable 1 milliGRAM(s) IntraMuscular once  heparin  Infusion 1800 Unit(s)/Hr IV Continuous <Continuous>  insulin glargine Injectable (LANTUS) 20 Unit(s) SubCutaneous at bedtime  insulin lispro (ADMELOG) corrective regimen sliding scale   SubCutaneous three times a day before meals  insulin lispro Injectable (ADMELOG) 8 Unit(s) SubCutaneous three times a day before meals  levothyroxine 100 MICROGram(s) Oral daily  methadone    Tablet 5 milliGRAM(s) Oral every 12 hours  metoclopramide Injectable 5 milliGRAM(s) IV Push three times a day  metoprolol tartrate 12.5 milliGRAM(s) Oral two times a day  midodrine 10 milliGRAM(s) Oral every 8 hours  norepinephrine Infusion 0.05 MICROgram(s)/kG/Min IV Continuous <Continuous>  pantoprazole   Suspension 40 milliGRAM(s) Oral before breakfast  QUEtiapine 50 milliGRAM(s) Oral two times a day    PRN Inpatient Medications  acetaminophen   Tablet .. 650 milliGRAM(s) Oral every 6 hours PRN  fentaNYL    Injectable 25 MICROGram(s) IV Push every 30 minutes PRN      VITALS/PHYSICAL EXAM  --------------------------------------------------------------------------------  T(C): 36.1 (01-22-21 @ 12:00), Max: 36.7 (01-21-21 @ 20:00)  HR: 76 (01-22-21 @ 12:00) (72 - 118)  BP: 122/57 (01-22-21 @ 12:00) (96/50 - 158/79)  RR: 33 (01-22-21 @ 12:00) (7 - 34)  SpO2: 96% (01-22-21 @ 12:00) (85% - 100%)  Wt(kg): --  Height (cm): 177.8 (01-21-21 @ 08:00)  Weight (kg): 97 (01-21-21 @ 08:00)  BMI (kg/m2): 30.7 (01-21-21 @ 08:00)  BSA (m2): 2.15 (01-21-21 @ 08:00)      01-21-21 @ 07:01  -  01-22-21 @ 07:00  --------------------------------------------------------  IN: 2341.2 mL / OUT: 3500 mL / NET: -1158.8 mL    01-22-21 @ 07:01  -  01-22-21 @ 12:09  --------------------------------------------------------  IN: 584.2 mL / OUT: 0 mL / NET: 584.2 mL      Physical Exam:  	Gen: Trached  	Pulm:  B/L supriya  	CV: RRR, S1S2  	Abd: +BS, soft, nondistended  	: scrotal edema  	LE: Warm, edema  	Vascular access: AVF    LABS/STUDIES  --------------------------------------------------------------------------------              7.8    12.24 >-----------<  226      [01-22-21 @ 05:00]              24.4     144  |  104  |  61  ----------------------------<  200      [01-22-21 @ 05:00]  3.9   |  25  |  5.0        Ca     8.2     [01-22-21 @ 05:00]      Mg     2.4     [01-22-21 @ 05:00]      Phos  6.7     [01-22-21 @ 05:00]    TPro  5.2  /  Alb  2.7  /  TBili  0.8  /  DBili  x   /  AST  49  /  ALT  24  /  AlkPhos  105  [01-22-21 @ 05:00]    PT/INR: PT 16.10, INR 1.40       [01-21-21 @ 13:45]  PTT: 71.6       [01-22-21 @ 10:25]    Creatinine Trend:  SCr 5.0 [01-22 @ 05:00]  SCr 3.6 [01-21 @ 12:31]  SCr 5.9 [01-21 @ 04:50]  SCr 5.8 [01-20 @ 04:50]  SCr 6.4 [01-19 @ 05:00]    Urinalysis - [01-06-21 @ 18:43]      Color Yellow / Appearance Slightly Turbid / SG 1.018 / pH 6.5      Gluc 100 mg/dL / Ketone Negative  / Bili Negative / Urobili <2 mg/dL       Blood Moderate / Protein 300 mg/dL / Leuk Est Small / Nitrite Negative       /  / Hyaline 114 / Gran  / Sq Epi  / Non Sq Epi 1 / Bacteria Negative    Ferritin 1382      [01-20-21 @ 08:10]  Vitamin D (25OH) 45      [01-15-21 @ 12:06]  HbA1c 7.1      [05-21-19 @ 04:30]  TSH 4.57      [01-01-21 @ 17:56]

## 2021-01-22 NOTE — PROGRESS NOTE ADULT - SUBJECTIVE AND OBJECTIVE BOX
SUBJECTIVE:    Patient is a 60y old Male who presents with a chief complaint of s/p fall. (22 Jan 2021 08:49)    Currently admitted to medicine with the primary diagnosis of Fever       Today is hospital day 21d. S/p debridement yesterday w/ burn. Overnight and this AM ptnt w/ diarrhea and gas.       PAST MEDICAL & SURGICAL HISTORY  PAD (peripheral artery disease)  multiple toe amputations    Anemia  chronic anemia - s/p transfusion 2018    Thyroid cancer    Chronic leg pain    OA (osteoarthritis)    SOB (shortness of breath) on exertion    ESRD (end stage renal disease)  2 yrs    Atrial fibrillation  on warfarin    Right sided weakness    Stroke  8 yrs ago    Hypertension    High blood cholesterol    Diabetes mellitus, type 2    Dialysis patient  Mon, Wednesday, Friday ([facility])    Smoker    H/O thyroid nodule    H/O gastroesophageal reflux (GERD)    History of tonsillectomy    History of surgery  Multiple toe amputations (amp 4 1/2 left toes; has 1/2 left mid toe; amp right mid toe)    A-V fistula  left AVF      SOCIAL HISTORY:  Negative for smoking/alcohol/drug use.     ALLERGIES:  Orange (Other)  Originally Entered as [HIVES] reaction to [sulfur] (Unknown)  penicillin (Unknown)  sulfADIAZINE (Unknown)    MEDICATIONS:  STANDING MEDICATIONS  acetaminophen   Tablet .. 650 milliGRAM(s) Oral once  aspirin  chewable 81 milliGRAM(s) Oral daily  atorvastatin 20 milliGRAM(s) Oral at bedtime  chlorhexidine 0.12% Liquid 15 milliLiter(s) Oral Mucosa every 12 hours  chlorhexidine 4% Liquid 1 Application(s) Topical <User Schedule>  clonazePAM  Tablet 1 milliGRAM(s) Oral every 12 hours  collagenase Ointment 1 Application(s) Topical two times a day  Dakins Solution - 1/2 Strength 1 Application(s) Topical two times a day  dexMEDEtomidine Infusion 0.2 MICROgram(s)/kG/Hr IV Continuous <Continuous>  dextrose 40% Gel 15 Gram(s) Oral once  dextrose 5%. 1000 milliLiter(s) IV Continuous <Continuous>  dextrose 5%. 1000 milliLiter(s) IV Continuous <Continuous>  dextrose 50% Injectable 25 Gram(s) IV Push once  dextrose 50% Injectable 12.5 Gram(s) IV Push once  dextrose 50% Injectable 25 Gram(s) IV Push once  epoetin raven-epbx (RETACRIT) Injectable 8000 Unit(s) IV Push <User Schedule>  fentaNYL   Infusion 0.5 MICROgram(s)/kG/Hr IV Continuous <Continuous>  glucagon  Injectable 1 milliGRAM(s) IntraMuscular once  heparin  Infusion 1800 Unit(s)/Hr IV Continuous <Continuous>  insulin glargine Injectable (LANTUS) 20 Unit(s) SubCutaneous at bedtime  insulin lispro (ADMELOG) corrective regimen sliding scale   SubCutaneous three times a day before meals  insulin lispro Injectable (ADMELOG) 8 Unit(s) SubCutaneous three times a day before meals  levothyroxine 100 MICROGram(s) Oral daily  methadone    Tablet 5 milliGRAM(s) Oral every 12 hours  metoclopramide Injectable 5 milliGRAM(s) IV Push three times a day  metoprolol tartrate 12.5 milliGRAM(s) Oral two times a day  midodrine 10 milliGRAM(s) Oral every 8 hours  norepinephrine Infusion 0.05 MICROgram(s)/kG/Min IV Continuous <Continuous>  pantoprazole   Suspension 40 milliGRAM(s) Oral before breakfast  polyethylene glycol 3350 17 Gram(s) Oral at bedtime  QUEtiapine 50 milliGRAM(s) Oral two times a day  senna 2 Tablet(s) Oral at bedtime    PRN MEDICATIONS  acetaminophen   Tablet .. 650 milliGRAM(s) Oral every 6 hours PRN  fentaNYL    Injectable 25 MICROGram(s) IV Push every 30 minutes PRN    VITALS:   T(F): 96.2  HR: 82  BP: 113/58  RR: 33  SpO2: 99%    PHYSICAL EXAM:  GEN: Trach in place, awake, NAD  LUNGS: Decreased breath sounds, mild wheezing   HEART: S1/S2 present.   ABD: Soft, non-tender, non-distended, peg present, bowel sounds present.  EXT: Edema of upper extremities and lower extremities   NEURO: on sedation, following commands     Garza catheter present     LABS:                        7.8    12.24 )-----------( 226      ( 22 Jan 2021 05:00 )             24.4     01-22    144  |  104  |  61<HH>  ----------------------------<  200<H>  3.9   |  25  |  5.0<HH>    Ca    8.2<L>      22 Jan 2021 05:00  Phos  6.7     01-22  Mg     2.4     01-22    TPro  5.2<L>  /  Alb  2.7<L>  /  TBili  0.8  /  DBili  x   /  AST  49<H>  /  ALT  24  /  AlkPhos  105  01-22    PT/INR - ( 21 Jan 2021 13:45 )   PT: 16.10 sec;   INR: 1.40 ratio         PTT - ( 22 Jan 2021 05:00 )  PTT:66.3 sec      RADIOLOGY:  EXAM:  XR ABDOMEN PORTABLE URGENT 1V          PROCEDURE DATE:  01/21/2021      INTERPRETATION:  Clinical History / Reason for exam: Abdominal distention  Single image  Comparison 1/20/2021  The bowel gas pattern is normal. Gastric distention has resolved. No free air is seen.  Impression normal bowel gas pattern    EMIL JOSEPH MD; Attending Radiologist  This document has been electronically signed. Jan 21 2021  9:21AM

## 2021-01-22 NOTE — PROGRESS NOTE ADULT - SUBJECTIVE AND OBJECTIVE BOX
Patient is a 60y old  Male who presents with a chief complaint of s/p fall. (21 Jan 2021 15:25)        Over Night Events:  remains On MV.  Sedated.  Off pressors.  SP debridement         ROS:     All ROS are negative except HPI         PHYSICAL EXAM    ICU Vital Signs Last 24 Hrs  T(C): 35.7 (22 Jan 2021 08:00), Max: 36.7 (21 Jan 2021 20:00)  T(F): 96.2 (22 Jan 2021 08:00), Max: 98.1 (21 Jan 2021 20:00)  HR: 90 (22 Jan 2021 08:00) (53 - 118)  BP: 99/52 (22 Jan 2021 08:00) (75/47 - 158/79)  BP(mean): 68 (22 Jan 2021 08:00) (55 - 106)  ABP: --  ABP(mean): --  RR: 24 (22 Jan 2021 08:00) (7 - 34)  SpO2: 98% (22 Jan 2021 08:00) (85% - 100%)      CONSTITUTIONAL:  Well nourished.  Ill appearing   NAD    ENT:   Airway patent,   Mouth with normal mucosa.   No thrush    EYES:   Pupils equal,   Round and reactive to light.    CARDIAC:   Normal rate,   Regular rhythm.    No edema      Vascular:  Normal systolic impulse  No Carotid bruits    RESPIRATORY:   No wheezing  Bilateral BS  Normal chest expansion  Not tachypneic,  No use of accessory muscles    GASTROINTESTINAL:  Abdomen soft,   Non-tender,   No guarding,   + BS    MUSCULOSKELETAL:   Range of motion is not limited,  No clubbing, cyanosis    NEUROLOGICAL:   Sedated  Follows commands     SKIN:   Skin normal color for race,   Warm and dry   No evidence of rash.    PSYCHIATRIC:   Normal mood and affect.   No apparent risk to self or others.    HEMATOLOGICAL:  No cervical  lymphadenopathy.  no inguinal lymphadenopathy      01-21-21 @ 07:01  -  01-22-21 @ 07:00  --------------------------------------------------------  IN:    Dexmedetomidine: 225.9 mL    Enteral Tube Flush: 50 mL    FentaNYL: 82.8 mL    Heparin: 357 mL    IV PiggyBack: 250 mL    Norepinephrine: 25.5 mL    Peptamen A.F.: 1350 mL  Total IN: 2341.2 mL    OUT:    Other (mL): 3500 mL  Total OUT: 3500 mL    Total NET: -1158.8 mL      01-22-21 @ 07:01  -  01-22-21 @ 08:50  --------------------------------------------------------  IN:    Dexmedetomidine: 12.1 mL    FentaNYL: 4.9 mL    Heparin: 18 mL    Peptamen A.F.: 75 mL  Total IN: 110 mL    OUT:  Total OUT: 0 mL    Total NET: 110 mL          LABS:                            7.8    12.24 )-----------( 226      ( 22 Jan 2021 05:00 )             24.4                                               01-22    144  |  104  |  61<HH>  ----------------------------<  200<H>  3.9   |  25  |  5.0<HH>    Ca    8.2<L>      22 Jan 2021 05:00  Phos  6.7     01-22  Mg     2.4     01-22    TPro  5.2<L>  /  Alb  2.7<L>  /  TBili  0.8  /  DBili  x   /  AST  49<H>  /  ALT  24  /  AlkPhos  105  01-22      PT/INR - ( 21 Jan 2021 13:45 )   PT: 16.10 sec;   INR: 1.40 ratio         PTT - ( 22 Jan 2021 05:00 )  PTT:66.3 sec                                                                                     LIVER FUNCTIONS - ( 22 Jan 2021 05:00 )  Alb: 2.7 g/dL / Pro: 5.2 g/dL / ALK PHOS: 105 U/L / ALT: 24 U/L / AST: 49 U/L / GGT: x                                                                                               Mode: AC/ CMV (Assist Control/ Continuous Mandatory Ventilation)  RR (machine): 34  TV (machine): 450  FiO2: 30  PEEP: 8  ITime: 1  MAP: 19  PIP: 30                                          MEDICATIONS  (STANDING):  acetaminophen   Tablet .. 650 milliGRAM(s) Oral once  aspirin  chewable 81 milliGRAM(s) Oral daily  atorvastatin 20 milliGRAM(s) Oral at bedtime  chlorhexidine 0.12% Liquid 15 milliLiter(s) Oral Mucosa every 12 hours  chlorhexidine 4% Liquid 1 Application(s) Topical <User Schedule>  clonazePAM  Tablet 1 milliGRAM(s) Oral every 12 hours  collagenase Ointment 1 Application(s) Topical two times a day  Dakins Solution - 1/2 Strength 1 Application(s) Topical two times a day  dexMEDEtomidine Infusion 0.2 MICROgram(s)/kG/Hr (4.85 mL/Hr) IV Continuous <Continuous>  dextrose 40% Gel 15 Gram(s) Oral once  dextrose 5%. 1000 milliLiter(s) (50 mL/Hr) IV Continuous <Continuous>  dextrose 5%. 1000 milliLiter(s) (100 mL/Hr) IV Continuous <Continuous>  dextrose 50% Injectable 25 Gram(s) IV Push once  dextrose 50% Injectable 12.5 Gram(s) IV Push once  dextrose 50% Injectable 25 Gram(s) IV Push once  epoetin raven-epbx (RETACRIT) Injectable 8000 Unit(s) IV Push <User Schedule>  fentaNYL   Infusion 0.5 MICROgram(s)/kG/Hr (4.85 mL/Hr) IV Continuous <Continuous>  glucagon  Injectable 1 milliGRAM(s) IntraMuscular once  heparin  Infusion 1800 Unit(s)/Hr (18 mL/Hr) IV Continuous <Continuous>  insulin glargine Injectable (LANTUS) 20 Unit(s) SubCutaneous at bedtime  insulin lispro (ADMELOG) corrective regimen sliding scale   SubCutaneous three times a day before meals  insulin lispro Injectable (ADMELOG) 8 Unit(s) SubCutaneous three times a day before meals  levothyroxine 100 MICROGram(s) Oral daily  meropenem  IVPB      meropenem  IVPB 500 milliGRAM(s) IV Intermittent every 24 hours  metoclopramide Injectable 5 milliGRAM(s) IV Push three times a day  metoprolol tartrate 12.5 milliGRAM(s) Oral two times a day  midodrine 10 milliGRAM(s) Oral every 8 hours  norepinephrine Infusion 0.05 MICROgram(s)/kG/Min (9.08 mL/Hr) IV Continuous <Continuous>  pantoprazole   Suspension 40 milliGRAM(s) Oral before breakfast  polyethylene glycol 3350 17 Gram(s) Oral at bedtime  QUEtiapine 50 milliGRAM(s) Oral two times a day  senna 2 Tablet(s) Oral at bedtime    MEDICATIONS  (PRN):  acetaminophen   Tablet .. 650 milliGRAM(s) Oral every 6 hours PRN Temp greater or equal to 38C (100.4F), Mild Pain (1 - 3)  fentaNYL    Injectable 25 MICROGram(s) IV Push every 30 minutes PRN agitation      New X-rays reviewed:                                                                                  ECHO    CXR interpreted by me:

## 2021-01-22 NOTE — PROGRESS NOTE ADULT - ASSESSMENT
ESRD (due to DM) on HD TTS  s/p Fall  altered mental status   Covid-19 PNA / bacterial PNA   fluid overload  hyponatremia  hyperkalemia  DM  HTN  afib on coumadin  CVA  NSTEMI    plan:    HD tomorrow: 3 hours, opti 200 dialyzer, 2Kbath, 3L UF  left arm precautions  covid isolation  f/u cardio / f/u pulm  icu care

## 2021-01-23 NOTE — PROGRESS NOTE ADULT - SUBJECTIVE AND OBJECTIVE BOX
Patient is a 60y old  Male who presents with a chief complaint of s/p fall. (22 Jan 2021 14:32)      Over Night Events: 2 hrs of PS yesterday, remain on MV via trach, on precedex, heparin drip, getting HD now    I&O's Detail    22 Jan 2021 07:01  -  23 Jan 2021 07:00  --------------------------------------------------------  IN:    Dexmedetomidine: 225.6 mL    Enteral Tube Flush: 50 mL    FentaNYL: 14.7 mL    Heparin: 418 mL    Peptamen A.F.: 1800 mL  Total IN: 2508.3 mL    OUT:  Total OUT: 0 mL    Total NET: 2508.3 mL      23 Jan 2021 07:01  -  23 Jan 2021 10:38  --------------------------------------------------------  IN:    Dexmedetomidine: 36.3 mL    Heparin: 48 mL    Peptamen A.F.: 300 mL  Total IN: 384.3 mL    OUT:    FentaNYL: 0 mL  Total OUT: 0 mL    Total NET: 384.3 mL          PHYSICAL EXAM    ICU Vital Signs Last 24 Hrs  T(C): 35.9 (23 Jan 2021 08:00), Max: 36.1 (22 Jan 2021 12:00)  T(F): 96.6 (23 Jan 2021 08:00), Max: 96.9 (22 Jan 2021 12:00)  HR: 88 (23 Jan 2021 10:00) (70 - 96)  BP: 108/62 (23 Jan 2021 10:00) (93/57 - 153/68)  BP(mean): 80 (23 Jan 2021 10:00) (66 - 127)  ABP: --  ABP(mean): --  RR: 26 (23 Jan 2021 10:00) (16 - 33)  SpO2: 100% (23 Jan 2021 10:00) (92% - 100%)      CONSTITUTIONAL:   Ill appearing.  Well nourished.  NAD    ENT:   Airway patent,   Mouth with normal mucosa.   No thrush    EYES:   Pupils equal,   Round and reactive to light.    CARDIAC:   Normal rate,   Regular rhythm.    No edema    Vascular:  Normal systolic impulse  No Carotid bruits    RESPIRATORY:   No wheezing  Bilateral BS  Normal chest expansion  Not tachypneic,  No use of accessory muscles    GASTROINTESTINAL:  Abdomen soft,   Non-tender,   No guarding,   + BS    GENITOURINARY  normal genitalia for sex  no edema    MUSCULOSKELETAL:   Range of motion is not limited,  No clubbing, cyanosis    NEUROLOGICAL:   follows commands  moves ext    SKIN:   Skin normal color for race,   Warm and dry  No evidence of rash.      HEMATOLOGICAL:  No cervical  lymphadenopathy.  no inguinal lymphadenopathy      LABS:                          8.0    11.61 )-----------( 225      ( 23 Jan 2021 04:30 )             24.8                                               01-23    139  |  100  |  74<HH>  ----------------------------<  191<H>  4.5   |  21  |  5.3<HH>    Ca    8.1<L>      23 Jan 2021 04:30  Phos  6.9     01-23  Mg     2.6     01-23    TPro  5.3<L>  /  Alb  2.7<L>  /  TBili  0.8  /  DBili  x   /  AST  31  /  ALT  18  /  AlkPhos  108  01-23      PT/INR - ( 21 Jan 2021 13:45 )   PT: 16.10 sec;   INR: 1.40 ratio         PTT - ( 23 Jan 2021 04:30 )  PTT:74.2 sec                                                                                     LIVER FUNCTIONS - ( 23 Jan 2021 04:30 )  Alb: 2.7 g/dL / Pro: 5.3 g/dL / ALK PHOS: 108 U/L / ALT: 18 U/L / AST: 31 U/L / GGT: x                                                  Culture - Abscess with Gram Stain (collected 21 Jan 2021 19:15)  Source: .Abscess sacrum wound  Preliminary Report (23 Jan 2021 09:06):    Moderate Enterococcus faecium                 Ferritin, Serum: 1202 ng/mL (01-22-21 @ 04:30)  Ferritin, Serum: 1382 ng/mL (01-20-21 @ 08:10)      D-Dimer Assay, Quantitative: 896 ng/mL DDU (01-23-21 @ 04:30)  D-Dimer Assay, Quantitative: 1183 ng/mL DDU (01-21-21 @ 04:50)  D-Dimer Assay, Quantitative: 1130 ng/mL DDU (01-20-21 @ 04:50)  D-Dimer Assay, Quantitative: 1353 ng/mL DDU (01-19-21 @ 05:00)      Procalcitonin, Serum: 2.95 ng/mL (01-21-21 @ 04:50)  Procalcitonin, Serum: 2.90 ng/mL (01-20-21 @ 04:50)  Procalcitonin, Serum: 3.60 ng/mL (01-19-21 @ 05:00)                                                    Mode: AC/ CMV (Assist Control/ Continuous Mandatory Ventilation)  RR (machine): 34  TV (machine): 450  FiO2: 30  PEEP: 8  ITime: 1  MAP: 16  PIP: 27                                          MEDICATIONS  (STANDING):  acetaminophen   Tablet .. 650 milliGRAM(s) Oral once  aspirin  chewable 81 milliGRAM(s) Oral daily  atorvastatin 20 milliGRAM(s) Oral at bedtime  chlorhexidine 0.12% Liquid 15 milliLiter(s) Oral Mucosa every 12 hours  chlorhexidine 4% Liquid 1 Application(s) Topical <User Schedule>  clonazePAM  Tablet 1 milliGRAM(s) Oral every 12 hours  collagenase Ointment 1 Application(s) Topical two times a day  Dakins Solution - 1/2 Strength 1 Application(s) Topical two times a day  dexMEDEtomidine Infusion 0.2 MICROgram(s)/kG/Hr (4.85 mL/Hr) IV Continuous <Continuous>  dextrose 40% Gel 15 Gram(s) Oral once  dextrose 5%. 1000 milliLiter(s) (50 mL/Hr) IV Continuous <Continuous>  dextrose 5%. 1000 milliLiter(s) (100 mL/Hr) IV Continuous <Continuous>  dextrose 50% Injectable 25 Gram(s) IV Push once  dextrose 50% Injectable 12.5 Gram(s) IV Push once  dextrose 50% Injectable 25 Gram(s) IV Push once  epoetin raven-epbx (RETACRIT) Injectable 8000 Unit(s) IV Push <User Schedule>  fentaNYL   Infusion 0.5 MICROgram(s)/kG/Hr (4.85 mL/Hr) IV Continuous <Continuous>  glucagon  Injectable 1 milliGRAM(s) IntraMuscular once  heparin  Infusion 1800 Unit(s)/Hr (16 mL/Hr) IV Continuous <Continuous>  insulin glargine Injectable (LANTUS) 20 Unit(s) SubCutaneous at bedtime  insulin lispro (ADMELOG) corrective regimen sliding scale   SubCutaneous three times a day before meals  insulin lispro Injectable (ADMELOG) 8 Unit(s) SubCutaneous three times a day before meals  levothyroxine 100 MICROGram(s) Oral daily  lidocaine 1%/epinephrine 1:100,000 Inj 40 milliLiter(s) Local Injection once  methadone    Tablet 5 milliGRAM(s) Oral every 12 hours  metoclopramide Injectable 5 milliGRAM(s) IV Push three times a day  metoprolol tartrate 12.5 milliGRAM(s) Oral two times a day  midodrine 10 milliGRAM(s) Oral every 8 hours  norepinephrine Infusion 0.05 MICROgram(s)/kG/Min (9.08 mL/Hr) IV Continuous <Continuous>  pantoprazole   Suspension 40 milliGRAM(s) Oral before breakfast  QUEtiapine 50 milliGRAM(s) Oral two times a day    MEDICATIONS  (PRN):  acetaminophen   Tablet .. 650 milliGRAM(s) Oral every 6 hours PRN Temp greater or equal to 38C (100.4F), Mild Pain (1 - 3)  fentaNYL    Injectable 25 MICROGram(s) IV Push every 30 minutes PRN agitation      CXR interpreted by me:  none today

## 2021-01-23 NOTE — PROGRESS NOTE ADULT - SUBJECTIVE AND OBJECTIVE BOX
[facility] FOLLOW UP NOTE  --------------------------------------------------------------------------------  24 hour events/subjective: Patient examined during HD. Trached.    PAST HISTORY  --------------------------------------------------------------------------------  No significant changes to PMH, PSH, FHx, SHx, unless otherwise noted    ALLERGIES & MEDICATIONS  --------------------------------------------------------------------------------  Allergies    Orange (Other)  Originally Entered as [HIVES] reaction to [sulfur] (Unknown)  penicillin (Unknown)  sulfADIAZINE (Unknown)    Intolerances      Standing Inpatient Medications  acetaminophen   Tablet .. 650 milliGRAM(s) Oral once  aspirin  chewable 81 milliGRAM(s) Oral daily  atorvastatin 20 milliGRAM(s) Oral at bedtime  chlorhexidine 0.12% Liquid 15 milliLiter(s) Oral Mucosa every 12 hours  chlorhexidine 4% Liquid 1 Application(s) Topical <User Schedule>  clonazePAM  Tablet 1 milliGRAM(s) Oral every 12 hours  collagenase Ointment 1 Application(s) Topical two times a day  Dakins Solution - 1/2 Strength 1 Application(s) Topical two times a day  dexMEDEtomidine Infusion 0.2 MICROgram(s)/kG/Hr IV Continuous <Continuous>  dextrose 40% Gel 15 Gram(s) Oral once  dextrose 5%. 1000 milliLiter(s) IV Continuous <Continuous>  dextrose 5%. 1000 milliLiter(s) IV Continuous <Continuous>  dextrose 50% Injectable 25 Gram(s) IV Push once  dextrose 50% Injectable 12.5 Gram(s) IV Push once  dextrose 50% Injectable 25 Gram(s) IV Push once  epoetin raven-epbx (RETACRIT) Injectable 8000 Unit(s) IV Push <User Schedule>  fentaNYL   Infusion 0.5 MICROgram(s)/kG/Hr IV Continuous <Continuous>  glucagon  Injectable 1 milliGRAM(s) IntraMuscular once  heparin  Infusion 1800 Unit(s)/Hr IV Continuous <Continuous>  insulin glargine Injectable (LANTUS) 20 Unit(s) SubCutaneous at bedtime  insulin lispro (ADMELOG) corrective regimen sliding scale   SubCutaneous three times a day before meals  insulin lispro Injectable (ADMELOG) 8 Unit(s) SubCutaneous three times a day before meals  levothyroxine 100 MICROGram(s) Oral daily  lidocaine 1%/epinephrine 1:100,000 Inj 40 milliLiter(s) Local Injection once  methadone    Tablet 5 milliGRAM(s) Oral every 12 hours  metoclopramide Injectable 5 milliGRAM(s) IV Push three times a day  metoprolol tartrate 12.5 milliGRAM(s) Oral two times a day  midodrine 10 milliGRAM(s) Oral every 8 hours  norepinephrine Infusion 0.05 MICROgram(s)/kG/Min IV Continuous <Continuous>  pantoprazole   Suspension 40 milliGRAM(s) Oral before breakfast  QUEtiapine 100 milliGRAM(s) Oral two times a day  QUEtiapine 50 milliGRAM(s) Oral once    PRN Inpatient Medications  acetaminophen   Tablet .. 650 milliGRAM(s) Oral every 6 hours PRN  fentaNYL    Injectable 25 MICROGram(s) IV Push every 30 minutes PRN      VITALS/PHYSICAL EXAM  --------------------------------------------------------------------------------  T(C): 35.9 (01-23-21 @ 08:00), Max: 35.9 (01-22-21 @ 20:00)  HR: 74 (01-23-21 @ 10:30) (70 - 96)  BP: 108/62 (01-23-21 @ 10:00) (93/57 - 153/68)  RR: 23 (01-23-21 @ 10:30) (16 - 33)  SpO2: 100% (01-23-21 @ 10:30) (92% - 100%)  Wt(kg): --        01-22-21 @ 07:01  -  01-23-21 @ 07:00  --------------------------------------------------------  IN: 2508.3 mL / OUT: 0 mL / NET: 2508.3 mL    01-23-21 @ 07:01  -  01-23-21 @ 12:04  --------------------------------------------------------  IN: 384.3 mL / OUT: 2000 mL / NET: -1615.7 mL      Physical Exam:  	Gen: Trached  	Pulm: CTA B/L  	CV: irreg irreg  	Abd: +BS, soft, nondistended  	: Scrotal edema  	LE: Warm,  edema  	Vascular access: AVF    LABS/STUDIES  --------------------------------------------------------------------------------              8.0    11.61 >-----------<  225      [01-23-21 @ 04:30]              24.8     139  |  100  |  74  ----------------------------<  191      [01-23-21 @ 04:30]  4.5   |  21  |  5.3        Ca     8.1     [01-23-21 @ 04:30]      Mg     2.6     [01-23-21 @ 04:30]      Phos  6.9     [01-23-21 @ 04:30]    TPro  5.3  /  Alb  2.7  /  TBili  0.8  /  DBili  x   /  AST  31  /  ALT  18  /  AlkPhos  108  [01-23-21 @ 04:30]    PT/INR: PT 16.10, INR 1.40       [01-21-21 @ 13:45]  PTT: 74.2       [01-23-21 @ 04:30]      Creatinine Trend:  SCr 5.3 [01-23 @ 04:30]  SCr 5.0 [01-22 @ 05:00]  SCr 3.6 [01-21 @ 12:31]  SCr 5.9 [01-21 @ 04:50]  SCr 5.8 [01-20 @ 04:50]    Urinalysis - [01-06-21 @ 18:43]      Color Yellow / Appearance Slightly Turbid / SG 1.018 / pH 6.5      Gluc 100 mg/dL / Ketone Negative  / Bili Negative / Urobili <2 mg/dL       Blood Moderate / Protein 300 mg/dL / Leuk Est Small / Nitrite Negative       /  / Hyaline 114 / Gran  / Sq Epi  / Non Sq Epi 1 / Bacteria Negative      Ferritin 1202      [01-22-21 @ 04:30]  Vitamin D (25OH) 45      [01-15-21 @ 12:06]  HbA1c 7.1      [05-21-19 @ 04:30]  TSH 4.57      [01-01-21 @ 17:56]

## 2021-01-23 NOTE — PROGRESS NOTE ADULT - ASSESSMENT
IMPRESSION:    ESRD MWF for HD  Acute hypoxemic resp failure failed weaning SP trach and PEG   NSTEMI  Afib was on coumadin  Severe COVID PNA  Superimposed bacteria PNA  Sacral ulcer SP debridement     PLAN:    CNS: SAT.  Seroquel.     HEENT: Oral care.    PULMONARY: Vent changes. Wean O2.  Monitor PPL and DP.  Wean O2.  PS wean.  cxr prn, check abg    CARDIOVASCULAR:  I<O as tolerated.      GI: GI prophylaxis.  PEG Feeding.  Reglan .      RENAL: check lytes and replete PRN. Nephro F/UP result.      INFECTIOUS DISEASE:  DC ABX     HEMATOLOGICAL: DVT Prophylaxis.  FU PTT     ENDOCRINE:  Follow up FS.  Insulin protocol if needed.    MUSCULOSKELETAL:  bed rest.  Wound Care      MICU     Prognosis poor

## 2021-01-23 NOTE — PROVIDER CONTACT NOTE (CHANGE IN STATUS NOTIFICATION) - ASSESSMENT
Patients oxygen saturation progressively declining, currently 83-84%. Patient also becoming increasingly tachycardic, -120 BPM. Patient's BIS is 39, patient sedated on fentanyl and versed drips. TOF 3/4, Nimbex gtt. adjusted accordingly.

## 2021-01-23 NOTE — PROGRESS NOTE ADULT - ASSESSMENT
ESRD (due to DM) on HD TTS  s/p Fall  altered mental status   Covid-19 PNA / bacterial PNA   fluid overload  hyponatremia  hyperkalemia  DM  HTN  afib on coumadin  CVA  NSTEMI    plan:    HD today: 3 hours, opti 200 dialyzer, 2Kbath, 3L UF  left arm precautions  covid isolation  f/u cardio / f/u pulm  icu care

## 2021-01-23 NOTE — PROVIDER CONTACT NOTE (CHANGE IN STATUS NOTIFICATION) - SITUATION
Patient's oxygen saturation progressively declining throughout the night, currently 83-84%. Patient also increasingly tachycardic, -122 BPM. FiO2 100%, PEEP 12. Patient on Nimbex gtt., titrated to TOF 2/4. patient sedated on Fentanyl and versed drips, titrated to BIS 40-60. Patient's current BIS 39. Dr. Mcwilliams made aware, no ventilator adjustments or other recommendations made at this time. ABG to be drawn and Senior MD to be consulted.

## 2021-01-24 NOTE — PROGRESS NOTE ADULT - ASSESSMENT
59 yo male with PMHx of afib on coumadin, DM2, ESRD on HD MWF, HLD, HTN, CVA, who presented w/ weakness and fall.     #Acute hypoxemic respiratory failure   #Covid-19 PNA  #Superimposed bacterial PNA   - s/p intubation -> trach  - per surgery, trach sutures can be removed by primary team on 1/25  - s/p Azithromycin discontinued on 1/8/2021, s/p Cefepime discontinued on 1/11/2021  - s/p Dexamethasone 6mg IV once daily (1/1/2021) x10 days  - Covid antibodies: reported negative --> s/p 2 units of Convalescent plasma ( 1/8/2021 and 1/12/2021)  - f/u inflammatory markers   - s/p tocilizumab 400mg IV x1 on 1/8/2021  - d/c lucinda and vanc   - wean off sedation w/ klonopin and pain control Methadone     #Leukocytosis  #Sacral ulcer, heel ulcers   - Multifactorial (Possible overlying infection, ulcers, steroids)   - diarrhea resolved   - d/c abx   - s/p debridement w/ burn  - wound culture- moderate e. faecium, f/u ID recs    - wound care per burn     #?Ileus- resolved   - s/p PEG 1/18  - xray 10/19 w/ gasseous distention of stomach  - PEG was connected to suction, feeds were held  - Xray 10/21 w/o evidence of obstruction, restarted feeds    #Diarrhea  - watery   - holding bowel regimen   - monitor     #Upper extremity edema R>L  - arterial and venous duplex- negative   - loosened restraints    #Acute on chronic anemia   - s/p 1u pRBC 1/19  - monitor      #Hyperkalemia- resolved  #Hypokalemia   -s/p Lokelma 10 q8h   - on HD   - monitor     #ESRD on HD   - EPO (retacrit) 8000 units IV push once weekly  - nephro following, f/u reccs    #Paroxysmal atrial fibrillation  - on Heparin gtt, holding home coumadin, follow up aPTT, transition to coumadin post debridement   - Metoprolol tartrate 12.5mg po q12hrs    - dc levo, started on midodrine   - CT brain no IC bleed or concerns of IC bleed     #NSTEMI  - Type II MI, demand ischemia, likely secondary to hypoxia secondary to COVID-19 pneumonia  - Aspirin 81mg po once daily   - CT brain no IC bleed or concerns of IC bleed   - On heparin gtt (for atrial fibrillation)  - Repeat CK-MB and CPK were stable    #Hypothyroidism: Continue with levothyroxine 100mcg po once daily     #Altered Mental Status- improved  - EEG diffuse slowing, but no seizure activity, check the report above  - CT brain shows ventriculomegaly (check full report), no IC bleed, possible old infarct.  - neurology consulted and recs:   a. CT brain negative x2 for stroke or other structural pathology.  EEG negative for any epileptiform activity.  Suspect multifactorial metabolic encephalopathy in setting of COVID and ESRD and sedatives.  Wean sedation as able, can reassess if patient does not awaken after that time.  b. No further EEG or other tests   c. To be reassessed after sedation is weaned    Diet: PEG feeds   DVT ppx: on Heparin gtt, monitor AM ptt   GI ppx: Protonix 40 qd

## 2021-01-24 NOTE — PROGRESS NOTE ADULT - ASSESSMENT
ESRD (due to DM) on HD TTS  s/p Fall  altered mental status   Covid-19 PNA / bacterial PNA   fluid overload  hyponatremia  hyperkalemia  DM  HTN  afib on coumadin  CVA  NSTEMI    plan:    HD tuesday: 3 hours, opti 200 dialyzer, 2Kbath, 3L UF  left arm precautions  covid isolation  f/u cardio / f/u pulm  icu care

## 2021-01-24 NOTE — PROGRESS NOTE ADULT - SUBJECTIVE AND OBJECTIVE BOX
SUBJECTIVE:    Patient is a 60y old Male who presents with a chief complaint of s/p fall. (24 Jan 2021 12:55)    Currently admitted to medicine with the primary diagnosis of Fever       Today is hospital day 23d. No acute events overnight.     PAST MEDICAL & SURGICAL HISTORY  PAD (peripheral artery disease)  multiple toe amputations    Anemia  chronic anemia - s/p transfusion 2018    Thyroid cancer    Chronic leg pain    OA (osteoarthritis)    SOB (shortness of breath) on exertion    ESRD (end stage renal disease)  2 yrs    Atrial fibrillation  on warfarin    Right sided weakness    Stroke  8 yrs ago    Hypertension    High blood cholesterol    Diabetes mellitus, type 2    Dialysis patient  Mon, Wednesday, Friday ([address])    Smoker    H/O thyroid nodule    H/O gastroesophageal reflux (GERD)    History of tonsillectomy    History of surgery  Multiple toe amputations (amp 4 1/2 left toes; has 1/2 left mid toe; amp right mid toe)    A-V fistula  left AVF      SOCIAL HISTORY:  Negative for smoking/alcohol/drug use.     ALLERGIES:  Orange (Other)  Originally Entered as [HIVES] reaction to [sulfur] (Unknown)  penicillin (Unknown)  sulfADIAZINE (Unknown)    MEDICATIONS:  STANDING MEDICATIONS  acetaminophen   Tablet .. 650 milliGRAM(s) Oral once  aspirin  chewable 81 milliGRAM(s) Oral daily  atorvastatin 20 milliGRAM(s) Oral at bedtime  chlorhexidine 0.12% Liquid 15 milliLiter(s) Oral Mucosa every 12 hours  chlorhexidine 4% Liquid 1 Application(s) Topical <User Schedule>  clonazePAM  Tablet 1 milliGRAM(s) Oral every 12 hours  collagenase Ointment 1 Application(s) Topical two times a day  Dakins Solution - 1/2 Strength 1 Application(s) Topical two times a day  dexMEDEtomidine Infusion 0.2 MICROgram(s)/kG/Hr IV Continuous <Continuous>  dextrose 40% Gel 15 Gram(s) Oral once  dextrose 5%. 1000 milliLiter(s) IV Continuous <Continuous>  dextrose 5%. 1000 milliLiter(s) IV Continuous <Continuous>  dextrose 50% Injectable 25 Gram(s) IV Push once  dextrose 50% Injectable 12.5 Gram(s) IV Push once  dextrose 50% Injectable 25 Gram(s) IV Push once  epoetin raven-epbx (RETACRIT) Injectable 8000 Unit(s) IV Push <User Schedule>  fentaNYL   Infusion 0.5 MICROgram(s)/kG/Hr IV Continuous <Continuous>  glucagon  Injectable 1 milliGRAM(s) IntraMuscular once  heparin  Infusion 1800 Unit(s)/Hr IV Continuous <Continuous>  insulin glargine Injectable (LANTUS) 20 Unit(s) SubCutaneous at bedtime  insulin lispro (ADMELOG) corrective regimen sliding scale   SubCutaneous three times a day before meals  insulin lispro Injectable (ADMELOG) 8 Unit(s) SubCutaneous three times a day before meals  levothyroxine 100 MICROGram(s) Oral daily  lidocaine 1%/epinephrine 1:100,000 Inj 40 milliLiter(s) Local Injection once  methadone    Tablet 5 milliGRAM(s) Oral every 12 hours  metoclopramide Injectable 5 milliGRAM(s) IV Push three times a day  metoprolol tartrate 12.5 milliGRAM(s) Oral two times a day  midodrine 10 milliGRAM(s) Oral every 8 hours  norepinephrine Infusion 0.05 MICROgram(s)/kG/Min IV Continuous <Continuous>  pantoprazole   Suspension 40 milliGRAM(s) Oral before breakfast  QUEtiapine 100 milliGRAM(s) Oral two times a day    PRN MEDICATIONS  acetaminophen   Tablet .. 650 milliGRAM(s) Oral every 6 hours PRN    VITALS:   T(F): 99.1  HR: 80  BP: 157/82  RR: 30  SpO2: 98%    PHYSICAL EXAM:  GEN: Trach in place, awake, NAD  LUNGS: Decreased breath sounds, mild wheezing   HEART: S1/S2 present.   ABD: Soft, non-tender, non-distended, peg present, bowel sounds present.  EXT: Edema of upper extremities and lower extremities   NEURO: on sedation, following commands     Garza catheter present     LABS:                        8.2    12.31 )-----------( 219      ( 24 Jan 2021 04:30 )             25.9     01-24    139  |  100  |  69<HH>  ----------------------------<  203<H>  4.7   |  25  |  4.7<HH>    Ca    8.5      24 Jan 2021 04:30  Phos  6.1     01-24  Mg     2.4     01-24    TPro  6.1  /  Alb  2.7<L>  /  TBili  0.7  /  DBili  x   /  AST  26  /  ALT  16  /  AlkPhos  113  01-24    PTT - ( 24 Jan 2021 04:30 )  PTT:66.0 sec    ABG - ( 23 Jan 2021 12:37 )  pH, Arterial: 7.49  pH, Blood: x     /  pCO2: 36    /  pO2: 77    / HCO3: 27    / Base Excess: 3.7   /  SaO2: 96                    Culture - Abscess with Gram Stain (collected 21 Jan 2021 19:15)  Source: .Abscess sacrum wound  Preliminary Report (24 Jan 2021 12:30):    Moderate Enterococcus faecium (vancomycin resistant)  Organism: Enterococcus faecium (vancomycin resistant) (24 Jan 2021 12:29)  Organism: Enterococcus faecium (vancomycin resistant) (24 Jan 2021 12:29)          RADIOLOGY:  EXAM:  XR CHEST PORTABLE ROUTINE 1V          PROCEDURE DATE:  01/24/2021      INTERPRETATION:  Clinical History / Reason for exam: Opacities    Comparison : Chest radiograph 1/21/2021.    Technique/Positioning: Single frontal chest x-ray obtained.    Findings:    Support devices: Stable tracheostomy tube, left IJ central venous catheter.    Cardiac/mediastinum/hilum: Unchanged.    Lung parenchyma/Pleura: Stable bilateral opacities. No pneumothorax..    Skeleton/soft tissues: Unchanged.      Impression:      Stable bilateral opacities.  Stable support devices.    SHANTI NICOLE MD; Attending Radiologist  This document has been electronically signed. Jan 24 2021  8:15AM

## 2021-01-24 NOTE — PROGRESS NOTE ADULT - SUBJECTIVE AND OBJECTIVE BOX
Patient is a 60y old  Male who presents with a chief complaint of s/p fall. (23 Jan 2021 12:03)        Over Night Events:  no acute events overnight      ROS:     All ROS are negative except HPI         PHYSICAL EXAM    ICU Vital Signs Last 24 Hrs  T(C): 37.3 (24 Jan 2021 09:00), Max: 37.3 (24 Jan 2021 09:00)  T(F): 99.1 (24 Jan 2021 09:00), Max: 99.1 (24 Jan 2021 09:00)  HR: 78 (24 Jan 2021 09:00) (58 - 92)  BP: 124/60 (24 Jan 2021 09:00) (85/48 - 157/82)  BP(mean): 81 (24 Jan 2021 09:00) (59 - 127)  ABP: --  ABP(mean): --  RR: 26 (24 Jan 2021 09:00) (18 - 32)  SpO2: 97% (24 Jan 2021 09:00) (95% - 100%)      CONSTITUTIONAL:  Well nourished.  NAD    ENT:   Airway patent,   Mouth with normal mucosa.   No thrush    EYES:   Pupils equal,   Round and reactive to light.    CARDIAC:   Normal rate,   Regular rhythm.    No edema      Vascular:  Normal systolic impulse  No Carotid bruits    RESPIRATORY:   No wheezing  Bilateral BS  Normal chest expansion  Not tachypneic,  No use of accessory muscles    GASTROINTESTINAL:  Abdomen soft,   Non-tender,   No guarding,   + BS    MUSCULOSKELETAL:   Range of motion is not limited,  No clubbing, cyanosis    NEUROLOGICAL:   Alert and oriented   No motor  deficits.    SKIN:   Skin normal color for race,   Warm and dry and intact.   No evidence of rash.    PSYCHIATRIC:   Normal mood and affect.   No apparent risk to self or others.    HEMATOLOGICAL:  No cervical  lymphadenopathy.  no inguinal lymphadenopathy      01-23-21 @ 07:01  -  01-24-21 @ 07:00  --------------------------------------------------------  IN:    Dexmedetomidine: 194.2 mL    Heparin: 192 mL    Peptamen A.F.: 900 mL  Total IN: 1286.2 mL    OUT:    FentaNYL: 0 mL    Norepinephrine: 0 mL    Other (mL): 2000 mL  Total OUT: 2000 mL    Total NET: -713.8 mL          LABS:                            8.2    12.31 )-----------( 219      ( 24 Jan 2021 04:30 )             25.9                                               01-24    139  |  100  |  69<HH>  ----------------------------<  203<H>  4.7   |  25  |  4.7<HH>    Ca    8.5      24 Jan 2021 04:30  Phos  6.1     01-24  Mg     2.4     01-24    TPro  6.1  /  Alb  2.7<L>  /  TBili  0.7  /  DBili  x   /  AST  26  /  ALT  16  /  AlkPhos  113  01-24      PTT - ( 24 Jan 2021 04:30 )  PTT:66.0 sec                                                                                     LIVER FUNCTIONS - ( 24 Jan 2021 04:30 )  Alb: 2.7 g/dL / Pro: 6.1 g/dL / ALK PHOS: 113 U/L / ALT: 16 U/L / AST: 26 U/L / GGT: x                                                  Culture - Abscess with Gram Stain (collected 21 Jan 2021 19:15)  Source: .Abscess sacrum wound  Preliminary Report (23 Jan 2021 09:06):    Moderate Enterococcus faecium                                                   Mode: AC/ CMV (Assist Control/ Continuous Mandatory Ventilation)  RR (machine): 24  TV (machine): 450  FiO2: 30  PEEP: 8  ITime: 1  MAP: 18  PIP: 21                                      ABG - ( 23 Jan 2021 12:37 )  pH, Arterial: 7.49  pH, Blood: x     /  pCO2: 36    /  pO2: 77    / HCO3: 27    / Base Excess: 3.7   /  SaO2: 96                  MEDICATIONS  (STANDING):  acetaminophen   Tablet .. 650 milliGRAM(s) Oral once  aspirin  chewable 81 milliGRAM(s) Oral daily  atorvastatin 20 milliGRAM(s) Oral at bedtime  chlorhexidine 0.12% Liquid 15 milliLiter(s) Oral Mucosa every 12 hours  chlorhexidine 4% Liquid 1 Application(s) Topical <User Schedule>  clonazePAM  Tablet 1 milliGRAM(s) Oral every 12 hours  collagenase Ointment 1 Application(s) Topical two times a day  Dakins Solution - 1/2 Strength 1 Application(s) Topical two times a day  dexMEDEtomidine Infusion 0.2 MICROgram(s)/kG/Hr (4.85 mL/Hr) IV Continuous <Continuous>  dextrose 40% Gel 15 Gram(s) Oral once  dextrose 5%. 1000 milliLiter(s) (50 mL/Hr) IV Continuous <Continuous>  dextrose 5%. 1000 milliLiter(s) (100 mL/Hr) IV Continuous <Continuous>  dextrose 50% Injectable 25 Gram(s) IV Push once  dextrose 50% Injectable 12.5 Gram(s) IV Push once  dextrose 50% Injectable 25 Gram(s) IV Push once  epoetin raven-epbx (RETACRIT) Injectable 8000 Unit(s) IV Push <User Schedule>  fentaNYL   Infusion 0.5 MICROgram(s)/kG/Hr (4.85 mL/Hr) IV Continuous <Continuous>  glucagon  Injectable 1 milliGRAM(s) IntraMuscular once  heparin  Infusion 1800 Unit(s)/Hr (16 mL/Hr) IV Continuous <Continuous>  insulin glargine Injectable (LANTUS) 20 Unit(s) SubCutaneous at bedtime  insulin lispro (ADMELOG) corrective regimen sliding scale   SubCutaneous three times a day before meals  insulin lispro Injectable (ADMELOG) 8 Unit(s) SubCutaneous three times a day before meals  levothyroxine 100 MICROGram(s) Oral daily  lidocaine 1%/epinephrine 1:100,000 Inj 40 milliLiter(s) Local Injection once  methadone    Tablet 5 milliGRAM(s) Oral every 12 hours  metoclopramide Injectable 5 milliGRAM(s) IV Push three times a day  metoprolol tartrate 12.5 milliGRAM(s) Oral two times a day  midodrine 10 milliGRAM(s) Oral every 8 hours  norepinephrine Infusion 0.05 MICROgram(s)/kG/Min (9.08 mL/Hr) IV Continuous <Continuous>  pantoprazole   Suspension 40 milliGRAM(s) Oral before breakfast  QUEtiapine 100 milliGRAM(s) Oral two times a day    MEDICATIONS  (PRN):  acetaminophen   Tablet .. 650 milliGRAM(s) Oral every 6 hours PRN Temp greater or equal to 38C (100.4F), Mild Pain (1 - 3)  fentaNYL    Injectable 25 MICROGram(s) IV Push every 30 minutes PRN agitation      New X-rays reviewed:                                                                                  ECHO    CXR interpreted by me:

## 2021-01-24 NOTE — PROGRESS NOTE ADULT - SUBJECTIVE AND OBJECTIVE BOX
[facility] [facility] FOLLOW UP NOTE  --------------------------------------------------------------------------------  24 hour events/subjective: Patient examined. Trached.    PAST HISTORY  --------------------------------------------------------------------------------  No significant changes to PMH, PSH, FHx, SHx, unless otherwise noted    ALLERGIES & MEDICATIONS  --------------------------------------------------------------------------------  Allergies    Orange (Other)  Originally Entered as [HIVES] reaction to [sulfur] (Unknown)  penicillin (Unknown)  sulfADIAZINE (Unknown)    Intolerances      Standing Inpatient Medications  acetaminophen   Tablet .. 650 milliGRAM(s) Oral once  aspirin  chewable 81 milliGRAM(s) Oral daily  atorvastatin 20 milliGRAM(s) Oral at bedtime  chlorhexidine 0.12% Liquid 15 milliLiter(s) Oral Mucosa every 12 hours  chlorhexidine 4% Liquid 1 Application(s) Topical <User Schedule>  clonazePAM  Tablet 1 milliGRAM(s) Oral every 12 hours  collagenase Ointment 1 Application(s) Topical two times a day  Dakins Solution - 1/2 Strength 1 Application(s) Topical two times a day  dexMEDEtomidine Infusion 0.2 MICROgram(s)/kG/Hr IV Continuous <Continuous>  dextrose 40% Gel 15 Gram(s) Oral once  dextrose 5%. 1000 milliLiter(s) IV Continuous <Continuous>  dextrose 5%. 1000 milliLiter(s) IV Continuous <Continuous>  dextrose 50% Injectable 25 Gram(s) IV Push once  dextrose 50% Injectable 12.5 Gram(s) IV Push once  dextrose 50% Injectable 25 Gram(s) IV Push once  epoetin raven-epbx (RETACRIT) Injectable 8000 Unit(s) IV Push <User Schedule>  fentaNYL   Infusion 0.5 MICROgram(s)/kG/Hr IV Continuous <Continuous>  glucagon  Injectable 1 milliGRAM(s) IntraMuscular once  heparin  Infusion 1800 Unit(s)/Hr IV Continuous <Continuous>  insulin glargine Injectable (LANTUS) 20 Unit(s) SubCutaneous at bedtime  insulin lispro (ADMELOG) corrective regimen sliding scale   SubCutaneous three times a day before meals  insulin lispro Injectable (ADMELOG) 8 Unit(s) SubCutaneous three times a day before meals  levothyroxine 100 MICROGram(s) Oral daily  lidocaine 1%/epinephrine 1:100,000 Inj 40 milliLiter(s) Local Injection once  methadone    Tablet 5 milliGRAM(s) Oral every 12 hours  metoclopramide Injectable 5 milliGRAM(s) IV Push three times a day  metoprolol tartrate 12.5 milliGRAM(s) Oral two times a day  midodrine 10 milliGRAM(s) Oral every 8 hours  norepinephrine Infusion 0.05 MICROgram(s)/kG/Min IV Continuous <Continuous>  pantoprazole   Suspension 40 milliGRAM(s) Oral before breakfast  QUEtiapine 100 milliGRAM(s) Oral two times a day    PRN Inpatient Medications  acetaminophen   Tablet .. 650 milliGRAM(s) Oral every 6 hours PRN      VITALS/PHYSICAL EXAM  --------------------------------------------------------------------------------  T(C): 37.3 (01-24-21 @ 12:00), Max: 37.3 (01-24-21 @ 09:00)  HR: 80 (01-24-21 @ 12:00) (58 - 92)  BP: 138/62 (01-24-21 @ 12:00) (85/48 - 175/63)  RR: 29 (01-24-21 @ 12:00) (18 - 32)  SpO2: 98% (01-24-21 @ 12:00) (95% - 100%)  Wt(kg): --        01-23-21 @ 07:01  -  01-24-21 @ 07:00  --------------------------------------------------------  IN: 1286.2 mL / OUT: 2000 mL / NET: -713.8 mL    01-24-21 @ 07:01  -  01-24-21 @ 12:55  --------------------------------------------------------  IN: 537.2 mL / OUT: 0 mL / NET: 537.2 mL      Physical Exam:  	Gen: NAD  	Pulm: CTA B/L  	CV: RRR, S1S2  	Abd: +BS, soft, nontender/nondistended  	: No suprapubic tenderness  	LE: Warm,  no edema  	Vascular access: AVF    LABS/STUDIES  --------------------------------------------------------------------------------              8.2    12.31 >-----------<  219      [01-24-21 @ 04:30]              25.9     139  |  100  |  69  ----------------------------<  203      [01-24-21 @ 04:30]  4.7   |  25  |  4.7        Ca     8.5     [01-24-21 @ 04:30]      Mg     2.4     [01-24-21 @ 04:30]      Phos  6.1     [01-24-21 @ 04:30]    TPro  6.1  /  Alb  2.7  /  TBili  0.7  /  DBili  x   /  AST  26  /  ALT  16  /  AlkPhos  113  [01-24-21 @ 04:30]    PTT: 66.0       [01-24-21 @ 04:30]    Creatinine Trend:  SCr 4.7 [01-24 @ 04:30]  SCr 5.3 [01-23 @ 04:30]  SCr 5.0 [01-22 @ 05:00]  SCr 3.6 [01-21 @ 12:31]  SCr 5.9 [01-21 @ 04:50]    Urinalysis - [01-06-21 @ 18:43]      Color Yellow / Appearance Slightly Turbid / SG 1.018 / pH 6.5      Gluc 100 mg/dL / Ketone Negative  / Bili Negative / Urobili <2 mg/dL       Blood Moderate / Protein 300 mg/dL / Leuk Est Small / Nitrite Negative       /  / Hyaline 114 / Gran  / Sq Epi  / Non Sq Epi 1 / Bacteria Negative    Ferritin 1202      [01-22-21 @ 04:30]  Vitamin D (25OH) 45      [01-15-21 @ 12:06]  HbA1c 7.1      [05-21-19 @ 04:30]  TSH 4.57      [01-01-21 @ 17:56

## 2021-01-24 NOTE — PROGRESS NOTE ADULT - ASSESSMENT
IMPRESSION:    ESRD MWF for HD  Acute hypoxemic resp failure failed weaning SP trach and PEG   NSTEMI  Afib was on coumadin  Severe COVID PNA  Superimposed bacteria PNA  Sacral ulcer SP debridement     PLAN:    CNS: SAT.  Seroquel.     HEENT: Oral care.    PULMONARY: Vent changes. Wean O2.  Monitor PPL and DP.  Wean O2.  PS wean.  cxr prn, check abg    CARDIOVASCULAR:  I<O as tolerated.      GI: GI prophylaxis.  PEG Feeding.  Reglan .      RENAL: check lytes and replete PRN. Nephro F/UP result.      INFECTIOUS DISEASE:  FU cultures.     HEMATOLOGICAL: DVT Prophylaxis.  FU PTT     ENDOCRINE:  Follow up FS.  Insulin protocol if needed.    MUSCULOSKELETAL:  bed rest.  Wound Care      MICU     Prognosis poor

## 2021-01-25 NOTE — PROGRESS NOTE ADULT - ASSESSMENT
IMPRESSION:    ESRD MWF for HD  Acute hypoxemic resp failure failed weaning SP trach and PEG   NSTEMI  Afib was on coumadin  Severe COVID PNA  Superimposed bacteria PNA  Sacral ulcer SP debridement     PLAN:    CNS:  Seroquel.     HEENT: Oral care.    PULMONARY: Vent changes. Wean O2.  Monitor PPL and DP.  ps trial    CARDIOVASCULAR:  I<O as tolerated.      GI: GI prophylaxis.  PEG Feeding.  Reglan .      RENAL: check lytes and replete PRN. Nephro F/UP result.      INFECTIOUS DISEASE:  FU cultures.     HEMATOLOGICAL: DVT Prophylaxis.  FU PTT , coumadin    ENDOCRINE:  Follow up FS.  Insulin protocol if needed.    MUSCULOSKELETAL:  bed rest.  Wound Care      MICU     Prognosis poor

## 2021-01-25 NOTE — PROGRESS NOTE ADULT - SUBJECTIVE AND OBJECTIVE BOX
SUBJECTIVE:    Patient is a 60y old Male who presents with a chief complaint of s/p fall. (25 Jan 2021 09:04)    Currently admitted to medicine with the primary diagnosis of Fever       Today is hospital day 24d. This morning he is resting comfortably in bed and reports no new issues or overnight events. No fevers, chills. Denies CP or SOB. No N/V/D. Has been eating well. No dysuria. Last BM was. Adequate sleep. Ambulating.      PAST MEDICAL & SURGICAL HISTORY  PAD (peripheral artery disease)  multiple toe amputations    Anemia  chronic anemia - s/p transfusion 2018    Thyroid cancer    Chronic leg pain    OA (osteoarthritis)    SOB (shortness of breath) on exertion    ESRD (end stage renal disease)  2 yrs    Atrial fibrillation  on warfarin    Right sided weakness    Stroke  8 yrs ago    Hypertension    High blood cholesterol    Diabetes mellitus, type 2    Dialysis patient  Mon, Wednesday, Friday ([facility])    Smoker    H/O thyroid nodule    H/O gastroesophageal reflux (GERD)    History of tonsillectomy    History of surgery  Multiple toe amputations (amp 4 1/2 left toes; has 1/2 left mid toe; amp right mid toe)    A-V fistula  left AVF      SOCIAL HISTORY:  Negative for smoking/alcohol/drug use.     ALLERGIES:  Orange (Other)  Originally Entered as [HIVES] reaction to [sulfur] (Unknown)  penicillin (Unknown)  sulfADIAZINE (Unknown)    MEDICATIONS:  STANDING MEDICATIONS  acetaminophen   Tablet .. 650 milliGRAM(s) Oral once  aspirin  chewable 81 milliGRAM(s) Oral daily  atorvastatin 20 milliGRAM(s) Oral at bedtime  chlorhexidine 0.12% Liquid 15 milliLiter(s) Oral Mucosa every 12 hours  chlorhexidine 4% Liquid 1 Application(s) Topical <User Schedule>  clonazePAM  Tablet 1 milliGRAM(s) Oral every 12 hours  collagenase Ointment 1 Application(s) Topical two times a day  Dakins Solution - 1/2 Strength 1 Application(s) Topical two times a day  dexMEDEtomidine Infusion 0.2 MICROgram(s)/kG/Hr IV Continuous <Continuous>  dextrose 40% Gel 15 Gram(s) Oral once  dextrose 5%. 1000 milliLiter(s) IV Continuous <Continuous>  dextrose 5%. 1000 milliLiter(s) IV Continuous <Continuous>  dextrose 50% Injectable 25 Gram(s) IV Push once  dextrose 50% Injectable 12.5 Gram(s) IV Push once  dextrose 50% Injectable 25 Gram(s) IV Push once  epoetin raven-epbx (RETACRIT) Injectable 8000 Unit(s) IV Push <User Schedule>  fentaNYL   Infusion 0.5 MICROgram(s)/kG/Hr IV Continuous <Continuous>  glucagon  Injectable 1 milliGRAM(s) IntraMuscular once  heparin  Infusion 1800 Unit(s)/Hr IV Continuous <Continuous>  insulin glargine Injectable (LANTUS) 20 Unit(s) SubCutaneous at bedtime  insulin lispro (ADMELOG) corrective regimen sliding scale   SubCutaneous three times a day before meals  insulin lispro Injectable (ADMELOG) 8 Unit(s) SubCutaneous three times a day before meals  levothyroxine 100 MICROGram(s) Oral daily  lidocaine 1%/epinephrine 1:100,000 Inj 40 milliLiter(s) Local Injection once  methadone    Tablet 5 milliGRAM(s) Oral every 12 hours  metoclopramide Injectable 5 milliGRAM(s) IV Push three times a day  metoprolol tartrate 12.5 milliGRAM(s) Oral two times a day  midodrine 10 milliGRAM(s) Oral every 8 hours  norepinephrine Infusion 0.05 MICROgram(s)/kG/Min IV Continuous <Continuous>  pantoprazole   Suspension 40 milliGRAM(s) Oral before breakfast  QUEtiapine 100 milliGRAM(s) Oral two times a day    PRN MEDICATIONS  acetaminophen   Tablet .. 650 milliGRAM(s) Oral every 6 hours PRN    VITALS:   T(F): 98.7  HR: 84  BP: 102/50  RR: 20  SpO2: 100%    PHYSICAL EXAM:  GEN: Trach in place, awake, NAD  LUNGS: Decreased breath sounds, mild wheezing   HEART: S1/S2 present.   ABD: Soft, non-tender, non-distended, peg present, bowel sounds present.  EXT: Edema of upper extremities and lower extremities   NEURO: on sedation, following commands     Garza catheter present     LABS:                        7.7    10.96 )-----------( 218      ( 25 Jan 2021 05:00 )             25.4     01-25    141  |  102  |  73<HH>  ----------------------------<  153<H>  5.6<H>   |  26  |  5.6<HH>    Ca    8.0<L>      25 Jan 2021 05:00  Phos  7.1     01-25  Mg     2.6     01-25    TPro  5.2<L>  /  Alb  2.8<L>  /  TBili  0.8  /  DBili  x   /  AST  24  /  ALT  13  /  AlkPhos  90  01-25    PTT - ( 25 Jan 2021 05:00 )  PTT:49.2 sec    ABG - ( 24 Jan 2021 14:53 )  pH, Arterial: 7.39  pH, Blood: x     /  pCO2: 45    /  pO2: 62    / HCO3: 27    / Base Excess: 2.1   /  SaO2: 91           SUBJECTIVE:    Patient is a 60y old Male who presents with a chief complaint of s/p fall. (25 Jan 2021 09:04)    Currently admitted to medicine with the primary diagnosis of Fever       Today is hospital day 24d. No acute overnight events. Weaning off sedation.       PAST MEDICAL & SURGICAL HISTORY  PAD (peripheral artery disease)  multiple toe amputations    Anemia  chronic anemia - s/p transfusion 2018    Thyroid cancer    Chronic leg pain    OA (osteoarthritis)    SOB (shortness of breath) on exertion    ESRD (end stage renal disease)  2 yrs    Atrial fibrillation  on warfarin    Right sided weakness    Stroke  8 yrs ago    Hypertension    High blood cholesterol    Diabetes mellitus, type 2    Dialysis patient  Mon, Wednesday, Friday ([facility])    Smoker    H/O thyroid nodule    H/O gastroesophageal reflux (GERD)    History of tonsillectomy    History of surgery  Multiple toe amputations (amp 4 1/2 left toes; has 1/2 left mid toe; amp right mid toe)    A-V fistula  left AVF      SOCIAL HISTORY:  Negative for smoking/alcohol/drug use.     ALLERGIES:  Orange (Other)  Originally Entered as [HIVES] reaction to [sulfur] (Unknown)  penicillin (Unknown)  sulfADIAZINE (Unknown)    MEDICATIONS:  STANDING MEDICATIONS  acetaminophen   Tablet .. 650 milliGRAM(s) Oral once  aspirin  chewable 81 milliGRAM(s) Oral daily  atorvastatin 20 milliGRAM(s) Oral at bedtime  chlorhexidine 0.12% Liquid 15 milliLiter(s) Oral Mucosa every 12 hours  chlorhexidine 4% Liquid 1 Application(s) Topical <User Schedule>  clonazePAM  Tablet 1 milliGRAM(s) Oral every 12 hours  collagenase Ointment 1 Application(s) Topical two times a day  Dakins Solution - 1/2 Strength 1 Application(s) Topical two times a day  dexMEDEtomidine Infusion 0.2 MICROgram(s)/kG/Hr IV Continuous <Continuous>  dextrose 40% Gel 15 Gram(s) Oral once  dextrose 5%. 1000 milliLiter(s) IV Continuous <Continuous>  dextrose 5%. 1000 milliLiter(s) IV Continuous <Continuous>  dextrose 50% Injectable 25 Gram(s) IV Push once  dextrose 50% Injectable 12.5 Gram(s) IV Push once  dextrose 50% Injectable 25 Gram(s) IV Push once  epoetin raven-epbx (RETACRIT) Injectable 8000 Unit(s) IV Push <User Schedule>  fentaNYL   Infusion 0.5 MICROgram(s)/kG/Hr IV Continuous <Continuous>  glucagon  Injectable 1 milliGRAM(s) IntraMuscular once  heparin  Infusion 1800 Unit(s)/Hr IV Continuous <Continuous>  insulin glargine Injectable (LANTUS) 20 Unit(s) SubCutaneous at bedtime  insulin lispro (ADMELOG) corrective regimen sliding scale   SubCutaneous three times a day before meals  insulin lispro Injectable (ADMELOG) 8 Unit(s) SubCutaneous three times a day before meals  levothyroxine 100 MICROGram(s) Oral daily  lidocaine 1%/epinephrine 1:100,000 Inj 40 milliLiter(s) Local Injection once  methadone    Tablet 5 milliGRAM(s) Oral every 12 hours  metoclopramide Injectable 5 milliGRAM(s) IV Push three times a day  metoprolol tartrate 12.5 milliGRAM(s) Oral two times a day  midodrine 10 milliGRAM(s) Oral every 8 hours  norepinephrine Infusion 0.05 MICROgram(s)/kG/Min IV Continuous <Continuous>  pantoprazole   Suspension 40 milliGRAM(s) Oral before breakfast  QUEtiapine 100 milliGRAM(s) Oral two times a day    PRN MEDICATIONS  acetaminophen   Tablet .. 650 milliGRAM(s) Oral every 6 hours PRN    VITALS:   T(F): 98.7  HR: 84  BP: 102/50  RR: 20  SpO2: 100%    PHYSICAL EXAM:  GEN: Trach in place, awake, NAD  LUNGS: Decreased breath sounds, mild wheezing   HEART: S1/S2 present.   ABD: Soft, non-tender, non-distended, peg present, bowel sounds present.  EXT: Edema of upper extremities and lower extremities   NEURO: on sedation, following commands     Garza catheter present     LABS:                        7.7    10.96 )-----------( 218      ( 25 Jan 2021 05:00 )             25.4     01-25    141  |  102  |  73<HH>  ----------------------------<  153<H>  5.6<H>   |  26  |  5.6<HH>    Ca    8.0<L>      25 Jan 2021 05:00  Phos  7.1     01-25  Mg     2.6     01-25    TPro  5.2<L>  /  Alb  2.8<L>  /  TBili  0.8  /  DBili  x   /  AST  24  /  ALT  13  /  AlkPhos  90  01-25    PTT - ( 25 Jan 2021 05:00 )  PTT:49.2 sec    ABG - ( 24 Jan 2021 14:53 )  pH, Arterial: 7.39  pH, Blood: x     /  pCO2: 45    /  pO2: 62    / HCO3: 27    / Base Excess: 2.1   /  SaO2: 91

## 2021-01-25 NOTE — PROGRESS NOTE ADULT - SUBJECTIVE AND OBJECTIVE BOX
OVERNIGHT EVENTS: events noted, on precedex, no major events overnight    Vital Signs Last 24 Hrs  T(C): 37.1 (25 Jan 2021 04:00), Max: 37.3 (24 Jan 2021 12:00)  T(F): 98.7 (25 Jan 2021 04:00), Max: 99.1 (24 Jan 2021 12:00)  HR: 84 (25 Jan 2021 06:00) (74 - 94)  BP: 102/50 (25 Jan 2021 06:00) (99/50 - 175/63)  BP(mean): 64 (25 Jan 2021 06:00) (64 - 128)  RR: 20 (25 Jan 2021 06:00) (18 - 32)  SpO2: 100% (25 Jan 2021 06:00) (95% - 100%)    PHYSICAL EXAMINATION:    GENERAL: ill looking     HEENT: Head is normocephalic and atraumatic.     NECK: Supple.    LUNGS: dec bs both bases    HEART: Regular rate and rhythm without murmur.    ABDOMEN: Soft, nontender, and nondistended.      EXTREMITIES: Without any cyanosis, clubbing, rash, lesions or edema.    NEUROLOGIC: unchanged    SKIN: sacral ulcer      LABS:                        7.7    10.96 )-----------( 218      ( 25 Jan 2021 05:00 )             25.4     01-25    141  |  102  |  73<HH>  ----------------------------<  153<H>  5.6<H>   |  26  |  5.6<HH>    Ca    8.0<L>      25 Jan 2021 05:00  Phos  7.1     01-25  Mg     2.6     01-25    TPro  5.2<L>  /  Alb  2.8<L>  /  TBili  0.8  /  DBili  x   /  AST  24  /  ALT  13  /  AlkPhos  90  01-25    PTT - ( 25 Jan 2021 05:00 )  PTT:49.2 sec    ABG - ( 24 Jan 2021 14:53 )  pH, Arterial: 7.39  pH, Blood: x     /  pCO2: 45    /  pO2: 62    / HCO3: 27    / Base Excess: 2.1   /  SaO2: 91          24/450/8/40%          D-Dimer Assay, Quantitative: 852 ng/mL DDU (01-25-21 @ 05:00)        Procalcitonin, Serum: 2.42 ng/mL (01-23-21 @ 04:30)        01-24-21 @ 07:01  -  01-25-21 @ 07:00  --------------------------------------------------------  IN: 2436 mL / OUT: 0 mL / NET: 2436 mL        MICROBIOLOGY:  Culture Results:   Moderate Enterococcus faecium (vancomycin resistant) (01-21 @ 19:15)      MEDICATIONS  (STANDING):  acetaminophen   Tablet .. 650 milliGRAM(s) Oral once  aspirin  chewable 81 milliGRAM(s) Oral daily  atorvastatin 20 milliGRAM(s) Oral at bedtime  chlorhexidine 0.12% Liquid 15 milliLiter(s) Oral Mucosa every 12 hours  chlorhexidine 4% Liquid 1 Application(s) Topical <User Schedule>  clonazePAM  Tablet 1 milliGRAM(s) Oral every 12 hours  collagenase Ointment 1 Application(s) Topical two times a day  Dakins Solution - 1/2 Strength 1 Application(s) Topical two times a day  dexMEDEtomidine Infusion 0.2 MICROgram(s)/kG/Hr (4.85 mL/Hr) IV Continuous <Continuous>  dextrose 40% Gel 15 Gram(s) Oral once  dextrose 5%. 1000 milliLiter(s) (50 mL/Hr) IV Continuous <Continuous>  dextrose 5%. 1000 milliLiter(s) (100 mL/Hr) IV Continuous <Continuous>  dextrose 50% Injectable 25 Gram(s) IV Push once  dextrose 50% Injectable 12.5 Gram(s) IV Push once  dextrose 50% Injectable 25 Gram(s) IV Push once  epoetin raven-epbx (RETACRIT) Injectable 8000 Unit(s) IV Push <User Schedule>  fentaNYL   Infusion 0.5 MICROgram(s)/kG/Hr (4.85 mL/Hr) IV Continuous <Continuous>  glucagon  Injectable 1 milliGRAM(s) IntraMuscular once  heparin  Infusion 1800 Unit(s)/Hr (17 mL/Hr) IV Continuous <Continuous>  insulin glargine Injectable (LANTUS) 20 Unit(s) SubCutaneous at bedtime  insulin lispro (ADMELOG) corrective regimen sliding scale   SubCutaneous three times a day before meals  insulin lispro Injectable (ADMELOG) 8 Unit(s) SubCutaneous three times a day before meals  levothyroxine 100 MICROGram(s) Oral daily  lidocaine 1%/epinephrine 1:100,000 Inj 40 milliLiter(s) Local Injection once  methadone    Tablet 5 milliGRAM(s) Oral every 12 hours  metoclopramide Injectable 5 milliGRAM(s) IV Push three times a day  metoprolol tartrate 12.5 milliGRAM(s) Oral two times a day  midodrine 10 milliGRAM(s) Oral every 8 hours  norepinephrine Infusion 0.05 MICROgram(s)/kG/Min (9.08 mL/Hr) IV Continuous <Continuous>  pantoprazole   Suspension 40 milliGRAM(s) Oral before breakfast  QUEtiapine 100 milliGRAM(s) Oral two times a day    MEDICATIONS  (PRN):  acetaminophen   Tablet .. 650 milliGRAM(s) Oral every 6 hours PRN Temp greater or equal to 38C (100.4F), Mild Pain (1 - 3)      RADIOLOGY & ADDITIONAL STUDIES:

## 2021-01-25 NOTE — PROGRESS NOTE ADULT - SUBJECTIVE AND OBJECTIVE BOX
ROGER RODRIGUES  60y, Male  Allergy: Orange (Other)  Originally Entered as [HIVES] reaction to [sulfur] (Unknown)  penicillin (Unknown)  sulfADIAZINE (Unknown)      LOS  24d    CHIEF COMPLAINT: s/p fall. 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   - T(F): , Max: 99.1 (01-24-21 @ 09:00)      - WBC Count: 10.96 (01-25-21 @ 05:00)  WBC Count: 12.31 (01-24-21 @ 04:30)     - Creatinine, Serum: 5.6 (01-25-21 @ 05:00)  Creatinine, Serum: 4.7 (01-24-21 @ 04:30)       ROS  unable to obtain history secondary to patient's mental status and/or sedation    VITALS:  T(F): 98.7, Max: 99.1 (01-24-21 @ 09:00)  HR: 84  BP: 102/50  RR: 20Vital Signs Last 24 Hrs  T(C): 37.1 (25 Jan 2021 04:00), Max: 37.3 (24 Jan 2021 09:00)  T(F): 98.7 (25 Jan 2021 04:00), Max: 99.1 (24 Jan 2021 09:00)  HR: 84 (25 Jan 2021 06:00) (74 - 94)  BP: 102/50 (25 Jan 2021 06:00) (99/50 - 175/63)  BP(mean): 64 (25 Jan 2021 06:00) (64 - 128)  RR: 20 (25 Jan 2021 06:00) (18 - 32)  SpO2: 100% (25 Jan 2021 06:00) (95% - 100%)    PHYSICAL EXAM:  Gen: vent  HEENT: Normocephalic, atraumatic  Neck: supple, no lymphadenopathy  CV: Regular rate & regular rhythm  Lungs: decreased BS at bases, no fremitus  Abdomen: Soft, BS present  Ext: Warm, well perfused  Neuro: non focal  Skin: no rash, no erythema  Lines: no phlebitis    FH: Non-contributory  Social Hx: Non-contributory    TESTS & MEASUREMENTS:                        7.7    10.96 )-----------( 218      ( 25 Jan 2021 05:00 )             25.4     01-25    141  |  102  |  73<HH>  ----------------------------<  153<H>  5.6<H>   |  26  |  5.6<HH>    Ca    8.0<L>      25 Jan 2021 05:00  Phos  7.1     01-25  Mg     2.6     01-25    TPro  5.2<L>  /  Alb  2.8<L>  /  TBili  0.8  /  DBili  x   /  AST  24  /  ALT  13  /  AlkPhos  90  01-25    eGFR if Non African American: 10 mL/min/1.73M2 (01-25-21 @ 05:00)  eGFR if African American: 12 mL/min/1.73M2 (01-25-21 @ 05:00)    LIVER FUNCTIONS - ( 25 Jan 2021 05:00 )  Alb: 2.8 g/dL / Pro: 5.2 g/dL / ALK PHOS: 90 U/L / ALT: 13 U/L / AST: 24 U/L / GGT: x               Culture - Abscess with Gram Stain (collected 01-21-21 @ 19:15)  Source: .Abscess sacrum wound  Preliminary Report (01-24-21 @ 12:30):    Moderate Enterococcus faecium (vancomycin resistant)  Organism: Enterococcus faecium (vancomycin resistant) (01-24-21 @ 12:29)  Organism: Enterococcus faecium (vancomycin resistant) (01-24-21 @ 12:29)      -  Ampicillin: R >8 Predicts results to ampicillin/sulbactam, amoxacillin-clavulanate and  piperacillin-tazobactam.      -  Daptomycin: SDD 4 The breakpoint for SDD (sensitive dose dependent)is based on a dosage regimen of 8-12 mg/kg administered every 24 h in adults and is intended for serious infections due to E. faecium. Consultation with an infectious diseases specialist is recommended.      -  Levofloxacin: R >4      -  Linezolid: S 1      -  Tetra/Doxy: S <=1      -  Vancomycin: R >16      Method Type: GUCCI    Culture - Blood (collected 01-14-21 @ 12:58)  Source: .Blood Blood  Final Report (01-20-21 @ 01:02):    No Growth Final    Culture - Blood (collected 01-11-21 @ 11:00)  Source: .Blood Blood-Venous  Final Report (01-17-21 @ 01:00):    No Growth Final    Culture - Blood (collected 01-07-21 @ 14:00)  Source: .Blood Blood-Peripheral  Final Report (01-12-21 @ 22:00):    No Growth Final    Culture - Sputum (collected 01-06-21 @ 18:30)  Source: .Sputum trache aspirate  Gram Stain (01-07-21 @ 06:09):    Moderate polymorphonuclear leukocytes per low power field    Few Squamous epithelial cells per low power field    Moderate Gram positive cocci in pairs seen per oil power field    Moderate Gram Variable Rods seen per oil power field  Final Report (01-08-21 @ 19:15):    Normal Respiratory Rosetta present    Culture - Blood (collected 01-02-21 @ 04:30)  Source: .Blood Blood  Final Report (01-07-21 @ 18:00):    No Growth Final    Culture - Blood (collected 01-01-21 @ 17:56)  Source: .Blood None  Final Report (01-07-21 @ 07:00):    No Growth Final            INFECTIOUS DISEASES TESTING  Procalcitonin, Serum: 2.42 (01-23-21 @ 04:30)  Procalcitonin, Serum: 2.95 (01-21-21 @ 04:50)  COVID-19 PCR: NotDetec (01-20-21 @ 11:50)  Procalcitonin, Serum: 2.90 (01-20-21 @ 04:50)  Procalcitonin, Serum: 3.60 (01-19-21 @ 05:00)  Procalcitonin, Serum: 5.41 (01-15-21 @ 04:30)  Procalcitonin, Serum: 3.47 (01-13-21 @ 18:55)  Fungitell: 38 (01-10-21 @ 11:21)  Procalcitonin, Serum: 4.70 (01-10-21 @ 11:21)  MRSA PCR Result.: NotDetec (01-10-21 @ 10:27)  Procalcitonin, Serum: 6.13 (01-08-21 @ 04:40)  Procalcitonin, Serum: 4.89 (01-07-21 @ 12:49)  Procalcitonin, Serum: 5.28 (01-06-21 @ 05:40)  MRSA PCR Result.: Negative (01-02-21 @ 21:32)  Procalcitonin, Serum: 4.11 (01-01-21 @ 17:56)  Rapid RVP Result: Detected (01-01-21 @ 11:00)      INFLAMMATORY MARKERS  C-Reactive Protein, Serum: 4.91 mg/dL (01-22-21 @ 05:00)  C-Reactive Protein, Serum: 6.43 mg/dL (01-20-21 @ 04:50)  C-Reactive Protein, Serum: 3.67 mg/dL (01-15-21 @ 04:30)      RADIOLOGY & ADDITIONAL TESTS:  I have personally reviewed the last available Chest xray  CXR      CT      CARDIOLOGY TESTING      MEDICATIONS  acetaminophen   Tablet .. 650 Oral once  aspirin  chewable 81 Oral daily  atorvastatin 20 Oral at bedtime  chlorhexidine 0.12% Liquid 15 Oral Mucosa every 12 hours  chlorhexidine 4% Liquid 1 Topical <User Schedule>  clonazePAM  Tablet 1 Oral every 12 hours  collagenase Ointment 1 Topical two times a day  Dakins Solution - 1/2 Strength 1 Topical two times a day  dexMEDEtomidine Infusion 0.2 IV Continuous <Continuous>  dextrose 40% Gel 15 Oral once  dextrose 5%. 1000 IV Continuous <Continuous>  dextrose 5%. 1000 IV Continuous <Continuous>  dextrose 50% Injectable 25 IV Push once  dextrose 50% Injectable 12.5 IV Push once  dextrose 50% Injectable 25 IV Push once  epoetin raven-epbx (RETACRIT) Injectable 8000 IV Push <User Schedule>  fentaNYL   Infusion 0.5 IV Continuous <Continuous>  glucagon  Injectable 1 IntraMuscular once  heparin  Infusion 1800 IV Continuous <Continuous>  insulin glargine Injectable (LANTUS) 20 SubCutaneous at bedtime  insulin lispro (ADMELOG) corrective regimen sliding scale  SubCutaneous three times a day before meals  insulin lispro Injectable (ADMELOG) 8 SubCutaneous three times a day before meals  levothyroxine 100 Oral daily  lidocaine 1%/epinephrine 1:100,000 Inj 40 Local Injection once  methadone    Tablet 5 Oral every 12 hours  metoclopramide Injectable 5 IV Push three times a day  metoprolol tartrate 12.5 Oral two times a day  midodrine 10 Oral every 8 hours  norepinephrine Infusion 0.05 IV Continuous <Continuous>  pantoprazole   Suspension 40 Oral before breakfast  QUEtiapine 100 Oral two times a day      WEIGHT  Weight (kg): 97 (01-21-21 @ 08:00)  Creatinine, Serum: 5.6 mg/dL (01-25-21 @ 05:00)      ANTIBIOTICS:      All available historical records have been reviewed       ROGER RODRIGUES  60y, Male  Allergy: Orange (Other)  Originally Entered as [HIVES] reaction to [sulfur] (Unknown)  penicillin (Unknown)  sulfADIAZINE (Unknown)      LOS  24d    CHIEF COMPLAINT: s/p fall. 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   - T(F): , Max: 99.1 (01-24-21 @ 09:00)  - WBC Count: 10.96 (01-25-21 @ 05:00)  WBC Count: 12.31 (01-24-21 @ 04:30)     - Creatinine, Serum: 5.6 (01-25-21 @ 05:00)  Creatinine, Serum: 4.7 (01-24-21 @ 04:30)       ROS  unable to obtain history secondary to patient's mental status and/or sedation    VITALS:  T(F): 98.7, Max: 99.1 (01-24-21 @ 09:00)  HR: 84  BP: 102/50  RR: 20Vital Signs Last 24 Hrs  T(C): 37.1 (25 Jan 2021 04:00), Max: 37.3 (24 Jan 2021 09:00)  T(F): 98.7 (25 Jan 2021 04:00), Max: 99.1 (24 Jan 2021 09:00)  HR: 84 (25 Jan 2021 06:00) (74 - 94)  BP: 102/50 (25 Jan 2021 06:00) (99/50 - 175/63)  BP(mean): 64 (25 Jan 2021 06:00) (64 - 128)  RR: 20 (25 Jan 2021 06:00) (18 - 32)  SpO2: 100% (25 Jan 2021 06:00) (95% - 100%)    PHYSICAL EXAM:  Gen: vent  HEENT: Normocephalic, atraumatic  Neck: supple, no lymphadenopathy  CV: Regular rate & regular rhythm  Lungs: decreased BS at bases, no fremitus  Abdomen: Soft, BS present  Ext: Warm, well perfused  Neuro: non focal  Skin: no rash, no erythema; noted sacral ulcer with necrotic tissue s/p debridement   Lines: no phlebitis    FH: Non-contributory  Social Hx: Non-contributory    TESTS & MEASUREMENTS:                        7.7    10.96 )-----------( 218      ( 25 Jan 2021 05:00 )             25.4     01-25    141  |  102  |  73<HH>  ----------------------------<  153<H>  5.6<H>   |  26  |  5.6<HH>    Ca    8.0<L>      25 Jan 2021 05:00  Phos  7.1     01-25  Mg     2.6     01-25    TPro  5.2<L>  /  Alb  2.8<L>  /  TBili  0.8  /  DBili  x   /  AST  24  /  ALT  13  /  AlkPhos  90  01-25    eGFR if Non African American: 10 mL/min/1.73M2 (01-25-21 @ 05:00)  eGFR if African American: 12 mL/min/1.73M2 (01-25-21 @ 05:00)    LIVER FUNCTIONS - ( 25 Jan 2021 05:00 )  Alb: 2.8 g/dL / Pro: 5.2 g/dL / ALK PHOS: 90 U/L / ALT: 13 U/L / AST: 24 U/L / GGT: x               Culture - Abscess with Gram Stain (collected 01-21-21 @ 19:15)  Source: .Abscess sacrum wound  Preliminary Report (01-24-21 @ 12:30):    Moderate Enterococcus faecium (vancomycin resistant)  Organism: Enterococcus faecium (vancomycin resistant) (01-24-21 @ 12:29)  Organism: Enterococcus faecium (vancomycin resistant) (01-24-21 @ 12:29)      -  Ampicillin: R >8 Predicts results to ampicillin/sulbactam, amoxacillin-clavulanate and  piperacillin-tazobactam.      -  Daptomycin: SDD 4 The breakpoint for SDD (sensitive dose dependent)is based on a dosage regimen of 8-12 mg/kg administered every 24 h in adults and is intended for serious infections due to E. faecium. Consultation with an infectious diseases specialist is recommended.      -  Levofloxacin: R >4      -  Linezolid: S 1      -  Tetra/Doxy: S <=1      -  Vancomycin: R >16      Method Type: GUCCI    Culture - Blood (collected 01-14-21 @ 12:58)  Source: .Blood Blood  Final Report (01-20-21 @ 01:02):    No Growth Final    Culture - Blood (collected 01-11-21 @ 11:00)  Source: .Blood Blood-Venous  Final Report (01-17-21 @ 01:00):    No Growth Final    Culture - Blood (collected 01-07-21 @ 14:00)  Source: .Blood Blood-Peripheral  Final Report (01-12-21 @ 22:00):    No Growth Final    Culture - Sputum (collected 01-06-21 @ 18:30)  Source: .Sputum trache aspirate  Gram Stain (01-07-21 @ 06:09):    Moderate polymorphonuclear leukocytes per low power field    Few Squamous epithelial cells per low power field    Moderate Gram positive cocci in pairs seen per oil power field    Moderate Gram Variable Rods seen per oil power field  Final Report (01-08-21 @ 19:15):    Normal Respiratory Rosetta present    Culture - Blood (collected 01-02-21 @ 04:30)  Source: .Blood Blood  Final Report (01-07-21 @ 18:00):    No Growth Final    Culture - Blood (collected 01-01-21 @ 17:56)  Source: .Blood None  Final Report (01-07-21 @ 07:00):    No Growth Final            INFECTIOUS DISEASES TESTING  Procalcitonin, Serum: 2.42 (01-23-21 @ 04:30)  Procalcitonin, Serum: 2.95 (01-21-21 @ 04:50)  COVID-19 PCR: NotDetec (01-20-21 @ 11:50)  Procalcitonin, Serum: 2.90 (01-20-21 @ 04:50)  Procalcitonin, Serum: 3.60 (01-19-21 @ 05:00)  Procalcitonin, Serum: 5.41 (01-15-21 @ 04:30)  Procalcitonin, Serum: 3.47 (01-13-21 @ 18:55)  Fungitell: 38 (01-10-21 @ 11:21)  Procalcitonin, Serum: 4.70 (01-10-21 @ 11:21)  MRSA PCR Result.: NotDetec (01-10-21 @ 10:27)  Procalcitonin, Serum: 6.13 (01-08-21 @ 04:40)  Procalcitonin, Serum: 4.89 (01-07-21 @ 12:49)  Procalcitonin, Serum: 5.28 (01-06-21 @ 05:40)  MRSA PCR Result.: Negative (01-02-21 @ 21:32)  Procalcitonin, Serum: 4.11 (01-01-21 @ 17:56)  Rapid RVP Result: Detected (01-01-21 @ 11:00)      INFLAMMATORY MARKERS  C-Reactive Protein, Serum: 4.91 mg/dL (01-22-21 @ 05:00)  C-Reactive Protein, Serum: 6.43 mg/dL (01-20-21 @ 04:50)  C-Reactive Protein, Serum: 3.67 mg/dL (01-15-21 @ 04:30)      RADIOLOGY & ADDITIONAL TESTS:  I have personally reviewed the last available Chest xray  CXR      CT      CARDIOLOGY TESTING      MEDICATIONS  acetaminophen   Tablet .. 650 Oral once  aspirin  chewable 81 Oral daily  atorvastatin 20 Oral at bedtime  chlorhexidine 0.12% Liquid 15 Oral Mucosa every 12 hours  chlorhexidine 4% Liquid 1 Topical <User Schedule>  clonazePAM  Tablet 1 Oral every 12 hours  collagenase Ointment 1 Topical two times a day  Dakins Solution - 1/2 Strength 1 Topical two times a day  dexMEDEtomidine Infusion 0.2 IV Continuous <Continuous>  dextrose 40% Gel 15 Oral once  dextrose 5%. 1000 IV Continuous <Continuous>  dextrose 5%. 1000 IV Continuous <Continuous>  dextrose 50% Injectable 25 IV Push once  dextrose 50% Injectable 12.5 IV Push once  dextrose 50% Injectable 25 IV Push once  epoetin raven-epbx (RETACRIT) Injectable 8000 IV Push <User Schedule>  fentaNYL   Infusion 0.5 IV Continuous <Continuous>  glucagon  Injectable 1 IntraMuscular once  heparin  Infusion 1800 IV Continuous <Continuous>  insulin glargine Injectable (LANTUS) 20 SubCutaneous at bedtime  insulin lispro (ADMELOG) corrective regimen sliding scale  SubCutaneous three times a day before meals  insulin lispro Injectable (ADMELOG) 8 SubCutaneous three times a day before meals  levothyroxine 100 Oral daily  lidocaine 1%/epinephrine 1:100,000 Inj 40 Local Injection once  methadone    Tablet 5 Oral every 12 hours  metoclopramide Injectable 5 IV Push three times a day  metoprolol tartrate 12.5 Oral two times a day  midodrine 10 Oral every 8 hours  norepinephrine Infusion 0.05 IV Continuous <Continuous>  pantoprazole   Suspension 40 Oral before breakfast  QUEtiapine 100 Oral two times a day      WEIGHT  Weight (kg): 97 (01-21-21 @ 08:00)  Creatinine, Serum: 5.6 mg/dL (01-25-21 @ 05:00)      ANTIBIOTICS:      All available historical records have been reviewed

## 2021-01-25 NOTE — PROGRESS NOTE ADULT - ASSESSMENT
ASSESSMENT/PLAN  - acute hypoxic resp failure  - covid 19 pneumonia  - ESRD on HD  - DM,   elevated WBC   hyperphosphatemia/ hyperkalemia    SUGGEST:  - estimated REE by PSE 2230 kcal/d and protein needs ~ 139 gm/d  - continue feeding Peptamen AF at 75 ml/h --> 137 gm protein (entirely whey source), 2160 kcal, meets recommended low n6:n3 ratio, 1500 total mg phos/d and 77 total mEq K per day  - glycemic control - insulin drip at one u/h when seen - starting to improve - and remember that current feeding Rx is LOW % carb  -   formula with high n6 content not ideal for any ARDS pt including covid (refer to CCN / ASPEN guidelines 4/1/20 and JPEN 9/20). Note that suggested formula is also low carb content.  -continue with current nutrition

## 2021-01-25 NOTE — PROGRESS NOTE ADULT - ASSESSMENT
ASSESSMENT  61 yo M w/ PMH of Afib on coumadin, DM2, ESRD on HD MWF, HLD, HTN, CVA presents with weakness & fall. Patient notes when he woke up today, he felt generally weak, slid from the bed onto the floor landing on his bottom, denies head injury/loc. Patient notes that he has been having increased sputum production for the past few days, endorses mild shortness of breath without chest pain/fever/diarrhea/vomiting. Patient had full dialysis session wednesday, notes next session is tomorrow due to the holidays. 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  Spectra [phone]    This is a preliminary incomplete pended note, all final recommendations to follow after interview and examination of the patient.   ASSESSMENT  61 yo M w/ PMH of Afib on coumadin, DM2, ESRD on HD MWF, HLD, HTN, CVA presents with weakness & fall. Patient notes when he woke up today, he felt generally weak, slid from the bed onto the floor landing on his bottom, denies head injury/loc. Patient notes that he has been having increased sputum production for the past few days, endorses mild shortness of breath without chest pain/fever/diarrhea/vomiting. Patient had full dialysis session wednesday, notes next session is tomorrow due to the holidays. Denies any focal weakness or pain currentl      IMPRESSION  #COVID-19 Pneumonia  - Unclear onset of symptoms, CXR with RLL opacity, possible super-imposed pneumonia  - intubated 1/4  - s/p plasma x 2  - s/p Toci 1/8  - D-Dimer Assay, Quantitative: 852  - CRP 4.91  - Ferritin, Serum: 1202  - Procalcitonin, Serum 2/42     #Sacral Ulcer   - s/p debridement by Burn 1/21 -- noted to have necrotic tissue  - debridement Cx 1/21 - VRE Faecium   #ESRD on HD  #DM II  #HTN  #CVA  #Obesity BMI (kg/m2): 30.7  #DM   #Abx allergy: penicillin (Unknown) - told he had allergy as a child, tolerates cefepime   sulfADIAZINE (Unknown)    RECOMMENDATIONS  - can start daptomycin 8 mg/kg q 48 hours ( please give after dialysis on HD days)  - will plan at least 7 days for soft tissue infection   - please obtain CK and weekly  - local wound care    Please call or message on [employer] Teams if with any questions.  Spectra [phone]

## 2021-01-25 NOTE — PROGRESS NOTE ADULT - SUBJECTIVE AND OBJECTIVE BOX
[facility] FOLLOW UP NOTE  --------------------------------------------------------------------------------  24 hour events/subjective: Patient examined. Trached.    PAST HISTORY  --------------------------------------------------------------------------------  No significant changes to PMH, PSH, FHx, SHx, unless otherwise noted    ALLERGIES & MEDICATIONS  --------------------------------------------------------------------------------  Allergies    Orange (Other)  Originally Entered as [HIVES] reaction to [sulfur] (Unknown)  penicillin (Unknown)  sulfADIAZINE (Unknown)    Standing Inpatient Medications  acetaminophen   Tablet .. 650 milliGRAM(s) Oral once  aspirin  chewable 81 milliGRAM(s) Oral daily  atorvastatin 20 milliGRAM(s) Oral at bedtime  chlorhexidine 0.12% Liquid 15 milliLiter(s) Oral Mucosa every 12 hours  chlorhexidine 4% Liquid 1 Application(s) Topical <User Schedule>  clonazePAM  Tablet 1 milliGRAM(s) Oral every 12 hours  collagenase Ointment 1 Application(s) Topical two times a day  Dakins Solution - 1/2 Strength 1 Application(s) Topical two times a day  dexMEDEtomidine Infusion 0.2 MICROgram(s)/kG/Hr IV Continuous <Continuous>  dextrose 40% Gel 15 Gram(s) Oral once  dextrose 5%. 1000 milliLiter(s) IV Continuous <Continuous>  dextrose 5%. 1000 milliLiter(s) IV Continuous <Continuous>  dextrose 50% Injectable 25 Gram(s) IV Push once  dextrose 50% Injectable 12.5 Gram(s) IV Push once  dextrose 50% Injectable 25 Gram(s) IV Push once  epoetin raven-epbx (RETACRIT) Injectable 8000 Unit(s) IV Push <User Schedule>  fentaNYL   Infusion 0.5 MICROgram(s)/kG/Hr IV Continuous <Continuous>  glucagon  Injectable 1 milliGRAM(s) IntraMuscular once  heparin  Infusion 1800 Unit(s)/Hr IV Continuous <Continuous>  insulin glargine Injectable (LANTUS) 20 Unit(s) SubCutaneous at bedtime  insulin lispro (ADMELOG) corrective regimen sliding scale   SubCutaneous three times a day before meals  insulin lispro Injectable (ADMELOG) 8 Unit(s) SubCutaneous three times a day before meals  levothyroxine 100 MICROGram(s) Oral daily  lidocaine 1%/epinephrine 1:100,000 Inj 40 milliLiter(s) Local Injection once  methadone    Tablet 5 milliGRAM(s) Oral every 12 hours  metoclopramide Injectable 5 milliGRAM(s) IV Push three times a day  metoprolol tartrate 12.5 milliGRAM(s) Oral two times a day  midodrine 10 milliGRAM(s) Oral every 8 hours  norepinephrine Infusion 0.05 MICROgram(s)/kG/Min IV Continuous <Continuous>  pantoprazole   Suspension 40 milliGRAM(s) Oral before breakfast  QUEtiapine 100 milliGRAM(s) Oral two times a day  sodium zirconium cyclosilicate 10 Gram(s) Oral every 8 hours    PRN Inpatient Medications  acetaminophen   Tablet .. 650 milliGRAM(s) Oral every 6 hours PRN      VITALS/PHYSICAL EXAM  --------------------------------------------------------------------------------  T(C): 37.1 (01-25-21 @ 08:00), Max: 37.1 (01-24-21 @ 16:00)  HR: 74 (01-25-21 @ 10:00) (74 - 94)  BP: 107/46 (01-25-21 @ 10:00) (92/45 - 164/68)  RR: 19 (01-25-21 @ 10:00) (18 - 32)  SpO2: 100% (01-25-21 @ 10:00) (95% - 100%)    01-24-21 @ 07:01  -  01-25-21 @ 07:00  --------------------------------------------------------  IN: 2436 mL / OUT: 0 mL / NET: 2436 mL    Physical Exam:  	Gen: Trached  	Pulm: CTA B/L  	CV: RRR, S1S2  	Abd: +BS, soft, nondistended  	: Scrotal edema  	LE: Warm,  edema  	Vascular access: AVF    LABS/STUDIES  --------------------------------------------------------------------------------              7.7    10.96 >-----------<  218      [01-25-21 @ 05:00]              25.4     141  |  102  |  73  ----------------------------<  153      [01-25-21 @ 05:00]  5.6   |  26  |  5.6        Ca     8.0     [01-25-21 @ 05:00]      Mg     2.6     [01-25-21 @ 05:00]      Phos  7.1     [01-25-21 @ 05:00]    TPro  5.2  /  Alb  2.8  /  TBili  0.8  /  DBili  x   /  AST  24  /  ALT  13  /  AlkPhos  90  [01-25-21 @ 05:00]    PTT: 49.2       [01-25-21 @ 05:00]    Creatinine Trend:  SCr 5.6 [01-25 @ 05:00]  SCr 4.7 [01-24 @ 04:30]  SCr 5.3 [01-23 @ 04:30]  SCr 5.0 [01-22 @ 05:00]  SCr 3.6 [01-21 @ 12:31]    Urinalysis - [01-06-21 @ 18:43]      Color Yellow / Appearance Slightly Turbid / SG 1.018 / pH 6.5      Gluc 100 mg/dL / Ketone Negative  / Bili Negative / Urobili <2 mg/dL       Blood Moderate / Protein 300 mg/dL / Leuk Est Small / Nitrite Negative       /  / Hyaline 114 / Gran  / Sq Epi  / Non Sq Epi 1 / Bacteria Negative      Ferritin 1202      [01-22-21 @ 04:30]  Vitamin D (25OH) 45      [01-15-21 @ 12:06]  HbA1c 7.1      [05-21-19 @ 04:30]  TSH 4.57      [01-01-21 @ 17:56]

## 2021-01-25 NOTE — PROGRESS NOTE ADULT - ASSESSMENT
ESRD (due to DM) on HD TTS  s/p Fall  altered mental status   Covid-19 PNA / bacterial PNA   fluid overload  hyponatremia  hyperkalemia  DM  HTN  afib on coumadin  CVA  NSTEMI    plan:    HD tomorrow: 3 hours, opti 200 dialyzer, 2K bath, 3L UF  Change tube feeds to Nepro  Add Lokelma  left arm precautions  covid isolation  f/u cardio / f/u pulm  icu care

## 2021-01-25 NOTE — PROGRESS NOTE ADULT - SUBJECTIVE AND OBJECTIVE BOX
Patient is a 60y old  Male who presents with a chief complaint of s/p fall. (25 Jan 2021 12:20)  pt seen and evaluated   remains vented via trach  on peg tube   swallow evaluation noted to continue NPO/w Peg tube feed  nephrology noted    ICU Vital Signs Last 24 Hrs  T(C): 36.9 (25 Jan 2021 12:00), Max: 37.1 (24 Jan 2021 16:00)  T(F): 98.5 (25 Jan 2021 12:00), Max: 98.7 (24 Jan 2021 16:00)  HR: 90 (25 Jan 2021 12:00) (74 - 94)  BP: 102/55 (25 Jan 2021 12:00) (92/45 - 164/68)  BP(mean): 81 (25 Jan 2021 12:00) (61 - 102)  RR: 20 (25 Jan 2021 12:00) (18 - 32)  SpO2: 100% (25 Jan 2021 12:00) (95% - 100%)      Drug Dosing Weight  Height (cm): 177.8 (21 Jan 2021 08:00)  Weight (kg): 97 (21 Jan 2021 08:00)  BMI (kg/m2): 30.7 (21 Jan 2021 08:00)  BSA (m2): 2.15 (21 Jan 2021 08:00)    I&O's Detail    24 Jan 2021 07:01  -  25 Jan 2021 07:00  --------------------------------------------------------  IN:    Dexmedetomidine: 343 mL    Heparin: 368 mL    Peptamen A.F.: 1725 mL  Total IN: 2436 mL    OUT:  Total OUT: 0 mL    Total NET: 2436 mL     PHYSICAL EXAM:  Constitutional:  remains  vented via trach  Gastrointestinal:  soft +peg tube feed  MEDICATIONS  (STANDING):  acetaminophen   Tablet .. 650 milliGRAM(s) Oral once  aspirin  chewable 81 milliGRAM(s) Oral daily  atorvastatin 20 milliGRAM(s) Oral at bedtime  chlorhexidine 0.12% Liquid 15 milliLiter(s) Oral Mucosa every 12 hours  chlorhexidine 4% Liquid 1 Application(s) Topical <User Schedule>  clonazePAM  Tablet 1 milliGRAM(s) Oral every 12 hours  collagenase Ointment 1 Application(s) Topical two times a day  Dakins Solution - 1/2 Strength 1 Application(s) Topical two times a day  dexMEDEtomidine Infusion 0.2 MICROgram(s)/kG/Hr (4.85 mL/Hr) IV Continuous <Continuous>  dextrose 40% Gel 15 Gram(s) Oral once  dextrose 5%. 1000 milliLiter(s) (50 mL/Hr) IV Continuous <Continuous>  dextrose 5%. 1000 milliLiter(s) (100 mL/Hr) IV Continuous <Continuous>  dextrose 50% Injectable 25 Gram(s) IV Push once  dextrose 50% Injectable 12.5 Gram(s) IV Push once  dextrose 50% Injectable 25 Gram(s) IV Push once  epoetin raven-epbx (RETACRIT) Injectable 8000 Unit(s) IV Push <User Schedule>  fentaNYL   Infusion 0.5 MICROgram(s)/kG/Hr (4.85 mL/Hr) IV Continuous <Continuous>  glucagon  Injectable 1 milliGRAM(s) IntraMuscular once  heparin  Infusion 1800 Unit(s)/Hr (17 mL/Hr) IV Continuous <Continuous>  insulin glargine Injectable (LANTUS) 20 Unit(s) SubCutaneous at bedtime  insulin lispro (ADMELOG) corrective regimen sliding scale   SubCutaneous three times a day before meals  insulin lispro Injectable (ADMELOG) 8 Unit(s) SubCutaneous three times a day before meals  levothyroxine 100 MICROGram(s) Oral daily  lidocaine 1%/epinephrine 1:100,000 Inj 40 milliLiter(s) Local Injection once  methadone    Tablet 5 milliGRAM(s) Oral every 12 hours  metoclopramide Injectable 5 milliGRAM(s) IV Push three times a day  metoprolol tartrate 12.5 milliGRAM(s) Oral two times a day  midodrine 10 milliGRAM(s) Oral every 8 hours  norepinephrine Infusion 0.05 MICROgram(s)/kG/Min (9.08 mL/Hr) IV Continuous <Continuous>  pantoprazole   Suspension 40 milliGRAM(s) Oral before breakfast  polyethylene glycol 3350 17 Gram(s) Oral daily  QUEtiapine 100 milliGRAM(s) Oral two times a day  senna 2 Tablet(s) Oral at bedtime  sodium zirconium cyclosilicate 10 Gram(s) Oral every 8 hours  warfarin 5 milliGRAM(s) Oral once      Diet, NPO with Tube Feed:   Tube Feeding Modality: Gastrostomy  Nepro with Carb Steady  Total Volume for 24 Hours (mL): 1800  Continuous  Until Goal Tube Feed Rate (mL per Hour): 75  Tube Feed Duration (in Hours): 24  Tube Feed Start Time: 13:15 (01-25-21 @ 12:57)      LABS  01-25    141  |  102  |  73<HH>  ----------------------------<  153<H>  5.6<H>   |  26  |  5.6<HH>    Ca    8.0<L>      25 Jan 2021 05:00  Phos  7.1     01-25  Mg     2.6     01-25    TPro  5.2<L>  /  Alb  2.8<L>  /  TBili  0.8  /  DBili  x   /  AST  24  /  ALT  13  /  AlkPhos  90  01-25                          7.7    10.96 )-----------( 218      ( 25 Jan 2021 05:00 )             25.4     CAPILLARY BLOOD GLUCOSE  POCT Blood Glucose.: 140 mg/dL (25 Jan 2021 11:25)  POCT Blood Glucose.: 186 mg/dL (25 Jan 2021 07:46)  POCT Blood Glucose.: 85 mg/dL (24 Jan 2021 20:39)  POCT Blood Glucose.: 100 mg/dL (24 Jan 2021 16:43)   RADIOLOGY STUDIES  < from: Xray Chest 1 View- PORTABLE-Routine (Xray Chest 1 View- PORTABLE-Routine in AM.) (01.24.21 @ 07:49) >  Impression:  Stable bilateral opacities.  Stable support devices.

## 2021-01-25 NOTE — PROGRESS NOTE ADULT - ASSESSMENT
61 yo male with PMHx of afib on coumadin, DM2, ESRD on HD MWF, HLD, HTN, CVA, who presented w/ weakness and fall.     #Acute hypoxemic respiratory failure   #Covid-19 PNA  #Superimposed bacterial PNA   - s/p intubation -> trach  - per surgery, trach sutures can be removed by primary team on 1/25  - s/p Azithromycin discontinued on 1/8/2021, s/p Cefepime discontinued on 1/11/2021  - s/p Dexamethasone 6mg IV once daily (1/1/2021) x10 days  - Covid antibodies: reported negative --> s/p 2 units of Convalescent plasma ( 1/8/2021 and 1/12/2021)  - f/u inflammatory markers   - s/p tocilizumab 400mg IV x1 on 1/8/2021  - d/c lucinda and vanc   - wean off sedation w/ klonopin and pain control Methadone     #Leukocytosis  #Sacral ulcer, heel ulcers   - Multifactorial (Possible overlying infection, ulcers, steroids)   - diarrhea resolved   - s/p debridement w/ burn 1/21/21  - wound culture- moderate e. faecium  - ID following, recs:   - can start daptomycin 8 mg/kg q 48 hours ( please give after dialysis on HD days)  - please obtain CK and weekly  - local wound care  - wound care per burn     #?Ileus- resolved   - s/p PEG 1/18  - xray 10/19 w/ gasseous distention of stomach  - PEG was connected to suction, feeds were held  - Xray 10/21 w/o evidence of obstruction, restarted feeds    #Diarrhea  - watery   - holding bowel regimen   - monitor     #Upper extremity edema R>L  - arterial and venous duplex- negative   - loosened restraints    #Acute on chronic anemia   - s/p 1u pRBC 1/19  - monitor      #Hyperkalemia  #Hypokalemia   - restart Lokelma 10 q8h   - c/w midodrine 10 q8h   - on HD   - monitor     #ESRD on HD   - EPO (retacrit) 8000 units IV push once weekly  - nephro following, f/u reccs    #Paroxysmal atrial fibrillation  - on Heparin gtt, holding home coumadin, follow up aPTT, transition to coumadin  - Metoprolol tartrate 12.5mg po q12hrs    - dc levo, started on midodrine   - CT brain no IC bleed or concerns of IC bleed     #NSTEMI  - Type II MI, demand ischemia, likely secondary to hypoxia secondary to COVID-19 pneumonia  - Aspirin 81mg po once daily   - CT brain no IC bleed or concerns of IC bleed   - On heparin gtt (for atrial fibrillation)  - Repeat CK-MB and CPK were stable    #Hypothyroidism: Continue with levothyroxine 100mcg po once daily     #Altered Mental Status- improved  - EEG diffuse slowing, but no seizure activity, check the report above  - CT brain shows ventriculomegaly (check full report), no IC bleed, possible old infarct.  - neurology consulted and recs:   a. CT brain negative x2 for stroke or other structural pathology.  EEG negative for any epileptiform activity.  Suspect multifactorial metabolic encephalopathy in setting of COVID and ESRD and sedatives.  Wean sedation as able, can reassess if patient does not awaken after that time.  b. No further EEG or other tests   c. To be reassessed after sedation is weaned    Diet: PEG feeds   DVT ppx: on Heparin gtt, transition to Coumadin for afib   GI ppx: Protonix 40 qd  61 yo male with PMHx of afib on coumadin, DM2, ESRD on HD MWF, HLD, HTN, CVA, who presented w/ weakness and fall.     #Acute hypoxemic respiratory failure   #Covid-19 PNA  #Superimposed bacterial PNA   - s/p intubation -> trach  - per surgery, trach sutures can be removed by primary team on 1/25  - s/p Azithromycin discontinued on 1/8/2021, s/p Cefepime discontinued on 1/11/2021  - s/p Dexamethasone 6mg IV once daily (1/1/2021) x10 days  - Covid antibodies: reported negative --> s/p 2 units of Convalescent plasma ( 1/8/2021 and 1/12/2021)  - f/u inflammatory markers   - s/p tocilizumab 400mg IV x1 on 1/8/2021  - d/c lucinda and vanc   - wean off sedation w/ klonopin and pain control Methadone     #Leukocytosis  #Sacral ulcer, heel ulcers   - Multifactorial (Possible overlying infection, ulcers, steroids)   - diarrhea resolved   - s/p debridement w/ burn 1/21/21  - wound culture- moderate e. faecium  - ID following, recs:   - can start daptomycin 8 mg/kg q 48 hours ( please give after dialysis on HD days)  - please obtain CK and weekly  - local wound care  - wound care per burn     #?Ileus- resolved   - s/p PEG 1/18  - xray 10/19 w/ gasseous distention of stomach  - PEG was connected to suction, feeds were held  - Xray 10/21 w/o evidence of obstruction, restarted feeds    #Diarrhea  - watery   - holding bowel regimen   - monitor     #Upper extremity edema R>L  - arterial and venous duplex- negative   - loosened restraints    #Acute on chronic anemia   - s/p 1u pRBC 1/19  - monitor      #Hyperkalemia  #Hypokalemia   - restart Lokelma 10 q8h   - c/w midodrine 10 q8h   - on HD   - monitor     #ESRD on HD   - EPO (retacrit) 8000 units IV push once weekly  - nephro following, f/u reccs    #Paroxysmal atrial fibrillation  - on Heparin gtt, holding home coumadin, follow up aPTT, transition to coumadin  - Metoprolol tartrate 12.5mg po q12hrs    - dc levo, started on midodrine   - CT brain no IC bleed or concerns of IC bleed     #NSTEMI  - Type II MI, demand ischemia, likely secondary to hypoxia secondary to COVID-19 pneumonia  - Aspirin 81mg po once daily   - CT brain no IC bleed or concerns of IC bleed   - On heparin gtt (for atrial fibrillation)  - Repeat CK-MB and CPK were stable    #Hypothyroidism: Continue with levothyroxine 100mcg po once daily     #Altered Mental Status- improved  - EEG diffuse slowing, but no seizure activity, check the report above  - CT brain shows ventriculomegaly (check full report), no IC bleed, possible old infarct.  - neurology consulted and recs:   a. CT brain negative x2 for stroke or other structural pathology.  EEG negative for any epileptiform activity.  Suspect multifactorial metabolic encephalopathy in setting of COVID and ESRD and sedatives.  Wean sedation as able, can reassess if patient does not awaken after that time.  b. No further EEG or other tests   c. To be reassessed after sedation is weaned    Diet: PEG feeds   DVT ppx: on Heparin gtt, transition to Coumadin for afib   GI ppx: Protonix 40 qd     Spoke with and updated patient's sister, Nedra.

## 2021-01-26 NOTE — PROGRESS NOTE ADULT - SUBJECTIVE AND OBJECTIVE BOX
SUBJECTIVE:    Patient is a 60y old Male who presents with a chief complaint of s/p fall. (26 Jan 2021 08:53)    Currently admitted to medicine with the primary diagnosis of Fever    Today is hospital day 25d. Yesterday heparin gtt was held for bleeding around peg, coumadin held, ptnt evaluated by surgery.     PAST MEDICAL & SURGICAL HISTORY  PAD (peripheral artery disease)  multiple toe amputations    Anemia  chronic anemia - s/p transfusion 2018    Thyroid cancer    Chronic leg pain    OA (osteoarthritis)    SOB (shortness of breath) on exertion    ESRD (end stage renal disease)  2 yrs    Atrial fibrillation  on warfarin    Right sided weakness    Stroke  8 yrs ago    Hypertension    High blood cholesterol    Diabetes mellitus, type 2    Dialysis patient  Mon, Wednesday, Friday ([facility])    Smoker    H/O thyroid nodule    H/O gastroesophageal reflux (GERD)    History of tonsillectomy    History of surgery  Multiple toe amputations (amp 4 1/2 left toes; has 1/2 left mid toe; amp right mid toe)    A-V fistula  left AVF      SOCIAL HISTORY:  Negative for smoking/alcohol/drug use.     ALLERGIES:  Orange (Other)  Originally Entered as [HIVES] reaction to [sulfur] (Unknown)  penicillin (Unknown)  sulfADIAZINE (Unknown)    MEDICATIONS:  STANDING MEDICATIONS  acetaminophen   Tablet .. 650 milliGRAM(s) Oral once  aspirin  chewable 81 milliGRAM(s) Oral daily  atorvastatin 20 milliGRAM(s) Oral at bedtime  chlorhexidine 0.12% Liquid 15 milliLiter(s) Oral Mucosa every 12 hours  chlorhexidine 4% Liquid 1 Application(s) Topical <User Schedule>  clonazePAM  Tablet 1 milliGRAM(s) Oral every 12 hours  collagenase Ointment 1 Application(s) Topical two times a day  Dakins Solution - 1/2 Strength 1 Application(s) Topical two times a day  DAPTOmycin IVPB 775 milliGRAM(s) IV Intermittent every 48 hours  dexMEDEtomidine Infusion 0.2 MICROgram(s)/kG/Hr IV Continuous <Continuous>  dextrose 40% Gel 15 Gram(s) Oral once  dextrose 5%. 1000 milliLiter(s) IV Continuous <Continuous>  dextrose 5%. 1000 milliLiter(s) IV Continuous <Continuous>  dextrose 50% Injectable 25 Gram(s) IV Push once  dextrose 50% Injectable 12.5 Gram(s) IV Push once  dextrose 50% Injectable 25 Gram(s) IV Push once  epoetin raven-epbx (RETACRIT) Injectable 8000 Unit(s) IV Push <User Schedule>  fentaNYL   Infusion 0.5 MICROgram(s)/kG/Hr IV Continuous <Continuous>  glucagon  Injectable 1 milliGRAM(s) IntraMuscular once  heparin  Infusion 1800 Unit(s)/Hr IV Continuous <Continuous>  insulin glargine Injectable (LANTUS) 20 Unit(s) SubCutaneous at bedtime  insulin lispro (ADMELOG) corrective regimen sliding scale   SubCutaneous three times a day before meals  insulin lispro Injectable (ADMELOG) 8 Unit(s) SubCutaneous three times a day before meals  levothyroxine 100 MICROGram(s) Oral daily  lidocaine 1%/epinephrine 1:100,000 Inj 40 milliLiter(s) Local Injection once  methadone    Tablet 5 milliGRAM(s) Oral daily  metoclopramide Injectable 5 milliGRAM(s) IV Push three times a day  metoprolol tartrate 12.5 milliGRAM(s) Oral two times a day  midodrine 10 milliGRAM(s) Oral every 8 hours  norepinephrine Infusion 0.05 MICROgram(s)/kG/Min IV Continuous <Continuous>  pantoprazole   Suspension 40 milliGRAM(s) Oral before breakfast  polyethylene glycol 3350 17 Gram(s) Oral daily  QUEtiapine 100 milliGRAM(s) Oral two times a day  senna 2 Tablet(s) Oral at bedtime    PRN MEDICATIONS  acetaminophen   Tablet .. 650 milliGRAM(s) Oral every 6 hours PRN    VITALS:   T(F): 97.2  HR: 100  BP: 126/86  RR: 21  SpO2: 100%    PHYSICAL EXAM:  GEN: awake, NAD  LUNGS: Decreased breath sounds, mild wheezing   HEART: S1/S2 present.   ABD: Soft, non-tender, peg present, bowel sounds present.  EXT: Edema of upper extremities and lower extremities   NEURO: following commands     Garza catheter present       LABS:                        7.2    13.07 )-----------( 239      ( 26 Jan 2021 04:30 )             23.3     01-26    140  |  100  |  93<HH>  ----------------------------<  112<H>  5.4<H>   |  24  |  6.1<HH>    Ca    8.0<L>      26 Jan 2021 04:30  Phos  7.1     01-25  Mg     2.6     01-26    TPro  5.0<L>  /  Alb  2.6<L>  /  TBili  0.8  /  DBili  x   /  AST  27  /  ALT  13  /  AlkPhos  88  01-26    PT/INR - ( 26 Jan 2021 04:30 )   PT: 13.10 sec;   INR: 1.14 ratio         PTT - ( 25 Jan 2021 20:45 )  PTT:36.0 sec    ABG - ( 24 Jan 2021 14:53 )  pH, Arterial: 7.39  pH, Blood: x     /  pCO2: 45    /  pO2: 62    / HCO3: 27    / Base Excess: 2.1   /  SaO2: 91          Creatine Kinase, Serum: 66 U/L (01-26-21 @ 04:30)      CARDIAC MARKERS ( 26 Jan 2021 04:30 )  x     / x     / 66 U/L / x     / x          RADIOLOGY:

## 2021-01-26 NOTE — PROGRESS NOTE ADULT - ASSESSMENT
ESRD (due to DM) on HD TTS  s/p Fall  altered mental status   Covid-19 PNA / bacterial PNA   fluid overload  hyponatremia  hyperkalemia  DM  HTN  afib on coumadin  CVA  NSTEMI    plan:    HD today: 3 hours, opti 200 dialyzer, 2K bath, 3L UF  left arm precautions  covid isolation  f/u cardio / f/u pulm  icu care

## 2021-01-26 NOTE — PROGRESS NOTE ADULT - SUBJECTIVE AND OBJECTIVE BOX
OVERNIGHT EVENTS: events noted, bleeding around PEG, no sedation, was on heparin    Vital Signs Last 24 Hrs  T(C): 36.2 (26 Jan 2021 08:00), Max: 37.7 (25 Jan 2021 16:00)  T(F): 97.2 (26 Jan 2021 08:00), Max: 99.9 (25 Jan 2021 16:00)  HR: 104 (26 Jan 2021 08:00) (68 - 113)  BP: 116/64 (26 Jan 2021 08:00) (87/46 - 175/59)  BP(mean): 80 (26 Jan 2021 08:00) (62 - 118)  RR: 25 (26 Jan 2021 08:00) (13 - 31)  SpO2: 100% (26 Jan 2021 08:00) (95% - 100%)    PHYSICAL EXAMINATION:    GENERAL: ill looking    HEENT: Head is normocephalic and atraumatic.     NECK: Supple.    LUNGS: bl crackles    HEART:  CHEL 36    ABDOMEN: Soft, nontender, and nondistended.      EXTREMITIES: Without any cyanosis, clubbing, rash, lesions or edema.    NEUROLOGIC: non focal    SKIN: ulcer      LABS:                        7.2    13.07 )-----------( 239      ( 26 Jan 2021 04:30 )             23.3     01-26    140  |  100  |  93<HH>  ----------------------------<  112<H>  5.4<H>   |  24  |  6.1<HH>    Ca    8.0<L>      26 Jan 2021 04:30  Phos  7.1     01-25  Mg     2.6     01-26    TPro  5.0<L>  /  Alb  2.6<L>  /  TBili  0.8  /  DBili  x   /  AST  27  /  ALT  13  /  AlkPhos  88  01-26    PT/INR - ( 26 Jan 2021 04:30 )   PT: 13.10 sec;   INR: 1.14 ratio         PTT - ( 25 Jan 2021 20:45 )  PTT:36.0 sec    ABG - ( 24 Jan 2021 14:53 )  pH, Arterial: 7.39  pH, Blood: x     /  pCO2: 45    /  pO2: 62    / HCO3: 27    / Base Excess: 2.1   /  SaO2: 91          450/24/40/8      CARDIAC MARKERS ( 26 Jan 2021 04:30 )  x     / x     / 66 U/L / x     / x                Procalcitonin, Serum: 1.98 ng/mL (01-25-21 @ 05:00)        01-25-21 @ 07:01  -  01-26-21 @ 07:00  --------------------------------------------------------  IN: 1794.4 mL / OUT: 0 mL / NET: 1794.4 mL        MICROBIOLOGY:      MEDICATIONS  (STANDING):  acetaminophen   Tablet .. 650 milliGRAM(s) Oral once  aspirin  chewable 81 milliGRAM(s) Oral daily  atorvastatin 20 milliGRAM(s) Oral at bedtime  chlorhexidine 0.12% Liquid 15 milliLiter(s) Oral Mucosa every 12 hours  chlorhexidine 4% Liquid 1 Application(s) Topical <User Schedule>  clonazePAM  Tablet 1 milliGRAM(s) Oral every 12 hours  collagenase Ointment 1 Application(s) Topical two times a day  Dakins Solution - 1/2 Strength 1 Application(s) Topical two times a day  dexMEDEtomidine Infusion 0.2 MICROgram(s)/kG/Hr (4.85 mL/Hr) IV Continuous <Continuous>  dextrose 40% Gel 15 Gram(s) Oral once  dextrose 5%. 1000 milliLiter(s) (50 mL/Hr) IV Continuous <Continuous>  dextrose 5%. 1000 milliLiter(s) (100 mL/Hr) IV Continuous <Continuous>  dextrose 50% Injectable 25 Gram(s) IV Push once  dextrose 50% Injectable 12.5 Gram(s) IV Push once  dextrose 50% Injectable 25 Gram(s) IV Push once  epoetin raven-epbx (RETACRIT) Injectable 8000 Unit(s) IV Push <User Schedule>  fentaNYL   Infusion 0.5 MICROgram(s)/kG/Hr (4.85 mL/Hr) IV Continuous <Continuous>  glucagon  Injectable 1 milliGRAM(s) IntraMuscular once  heparin  Infusion 1800 Unit(s)/Hr (17 mL/Hr) IV Continuous <Continuous>  insulin glargine Injectable (LANTUS) 20 Unit(s) SubCutaneous at bedtime  insulin lispro (ADMELOG) corrective regimen sliding scale   SubCutaneous three times a day before meals  insulin lispro Injectable (ADMELOG) 8 Unit(s) SubCutaneous three times a day before meals  levothyroxine 100 MICROGram(s) Oral daily  lidocaine 1%/epinephrine 1:100,000 Inj 40 milliLiter(s) Local Injection once  methadone    Tablet 5 milliGRAM(s) Oral every 12 hours  metoclopramide Injectable 5 milliGRAM(s) IV Push three times a day  metoprolol tartrate 12.5 milliGRAM(s) Oral two times a day  midodrine 10 milliGRAM(s) Oral every 8 hours  norepinephrine Infusion 0.05 MICROgram(s)/kG/Min (9.08 mL/Hr) IV Continuous <Continuous>  pantoprazole   Suspension 40 milliGRAM(s) Oral before breakfast  polyethylene glycol 3350 17 Gram(s) Oral daily  QUEtiapine 100 milliGRAM(s) Oral two times a day  senna 2 Tablet(s) Oral at bedtime    MEDICATIONS  (PRN):  acetaminophen   Tablet .. 650 milliGRAM(s) Oral every 6 hours PRN Temp greater or equal to 38C (100.4F), Mild Pain (1 - 3)      RADIOLOGY & ADDITIONAL STUDIES:

## 2021-01-26 NOTE — PROGRESS NOTE ADULT - ASSESSMENT
61 yo male with PMHx of afib on coumadin, DM2, ESRD on HD MWF, HLD, HTN, CVA, who presented w/ weakness and fall.     #Acute hypoxemic respiratory failure   #Covid-19 PNA  #Superimposed bacterial PNA   - s/p intubation -> trach  - per surgery, trach sutures can be removed by primary team on 1/25  - s/p Azithromycin discontinued on 1/8/2021, s/p Cefepime discontinued on 1/11/2021  - s/p Dexamethasone 6mg IV once daily (1/1/2021) x10 days  - Covid antibodies: negative --> s/p 2 units of Convalescent plasma ( 1/8/2021 and 1/12/2021)  - f/u inflammatory markers   - s/p tocilizumab 400mg IV x1 on 1/8/2021  - d/c lucinda and vanc   - weaned off sedation w/ klonopin and pain control Methadone, decrease methadone to qd     #Leukocytosis  #Sacral ulcer, heel ulcers   - Multifactorial (Possible overlying infection, ulcers, steroids)   - diarrhea resolved   - s/p debridement w/ burn 1/21/21  - wound culture- moderate e. faecium  - ID following, recs:   - can start daptomycin 8 mg/kg q 48 hours ( please give after dialysis on HD days)  - please obtain CK and weekly  - local wound care  - wound care per burn     #?Ileus- resolved   - s/p PEG 1/18  - xray 10/19 w/ gasseous distention of stomach  - PEG was connected to suction, feeds were held  - Xray 10/21 w/o evidence of obstruction, restarted feeds    #Diarrhea- resolved     #Constipation   - started on senna and miralax     #Upper extremity edema R>L  - arterial and venous duplex- negative   - loosened restraints    #Acute on chronic anemia   - s/p 1u pRBC 1/19  - monitor      #Hyperkalemia  #Hypokalemia   - restart Lokelma 10 q8h   - c/w midodrine 10 q8h   - on HD   - monitor     #ESRD on HD   - EPO (retacrit) 8000 units IV push once weekly  - nephro following, f/u reccs    #Paroxysmal atrial fibrillation  - holding Heparin gtt, holding home coumadin,  transition to coumadin tonight  - Metoprolol tartrate 12.5mg po q12hrs    - dc levo, started on midodrine   - CT brain no IC bleed or concerns of IC bleed     #NSTEMI  - Type II MI, demand ischemia, likely secondary to hypoxia secondary to COVID-19 pneumonia  - Aspirin 81mg po once daily   - CT brain no IC bleed or concerns of IC bleed   - On heparin gtt (for atrial fibrillation)  - Repeat CK-MB and CPK were stable    #Hypothyroidism: Continue with levothyroxine 100mcg po once daily     #Altered Mental Status- improved  - EEG diffuse slowing, but no seizure activity, check the report above  - CT brain shows ventriculomegaly (check full report), no IC bleed, possible old infarct.  - neurology consulted and recs:   a. CT brain negative x2 for stroke or other structural pathology.  EEG negative for any epileptiform activity.  Suspect multifactorial metabolic encephalopathy in setting of COVID and ESRD and sedatives.  Wean sedation as able, can reassess if patient does not awaken after that time.  b. No further EEG or other tests   c. To be reassessed after sedation is weaned    Diet: PEG feeds   DVT ppx: holding Heparin gtt, transition to Coumadin for afib   GI ppx: Protonix 40 qd

## 2021-01-26 NOTE — PROGRESS NOTE ADULT - ASSESSMENT
IMPRESSION:    ESRD MWF for HD  Acute hypoxemic resp failure failed weaning SP trach and PEG PS 12 H  NSTEMI  Afib was on coumadin  Severe COVID PNA  Superimposed bacteria PNA  Sacral ulcer SP debridement   bleeding arounD peg    PLAN:    CNS:  Seroquel. DEC METHADONE    HEENT: Oral care.    PULMONARY: Vent changes. Wean O2.  Monitor PPL and DP.  ps trial    CARDIOVASCULAR:  I<O as tolerated.      GI: GI prophylaxis.  PEG Feeding.  Reglan .      RENAL: check lytes and replete PRN. Nephro F/UP result.      INFECTIOUS DISEASE:  FU cultures. ABX PER ID    HEMATOLOGICAL: DVT Prophylaxis.  FU PTT , coumadin    ENDOCRINE:  Follow up FS.  Insulin protocol if needed.    MUSCULOSKELETAL:  bed rest.  Wound Care      MICU     Prognosis poor

## 2021-01-26 NOTE — CHART NOTE - NSCHARTNOTEFT_GEN_A_CORE
Spoke with patient's sister, Nedra Shea, [phone], provided with updates. Answered all questions. Sister would like to speak with social work regarding placement and would like to facetime with patient today.

## 2021-01-26 NOTE — PROGRESS NOTE ADULT - SUBJECTIVE AND OBJECTIVE BOX
[facility] NEPHROLOGY FOLLOW UP NOTE  --------------------------------------------------------------------------------  24 hour events/subjective: Patient examined. Trached.    PAST HISTORY  --------------------------------------------------------------------------------  No significant changes to PMH, PSH, FHx, SHx, unless otherwise noted    ALLERGIES & MEDICATIONS  --------------------------------------------------------------------------------  Allergies    Orange (Other)  Originally Entered as [HIVES] reaction to [sulfur] (Unknown)  penicillin (Unknown)  sulfADIAZINE (Unknown)    Intolerances      Standing Inpatient Medications  acetaminophen   Tablet .. 650 milliGRAM(s) Oral once  aspirin  chewable 81 milliGRAM(s) Oral daily  atorvastatin 20 milliGRAM(s) Oral at bedtime  chlorhexidine 0.12% Liquid 15 milliLiter(s) Oral Mucosa every 12 hours  chlorhexidine 4% Liquid 1 Application(s) Topical <User Schedule>  clonazePAM  Tablet 1 milliGRAM(s) Oral every 12 hours  collagenase Ointment 1 Application(s) Topical two times a day  Dakins Solution - 1/2 Strength 1 Application(s) Topical two times a day  DAPTOmycin IVPB 775 milliGRAM(s) IV Intermittent every 48 hours  dexMEDEtomidine Infusion 0.2 MICROgram(s)/kG/Hr IV Continuous <Continuous>  dextrose 40% Gel 15 Gram(s) Oral once  dextrose 5%. 1000 milliLiter(s) IV Continuous <Continuous>  dextrose 5%. 1000 milliLiter(s) IV Continuous <Continuous>  dextrose 50% Injectable 25 Gram(s) IV Push once  dextrose 50% Injectable 12.5 Gram(s) IV Push once  dextrose 50% Injectable 25 Gram(s) IV Push once  epoetin raven-epbx (RETACRIT) Injectable 8000 Unit(s) IV Push <User Schedule>  fentaNYL   Infusion 0.5 MICROgram(s)/kG/Hr IV Continuous <Continuous>  glucagon  Injectable 1 milliGRAM(s) IntraMuscular once  heparin  Infusion 1800 Unit(s)/Hr IV Continuous <Continuous>  insulin glargine Injectable (LANTUS) 20 Unit(s) SubCutaneous at bedtime  insulin lispro (ADMELOG) corrective regimen sliding scale   SubCutaneous three times a day before meals  insulin lispro Injectable (ADMELOG) 8 Unit(s) SubCutaneous three times a day before meals  lactulose Syrup 10 Gram(s) Oral every 12 hours  levothyroxine 100 MICROGram(s) Oral daily  lidocaine 1%/epinephrine 1:100,000 Inj 40 milliLiter(s) Local Injection once  methadone    Tablet 5 milliGRAM(s) Oral daily  metoclopramide Injectable 5 milliGRAM(s) IV Push three times a day  metoprolol tartrate 12.5 milliGRAM(s) Oral two times a day  midodrine 10 milliGRAM(s) Oral every 8 hours  norepinephrine Infusion 0.05 MICROgram(s)/kG/Min IV Continuous <Continuous>  pantoprazole   Suspension 40 milliGRAM(s) Oral before breakfast  polyethylene glycol 3350 17 Gram(s) Oral daily  QUEtiapine 100 milliGRAM(s) Oral two times a day  senna 2 Tablet(s) Oral at bedtime  warfarin 5 milliGRAM(s) Oral once    PRN Inpatient Medications  acetaminophen   Tablet .. 650 milliGRAM(s) Oral every 6 hours PRN      VITALS/PHYSICAL EXAM  --------------------------------------------------------------------------------  T(C): 36.2 (01-26-21 @ 08:00), Max: 37.7 (01-25-21 @ 16:00)  HR: 100 (01-26-21 @ 10:00) (68 - 113)  BP: 126/86 (01-26-21 @ 10:00) (87/46 - 175/59)  RR: 21 (01-26-21 @ 10:00) (13 - 31)  SpO2: 100% (01-26-21 @ 10:00) (95% - 100%)  Wt(kg): --        01-25-21 @ 07:01  -  01-26-21 @ 07:00  --------------------------------------------------------  IN: 1794.4 mL / OUT: 0 mL / NET: 1794.4 mL      Physical Exam:  	Gen: Trached  	Pulm: CTA B/L  	CV: RRR, S1S2  	Abd: +BS, soft, nondistended  	: Scrotal edema  	LE: Warm,  edema  	Vascular access: AVF    LABS/STUDIES  --------------------------------------------------------------------------------              7.2    13.07 >-----------<  239      [01-26-21 @ 04:30]              23.3     140  |  100  |  93  ----------------------------<  112      [01-26-21 @ 04:30]  5.4   |  24  |  6.1        Ca     8.0     [01-26-21 @ 04:30]      Mg     2.6     [01-26-21 @ 04:30]      Phos  7.1     [01-25-21 @ 05:00]    TPro  5.0  /  Alb  2.6  /  TBili  0.8  /  DBili  x   /  AST  27  /  ALT  13  /  AlkPhos  88  [01-26-21 @ 04:30]    PT/INR: PT 13.10, INR 1.14       [01-26-21 @ 04:30]  PTT: 36.0       [01-25-21 @ 20:45]    CK 66      [01-26-21 @ 04:30]    Creatinine Trend:  SCr 6.1 [01-26 @ 04:30]  SCr 5.6 [01-25 @ 05:00]  SCr 4.7 [01-24 @ 04:30]  SCr 5.3 [01-23 @ 04:30]  SCr 5.0 [01-22 @ 05:00]    Urinalysis - [01-06-21 @ 18:43]      Color Yellow / Appearance Slightly Turbid / SG 1.018 / pH 6.5      Gluc 100 mg/dL / Ketone Negative  / Bili Negative / Urobili <2 mg/dL       Blood Moderate / Protein 300 mg/dL / Leuk Est Small / Nitrite Negative       /  / Hyaline 114 / Gran  / Sq Epi  / Non Sq Epi 1 / Bacteria Negative      Ferritin 983      [01-24-21 @ 06:00]  Vitamin D (25OH) 45      [01-15-21 @ 12:06]  HbA1c 7.1      [05-21-19 @ 04:30]  TSH 4.57      [01-01-21 @ 17:56]

## 2021-01-27 NOTE — PROGRESS NOTE ADULT - ASSESSMENT
IMPRESSION:    ESRD MWF for HD  Acute hypoxemic resp failure failed weaning SP trach and PEG   NSTEMI  Afib was on coumadin  Severe COVID PNA  Superimposed bacteria PNA  Sacral ulcer SP debridement   bleeding arounD peg resolved    PLAN:    CNS:  Seroquel.    HEENT: Oral care.    PULMONARY: Vent changes. Wean O2.  Monitor PPL and DP.  ps trial 4 h    CARDIOVASCULAR:  I<O as tolerated.      GI: GI prophylaxis.  PEG Feeding.  Reglan .      RENAL: check lytes and replete PRN. Nephro F/UP result.      INFECTIOUS DISEASE:  FU cultures. ABX PER ID    HEMATOLOGICAL: DVT Prophylaxis.  FU PTT , coumadin    ENDOCRINE:  Follow up FS.  Insulin protocol if needed.    MUSCULOSKELETAL:  bed rest.  Wound Care      MICU     Prognosis poor

## 2021-01-27 NOTE — PROGRESS NOTE ADULT - SUBJECTIVE AND OBJECTIVE BOX
Progress Note: General Surgery  Patient: ROGER RODRIGUES , 60y (1960)Male   MRN: [id number]  Location: [facility] [address] A  Visit: 01-01-21 Inpatient  Date: 01-27-21 @ 09:26    Admit Diagnosis/Chief Complaint: Pressure sore on sacrum    Acute respiratory failure due to COVID-19        Procedure/Diagnosis: Pressure sore on sacrum    Acute respiratory failure due to COVID-19     S/P Debridement, ischium or sacrum    Insertion, PEG tube    Open tracheostomy        Events/ 24h: No acute events overnight, no bleeding from Trach and/or PEG, heparin drip restarted. Pain controlled.    Vitals: T(F): 98.7 (01-27-21 @ 08:00), Max: 98.8 (01-27-21 @ 00:00)  HR: 84 (01-27-21 @ 09:00)  BP: 127/58 (01-27-21 @ 09:00) (111/71 - 176/73)  RR: 17 (01-27-21 @ 09:00)  SpO2: 100% (01-27-21 @ 09:00)  RR (machine): 24, TV (machine): 450, FiO2: 40, PEEP: 8, PIP: 36  In:   01-26-21 @ 07:01  -  01-27-21 @ 07:00  --------------------------------------------------------  IN: 2004 mL    01-27-21 @ 07:01  -  01-27-21 @ 09:26  --------------------------------------------------------  IN: 278 mL      Out:   01-26-21 @ 07:01  -  01-27-21 @ 07:00  --------------------------------------------------------  OUT:    Dexmedetomidine: 0 mL    FentaNYL: 0 mL    Norepinephrine: 0 mL    Other (mL): 3000 mL  Total OUT: 3000 mL      01-27-21 @ 07:01  -  01-27-21 @ 09:26  --------------------------------------------------------  OUT:    Dexmedetomidine: 0 mL    FentaNYL: 0 mL    Norepinephrine: 0 mL  Total OUT: 0 mL        Net:   01-26-21 @ 07:01  -  01-27-21 @ 07:00  --------------------------------------------------------  NET: -996 mL    01-27-21 @ 07:01  -  01-27-21 @ 09:26  --------------------------------------------------------  NET: 278 mL        Diet: Diet, NPO with Tube Feed:   Tube Feeding Modality: Gastrostomy  Nepro with Carb Steady  Total Volume for 24 Hours (mL): 1800  Continuous  Until Goal Tube Feed Rate (mL per Hour): 75  Tube Feed Duration (in Hours): 24  Tube Feed Start Time: 13:15 (01-25-21 @ 12:57)    IV Fluids: dextrose 5%. 1000 milliLiter(s) (50 mL/Hr) IV Continuous <Continuous>  dextrose 5%. 1000 milliLiter(s) (100 mL/Hr) IV Continuous <Continuous>      Physical Examination:  General Appearance: Trach in place no hematoma  HEENT: EOMI, sclera non-icteric.  Heart: RRR   Lungs: CTABL.   Abdomen:  Soft, PEG functioning.   MSK/Extremities: Warm & well-perfused.   Skin: Warm, dry. No jaundice.       Medications: [Standing]  acetaminophen   Tablet .. 650 milliGRAM(s) Oral once  aspirin  chewable 81 milliGRAM(s) Oral daily  atorvastatin 20 milliGRAM(s) Oral at bedtime  chlorhexidine 0.12% Liquid 15 milliLiter(s) Oral Mucosa every 12 hours  chlorhexidine 4% Liquid 1 Application(s) Topical <User Schedule>  clonazePAM  Tablet 1 milliGRAM(s) Oral every 12 hours  collagenase Ointment 1 Application(s) Topical two times a day  Dakins Solution - 1/2 Strength 1 Application(s) Topical two times a day  DAPTOmycin IVPB 775 milliGRAM(s) IV Intermittent every 48 hours  dexMEDEtomidine Infusion 0.2 MICROgram(s)/kG/Hr (4.85 mL/Hr) IV Continuous <Continuous>  dextrose 40% Gel 15 Gram(s) Oral once  dextrose 5%. 1000 milliLiter(s) (50 mL/Hr) IV Continuous <Continuous>  dextrose 5%. 1000 milliLiter(s) (100 mL/Hr) IV Continuous <Continuous>  dextrose 50% Injectable 25 Gram(s) IV Push once  dextrose 50% Injectable 12.5 Gram(s) IV Push once  dextrose 50% Injectable 25 Gram(s) IV Push once  epoetin raven-epbx (RETACRIT) Injectable 8000 Unit(s) IV Push <User Schedule>  fentaNYL   Infusion 0.5 MICROgram(s)/kG/Hr (4.85 mL/Hr) IV Continuous <Continuous>  glucagon  Injectable 1 milliGRAM(s) IntraMuscular once  heparin  Infusion 1800 Unit(s)/Hr (18 mL/Hr) IV Continuous <Continuous>  insulin glargine Injectable (LANTUS) 20 Unit(s) SubCutaneous at bedtime  insulin lispro (ADMELOG) corrective regimen sliding scale   SubCutaneous three times a day before meals  insulin lispro Injectable (ADMELOG) 8 Unit(s) SubCutaneous three times a day before meals  lactulose Syrup 10 Gram(s) Oral every 12 hours  levothyroxine 100 MICROGram(s) Oral daily  lidocaine 1%/epinephrine 1:100,000 Inj 40 milliLiter(s) Local Injection once  methadone    Tablet 5 milliGRAM(s) Oral daily  metoclopramide Injectable 5 milliGRAM(s) IV Push three times a day  metoprolol tartrate 12.5 milliGRAM(s) Oral two times a day  midodrine 10 milliGRAM(s) Oral every 8 hours  norepinephrine Infusion 0.05 MICROgram(s)/kG/Min (9.08 mL/Hr) IV Continuous <Continuous>  pantoprazole   Suspension 40 milliGRAM(s) Oral before breakfast  polyethylene glycol 3350 17 Gram(s) Oral daily  QUEtiapine 100 milliGRAM(s) Oral two times a day  senna 2 Tablet(s) Oral at bedtime    DVT Prophylaxis: heparin  Infusion 1800 Unit(s)/Hr IV Continuous <Continuous>    GI Prophylaxis: pantoprazole   Suspension 40 milliGRAM(s) Oral before breakfast    Antibiotics: DAPTOmycin IVPB 775 milliGRAM(s) IV Intermittent every 48 hours    Anticoagulation:   Medications:[PRN]  acetaminophen   Tablet .. 650 milliGRAM(s) Oral every 6 hours PRN      Labs:                        7.0    14.75 )-----------( 202      ( 27 Jan 2021 04:30 )             23.2     01-27    140  |  97<L>  |  80<HH>  ----------------------------<  222<H>  4.8   |  24  |  5.2<HH>    Ca    8.3<L>      27 Jan 2021 04:30  Mg     2.8     01-27    TPro  5.3<L>  /  Alb  2.6<L>  /  TBili  0.7  /  DBili  x   /  AST  29  /  ALT  13  /  AlkPhos  100  01-27    LIVER FUNCTIONS - ( 27 Jan 2021 04:30 )  Alb: 2.6 g/dL / Pro: 5.3 g/dL / ALK PHOS: 100 U/L / ALT: 13 U/L / AST: 29 U/L / GGT: x           PT/INR - ( 27 Jan 2021 04:30 )   PT: 13.50 sec;   INR: 1.17 ratio         PTT - ( 26 Jan 2021 23:44 )  PTT:46.2 sec    CARDIAC MARKERS ( 26 Jan 2021 04:30 )  x     / x     / 66 U/L / x     / x            Urine/Micro:        Imaging:   ***    < from: Xray Chest 1 View- PORTABLE-Routine (Xray Chest 1 View- PORTABLE-Routine in AM.) (01.26.21 @ 08:01) >    Impression:    No significant radiographic change on frontal view.    < end of copied text >

## 2021-01-27 NOTE — PROGRESS NOTE ADULT - ASSESSMENT
ESRD (due to DM) on HD TTS  s/p Fall  altered mental status   Covid-19 PNA / bacterial PNA   fluid overload  hyponatremia  hyperkalemia  DM  HTN  afib on coumadin  CVA  NSTEMI    plan:    HD tomorrow: 3 hours, opti 200 dialyzer, 2K bath, 3.5L UF  left arm precautions  covid isolation  f/u cardio / f/u pulm  icu care

## 2021-01-27 NOTE — PROGRESS NOTE ADULT - ASSESSMENT
ASSESSMENT/PLAN    - acute hypoxic resp failure  - covid 19 pneumonia  - ESRD on HD  - DM,   elevated WBC       SUGGEST:  - estimated REE by PSE 2230 kcal/d and protein needs ~ 139 gm/d  - change  feeding Peptamen AF at 75 ml/h --> 137 gm protein (entirely whey source), 2160 kcal, meets recommended low n6:n3 ratio, 1500 total mg phos/d and 77 total mEq K per day  - glycemic control - insulin drip at one u/h when seen - starting to improve - and remember that current feeding Rx is LOW % carb  -   formula with high n6 content not ideal for any ARDS pt including covid (refer to CCN / ASPEN guidelines 4/1/20 and JPEN 9/20). Note that suggested formula is also low carb content.  -continue with current nutrition

## 2021-01-27 NOTE — PROGRESS NOTE ADULT - SUBJECTIVE AND OBJECTIVE BOX
Patient is a 60y old  Male who presents with a chief complaint of s/p fall. (27 Jan 2021 11:21)  pt seen and evwaluated   remain vented via trach  on peg tube feed  surgical f/u noted    ICU Vital Signs Last 24 Hrs  T(C): 37.1 (27 Jan 2021 12:00), Max: 37.1 (27 Jan 2021 00:00)  T(F): 98.7 (27 Jan 2021 12:00), Max: 98.8 (27 Jan 2021 00:00)  HR: 82 (27 Jan 2021 14:30) (74 - 112)  BP: 128/59 (27 Jan 2021 14:00) (111/71 - 176/73)  BP(mean): 87 (27 Jan 2021 14:00) (75 - 112)  RR: 23 (27 Jan 2021 14:30) (10 - 32)  SpO2: 98% (27 Jan 2021 14:30) (95% - 100%)      Drug Dosing Weight  Height (cm): 177.8 (21 Jan 2021 08:00)  Weight (kg): 97 (21 Jan 2021 08:00)  BMI (kg/m2): 30.7 (21 Jan 2021 08:00)  BSA (m2): 2.15 (21 Jan 2021 08:00)    I&O's Detail    26 Jan 2021 07:01  -  27 Jan 2021 07:00  --------------------------------------------------------  IN:    Heparin: 204 mL    Nepro with Carb Steady: 1800 mL  Total IN: 2004 mL    OUT:    Dexmedetomidine: 0 mL    FentaNYL: 0 mL    Norepinephrine: 0 mL    Other (mL): 3000 mL  Total OUT: 3000 mL    Total NET: -996 mL      27 Jan 2021 07:01  -  27 Jan 2021 15:05  --------------------------------------------------------  IN:    Enteral Tube Flush: 90 mL    Heparin: 143 mL    Nepro with Carb Steady: 600 mL  Total IN: 833 mL    OUT:    Dexmedetomidine: 0 mL    FentaNYL: 0 mL    Norepinephrine: 0 mL    Rectal Tube (mL): 100 mL  Total OUT: 100 mL    Total NET: 733 mL     PHYSICAL EXAM:  Constitutional:  remains  vented via trach  Gastrointestinal:  soft +peg tube feed  MEDICATIONS  (STANDING):  acetaminophen   Tablet .. 650 milliGRAM(s) Oral once  aspirin  chewable 81 milliGRAM(s) Oral daily  atorvastatin 20 milliGRAM(s) Oral at bedtime  chlorhexidine 0.12% Liquid 15 milliLiter(s) Oral Mucosa every 12 hours  chlorhexidine 4% Liquid 1 Application(s) Topical <User Schedule>  clonazePAM  Tablet 1 milliGRAM(s) Oral every 12 hours  collagenase Ointment 1 Application(s) Topical two times a day  Dakins Solution - 1/2 Strength 1 Application(s) Topical two times a day  DAPTOmycin IVPB 775 milliGRAM(s) IV Intermittent every 48 hours  dexMEDEtomidine Infusion 0.2 MICROgram(s)/kG/Hr (4.85 mL/Hr) IV Continuous <Continuous>  dextrose 40% Gel 15 Gram(s) Oral once  dextrose 5%. 1000 milliLiter(s) (50 mL/Hr) IV Continuous <Continuous>  dextrose 5%. 1000 milliLiter(s) (100 mL/Hr) IV Continuous <Continuous>  dextrose 50% Injectable 25 Gram(s) IV Push once  dextrose 50% Injectable 12.5 Gram(s) IV Push once  dextrose 50% Injectable 25 Gram(s) IV Push once  epoetin raven-epbx (RETACRIT) Injectable 8000 Unit(s) IV Push <User Schedule>  fentaNYL   Infusion 0.5 MICROgram(s)/kG/Hr (4.85 mL/Hr) IV Continuous <Continuous>  glucagon  Injectable 1 milliGRAM(s) IntraMuscular once  heparin  Infusion 1800 Unit(s)/Hr (18 mL/Hr) IV Continuous <Continuous>  insulin glargine Injectable (LANTUS) 20 Unit(s) SubCutaneous at bedtime  insulin lispro (ADMELOG) corrective regimen sliding scale   SubCutaneous three times a day before meals  insulin lispro Injectable (ADMELOG) 8 Unit(s) SubCutaneous three times a day before meals  lactulose Syrup 10 Gram(s) Oral every 12 hours  levothyroxine 100 MICROGram(s) Oral daily  lidocaine 1%/epinephrine 1:100,000 Inj 40 milliLiter(s) Local Injection once  methadone    Tablet 5 milliGRAM(s) Oral daily  metoclopramide Injectable 5 milliGRAM(s) IV Push three times a day  metoprolol tartrate 12.5 milliGRAM(s) Oral two times a day  midodrine 10 milliGRAM(s) Oral every 8 hours  norepinephrine Infusion 0.05 MICROgram(s)/kG/Min (9.08 mL/Hr) IV Continuous <Continuous>  pantoprazole   Suspension 40 milliGRAM(s) Oral before breakfast  polyethylene glycol 3350 17 Gram(s) Oral daily  QUEtiapine 100 milliGRAM(s) Oral two times a day  senna 2 Tablet(s) Oral at bedtime  warfarin 5 milliGRAM(s) Oral once      Diet, NPO with Tube Feed:   Tube Feeding Modality: Gastrostomy  Nepro with Carb Steady  Total Volume for 24 Hours (mL): 1800  Continuous  Until Goal Tube Feed Rate (mL per Hour): 75  Tube Feed Duration (in Hours): 24  Tube Feed Start Time: 13:15 (01-25-21 @ 12:57)      LABS  01-27    140  |  97<L>  |  80<HH>  ----------------------------<  222<H>  4.8   |  24  |  5.2<HH>    Ca    8.3<L>      27 Jan 2021 04:30  Mg     2.8     01-27    TPro  5.3<L>  /  Alb  2.6<L>  /  TBili  0.7  /  DBili  x   /  AST  29  /  ALT  13  /  AlkPhos  100  01-27                          7.0    14.75 )-----------( 202      ( 27 Jan 2021 04:30 )             23.2     CAPILLARY BLOOD GLUCOSE  POCT Blood Glucose.: 299 mg/dL (27 Jan 2021 12:09)  POCT Blood Glucose.: 255 mg/dL (27 Jan 2021 05:29)  POCT Blood Glucose.: 133 mg/dL (26 Jan 2021 21:58)  POCT Blood Glucose.: 136 mg/dL (26 Jan 2021 16:33)   RADIOLOGY STUDIES  < from: Xray Chest 1 View- PORTABLE-Routine (Xray Chest 1 View- PORTABLE-Routine in AM.) (01.26.21 @ 08:01) >  Impression:  No significant radiographic change on frontal view.

## 2021-01-27 NOTE — PROGRESS NOTE ADULT - SUBJECTIVE AND OBJECTIVE BOX
SUBJECTIVE:    Patient is a 60y old Male who presents with a chief complaint of s/p fall. (27 Jan 2021 11:12)    Currently admitted to medicine with the primary diagnosis of Fever       Today is hospital day 26d. No acute events overnight. Heparin bridge to coumadin restarted yesterday. Patient tolerated 12 hours of pressure support yesterday. S/p HD yesterday.     PAST MEDICAL & SURGICAL HISTORY  PAD (peripheral artery disease)  multiple toe amputations    Anemia  chronic anemia - s/p transfusion 2018    Thyroid cancer    Chronic leg pain    OA (osteoarthritis)    SOB (shortness of breath) on exertion    ESRD (end stage renal disease)  2 yrs    Atrial fibrillation  on warfarin    Right sided weakness    Stroke  8 yrs ago    Hypertension    High blood cholesterol    Diabetes mellitus, type 2    Dialysis patient  Mon, Wednesday, Friday ([address])    Smoker    H/O thyroid nodule    H/O gastroesophageal reflux (GERD)    History of tonsillectomy    History of surgery  Multiple toe amputations (amp 4 1/2 left toes; has 1/2 left mid toe; amp right mid toe)    A-V fistula  left AVF      SOCIAL HISTORY:  Negative for smoking/alcohol/drug use.     ALLERGIES:  Orange (Other)  Originally Entered as [HIVES] reaction to [sulfur] (Unknown)  penicillin (Unknown)  sulfADIAZINE (Unknown)    MEDICATIONS:  STANDING MEDICATIONS  acetaminophen   Tablet .. 650 milliGRAM(s) Oral once  aspirin  chewable 81 milliGRAM(s) Oral daily  atorvastatin 20 milliGRAM(s) Oral at bedtime  chlorhexidine 0.12% Liquid 15 milliLiter(s) Oral Mucosa every 12 hours  chlorhexidine 4% Liquid 1 Application(s) Topical <User Schedule>  clonazePAM  Tablet 1 milliGRAM(s) Oral every 12 hours  collagenase Ointment 1 Application(s) Topical two times a day  Dakins Solution - 1/2 Strength 1 Application(s) Topical two times a day  DAPTOmycin IVPB 775 milliGRAM(s) IV Intermittent every 48 hours  dexMEDEtomidine Infusion 0.2 MICROgram(s)/kG/Hr IV Continuous <Continuous>  dextrose 40% Gel 15 Gram(s) Oral once  dextrose 5%. 1000 milliLiter(s) IV Continuous <Continuous>  dextrose 5%. 1000 milliLiter(s) IV Continuous <Continuous>  dextrose 50% Injectable 25 Gram(s) IV Push once  dextrose 50% Injectable 12.5 Gram(s) IV Push once  dextrose 50% Injectable 25 Gram(s) IV Push once  epoetin raven-epbx (RETACRIT) Injectable 8000 Unit(s) IV Push <User Schedule>  fentaNYL   Infusion 0.5 MICROgram(s)/kG/Hr IV Continuous <Continuous>  glucagon  Injectable 1 milliGRAM(s) IntraMuscular once  heparin  Infusion 1800 Unit(s)/Hr IV Continuous <Continuous>  insulin glargine Injectable (LANTUS) 20 Unit(s) SubCutaneous at bedtime  insulin lispro (ADMELOG) corrective regimen sliding scale   SubCutaneous three times a day before meals  insulin lispro Injectable (ADMELOG) 8 Unit(s) SubCutaneous three times a day before meals  lactulose Syrup 10 Gram(s) Oral every 12 hours  levothyroxine 100 MICROGram(s) Oral daily  lidocaine 1%/epinephrine 1:100,000 Inj 40 milliLiter(s) Local Injection once  methadone    Tablet 5 milliGRAM(s) Oral daily  metoclopramide Injectable 5 milliGRAM(s) IV Push three times a day  metoprolol tartrate 12.5 milliGRAM(s) Oral two times a day  midodrine 10 milliGRAM(s) Oral every 8 hours  norepinephrine Infusion 0.05 MICROgram(s)/kG/Min IV Continuous <Continuous>  pantoprazole   Suspension 40 milliGRAM(s) Oral before breakfast  polyethylene glycol 3350 17 Gram(s) Oral daily  QUEtiapine 100 milliGRAM(s) Oral two times a day  senna 2 Tablet(s) Oral at bedtime    PRN MEDICATIONS  acetaminophen   Tablet .. 650 milliGRAM(s) Oral every 6 hours PRN    VITALS:   T(F): 98.7  HR: 82  BP: 132/54  RR: 16  SpO2: 100%    PHYSICAL EXAM:  GEN: awake, lethargic  LUNGS: Decreased breath sounds, mild wheezing   HEART: S1/S2 present.   ABD: Soft, non-tender, peg present, bowel sounds present.  EXT: Edema of upper extremities and lower extremities   NEURO: following commands     Garza catheter present       LABS:                        7.0    14.75 )-----------( 202 ( 27 Jan 2021 04:30 )             23.2     01-27    140  |  97<L>  |  80<HH>  ----------------------------<  222<H>  4.8   |  24  |  5.2<HH>    Ca    8.3<L>      27 Jan 2021 04:30  Mg     2.8     01-27    TPro  5.3<L>  /  Alb  2.6<L>  /  TBili  0.7  /  DBili  x   /  AST  29  /  ALT  13  /  AlkPhos  100  01-27    PT/INR - ( 27 Jan 2021 04:30 )   PT: 13.50 sec;   INR: 1.17 ratio         PTT - ( 26 Jan 2021 23:44 )  PTT:46.2 sec      CARDIAC MARKERS ( 26 Jan 2021 04:30 )  x     / x     / 66 U/L / x     / x          RADIOLOGY:

## 2021-01-27 NOTE — PROGRESS NOTE ADULT - SUBJECTIVE AND OBJECTIVE BOX
[place] NEPHROLOGY FOLLOW UP NOTE  --------------------------------------------------------------------------------  24 hour events/subjective: Patient examined. Trached.    PAST HISTORY  --------------------------------------------------------------------------------  No significant changes to PMH, PSH, FHx, SHx, unless otherwise noted    ALLERGIES & MEDICATIONS  --------------------------------------------------------------------------------  Allergies    Orange (Other)  Originally Entered as [HIVES] reaction to [sulfur] (Unknown)  penicillin (Unknown)  sulfADIAZINE (Unknown)    Standing Inpatient Medications  acetaminophen   Tablet .. 650 milliGRAM(s) Oral once  aspirin  chewable 81 milliGRAM(s) Oral daily  atorvastatin 20 milliGRAM(s) Oral at bedtime  chlorhexidine 0.12% Liquid 15 milliLiter(s) Oral Mucosa every 12 hours  chlorhexidine 4% Liquid 1 Application(s) Topical <User Schedule>  clonazePAM  Tablet 1 milliGRAM(s) Oral every 12 hours  collagenase Ointment 1 Application(s) Topical two times a day  Dakins Solution - 1/2 Strength 1 Application(s) Topical two times a day  DAPTOmycin IVPB 775 milliGRAM(s) IV Intermittent every 48 hours  dexMEDEtomidine Infusion 0.2 MICROgram(s)/kG/Hr IV Continuous <Continuous>  dextrose 40% Gel 15 Gram(s) Oral once  dextrose 5%. 1000 milliLiter(s) IV Continuous <Continuous>  dextrose 5%. 1000 milliLiter(s) IV Continuous <Continuous>  dextrose 50% Injectable 25 Gram(s) IV Push once  dextrose 50% Injectable 12.5 Gram(s) IV Push once  dextrose 50% Injectable 25 Gram(s) IV Push once  epoetin raven-epbx (RETACRIT) Injectable 8000 Unit(s) IV Push <User Schedule>  fentaNYL   Infusion 0.5 MICROgram(s)/kG/Hr IV Continuous <Continuous>  glucagon  Injectable 1 milliGRAM(s) IntraMuscular once  heparin  Infusion 1800 Unit(s)/Hr IV Continuous <Continuous>  insulin glargine Injectable (LANTUS) 20 Unit(s) SubCutaneous at bedtime  insulin lispro (ADMELOG) corrective regimen sliding scale   SubCutaneous three times a day before meals  insulin lispro Injectable (ADMELOG) 8 Unit(s) SubCutaneous three times a day before meals  lactulose Syrup 10 Gram(s) Oral every 12 hours  levothyroxine 100 MICROGram(s) Oral daily  lidocaine 1%/epinephrine 1:100,000 Inj 40 milliLiter(s) Local Injection once  methadone    Tablet 5 milliGRAM(s) Oral daily  metoclopramide Injectable 5 milliGRAM(s) IV Push three times a day  metoprolol tartrate 12.5 milliGRAM(s) Oral two times a day  midodrine 10 milliGRAM(s) Oral every 8 hours  norepinephrine Infusion 0.05 MICROgram(s)/kG/Min IV Continuous <Continuous>  pantoprazole   Suspension 40 milliGRAM(s) Oral before breakfast  polyethylene glycol 3350 17 Gram(s) Oral daily  QUEtiapine 100 milliGRAM(s) Oral two times a day  senna 2 Tablet(s) Oral at bedtime    PRN Inpatient Medications  acetaminophen   Tablet .. 650 milliGRAM(s) Oral every 6 hours PRN      VITALS/PHYSICAL EXAM  --------------------------------------------------------------------------------  T(C): 37.1 (01-27-21 @ 08:00), Max: 37.1 (01-27-21 @ 00:00)  HR: 82 (01-27-21 @ 10:30) (75 - 112)  BP: 132/54 (01-27-21 @ 10:00) (111/71 - 176/73)  RR: 16 (01-27-21 @ 10:30) (10 - 32)  SpO2: 100% (01-27-21 @ 10:30) (93% - 100%)  Wt(kg): --        01-26-21 @ 07:01  -  01-27-21 @ 07:00  --------------------------------------------------------  IN: 2004 mL / OUT: 3000 mL / NET: -996 mL    01-27-21 @ 07:01  -  01-27-21 @ 11:12  --------------------------------------------------------  IN: 371 mL / OUT: 0 mL / NET: 371 mL      Physical Exam:  	Gen: Trached  	Pulm: CTA B/L  	CV: RRR, S1S2  	Abd: +BS, soft, nondistended  	: Scrotal edema  	LE: Warm, edema  	Vascular access: AVF    LABS/STUDIES  --------------------------------------------------------------------------------              7.0    14.75 >-----------<  202      [01-27-21 @ 04:30]              23.2     140  |  97  |  80  ----------------------------<  222      [01-27-21 @ 04:30]  4.8   |  24  |  5.2        Ca     8.3     [01-27-21 @ 04:30]      Mg     2.8     [01-27-21 @ 04:30]    TPro  5.3  /  Alb  2.6  /  TBili  0.7  /  DBili  x   /  AST  29  /  ALT  13  /  AlkPhos  100  [01-27-21 @ 04:30]    PT/INR: PT 13.50, INR 1.17       [01-27-21 @ 04:30]  PTT: 46.2       [01-26-21 @ 23:44]    CK 66      [01-26-21 @ 04:30]    Creatinine Trend:  SCr 5.2 [01-27 @ 04:30]  SCr 6.1 [01-26 @ 04:30]  SCr 5.6 [01-25 @ 05:00]  SCr 4.7 [01-24 @ 04:30]  SCr 5.3 [01-23 @ 04:30]    Urinalysis - [01-06-21 @ 18:43]      Color Yellow / Appearance Slightly Turbid / SG 1.018 / pH 6.5      Gluc 100 mg/dL / Ketone Negative  / Bili Negative / Urobili <2 mg/dL       Blood Moderate / Protein 300 mg/dL / Leuk Est Small / Nitrite Negative       /  / Hyaline 114 / Gran  / Sq Epi  / Non Sq Epi 1 / Bacteria Negative      Ferritin 983      [01-24-21 @ 06:00]  Vitamin D (25OH) 45      [01-15-21 @ 12:06]  HbA1c 7.1      [05-21-19 @ 04:30]  TSH 4.57      [01-01-21 @ 17:56]

## 2021-01-27 NOTE — CHART NOTE - NSCHARTNOTEFT_GEN_A_CORE
Spoke with patient's sister, Nedra Pelayoronaldo, [phone], update her. Answered all questions. Sister requesting to speak with social work regarding placement options.

## 2021-01-27 NOTE — PROGRESS NOTE ADULT - SUBJECTIVE AND OBJECTIVE BOX
OVERNIGHT EVENTS: on IV heparin no bleeding sp HD    Vital Signs Last 24 Hrs  T(C): 37.1 (27 Jan 2021 08:00), Max: 37.1 (27 Jan 2021 00:00)  T(F): 98.7 (27 Jan 2021 08:00), Max: 98.8 (27 Jan 2021 00:00)  HR: 82 (27 Jan 2021 08:30) (75 - 112)  BP: 114/52 (27 Jan 2021 08:00) (111/71 - 176/73)  BP(mean): 75 (27 Jan 2021 08:00) (75 - 112)  RR: 15 (27 Jan 2021 08:30) (10 - 32)  SpO2: 100% (27 Jan 2021 08:30) (93% - 100%)    PHYSICAL EXAMINATION:    GENERAL: awake     HEENT: Head is normocephalic and atraumatic    NECK: Supple.    LUNGS: dec bs both bases    HEART: Regular rate and rhythm without murmur.    ABDOMEN: Soft, nontender, and nondistended.      EXTREMITIES: Without any cyanosis, clubbing, rash, lesions or edema.    NEUROLOGIC: Grossly intact.    SKIN: sacral ulcer      LABS:                        7.0    14.75 )-----------( 202      ( 27 Jan 2021 04:30 )             23.2     01-27    140  |  97<L>  |  80<HH>  ----------------------------<  222<H>  4.8   |  24  |  5.2<HH>    Ca    8.3<L>      27 Jan 2021 04:30  Mg     2.8     01-27    TPro  5.3<L>  /  Alb  2.6<L>  /  TBili  0.7  /  DBili  x   /  AST  29  /  ALT  13  /  AlkPhos  100  01-27    PT/INR - ( 27 Jan 2021 04:30 )   PT: 13.50 sec;   INR: 1.17 ratio         PTT - ( 26 Jan 2021 23:44 )  PTT:46.2 sec      CARDIAC MARKERS ( 26 Jan 2021 04:30 )  x     / x     / 66 U/L / x     / x                Procalcitonin, Serum: 1.98 ng/mL (01-25-21 @ 05:00)        01-26-21 @ 07:01  -  01-27-21 @ 07:00  --------------------------------------------------------  IN: 2004 mL / OUT: 3000 mL / NET: -996 mL        MICROBIOLOGY:      MEDICATIONS  (STANDING):  acetaminophen   Tablet .. 650 milliGRAM(s) Oral once  aspirin  chewable 81 milliGRAM(s) Oral daily  atorvastatin 20 milliGRAM(s) Oral at bedtime  chlorhexidine 0.12% Liquid 15 milliLiter(s) Oral Mucosa every 12 hours  chlorhexidine 4% Liquid 1 Application(s) Topical <User Schedule>  clonazePAM  Tablet 1 milliGRAM(s) Oral every 12 hours  collagenase Ointment 1 Application(s) Topical two times a day  Dakins Solution - 1/2 Strength 1 Application(s) Topical two times a day  DAPTOmycin IVPB 775 milliGRAM(s) IV Intermittent every 48 hours  dexMEDEtomidine Infusion 0.2 MICROgram(s)/kG/Hr (4.85 mL/Hr) IV Continuous <Continuous>  dextrose 40% Gel 15 Gram(s) Oral once  dextrose 5%. 1000 milliLiter(s) (50 mL/Hr) IV Continuous <Continuous>  dextrose 5%. 1000 milliLiter(s) (100 mL/Hr) IV Continuous <Continuous>  dextrose 50% Injectable 25 Gram(s) IV Push once  dextrose 50% Injectable 12.5 Gram(s) IV Push once  dextrose 50% Injectable 25 Gram(s) IV Push once  epoetin raven-epbx (RETACRIT) Injectable 8000 Unit(s) IV Push <User Schedule>  fentaNYL   Infusion 0.5 MICROgram(s)/kG/Hr (4.85 mL/Hr) IV Continuous <Continuous>  glucagon  Injectable 1 milliGRAM(s) IntraMuscular once  heparin  Infusion 1800 Unit(s)/Hr (18 mL/Hr) IV Continuous <Continuous>  insulin glargine Injectable (LANTUS) 20 Unit(s) SubCutaneous at bedtime  insulin lispro (ADMELOG) corrective regimen sliding scale   SubCutaneous three times a day before meals  insulin lispro Injectable (ADMELOG) 8 Unit(s) SubCutaneous three times a day before meals  lactulose Syrup 10 Gram(s) Oral every 12 hours  levothyroxine 100 MICROGram(s) Oral daily  lidocaine 1%/epinephrine 1:100,000 Inj 40 milliLiter(s) Local Injection once  methadone    Tablet 5 milliGRAM(s) Oral daily  metoclopramide Injectable 5 milliGRAM(s) IV Push three times a day  metoprolol tartrate 12.5 milliGRAM(s) Oral two times a day  midodrine 10 milliGRAM(s) Oral every 8 hours  norepinephrine Infusion 0.05 MICROgram(s)/kG/Min (9.08 mL/Hr) IV Continuous <Continuous>  pantoprazole   Suspension 40 milliGRAM(s) Oral before breakfast  polyethylene glycol 3350 17 Gram(s) Oral daily  QUEtiapine 100 milliGRAM(s) Oral two times a day  senna 2 Tablet(s) Oral at bedtime    MEDICATIONS  (PRN):  acetaminophen   Tablet .. 650 milliGRAM(s) Oral every 6 hours PRN Temp greater or equal to 38C (100.4F), Mild Pain (1 - 3)      RADIOLOGY & ADDITIONAL STUDIES:

## 2021-01-27 NOTE — PROGRESS NOTE ADULT - ASSESSMENT
59 yo male with PMHx of afib on coumadin, DM2, ESRD on HD MWF, HLD, HTN, CVA, who presented w/ weakness and fall.     #Acute hypoxemic respiratory failure   #Covid-19 PNA  #Superimposed bacterial PNA   - s/p intubation -> trach  - per surgery, trach sutures can be removed by primary team on 1/25  - s/p Azithromycin discontinued on 1/8/2021, s/p Cefepime discontinued on 1/11/2021  - s/p Dexamethasone 6mg IV once daily (1/1/2021) x10 days  - Covid antibodies: negative --> s/p 2 units of Convalescent plasma ( 1/8/2021 and 1/12/2021)  - f/u inflammatory markers   - s/p tocilizumab 400mg IV x1 on 1/8/2021  - s/p lucinda and vanc   - weaned off sedation w/ klonopin and pain control Methadone, c/w methadone qd x5 days      #Leukocytosis  #Sacral ulcer, heel ulcers   - Multifactorial (Possible overlying infection, ulcers, steroids)   - diarrhea resolved   - s/p debridement w/ burn 1/21/21  - wound culture- moderate e. faecium  - ID following, recs:   - can start daptomycin 8 mg/kg q 48 hours ( please give after dialysis on HD days)  - please obtain CK and weekly  - local wound care  - wound care per burn     #?Ileus- resolved   - s/p PEG 1/18  - xray 10/19 w/ gasseous distention of stomach  - PEG was connected to suction, feeds were held  - Xray 10/21 w/o evidence of obstruction, restarted feeds    #Diarrhea- resolved   - c/w senna and miralax     #Upper extremity edema R>L  - arterial and venous duplex- negative   - loosened restraints    #Acute on chronic anemia   - s/p 1u pRBC 1/19  - monitor     #ESRD on HD   - EPO (retacrit) 8000 units IV push once weekly  - nephro following, f/u reccs    #Paroxysmal atrial fibrillation  - holding Heparin gtt, holding home coumadin,  transition to coumadin tonight  - Metoprolol tartrate 12.5mg po q12hrs    - dc levo, started on midodrine   - CT brain no IC bleed or concerns of IC bleed     #NSTEMI  - Type II MI, demand ischemia, likely secondary to hypoxia secondary to COVID-19 pneumonia  - Aspirin 81mg po once daily   - CT brain no IC bleed or concerns of IC bleed   - On heparin gtt (for atrial fibrillation)  - Repeat CK-MB and CPK were stable    #Hypothyroidism: Continue with levothyroxine 100mcg po once daily     #Altered Mental Status- improved  - EEG diffuse slowing, but no seizure activity, check the report above  - CT brain shows ventriculomegaly (check full report), no IC bleed, possible old infarct.  - neurology consulted and recs:   a. CT brain negative x2 for stroke or other structural pathology.  EEG negative for any epileptiform activity.  Suspect multifactorial metabolic encephalopathy in setting of COVID and ESRD and sedatives.  Wean sedation as able, can reassess if patient does not awaken after that time.  b. No further EEG or other tests   c. To be reassessed after sedation is weaned    Diet: PEG feeds   DVT ppx: on Heparin gtt, f/u AM ptt. transitioning to Coumadin f/u AM INR for afib   GI ppx: Protonix 40 qd

## 2021-01-28 NOTE — PROGRESS NOTE ADULT - SUBJECTIVE AND OBJECTIVE BOX
OVERNIGHT EVENTS: events noted, for HD today, on IV heparin, no sedation    Vital Signs Last 24 Hrs  T(C): 36.2 (28 Jan 2021 04:00), Max: 37.1 (27 Jan 2021 12:00)  T(F): 97.1 (28 Jan 2021 04:00), Max: 98.7 (27 Jan 2021 12:00)  HR: 86 (28 Jan 2021 06:00) (74 - 88)  BP: 135/67 (28 Jan 2021 06:00) (121/55 - 149/63)  BP(mean): 98 (28 Jan 2021 06:00) (73 - 98)  RR: 7 (28 Jan 2021 06:00) (5 - 24)  SpO2: 100% (28 Jan 2021 06:00) (98% - 100%)    PHYSICAL EXAMINATION:    GENERAL: ill looking    HEENT: Head is normocephalic and atraumatic.     NECK: Supple.    LUNGS: bl crackles    HEART: CHEL 3/6    ABDOMEN: Soft, nontender, and nondistended.      EXTREMITIES: Without any cyanosis, clubbing, rash, lesions or edema.    NEUROLOGIC: ulcer    SKIN: No ulceration or induration present.      LABS:                        7.2    12.07 )-----------( 255      ( 28 Jan 2021 04:30 )             23.6     01-28    138  |  98  |  88<HH>  ----------------------------<  204<H>  4.9   |  26  |  6.2<HH>    Ca    8.1<L>      28 Jan 2021 04:30  Mg     2.9     01-28    TPro  5.4<L>  /  Alb  2.8<L>  /  TBili  0.8  /  DBili  x   /  AST  22  /  ALT  12  /  AlkPhos  101  01-28    PT/INR - ( 28 Jan 2021 04:30 )   PT: 13.50 sec;   INR: 1.17 ratio         PTT - ( 28 Jan 2021 04:30 )  PTT:52.8 sec                      01-27-21 @ 07:01  -  01-28-21 @ 07:00  --------------------------------------------------------  IN: 1845 mL / OUT: 350 mL / NET: 1495 mL        MICROBIOLOGY:      MEDICATIONS  (STANDING):  acetaminophen   Tablet .. 650 milliGRAM(s) Oral once  aspirin  chewable 81 milliGRAM(s) Oral daily  atorvastatin 20 milliGRAM(s) Oral at bedtime  chlorhexidine 0.12% Liquid 15 milliLiter(s) Oral Mucosa every 12 hours  chlorhexidine 4% Liquid 1 Application(s) Topical <User Schedule>  collagenase Ointment 1 Application(s) Topical two times a day  Dakins Solution - 1/2 Strength 1 Application(s) Topical two times a day  DAPTOmycin IVPB 775 milliGRAM(s) IV Intermittent every 48 hours  dexMEDEtomidine Infusion 0.2 MICROgram(s)/kG/Hr (4.85 mL/Hr) IV Continuous <Continuous>  dextrose 40% Gel 15 Gram(s) Oral once  dextrose 5%. 1000 milliLiter(s) (50 mL/Hr) IV Continuous <Continuous>  dextrose 5%. 1000 milliLiter(s) (100 mL/Hr) IV Continuous <Continuous>  dextrose 50% Injectable 25 Gram(s) IV Push once  dextrose 50% Injectable 12.5 Gram(s) IV Push once  dextrose 50% Injectable 25 Gram(s) IV Push once  epoetin raevn-epbx (RETACRIT) Injectable 8000 Unit(s) IV Push <User Schedule>  glucagon  Injectable 1 milliGRAM(s) IntraMuscular once  heparin  Infusion 1800 Unit(s)/Hr (18 mL/Hr) IV Continuous <Continuous>  insulin glargine Injectable (LANTUS) 20 Unit(s) SubCutaneous at bedtime  insulin lispro (ADMELOG) corrective regimen sliding scale   SubCutaneous three times a day before meals  insulin lispro Injectable (ADMELOG) 8 Unit(s) SubCutaneous three times a day before meals  lactulose Syrup 10 Gram(s) Oral every 12 hours  levothyroxine 100 MICROGram(s) Oral daily  lidocaine 1%/epinephrine 1:100,000 Inj 40 milliLiter(s) Local Injection once  methadone    Tablet 5 milliGRAM(s) Oral daily  metoclopramide Injectable 5 milliGRAM(s) IV Push three times a day  metoprolol tartrate 12.5 milliGRAM(s) Oral two times a day  midodrine 10 milliGRAM(s) Oral every 8 hours  norepinephrine Infusion 0.05 MICROgram(s)/kG/Min (9.08 mL/Hr) IV Continuous <Continuous>  pantoprazole   Suspension 40 milliGRAM(s) Oral before breakfast  polyethylene glycol 3350 17 Gram(s) Oral daily  QUEtiapine 100 milliGRAM(s) Oral two times a day  senna 2 Tablet(s) Oral at bedtime    MEDICATIONS  (PRN):  acetaminophen   Tablet .. 650 milliGRAM(s) Oral every 6 hours PRN Temp greater or equal to 38C (100.4F), Mild Pain (1 - 3)      RADIOLOGY & ADDITIONAL STUDIES:

## 2021-01-28 NOTE — PROGRESS NOTE ADULT - ASSESSMENT
ASSESSMENT/PLAN  - acute hypoxic resp failure  - covid 19 pneumonia  - ESRD on HD  - DM,   elevated WBC       SUGGEST:  - estimated REE by PSE 2230 kcal/d and protein needs ~ 139 gm/d  - change  feeding Peptamen AF at 75 ml/h --> 137 gm protein (entirely whey source), 2160 kcal, meets recommended low n6:n3 ratio, 1500 total mg phos/d and 77 total mEq K per day  - glycemic control - insulin drip at one u/h when seen - starting to improve - and remember that current feeding Rx is LOW % carb  -   formula with high n6 content not ideal for any ARDS pt including covid (refer to CCN / ASPEN guidelines 4/1/20 and JPEN 9/20). Note that suggested formula is also low carb content.  -continue with current nutrition   ASSESSMENT/PLAN  - acute hypoxic resp failure  - covid 19 pneumonia  - ESRD on HD  - DM,   elevated WBC       SUGGEST:  - estimated REE by PSE 2230 kcal/d and protein needs ~ 139 gm/d  - last phos level was 1/25 - repeat with am labs tomorrow  - feeds changed by HP to Nepro, but given at same rate as the 1.2 k/cc Rx - Nepro at this rate provides a whopping 3188 kcal and 284 gm carb/d  - change  feeding Peptamen AF at 65 ml/h --> 119 gm protein (entirely whey source), 1872 kcal, meets recommended low n6:n3 ratio, 1300 total mg phos/d and 67 total mEq K per day  - glycemic control - problem when given higher carb dose  -   formula with high n6 content not ideal for any ARDS pt including covid (refer to CCN / ASPEN guidelines 4/1/20 and JPEN 9/20). Note that suggested formula is also low carb content.  - again note high MCV - check B12 and folate levels

## 2021-01-28 NOTE — PROGRESS NOTE ADULT - ASSESSMENT
59 yo male with PMHx of afib on coumadin, DM2, ESRD on HD MWF, HLD, HTN, CVA, who presented w/ weakness and fall.     #Acute hypoxemic respiratory failure   #Covid-19 PNA  #Superimposed bacterial PNA   - s/p intubation -> trach  - per surgery, trach sutures can be removed by primary team on 1/25  - s/p Azithromycin discontinued on 1/8/2021, s/p Cefepime discontinued on 1/11/2021  - s/p Dexamethasone 6mg IV once daily (1/1/2021) x10 days  - Covid antibodies: negative --> s/p 2 units of Convalescent plasma ( 1/8/2021 and 1/12/2021)  - f/u inflammatory markers   - s/p tocilizumab 400mg IV x1 on 1/8/2021  - s/p lucinda and vanc   - weaned off sedation w/ klonopin and pain control Methadone, c/w methadone qd x5 days, day 3 today   - daily pressure support trials, ptnt on 12hrs 1/23 and 1/25, tiring out, 4 hrs 1/27, plan for 4 hours today     #Leukocytosis  #Sacral ulcer, heel ulcers   - Multifactorial (Possible overlying infection, ulcers, steroids)   - s/p debridement w/ burn 1/21/21  - wound culture- moderate e. faecium  - ID following, recs:   - can start daptomycin 8 mg/kg q 48 hours ( please give after dialysis on HD days)  - please obtain CK and weekly  - local wound care  - wound care per burn     #?Ileus- resolved   - s/p PEG 1/18  - xray 10/19 w/ gasseous distention of stomach  - PEG was connected to suction, feeds were held  - Xray 10/21 w/o evidence of obstruction, restarted feeds    #Diarrhea- resolved and again on 1/27  - d/c senna and miralax   - monitor  - remove dignishield when resolves     #Upper extremity edema R>L  - arterial and venous duplex- negative   - loosened restraints    #Acute on chronic anemia   - s/p 1u pRBC 1/19  - monitor     #ESRD on HD   - EPO (retacrit) 8000 units IV push once weekly  - nephro following, f/u reccs    #Paroxysmal atrial fibrillation  - c/w Heparin gtt, transition to coumadin day#3 coumadin 5mg tonight   - Metoprolol tartrate 12.5mg po q12hrs    - dc levo, started on midodrine     #NSTEMI  - Type II MI, demand ischemia, likely secondary to hypoxia secondary to COVID-19 pneumonia  - Aspirin 81mg po once daily   - CT brain no IC bleed or concerns of IC bleed   - Repeat CK-MB and CPK were stable    #Hypothyroidism: Continue with levothyroxine 100mcg po once daily     #Altered Mental Status- resolved  - EEG diffuse slowing, but no seizure activity, check the report above  - CT brain shows ventriculomegaly (check full report), no IC bleed, possible old infarct.  - neurology consulted and recs:   a. CT brain negative x2 for stroke or other structural pathology.  EEG negative for any epileptiform activity.  Suspect multifactorial metabolic encephalopathy in setting of COVID and ESRD and sedatives.  Wean sedation as able, can reassess if patient does not awaken after that time.  b. No further EEG or other tests   c. To be reassessed after sedation is weaned    Diet: PEG feeds   DVT ppx: on Heparin gtt for afib, f/u AM ptt. transitioning to Coumadin f/u AM INR   GI ppx: Protonix 40 qd

## 2021-01-28 NOTE — PROGRESS NOTE ADULT - SUBJECTIVE AND OBJECTIVE BOX
SUBJECTIVE:    Patient is a 60y old Male who presents with a chief complaint of s/p fall. (28 Jan 2021 09:05)    Currently admitted to medicine with the primary diagnosis of Fever       Today is hospital day 27d. No acute events overnight. Patient tolerated 4hrs of pressure support yesterday. Diarrhea overnight, dignishield placed.       PAST MEDICAL & SURGICAL HISTORY  PAD (peripheral artery disease)  multiple toe amputations    Anemia  chronic anemia - s/p transfusion 2018    Thyroid cancer    Chronic leg pain    OA (osteoarthritis)    SOB (shortness of breath) on exertion    ESRD (end stage renal disease)  2 yrs    Atrial fibrillation  on warfarin    Right sided weakness    Stroke  8 yrs ago    Hypertension    High blood cholesterol    Diabetes mellitus, type 2    Dialysis patient  Mon, Wednesday, Friday ([facility])    Smoker    H/O thyroid nodule    H/O gastroesophageal reflux (GERD)    History of tonsillectomy    History of surgery  Multiple toe amputations (amp 4 1/2 left toes; has 1/2 left mid toe; amp right mid toe)    A-V fistula  left AVF      SOCIAL HISTORY:  Negative for smoking/alcohol/drug use.     ALLERGIES:  Orange (Other)  Originally Entered as [HIVES] reaction to [sulfur] (Unknown)  penicillin (Unknown)  sulfADIAZINE (Unknown)    MEDICATIONS:  STANDING MEDICATIONS  acetaminophen   Tablet .. 650 milliGRAM(s) Oral once  aspirin  chewable 81 milliGRAM(s) Oral daily  atorvastatin 20 milliGRAM(s) Oral at bedtime  chlorhexidine 0.12% Liquid 15 milliLiter(s) Oral Mucosa every 12 hours  chlorhexidine 4% Liquid 1 Application(s) Topical <User Schedule>  collagenase Ointment 1 Application(s) Topical two times a day  Dakins Solution - 1/2 Strength 1 Application(s) Topical two times a day  DAPTOmycin IVPB 775 milliGRAM(s) IV Intermittent every 48 hours  dexMEDEtomidine Infusion 0.2 MICROgram(s)/kG/Hr IV Continuous <Continuous>  dextrose 40% Gel 15 Gram(s) Oral once  dextrose 5%. 1000 milliLiter(s) IV Continuous <Continuous>  dextrose 5%. 1000 milliLiter(s) IV Continuous <Continuous>  dextrose 50% Injectable 25 Gram(s) IV Push once  dextrose 50% Injectable 12.5 Gram(s) IV Push once  dextrose 50% Injectable 25 Gram(s) IV Push once  epoetin raven-epbx (RETACRIT) Injectable 8000 Unit(s) IV Push <User Schedule>  glucagon  Injectable 1 milliGRAM(s) IntraMuscular once  heparin  Infusion 1800 Unit(s)/Hr IV Continuous <Continuous>  insulin glargine Injectable (LANTUS) 20 Unit(s) SubCutaneous at bedtime  insulin lispro (ADMELOG) corrective regimen sliding scale   SubCutaneous three times a day before meals  insulin lispro Injectable (ADMELOG) 8 Unit(s) SubCutaneous three times a day before meals  levothyroxine 100 MICROGram(s) Oral daily  lidocaine 1%/epinephrine 1:100,000 Inj 40 milliLiter(s) Local Injection once  methadone    Tablet 5 milliGRAM(s) Oral daily  metoprolol tartrate 12.5 milliGRAM(s) Oral two times a day  midodrine 10 milliGRAM(s) Oral every 8 hours  norepinephrine Infusion 0.05 MICROgram(s)/kG/Min IV Continuous <Continuous>  pantoprazole   Suspension 40 milliGRAM(s) Oral before breakfast  QUEtiapine 100 milliGRAM(s) Oral two times a day    PRN MEDICATIONS  acetaminophen   Tablet .. 650 milliGRAM(s) Oral every 6 hours PRN    VITALS:   T(F): 97.1  HR: 88  BP: 135/67  RR: 7  SpO2: 100%    PHYSICAL EXAM:  GEN: awake, alert   LUNGS: Decreased breath sounds, mild wheezing   HEART: S1/S2 present.   ABD: Soft, non-tender, peg present, bowel sounds present.  EXT: Edema of upper extremities and lower extremities   NEURO: following commands     Garza catheter present   Dignishield present       LABS:                        7.2    12.07 )-----------( 255      ( 28 Jan 2021 04:30 )             23.6     01-28    138  |  98  |  88<HH>  ----------------------------<  204<H>  4.9   |  26  |  6.2<HH>    Ca    8.1<L>      28 Jan 2021 04:30  Mg     2.9     01-28    TPro  5.4<L>  /  Alb  2.8<L>  /  TBili  0.8  /  DBili  x   /  AST  22  /  ALT  12  /  AlkPhos  101  01-28    PT/INR - ( 28 Jan 2021 04:30 )   PT: 13.50 sec;   INR: 1.17 ratio         PTT - ( 28 Jan 2021 04:30 )  PTT:52.8 sec      RADIOLOGY:  < from: Xray Chest 1 View- PORTABLE-Routine (Xray Chest 1 View- PORTABLE-Routine in AM.) (01.28.21 @ 06:21) >  EXAM:  XR CHEST PORTABLE ROUTINE 1V          PROCEDURE DATE:  01/28/2021      INTERPRETATION:  Clinical History / Reason for exam: Line placement.    Comparison : Chest radiograph January 26, 2021.    Technique/Positioning: Single frontal chest x-ray obtained.    Findings:    Support devices: Stable tracheostomy tube, left IJ venous catheter.    Cardiac/mediastinum/hilum: Unchanged.    Lung parenchyma/Pleura: Stable bilateral opacities. No pneumothorax.    Skeleton/soft tissues: Unchanged.    Impression:    Stable bilateral opacities.  Stable support devices.    ADELA DAMICO MD; Attending Radiologist  This document has been electronically signed. Jan 28 2021 10:44AM    < end of copied text >

## 2021-01-28 NOTE — PROGRESS NOTE ADULT - SUBJECTIVE AND OBJECTIVE BOX
[facility] FOLLOW UP NOTE  --------------------------------------------------------------------------------  24 hour events/subjective: Patient examined during HD. Trached.    PAST HISTORY  --------------------------------------------------------------------------------  No significant changes to PMH, PSH, FHx, SHx, unless otherwise noted    ALLERGIES & MEDICATIONS  --------------------------------------------------------------------------------  Allergies    Orange (Other)  Originally Entered as [HIVES] reaction to [sulfur] (Unknown)  penicillin (Unknown)  sulfADIAZINE (Unknown)    Intolerances      Standing Inpatient Medications  acetaminophen   Tablet .. 650 milliGRAM(s) Oral once  aspirin  chewable 81 milliGRAM(s) Oral daily  atorvastatin 20 milliGRAM(s) Oral at bedtime  chlorhexidine 0.12% Liquid 15 milliLiter(s) Oral Mucosa every 12 hours  chlorhexidine 4% Liquid 1 Application(s) Topical <User Schedule>  collagenase Ointment 1 Application(s) Topical two times a day  Dakins Solution - 1/2 Strength 1 Application(s) Topical two times a day  DAPTOmycin IVPB 775 milliGRAM(s) IV Intermittent every 48 hours  dexMEDEtomidine Infusion 0.2 MICROgram(s)/kG/Hr IV Continuous <Continuous>  dextrose 40% Gel 15 Gram(s) Oral once  dextrose 5%. 1000 milliLiter(s) IV Continuous <Continuous>  dextrose 5%. 1000 milliLiter(s) IV Continuous <Continuous>  dextrose 50% Injectable 25 Gram(s) IV Push once  dextrose 50% Injectable 12.5 Gram(s) IV Push once  dextrose 50% Injectable 25 Gram(s) IV Push once  epoetin raven-epbx (RETACRIT) Injectable 8000 Unit(s) IV Push <User Schedule>  glucagon  Injectable 1 milliGRAM(s) IntraMuscular once  heparin  Infusion 1800 Unit(s)/Hr IV Continuous <Continuous>  insulin glargine Injectable (LANTUS) 20 Unit(s) SubCutaneous at bedtime  insulin lispro (ADMELOG) corrective regimen sliding scale   SubCutaneous three times a day before meals  insulin lispro Injectable (ADMELOG) 8 Unit(s) SubCutaneous three times a day before meals  levothyroxine 100 MICROGram(s) Oral daily  lidocaine 1%/epinephrine 1:100,000 Inj 40 milliLiter(s) Local Injection once  methadone    Tablet 5 milliGRAM(s) Oral daily  metoprolol tartrate 12.5 milliGRAM(s) Oral two times a day  midodrine 10 milliGRAM(s) Oral every 8 hours  norepinephrine Infusion 0.05 MICROgram(s)/kG/Min IV Continuous <Continuous>  pantoprazole   Suspension 40 milliGRAM(s) Oral before breakfast  QUEtiapine 100 milliGRAM(s) Oral two times a day  warfarin 5 milliGRAM(s) Oral once    PRN Inpatient Medications  acetaminophen   Tablet .. 650 milliGRAM(s) Oral every 6 hours PRN      VITALS/PHYSICAL EXAM  --------------------------------------------------------------------------------  T(C): 36.2 (01-28-21 @ 04:00), Max: 36.5 (01-27-21 @ 20:00)  HR: 92 (01-28-21 @ 12:00) (78 - 92)  BP: 108/49 (01-28-21 @ 12:00) (108/49 - 149/63)  RR: 17 (01-28-21 @ 12:00) (5 - 25)  SpO2: 100% (01-28-21 @ 12:00) (98% - 100%)  Wt(kg): --        01-27-21 @ 07:01  -  01-28-21 @ 07:00  --------------------------------------------------------  IN: 1845 mL / OUT: 350 mL / NET: 1495 mL    01-28-21 @ 07:01  -  01-28-21 @ 14:16  --------------------------------------------------------  IN: 114 mL / OUT: 0 mL / NET: 114 mL      Physical Exam:  	Gen: Trached  	Pulm: CTA B/L  	CV: RRR, S1S2  	Abd: +BS, soft, nondistended  	: No suprapubic tenderness  	LE: Warm, edema  	Vascular access: AVF    LABS/STUDIES  --------------------------------------------------------------------------------              7.2    12.07 >-----------<  255      [01-28-21 @ 04:30]              23.6     138  |  98  |  88  ----------------------------<  204      [01-28-21 @ 04:30]  4.9   |  26  |  6.2        Ca     8.1     [01-28-21 @ 04:30]      Mg     2.9     [01-28-21 @ 04:30]    TPro  5.4  /  Alb  2.8  /  TBili  0.8  /  DBili  x   /  AST  22  /  ALT  12  /  AlkPhos  101  [01-28-21 @ 04:30]    PT/INR: PT 13.50, INR 1.17       [01-28-21 @ 04:30]  PTT: 52.8       [01-28-21 @ 04:30]      Creatinine Trend:  SCr 6.2 [01-28 @ 04:30]  SCr 5.2 [01-27 @ 04:30]  SCr 6.1 [01-26 @ 04:30]  SCr 5.6 [01-25 @ 05:00]  SCr 4.7 [01-24 @ 04:30]    Urinalysis - [01-06-21 @ 18:43]      Color Yellow / Appearance Slightly Turbid / SG 1.018 / pH 6.5      Gluc 100 mg/dL / Ketone Negative  / Bili Negative / Urobili <2 mg/dL       Blood Moderate / Protein 300 mg/dL / Leuk Est Small / Nitrite Negative       /  / Hyaline 114 / Gran  / Sq Epi  / Non Sq Epi 1 / Bacteria Negative      Ferritin 983      [01-24-21 @ 06:00]  Vitamin D (25OH) 45      [01-15-21 @ 12:06]  HbA1c 7.1      [05-21-19 @ 04:30]  TSH 4.57      [01-01-21 @ 17:56]

## 2021-01-28 NOTE — PROGRESS NOTE ADULT - ASSESSMENT
ESRD (due to DM) on HD TTS  s/p Fall  altered mental status   Covid-19 PNA / bacterial PNA   fluid overload  hyponatremia  hyperkalemia  DM  HTN  afib on coumadin  CVA  NSTEMI    plan:    HD today: 3 hours, opti 200 dialyzer, 2K bath, 3.5L UF  left arm precautions  covid isolation  f/u cardio / f/u pulm  icu care

## 2021-01-28 NOTE — PROGRESS NOTE ADULT - SUBJECTIVE AND OBJECTIVE BOX
Patient is a 60y old  Male who presents with a chief complaint of s/p fall. (28 Jan 2021 10:44)  pt seen and evwaluated   remain vented via trach  on peg tube feed  surgical f/u noted      ICU Vital Signs Last 24 Hrs  T(C): 36.2 (28 Jan 2021 04:00), Max: 36.5 (27 Jan 2021 20:00)  T(F): 97.1 (28 Jan 2021 04:00), Max: 97.7 (27 Jan 2021 20:00)  HR: 92 (28 Jan 2021 12:00) (78 - 92)  BP: 108/49 (28 Jan 2021 12:00) (108/49 - 149/63)  BP(mean): 68 (28 Jan 2021 12:00) (68 - 98)  RR: 17 (28 Jan 2021 12:00) (5 - 25)  SpO2: 100% (28 Jan 2021 12:00) (98% - 100%)      Drug Dosing Weight  Height (cm): 177.8 (21 Jan 2021 08:00)  Weight (kg): 97 (21 Jan 2021 08:00)  BMI (kg/m2): 30.7 (21 Jan 2021 08:00)  BSA (m2): 2.15 (21 Jan 2021 08:00)    I&O's Detail    27 Jan 2021 07:01  -  28 Jan 2021 07:00  --------------------------------------------------------  IN:    Enteral Tube Flush: 130 mL    Heparin: 215 mL    Nepro with Carb Steady: 1500 mL  Total IN: 1845 mL    OUT:    Dexmedetomidine: 0 mL    FentaNYL: 0 mL    Norepinephrine: 0 mL    Rectal Tube (mL): 350 mL  Total OUT: 350 mL    Total NET: 1495 mL      28 Jan 2021 07:01  -  28 Jan 2021 12:25  --------------------------------------------------------  IN:    Heparin: 114 mL  Total IN: 114 mL    OUT:  Total OUT: 0 mL    Total NET: 114 mL     PHYSICAL EXAM:  Constitutional:  remains  vented via trach  Gastrointestinal:  soft +peg tube feed  MEDICATIONS  (STANDING):  acetaminophen   Tablet .. 650 milliGRAM(s) Oral once  aspirin  chewable 81 milliGRAM(s) Oral daily  atorvastatin 20 milliGRAM(s) Oral at bedtime  chlorhexidine 0.12% Liquid 15 milliLiter(s) Oral Mucosa every 12 hours  chlorhexidine 4% Liquid 1 Application(s) Topical <User Schedule>  collagenase Ointment 1 Application(s) Topical two times a day  Dakins Solution - 1/2 Strength 1 Application(s) Topical two times a day  DAPTOmycin IVPB 775 milliGRAM(s) IV Intermittent every 48 hours  dexMEDEtomidine Infusion 0.2 MICROgram(s)/kG/Hr (4.85 mL/Hr) IV Continuous <Continuous>  dextrose 40% Gel 15 Gram(s) Oral once  dextrose 5%. 1000 milliLiter(s) (50 mL/Hr) IV Continuous <Continuous>  dextrose 5%. 1000 milliLiter(s) (100 mL/Hr) IV Continuous <Continuous>  dextrose 50% Injectable 25 Gram(s) IV Push once  dextrose 50% Injectable 12.5 Gram(s) IV Push once  dextrose 50% Injectable 25 Gram(s) IV Push once  epoetin raven-epbx (RETACRIT) Injectable 8000 Unit(s) IV Push <User Schedule>  glucagon  Injectable 1 milliGRAM(s) IntraMuscular once  heparin  Infusion 1800 Unit(s)/Hr (18 mL/Hr) IV Continuous <Continuous>  insulin glargine Injectable (LANTUS) 20 Unit(s) SubCutaneous at bedtime  insulin lispro (ADMELOG) corrective regimen sliding scale   SubCutaneous three times a day before meals  insulin lispro Injectable (ADMELOG) 8 Unit(s) SubCutaneous three times a day before meals  levothyroxine 100 MICROGram(s) Oral daily  lidocaine 1%/epinephrine 1:100,000 Inj 40 milliLiter(s) Local Injection once  methadone    Tablet 5 milliGRAM(s) Oral daily  metoprolol tartrate 12.5 milliGRAM(s) Oral two times a day  midodrine 10 milliGRAM(s) Oral every 8 hours  norepinephrine Infusion 0.05 MICROgram(s)/kG/Min (9.08 mL/Hr) IV Continuous <Continuous>  pantoprazole   Suspension 40 milliGRAM(s) Oral before breakfast  QUEtiapine 100 milliGRAM(s) Oral two times a day  warfarin 5 milliGRAM(s) Oral once      Diet, NPO with Tube Feed:   Tube Feeding Modality: Gastrostomy  Nepro with Carb Steady  Total Volume for 24 Hours (mL): 1800  Continuous  Until Goal Tube Feed Rate (mL per Hour): 75  Tube Feed Duration (in Hours): 24  Tube Feed Start Time: 13:15 (01-25-21 @ 12:57)      LABS  01-28    138  |  98  |  88<HH>  ----------------------------<  204<H>  4.9   |  26  |  6.2<HH>    Ca    8.1<L>      28 Jan 2021 04:30  Mg     2.9     01-28    TPro  5.4<L>  /  Alb  2.8<L>  /  TBili  0.8  /  DBili  x   /  AST  22  /  ALT  12  /  AlkPhos  101  01-28                        7.2    12.07 )-----------( 255      ( 28 Jan 2021 04:30 )             23.6     CAPILLARY BLOOD GLUCOSE  POCT Blood Glucose.: 243 mg/dL (27 Jan 2021 20:34)  POCT Blood Glucose.: 315 mg/dL (27 Jan 2021 17:35)   RADIOLOGY STUDIES  < from: Xray Chest 1 View- PORTABLE-Routine (Xray Chest 1 View- PORTABLE-Routine in AM.) (01.28.21 @ 06:21) >  Impression:  Stable bilateral opacities.  Stable support devices.                 Patient is a 60y old  Male who presents with a chief complaint of s/p fall. (28 Jan 2021 10:44)  pt seen and evwaluated   remain vented via trach  on peg tube feed  surgical f/u noted  HD on tues/thurs/sat      ICU Vital Signs Last 24 Hrs  T(C): 36.2 (28 Jan 2021 04:00), Max: 36.5 (27 Jan 2021 20:00)  T(F): 97.1 (28 Jan 2021 04:00), Max: 97.7 (27 Jan 2021 20:00)  HR: 92 (28 Jan 2021 12:00) (78 - 92)  BP: 108/49 (28 Jan 2021 12:00) (108/49 - 149/63)  BP(mean): 68 (28 Jan 2021 12:00) (68 - 98)  RR: 17 (28 Jan 2021 12:00) (5 - 25)  SpO2: 100% (28 Jan 2021 12:00) (98% - 100%)      Drug Dosing Weight  Height (cm): 177.8 (21 Jan 2021 08:00)  Weight (kg): 97 (21 Jan 2021 08:00)  BMI (kg/m2): 30.7 (21 Jan 2021 08:00)  BSA (m2): 2.15 (21 Jan 2021 08:00)    I&O's Detail    27 Jan 2021 07:01  -  28 Jan 2021 07:00  --------------------------------------------------------  IN:    Enteral Tube Flush: 130 mL    Heparin: 215 mL    Nepro with Carb Steady: 1500 mL  Total IN: 1845 mL    OUT:    Dexmedetomidine: 0 mL    FentaNYL: 0 mL    Norepinephrine: 0 mL    Rectal Tube (mL): 350 mL  Total OUT: 350 mL    Total NET: 1495 mL      28 Jan 2021 07:01  -  28 Jan 2021 12:25  --------------------------------------------------------  IN:    Heparin: 114 mL  Total IN: 114 mL    OUT:  Total OUT: 0 mL    Total NET: 114 mL     PHYSICAL EXAM:  Constitutional:  remains  vented via trach  Gastrointestinal:  soft +peg tube feed  MEDICATIONS  (STANDING):  acetaminophen   Tablet .. 650 milliGRAM(s) Oral once  aspirin  chewable 81 milliGRAM(s) Oral daily  atorvastatin 20 milliGRAM(s) Oral at bedtime  chlorhexidine 0.12% Liquid 15 milliLiter(s) Oral Mucosa every 12 hours  chlorhexidine 4% Liquid 1 Application(s) Topical <User Schedule>  collagenase Ointment 1 Application(s) Topical two times a day  Dakins Solution - 1/2 Strength 1 Application(s) Topical two times a day  DAPTOmycin IVPB 775 milliGRAM(s) IV Intermittent every 48 hours  dexMEDEtomidine Infusion 0.2 MICROgram(s)/kG/Hr (4.85 mL/Hr) IV Continuous <Continuous>  dextrose 40% Gel 15 Gram(s) Oral once  dextrose 5%. 1000 milliLiter(s) (50 mL/Hr) IV Continuous <Continuous>  dextrose 5%. 1000 milliLiter(s) (100 mL/Hr) IV Continuous <Continuous>  dextrose 50% Injectable 25 Gram(s) IV Push once  dextrose 50% Injectable 12.5 Gram(s) IV Push once  dextrose 50% Injectable 25 Gram(s) IV Push once  epoetin raven-epbx (RETACRIT) Injectable 8000 Unit(s) IV Push <User Schedule>  glucagon  Injectable 1 milliGRAM(s) IntraMuscular once  heparin  Infusion 1800 Unit(s)/Hr (18 mL/Hr) IV Continuous <Continuous>  insulin glargine Injectable (LANTUS) 20 Unit(s) SubCutaneous at bedtime  insulin lispro (ADMELOG) corrective regimen sliding scale   SubCutaneous three times a day before meals  insulin lispro Injectable (ADMELOG) 8 Unit(s) SubCutaneous three times a day before meals  levothyroxine 100 MICROGram(s) Oral daily  lidocaine 1%/epinephrine 1:100,000 Inj 40 milliLiter(s) Local Injection once  methadone    Tablet 5 milliGRAM(s) Oral daily  metoprolol tartrate 12.5 milliGRAM(s) Oral two times a day  midodrine 10 milliGRAM(s) Oral every 8 hours  norepinephrine Infusion 0.05 MICROgram(s)/kG/Min (9.08 mL/Hr) IV Continuous <Continuous>  pantoprazole   Suspension 40 milliGRAM(s) Oral before breakfast  QUEtiapine 100 milliGRAM(s) Oral two times a day  warfarin 5 milliGRAM(s) Oral once      Diet, NPO with Tube Feed:   Tube Feeding Modality: Gastrostomy  Nepro with Carb Steady  Total Volume for 24 Hours (mL): 1800  Continuous  Until Goal Tube Feed Rate (mL per Hour): 75  Tube Feed Duration (in Hours): 24  Tube Feed Start Time: 13:15 (01-25-21 @ 12:57)      LABS  01-28    138  |  98  |  88<HH>  ----------------------------<  204<H>  4.9   |  26  |  6.2<HH>    Ca    8.1<L>      28 Jan 2021 04:30  Mg     2.9     01-28    TPro  5.4<L>  /  Alb  2.8<L>  /  TBili  0.8  /  DBili  x   /  AST  22  /  ALT  12  /  AlkPhos  101  01-28                        7.2    12.07 )-----------( 255      ( 28 Jan 2021 04:30 )             23.6     CAPILLARY BLOOD GLUCOSE  POCT Blood Glucose.: 243 mg/dL (27 Jan 2021 20:34)  POCT Blood Glucose.: 315 mg/dL (27 Jan 2021 17:35)   RADIOLOGY STUDIES  < from: Xray Chest 1 View- PORTABLE-Routine (Xray Chest 1 View- PORTABLE-Routine in AM.) (01.28.21 @ 06:21) >  Impression:  Stable bilateral opacities.  Stable support devices.                 Patient is a 60y old  Male who presents with a chief complaint of s/p fall. (28 Jan 2021 10:44)  pt seen and evwaluated   remain vented via trach  on peg tube feedings - which are off since ~ 8am today  surgical f/u noted  HD on tues/thurs/sat - starting HD when seen earlier today    ICU Vital Signs Last 24 Hrs  T(C): 36.2 (28 Jan 2021 04:00), Max: 36.5 (27 Jan 2021 20:00)  T(F): 97.1 (28 Jan 2021 04:00), Max: 97.7 (27 Jan 2021 20:00)  HR: 92 (28 Jan 2021 12:00) (78 - 92)  BP: 108/49 (28 Jan 2021 12:00) (108/49 - 149/63)  BP(mean): 68 (28 Jan 2021 12:00) (68 - 98)  RR: 17 (28 Jan 2021 12:00) (5 - 25)  SpO2: 100% (28 Jan 2021 12:00) (98% - 100%)    Drug Dosing Weight  Height (cm): 177.8 (21 Jan 2021 08:00)  Weight (kg): 97 (21 Jan 2021 08:00)  BMI (kg/m2): 30.7 (21 Jan 2021 08:00)  BSA (m2): 2.15 (21 Jan 2021 08:00)    I&O's Detail    27 Jan 2021 07:01  -  28 Jan 2021 07:00  --------------------------------------------------------  IN:    Enteral Tube Flush: 130 mL    Heparin: 215 mL    Nepro with Carb Steady: 1500 mL  Total IN: 1845 mL    OUT:    Dexmedetomidine: 0 mL    FentaNYL: 0 mL    Norepinephrine: 0 mL    Rectal Tube (mL): 350 mL  Total OUT: 350 mL    Total NET: 1495 mL     PHYSICAL EXAM:  Constitutional:  remains  vented via trach  Gastrointestinal:  soft +peg     MEDICATIONS  (STANDING):  acetaminophen   Tablet .. 650 milliGRAM(s) Oral once  aspirin  chewable 81 milliGRAM(s) Oral daily  atorvastatin 20 milliGRAM(s) Oral at bedtime  chlorhexidine 0.12% Liquid 15 milliLiter(s) Oral Mucosa every 12 hours  chlorhexidine 4% Liquid 1 Application(s) Topical <User Schedule>  collagenase Ointment 1 Application(s) Topical two times a day  Dakins Solution - 1/2 Strength 1 Application(s) Topical two times a day  DAPTOmycin IVPB 775 milliGRAM(s) IV Intermittent every 48 hours  dexMEDEtomidine Infusion 0.2 MICROgram(s)/kG/Hr (4.85 mL/Hr) IV Continuous <Continuous>  epoetin raven-epbx (RETACRIT) Injectable 8000 Unit(s) IV Push <User Schedule>  heparin  Infusion 1800 Unit(s)/Hr (18 mL/Hr) IV Continuous <Continuous>  insulin glargine Injectable (LANTUS) 20 Unit(s) SubCutaneous at bedtime  insulin lispro (ADMELOG) corrective regimen sliding scale   SubCutaneous three times a day before meals  insulin lispro Injectable (ADMELOG) 8 Unit(s) SubCutaneous three times a day before meals  levothyroxine 100 MICROGram(s) Oral daily  lidocaine 1%/epinephrine 1:100,000 Inj 40 milliLiter(s) Local Injection once  methadone    Tablet 5 milliGRAM(s) Oral daily  metoprolol tartrate 12.5 milliGRAM(s) Oral two times a day  midodrine 10 milliGRAM(s) Oral every 8 hours  norepinephrine Infusion 0.05 MICROgram(s)/kG/Min (9.08 mL/Hr) IV Continuous <Continuous>  pantoprazole   Suspension 40 milliGRAM(s) Oral before breakfast  QUEtiapine 100 milliGRAM(s) Oral two times a day  warfarin 5 milliGRAM(s) Oral once      Diet, NPO with Tube Feed:   Tube Feeding Modality: Gastrostomy  Nepro with Carb Steady  Total Volume for 24 Hours (mL): 1800  Continuous  Until Goal Tube Feed Rate (mL per Hour): 75  Tube Feed Duration (in Hours): 24  Tube Feed Start Time: 13:15 (01-25-21 @ 12:57)      LABS  01-28    138  |  98  |  88<HH>  ----------------------------<  204<H>  4.9   |  26  |  6.2<HH>    Ca    8.1<L>      28 Jan 2021 04:30  Mg     2.9     01-28    TPro  5.4<L>  /  Alb  2.8<L>  /  TBili  0.8  /  DBili  x   /  AST  22  /  ALT  12  /  AlkPhos  101  01-28                        7.2    12.07 )-----------( 255      ( 28 Jan 2021 04:30 )             23.6     CAPILLARY BLOOD GLUCOSE  POCT Blood Glucose.: 243 mg/dL (27 Jan 2021 20:34)  POCT Blood Glucose.: 315 mg/dL (27 Jan 2021 17:35)   RADIOLOGY STUDIES  < from: Xray Chest 1 View- PORTABLE-Routine (Xray Chest 1 View- PORTABLE-Routine in AM.) (01.28.21 @ 06:21) >  Impression:  Stable bilateral opacities.  Stable support devices.

## 2021-01-28 NOTE — CHART NOTE - NSCHARTNOTEFT_GEN_A_CORE
Spoke with patient's sister, Nedra Shabbir, [phone], updated her on current status. Answered all questions.

## 2021-01-28 NOTE — PROGRESS NOTE ADULT - ASSESSMENT
IMPRESSION:    ESRD MWF for HD  Acute hypoxemic resp failure failed weaning SP trach and PEG   NSTEMI  Afib was on coumadin  Severe COVID PNA  Superimposed bacteria PNA  Sacral ulcer SP debridement   bleeding arounD peg resolved    PLAN:    CNS:  Seroquel.    HEENT: Oral care.    PULMONARY: Vent changes. Wean O2.  Monitor PPL and DP.  ps trial 4 h    CARDIOVASCULAR:  I<O as tolerated.      GI: GI prophylaxis.  PEG Feeding.  Reglan dc    RENAL: check lytes and replete PRN. Nephro F/UP result.      INFECTIOUS DISEASE:  FU cultures. ABX PER ID dapto    HEMATOLOGICAL: DVT Prophylaxis.  FU PTT , coumadin    ENDOCRINE:  Follow up FS.  Insulin protocol if needed.    MUSCULOSKELETAL:  bed rest.  Wound Care      discuss for vent unit    Prognosis poor

## 2021-01-29 NOTE — BRIEF OPERATIVE NOTE - NSICDXBRIEFPROCEDURE_GEN_ALL_CORE_FT
PROCEDURES:  Debridement, ischium or sacrum 21-Jan-2021 20:24:28 sacrum Jewel Diaz  
PROCEDURES:  Debridement, ischium or sacrum 21-Jan-2021 20:24:28 ayseum Jewel Diaz  Insertion, PEG tube 18-Jan-2021 14:39:07  Dav Taylor  Open tracheostomy 18-Jan-2021 14:38:46  Dav Taylor  
PROCEDURES:  Insertion, PEG tube 18-Jan-2021 14:39:07  Dav Taylor  Open tracheostomy 18-Jan-2021 14:38:46  Dav Taylor

## 2021-01-29 NOTE — PROGRESS NOTE ADULT - ASSESSMENT
IMPRESSION:    Acute hypoxemic resp failure failed weaning SP trach and PEG   NSTEMI  Afib was on coumadin  Severe COVID PNA  Superimposed bacteria PNA  Sacral ulcer SP debridement   bleeding arounD peg resolved  HD  thormbocytopenia    PLAN:    CNS:  Seroquel. dc methadone    HEENT: Oral care.    PULMONARY: Vent changes. Wean O2.  Monitor PPL and DP.  ps daily    CARDIOVASCULAR:  I<O as tolerated.      GI: GI prophylaxis.  PEG Feeding.  Reglan     RENAL: check lytes and replete PRN. Nephro F/UP result.      INFECTIOUS DISEASE:  FU cultures. ABX PER ID dapto    HEMATOLOGICAL: DVT Prophylaxis.  FU PTT , coumadin repeat cbc    ENDOCRINE:  Follow up FS.  Insulin protocol if needed.    MUSCULOSKELETAL:  bed rest.  Wound Care      discuss for vent unit    Prognosis poor

## 2021-01-29 NOTE — PROGRESS NOTE ADULT - ASSESSMENT
59 yo male with PMHx of afib on coumadin, DM2, ESRD on HD MWF, HLD, HTN, CVA, who presented w/ weakness and fall.     #Acute hypoxemic respiratory failure   #Covid-19 PNA  #Superimposed bacterial PNA   - s/p intubation -> trach  - per surgery, trach sutures can be removed by primary team on 1/25  - s/p Azithromycin discontinued on 1/8/2021, s/p Cefepime discontinued on 1/11/2021  - s/p Dexamethasone 6mg IV once daily (1/1/2021) x10 days  - Covid antibodies: negative --> s/p 2 units of Convalescent plasma ( 1/8/2021 and 1/12/2021)  - f/u inflammatory markers   - s/p tocilizumab 400mg IV x1 on 1/8/2021  - s/p lucinda and vanc   - weaned off sedation w/ klonopin and pain control Methadone, c/w methadone qd x5 days, day 3 today   - daily pressure support trials, ptnt on 12hrs 1/23 and 1/25, tiring out, 4 hrs 1/27    #Thrombocytopenia  - likely secondary to Daptomycin (started on 1/26) vs HIT less likely was on heparin gtt   - f/u repeat CBC  - consider holding Daptomycin     #Leukocytosis  #Sacral ulcer, heel ulcers   - Multifactorial (Possible overlying infection, ulcers, steroids)   - s/p debridement w/ burn 1/21/21  - wound culture- moderate e. faecium  - ID following, recs:   - can start daptomycin 8 mg/kg q 48 hours ( please give after dialysis on HD days)  - please obtain CK and weekly  - local wound care  - wound care per burn     #?Ileus- resolved   - s/p PEG 1/18  - xray 10/19 w/ gasseous distention of stomach  - PEG was connected to suction, feeds were held  - Xray 10/21 w/o evidence of obstruction, restarted feeds    #Diarrhea- resolved and again on 1/27  - d/c senna and miralax   - monitor  - changed feeds from nepro to peptamen   - remove dignishield when resolves     #Nausea/vomiting  - on Reglan w/ meals     #Upper extremity edema R>L  - arterial and venous duplex- negative   - loosened restraints    #Acute on chronic anemia   - s/p 1u pRBC 1/19  - monitor     #ESRD on HD   - EPO (retacrit) 8000 units IV push once weekly  - nephro following, f/u reccs    #Paroxysmal atrial fibrillation  - restarted on home coumadin, f/u AM INR, (2.5mg tonight, s/p 5mg on 1/27 and 1/28)  - Metoprolol tartrate 12.5mg po q12hrs      #NSTEMI  - Type II MI, demand ischemia, likely secondary to hypoxia secondary to COVID-19 pneumonia  - Aspirin 81mg po once daily   - CT brain no IC bleed or concerns of IC bleed   - Repeat CK-MB and CPK were stable    #Hypothyroidism: Continue with levothyroxine 100mcg po once daily     #Altered Mental Status- resolved  - EEG diffuse slowing, but no seizure activity, check the report above  - CT brain shows ventriculomegaly (check full report), no IC bleed, possible old infarct.  - neurology consulted and recs:   a. CT brain negative x2 for stroke or other structural pathology.  EEG negative for any epileptiform activity.  Suspect multifactorial metabolic encephalopathy in setting of COVID and ESRD and sedatives.  Wean sedation as able, can reassess if patient does not awaken after that time.  b. No further EEG or other tests   c. To be reassessed after sedation is weaned    Diet: PEG feeds Peptamen AF  DVT ppx: on Coumadin, f/u INR (2.5mg tonight, s/p 5mg on 1/27 and 1/28)   GI ppx: Protonix 40 qd     Dispo: discuss downgrade to vent vs NH, repeat covid on 1/20 negative

## 2021-01-29 NOTE — PROCEDURE NOTE - NSINDICATIONS_GEN_A_CORE
critical illness
venous access
arterial puncture to obtain ABG's/blood sampling/critical patient/monitoring purposes
critical illness

## 2021-01-29 NOTE — CHART NOTE - NSCHARTNOTEFT_GEN_A_CORE
Spoke with patient's sister, Nedra Mortonumerronaldo, [phone], updated her on current status and plan of care regarding placement.

## 2021-01-29 NOTE — PROGRESS NOTE ADULT - SUBJECTIVE AND OBJECTIVE BOX
[place] [facility] FOLLOW UP NOTE  --------------------------------------------------------------------------------  24 hour events/subjective: Patient examined. Trached.    PAST HISTORY  --------------------------------------------------------------------------------  No significant changes to PMH, PSH, FHx, SHx, unless otherwise noted    ALLERGIES & MEDICATIONS  --------------------------------------------------------------------------------  Allergies    Orange (Other)  Originally Entered as [HIVES] reaction to [sulfur] (Unknown)  penicillin (Unknown)  sulfADIAZINE (Unknown)    Standing Inpatient Medications  aspirin  chewable 81 milliGRAM(s) Oral daily  atorvastatin 20 milliGRAM(s) Oral at bedtime  chlorhexidine 0.12% Liquid 15 milliLiter(s) Oral Mucosa every 12 hours  chlorhexidine 4% Liquid 1 Application(s) Topical <User Schedule>  collagenase Ointment 1 Application(s) Topical two times a day  Dakins Solution - 1/2 Strength 1 Application(s) Topical two times a day  DAPTOmycin IVPB 775 milliGRAM(s) IV Intermittent every 48 hours  dextrose 40% Gel 15 Gram(s) Oral once  dextrose 5%. 1000 milliLiter(s) IV Continuous <Continuous>  dextrose 5%. 1000 milliLiter(s) IV Continuous <Continuous>  dextrose 50% Injectable 25 Gram(s) IV Push once  dextrose 50% Injectable 12.5 Gram(s) IV Push once  dextrose 50% Injectable 25 Gram(s) IV Push once  epoetin raven-epbx (RETACRIT) Injectable 8000 Unit(s) IV Push <User Schedule>  glucagon  Injectable 1 milliGRAM(s) IntraMuscular once  insulin glargine Injectable (LANTUS) 20 Unit(s) SubCutaneous at bedtime  insulin lispro (ADMELOG) corrective regimen sliding scale   SubCutaneous three times a day before meals  insulin lispro Injectable (ADMELOG) 8 Unit(s) SubCutaneous three times a day before meals  levothyroxine 100 MICROGram(s) Oral daily  lidocaine 1%/epinephrine 1:100,000 Inj 40 milliLiter(s) Local Injection once  methadone    Tablet 5 milliGRAM(s) Oral daily  metoclopramide 10 milliGRAM(s) Oral three times a day before meals  metoprolol tartrate 12.5 milliGRAM(s) Oral two times a day  midodrine 10 milliGRAM(s) Oral every 8 hours  norepinephrine Infusion 0.05 MICROgram(s)/kG/Min IV Continuous <Continuous>  pantoprazole   Suspension 40 milliGRAM(s) Oral before breakfast  QUEtiapine 100 milliGRAM(s) Oral two times a day  warfarin 2.5 milliGRAM(s) Oral once    PRN Inpatient Medications  acetaminophen   Tablet .. 650 milliGRAM(s) Oral every 6 hours PRN      VITALS/PHYSICAL EXAM  --------------------------------------------------------------------------------  T(C): 37.1 (01-29-21 @ 12:00), Max: 37.2 (01-29-21 @ 00:00)  HR: 112 (01-29-21 @ 13:00) (84 - 112)  BP: 136/64 (01-29-21 @ 13:00) (106/54 - 142/76)  RR: 18 (01-29-21 @ 13:00) (7 - 27)  SpO2: 100% (01-29-21 @ 13:00) (99% - 100%)  Wt(kg): --        01-28-21 @ 07:01  -  01-29-21 @ 07:00  --------------------------------------------------------  IN: 291 mL / OUT: 3200 mL / NET: -2909 mL    01-29-21 @ 07:01  -  01-29-21 @ 13:53  --------------------------------------------------------  IN: 36 mL / OUT: 0 mL / NET: 36 mL      Physical Exam:  	Gen: Trached  	Pulm: CTA B/L  	CV: RRR, S1S2  	Abd: +BS, soft, nondistended  	: Scrotal edema  	LE: Warm,  edema  	Vascular access: AVF    LABS/STUDIES  --------------------------------------------------------------------------------              7.3    11.36 >-----------<  268      [01-29-21 @ 09:09]              23.3     132  |  101  |  89  ----------------------------<  70      [01-29-21 @ 04:30]  4.0   |  22  |  3.3        Ca     7.3     [01-29-21 @ 04:30]      Mg     1.9     [01-29-21 @ 04:30]      Phos  5.6     [01-29-21 @ 04:30]    TPro  4.5  /  Alb  1.5  /  TBili  1.2  /  DBili  x   /  AST  40  /  ALT  57  /  AlkPhos  98  [01-29-21 @ 04:30]    PT/INR: PT 32.10, INR 2.79       [01-29-21 @ 04:30]  PTT: 33.2       [01-29-21 @ 04:30]      Creatinine Trend:  SCr 3.3 [01-29 @ 04:30]  SCr 6.2 [01-28 @ 04:30]  SCr 5.2 [01-27 @ 04:30]  SCr 6.1 [01-26 @ 04:30]  SCr 5.6 [01-25 @ 05:00]    Urinalysis - [01-06-21 @ 18:43]      Color Yellow / Appearance Slightly Turbid / SG 1.018 / pH 6.5      Gluc 100 mg/dL / Ketone Negative  / Bili Negative / Urobili <2 mg/dL       Blood Moderate / Protein 300 mg/dL / Leuk Est Small / Nitrite Negative       /  / Hyaline 114 / Gran  / Sq Epi  / Non Sq Epi 1 / Bacteria Negative      Ferritin 983      [01-24-21 @ 06:00]  Vitamin D (25OH) 45      [01-15-21 @ 12:06]  HbA1c 7.1      [05-21-19 @ 04:30]  TSH 4.57      [01-01-21 @ 17:56]

## 2021-01-29 NOTE — PROCEDURE NOTE - NSSITEPREP_SKIN_A_CORE
chlorhexidine/povidone iodine (if allergic to chlorhexidine)
chlorhexidine

## 2021-01-29 NOTE — PROGRESS NOTE ADULT - ASSESSMENT
ASSESSMENT/PLAN  - acute hypoxic resp failure  - covid 19 pneumonia  - ESRD on HD  - DM,   elevated WBC       SUGGEST:  - estimated REE by PSE 2230 kcal/d and protein needs ~ 139 gm/d  - last phos level was 1/25 - repeat with am labs tomorrow  - continue with  Peptamen AF at 65 ml/h --> 119 gm protein (entirely whey source), 1872 kcal, meets recommended low n6:n3 ratio, 1300 total mg phos/d and 67 total mEq K per day  - glycemic control - problem when given higher carb dose  -   formula with high n6 content not ideal for any ARDS pt including covid (refer to CCN / ASPEN guidelines 4/1/20 and JPEN 9/20). Note that suggested formula is also low carb content.  - again note high MCV - check B12 and folate levels

## 2021-01-29 NOTE — PROGRESS NOTE ADULT - SUBJECTIVE AND OBJECTIVE BOX
OVERNIGHT EVENTS: events noted, diarrhea no drips, sp HD    Vital Signs Last 24 Hrs  T(C): 36.6 (29 Jan 2021 08:00), Max: 37.2 (29 Jan 2021 00:00)  T(F): 97.8 (29 Jan 2021 08:00), Max: 99 (29 Jan 2021 00:00)  HR: 92 (29 Jan 2021 08:00) (86 - 106)  BP: 138/59 (29 Jan 2021 08:00) (106/54 - 142/76)  BP(mean): 72 (29 Jan 2021 08:00) (68 - 105)  RR: 22 (29 Jan 2021 08:00) (7 - 24)  SpO2: 100% (29 Jan 2021 08:00) (99% - 100%)    PHYSICAL EXAMINATION:    GENERAL: ill looking    HEENT: Head is normocephalic and atraumatic.    NECK: Supple.    LUNGS: bl crackles    HEART: Irregular, CHEL 3/6    ABDOMEN: Soft, nontender, and nondistended.      EXTREMITIES: Without any cyanosis, clubbing, rash, lesions or edema.    NEUROLOGIC: Grossly intact.    SKIN: DTI      LABS:                        8.2    13.74 )-----------( 79       ( 29 Jan 2021 04:30 )             25.2     01-29    132<L>  |  101  |  89<HH>  ----------------------------<  70  4.0   |  22  |  3.3<H>    Ca    7.3<L>      29 Jan 2021 04:30  Phos  5.6     01-29  Mg     1.9     01-29    TPro  4.5<L>  /  Alb  1.5<L>  /  TBili  1.2  /  DBili  x   /  AST  40  /  ALT  57<H>  /  AlkPhos  98  01-29    PT/INR - ( 29 Jan 2021 04:30 )   PT: 32.10 sec;   INR: 2.79 ratio         PTT - ( 29 Jan 2021 04:30 )  PTT:33.2 sec    450/24/80/7    ps 7 h                  01-28-21 @ 07:01  -  01-29-21 @ 07:00  --------------------------------------------------------  IN: 291 mL / OUT: 3200 mL / NET: -2909 mL    01-29-21 @ 07:01  -  01-29-21 @ 08:53  --------------------------------------------------------  IN: 18 mL / OUT: 0 mL / NET: 18 mL        MICROBIOLOGY:      MEDICATIONS  (STANDING):  acetaminophen   Tablet .. 650 milliGRAM(s) Oral once  aspirin  chewable 81 milliGRAM(s) Oral daily  atorvastatin 20 milliGRAM(s) Oral at bedtime  chlorhexidine 0.12% Liquid 15 milliLiter(s) Oral Mucosa every 12 hours  chlorhexidine 4% Liquid 1 Application(s) Topical <User Schedule>  collagenase Ointment 1 Application(s) Topical two times a day  Dakins Solution - 1/2 Strength 1 Application(s) Topical two times a day  DAPTOmycin IVPB 775 milliGRAM(s) IV Intermittent every 48 hours  dextrose 40% Gel 15 Gram(s) Oral once  dextrose 5%. 1000 milliLiter(s) (50 mL/Hr) IV Continuous <Continuous>  dextrose 5%. 1000 milliLiter(s) (100 mL/Hr) IV Continuous <Continuous>  dextrose 50% Injectable 25 Gram(s) IV Push once  dextrose 50% Injectable 12.5 Gram(s) IV Push once  dextrose 50% Injectable 25 Gram(s) IV Push once  epoetin arven-epbx (RETACRIT) Injectable 8000 Unit(s) IV Push <User Schedule>  glucagon  Injectable 1 milliGRAM(s) IntraMuscular once  heparin  Infusion 1800 Unit(s)/Hr (18 mL/Hr) IV Continuous <Continuous>  insulin glargine Injectable (LANTUS) 20 Unit(s) SubCutaneous at bedtime  insulin lispro (ADMELOG) corrective regimen sliding scale   SubCutaneous three times a day before meals  insulin lispro Injectable (ADMELOG) 8 Unit(s) SubCutaneous three times a day before meals  levothyroxine 100 MICROGram(s) Oral daily  lidocaine 1%/epinephrine 1:100,000 Inj 40 milliLiter(s) Local Injection once  methadone    Tablet 5 milliGRAM(s) Oral daily  metoprolol tartrate 12.5 milliGRAM(s) Oral two times a day  midodrine 10 milliGRAM(s) Oral every 8 hours  norepinephrine Infusion 0.05 MICROgram(s)/kG/Min (9.08 mL/Hr) IV Continuous <Continuous>  pantoprazole   Suspension 40 milliGRAM(s) Oral before breakfast  QUEtiapine 100 milliGRAM(s) Oral two times a day    MEDICATIONS  (PRN):  acetaminophen   Tablet .. 650 milliGRAM(s) Oral every 6 hours PRN Temp greater or equal to 38C (100.4F), Mild Pain (1 - 3)      RADIOLOGY & ADDITIONAL STUDIES:

## 2021-01-29 NOTE — PROGRESS NOTE ADULT - SUBJECTIVE AND OBJECTIVE BOX
Patient is a 60y old  Male who presents with a chief complaint of s/p fall. (29 Jan 2021 13:53)  pt seen and evaluated   remain vented via trach  on peg tube feed     ICU Vital Signs Last 24 Hrs  T(C): 37.1 (29 Jan 2021 12:00), Max: 37.2 (29 Jan 2021 00:00)  T(F): 98.8 (29 Jan 2021 12:00), Max: 99 (29 Jan 2021 00:00)  HR: 112 (29 Jan 2021 13:00) (84 - 112)  BP: 136/64 (29 Jan 2021 13:00) (106/54 - 142/76)  BP(mean): 92 (29 Jan 2021 13:00) (68 - 105)  RR: 18 (29 Jan 2021 13:00) (7 - 27)  SpO2: 100% (29 Jan 2021 13:00) (99% - 100%)      Drug Dosing Weight  Height (cm): 177.8 (21 Jan 2021 08:00)  Weight (kg): 97 (21 Jan 2021 08:00)  BMI (kg/m2): 30.7 (21 Jan 2021 08:00)  BSA (m2): 2.15 (21 Jan 2021 08:00)    I&O's Detail    28 Jan 2021 07:01  -  29 Jan 2021 07:00  --------------------------------------------------------  IN:    Heparin: 216 mL    Peptamen A.F.: 75 mL  Total IN: 291 mL    OUT:    Other (mL): 2500 mL    Rectal Tube (mL): 700 mL  Total OUT: 3200 mL    Total NET: -2909 mL      29 Jan 2021 07:01  -  29 Jan 2021 14:18  --------------------------------------------------------  IN:    Heparin: 36 mL  Total IN: 36 mL    OUT:  Total OUT: 0 mL    Total NET: 36 mL    PHYSICAL EXAM:  Constitutional:  remains  vented via trach  Gastrointestinal:  soft +peg   MEDICATIONS  (STANDING):  aspirin  chewable 81 milliGRAM(s) Oral daily  atorvastatin 20 milliGRAM(s) Oral at bedtime  chlorhexidine 0.12% Liquid 15 milliLiter(s) Oral Mucosa every 12 hours  chlorhexidine 4% Liquid 1 Application(s) Topical <User Schedule>  collagenase Ointment 1 Application(s) Topical two times a day  Dakins Solution - 1/2 Strength 1 Application(s) Topical two times a day  DAPTOmycin IVPB 775 milliGRAM(s) IV Intermittent every 48 hours  dextrose 40% Gel 15 Gram(s) Oral once  dextrose 5%. 1000 milliLiter(s) (50 mL/Hr) IV Continuous <Continuous>  dextrose 5%. 1000 milliLiter(s) (100 mL/Hr) IV Continuous <Continuous>  dextrose 50% Injectable 25 Gram(s) IV Push once  dextrose 50% Injectable 12.5 Gram(s) IV Push once  dextrose 50% Injectable 25 Gram(s) IV Push once  epoetin raven-epbx (RETACRIT) Injectable 8000 Unit(s) IV Push <User Schedule>  glucagon  Injectable 1 milliGRAM(s) IntraMuscular once  insulin glargine Injectable (LANTUS) 20 Unit(s) SubCutaneous at bedtime  insulin lispro (ADMELOG) corrective regimen sliding scale   SubCutaneous three times a day before meals  insulin lispro Injectable (ADMELOG) 8 Unit(s) SubCutaneous three times a day before meals  levothyroxine 100 MICROGram(s) Oral daily  lidocaine 1%/epinephrine 1:100,000 Inj 40 milliLiter(s) Local Injection once  methadone    Tablet 5 milliGRAM(s) Oral daily  metoclopramide 10 milliGRAM(s) Oral three times a day before meals  metoprolol tartrate 12.5 milliGRAM(s) Oral two times a day  midodrine 10 milliGRAM(s) Oral every 8 hours  norepinephrine Infusion 0.05 MICROgram(s)/kG/Min (9.08 mL/Hr) IV Continuous <Continuous>  pantoprazole   Suspension 40 milliGRAM(s) Oral before breakfast  QUEtiapine 100 milliGRAM(s) Oral two times a day  warfarin 2.5 milliGRAM(s) Oral once      Diet, NPO with Tube Feed:   Tube Feeding Modality: Gastrostomy  Peptamen A.F. Formula  Total Volume for 24 Hours (mL): 1560  Continuous  Until Goal Tube Feed Rate (mL per Hour): 65  Tube Feed Duration (in Hours): 24  Tube Feed Start Time: 21:15 (01-28-21 @ 21:03)      LABS  01-29    132<L>  |  101  |  89<HH>  ----------------------------<  70  4.0   |  22  |  3.3<H>    Ca    7.3<L>      29 Jan 2021 04:30    Mg     1.9     01-29    TPro  4.5<L>  /  Alb  1.5<L>  /  TBili  1.2  /  DBili  x   /  AST  40  /  ALT  57<H>  /  AlkPhos  98  01-29                          7.3    11.36 )-----------( 268      ( 29 Jan 2021 09:09 )             23.3     CAPILLARY BLOOD GLUCOSE      POCT Blood Glucose.: 96 mg/dL (29 Jan 2021 12:01)  POCT Blood Glucose.: 56 mg/dL (29 Jan 2021 11:20)  POCT Blood Glucose.: 130 mg/dL (29 Jan 2021 06:26)  POCT Blood Glucose.: 166 mg/dL (28 Jan 2021 21:16)  POCT Blood Glucose.: 152 mg/dL (28 Jan 2021 17:59)       RADIOLOGY STUDIES               Patient is a 60y old  Male who presents with a chief complaint of s/p fall. (29 Jan 2021 13:53)  pt seen and evaluated   remain vented via trach  on peg tube feed     ICU Vital Signs Last 24 Hrs  T(C): 37.1 (29 Jan 2021 12:00), Max: 37.2 (29 Jan 2021 00:00)  T(F): 98.8 (29 Jan 2021 12:00), Max: 99 (29 Jan 2021 00:00)  HR: 112 (29 Jan 2021 13:00) (84 - 112)  BP: 136/64 (29 Jan 2021 13:00) (106/54 - 142/76)  BP(mean): 92 (29 Jan 2021 13:00) (68 - 105)  RR: 18 (29 Jan 2021 13:00) (7 - 27)  SpO2: 100% (29 Jan 2021 13:00) (99% - 100%)      Drug Dosing Weight  Height (cm): 177.8 (21 Jan 2021 08:00)  Weight (kg): 97 (21 Jan 2021 08:00)  BMI (kg/m2): 30.7 (21 Jan 2021 08:00)  BSA (m2): 2.15 (21 Jan 2021 08:00)    I&O's Detail    28 Jan 2021 07:01  -  29 Jan 2021 07:00  --------------------------------------------------------  IN:    Heparin: 216 mL    Peptamen A.F.: 75 mL  Total IN: 291 mL    OUT:    Other (mL): 2500 mL    Rectal Tube (mL): 700 mL  Total OUT: 3200 mL    Total NET: -2909 mL      29 Jan 2021 07:01  -  29 Jan 2021 14:18  --------------------------------------------------------  IN:    Heparin: 36 mL  Total IN: 36 mL    OUT:  Total OUT: 0 mL    Total NET: 36 mL    PHYSICAL EXAM:  Constitutional:  remains  vented via trach  Gastrointestinal:  soft +peg   MEDICATIONS  (STANDING):  aspirin  chewable 81 milliGRAM(s) Oral daily  atorvastatin 20 milliGRAM(s) Oral at bedtime  chlorhexidine 0.12% Liquid 15 milliLiter(s) Oral Mucosa every 12 hours  chlorhexidine 4% Liquid 1 Application(s) Topical <User Schedule>  collagenase Ointment 1 Application(s) Topical two times a day  Dakins Solution - 1/2 Strength 1 Application(s) Topical two times a day  DAPTOmycin IVPB 775 milliGRAM(s) IV Intermittent every 48 hours  dextrose 40% Gel 15 Gram(s) Oral once  dextrose 5%. 1000 milliLiter(s) (50 mL/Hr) IV Continuous <Continuous>  dextrose 5%. 1000 milliLiter(s) (100 mL/Hr) IV Continuous <Continuous>  dextrose 50% Injectable 25 Gram(s) IV Push once  dextrose 50% Injectable 12.5 Gram(s) IV Push once  dextrose 50% Injectable 25 Gram(s) IV Push once  epoetin raven-epbx (RETACRIT) Injectable 8000 Unit(s) IV Push <User Schedule>  glucagon  Injectable 1 milliGRAM(s) IntraMuscular once  insulin glargine Injectable (LANTUS) 20 Unit(s) SubCutaneous at bedtime  insulin lispro (ADMELOG) corrective regimen sliding scale   SubCutaneous three times a day before meals  insulin lispro Injectable (ADMELOG) 8 Unit(s) SubCutaneous three times a day before meals  levothyroxine 100 MICROGram(s) Oral daily  lidocaine 1%/epinephrine 1:100,000 Inj 40 milliLiter(s) Local Injection once  methadone    Tablet 5 milliGRAM(s) Oral daily  metoclopramide 10 milliGRAM(s) Oral three times a day before meals  metoprolol tartrate 12.5 milliGRAM(s) Oral two times a day  midodrine 10 milliGRAM(s) Oral every 8 hours  norepinephrine Infusion 0.05 MICROgram(s)/kG/Min (9.08 mL/Hr) IV Continuous <Continuous>  pantoprazole   Suspension 40 milliGRAM(s) Oral before breakfast  QUEtiapine 100 milliGRAM(s) Oral two times a day  warfarin 2.5 milliGRAM(s) Oral once      Diet, NPO with Tube Feed:   Tube Feeding Modality: Gastrostomy  Peptamen A.F. Formula  Total Volume for 24 Hours (mL): 1560  Continuous  Until Goal Tube Feed Rate (mL per Hour): 65  Tube Feed Duration (in Hours): 24  Tube Feed Start Time: 21:15 (01-28-21 @ 21:03)      LABS  01-29    132<L>  |  101  |  89<HH>  ----------------------------<  70  4.0   |  22  |  3.3<H>    Ca    7.3<L>      29 Jan 2021 04:30    Mg     1.9     01-29    TPro  4.5<L>  /  Alb  1.5<L>  /  TBili  1.2  /  DBili  x   /  AST  40  /  ALT  57<H>  /  AlkPhos  98  01-29                          7.3    11.36 )-----------( 268      ( 29 Jan 2021 09:09 )             23.3     CAPILLARY BLOOD GLUCOSE  POCT Blood Glucose.: 96 mg/dL (29 Jan 2021 12:01)  POCT Blood Glucose.: 56 mg/dL (29 Jan 2021 11:20)  POCT Blood Glucose.: 130 mg/dL (29 Jan 2021 06:26)  POCT Blood Glucose.: 166 mg/dL (28 Jan 2021 21:16)  POCT Blood Glucose.: 152 mg/dL (28 Jan 2021 17:59)

## 2021-01-29 NOTE — BRIEF OPERATIVE NOTE - NSICDXBRIEFPREOP_GEN_ALL_CORE_FT
PRE-OP DIAGNOSIS:  Pressure sore on sacrum 21-Jan-2021 20:24:52  Jewel Diaz  
PRE-OP DIAGNOSIS:  Pressure sore on sacrum 21-Jan-2021 20:24:52  Jewel Diaz  Acute respiratory failure due to COVID-19 18-Jan-2021 14:39:25  Dav Taylor  
PRE-OP DIAGNOSIS:  Acute respiratory failure due to COVID-19 18-Jan-2021 14:39:25  Dav Taylor

## 2021-01-29 NOTE — BRIEF OPERATIVE NOTE - NSICDXBRIEFPOSTOP_GEN_ALL_CORE_FT
POST-OP DIAGNOSIS:  Pressure sore on sacrum 21-Jan-2021 20:25:13  Jewel Diaz  
POST-OP DIAGNOSIS:  Pressure sore on sacrum 21-Jan-2021 20:25:13  eJwel Diaz  Acute respiratory failure due to COVID-19 18-Jan-2021 14:39:44  Dav Taylor  
POST-OP DIAGNOSIS:  Acute respiratory failure due to COVID-19 18-Jan-2021 14:39:44  Dav Taylor

## 2021-01-29 NOTE — PROCEDURE NOTE - NSPROCDETAILS_GEN_ALL_CORE
location identified, draped/prepped, sterile technique used/sterile dressing applied/sterile technique, catheter placed/ultrasound guidance
guidewire recovered/lumen(s) aspirated and flushed/sterile dressing applied/sterile technique, catheter placed/ultrasound guidance with use of sterile gel and probe cove
location identified, draped/prepped, sterile technique used, needle inserted/introduced/positive blood return obtained via catheter/connected to a pressurized flush line/sutured in place/all materials/supplies accounted for at end of procedure
guidewire recovered/lumen(s) aspirated and flushed/sterile dressing applied/sterile technique, catheter placed/ultrasound guidance with use of sterile gel and probe cove

## 2021-01-29 NOTE — PROGRESS NOTE ADULT - SUBJECTIVE AND OBJECTIVE BOX
SUBJECTIVE:    Patient is a 60y old Male who presents with a chief complaint of s/p fall. (29 Jan 2021 08:52)    Currently admitted to medicine with the primary diagnosis of Fever       Today is hospital day 28d. No acute events overnight. This morning ptnt vomited while on feeds, restarted on Reglan.       PAST MEDICAL & SURGICAL HISTORY  PAD (peripheral artery disease)  multiple toe amputations    Anemia  chronic anemia - s/p transfusion 2018    Thyroid cancer    Chronic leg pain    OA (osteoarthritis)    SOB (shortness of breath) on exertion    ESRD (end stage renal disease)  2 yrs    Atrial fibrillation  on warfarin    Right sided weakness    Stroke  8 yrs ago    Hypertension    High blood cholesterol    Diabetes mellitus, type 2    Dialysis patient  Mon, Wednesday, Friday ([facility])    Smoker    H/O thyroid nodule    H/O gastroesophageal reflux (GERD)    History of tonsillectomy    History of surgery  Multiple toe amputations (amp 4 1/2 left toes; has 1/2 left mid toe; amp right mid toe)    A-V fistula  left AVF      SOCIAL HISTORY:  Negative for smoking/alcohol/drug use.     ALLERGIES:  Orange (Other)  Originally Entered as [HIVES] reaction to [sulfur] (Unknown)  penicillin (Unknown)  sulfADIAZINE (Unknown)    MEDICATIONS:  STANDING MEDICATIONS  acetaminophen   Tablet .. 650 milliGRAM(s) Oral once  aspirin  chewable 81 milliGRAM(s) Oral daily  atorvastatin 20 milliGRAM(s) Oral at bedtime  chlorhexidine 0.12% Liquid 15 milliLiter(s) Oral Mucosa every 12 hours  chlorhexidine 4% Liquid 1 Application(s) Topical <User Schedule>  collagenase Ointment 1 Application(s) Topical two times a day  Dakins Solution - 1/2 Strength 1 Application(s) Topical two times a day  DAPTOmycin IVPB 775 milliGRAM(s) IV Intermittent every 48 hours  dextrose 40% Gel 15 Gram(s) Oral once  dextrose 5%. 1000 milliLiter(s) IV Continuous <Continuous>  dextrose 5%. 1000 milliLiter(s) IV Continuous <Continuous>  dextrose 50% Injectable 25 Gram(s) IV Push once  dextrose 50% Injectable 12.5 Gram(s) IV Push once  dextrose 50% Injectable 25 Gram(s) IV Push once  epoetin raven-epbx (RETACRIT) Injectable 8000 Unit(s) IV Push <User Schedule>  glucagon  Injectable 1 milliGRAM(s) IntraMuscular once  insulin glargine Injectable (LANTUS) 20 Unit(s) SubCutaneous at bedtime  insulin lispro (ADMELOG) corrective regimen sliding scale   SubCutaneous three times a day before meals  insulin lispro Injectable (ADMELOG) 8 Unit(s) SubCutaneous three times a day before meals  levothyroxine 100 MICROGram(s) Oral daily  lidocaine 1%/epinephrine 1:100,000 Inj 40 milliLiter(s) Local Injection once  methadone    Tablet 5 milliGRAM(s) Oral daily  metoclopramide 10 milliGRAM(s) Oral three times a day before meals  metoprolol tartrate 12.5 milliGRAM(s) Oral two times a day  midodrine 10 milliGRAM(s) Oral every 8 hours  norepinephrine Infusion 0.05 MICROgram(s)/kG/Min IV Continuous <Continuous>  pantoprazole   Suspension 40 milliGRAM(s) Oral before breakfast  QUEtiapine 100 milliGRAM(s) Oral two times a day  warfarin 2.5 milliGRAM(s) Oral once    PRN MEDICATIONS  acetaminophen   Tablet .. 650 milliGRAM(s) Oral every 6 hours PRN    VITALS:   T(F): 97.8  HR: 90  BP: 134/58  RR: 17  SpO2: 100%    PHYSICAL EXAM:  GEN: awake, alert   LUNGS: Decreased breath sounds, mild wheezing   HEART: S1/S2 present. Irregular.   ABD: Soft, non-tender, peg present, bowel sounds present.  EXT: Edema of upper extremities and lower extremities   NEURO: following commands     [place] present     LABS:                        8.2    13.74 )-----------( 79       ( 29 Jan 2021 04:30 )             25.2     01-29    132<L>  |  101  |  89<HH>  ----------------------------<  70  4.0   |  22  |  3.3<H>    Ca    7.3<L>      29 Jan 2021 04:30  Phos  5.6     01-29  Mg     1.9     01-29    TPro  4.5<L>  /  Alb  1.5<L>  /  TBili  1.2  /  DBili  x   /  AST  40  /  ALT  57<H>  /  AlkPhos  98  01-29    PT/INR - ( 29 Jan 2021 04:30 )   PT: 32.10 sec;   INR: 2.79 ratio         PTT - ( 29 Jan 2021 04:30 )  PTT:33.2 sec

## 2021-01-30 NOTE — PHARMACOTHERAPY INTERVENTION NOTE - INTERVENTION TYPE RECOOMEND
Therapy Recommended - Alternative treatment
Dose Optimization/Non-renal Dose Adjustment
Dose Optimization/Non-renal Dose Adjustment
Therapy Recommended - Alternative treatment
Dose Adjustment - Renal Dose
Dose Adjustment - Renal Dose
Therapy Recommended - Med Rec related
Therapy Recommended - Med Rec related

## 2021-01-30 NOTE — PROGRESS NOTE ADULT - SUBJECTIVE AND OBJECTIVE BOX
Patient is a 60y old  Male who presents with a chief complaint of s/p fall. (29 Jan 2021 14:17)        HPI:  59 yo M w/ PMH of Afib on coumadin, DM2, ESRD on HD MWF, HLD, HTN, CVA presents with weakness & fall. Patient notes when he woke up today, he felt generally weak, slid from the bed onto the floor landing on his bottom, denies head injury/loc. Patient notes that he has been having increased sputum production for the past few days, endorses mild shortness of breath without chest pain/fever/diarrhea/vomiting. Patient had full dialysis session wednesday, notes next session is tomorrow due to the holidays. Denies any focal weakness or pain currently.     On my assessment, pt is obtunded and unable to provide HPI. HPI obtained by Sister who is his primary care taker. As per sister, pt has been having increased frequency of imbalance/fall since 2 days ago. Decreased PO intake.       In ED, pt underwent CT head which is negative. CXR performed and shows possible RLL PNA. Given cefepime and vancomycin. Also found to be COVID+. no drips (01 Jan 2021 14:01)    Pt evaluated on AM rounds.  Interval Events: No overnight events.    REVIEW OF SYSTEMS:   see HPI      OBJECTIVE:  ICU Vital Signs Last 24 Hrs  T(C): 36.2 (29 Jan 2021 16:00), Max: 37.1 (29 Jan 2021 12:00)  T(F): 97.2 (29 Jan 2021 16:00), Max: 98.8 (29 Jan 2021 12:00)  HR: 92 (30 Jan 2021 03:30) (84 - 112)  BP: 113/41 (30 Jan 2021 03:00) (98/43 - 144/56)  BP(mean): 64 (30 Jan 2021 03:00) (51 - 92)  RR: 24 (30 Jan 2021 03:30) (8 - 28)  SpO2: 98% (30 Jan 2021 03:30) (94% - 100%)    Mode: AC/ CMV (Assist Control/ Continuous Mandatory Ventilation), RR (machine): 24, TV (machine): 450, FiO2: 40, PEEP: 7, ITime: 1, MAP: 13, PIP: 19    01-29 @ 07:01 - 01-30 @ 07:00  --------------------------------------------------------  IN: 606 mL / OUT: 200 mL / NET: 406 mL      CAPILLARY BLOOD GLUCOSE      POCT Blood Glucose.: 167 mg/dL (30 Jan 2021 07:03)        PHYSICAL EXAM:     · CONSTITUTIONAL:   not septic appearing,   well nourished,   NAD    · ENMT:   Airway patent,   Nasal mucosa clear.  Mouth with normal mucosa.   No thrush    · EYES:   Clear bilaterally,   pupils equal,   round and reactive to light.    · CARDIAC:   Normal rate,   regular rhythm.    Heart sounds S1, S2.   No murmurs, no rubs or gallops on auscultation  no edema        CAROTID:   normal systolic impulse  no bruits    · RESPIRATORY:   rales  normal chest expansion  tachypnea,  no retractions or use of accessory muscles  palpation of chest is normal with no fremitus  percussion of chest demonstrates no hyperresonance or dullness    · GASTROINTESTINAL:  Abdomen soft,   non-tender,   + BS  liver/spleen not palpable    · MUSCULOSKELETAL:   no clubbing, cyanosis      · NEUROLOGICAL:   Unavailable as the patient is sedated.        · SKIN:   Skin normal color for race,   warm, dry   No evidence of rash.    · PSYCHIATRIC:   stable  no threat to self or others      · HEME LYMPH:   no splenomegaly.  No cervical  lymphadenopathy.  no inguinal lymphadenopathy    HOSPITAL MEDICATIONS:  MEDICATIONS  (STANDING):  aspirin  chewable 81 milliGRAM(s) Oral daily  atorvastatin 20 milliGRAM(s) Oral at bedtime  chlorhexidine 0.12% Liquid 15 milliLiter(s) Oral Mucosa every 12 hours  chlorhexidine 4% Liquid 1 Application(s) Topical <User Schedule>  collagenase Ointment 1 Application(s) Topical two times a day  Dakins Solution - 1/2 Strength 1 Application(s) Topical two times a day  DAPTOmycin IVPB 775 milliGRAM(s) IV Intermittent every 48 hours  dextrose 40% Gel 15 Gram(s) Oral once  dextrose 5%. 1000 milliLiter(s) (50 mL/Hr) IV Continuous <Continuous>  dextrose 5%. 1000 milliLiter(s) (100 mL/Hr) IV Continuous <Continuous>  dextrose 50% Injectable 25 Gram(s) IV Push once  dextrose 50% Injectable 12.5 Gram(s) IV Push once  dextrose 50% Injectable 25 Gram(s) IV Push once  epoetin raven-epbx (RETACRIT) Injectable 8000 Unit(s) IV Push <User Schedule>  glucagon  Injectable 1 milliGRAM(s) IntraMuscular once  insulin glargine Injectable (LANTUS) 20 Unit(s) SubCutaneous at bedtime  insulin lispro (ADMELOG) corrective regimen sliding scale   SubCutaneous three times a day before meals  insulin lispro Injectable (ADMELOG) 8 Unit(s) SubCutaneous three times a day before meals  levothyroxine 100 MICROGram(s) Oral daily  lidocaine 1%/epinephrine 1:100,000 Inj 40 milliLiter(s) Local Injection once  methadone    Tablet 5 milliGRAM(s) Oral daily  metoclopramide 10 milliGRAM(s) Oral three times a day before meals  metoprolol tartrate 12.5 milliGRAM(s) Oral two times a day  midodrine 10 milliGRAM(s) Oral every 8 hours  norepinephrine Infusion 0.05 MICROgram(s)/kG/Min (9.08 mL/Hr) IV Continuous <Continuous>  pantoprazole   Suspension 40 milliGRAM(s) Oral before breakfast  QUEtiapine 100 milliGRAM(s) Oral two times a day    MEDICATIONS  (PRN):  acetaminophen   Tablet .. 650 milliGRAM(s) Oral every 6 hours PRN Temp greater or equal to 38C (100.4F), Mild Pain (1 - 3)    lactated ringers Bolus:   250 milliLiter(s), IV Bolus, once, infuse over 30 Minute(s), Stop After 1 Doses  sodium chloride 0.9%.: Solution, 1000 milliLiter(s) infuse at 50 mL/Hr      LABS:                        7.0    10.18 )-----------( 282      ( 30 Jan 2021 05:00 )             22.4     01-30    135  |  95<L>  |  77<HH>  ----------------------------<  143<H>  5.0   |  26  |  5.9<HH>    Ca    7.9<L>      30 Jan 2021 05:00  Phos  5.6     01-29  Mg     2.7     01-30    TPro  5.5<L>  /  Alb  2.7<L>  /  TBili  1.0  /  DBili  x   /  AST  27  /  ALT  12  /  AlkPhos  112  01-30    PT/INR - ( 30 Jan 2021 05:00 )   PT: 14.80 sec;   INR: 1.29 ratio         PTT - ( 30 Jan 2021 05:00 )  PTT:33.8 sec              Mode: AC/ CMV (Assist Control/ Continuous Mandatory Ventilation)  RR (machine): 24  TV (machine): 450  FiO2: 40  PEEP: 7  ITime: 1  MAP: 13  PIP: 19          RADIOLOGY: I personally reviewed latest CXR and other pertinent films.

## 2021-01-30 NOTE — PROGRESS NOTE ADULT - ASSESSMENT
ESRD (due to DM) on HD TTS  s/p Fall  altered mental status   Covid-19 PNA / bacterial PNA   fluid overload  hyponatremia  hyperkalemia  DM  HTN  afib on coumadin  CVA  NSTEMI    plan:    HD today: 3 hours, opti 200 dialyzer, 2K bath, 3.5L UF  Change TF to Nepro  left arm precautions  covid isolation  f/u cardio / f/u pulm  icu care

## 2021-01-30 NOTE — PHARMACOTHERAPY INTERVENTION NOTE - COMMENTS
-level ~25, will hold vancomycin today, recommended vancomycin 750mg IV post -HD start 1/16/2021
-metoclopramide 10mg ng q8h, ESRD on HD-recommended adjusting 5mg ng q8h
K 6, pt received Lokelma 5g @ ~7am, d/w team, recommended increasing 10g q8h
Pt  Scr 5, and had recent dialysis on 1/2. Vanco 1g iv q12hr ordered; rec 1250mg x1 once dosing  Last received vanco on 1/1, dialysis on 1/2. OK to give without checking level.
Pt on warfarin 2.5mg daily at home. Pt INR been elevated throughout hospital admission without any receipt of warfarin dosage. Rec to dose conservatively 1mg rather than resuming home dose of 2.5mg.
pt now intubated, recommended changing pantoprazole to suspension 40mg daily & ASA to chewable 81mg daily
K 4.5, on lokelma 10g q8h-d/w [facility], d/c
-recommended metoclopramide 5mg IV q8h
-plan continue meropenem, completed 7days, will reorder  500mg IV q24h   -continue vancomycin, recommended 750mg IV Tu Th Sat post-HD
-pt intubated, recommended  --changing metoprolol ER to 12.5mg IR q12h  --holding nicotine patch for now

## 2021-01-30 NOTE — PROGRESS NOTE ADULT - ASSESSMENT
IMPRESSION:    Acute hypoxemic resp failure failed weaning SP trach and PEG   NSTEMI  Afib was on coumadin  Severe COVID PNA  Superimposed bacteria PNA  Sacral ulcer SP debridement   bleeding arounD peg resolved  HD  thormbocytopenia    PLAN:    CNS:  Seroquel. dc methadone    HEENT: Oral care.    PULMONARY: Vent changes. Wean O2.  Monitor PPL and DP.  ps daily    CARDIOVASCULAR:  I<O as tolerated.      GI: GI prophylaxis.  PEG Feeding.  Reglan     RENAL: check lytes and replete PRN. Nephro F/UP result.      INFECTIOUS DISEASE:  FU cultures. ABX PER ID dapto    HEMATOLOGICAL: DVT Prophylaxis.  FU PTT , coumadin repeat cbc    ENDOCRINE:  Follow up FS.  Insulin protocol if needed.    MUSCULOSKELETAL:  bed rest.  Wound Care      discuss for vent unit    Prognosis poor    IMPRESSION:    Acute hypoxemic resp failure failed weaning SP trach and PEG   NSTEMI  Afib was on coumadin  Severe COVID PNA  Superimposed bacteria PNA  Sacral ulcer SP debridement   bleeding arounD peg resolved  HD  thormbocytopenia    PLAN:    CNS:  Seroquel. dc methadone    HEENT: Oral care.    PULMONARY: Vent changes. Wean O2.  Monitor PPL and DP.  ps daily    CARDIOVASCULAR:  I<O as tolerated.      GI: GI prophylaxis.  PEG Feeding.  Reglan     RENAL: check lytes and replete PRN. Nephro F/UP result.      INFECTIOUS DISEASE:  FU cultures. ABX PER ID dapto    HEMATOLOGICAL: DVT Prophylaxis.  FU PTT , coumadin repeat cbc    ENDOCRINE:  Follow up FS.  Insulin protocol if needed.    MUSCULOSKELETAL:  bed rest.  Wound Care      discuss for vent unit, or LTAC next week    Prognosis poor

## 2021-01-30 NOTE — PHARMACOTHERAPY INTERVENTION NOTE - PROVIDER CONTACTED
Ed Linares
Ed Linares
Jose, Ali
Jose, Ali
Ed Linares
Jami Krishnan Faarussell Jones
Shayna Hooks
Amalia Majano

## 2021-01-30 NOTE — PROGRESS NOTE ADULT - SUBJECTIVE AND OBJECTIVE BOX
[place] NEPHROLOGY FOLLOW UP NOTE  --------------------------------------------------------------------------------  24 hour events/subjective: Patient examined. Trached.    PAST HISTORY  --------------------------------------------------------------------------------  No significant changes to PMH, PSH, FHx, SHx, unless otherwise noted    ALLERGIES & MEDICATIONS  --------------------------------------------------------------------------------  Allergies    Orange (Other)  Originally Entered as [HIVES] reaction to [sulfur] (Unknown)  penicillin (Unknown)  sulfADIAZINE (Unknown)    Intolerances      Standing Inpatient Medications  aspirin  chewable 81 milliGRAM(s) Oral daily  atorvastatin 20 milliGRAM(s) Oral at bedtime  chlorhexidine 0.12% Liquid 15 milliLiter(s) Oral Mucosa every 12 hours  chlorhexidine 4% Liquid 1 Application(s) Topical <User Schedule>  collagenase Ointment 1 Application(s) Topical two times a day  Dakins Solution - 1/2 Strength 1 Application(s) Topical two times a day  DAPTOmycin IVPB 775 milliGRAM(s) IV Intermittent every 48 hours  dextrose 40% Gel 15 Gram(s) Oral once  dextrose 5%. 1000 milliLiter(s) IV Continuous <Continuous>  dextrose 5%. 1000 milliLiter(s) IV Continuous <Continuous>  dextrose 50% Injectable 25 Gram(s) IV Push once  dextrose 50% Injectable 12.5 Gram(s) IV Push once  dextrose 50% Injectable 25 Gram(s) IV Push once  epoetin raven-epbx (RETACRIT) Injectable 8000 Unit(s) IV Push <User Schedule>  glucagon  Injectable 1 milliGRAM(s) IntraMuscular once  insulin glargine Injectable (LANTUS) 20 Unit(s) SubCutaneous at bedtime  insulin lispro (ADMELOG) corrective regimen sliding scale   SubCutaneous three times a day before meals  insulin lispro Injectable (ADMELOG) 8 Unit(s) SubCutaneous three times a day before meals  levothyroxine 100 MICROGram(s) Oral daily  lidocaine 1%/epinephrine 1:100,000 Inj 40 milliLiter(s) Local Injection once  methadone    Tablet 5 milliGRAM(s) Oral daily  metoclopramide 10 milliGRAM(s) Oral three times a day before meals  metoprolol tartrate 12.5 milliGRAM(s) Oral two times a day  midodrine 10 milliGRAM(s) Oral every 8 hours  norepinephrine Infusion 0.05 MICROgram(s)/kG/Min IV Continuous <Continuous>  pantoprazole   Suspension 40 milliGRAM(s) Oral before breakfast  QUEtiapine 100 milliGRAM(s) Oral two times a day    PRN Inpatient Medications  acetaminophen   Tablet  650 milliGRAM(s) Oral every 6 hours PRN    VITALS/PHYSICAL EXAM  --------------------------------------------------------------------------------  T(C): 37.1 (01-30-21 @ 12:00), Max: 37.3 (01-30-21 @ 08:00)  HR: 84 (01-30-21 @ 12:00) (78 - 112)  BP: 120/53 (01-30-21 @ 12:00) (74/45 - 144/56)  RR: 21 (01-30-21 @ 12:00) (8 - 28)  SpO2: 99% (01-30-21 @ 12:00) (94% - 100%)    01-29-21 @ 07:01  -  01-30-21 @ 07:00  --------------------------------------------------------  IN: 606 mL / OUT: 250 mL / NET: 356 mL    01-30-21 @ 07:01  -  01-30-21 @ 12:38  --------------------------------------------------------  IN: 260 mL / OUT: 0 mL / NET: 260 mL      Physical Exam:  	Gen: Trached  	Pulm: CTA B/L  	CV: RRR, S1S2  	Abd: +BS, soft, nondistended  	: Scrotal edema  	LE: Warm,  edema  	Vascular access: AVF    LABS/STUDIES  --------------------------------------------------------------------------------              7.0    10.18 >-----------<  282      [01-30-21 @ 05:00]              22.4     135  |  95  |  77  ----------------------------<  143      [01-30-21 @ 05:00]  5.0   |  26  |  5.9        Ca     7.9     [01-30-21 @ 05:00]      Mg     2.7     [01-30-21 @ 05:00]      Phos  5.6     [01-29-21 @ 04:30]    TPro  5.5  /  Alb  2.7  /  TBili  1.0  /  DBili  x   /  AST  27  /  ALT  12  /  AlkPhos  112  [01-30-21 @ 05:00]    PT/INR: PT 14.80, INR 1.29       [01-30-21 @ 05:00]  PTT: 33.8       [01-30-21 @ 05:00]      Creatinine Trend:  SCr 5.9 [01-30 @ 05:00]  SCr 3.3 [01-29 @ 04:30]  SCr 6.2 [01-28 @ 04:30]  SCr 5.2 [01-27 @ 04:30]  SCr 6.1 [01-26 @ 04:30]    Urinalysis - [01-06-21 @ 18:43]      Color Yellow / Appearance Slightly Turbid / SG 1.018 / pH 6.5      Gluc 100 mg/dL / Ketone Negative  / Bili Negative / Urobili <2 mg/dL       Blood Moderate / Protein 300 mg/dL / Leuk Est Small / Nitrite Negative       /  / Hyaline 114 / Gran  / Sq Epi  / Non Sq Epi 1 / Bacteria Negative      Ferritin 983      [01-24-21 @ 06:00]  Vitamin D (25OH) 45      [01-15-21 @ 12:06]  HbA1c 7.1      [05-21-19 @ 04:30]  TSH 4.57      [01-01-21 @ 17:56]

## 2021-01-31 NOTE — PROGRESS NOTE ADULT - ASSESSMENT
ESRD (due to DM) on HD TTS  s/p Fall  altered mental status   Covid-19 PNA / bacterial PNA   fluid overload  hyponatremia  hyperkalemia  DM  HTN  afib on coumadin  CVA  NSTEMI    plan:    HD tuesday: 3 hours, opti 200 dialyzer, 2K bath, 3.5L UF  left arm precautions  covid isolation  f/u cardio / f/u pulm  icu care

## 2021-01-31 NOTE — PROGRESS NOTE ADULT - SUBJECTIVE AND OBJECTIVE BOX
[place] NEPHROLOGY FOLLOW UP NOTE  --------------------------------------------------------------------------------  24 hour events/subjective: Patient examined. Trached.    PAST HISTORY  --------------------------------------------------------------------------------  No significant changes to PMH, PSH, FHx, SHx, unless otherwise noted    ALLERGIES & MEDICATIONS  --------------------------------------------------------------------------------  Allergies    Orange (Other)  Originally Entered as [HIVES] reaction to [sulfur] (Unknown)  penicillin (Unknown)  sulfADIAZINE (Unknown)    Intolerances      Standing Inpatient Medications  aspirin  chewable 81 milliGRAM(s) Oral daily  atorvastatin 20 milliGRAM(s) Oral at bedtime  chlorhexidine 0.12% Liquid 15 milliLiter(s) Oral Mucosa every 12 hours  chlorhexidine 4% Liquid 1 Application(s) Topical <User Schedule>  collagenase Ointment 1 Application(s) Topical two times a day  Dakins Solution - 1/2 Strength 1 Application(s) Topical two times a day  DAPTOmycin IVPB 775 milliGRAM(s) IV Intermittent every 48 hours  dextrose 40% Gel 15 Gram(s) Oral once  dextrose 5%. 1000 milliLiter(s) IV Continuous <Continuous>  dextrose 5%. 1000 milliLiter(s) IV Continuous <Continuous>  dextrose 50% Injectable 25 Gram(s) IV Push once  dextrose 50% Injectable 12.5 Gram(s) IV Push once  dextrose 50% Injectable 25 Gram(s) IV Push once  epoetin raven-epbx (RETACRIT) Injectable 8000 Unit(s) IV Push <User Schedule>  glucagon  Injectable 1 milliGRAM(s) IntraMuscular once  insulin glargine Injectable (LANTUS) 20 Unit(s) SubCutaneous at bedtime  insulin lispro (ADMELOG) corrective regimen sliding scale   SubCutaneous three times a day before meals  insulin lispro Injectable (ADMELOG) 8 Unit(s) SubCutaneous three times a day before meals  levothyroxine 100 MICROGram(s) Oral daily  lidocaine 1%/epinephrine 1:100,000 Inj 40 milliLiter(s) Local Injection once  methadone    Tablet 5 milliGRAM(s) Oral daily  metoclopramide   Syrup 5 milliGRAM(s) Oral every 8 hours  metoprolol tartrate 12.5 milliGRAM(s) Oral two times a day  midodrine 10 milliGRAM(s) Oral every 8 hours  pantoprazole   Suspension 40 milliGRAM(s) Oral before breakfast  QUEtiapine 100 milliGRAM(s) Oral two times a day    PRN Inpatient Medications  acetaminophen   Tablet .. 650 milliGRAM(s) Oral every 6 hours PRN      VITALS/PHYSICAL EXAM  --------------------------------------------------------------------------------  T(C): 36.6 (01-31-21 @ 08:00), Max: 37.1 (01-30-21 @ 12:00)  HR: 82 (01-31-21 @ 11:00) (62 - 96)  BP: 115/50 (01-31-21 @ 11:00) (87/57 - 135/50)  RR: 27 (01-31-21 @ 11:00) (13 - 29)  SpO2: 98% (01-31-21 @ 11:00) (80% - 100%)  Wt(kg): --        01-30-21 @ 07:01  -  01-31-21 @ 07:00  --------------------------------------------------------  IN: 1640 mL / OUT: 3200 mL / NET: -1560 mL      Physical Exam:  	Gen: Trached  	Pulm: CTA B/L  	CV: RRR, S1S2  	Abd: +BS, soft, nondistended  	: No suprapubic tenderness  	LE: Warm,  edema  	Vascular access: AVF    LABS/STUDIES  --------------------------------------------------------------------------------              7.6    9.95  >-----------<  349      [01-31-21 @ 04:30]              24.4     137  |  96  |  61  ----------------------------<  94      [01-31-21 @ 04:30]  4.6   |  29  |  5.1        Ca     7.8     [01-31-21 @ 04:30]      Mg     2.7     [01-31-21 @ 04:30]    TPro  5.5  /  Alb  2.7  /  TBili  0.8  /  DBili  x   /  AST  28  /  ALT  12  /  AlkPhos  118  [01-31-21 @ 04:30]    PT/INR: PT 14.80, INR 1.29       [01-31-21 @ 04:30]  PTT: 32.3       [01-31-21 @ 04:30]      Creatinine Trend:  SCr 5.1 [01-31 @ 04:30]  SCr 5.9 [01-30 @ 05:00]  SCr 3.3 [01-29 @ 04:30]  SCr 6.2 [01-28 @ 04:30]  SCr 5.2 [01-27 @ 04:30]    Urinalysis - [01-06-21 @ 18:43]      Color Yellow / Appearance Slightly Turbid / SG 1.018 / pH 6.5      Gluc 100 mg/dL / Ketone Negative  / Bili Negative / Urobili <2 mg/dL       Blood Moderate / Protein 300 mg/dL / Leuk Est Small / Nitrite Negative       /  / Hyaline 114 / Gran  / Sq Epi  / Non Sq Epi 1 / Bacteria Negative      Ferritin 983      [01-24-21 @ 06:00]  Vitamin D (25OH) 45      [01-15-21 @ 12:06]  HbA1c 7.1      [05-21-19 @ 04:30]  TSH 4.57      [01-01-21 @ 17:56]

## 2021-01-31 NOTE — PROGRESS NOTE ADULT - SUBJECTIVE AND OBJECTIVE BOX
Patient is a 60y old  Male who presents with a chief complaint of s/p fall. (30 Jan 2021 12:37)        HPI:  59 yo M w/ PMH of Afib on coumadin, DM2, ESRD on HD MWF, HLD, HTN, CVA presents with weakness & fall. Patient notes when he woke up today, he felt generally weak, slid from the bed onto the floor landing on his bottom, denies head injury/loc. Patient notes that he has been having increased sputum production for the past few days, endorses mild shortness of breath without chest pain/fever/diarrhea/vomiting. Patient had full dialysis session wednesday, notes next session is tomorrow due to the holidays. Denies any focal weakness or pain currently.     On my assessment, pt is obtunded and unable to provide HPI. HPI obtained by Sister who is his primary care taker. As per sister, pt has been having increased frequency of imbalance/fall since 2 days ago. Decreased PO intake.     ICU Vital Signs Last 24 Hrs  T(C): 37.2 (01 Jan 2021 14:00), Max: 38.3 (01 Jan 2021 10:28)  T(F): 98.9 (01 Jan 2021 14:00), Max: 101 (01 Jan 2021 10:28)  HR: 78 (01 Jan 2021 14:00) (78 - 107)  BP: 109/60 (01 Jan 2021 14:00) (109/60 - 142/62)  RR: 18 (01 Jan 2021 14:00) (18 - 20)  SpO2: 97% (01 Jan 2021 14:00) (95% - 99%)    In ED, pt underwent CT head which is negative. CXR performed and shows possible RLL PNA. Given cefepime and vancomycin. Also found to be COVID+. no drips (01 Jan 2021 14:01)    Pt evaluated on AM rounds.  Interval Events: No overnight events.    REVIEW OF SYSTEMS:   see HPI      OBJECTIVE:  ICU Vital Signs Last 24 Hrs  T(C): 36.7 (31 Jan 2021 04:00), Max: 37.3 (30 Jan 2021 08:00)  T(F): 98.1 (31 Jan 2021 04:00), Max: 99.1 (30 Jan 2021 08:00)  HR: 92 (31 Jan 2021 07:00) (62 - 98)  BP: 117/53 (31 Jan 2021 07:00) (74/45 - 135/49)  BP(mean): 79 (31 Jan 2021 07:00) (41 - 91)  ABP: --  ABP(mean): --  RR: 24 (31 Jan 2021 07:00) (13 - 24)  SpO2: 98% (31 Jan 2021 07:00) (80% - 100%)    Mode: AC/ CMV (Assist Control/ Continuous Mandatory Ventilation), RR (machine): 24, TV (machine): 450, FiO2: 40, PEEP: 5    01-30 @ 07:01  -  01-31 @ 07:00  --------------------------------------------------------  IN: 1640 mL / OUT: 3200 mL / NET: -1560 mL      CAPILLARY BLOOD GLUCOSE      POCT Blood Glucose.: 80 mg/dL (30 Jan 2021 22:14)        PHYSICAL EXAM:     · CONSTITUTIONAL:   not septic appearing,   well nourished,   NAD    · ENMT:   Airway patent,   Nasal mucosa clear.  Mouth with normal mucosa.   No thrush    · EYES:   Clear bilaterally,   pupils equal,   round and reactive to light.    · CARDIAC:   Normal rate,   regular rhythm.    Heart sounds S1, S2.   No murmurs, no rubs or gallops on auscultation  no edema        CAROTID:   normal systolic impulse  no bruits    · RESPIRATORY:   rales  normal chest expansion  no retractions or use of accessory muscles  palpation of chest is normal with no fremitus  percussion of chest demonstrates no hyperresonance or dullness    · GASTROINTESTINAL:  Abdomen soft,   non-tender,   + BS  liver/spleen not palpable    · MUSCULOSKELETAL:   no clubbing, cyanosis      · NEUROLOGICAL:   Unavailable as the patient is sedated.        · SKIN:   Skin normal color for race,   warm, dry   No evidence of rash.        · HEME LYMPH:   no splenomegaly.  No cervical  lymphadenopathy.  no inguinal lymphadenopathy    HOSPITAL MEDICATIONS:  MEDICATIONS  (STANDING):  aspirin  chewable 81 milliGRAM(s) Oral daily  atorvastatin 20 milliGRAM(s) Oral at bedtime  chlorhexidine 0.12% Liquid 15 milliLiter(s) Oral Mucosa every 12 hours  chlorhexidine 4% Liquid 1 Application(s) Topical <User Schedule>  collagenase Ointment 1 Application(s) Topical two times a day  Dakins Solution - 1/2 Strength 1 Application(s) Topical two times a day  DAPTOmycin IVPB 775 milliGRAM(s) IV Intermittent every 48 hours  dextrose 40% Gel 15 Gram(s) Oral once  dextrose 5%. 1000 milliLiter(s) (50 mL/Hr) IV Continuous <Continuous>  dextrose 5%. 1000 milliLiter(s) (100 mL/Hr) IV Continuous <Continuous>  dextrose 50% Injectable 25 Gram(s) IV Push once  dextrose 50% Injectable 12.5 Gram(s) IV Push once  dextrose 50% Injectable 25 Gram(s) IV Push once  epoetin raven-epbx (RETACRIT) Injectable 8000 Unit(s) IV Push <User Schedule>  glucagon  Injectable 1 milliGRAM(s) IntraMuscular once  insulin glargine Injectable (LANTUS) 20 Unit(s) SubCutaneous at bedtime  insulin lispro (ADMELOG) corrective regimen sliding scale   SubCutaneous three times a day before meals  insulin lispro Injectable (ADMELOG) 8 Unit(s) SubCutaneous three times a day before meals  levothyroxine 100 MICROGram(s) Oral daily  lidocaine 1%/epinephrine 1:100,000 Inj 40 milliLiter(s) Local Injection once  methadone    Tablet 5 milliGRAM(s) Oral daily  metoclopramide   Syrup 5 milliGRAM(s) Oral every 8 hours  metoprolol tartrate 12.5 milliGRAM(s) Oral two times a day  midodrine 10 milliGRAM(s) Oral every 8 hours  pantoprazole   Suspension 40 milliGRAM(s) Oral before breakfast  QUEtiapine 100 milliGRAM(s) Oral two times a day    MEDICATIONS  (PRN):  acetaminophen   Tablet .. 650 milliGRAM(s) Oral every 6 hours PRN Temp greater or equal to 38C (100.4F), Mild Pain (1 - 3)    lactated ringers Bolus:   250 milliLiter(s), IV Bolus, once, infuse over 30 Minute(s), Stop After 1 Doses  sodium chloride 0.9%.: Solution, 1000 milliLiter(s) infuse at 50 mL/Hr      LABS:                        7.6    9.95  )-----------( 349      ( 31 Jan 2021 04:30 )             24.4     01-31    137  |  96<L>  |  61<HH>  ----------------------------<  94  4.6   |  29  |  5.1<HH>    Ca    7.8<L>      31 Jan 2021 04:30  Mg     2.7     01-31    TPro  5.5<L>  /  Alb  2.7<L>  /  TBili  0.8  /  DBili  x   /  AST  28  /  ALT  12  /  AlkPhos  118<H>  01-31    PT/INR - ( 31 Jan 2021 04:30 )   PT: 14.80 sec;   INR: 1.29 ratio         PTT - ( 31 Jan 2021 04:30 )  PTT:32.3 sec              Mode: AC/ CMV (Assist Control/ Continuous Mandatory Ventilation)  RR (machine): 24  TV (machine): 450  FiO2: 40  PEEP: 5          RADIOLOGY: I personally reviewed latest CXR and other pertinent films.

## 2021-01-31 NOTE — PROGRESS NOTE ADULT - ASSESSMENT
61 yo male with PMHx of afib on coumadin, DM2, ESRD on HD MWF, HLD, HTN, CVA, who presented w/ weakness and fall.     #Acute hypoxemic respiratory failure   #Covid-19 PNA  #Superimposed bacterial PNA   - s/p intubation -> trach  - per surgery, trach sutures can be removed by primary team after 1/25  - s/p Azithromycin discontinued on 1/8/2021, s/p Cefepime discontinued on 1/11/2021  - s/p Dexamethasone 6mg IV once daily (1/1/2021) x10 days  - Covid antibodies: negative --> s/p 2 units of Convalescent plasma ( 1/8/2021 and 1/12/2021)  - f/u inflammatory markers   - s/p tocilizumab 400mg IV x1 on 1/8/2021  - s/p lucinda and vanc   - weaned off sedation w/ klonopin and pain control Methadone, c/w methadone qd x5 days, day 3 today   - daily pressure support trials, ptnt on 12hrs 1/23 and 1/25, tiring out, 4 hrs 1/27    #Thrombocytopenia- resolved   - likely secondary to Daptomycin (started on 1/26) vs lab error vs HIT less likely was on heparin gtt   - repeat CBC w/ platelets stable    #Leukocytosis  #Sacral ulcer, heel ulcers   - Multifactorial (Possible overlying infection, ulcers, steroids)   - s/p debridement w/ burn 1/21/21  - wound culture- moderate e. faecium  - ID following, recs:   - can start daptomycin 8 mg/kg q 48 hours ( please give after dialysis on HD days)  - please obtain CK and weekly  - local wound care  - wound care per burn     #?Ileus- resolved   - s/p PEG 1/18  - xray 10/19 w/ gasseous distention of stomach  - PEG was connected to suction, feeds were held  - Xray 10/21 w/o evidence of obstruction, restarted feeds    #Diarrhea- resolved and again on 1/27  - d/c senna and miralax   - monitor  - changed feeds from nepro to peptamen   - remove dignishield when resolves     #Nausea/vomiting  - on Reglan w/ meals     #Upper extremity edema R>L  - arterial and venous duplex- negative   - loosened restraints    #Acute on chronic anemia   - s/p 1u pRBC 1/19  - monitor     #ESRD on HD   - EPO (retacrit) 8000 units IV push once weekly  - nephro following, f/u reccs    #Paroxysmal atrial fibrillation  - restarted on home coumadin, f/u AM INR, 7.5 dosed this AM as did not get any on 1/30 (2.5mg 1/29, s/p 5mg on 1/27 and 1/28)  - Metoprolol tartrate 12.5mg po q12hrs      #NSTEMI  - Type II MI, demand ischemia, likely secondary to hypoxia secondary to COVID-19 pneumonia  - Aspirin 81mg po once daily   - CT brain no IC bleed or concerns of IC bleed   - Repeat CK-MB and CPK were stable    #Hypothyroidism: Continue with levothyroxine 100mcg po once daily     #Altered Mental Status- resolved  - EEG diffuse slowing, but no seizure activity, check the report above  - CT brain shows ventriculomegaly (check full report), no IC bleed, possible old infarct.  - neurology consulted and recs:   a. CT brain negative x2 for stroke or other structural pathology.  EEG negative for any epileptiform activity.  Suspect multifactorial metabolic encephalopathy in setting of COVID and ESRD and sedatives.  Wean sedation as able, can reassess if patient does not awaken after that time.  b. No further EEG or other tests   c. To be reassessed after sedation is weaned    Diet: PEG feeds Peptamen AF  DVT ppx: on Coumadin, f/u INR dosed 7.5 coumadin this AM (2.5mg 1/29, s/p 5mg on 1/27 and 1/28)   GI ppx: Protonix 40 qd     Dispo: discuss downgrade to vent vs NH, repeat covid on 1/20 negative

## 2021-01-31 NOTE — PROGRESS NOTE ADULT - SUBJECTIVE AND OBJECTIVE BOX
pt seen this morning - s/p trach, awake, on 40% O2 & peep 5  enteral feeds via GT were at 65 ml/h   no pressors or paralytic  next HD planned for 2/2 Tuesday  Vital Signs Last 24 Hrs  T(C): 36.6 (31 Jan 2021 16:00), Max: 36.9 (30 Jan 2021 20:30)  T(F): 97.9 (31 Jan 2021 16:00), Max: 98.5 (30 Jan 2021 20:30)  HR: 88 (31 Jan 2021 16:00) (78 - 114)  BP: 139/62 (31 Jan 2021 16:00) (87/57 - 154/59)  BP(mean): 91 (31 Jan 2021 16:00) (44 - 91)  RR: 24 (31 Jan 2021 16:00) (13 - 34)  SpO2: 98% (31 Jan 2021 16:00) (80% - 100%)    MEDICATIONS  (STANDING):  aspirin  chewable 81 milliGRAM(s) Oral daily  atorvastatin 20 milliGRAM(s) Oral at bedtime  chlorhexidine 0.12% Liquid 15 milliLiter(s) Oral Mucosa every 12 hours  chlorhexidine 4% Liquid 1 Application(s) Topical <User Schedule>  collagenase Ointment 1 Application(s) Topical two times a day  Dakins Solution - 1/2 Strength 1 Application(s) Topical two times a day  DAPTOmycin IVPB 775 milliGRAM(s) IV Intermittent every 48 hours  epoetin raven-epbx (RETACRIT) Injectable 8000 Unit(s) IV Push <User Schedule>  heparin  Infusion 1800 Unit(s)/Hr (18 mL/Hr) IV Continuous <Continuous>  insulin glargine Injectable (LANTUS) 20 Unit(s) SubCutaneous at bedtime  insulin lispro (ADMELOG) corrective regimen sliding scale   SubCutaneous three times a day before meals  insulin lispro Injectable (ADMELOG) 8 Unit(s) SubCutaneous three times a day before meals  levothyroxine 100 MICROGram(s) Oral daily  lidocaine 1%/epinephrine 1:100,000 Inj 40 milliLiter(s) Local Injection once  methadone    Tablet 5 milliGRAM(s) Oral daily  metoclopramide   Syrup 5 milliGRAM(s) Oral every 8 hours  metoprolol tartrate 12.5 milliGRAM(s) Oral two times a day  midodrine 10 milliGRAM(s) Oral every 8 hours  pantoprazole   Suspension 40 milliGRAM(s) Oral before breakfast  QUEtiapine 100 milliGRAM(s) Oral two times a day                        7.6    9.95  )-----------( 349      ( 31 Jan 2021 04:30 )             24.4   01-31    137  |  96<L>  |  61<HH>  ----------------------------<  94  4.6   |  29  |  5.1<HH>    Ca    7.8<L>      31 Jan 2021 04:30  Mg     2.7     01-31    TPro  5.5<L>  /  Alb  2.7<L>  /  TBili  0.8  /  DBili  x   /  AST  28  /  ALT  12  /  AlkPhos  118<H>  01-31  Phosphorus Level, Serum: 5.6 mg/dL (01.29.21

## 2021-01-31 NOTE — PROGRESS NOTE ADULT - SUBJECTIVE AND OBJECTIVE BOX
SUBJECTIVE:    Patient is a 60y old Male who presents with a chief complaint of s/p fall. (31 Jan 2021 07:27)    Currently admitted to medicine with the primary diagnosis of Fever       Today is hospital day 30d. No acute events overnight. Nausea and vomiting on Friday, continued w/ reglan premeals. Tolerating feeds currently.       PAST MEDICAL & SURGICAL HISTORY  PAD (peripheral artery disease)  multiple toe amputations    Anemia  chronic anemia - s/p transfusion 2018    Thyroid cancer    Chronic leg pain    OA (osteoarthritis)    SOB (shortness of breath) on exertion    ESRD (end stage renal disease)  2 yrs    Atrial fibrillation  on warfarin    Right sided weakness    Stroke  8 yrs ago    Hypertension    High blood cholesterol    Diabetes mellitus, type 2    Dialysis patient  Mon, Wednesday, Friday ([facility])    Smoker    H/O thyroid nodule    H/O gastroesophageal reflux (GERD)    History of tonsillectomy    History of surgery  Multiple toe amputations (amp 4 1/2 left toes; has 1/2 left mid toe; amp right mid toe)    A-V fistula  left AVF      SOCIAL HISTORY:  Negative for smoking/alcohol/drug use.     ALLERGIES:  Orange (Other)  Originally Entered as [HIVES] reaction to [sulfur] (Unknown)  penicillin (Unknown)  sulfADIAZINE (Unknown)    MEDICATIONS:  STANDING MEDICATIONS  aspirin  chewable 81 milliGRAM(s) Oral daily  atorvastatin 20 milliGRAM(s) Oral at bedtime  chlorhexidine 0.12% Liquid 15 milliLiter(s) Oral Mucosa every 12 hours  chlorhexidine 4% Liquid 1 Application(s) Topical <User Schedule>  collagenase Ointment 1 Application(s) Topical two times a day  Dakins Solution - 1/2 Strength 1 Application(s) Topical two times a day  DAPTOmycin IVPB 775 milliGRAM(s) IV Intermittent every 48 hours  dextrose 40% Gel 15 Gram(s) Oral once  dextrose 5%. 1000 milliLiter(s) IV Continuous <Continuous>  dextrose 5%. 1000 milliLiter(s) IV Continuous <Continuous>  dextrose 50% Injectable 25 Gram(s) IV Push once  dextrose 50% Injectable 12.5 Gram(s) IV Push once  dextrose 50% Injectable 25 Gram(s) IV Push once  epoetin raven-epbx (RETACRIT) Injectable 8000 Unit(s) IV Push <User Schedule>  glucagon  Injectable 1 milliGRAM(s) IntraMuscular once  insulin glargine Injectable (LANTUS) 20 Unit(s) SubCutaneous at bedtime  insulin lispro (ADMELOG) corrective regimen sliding scale   SubCutaneous three times a day before meals  insulin lispro Injectable (ADMELOG) 8 Unit(s) SubCutaneous three times a day before meals  levothyroxine 100 MICROGram(s) Oral daily  lidocaine 1%/epinephrine 1:100,000 Inj 40 milliLiter(s) Local Injection once  methadone    Tablet 5 milliGRAM(s) Oral daily  metoclopramide   Syrup 5 milliGRAM(s) Oral every 8 hours  metoprolol tartrate 12.5 milliGRAM(s) Oral two times a day  midodrine 10 milliGRAM(s) Oral every 8 hours  pantoprazole   Suspension 40 milliGRAM(s) Oral before breakfast  QUEtiapine 100 milliGRAM(s) Oral two times a day    PRN MEDICATIONS  acetaminophen   Tablet .. 650 milliGRAM(s) Oral every 6 hours PRN    VITALS:   T(F): 97.9  HR: 84  BP: 114/54  RR: 28  SpO2: 98%    PHYSICAL EXAM:  GEN: awake, alert   LUNGS: Decreased breath sounds, mild wheezing   HEART: S1/S2 present. Irregular.   ABD: Soft, non-tender, peg present, bowel sounds present.  EXT: Edema of upper extremities and lower extremities   NEURO: following commands     Dignishield present       LABS:                        7.6    9.95  )-----------( 349      ( 31 Jan 2021 04:30 )             24.4     01-31    137  |  96<L>  |  61<HH>  ----------------------------<  94  4.6   |  29  |  5.1<HH>    Ca    7.8<L>      31 Jan 2021 04:30  Mg     2.7     01-31    TPro  5.5<L>  /  Alb  2.7<L>  /  TBili  0.8  /  DBili  x   /  AST  28  /  ALT  12  /  AlkPhos  118<H>  01-31    PT/INR - ( 31 Jan 2021 04:30 )   PT: 14.80 sec;   INR: 1.29 ratio         PTT - ( 31 Jan 2021 04:30 )  PTT:32.3 sec        RADIOLOGY:

## 2021-01-31 NOTE — PROVIDER CONTACT NOTE (EICU) - ACTION/TREATMENT ORDERED:
Ventilator ordered as 24/450/30/8  Vent set to 24/450/40/5  Vent order updated to reflect current settings.

## 2021-01-31 NOTE — PROGRESS NOTE ADULT - ASSESSMENT
IMPRESSION:    Acute hypoxemic resp failure failed weaning SP trach and PEG   NSTEMI  Afib was on coumadin  Severe COVID PNA  Superimposed bacteria PNA  acute/chronic renal failure  Sacral ulcer SP debridement   bleeding around peg resolved  HD      PLAN:    CNS:  Seroquel. dc methadone    HEENT: Oral care.    PULMONARY: Vent changes. Wean O2.  Monitor PPL and DP.  ps daily    CARDIOVASCULAR:  I<O as tolerated.      GI: GI prophylaxis.  PEG Feeding.  Reglan     RENAL: check lytes and replete PRN. Nephro F/UP result.      INFECTIOUS DISEASE:  FU cultures. ABX PER ID dapto    HEMATOLOGICAL: DVT Prophylaxis.  FU PTT , coumadin repeat cbc    ENDOCRINE:  Follow up FS.  Insulin protocol if needed.    MUSCULOSKELETAL:  bed rest.  Wound Care      discuss for vent unit, or LTAC next week    Prognosis poor

## 2021-01-31 NOTE — PROGRESS NOTE ADULT - ASSESSMENT
ASSESSMENT/PLAN  - acute hypoxic resp failure  - covid 19 pneumonia  - ESRD on HD  - DM,   elevated WBC       SUGGEST:  - estimated REE by PSE 2230 kcal/d and protein needs ~ 139 gm/d  - continue with  Peptamen AF at 65 ml/h --> 119 gm protein (entirely whey source), 1872 kcal, meets recommended low n6:n3 ratio, 1300 total mg phos/d and 67 total mEq K per day  -   formula with high n6 content not ideal for any ARDS pt including covid (refer to CCN / ASPEN guidelines 4/1/20 and JPEN 9/20). Note that suggested formula is also low carb content.  - again note high MCV - check B12 and folate levels    pt reportedly c/o discomfort later this afternoon and little or no dignishield output. and feeds stopped. KUB ordered (note yesterdays' study)  pt did have large gastric air content after GT insertion, with delayed gastric emptying which has responded to metoclopramide.  All cathartics and stool softeners d/c mid-last week due to loose BMs.  - f/u KUB  - if no obstruction, consider single dose of lactulose  - then resume feeding  - will d/w ICU team in the AM - consider change to gravity meal-pattern feeding

## 2021-02-01 NOTE — PROGRESS NOTE ADULT - SUBJECTIVE AND OBJECTIVE BOX
Patient is a 60y old  Male who presents with a chief complaint of s/p fall. (01 Feb 2021 10:22)        Over Night Events:  pulled out peg and replaced      ROS:     All ROS are negative except HPI         PHYSICAL EXAM    ICU Vital Signs Last 24 Hrs  T(C): 36.6 (01 Feb 2021 08:00), Max: 36.6 (31 Jan 2021 16:00)  T(F): 97.8 (01 Feb 2021 08:00), Max: 97.9 (31 Jan 2021 16:00)  HR: 72 (01 Feb 2021 10:30) (70 - 114)  BP: 106/48 (01 Feb 2021 10:30) (98/53 - 158/68)  BP(mean): 63 (01 Feb 2021 10:30) (62 - 91)  ABP: --  ABP(mean): --  RR: 24 (01 Feb 2021 10:30) (22 - 34)  SpO2: 100% (01 Feb 2021 10:30) (90% - 100%)      CONSTITUTIONAL:  ill appearing  NAD    ENT:   Airway patent,   Mouth with normal mucosa.   No thrush  +trach    EYES:   Pupils equal,   Round and reactive to light.    CARDIAC:   Normal rate,   Regular rhythm.    No edema      Vascular:  Normal systolic impulse  No Carotid bruits    RESPIRATORY:   No wheezing  Bilateral BS  Normal chest expansion  Not tachypneic,  No use of accessory muscles    GASTROINTESTINAL:  Abdomen soft,   Non-tender,   No guarding,   + BS    MUSCULOSKELETAL:   Range of motion is not limited,  No clubbing, cyanosis    NEUROLOGICAL:   sedated    SKIN:   Skin normal color for race,   Warm and dry and intact.   No evidence of rash.  sacral decubiti    01-31-21 @ 07:01  -  02-01-21 @ 07:00  --------------------------------------------------------  IN:    Dexmedetomidine: 154 mL    Enteral Tube Flush: 100 mL    Heparin: 234 mL    Midazolam: 20 mL    Nepro with Carb Steady: 1120 mL  Total IN: 1628 mL    OUT:    PEG (Percutaneous Endoscopic Gastrostomy) Tube (mL): 30 mL    Rectal Tube (mL): 200 mL  Total OUT: 230 mL    Total NET: 1398 mL      02-01-21 @ 07:01  -  02-01-21 @ 11:12  --------------------------------------------------------  IN:    Heparin: 72 mL    Midazolam: 16 mL  Total IN: 88 mL    OUT:    Nepro with Carb Steady: 0 mL  Total OUT: 0 mL    Total NET: 88 mL          LABS:                            7.1    9.88  )-----------( 371      ( 01 Feb 2021 04:00 )             23.2                                               02-01    135  |  91<L>  |  70<HH>  ----------------------------<  249<H>  4.8   |  22  |  5.7<HH>    Ca    8.1<L>      01 Feb 2021 04:00  Mg     2.9     02-01    TPro  5.5<L>  /  Alb  2.5<L>  /  TBili  0.7  /  DBili  x   /  AST  30  /  ALT  13  /  AlkPhos  133<H>  02-01      PT/INR - ( 01 Feb 2021 04:00 )   PT: 14.60 sec;   INR: 1.27 ratio         PTT - ( 01 Feb 2021 04:00 )  PTT:33.1 sec                                                                                     LIVER FUNCTIONS - ( 01 Feb 2021 04:00 )  Alb: 2.5 g/dL / Pro: 5.5 g/dL / ALK PHOS: 133 U/L / ALT: 13 U/L / AST: 30 U/L / GGT: x                                                                                               Mode: AC/ CMV (Assist Control/ Continuous Mandatory Ventilation)  RR (machine): 24  TV (machine): 450  FiO2: 40  PEEP: 5  ITime: 1  MAP: 9  PIP: 18                                          MEDICATIONS  (STANDING):  aspirin  chewable 81 milliGRAM(s) Oral daily  atorvastatin 20 milliGRAM(s) Oral at bedtime  chlorhexidine 0.12% Liquid 15 milliLiter(s) Oral Mucosa every 12 hours  chlorhexidine 4% Liquid 1 Application(s) Topical <User Schedule>  collagenase Ointment 1 Application(s) Topical two times a day  Dakins Solution - 1/2 Strength 1 Application(s) Topical two times a day  DAPTOmycin IVPB 775 milliGRAM(s) IV Intermittent every 48 hours  dextrose 40% Gel 15 Gram(s) Oral once  dextrose 5%. 1000 milliLiter(s) (50 mL/Hr) IV Continuous <Continuous>  dextrose 5%. 1000 milliLiter(s) (100 mL/Hr) IV Continuous <Continuous>  dextrose 50% Injectable 25 Gram(s) IV Push once  dextrose 50% Injectable 12.5 Gram(s) IV Push once  dextrose 50% Injectable 25 Gram(s) IV Push once  epoetin raven-epbx (RETACRIT) Injectable 8000 Unit(s) IV Push <User Schedule>  glucagon  Injectable 1 milliGRAM(s) IntraMuscular once  heparin  Infusion 1800 Unit(s)/Hr (21 mL/Hr) IV Continuous <Continuous>  insulin glargine Injectable (LANTUS) 20 Unit(s) SubCutaneous at bedtime  insulin lispro (ADMELOG) corrective regimen sliding scale   SubCutaneous three times a day before meals  insulin lispro Injectable (ADMELOG) 8 Unit(s) SubCutaneous three times a day before meals  levothyroxine 100 MICROGram(s) Oral daily  lidocaine 1%/epinephrine 1:100,000 Inj 40 milliLiter(s) Local Injection once  methadone    Tablet 5 milliGRAM(s) Oral daily  metoclopramide   Syrup 5 milliGRAM(s) Oral every 8 hours  metoprolol tartrate 12.5 milliGRAM(s) Oral two times a day  midazolam Infusion 0.02 mG/kG/Hr (1.94 mL/Hr) IV Continuous <Continuous>  midodrine 10 milliGRAM(s) Oral every 8 hours  pantoprazole   Suspension 40 milliGRAM(s) Oral before breakfast  QUEtiapine 100 milliGRAM(s) Oral two times a day    MEDICATIONS  (PRN):  acetaminophen   Tablet .. 650 milliGRAM(s) Oral every 6 hours PRN Temp greater or equal to 38C (100.4F), Mild Pain (1 - 3)      New X-rays reviewed:                                                                                  ECHO    CXR interpreted by me:

## 2021-02-01 NOTE — PROGRESS NOTE ADULT - ASSESSMENT
IMPRESSION:    Acute hypoxemic resp failure failed weaning SP trach and PEG   NSTEMI  Afib was on coumadin  Severe COVID PNA  Superimposed bacteria PNA  acute/chronic renal failure  Sacral ulcer SP debridement   bleeding around peg resolved  HD      PLAN:    CNS:  Seroquel. start klonopin, wean off versed. versed pushes prn    HEENT: Oral care.    PULMONARY: Vent changes. Wean O2.  Monitor PPL and DP.  ps daily    CARDIOVASCULAR:  I<O as tolerated.      GI: GI prophylaxis.  PEG bolus Feeding.  Reglan     RENAL: check lytes and replete PRN. Nephro F/UP result.      INFECTIOUS DISEASE:  FU cultures. ABX PER ID dapto    HEMATOLOGICAL: DVT Prophylaxis.  FU PTT , coumadin repeat cbc    ENDOCRINE:  Follow up FS.  Insulin protocol if needed.    MUSCULOSKELETAL:  bed rest.  Wound Care      Prognosis poor     dispo: SDU   IMPRESSION:    Acute hypoxemic resp failure failed weaning SP trach and PEG   NSTEMI  Afib was on coumadin  Severe COVID PNA  Superimposed bacteria PNA  acute/chronic renal failure now On HD   Sacral ulcer SP debridement   bleeding around peg resolved      PLAN:    CNS:  Seroquel. Start klonopin, wean off versed. versed pushes prn.  Pain cintrol as needed     HEENT: Oral care. Trach care     PULMONARY: Vent changes. Wean O2.  Monitor PPL and DP.  ps daily    CARDIOVASCULAR:  I<O as tolerated.      GI: GI prophylaxis.  PEG bolus Feeding.  Reglan     RENAL: check lytes and replete PRN. Nephro F/UP result.      INFECTIOUS DISEASE:  FU cultures. ABX PER ID dapto    HEMATOLOGICAL: DVT Prophylaxis.  FU Coags, coumadin repeat cbc    ENDOCRINE:  Follow up FS.  Insulin protocol if needed.    MUSCULOSKELETAL:  bed rest.  Wound Care      Prognosis poor     dispo: SDU

## 2021-02-01 NOTE — PROCEDURAL SAFETY CHECKLIST WITH OR WITHOUT SEDATION - NSPRESURGSED_GEN_ALL_CORE
Present, accurate, and signed
family consent for line/Present, accurate, and signed
Present, accurate, and signed

## 2021-02-01 NOTE — PROGRESS NOTE ADULT - SUBJECTIVE AND OBJECTIVE BOX
Progress Note: General Surgery  Patient: ROGER RODRIGUES , 60y (1960)Male   MRN: [id number]  Location: [facility] [address]  Visit: 01-01-21 Inpatient  Date: 02-01-21 @ 10:22    Admit Diagnosis/Chief Complaint: Pressure sore on sacrum    Acute respiratory failure due to COVID-19      Procedure/Diagnosis: Pressure sore on sacrum    Acute respiratory failure due to COVID-19     S/P Debridement, ischium or sacrum    Insertion, PEG tube    Open tracheostomy      Events/ 24h:   Called overnight by ICU nurses regarding dislodged PEG, stating pt pulled it.  20 Fr PEG originally placed on 1/18  20 Fr balloon-tipped g-tube replaced at the bedside  No resistance, gastric contents aspirated  Tube study was completed, with final read showing tube in proper position.     Vitals: T(F): 97.8 (02-01-21 @ 08:00), Max: 97.9 (01-31-21 @ 16:00)  HR: 72 (02-01-21 @ 10:00)  BP: 98/53 (02-01-21 @ 10:00) (98/53 - 158/68)  RR: 24 (02-01-21 @ 10:00)  SpO2: 100% (02-01-21 @ 10:00)  RR (machine): 24, TV (machine): 450, FiO2: 40, PEEP: 5, PIP: 18  In:   01-31-21 @ 07:01  -  02-01-21 @ 07:00  --------------------------------------------------------  IN: 1628 mL      Out:   01-31-21 @ 07:01  -  02-01-21 @ 07:00  --------------------------------------------------------  OUT:    PEG (Percutaneous Endoscopic Gastrostomy) Tube (mL): 30 mL    Rectal Tube (mL): 200 mL  Total OUT: 230 mL        Net:   01-31-21 @ 07:01  -  02-01-21 @ 07:00  --------------------------------------------------------  NET: 1398 mL        Diet: Diet, NPO with Tube Feed:   Tube Feeding Modality: Orogastric  Nepro with Carb Steady  Total Volume for 24 Hours (mL): 1200  Continuous  Starting Tube Feed Rate mL per Hour: 20  Increase Tube Feed Rate by (mL): 10     Every 2 hours  Until Goal Tube Feed Rate (mL per Hour): 50  Tube Feed Duration (in Hours): 24  Tube Feed Start Time: 17:00 (01-31-21 @ 17:55)    IV Fluids: dextrose 5%. 1000 milliLiter(s) (50 mL/Hr) IV Continuous <Continuous>  dextrose 5%. 1000 milliLiter(s) (100 mL/Hr) IV Continuous <Continuous>      Physical Examination:  Abdomen: PEG in place    Medications: [Standing]  aspirin  chewable 81 milliGRAM(s) Oral daily  atorvastatin 20 milliGRAM(s) Oral at bedtime  chlorhexidine 0.12% Liquid 15 milliLiter(s) Oral Mucosa every 12 hours  chlorhexidine 4% Liquid 1 Application(s) Topical <User Schedule>  collagenase Ointment 1 Application(s) Topical two times a day  Dakins Solution - 1/2 Strength 1 Application(s) Topical two times a day  DAPTOmycin IVPB 775 milliGRAM(s) IV Intermittent every 48 hours  dextrose 40% Gel 15 Gram(s) Oral once  dextrose 5%. 1000 milliLiter(s) (50 mL/Hr) IV Continuous <Continuous>  dextrose 5%. 1000 milliLiter(s) (100 mL/Hr) IV Continuous <Continuous>  dextrose 50% Injectable 25 Gram(s) IV Push once  dextrose 50% Injectable 12.5 Gram(s) IV Push once  dextrose 50% Injectable 25 Gram(s) IV Push once  epoetin raven-epbx (RETACRIT) Injectable 8000 Unit(s) IV Push <User Schedule>  glucagon  Injectable 1 milliGRAM(s) IntraMuscular once  heparin  Infusion 1800 Unit(s)/Hr (18 mL/Hr) IV Continuous <Continuous>  insulin glargine Injectable (LANTUS) 20 Unit(s) SubCutaneous at bedtime  insulin lispro (ADMELOG) corrective regimen sliding scale   SubCutaneous three times a day before meals  insulin lispro Injectable (ADMELOG) 8 Unit(s) SubCutaneous three times a day before meals  levothyroxine 100 MICROGram(s) Oral daily  lidocaine 1%/epinephrine 1:100,000 Inj 40 milliLiter(s) Local Injection once  methadone    Tablet 5 milliGRAM(s) Oral daily  metoclopramide   Syrup 5 milliGRAM(s) Oral every 8 hours  metoprolol tartrate 12.5 milliGRAM(s) Oral two times a day  midazolam Infusion 0.02 mG/kG/Hr (1.94 mL/Hr) IV Continuous <Continuous>  midodrine 10 milliGRAM(s) Oral every 8 hours  pantoprazole   Suspension 40 milliGRAM(s) Oral before breakfast  QUEtiapine 100 milliGRAM(s) Oral two times a day    DVT Prophylaxis: heparin  Infusion 1800 Unit(s)/Hr IV Continuous <Continuous>    GI Prophylaxis: pantoprazole   Suspension 40 milliGRAM(s) Oral before breakfast    Antibiotics: DAPTOmycin IVPB 775 milliGRAM(s) IV Intermittent every 48 hours    Anticoagulation:   Medications:[PRN]  acetaminophen   Tablet .. 650 milliGRAM(s) Oral every 6 hours PRN      Labs:                        7.1    9.88  )-----------( 371      ( 01 Feb 2021 04:00 )             23.2     02-01    135  |  91<L>  |  70<HH>  ----------------------------<  249<H>  4.8   |  22  |  5.7<HH>    Ca    8.1<L>      01 Feb 2021 04:00  Mg     2.9     02-01    TPro  5.5<L>  /  Alb  2.5<L>  /  TBili  0.7  /  DBili  x   /  AST  30  /  ALT  13  /  AlkPhos  133<H>  02-01    LIVER FUNCTIONS - ( 01 Feb 2021 04:00 )  Alb: 2.5 g/dL / Pro: 5.5 g/dL / ALK PHOS: 133 U/L / ALT: 13 U/L / AST: 30 U/L / GGT: x           PT/INR - ( 01 Feb 2021 04:00 )   PT: 14.60 sec;   INR: 1.27 ratio         PTT - ( 01 Feb 2021 04:00 )  PTT:33.1 sec        Imaging:   < from: Xray Abdomen 2 View PORTABLE -Urgent (Xray Abdomen 2 View PORTABLE -Urgent .) (02.01.21 @ 06:39) >  IMPRESSION:    Enteric contrast is noted within the gastric lumen following administration via gastrostomy tube, without evidence of extravasation. Nonobstructive bowel gas pattern. Osseous structures are stable.    < end of copied text >

## 2021-02-01 NOTE — PROGRESS NOTE ADULT - SUBJECTIVE AND OBJECTIVE BOX
[facility] FOLLOW UP NOTE  --------------------------------------------------------------------------------  24 hour events/subjective: Patient examined. Trached.    PAST HISTORY  --------------------------------------------------------------------------------  No significant changes to PMH, PSH, FHx, SHx, unless otherwise noted    ALLERGIES & MEDICATIONS  --------------------------------------------------------------------------------  Allergies    Orange (Other)  Originally Entered as [HIVES] reaction to [sulfur] (Unknown)  penicillin (Unknown)  sulfADIAZINE (Unknown)    Standing Inpatient Medications  aspirin  chewable 81 milliGRAM(s) Oral daily  atorvastatin 20 milliGRAM(s) Oral at bedtime  chlorhexidine 0.12% Liquid 15 milliLiter(s) Oral Mucosa every 12 hours  chlorhexidine 4% Liquid 1 Application(s) Topical <User Schedule>  clonazePAM  Tablet 1 milliGRAM(s) Oral two times a day  collagenase Ointment 1 Application(s) Topical two times a day  Dakins Solution - 1/2 Strength 1 Application(s) Topical two times a day  DAPTOmycin IVPB 775 milliGRAM(s) IV Intermittent every 48 hours  dextrose 40% Gel 15 Gram(s) Oral once  dextrose 5%. 1000 milliLiter(s) IV Continuous <Continuous>  dextrose 5%. 1000 milliLiter(s) IV Continuous <Continuous>  dextrose 50% Injectable 25 Gram(s) IV Push once  dextrose 50% Injectable 12.5 Gram(s) IV Push once  dextrose 50% Injectable 25 Gram(s) IV Push once  epoetin raven-epbx (RETACRIT) Injectable 8000 Unit(s) IV Push <User Schedule>  glucagon  Injectable 1 milliGRAM(s) IntraMuscular once  heparin  Infusion 1800 Unit(s)/Hr IV Continuous <Continuous>  insulin glargine Injectable (LANTUS) 20 Unit(s) SubCutaneous at bedtime  insulin lispro (ADMELOG) corrective regimen sliding scale   SubCutaneous three times a day before meals  insulin lispro Injectable (ADMELOG) 8 Unit(s) SubCutaneous three times a day before meals  levothyroxine 100 MICROGram(s) Oral daily  lidocaine 1%/epinephrine 1:100,000 Inj 40 milliLiter(s) Local Injection once  methadone    Tablet 5 milliGRAM(s) Oral daily  metoclopramide   Syrup 5 milliGRAM(s) Oral every 8 hours  metoprolol tartrate 12.5 milliGRAM(s) Oral two times a day  midazolam Infusion 0.02 mG/kG/Hr IV Continuous <Continuous>  midodrine 10 milliGRAM(s) Oral every 8 hours  pantoprazole   Suspension 40 milliGRAM(s) Oral before breakfast  QUEtiapine 100 milliGRAM(s) Oral two times a day    PRN Inpatient Medications  acetaminophen   Tablet .. 650 milliGRAM(s) Oral every 6 hours PRN    VITALS/PHYSICAL EXAM  --------------------------------------------------------------------------------  T(C): 36.6 (02-01-21 @ 08:00), Max: 36.6 (01-31-21 @ 16:00)  HR: 72 (02-01-21 @ 10:30) (70 - 114)  BP: 106/48 (02-01-21 @ 10:30) (98/53 - 158/68)  RR: 24 (02-01-21 @ 10:30) (22 - 34)  SpO2: 100% (02-01-21 @ 10:30) (90% - 100%)    01-31-21 @ 07:01 - 02-01-21 @ 07:00  --------------------------------------------------------  IN: 1628 mL / OUT: 230 mL / NET: 1398 mL    02-01-21 @ 07:01  -  02-01-21 @ 11:22  --------------------------------------------------------  IN: 88 mL / OUT: 0 mL / NET: 88 mL      Physical Exam:  	Gen: Trached  	Pulm: CTA B/L  	CV: RRR, S1S2  	Abd: +BS, soft, nontender/nondistended  	: Scrotal edema  	LE: Warm,  edema  	Vascular access: AVF    LABS/STUDIES  --------------------------------------------------------------------------------              7.1    9.88  >-----------<  371      [02-01-21 @ 04:00]              23.2     135  |  91  |  70  ----------------------------<  249      [02-01-21 @ 04:00]  4.8   |  22  |  5.7        Ca     8.1     [02-01-21 @ 04:00]      Mg     2.9     [02-01-21 @ 04:00]    TPro  5.5  /  Alb  2.5  /  TBili  0.7  /  DBili  x   /  AST  30  /  ALT  13  /  AlkPhos  133  [02-01-21 @ 04:00]    PT/INR: PT 14.60, INR 1.27       [02-01-21 @ 04:00]  PTT: 33.1       [02-01-21 @ 04:00]    Creatinine Trend:  SCr 5.7 [02-01 @ 04:00]  SCr 5.1 [01-31 @ 04:30]  SCr 5.9 [01-30 @ 05:00]  SCr 3.3 [01-29 @ 04:30]  SCr 6.2 [01-28 @ 04:30]    Urinalysis - [01-06-21 @ 18:43]      Color Yellow / Appearance Slightly Turbid / SG 1.018 / pH 6.5      Gluc 100 mg/dL / Ketone Negative  / Bili Negative / Urobili <2 mg/dL       Blood Moderate / Protein 300 mg/dL / Leuk Est Small / Nitrite Negative       /  / Hyaline 114 / Gran  / Sq Epi  / Non Sq Epi 1 / Bacteria Negative    Ferritin 983      [01-24-21 @ 06:00]  Vitamin D (25OH) 45      [01-15-21 @ 12:06]  HbA1c 7.1      [05-21-19 @ 04:30]  TSH 4.57      [01-01-21 @ 17:56]

## 2021-02-01 NOTE — PROGRESS NOTE ADULT - ASSESSMENT
Assessment:  60y Male patient:  Called overnight by ICU nurses regarding dislodged PEG, stating pt pulled it.  20 Fr PEG originally placed on 1/18  20 Fr balloon-tipped g-tube replaced at the bedside  No resistance, gastric contents aspirated  Tube study was completed, with final read showing tube in proper position.       Plan:  - No further surgical intervention  - Please keep abdominal binder on at all times.    Date/Time: 02-01-21 @ 10:22

## 2021-02-01 NOTE — CHART NOTE - NSCHARTNOTEFT_GEN_A_CORE
Called overnight by ICU nurses regarding dislodged PEG, stating pt pulled it.  20 Fr PEG originally placed on 1/18  20 Fr balloon-tipped g-tube replaced at the bedside  No resistance, gastric contents aspirated    Plan:  Please obtain tube study  Surgery will follow

## 2021-02-01 NOTE — CHART NOTE - NSCHARTNOTEFT_GEN_A_CORE
ICU DOWNGRADE NOTE:    60y Male transferred to floor from ICU    Patient is a 60y old Male who presents with a chief complaint of s/p fall.     The patient is currently admitted for the primary diagnosis of COVID-19       The patient was admitted to the unit for (31) Days.    The patient was intubated and now s/p TRACH/PEG is no longer on pressors.    Indwelling vascular catheters: Right midline     Urinary Catheter: n/a, ESRD anuric     Disposition: Downgrade to step down     Code Status: Full Code     ICU EVENTS:  -------------------------------------------------------------------------------------------  59 yo male with PMHx of afib on coumadin, DM2, ESRD on HD MWF, HLD, HTN, CVA, who presented w/ weakness and fall.     #Acute hypoxemic respiratory failure   #Covid-19 PNA  #Superimposed bacterial PNA   - s/p intubation, now trach & PEG  - per surgery, trach sutures can be removed by primary team after 1/25  - s/p Azithromycin discontinued on 1/8/2021, s/p Cefepime discontinued on 1/11/2021  - s/p Dexamethasone 6mg IV once daily (1/1/2021) x10 days  - Covid antibodies: negative --> s/p 2 units of Convalescent plasma ( 1/8/2021 and 1/12/2021)  - f/u inflammatory markers   - s/p tocilizumab 400mg IV x1 on 1/8/2021  - s/p lucinda and vanc   - d/c versed this morning --> c/w Klonopin q 12       #Dislodged PEG tube:   - patient agitated overnight on 1/31, PEG tube became dislodged  - replaced overnight by surgery, placement confirmed via XR w/ contrast  - restarted bolus feeds today       #Thrombocytopenia- resolved   - likely secondary to Daptomycin (started on 1/26) vs lab error vs HIT less likely was on heparin gtt   - repeat CBC w/ platelets stable      #Leukocytosis  #Sacral ulcer, heel ulcers   - Multifactorial (Possible overlying infection, ulcers, steroids)   - s/p debridement w/ burn 1/21/21  - wound culture- moderate e. faecium  - ID following:  daptomycin 8 mg/kg q 48 hours (please give after dialysis on HD days)  - please obtain CK and weekly  - local wound care  - wound care per burn     #Ileus- resolved   - s/p PEG 1/18  - xray 10/19 w/ gasseous distention of stomach  - PEG was connected to suction, feeds were held  - Xray 10/21 w/o evidence of obstruction, restarted feeds    #Diarrhea- resolved and again on 1/27  - d/c senna and miralax   - monitor  - changed feeds from nepro to peptamen   - remove dignishield when resolves     #Nausea/vomiting  - on Reglan w/ meals     #Upper extremity edema R>L  - arterial and venous duplex- negative   - loosened restraints    #Acute on chronic anemia   - s/p 1u pRBC 1/19  - monitor     #ESRD on HD   - EPO (retacrit) 8000 units IV push once weekly  - nephro following, f/u recs  - next HD likely 2/2/2021    #Paroxysmal atrial fibrillation  - restarted on home coumadin  - Metoprolol tartrate 12.5mg po q12hrs      #NSTEMI  - Type II MI, demand ischemia, likely secondary to hypoxia secondary to COVID-19 pneumonia  - Aspirin 81mg po once daily   - CT brain no IC bleed or concerns of IC bleed   - Repeat CK-MB and CPK were stable    #Hypothyroidism  - Continue with levothyroxine 100mcg po once daily     #Altered Mental Status- resolved  - EEG diffuse slowing, but no seizure activity, check the report above  - CT brain shows ventriculomegaly (check full report), no IC bleed, possible old infarct.  - neurology consulted and recs:   a. CT brain negative x2 for stroke or other structural pathology.  EEG negative for any epileptiform activity.  Suspect multifactorial metabolic encephalopathy in setting of COVID and ESRD and sedatives.  Wean sedation as able, can reassess if patient does not awaken after that time.  b. No further EEG or other tests   c. To be reassessed after sedation is weaned    Diet: PEG feeds Peptamen AF  DVT ppx: heparin drip to bridge to coumadin  GI ppx: Protonix 40 qd     Dispo: downgrade to stepdown   -------------------------------------------------------------------------------------------  Current workup in progress:  - monitor CK weekly on Daptomycin   - heparin drip to bridge to coumadin, monitor PTT q6 hrs   - monitor INR daily and dose coumadin accordingly   - starting on bolus feeds, monitor     SIGN OUT AT 02-01-21

## 2021-02-02 NOTE — CHART NOTE - NSCHARTNOTEFT_GEN_A_CORE
Patient seen and examined at bedside for PEG tube replacement after it was removed by the patient. PEG insertion education was provided to the nurse taking care of the patient. PEG was successfully placed by myself today. Nurse understands the steps of re-inserting a PEG tube when removed by the patient. Instructions on how to insert the PEG tube was also explained to the medical resident.

## 2021-02-02 NOTE — PROGRESS NOTE ADULT - SUBJECTIVE AND OBJECTIVE BOX
ROGER RODRIGUES 60y Male  MRN#: [id number]   CODE STATUS: Full code      SUBJECTIVE  Patient is a 60y old Male who presents with a chief complaint of s/p fall. (01 Feb 2021 11:22)  Currently admitted to medicine with the primary diagnosis of Fever  ____.   Today is hospital day 32d, and this morning he is _________ and reports ________ overnight events.         OBJECTIVE  PAST MEDICAL & SURGICAL HISTORY  PAD (peripheral artery disease)  multiple toe amputations    Anemia  chronic anemia - s/p transfusion 2018    Thyroid cancer    Chronic leg pain    OA (osteoarthritis)    SOB (shortness of breath) on exertion    ESRD (end stage renal disease)  2 yrs    Atrial fibrillation  on warfarin    Right sided weakness    Stroke  8 yrs ago    Hypertension    High blood cholesterol    Diabetes mellitus, type 2    Dialysis patient  Mon, Wednesday, Friday ([address])    Smoker    H/O thyroid nodule    H/O gastroesophageal reflux (GERD)    History of tonsillectomy    History of surgery  Multiple toe amputations (amp 4 1/2 left toes; has 1/2 left mid toe; amp right mid toe)    A-V fistula  left AVF      ALLERGIES:  Orange (Other)  Originally Entered as [HIVES] reaction to [sulfur] (Unknown)  penicillin (Unknown)  sulfADIAZINE (Unknown)    MEDICATIONS:  STANDING MEDICATIONS  aspirin  chewable 81 milliGRAM(s) Oral daily  atorvastatin 20 milliGRAM(s) Oral at bedtime  chlorhexidine 0.12% Liquid 15 milliLiter(s) Oral Mucosa every 12 hours  chlorhexidine 4% Liquid 1 Application(s) Topical <User Schedule>  clonazePAM  Tablet 1 milliGRAM(s) Oral two times a day  collagenase Ointment 1 Application(s) Topical two times a day  Dakins Solution - 1/2 Strength 1 Application(s) Topical two times a day  dextrose 40% Gel 15 Gram(s) Oral once  dextrose 5%. 1000 milliLiter(s) IV Continuous <Continuous>  dextrose 5%. 1000 milliLiter(s) IV Continuous <Continuous>  dextrose 50% Injectable 25 Gram(s) IV Push once  dextrose 50% Injectable 12.5 Gram(s) IV Push once  dextrose 50% Injectable 25 Gram(s) IV Push once  epoetin raven-epbx (RETACRIT) Injectable 8000 Unit(s) IV Push <User Schedule>  glucagon  Injectable 1 milliGRAM(s) IntraMuscular once  heparin  Infusion. 2300 Unit(s)/Hr IV Continuous <Continuous>  insulin glargine Injectable (LANTUS) 20 Unit(s) SubCutaneous at bedtime  insulin lispro (ADMELOG) corrective regimen sliding scale   SubCutaneous three times a day before meals  insulin lispro Injectable (ADMELOG) 8 Unit(s) SubCutaneous three times a day before meals  levothyroxine 100 MICROGram(s) Oral daily  lidocaine 1%/epinephrine 1:100,000 Inj 40 milliLiter(s) Local Injection once  methadone    Tablet 5 milliGRAM(s) Oral daily  metoclopramide   Syrup 5 milliGRAM(s) Oral every 8 hours  metoprolol tartrate 12.5 milliGRAM(s) Oral two times a day  midodrine 10 milliGRAM(s) Oral every 8 hours  pantoprazole   Suspension 40 milliGRAM(s) Oral before breakfast  QUEtiapine 100 milliGRAM(s) Oral two times a day    PRN MEDICATIONS  acetaminophen   Tablet .. 650 milliGRAM(s) Oral every 6 hours PRN      VITAL SIGNS: Last 24 Hours  T(C): 36.2 (02 Feb 2021 00:00), Max: 36.7 (01 Feb 2021 18:00)  T(F): 97.2 (02 Feb 2021 00:00), Max: 98 (01 Feb 2021 18:00)  HR: 99 (02 Feb 2021 00:00) (70 - 99)  BP: 125/58 (02 Feb 2021 00:00) (98/53 - 143/55)  BP(mean): 76 (01 Feb 2021 20:00) (63 - 79)  RR: 24 (02 Feb 2021 00:00) (9 - 27)  SpO2: 99% (02 Feb 2021 00:00) (98% - 100%)    LABS:                        7.1    9.88  )-----------( 371      ( 01 Feb 2021 04:00 )             23.2     02-01    135  |  91<L>  |  70<HH>  ----------------------------<  249<H>  4.8   |  22  |  5.7<HH>    Ca    8.1<L>      01 Feb 2021 04:00  Mg     2.9     02-01    TPro  5.5<L>  /  Alb  2.5<L>  /  TBili  0.7  /  DBili  x   /  AST  30  /  ALT  13  /  AlkPhos  133<H>  02-01    PT/INR - ( 01 Feb 2021 04:00 )   PT: 14.60 sec;   INR: 1.27 ratio         PTT - ( 01 Feb 2021 23:53 )  PTT:40.7 sec              RADIOLOGY:      PHYSICAL EXAM:    GENERAL: NAD, well-developed, AAOx3  HEENT:  Atraumatic, Normocephalic. EOMI, PERRLA, conjunctiva and sclera clear, No JVD  PULMONARY: Clear to auscultation bilaterally; No wheeze  CARDIOVASCULAR: Regular rate and rhythm; No murmurs, rubs, or gallops  GASTROINTESTINAL: Soft, Nontender, Nondistended; Bowel sounds present  MUSCULOSKELETAL:  2+ Peripheral Pulses, No clubbing, cyanosis, or edema  NEUROLOGY: non-focal  SKIN: No rashes or lesions      ADMISSION SUMMARY    59 yo male with PMHx of afib on coumadin, DM2, ESRD on HD MWF, HLD, HTN, CVA, who presented w/ weakness and fall.     #Acute hypoxemic respiratory failure   #Covid-19 PNA  #Superimposed bacterial PNA   - s/p intubation, now trach & PEG  - per surgery, trach sutures can be removed by primary team after 1/25  - s/p Azithromycin discontinued on 1/8/2021, s/p Cefepime discontinued on 1/11/2021  - s/p Dexamethasone 6mg IV once daily (1/1/2021) x10 days  - Covid antibodies: negative --> s/p 2 units of Convalescent plasma ( 1/8/2021 and 1/12/2021)  - f/u inflammatory markers   - s/p tocilizumab 400mg IV x1 on 1/8/2021  - s/p lucinda and vanc   - d/c versed this morning --> c/w Klonopin q 12       #Dislodged PEG tube:   - patient agitated overnight on 1/31, PEG tube became dislodged  - replaced overnight by surgery, placement confirmed via XR w/ contrast  - restarted bolus feeds today       #Thrombocytopenia- resolved   - likely secondary to Daptomycin (started on 1/26) vs lab error vs HIT less likely was on heparin gtt   - repeat CBC w/ platelets stable      #Leukocytosis  #Sacral ulcer, heel ulcers   - Multifactorial (Possible overlying infection, ulcers, steroids)   - s/p debridement w/ burn 1/21/21  - wound culture- moderate e. faecium  - ID following:  daptomycin 8 mg/kg q 48 hours (please give after dialysis on HD days)  - please obtain CK and weekly  - local wound care  - wound care per burn     #Ileus- resolved   - s/p PEG 1/18  - xray 10/19 w/ gasseous distention of stomach  - PEG was connected to suction, feeds were held  - Xray 10/21 w/o evidence of obstruction, restarted feeds    #Diarrhea- resolved and again on 1/27  - d/c senna and miralax   - monitor  - changed feeds from nepro to peptamen   - remove dignishield when resolves     #Nausea/vomiting  - on Reglan w/ meals     #Upper extremity edema R>L  - arterial and venous duplex- negative   - loosened restraints    #Acute on chronic anemia   - s/p 1u pRBC 1/19  - monitor     #ESRD on HD   - EPO (retacrit) 8000 units IV push once weekly  - nephro following, f/u recs  - next HD likely 2/2/2021    #Paroxysmal atrial fibrillation  - restarted on home coumadin  - Metoprolol tartrate 12.5mg po q12hrs      #NSTEMI  - Type II MI, demand ischemia, likely secondary to hypoxia secondary to COVID-19 pneumonia  - Aspirin 81mg po once daily   - CT brain no IC bleed or concerns of IC bleed   - Repeat CK-MB and CPK were stable    #Hypothyroidism  - Continue with levothyroxine 100mcg po once daily     #Altered Mental Status- resolved  - EEG diffuse slowing, but no seizure activity, check the report above  - CT brain shows ventriculomegaly (check full report), no IC bleed, possible old infarct.  - neurology consulted and recs:   a. CT brain negative x2 for stroke or other structural pathology.  EEG negative for any epileptiform activity.  Suspect multifactorial metabolic encephalopathy in setting of COVID and ESRD and sedatives.  Wean sedation as able, can reassess if patient does not awaken after that time.  b. No further EEG or other tests   c. To be reassessed after sedation is weaned    Diet: PEG feeds Peptamen AF  DVT ppx: heparin drip to bridge to coumadin  GI ppx: Protonix 40 qd     Dispo: downgrade to stepdown   -------------------------------------------------------------------------------------------  Current workup in progress:  - monitor CK weekly on Daptomycin   - heparin drip to bridge to coumadin, monitor PTT q6 hrs   - monitor INR daily and dose coumadin accordingly   - starting on bolus feeds, monitor    ROGER RODRIGUES 60y Male  MRN#: [id number]   CODE STATUS: Full code      SUBJECTIVE  Patient is a 60y old Male who presents with a chief complaint of s/p fall. (01 Feb 2021 11:22)  Currently admitted to medicine with the primary diagnosis of Fever    Today is hospital day 32d, and this morning he had a misplaced PEG tube. This was fixed yesterday but after the second feed the PEG was misplaced. Surgery contacted.   otherwise he does not report any pain.   Bridging between heparin and coumadin for afib.       OBJECTIVE  PAST MEDICAL & SURGICAL HISTORY  PAD (peripheral artery disease)  multiple toe amputations    Anemia  chronic anemia - s/p transfusion 2018    Thyroid cancer    Chronic leg pain    OA (osteoarthritis)    SOB (shortness of breath) on exertion    ESRD (end stage renal disease)  2 yrs    Atrial fibrillation  on warfarin    Right sided weakness    Stroke  8 yrs ago    Hypertension    High blood cholesterol    Diabetes mellitus, type 2    Dialysis patient  Mon, Wednesday, Friday ([facility])    Smoker    H/O thyroid nodule    H/O gastroesophageal reflux (GERD)    History of tonsillectomy    History of surgery  Multiple toe amputations (amp 4 1/2 left toes; has 1/2 left mid toe; amp right mid toe)    A-V fistula  left AVF      ALLERGIES:  Orange (Other)  Originally Entered as [HIVES] reaction to [sulfur] (Unknown)  penicillin (Unknown)  sulfADIAZINE (Unknown)    MEDICATIONS:  STANDING MEDICATIONS  aspirin  chewable 81 milliGRAM(s) Oral daily  atorvastatin 20 milliGRAM(s) Oral at bedtime  chlorhexidine 0.12% Liquid 15 milliLiter(s) Oral Mucosa every 12 hours  chlorhexidine 4% Liquid 1 Application(s) Topical <User Schedule>  clonazePAM  Tablet 1 milliGRAM(s) Oral two times a day  collagenase Ointment 1 Application(s) Topical two times a day  Dakins Solution - 1/2 Strength 1 Application(s) Topical two times a day  dextrose 40% Gel 15 Gram(s) Oral once  dextrose 5%. 1000 milliLiter(s) IV Continuous <Continuous>  dextrose 5%. 1000 milliLiter(s) IV Continuous <Continuous>  dextrose 50% Injectable 25 Gram(s) IV Push once  dextrose 50% Injectable 12.5 Gram(s) IV Push once  dextrose 50% Injectable 25 Gram(s) IV Push once  epoetin raven-epbx (RETACRIT) Injectable 8000 Unit(s) IV Push <User Schedule>  glucagon  Injectable 1 milliGRAM(s) IntraMuscular once  heparin  Infusion. 2300 Unit(s)/Hr IV Continuous <Continuous>  insulin glargine Injectable (LANTUS) 20 Unit(s) SubCutaneous at bedtime  insulin lispro (ADMELOG) corrective regimen sliding scale   SubCutaneous three times a day before meals  insulin lispro Injectable (ADMELOG) 8 Unit(s) SubCutaneous three times a day before meals  levothyroxine 100 MICROGram(s) Oral daily  lidocaine 1%/epinephrine 1:100,000 Inj 40 milliLiter(s) Local Injection once  methadone    Tablet 5 milliGRAM(s) Oral daily  metoclopramide   Syrup 5 milliGRAM(s) Oral every 8 hours  metoprolol tartrate 12.5 milliGRAM(s) Oral two times a day  midodrine 10 milliGRAM(s) Oral every 8 hours  pantoprazole   Suspension 40 milliGRAM(s) Oral before breakfast  QUEtiapine 100 milliGRAM(s) Oral two times a day    PRN MEDICATIONS  acetaminophen   Tablet .. 650 milliGRAM(s) Oral every 6 hours PRN      VITAL SIGNS: Last 24 Hours  T(C): 36.2 (02 Feb 2021 00:00), Max: 36.7 (01 Feb 2021 18:00)  T(F): 97.2 (02 Feb 2021 00:00), Max: 98 (01 Feb 2021 18:00)  HR: 99 (02 Feb 2021 00:00) (70 - 99)  BP: 125/58 (02 Feb 2021 00:00) (98/53 - 143/55)  BP(mean): 76 (01 Feb 2021 20:00) (63 - 79)  RR: 24 (02 Feb 2021 00:00) (9 - 27)  SpO2: 99% (02 Feb 2021 00:00) (98% - 100%)    LABS:                        7.1    9.88  )-----------( 371      ( 01 Feb 2021 04:00 )             23.2     02-01    135  |  91<L>  |  70<HH>  ----------------------------<  249<H>  4.8   |  22  |  5.7<HH>    Ca    8.1<L>      01 Feb 2021 04:00  Mg     2.9     02-01    TPro  5.5<L>  /  Alb  2.5<L>  /  TBili  0.7  /  DBili  x   /  AST  30  /  ALT  13  /  AlkPhos  133<H>  02-01    PT/INR - ( 01 Feb 2021 04:00 )   PT: 14.60 sec;   INR: 1.27 ratio         PTT - ( 01 Feb 2021 23:53 )  PTT:40.7 sec              RADIOLOGY:      PHYSICAL EXAM:    GENERAL: Trach, Peg on Mechanical ventilation  HEENT:  Atraumatic, Normocephalic. EOMI, PERRLA, conjunctiva and sclera clear, No JVD  PULMONARY: Clear to auscultation bilaterally; No wheeze  CARDIOVASCULAR: Regular rate and rhythm; No murmurs, rubs, or gallops  GASTROINTESTINAL: Soft, Nontender, Nondistended; Bowel sounds present  MUSCULOSKELETAL:  2+ Peripheral Pulses, No clubbing, cyanosis, or edema  NEUROLOGY: non-focal  SKIN: No rashes or lesions      ADMISSION SUMMARY    59 yo male with PMHx of afib on coumadin, DM2, ESRD on HD MWF, HLD, HTN, CVA, who presented w/ weakness and fall.     #Acute hypoxemic respiratory failure   #Covid-19 PNA  #Superimposed bacterial PNA   - s/p intubation, now trach & PEG  - per surgery, trach sutures can be removed by primary team after 1/25  - s/p Azithromycin discontinued on 1/8/2021, s/p Cefepime discontinued on 1/11/2021  - s/p Dexamethasone 6mg IV once daily (1/1/2021) x10 days  - Covid antibodies: negative --> s/p 2 units of Convalescent plasma ( 1/8/2021 and 1/12/2021)  - f/u inflammatory markers   - s/p tocilizumab 400mg IV x1 on 1/8/2021  - s/p lucinda and vanc   - d/c versed this morning --> c/w Klonopin q 12 , methadone  - Repeat Duplex    #Dislodged PEG tube:   - patient agitated overnight on 1/31, PEG tube became dislodged  - replaced overnight by surgery, placement confirmed via XR w/ contrast  - restarted bolus feeds , dislodged after.   - Surgery informed, they will follow      #Thrombocytopenia- resolved   - likely secondary to Daptomycin (started on 1/26) vs lab error vs HIT less likely was on heparin gtt   - repeat CBC w/ platelets stable      #Paroxysmal atrial fibrillation  - restarted on home coumadin  - Metoprolol tartrate 12.5mg po q12hrs    - On heparin drip, following PTT, bridging Coumadin    #Leukocytosis  #Sacral ulcer, heel ulcers   - Multifactorial (Possible overlying infection, ulcers, steroids)   - s/p debridement w/ burn 1/21/21  - wound culture- moderate e. faecium  - ID following: S/P daptomycin 8 mg/kg q 48 hours (please give after dialysis on HD days) finished on 2/1  - please obtain CK and weekly  - local wound care  - wound care per burn     #Ileus- resolved   - s/p PEG 1/18  - xray 10/19 w/ gasseous distention of stomach  - PEG was connected to suction, feeds were held  - Xray 10/21 w/o evidence of obstruction, restarted feeds    #Diarrhea- resolved and again on 1/27  - d/c senna and miralax   - monitor  - changed feeds from nepro to peptamen   - remove dignishield when resolves     #Nausea/vomiting  - on Reglan w/ meals     #Upper extremity edema R>L  - arterial and venous duplex- negative   - loosened restraints    #Acute on chronic anemia   - s/p 1u pRBC 1/19  - monitor     #ESRD on HD   - EPO (retacrit) 8000 units IV push once weekly  - nephro following, f/u recs  - next HD likely 2/2/2021    #NSTEMI  - Type II MI, demand ischemia, likely secondary to hypoxia secondary to COVID-19 pneumonia  - Aspirin 81mg po once daily   - CT brain no IC bleed or concerns of IC bleed   - Repeat CK-MB and CPK were stable    #Hypothyroidism  - Continue with levothyroxine 100mcg po once daily     #Altered Mental Status- resolved  - EEG diffuse slowing, but no seizure activity, check the report above  - CT brain shows ventriculomegaly (check full report), no IC bleed, possible old infarct.  - neurology consulted and recs:   a. CT brain negative x2 for stroke or other structural pathology.  EEG negative for any epileptiform activity.  Suspect multifactorial metabolic encephalopathy in setting of COVID and ESRD and sedatives.  Wean sedation as able, can reassess if patient does not awaken after that time.  b. No further EEG or other tests   c. To be reassessed after sedation is weaned    Diet: PEG feeds Peptamen AF  DVT ppx: heparin drip to bridge to coumadin  GI ppx: Protonix 40 qd     Dispo: Stepdown unit       ROGER RODRIGUES 60y Male  MRN#: [id number]   CODE STATUS: Full code      SUBJECTIVE  Patient is a 60y old Male who presents with a chief complaint of s/p fall. (01 Feb 2021 11:22)  Currently admitted to medicine with the primary diagnosis of Fever    Today is hospital day 32d, and this morning he had a misplaced PEG tube. This was fixed yesterday but after the second feed the PEG was misplaced. Surgery contacted.   otherwise he does not report any pain.   Bridging between heparin and coumadin for afib.       OBJECTIVE  PAST MEDICAL & SURGICAL HISTORY  PAD (peripheral artery disease)  multiple toe amputations    Anemia  chronic anemia - s/p transfusion 2018    Thyroid cancer    Chronic leg pain    OA (osteoarthritis)    SOB (shortness of breath) on exertion    ESRD (end stage renal disease)  2 yrs    Atrial fibrillation  on warfarin    Right sided weakness    Stroke  8 yrs ago    Hypertension    High blood cholesterol    Diabetes mellitus, type 2    Dialysis patient  Mon, Wednesday, Friday ([facility])    Smoker    H/O thyroid nodule    H/O gastroesophageal reflux (GERD)    History of tonsillectomy    History of surgery  Multiple toe amputations (amp 4 1/2 left toes; has 1/2 left mid toe; amp right mid toe)    A-V fistula  left AVF      ALLERGIES:  Orange (Other)  Originally Entered as [HIVES] reaction to [sulfur] (Unknown)  penicillin (Unknown)  sulfADIAZINE (Unknown)    MEDICATIONS:  STANDING MEDICATIONS  aspirin  chewable 81 milliGRAM(s) Oral daily  atorvastatin 20 milliGRAM(s) Oral at bedtime  chlorhexidine 0.12% Liquid 15 milliLiter(s) Oral Mucosa every 12 hours  chlorhexidine 4% Liquid 1 Application(s) Topical <User Schedule>  clonazePAM  Tablet 1 milliGRAM(s) Oral two times a day  collagenase Ointment 1 Application(s) Topical two times a day  Dakins Solution - 1/2 Strength 1 Application(s) Topical two times a day  dextrose 40% Gel 15 Gram(s) Oral once  dextrose 5%. 1000 milliLiter(s) IV Continuous <Continuous>  dextrose 5%. 1000 milliLiter(s) IV Continuous <Continuous>  dextrose 50% Injectable 25 Gram(s) IV Push once  dextrose 50% Injectable 12.5 Gram(s) IV Push once  dextrose 50% Injectable 25 Gram(s) IV Push once  epoetin raven-epbx (RETACRIT) Injectable 8000 Unit(s) IV Push <User Schedule>  glucagon  Injectable 1 milliGRAM(s) IntraMuscular once  heparin  Infusion. 2300 Unit(s)/Hr IV Continuous <Continuous>  insulin glargine Injectable (LANTUS) 20 Unit(s) SubCutaneous at bedtime  insulin lispro (ADMELOG) corrective regimen sliding scale   SubCutaneous three times a day before meals  insulin lispro Injectable (ADMELOG) 8 Unit(s) SubCutaneous three times a day before meals  levothyroxine 100 MICROGram(s) Oral daily  lidocaine 1%/epinephrine 1:100,000 Inj 40 milliLiter(s) Local Injection once  methadone    Tablet 5 milliGRAM(s) Oral daily  metoclopramide   Syrup 5 milliGRAM(s) Oral every 8 hours  metoprolol tartrate 12.5 milliGRAM(s) Oral two times a day  midodrine 10 milliGRAM(s) Oral every 8 hours  pantoprazole   Suspension 40 milliGRAM(s) Oral before breakfast  QUEtiapine 100 milliGRAM(s) Oral two times a day    PRN MEDICATIONS  acetaminophen   Tablet .. 650 milliGRAM(s) Oral every 6 hours PRN      VITAL SIGNS: Last 24 Hours  T(C): 36.2 (02 Feb 2021 00:00), Max: 36.7 (01 Feb 2021 18:00)  T(F): 97.2 (02 Feb 2021 00:00), Max: 98 (01 Feb 2021 18:00)  HR: 99 (02 Feb 2021 00:00) (70 - 99)  BP: 125/58 (02 Feb 2021 00:00) (98/53 - 143/55)  BP(mean): 76 (01 Feb 2021 20:00) (63 - 79)  RR: 24 (02 Feb 2021 00:00) (9 - 27)  SpO2: 99% (02 Feb 2021 00:00) (98% - 100%)    LABS:                        7.1    9.88  )-----------( 371      ( 01 Feb 2021 04:00 )             23.2     02-01    135  |  91<L>  |  70<HH>  ----------------------------<  249<H>  4.8   |  22  |  5.7<HH>    Ca    8.1<L>      01 Feb 2021 04:00  Mg     2.9     02-01    TPro  5.5<L>  /  Alb  2.5<L>  /  TBili  0.7  /  DBili  x   /  AST  30  /  ALT  13  /  AlkPhos  133<H>  02-01    PT/INR - ( 01 Feb 2021 04:00 )   PT: 14.60 sec;   INR: 1.27 ratio         PTT - ( 01 Feb 2021 23:53 )  PTT:40.7 sec              RADIOLOGY:      PHYSICAL EXAM:    GENERAL: Trach, Peg on Mechanical ventilation  HEENT:  Atraumatic, Normocephalic. EOMI, PERRLA, conjunctiva and sclera clear, No JVD  PULMONARY: Clear to auscultation bilaterally; No wheeze  CARDIOVASCULAR: Regular rate and rhythm; No murmurs, rubs, or gallops  GASTROINTESTINAL: Soft, Nontender, Nondistended; Bowel sounds present  MUSCULOSKELETAL:  2+ Peripheral Pulses, No clubbing, cyanosis, or edema  NEUROLOGY: non-focal  SKIN: No rashes or lesions      ADMISSION SUMMARY    61 yo male with PMHx of afib on coumadin, DM2, ESRD on HD MWF, HLD, HTN, CVA, who presented w/ weakness and fall.     #Acute hypoxemic respiratory failure   #Covid-19 PNA  #Superimposed bacterial PNA   - s/p intubation, now trach & PEG  - per surgery, trach sutures can be removed by primary team after 1/25  - s/p Azithromycin discontinued on 1/8/2021, s/p Cefepime discontinued on 1/11/2021  - s/p Dexamethasone 6mg IV once daily (1/1/2021) x10 days  - Covid antibodies: negative --> s/p 2 units of Convalescent plasma ( 1/8/2021 and 1/12/2021)  - f/u inflammatory markers   - s/p tocilizumab 400mg IV x1 on 1/8/2021  - s/p lucinda and vanc   - d/c versed this morning --> c/w Klonopin q 12 , methadone  - Repeat Duplex    #Dislodged PEG tube:   - patient agitated overnight on 1/31, PEG tube became dislodged  - replaced overnight by surgery, placement confirmed via XR w/ contrast  - restarted bolus feeds , dislodged after.   - Surgery informed, they will follow      #Thrombocytopenia- resolved   - likely secondary to Daptomycin (started on 1/26) vs lab error vs HIT less likely was on heparin gtt   - repeat CBC w/ platelets stable      #Paroxysmal atrial fibrillation  - restarted on home coumadin  - Metoprolol tartrate 12.5mg po q12hrs    - On heparin drip, following PTT, bridging Coumadin    #Leukocytosis  #Sacral ulcer, heel ulcers   - Multifactorial (Possible overlying infection, ulcers, steroids)   - s/p debridement w/ burn 1/21/21  - wound culture- moderate e. faecium  - ID following: S/P daptomycin 8 mg/kg q 48 hours (please give after dialysis on HD days) finished on 2/1  - please obtain CK and weekly  - local wound care  - wound care per burn     #Ileus- resolved   - s/p PEG 1/18  - xray 10/19 w/ gasseous distention of stomach  - PEG was connected to suction, feeds were held  - Xray 10/21 w/o evidence of obstruction, restarted feeds    #Diarrhea- resolved and again on 1/27  - d/c senna and miralax   - monitor  - changed feeds from nepro to peptamen   - remove dignishield when resolves     #Nausea/vomiting  - on Reglan w/ meals     #Upper extremity edema R>L  - arterial and venous duplex- negative   - loosened restraints    #Acute on chronic anemia   - s/p 1u pRBC 1/19  - monitor     #ESRD on HD   - EPO (retacrit) 8000 units IV push once weekly  - nephro following, f/u recs  - next HD likely 2/2/2021    #NSTEMI  - Type II MI, demand ischemia, likely secondary to hypoxia secondary to COVID-19 pneumonia  - Aspirin 81mg po once daily   - CT brain no IC bleed or concerns of IC bleed   - Repeat CK-MB and CPK were stable    #Hypothyroidism  - Continue with levothyroxine 100mcg po once daily     #Altered Mental Status- resolved  - EEG diffuse slowing, but no seizure activity, check the report above  - CT brain shows ventriculomegaly (check full report), no IC bleed, possible old infarct.  - neurology consulted and recs:   a. CT brain negative x2 for stroke or other structural pathology.  EEG negative for any epileptiform activity.  Suspect multifactorial metabolic encephalopathy in setting of COVID and ESRD and sedatives.  Wean sedation as able, can reassess if patient does not awaken after that time.  b. No further EEG or other tests   c. To be reassessed after sedation is weaned    Diet: PEG feeds Peptamen AF  DVT ppx: heparin drip to bridge to coumadin  GI ppx: Protonix 40 qd     Dispo: Stepdown unit  Called and updated [phone] Nedra Shea

## 2021-02-02 NOTE — PROGRESS NOTE ADULT - SUBJECTIVE AND OBJECTIVE BOX
ROGER RODRIGUES  60y Male    CHIEF COMPLAINT:    Patient is a 60y old  Male who presents with a chief complaint of s/p fall. (2021 07:40)      INTERVAL HPI/OVERNIGHT EVENTS:    Patient seen and examined. No acute events overnight. downgraded from ICU    ROS: All other systems are negative.    Vital Signs:    T(F): 95.6 (21 @ 12:00), Max: 98 (21 @ 18:00)  HR: 90 (21 @ 12:00) (78 - 99)  BP: 131/53 (21 @ 12:00) (104/50 - 143/55)  RR: 20 (21 @ 12:00) (9 - 27)  SpO2: 100% (21 @ 07:35) (98% - 100%)    2021 07:01  -  2021 07:00  --------------------------------------------------------  IN: 795 mL / OUT: 400 mL / NET: 395 mL    2021 07:01  -  2021 12:34  --------------------------------------------------------  IN: 69 mL / OUT: 0 mL / NET: 69 mL    Daily Weight in k.5 (2021 10:00)    POCT Blood Glucose.: 132 mg/dL (2021 11:09)  POCT Blood Glucose.: 143 mg/dL (2021 08:06)  POCT Blood Glucose.: 152 mg/dL (2021 22:22)  POCT Blood Glucose.: 84 mg/dL (2021 19:31)  POCT Blood Glucose.: 128 mg/dL (2021 12:37)      PHYSICAL EXAM:    GENERAL:  NAD  SKIN: No rashes or lesions  HEENT: Atraumatic. Normocephalic.  NECK: Supple, No JVD  PULMONARY: coarse breath sounds. No wheezing. No rales  CVS: Normal S1, S2. Rate and Rhythm are regular.   ABDOMEN/GI: Soft, Nontender, Nondistended; BS present  MSK: no clubbing or cyanosis, edematous    Consultant(s) Notes Reviewed:  [x ] YES  [ ] NO  Care Discussed with Consultants/Other Providers [ x] YES  [ ] NO    LABS:                        8.5    10.48 )-----------( 512      ( 2021 06:55 )             27.5     132<L>  |  91<L>  |  78<HH>  ----------------------------<  150<H>  4.9   |  24  |  6.5<HH>    Ca    8.1<L>      2021 06:55  Mg     2.9         TPro  6.1  /  Alb  2.8<L>  /  TBili  0.7  /  DBili  x   /  AST  29  /  ALT  14  /  AlkPhos  139<H>      PT/INR - ( 2021 06:55 )   PT: 15.20 sec;   INR: 1.32 ratio         PTT - ( 2021 06:55 )  PTT:59.3 sec    Trop --, CKMB --, CK 56, 21 @ 06:55        RADIOLOGY & ADDITIONAL TESTS:      Imaging or report Personally Reviewed:  [x] YES  [ ] NO  EKG reviewed: [x] YES  [ ] NO    Medications:  Standing  aspirin  chewable 81 milliGRAM(s) Oral daily  atorvastatin 20 milliGRAM(s) Oral at bedtime  chlorhexidine 0.12% Liquid 15 milliLiter(s) Oral Mucosa every 12 hours  chlorhexidine 4% Liquid 1 Application(s) Topical <User Schedule>  clonazePAM  Tablet 1 milliGRAM(s) Oral two times a day  collagenase Ointment 1 Application(s) Topical two times a day  Dakins Solution - 1/2 Strength 1 Application(s) Topical two times a day  dextrose 40% Gel 15 Gram(s) Oral once  dextrose 5%. 1000 milliLiter(s) IV Continuous <Continuous>  dextrose 5%. 1000 milliLiter(s) IV Continuous <Continuous>  dextrose 50% Injectable 25 Gram(s) IV Push once  dextrose 50% Injectable 12.5 Gram(s) IV Push once  dextrose 50% Injectable 25 Gram(s) IV Push once  epoetin raven-epbx (RETACRIT) Injectable 8000 Unit(s) IV Push <User Schedule>  glucagon  Injectable 1 milliGRAM(s) IntraMuscular once  heparin  Infusion. 2300 Unit(s)/Hr IV Continuous <Continuous>  insulin glargine Injectable (LANTUS) 20 Unit(s) SubCutaneous at bedtime  insulin lispro (ADMELOG) corrective regimen sliding scale   SubCutaneous three times a day before meals  insulin lispro Injectable (ADMELOG) 8 Unit(s) SubCutaneous three times a day before meals  levothyroxine 100 MICROGram(s) Oral daily  lidocaine 1%/epinephrine 1:100,000 Inj 40 milliLiter(s) Local Injection once  methadone    Tablet 5 milliGRAM(s) Oral daily  metoclopramide   Syrup 5 milliGRAM(s) Oral every 8 hours  metoprolol tartrate 12.5 milliGRAM(s) Oral two times a day  midodrine 10 milliGRAM(s) Oral every 8 hours  pantoprazole   Suspension 40 milliGRAM(s) Oral before breakfast  QUEtiapine 100 milliGRAM(s) Oral two times a day    PRN Meds  acetaminophen   Tablet .. 650 milliGRAM(s) Oral every 6 hours PRN

## 2021-02-02 NOTE — PROGRESS NOTE ADULT - ASSESSMENT
59 yo male with PMHx of afib on coumadin, DM2, ESRD on HD TThsat, HLD, HTN, CVA, who presented w/ weakness and fall, admitted for COVID PNA/bacterial PNA/NSTEMI and . Had a prolonged hospitalization in the ICU, hospital course complicated by PEG/Trach placement as well as Ileus, which has since resolved. He was subsequently downgraded to step down unit    Acute Hypoxemic respiratory failure secondary to COVID PNA  Superimprosed bacterial PNA  NSTEMI  Sacral Ulcers/Heel ulcers s/p debriedment  s/p trach and peg  s/p course of Azithromycin/Cefepime, meropenem and vancomycin  recently finished course of Daptomycin on 2/1  s/p Decadron x10 days, Toci x1 and 2u convalescent plasma earlier on the in the hospitalization  taper down sedation    Dislodged PEG tubex2  initially replaced by surgery and placement confirmed with Xray  displaced again today, surgery aware to f/u     Paroxysmal atrial fibrillation  On heparin, being bridged to coumadin, check PTTs and INR  - Metoprolol tartrate 12.5mg po q12hrs      Thrombocytopenia- resolved   seen on 1 lab on 1/29, likely lab error    Upper extremity edema R>L  - arterial and venous duplex- negative   - loosened restraints, elevate if possible    ESRD on HD TThsat, due to DM  Acute on Chronic anemia  renal following  monitor CBC/keep active t&s/tranfuse if <7    Patient requires continuous monitoring in CEU   59 yo male with PMHx of afib on coumadin, DM2, ESRD on HD TThsat, HLD, HTN, CVA, who presented w/ weakness and fall, admitted for COVID PNA/bacterial PNA/NSTEMI and . Had a prolonged hospitalization in the ICU, hospital course complicated by PEG/Trach placement as well as Ileus, which has since resolved. He was subsequently downgraded to step down unit    Acute Hypoxemic respiratory failure secondary to COVID PNA  Superimprosed bacterial PNA  NSTEMI  Sacral Ulcers/Heel ulcers s/p debriedment  s/p trach and peg  s/p course of Azithromycin/Cefepime, meropenem and vancomycin  recently finished course of Daptomycin on 2/1  s/p Decadron x10 days, Toci x1 and 2u convalescent plasma earlier on the in the hospitalization  taper down sedation    Dislodged PEG tubex2  initially replaced by surgery and placement confirmed with Xray  displaced again today, surgery aware to f/u     Paroxysmal atrial fibrillation  On heparin, being bridged to coumadin, check PTTs and INR  - Metoprolol tartrate 12.5mg po q12hrs      Thrombocytopenia- resolved   seen on 1 lab on 1/29, likely lab error    Upper extremity edema R>L  - arterial and venous duplex- negative   - loosened restraints, elevate if possible    ESRD on HD TThsat, due to DM  Acute on Chronic anemia  renal following  monitor CBC/keep active t&s/tranfuse if <7

## 2021-02-02 NOTE — CHART NOTE - NSCHARTNOTEFT_GEN_A_CORE
CEU DOWNGRADE NOTE:    60y Male transferred to floor from CEU    Patient is a 60y old Male who presents with a chief complaint of s/p fall. (02 Feb 2021 13:02)    The patient is currently admitted for the primary diagnosis of Fever      The patient was admitted to the CEU for 1 day and was in the ICU prior.   -------------------------------------------------------------------------------------------------------  61 yo male with PMHx of afib on coumadin, DM2, ESRD on HD MWF, HLD, HTN, CVA, who presented w/ weakness and fall.     #Acute hypoxemic respiratory failure   #Covid-19 PNA  #Superimposed bacterial PNA   - s/p intubation, now trach & PEG  - per surgery, trach sutures can be removed by primary team after 1/25  - s/p Azithromycin discontinued on 1/8/2021, s/p Cefepime discontinued on 1/11/2021  - s/p Dexamethasone 6mg IV once daily (1/1/2021) x10 days  - Covid antibodies: negative --> s/p 2 units of Convalescent plasma ( 1/8/2021 and 1/12/2021)  - f/u inflammatory markers   - s/p tocilizumab 400mg IV x1 on 1/8/2021  - s/p lucinda and vanc   - d/c versed this morning --> c/w Klonopin q 12 , methadone  - Repeat Duplex    #Dislodged PEG tube:   - patient agitated overnight on 1/31, PEG tube became dislodged  - replaced overnight by surgery, placement confirmed via XR w/ contrast  - restarted bolus feeds , dislodged after.   - Surgery informed, they will follow      #Thrombocytopenia- resolved   - likely secondary to Daptomycin (started on 1/26) vs lab error vs HIT less likely was on heparin gtt   - repeat CBC w/ platelets stable      #Paroxysmal atrial fibrillation  - restarted on home coumadin  - Metoprolol tartrate 12.5mg po q12hrs    - On heparin drip, following PTT, bridging Coumadin    #Leukocytosis  #Sacral ulcer, heel ulcers   - Multifactorial (Possible overlying infection, ulcers, steroids)   - s/p debridement w/ burn 1/21/21  - wound culture- moderate e. faecium  - ID following: S/P daptomycin 8 mg/kg q 48 hours (please give after dialysis on HD days) finished on 2/1  - please obtain CK and weekly  - local wound care  - wound care per burn     #Ileus- resolved   - s/p PEG 1/18  - xray 10/19 w/ gasseous distention of stomach  - PEG was connected to suction, feeds were held  - Xray 10/21 w/o evidence of obstruction, restarted feeds    #Diarrhea- resolved and again on 1/27  - d/c senna and miralax   - monitor  - changed feeds from nepro to peptamen   - remove dignishield when resolves     #Nausea/vomiting  - on Reglan w/ meals     #Upper extremity edema R>L  - arterial and venous duplex- negative   - loosened restraints    #Acute on chronic anemia   - s/p 1u pRBC 1/19  - monitor     #ESRD on HD   - EPO (retacrit) 8000 units IV push once weekly  - nephro following, f/u recs  - next HD likely 2/2/2021    #NSTEMI  - Type II MI, demand ischemia, likely secondary to hypoxia secondary to COVID-19 pneumonia  - Aspirin 81mg po once daily   - CT brain no IC bleed or concerns of IC bleed   - Repeat CK-MB and CPK were stable    #Hypothyroidism  - Continue with levothyroxine 100mcg po once daily     #Altered Mental Status- resolved  - EEG diffuse slowing, but no seizure activity, check the report above  - CT brain shows ventriculomegaly (check full report), no IC bleed, possible old infarct.  - neurology consulted and recs:   a. CT brain negative x2 for stroke or other structural pathology.  EEG negative for any epileptiform activity.  Suspect multifactorial metabolic encephalopathy in setting of COVID and ESRD and sedatives.  Wean sedation as able, can reassess if patient does not awaken after that time.  b. No further EEG or other tests   c. To be reassessed after sedation is weaned    Diet: PEG feeds Peptamen AF  DVT ppx: heparin drip to bridge to coumadin  GI ppx: Protonix 40 qd     Dispo: Stepdown unit  Called and updated [phone] Nedra Shea      SIGN OUT AT 02-02-21 @ 15:35 GIVEN TO: CEU DOWNGRADE NOTE:    60y Male transferred to floor from CEU    Patient is a 60y old Male who presents with a chief complaint of s/p fall. (02 Feb 2021 13:02)    The patient is currently admitted for the primary diagnosis of Fever      The patient was admitted to the CEU for 1 day and was in the ICU prior.   -------------------------------------------------------------------------------------------------------  61 yo male with PMHx of afib on coumadin, DM2, ESRD on HD MWF, HLD, HTN, CVA, who presented w/ weakness and fall.     #Acute hypoxemic respiratory failure   #Covid-19 PNA  #Superimposed bacterial PNA   - s/p intubation, now trach & PEG  - per surgery, trach sutures can be removed by primary team after 1/25  - s/p Azithromycin discontinued on 1/8/2021, s/p Cefepime discontinued on 1/11/2021  - s/p Dexamethasone 6mg IV once daily (1/1/2021) x10 days  - Covid antibodies: negative --> s/p 2 units of Convalescent plasma ( 1/8/2021 and 1/12/2021)  - f/u inflammatory markers   - s/p tocilizumab 400mg IV x1 on 1/8/2021  - s/p lucinda and vanc   - d/c versed this morning --> c/w Klonopin q 12 , methadone  - Repeat Duplex    #Dislodged PEG tube:   - patient agitated overnight on 1/31, PEG tube became dislodged  - replaced overnight by surgery, placement confirmed via XR w/ contrast  - restarted bolus feeds , dislodged after, replaced.       #Thrombocytopenia- resolved   - likely secondary to Daptomycin (started on 1/26) vs lab error vs HIT less likely was on heparin gtt   - repeat CBC w/ platelets stable      #Paroxysmal atrial fibrillation  - restarted on home coumadin  - Metoprolol tartrate 12.5mg po q12hrs    - On heparin drip, following PTT, bridging Coumadin    #Leukocytosis  #Sacral ulcer, heel ulcers   - Multifactorial (Possible overlying infection, ulcers, steroids)   - s/p debridement w/ burn 1/21/21  - wound culture- moderate e. faecium  - ID following: S/P daptomycin 8 mg/kg q 48 hours (please give after dialysis on HD days) finished on 2/1  - please obtain CK and weekly  - local wound care  - wound care per burn     #Ileus- resolved   - s/p PEG 1/18  - xray 10/19 w/ gasseous distention of stomach  - PEG was connected to suction, feeds were held  - Xray 10/21 w/o evidence of obstruction, restarted feeds    #Diarrhea- resolved and again on 1/27  - d/c senna and miralax   - monitor  - changed feeds from nepro to peptamen   - remove dignishield when resolves     #Nausea/vomiting  - on Reglan w/ meals     #Upper extremity edema R>L  - arterial and venous duplex- negative   - loosened restraints    #Acute on chronic anemia   - s/p 1u pRBC 1/19  - monitor     #ESRD on HD   - EPO (retacrit) 8000 units IV push once weekly  - nephro following, f/u recs  - next HD likely 2/2/2021    #NSTEMI  - Type II MI, demand ischemia, likely secondary to hypoxia secondary to COVID-19 pneumonia  - Aspirin 81mg po once daily   - CT brain no IC bleed or concerns of IC bleed   - Repeat CK-MB and CPK were stable    #Hypothyroidism  - Continue with levothyroxine 100mcg po once daily     #Altered Mental Status- resolved  - EEG diffuse slowing, but no seizure activity, check the report above  - CT brain shows ventriculomegaly (check full report), no IC bleed, possible old infarct.  - neurology consulted and recs:   a. CT brain negative x2 for stroke or other structural pathology.  EEG negative for any epileptiform activity.  Suspect multifactorial metabolic encephalopathy in setting of COVID and ESRD and sedatives.  Wean sedation as able, can reassess if patient does not awaken after that time.  b. No further EEG or other tests   c. To be reassessed after sedation is weaned    Diet: PEG feeds Peptamen AF  DVT ppx: heparin drip to bridge to coumadin  GI ppx: Protonix 40 qd     Dispo: Stepdown unit  Called and updated [phone] Nedra Shea      SIGN OUT AT 02-02-21 @ 15:35 GIVEN TO:

## 2021-02-02 NOTE — PROGRESS NOTE ADULT - SUBJECTIVE AND OBJECTIVE BOX
[facility] FOLLOW UP NOTE  --------------------------------------------------------------------------------  24 hour events/subjective: Patient examined. Appears comfortable.    PAST HISTORY  --------------------------------------------------------------------------------  No significant changes to PMH, PSH, FHx, SHx, unless otherwise noted    ALLERGIES & MEDICATIONS  --------------------------------------------------------------------------------  Allergies    Orange (Other)  Originally Entered as [HIVES] reaction to [sulfur] (Unknown)  penicillin (Unknown)  sulfADIAZINE (Unknown)    Intolerances      Standing Inpatient Medications  aspirin  chewable 81 milliGRAM(s) Oral daily  atorvastatin 20 milliGRAM(s) Oral at bedtime  chlorhexidine 0.12% Liquid 15 milliLiter(s) Oral Mucosa every 12 hours  chlorhexidine 4% Liquid 1 Application(s) Topical <User Schedule>  clonazePAM  Tablet 1 milliGRAM(s) Oral two times a day  collagenase Ointment 1 Application(s) Topical two times a day  Dakins Solution - 1/2 Strength 1 Application(s) Topical two times a day  dextrose 40% Gel 15 Gram(s) Oral once  dextrose 5%. 1000 milliLiter(s) IV Continuous <Continuous>  dextrose 5%. 1000 milliLiter(s) IV Continuous <Continuous>  dextrose 50% Injectable 25 Gram(s) IV Push once  dextrose 50% Injectable 12.5 Gram(s) IV Push once  dextrose 50% Injectable 25 Gram(s) IV Push once  epoetin raven-epbx (RETACRIT) Injectable 8000 Unit(s) IV Push <User Schedule>  glucagon  Injectable 1 milliGRAM(s) IntraMuscular once  heparin  Infusion. 2300 Unit(s)/Hr IV Continuous <Continuous>  insulin glargine Injectable (LANTUS) 20 Unit(s) SubCutaneous at bedtime  insulin lispro (ADMELOG) corrective regimen sliding scale   SubCutaneous three times a day before meals  insulin lispro Injectable (ADMELOG) 8 Unit(s) SubCutaneous three times a day before meals  levothyroxine 100 MICROGram(s) Oral daily  lidocaine 1%/epinephrine 1:100,000 Inj 40 milliLiter(s) Local Injection once  methadone    Tablet 5 milliGRAM(s) Oral daily  metoclopramide   Syrup 5 milliGRAM(s) Oral every 8 hours  metoprolol tartrate 12.5 milliGRAM(s) Oral two times a day  midodrine 10 milliGRAM(s) Oral every 8 hours  pantoprazole   Suspension 40 milliGRAM(s) Oral before breakfast  QUEtiapine 100 milliGRAM(s) Oral two times a day    PRN Inpatient Medications  acetaminophen   Tablet .. 650 milliGRAM(s) Oral every 6 hours PRN      VITALS/PHYSICAL EXAM  --------------------------------------------------------------------------------  T(C): 35.3 (02-02-21 @ 12:00), Max: 36.7 (02-01-21 @ 18:00)  HR: 90 (02-02-21 @ 12:00) (78 - 99)  BP: 131/53 (02-02-21 @ 12:00) (112/55 - 143/55)  RR: 20 (02-02-21 @ 12:00) (9 - 27)  SpO2: 100% (02-02-21 @ 07:35) (98% - 100%)  Wt(kg): --        02-01-21 @ 07:01  -  02-02-21 @ 07:00  --------------------------------------------------------  IN: 795 mL / OUT: 400 mL / NET: 395 mL    02-02-21 @ 07:01  -  02-02-21 @ 13:02  --------------------------------------------------------  IN: 69 mL / OUT: 0 mL / NET: 69 mL      Physical Exam:  	Gen: NAD  	Pulm: CTA B/L  	CV: RRR, S1S2  	Abd: +BS, soft, nontender/nondistended  	: No suprapubic tenderness  	LE: Warm,  no edema  	Vascular access: AVF    LABS/STUDIES  --------------------------------------------------------------------------------              8.5    10.48 >-----------<  512      [02-02-21 @ 06:55]              27.5     132  |  91  |  78  ----------------------------<  150      [02-02-21 @ 06:55]  4.9   |  24  |  6.5        Ca     8.1     [02-02-21 @ 06:55]      Mg     2.9     [02-01-21 @ 04:00]    TPro  6.1  /  Alb  2.8  /  TBili  0.7  /  DBili  x   /  AST  29  /  ALT  14  /  AlkPhos  139  [02-02-21 @ 06:55]    PT/INR: PT 15.20, INR 1.32       [02-02-21 @ 06:55]  PTT: 59.3       [02-02-21 @ 06:55]    CK 56      [02-02-21 @ 06:55]    Creatinine Trend:  SCr 6.5 [02-02 @ 06:55]  SCr 5.7 [02-01 @ 04:00]  SCr 5.1 [01-31 @ 04:30]  SCr 5.9 [01-30 @ 05:00]  SCr 3.3 [01-29 @ 04:30]    Urinalysis - [01-06-21 @ 18:43]      Color Yellow / Appearance Slightly Turbid / SG 1.018 / pH 6.5      Gluc 100 mg/dL / Ketone Negative  / Bili Negative / Urobili <2 mg/dL       Blood Moderate / Protein 300 mg/dL / Leuk Est Small / Nitrite Negative       /  / Hyaline 114 / Gran  / Sq Epi  / Non Sq Epi 1 / Bacteria Negative    Ferritin 983      [01-24-21 @ 06:00]  Vitamin D (25OH) 45      [01-15-21 @ 12:06]  HbA1c 7.1      [05-21-19 @ 04:30]  TSH 4.57      [01-01-21 @ 17:56]

## 2021-02-03 NOTE — PROGRESS NOTE ADULT - ASSESSMENT
ESRD (due to DM) on HD TTS  s/p Fall  altered mental status   Covid-19 PNA / bacterial PNA   fluid overload  hyponatremia  hyperkalemia  DM  HTN  afib on coumadin  CVA  NSTEMI    plan:    HD tomorrow: 3 hours, opti 200 dialyzer, 2K bath, 3.5L UF  EPO with HD  left arm precautions  covid isolation  f/u cardio / f/u pulm  icu care

## 2021-02-03 NOTE — PROGRESS NOTE ADULT - ASSESSMENT
61 yo male with PMHx of afib on coumadin, DM2, ESRD on HD MWF, HLD, HTN, CVA, who presented w/ weakness and fall.     #Acute hypoxemic respiratory failure   #Covid-19 PNA  #Superimposed bacterial PNA   - s/p intubation, now trach & PEG  - s/p Azithromycin discontinued on 1/8/2021, s/p Cefepime discontinued on 1/11/2021  - s/p Dexamethasone 6mg IV once daily (1/1/2021) x10 days  - Covid antibodies: negative --> s/p 2 units of Convalescent plasma ( 1/8/2021 and 1/12/2021)  - f/u inflammatory markers   - s/p tocilizumab 400mg IV x1 on 1/8/2021  - s/p lucinda and vanc   - d/c versed this morning --> c/w Klonopin q 12 , methadone  - Repeat Duplex negative for DVT    # Acute on top of chronic anemia  - HB 8.6-->7  - repeat Hb and T&S at 4:00 pm  - Transfuse if <7  - Currently no evidence of bleed. FIT pending    #Dislodged PEG tube:   -s/p replacement by surgery resident    #Paroxysmal atrial fibrillation  - restarted on home coumadin 2.5 mg   - Metoprolol tartrate 12.5mg po q12hrs    - On heparin drip, following PTT, bridging Coumadin    #Leukocytosis- Resolved  #Sacral ulcer, heel ulcers   - Multifactorial (Possible overlying infection, ulcers, steroids)   - s/p debridement w/ burn 1/21/21  - wound culture- moderate e. faecium  - ID following: S/P daptomycin 8 mg/kg q 48 hours (please give after dialysis on HD days) finished on 2/1  - local wound care  - wound care per burn     #Ileus- resolved   - s/p PEG 1/18  - xray 10/19 w/ gasseous distention of stomach  - PEG was connected to suction, feeds were held  - Xray 10/21 w/o evidence of obstruction, restarted feeds    #Diarrhea-resolved  - changed feeds from nepro to peptamen   - remove dignishield when resolves     #Nausea/vomiting  - on Reglan w/ meals     #Upper extremity edema R>L  - arterial and venous duplex- negative   - loosened restraints    #Acute on chronic anemia   - s/p 1u pRBC 1/19  - monitor     #ESRD on HD   - EPO (retacrit) 8000 units IV push once weekly  - nephro following, f/u recs  - next HD likely 2/2/2021    #NSTEMI  - Type II MI, demand ischemia, likely secondary to hypoxia secondary to COVID-19 pneumonia  - Aspirin 81mg po once daily   - CT brain no IC bleed or concerns of IC bleed   - Repeat CK-MB and CPK were stable    #Hypothyroidism  - Continue with levothyroxine 100mcg po once daily     #Altered Mental Status- resolved  - EEG diffuse slowing, but no seizure activity, check the report above  - CT brain shows ventriculomegaly (check full report), no IC bleed, possible old infarct.    Diet: PEG feeds Peptamen AF  DVT ppx: heparin drip to bridge to coumadin  GI ppx: Protonix 40 qd     Called and updated [phone] Nedra Shea

## 2021-02-03 NOTE — PROGRESS NOTE ADULT - SUBJECTIVE AND OBJECTIVE BOX
24H events:    Patient is a 60y old Male who presents with a chief complaint of s/p fall. (03 Feb 2021 12:31)    Primary diagnosis of Acute respiratory failure secondary to COVID    Today is hospital day 33d. This morning patient was seen and examined at bedside. Remain on tracheostomy on a ventilator with AC mode. FiO2 40%.    PAST MEDICAL & SURGICAL HISTORY  PAD (peripheral artery disease)  multiple toe amputations    Anemia  chronic anemia - s/p transfusion 2018    Thyroid cancer    Chronic leg pain    OA (osteoarthritis)    SOB (shortness of breath) on exertion    ESRD (end stage renal disease)  2 yrs    Atrial fibrillation  on warfarin    Right sided weakness    Stroke  8 yrs ago    Hypertension    High blood cholesterol    Diabetes mellitus, type 2    Dialysis patient  Mon, Wednesday, Friday ([address])    Smoker    H/O thyroid nodule    H/O gastroesophageal reflux (GERD)    History of tonsillectomy    History of surgery  Multiple toe amputations (amp 4 1/2 left toes; has 1/2 left mid toe; amp right mid toe)    A-V fistula  left AVF      SOCIAL HISTORY:  Social History:  Lives at home.  +smoking.  Denies alcohol or illicit drug use. (01 Jan 2021 14:01)      ALLERGIES:  Orange (Other)  Originally Entered as [HIVES] reaction to [sulfur] (Unknown)  penicillin (Unknown)  sulfADIAZINE (Unknown)    MEDICATIONS:  STANDING MEDICATIONS  aspirin  chewable 81 milliGRAM(s) Oral daily  atorvastatin 20 milliGRAM(s) Oral at bedtime  chlorhexidine 0.12% Liquid 15 milliLiter(s) Oral Mucosa every 12 hours  chlorhexidine 4% Liquid 1 Application(s) Topical <User Schedule>  clonazePAM  Tablet 1 milliGRAM(s) Oral two times a day  collagenase Ointment 1 Application(s) Topical two times a day  Dakins Solution - 1/2 Strength 1 Application(s) Topical two times a day  dextrose 40% Gel 15 Gram(s) Oral once  dextrose 5%. 1000 milliLiter(s) IV Continuous <Continuous>  dextrose 5%. 1000 milliLiter(s) IV Continuous <Continuous>  dextrose 50% Injectable 25 Gram(s) IV Push once  dextrose 50% Injectable 12.5 Gram(s) IV Push once  dextrose 50% Injectable 25 Gram(s) IV Push once  epoetin raven-epbx (RETACRIT) Injectable 8000 Unit(s) IV Push <User Schedule>  glucagon  Injectable 1 milliGRAM(s) IntraMuscular once  heparin  Infusion. 2300 Unit(s)/Hr IV Continuous <Continuous>  insulin glargine Injectable (LANTUS) 20 Unit(s) SubCutaneous at bedtime  insulin lispro (ADMELOG) corrective regimen sliding scale   SubCutaneous three times a day before meals  insulin lispro Injectable (ADMELOG) 8 Unit(s) SubCutaneous three times a day before meals  levothyroxine 100 MICROGram(s) Oral daily  lidocaine 1%/epinephrine 1:100,000 Inj 40 milliLiter(s) Local Injection once  methadone    Tablet 5 milliGRAM(s) Oral daily  metoclopramide   Syrup 5 milliGRAM(s) Oral every 8 hours  metoprolol tartrate 12.5 milliGRAM(s) Oral two times a day  midodrine 10 milliGRAM(s) Oral every 8 hours  pantoprazole   Suspension 40 milliGRAM(s) Oral before breakfast  QUEtiapine 100 milliGRAM(s) Oral two times a day  warfarin 2.5 milliGRAM(s) Oral once    PRN MEDICATIONS  acetaminophen   Tablet .. 650 milliGRAM(s) Oral every 6 hours PRN    VITALS:   ICU Vital Signs Last 24 Hrs  T(C): 36.1 (03 Feb 2021 13:05), Max: 36.6 (02 Feb 2021 22:43)  T(F): 97 (03 Feb 2021 13:05), Max: 97.8 (02 Feb 2021 22:43)  HR: 92 (03 Feb 2021 13:05) (80 - 101)  BP: 108/52 (03 Feb 2021 13:05) (105/52 - 134/50)  BP(mean): 75 (03 Feb 2021 13:05) (72 - 78)  RR: 24 (03 Feb 2021 08:25) (20 - 24)  SpO2: 99% (03 Feb 2021 13:05) (99% - 100%)    PHYSICAL EXAM:  GENERAL:  looks comfortable on ventilator. Tracheostomy in place, connected to ventilator A/C mode  SKIN: No rashes or lesions  HEENT: Atraumatic. Normocephalic.  NECK: Supple, No JVD, trach  PULMONARY: Decrease air entry on anterior auscultation  CVS: Normal S1, S2. Rate and Rhythm are regular.   ABDOMEN/GI: Soft, Nontender, Nondistended; BS present, PEG tube noted in place  MSK: no clubbing or cyanosis, edematous all over  Neuro: follows commands, opens eyes, replies by yes or no.    LABS:                        7.0    8.33  )-----------( 371      ( 03 Feb 2021 09:13 )             22.7     02-03    133<L>  |  93<L>  |  53<H>  ----------------------------<  176<H>  4.4   |  21  |  5.2<HH>    Ca    7.8<L>      03 Feb 2021 09:13  Mg     2.6     02-03    TPro  5.5<L>  /  Alb  2.6<L>  /  TBili  1.1  /  DBili  x   /  AST  32  /  ALT  13  /  AlkPhos  127<H>  02-03    PT/INR - ( 03 Feb 2021 09:13 )   PT: 17.20 sec;   INR: 1.50 ratio         PTT - ( 03 Feb 2021 09:13 )  PTT:82.9 sec          CARDIAC MARKERS ( 02 Feb 2021 06:55 )  x     / x     / 56 U/L / x     / x          RADIOLOGY:

## 2021-02-03 NOTE — PROGRESS NOTE ADULT - SUBJECTIVE AND OBJECTIVE BOX
[place] NEPHROLOGY FOLLOW UP NOTE  --------------------------------------------------------------------------------  24 hour events/subjective: Patient examined. Trached.    PAST HISTORY  --------------------------------------------------------------------------------  No significant changes to PMH, PSH, FHx, SHx, unless otherwise noted    ALLERGIES & MEDICATIONS  --------------------------------------------------------------------------------  Allergies    Orange (Other)  Originally Entered as [HIVES] reaction to [sulfur] (Unknown)  penicillin (Unknown)  sulfADIAZINE (Unknown)    Standing Inpatient Medications  aspirin  chewable 81 milliGRAM(s) Oral daily  atorvastatin 20 milliGRAM(s) Oral at bedtime  chlorhexidine 0.12% Liquid 15 milliLiter(s) Oral Mucosa every 12 hours  chlorhexidine 4% Liquid 1 Application(s) Topical <User Schedule>  clonazePAM  Tablet 1 milliGRAM(s) Oral two times a day  collagenase Ointment 1 Application(s) Topical two times a day  Dakins Solution - 1/2 Strength 1 Application(s) Topical two times a day  dextrose 40% Gel 15 Gram(s) Oral once  dextrose 5%. 1000 milliLiter(s) IV Continuous <Continuous>  dextrose 5%. 1000 milliLiter(s) IV Continuous <Continuous>  dextrose 50% Injectable 25 Gram(s) IV Push once  dextrose 50% Injectable 12.5 Gram(s) IV Push once  dextrose 50% Injectable 25 Gram(s) IV Push once  epoetin raven-epbx (RETACRIT) Injectable 8000 Unit(s) IV Push <User Schedule>  glucagon  Injectable 1 milliGRAM(s) IntraMuscular once  heparin  Infusion. 2300 Unit(s)/Hr IV Continuous <Continuous>  insulin glargine Injectable (LANTUS) 20 Unit(s) SubCutaneous at bedtime  insulin lispro (ADMELOG) corrective regimen sliding scale   SubCutaneous three times a day before meals  insulin lispro Injectable (ADMELOG) 8 Unit(s) SubCutaneous three times a day before meals  levothyroxine 100 MICROGram(s) Oral daily  lidocaine 1%/epinephrine 1:100,000 Inj 40 milliLiter(s) Local Injection once  methadone    Tablet 5 milliGRAM(s) Oral daily  metoclopramide   Syrup 5 milliGRAM(s) Oral every 8 hours  metoprolol tartrate 12.5 milliGRAM(s) Oral two times a day  midodrine 10 milliGRAM(s) Oral every 8 hours  pantoprazole   Suspension 40 milliGRAM(s) Oral before breakfast  QUEtiapine 100 milliGRAM(s) Oral two times a day  warfarin 2.5 milliGRAM(s) Oral once    PRN Inpatient Medications  acetaminophen   Tablet .. 650 milliGRAM(s) Oral every 6 hours PRN      VITALS/PHYSICAL EXAM  --------------------------------------------------------------------------------  T(C): 35.8 (02-03-21 @ 05:30), Max: 36.6 (02-02-21 @ 22:43)  HR: 101 (02-03-21 @ 08:25) (80 - 101)  BP: 122/56 (02-03-21 @ 05:30) (105/52 - 173/84)  RR: 24 (02-03-21 @ 08:25) (20 - 24)  SpO2: 100% (02-03-21 @ 08:25) (93% - 100%)  Height (cm): 177.8 (02-02-21 @ 22:43)  Weight (kg): 97 (02-02-21 @ 22:43)  BMI (kg/m2): 30.7 (02-02-21 @ 22:43)  BSA (m2): 2.15 (02-02-21 @ 22:43)      02-02-21 @ 07:01  -  02-03-21 @ 07:00  --------------------------------------------------------  IN: 1002 mL / OUT: 3575 mL / NET: -2573 mL      Physical Exam:  	Gen: trached  	Pulm: CTA B/L  	CV: RRR, S1S2  	Abd: +BS, soft, nondistended  	LE: Warm,  edema  	Vascular access: AVF    LABS/STUDIES  --------------------------------------------------------------------------------              7.0    8.33  >-----------<  371      [02-03-21 @ 09:13]              22.7     133  |  93  |  53  ----------------------------<  176      [02-03-21 @ 09:13]  4.4   |  21  |  5.2        Ca     7.8     [02-03-21 @ 09:13]      Mg     2.6     [02-03-21 @ 09:13]    TPro  5.5  /  Alb  2.6  /  TBili  1.1  /  DBili  x   /  AST  32  /  ALT  13  /  AlkPhos  127  [02-03-21 @ 09:13]    PT/INR: PT 17.20, INR 1.50       [02-03-21 @ 09:13]  PTT: 82.9       [02-03-21 @ 09:13]    CK 56      [02-02-21 @ 06:55]    Creatinine Trend:  SCr 5.2 [02-03 @ 09:13]  SCr 6.5 [02-02 @ 06:55]  SCr 5.7 [02-01 @ 04:00]  SCr 5.1 [01-31 @ 04:30]  SCr 5.9 [01-30 @ 05:00]    Urinalysis - [01-06-21 @ 18:43]      Color Yellow / Appearance Slightly Turbid / SG 1.018 / pH 6.5      Gluc 100 mg/dL / Ketone Negative  / Bili Negative / Urobili <2 mg/dL       Blood Moderate / Protein 300 mg/dL / Leuk Est Small / Nitrite Negative       /  / Hyaline 114 / Gran  / Sq Epi  / Non Sq Epi 1 / Bacteria Negative      Ferritin 983      [01-24-21 @ 06:00]  Vitamin D (25OH) 45      [01-15-21 @ 12:06]  HbA1c 7.1      [05-21-19 @ 04:30]  TSH 4.57      [01-01-21 @ 17:56]    HBsAb Reactive      [02-02-21 @ 15:05]  HBsAg Nonreact      [02-02-21 @ 06:55]  HBcAb Nonreact      [02-02-21 @ 06:55]  HCV 0.17, Nonreact      [02-02-21 @ 06:55]

## 2021-02-03 NOTE — PROGRESS NOTE ADULT - ASSESSMENT
61 yo male with PMHx of afib on coumadin, DM2, ESRD on HD TThsat, HLD, HTN, CVA, who presented w/ weakness and fall, admitted for COVID PNA/bacterial PNA/NSTEMI and . Had a prolonged hospitalization in the ICU, hospital course complicated by PEG/Trach placement as well as Ileus, which has since resolved. He was subsequently downgraded to step down unit  Patient seen and examined 2/3 able to decrease fi02 will repeat swab and begin weaning    Acute Hypoxemic respiratory failure secondary to COVID PNA  Superimprosed bacterial PNA  NSTEMI  Sacral Ulcers/Heel ulcers s/p debriedment  s/p trach and peg  s/p course of Azithromycin/Cefepime, meropenem and vancomycin  recently finished course of Daptomycin on 2/1  s/p Decadron x10 days, Toci x1 and 2u convalescent plasma earlier on the in the hospitalization  taper down sedation    Dislodged PEG tubex2  initially replaced by surgery and placement confirmed with Xray  displaced again today, surgery aware to f/u     Paroxysmal atrial fibrillation  On heparin, being bridged to coumadin, check PTTs and INR  - Metoprolol tartrate 12.5mg po q12hrs      Thrombocytopenia- resolved   seen on 1 lab on 1/29, likely lab error    Upper extremity edema R>L  - arterial and venous duplex- negative   - loosened restraints, elevate if possible    ESRD on HD TThsat, due to DM  Acute on Chronic anemia  renal following  monitor CBC/keep active t&s/tranfuse if <7

## 2021-02-04 NOTE — PROGRESS NOTE ADULT - SUBJECTIVE AND OBJECTIVE BOX
24H events:    Patient is a 60y old Male who presents with a chief complaint of s/p fall. (03 Feb 2021 12:31)    Primary diagnosis of Acute respiratory failure secondary to COVID    Today is hospital day 34d. This morning patient was seen and examined at bedside. Remain on tracheostomy on a ventilator with AC mode. FiO2 40%. Overnight patient required 1 unit f PRBC as hb dropped to 6.6. No evidence of bleed. No melena.    PAST MEDICAL & SURGICAL HISTORY  PAD (peripheral artery disease)  multiple toe amputations    Anemia  chronic anemia - s/p transfusion 2018    Thyroid cancer    Chronic leg pain    OA (osteoarthritis)    SOB (shortness of breath) on exertion    ESRD (end stage renal disease)  2 yrs    Atrial fibrillation  on warfarin    Right sided weakness    Stroke  8 yrs ago    Hypertension    High blood cholesterol    Diabetes mellitus, type 2    Dialysis patient  Mon, Wednesday, Friday ([facility])    Smoker    H/O thyroid nodule    H/O gastroesophageal reflux (GERD)    History of tonsillectomy    History of surgery  Multiple toe amputations (amp 4 1/2 left toes; has 1/2 left mid toe; amp right mid toe)    A-V fistula  left AVF      SOCIAL HISTORY:  Social History:  Lives at home.  +smoking.  Denies alcohol or illicit drug use. (01 Jan 2021 14:01)      ALLERGIES:  Orange (Other)  Originally Entered as [HIVES] reaction to [sulfur] (Unknown)  penicillin (Unknown)  sulfADIAZINE (Unknown)    MEDICATIONS:  STANDING MEDICATIONS  aspirin  chewable 81 milliGRAM(s) Oral daily  atorvastatin 20 milliGRAM(s) Oral at bedtime  chlorhexidine 0.12% Liquid 15 milliLiter(s) Oral Mucosa every 12 hours  chlorhexidine 4% Liquid 1 Application(s) Topical <User Schedule>  clonazePAM  Tablet 1 milliGRAM(s) Oral two times a day  collagenase Ointment 1 Application(s) Topical two times a day  Dakins Solution - 1/2 Strength 1 Application(s) Topical two times a day  dextrose 40% Gel 15 Gram(s) Oral once  dextrose 5%. 1000 milliLiter(s) IV Continuous <Continuous>  dextrose 5%. 1000 milliLiter(s) IV Continuous <Continuous>  dextrose 50% Injectable 25 Gram(s) IV Push once  dextrose 50% Injectable 12.5 Gram(s) IV Push once  dextrose 50% Injectable 25 Gram(s) IV Push once  epoetin raven-epbx (RETACRIT) Injectable 8000 Unit(s) IV Push <User Schedule>  glucagon  Injectable 1 milliGRAM(s) IntraMuscular once  insulin glargine Injectable (LANTUS) 20 Unit(s) SubCutaneous at bedtime  insulin lispro (ADMELOG) corrective regimen sliding scale   SubCutaneous three times a day before meals  insulin lispro Injectable (ADMELOG) 8 Unit(s) SubCutaneous three times a day before meals  levothyroxine 100 MICROGram(s) Oral daily  lidocaine 1%/epinephrine 1:100,000 Inj 40 milliLiter(s) Local Injection once  methadone    Tablet 5 milliGRAM(s) Oral daily  metoclopramide   Syrup 5 milliGRAM(s) Oral every 8 hours  metoprolol tartrate 12.5 milliGRAM(s) Oral two times a day  midodrine 10 milliGRAM(s) Oral every 8 hours  pantoprazole  Injectable 40 milliGRAM(s) IV Push every 12 hours  QUEtiapine 100 milliGRAM(s) Oral two times a day    PRN MEDICATIONS  acetaminophen   Tablet .. 650 milliGRAM(s) Oral every 6 hours PRN    VITALS:   T(F): 98.1  HR: 83  BP: 116/54  RR: 20  SpO2: 100%    PHYSICAL EXAM:  GENERAL:  looks comfortable on ventilator. Tracheostomy in place, connected to ventilator A/C mode  SKIN: No rashes or lesions  HEENT: Atraumatic. Normocephalic.  NECK: Supple, No JVD, trach  PULMONARY: Decrease air entry on anterior auscultation  CVS: Normal S1, S2. Rate and Rhythm are regular.   ABDOMEN/GI: Soft, Nontender, Nondistended; BS present, PEG tube noted in place  MSK: no clubbing or cyanosis, edematous all over  Neuro: follows commands, opens eyes, replies by yes or no.  LABS:                        7.2    8.34  )-----------( 431      ( 04 Feb 2021 07:29 )             23.1     02-04    134<L>  |  93<L>  |  63<HH>  ----------------------------<  78  4.6   |  28  |  5.7<HH>    Ca    8.1<L>      04 Feb 2021 07:29  Mg     2.8     02-04    TPro  5.4<L>  /  Alb  2.4<L>  /  TBili  1.0  /  DBili  x   /  AST  24  /  ALT  11  /  AlkPhos  112  02-04    PT/INR - ( 04 Feb 2021 07:29 )   PT: 16.20 sec;   INR: 1.41 ratio         PTT - ( 04 Feb 2021 07:29 )  PTT:65.1 sec              RADIOLOGY:

## 2021-02-04 NOTE — PROGRESS NOTE ADULT - ASSESSMENT
61 yo male with PMHx of afib on coumadin, DM2, ESRD on HD MWF, HLD, HTN, CVA, who presented w/ weakness and fall. Admitted initially to ICU, then downgraded to 3E. Patient's COVID RCP positive 2/2. Will be transferred to CEU again.      #Acute hypoxemic respiratory failure   #Covid-19 PNA  #Superimposed bacterial PNA   - s/p intubation, now trach & PEG  - s/p Azithromycin discontinued on 1/8/2021, s/p Cefepime discontinued on 1/11/2021  - s/p Dexamethasone 6mg IV once daily (1/1/2021) x10 days  - Covid antibodies: negative --> s/p 2 units of Convalescent plasma ( 1/8/2021 and 1/12/2021)  - s/p tocilizumab 400mg IV x1 on 1/8/2021  - s/p lucinda and vanc   - c/w Klonopin q 12 , methadone  - Repeat Duplex negative for DVT  - Patient remains on ventilator AC mode 40%.     # Acute on top of chronic anemia  - HB 8.6-->7-->6.6  -s/p 1 unit of PRBC overnight  - No evidence of bleed. Patient is hemodynamically stable  - repeat Hb and T&S at 4:00 pm  - Transfuse if <7  - FIT pending  - CT abdomen/ Pelvis non contrast r/o retroperitoneal hematoma      #Dislodged PEG tube:   -s/p replacement by surgery resident    #Paroxysmal atrial fibrillation  - Metoprolol tartrate 12.5mg po q12hrs    - Was on heparin drip, and coumadin. Off anticoagulation now.  - Risk of bleed > risk of stroke?    #Leukocytosis- Resolved  #Sacral ulcer, heel ulcers   - Multifactorial (Possible overlying infection, ulcers, steroids)   - s/p debridement w/ burn 1/21/21  - wound culture- moderate e. faecium  - ID following: S/P daptomycin 8 mg/kg q 48 hours (please give after dialysis on HD days) finished on 2/1  - local wound care  - wound care per burn     #Ileus- resolved   - s/p PEG 1/18  - xray 10/19 w/ gasseous distention of stomach  - PEG was connected to suction, feeds were held  - Xray 10/21 w/o evidence of obstruction, restarted feeds    #Upper extremity edema R>L  - arterial and venous duplex- negative   - loosened restraints    #ESRD on HD   - EPO (retacrit) 8000 units IV push once weekly  - nephro following, f/u recs  - next HD likely 2/2/2021  -Check iron strones    #NSTEMI  - Type II MI, demand ischemia, likely secondary to hypoxia secondary to COVID-19 pneumonia  - Aspirin 81mg po once daily   - CT brain no IC bleed or concerns of IC bleed   - Repeat CK-MB and CPK were stable    #Hypothyroidism  - Continue with levothyroxine 100mcg po once daily     #Altered Mental Status- resolved  - EEG diffuse slowing, but no seizure activity, check the report above  - CT brain shows ventriculomegaly (check full report), no IC bleed, possible old infarct.    Diet: PEG feeds Peptamen AF  DVT ppx: heparin drip to bridge to coumadin  GI ppx: Protonix 40 qd     Called and updated [phone] Nedra Shea   	  59 yo male with PMHx of afib on coumadin, DM2, ESRD on HD MWF, HLD, HTN, CVA, who presented w/ weakness and fall. Admitted initially to ICU, then downgraded to 3E. Patient's COVID RCP positive 2/2. Will be transferred to CEU again.    To follow:  CBC at 4:00 pm  CT abdomen/pelvis w/o contrast  Iron studies    #Acute hypoxemic respiratory failure   #Covid-19 PNA  #Superimposed bacterial PNA   - s/p intubation, now trach & PEG  - s/p Azithromycin discontinued on 1/8/2021, s/p Cefepime discontinued on 1/11/2021  - s/p Dexamethasone 6mg IV once daily (1/1/2021) x10 days  - Covid antibodies: negative --> s/p 2 units of Convalescent plasma ( 1/8/2021 and 1/12/2021)  - s/p tocilizumab 400mg IV x1 on 1/8/2021  - s/p lucinda and vanc   - c/w Klonopin q 12 , methadone  - Repeat Duplex negative for DVT  - Patient remains on ventilator AC mode 40%.     # Acute on top of chronic anemia  - HB 8.6-->7-->6.6  -s/p 1 unit of PRBC overnight  - No evidence of bleed. Patient is hemodynamically stable  - repeat Hb and T&S at 4:00 pm  - Transfuse if <7  - FIT pending  - CT abdomen/ Pelvis non contrast r/o retroperitoneal hematoma  - PPI IV Q 12H  - HOLD feeds      #Dislodged PEG tube:   -s/p replacement by surgery resident    #Paroxysmal atrial fibrillation  - Metoprolol tartrate 12.5mg po q12hrs    - Was on heparin drip, and coumadin. Off anticoagulation now.  - Risk of bleed > risk of stroke?    #Leukocytosis- Resolved  #Sacral ulcer, heel ulcers   - Multifactorial (Possible overlying infection, ulcers, steroids)   - s/p debridement w/ burn 1/21/21  - wound culture- moderate e. faecium  - ID following: S/P daptomycin 8 mg/kg q 48 hours (please give after dialysis on HD days) finished on 2/1  - local wound care  - wound care per burn     #Ileus- resolved   - s/p PEG 1/18  - xray 10/19 w/ gasseous distention of stomach  - PEG was connected to suction, feeds were held  - Xray 10/21 w/o evidence of obstruction, restarted feeds    #Upper extremity edema R>L  - arterial and venous duplex- negative   - loosened restraints    #ESRD on HD   - EPO (retacrit) 8000 units IV push once weekly  - nephro following, f/u recs  - next HD likely 2/2/2021  -Check iron strones    #NSTEMI  - Type II MI, demand ischemia, likely secondary to hypoxia secondary to COVID-19 pneumonia  - Aspirin 81mg po once daily   - CT brain no IC bleed or concerns of IC bleed   - Repeat CK-MB and CPK were stable    #Hypothyroidism  - Continue with levothyroxine 100mcg po once daily     #Altered Mental Status- resolved  - EEG diffuse slowing, but no seizure activity, check the report above  - CT brain shows ventriculomegaly (check full report), no IC bleed, possible old infarct.    Diet: PEG feeds Peptamen AF/ Hold feeds  DVT ppx: off anticougulation  GI ppx: Protonix  q 12h   	  61 yo male with PMHx of afib on coumadin, DM2, ESRD on HD MWF, HLD, HTN, CVA, who presented w/ weakness and fall. Admitted initially to ICU, then downgraded to 3E. Patient's COVID RCP positive 2/2. Will be transferred to CEU again.    To follow:  CBC at 4:00 pm  CT abdomen/pelvis w/o contrast  Iron studies    #Acute hypoxemic respiratory failure   #Covid-19 PNA  #Superimposed bacterial PNA   - s/p intubation, now trach & PEG  - s/p Azithromycin discontinued on 1/8/2021, s/p Cefepime discontinued on 1/11/2021  - s/p Dexamethasone 6mg IV once daily (1/1/2021) x10 days  - Covid antibodies: negative --> s/p 2 units of Convalescent plasma ( 1/8/2021 and 1/12/2021)  - s/p tocilizumab 400mg IV x1 on 1/8/2021  - s/p lucinda and vanc   - c/w Klonopin q 12 , methadone  - Repeat Duplex negative for DVT  - Patient remains on ventilator AC mode 40%.     # Acute on top of chronic anemia  - HB 8.6-->7-->6.6  -s/p 1 unit of PRBC overnight  - No evidence of bleed. Patient is hemodynamically stable  - repeat Hb and T&S at 4:00 pm  - Transfuse if <7  - FIT pending  - CT abdomen/ Pelvis non contrast r/o retroperitoneal hematoma  - PPI IV Q 12H  - HOLD feeds, if repeat CBC is stable, then resume feed      #Dislodged PEG tube:   -s/p replacement by surgery resident    #Paroxysmal atrial fibrillation  - Metoprolol tartrate 12.5mg po q12hrs    - Was on heparin drip, and coumadin. Off anticoagulation now.  - Risk of bleed > risk of stroke?    #Leukocytosis- Resolved  #Sacral ulcer, heel ulcers   - Multifactorial (Possible overlying infection, ulcers, steroids)   - s/p debridement w/ burn 1/21/21  - wound culture- moderate e. faecium  - ID following: S/P daptomycin 8 mg/kg q 48 hours (please give after dialysis on HD days) finished on 2/1  - local wound care  - wound care per burn     #Ileus- resolved   - s/p PEG 1/18  - xray 10/19 w/ gasseous distention of stomach  - PEG was connected to suction, feeds were held  - Xray 10/21 w/o evidence of obstruction, restarted feeds    #Upper extremity edema R>L  - arterial and venous duplex- negative   - loosened restraints    #ESRD on HD   - EPO (retacrit) 8000 units IV push once weekly  - nephro following, f/u recs  - next HD likely 2/2/2021  -Check iron strones    #NSTEMI  - Type II MI, demand ischemia, likely secondary to hypoxia secondary to COVID-19 pneumonia  - Aspirin 81mg po once daily   - CT brain no IC bleed or concerns of IC bleed   - Repeat CK-MB and CPK were stable    #Hypothyroidism  - Continue with levothyroxine 100mcg po once daily     #Altered Mental Status- resolved  - EEG diffuse slowing, but no seizure activity, check the report above  - CT brain shows ventriculomegaly (check full report), no IC bleed, possible old infarct.    Diet: PEG feeds Peptamen AF/ Hold feeds  DVT ppx: off anticougulation  GI ppx: Protonix  q 12h   	  59 yo male with PMHx of afib on coumadin, DM2, ESRD on HD MWF, HLD, HTN, CVA, who presented w/ weakness and fall. Admitted initially to ICU, then downgraded to 3E. Patient's COVID RCP positive 2/2. Will be transferred to CEU again.    To follow:  CBC at 4:00 pm  CT abdomen/pelvis w/o contrast  Iron studies    #Acute hypoxemic respiratory failure   #Covid-19 PNA  #Superimposed bacterial PNA   - s/p intubation, now trach & PEG  - s/p Azithromycin discontinued on 1/8/2021, s/p Cefepime discontinued on 1/11/2021  - s/p Dexamethasone 6mg IV once daily (1/1/2021) x10 days  - Covid antibodies: negative --> s/p 2 units of Convalescent plasma ( 1/8/2021 and 1/12/2021)  - s/p tocilizumab 400mg IV x1 on 1/8/2021  - s/p lucinda and vanc   - c/w Klonopin q 12 , methadone  - Repeat Duplex negative for DVT  - Patient remains on ventilator AC mode 40%.     # Acute on top of chronic anemia  - HB 8.6-->7-->6.6  - s/p 1 unit of PRBC overnight  - No evidence of bleed. Patient is hemodynamically stable  - repeat Hb and T&S at 4:00 pm-->Transfuse if <7  - FIT pending  - CT abdomen/ Pelvis non contrast r/o retroperitoneal hematoma  - PPI IV Q 12H  - HOLD feeds, if repeat CBC is stable, then resume feed      #Dislodged PEG tube:   -s/p replacement by surgery resident    #Paroxysmal atrial fibrillation  - Metoprolol tartrate 12.5mg po q12hrs    - Was on heparin drip, and coumadin. Off anticoagulation now.  - Risk of bleed > risk of stroke?    #Leukocytosis- Resolved  #Sacral ulcer, heel ulcers   - Multifactorial (Possible overlying infection, ulcers, steroids)   - s/p debridement w/ burn 1/21/21  - wound culture- moderate e. faecium  - ID following: S/P daptomycin 8 mg/kg q 48 hours (please give after dialysis on HD days) finished on 2/1  - local wound care  - wound care per burn     #Ileus- resolved   - s/p PEG 1/18  - xray 10/19 w/ gasseous distention of stomach  - PEG was connected to suction, feeds were held  - Xray 10/21 w/o evidence of obstruction, restarted feeds    #Upper extremity edema R>L  - arterial and venous duplex- negative   - loosened restraints    #ESRD on HD   - EPO (retacrit) 8000 units IV push once weekly  - nephro following, f/u recs  - next HD likely 2/2/2021  -Check iron stores    #NSTEMI  - Type II MI, demand ischemia, likely secondary to hypoxia secondary to COVID-19 pneumonia  - Aspirin 81mg po once daily   - CT brain no IC bleed or concerns of IC bleed     #Hypothyroidism  - Continue with levothyroxine 100mcg po once daily     #Altered Mental Status- resolved  - EEG diffuse slowing, but no seizure activity, check the report above  - CT brain shows ventriculomegaly (check full report), no IC bleed, possible old infarct.    Diet: PEG feeds Peptamen AF/ Hold feeds  DVT ppx: off anticoagulation  GI ppx: Protonix  q 12h

## 2021-02-04 NOTE — PROGRESS NOTE ADULT - ASSESSMENT
59 yo male with PMHx of afib on coumadin, DM2, ESRD on HD TThsat, HLD, HTN, CVA, who presented w/ weakness and fall, admitted for COVID PNA/bacterial PNA/NSTEMI and . Had a prolonged hospitalization in the ICU, hospital course complicated by PEG/Trach placement as well as Ileus, which has since resolved. He was subsequently downgraded to step down unit  Patient seen and examined 2/3 able to decrease fi02 will repeat swab and begin weaning    Acute Hypoxemic respiratory failure secondary to COVID PNA  Superimprosed bacterial PNA  NSTEMI  Sacral Ulcers/Heel ulcers s/p debriedment  s/p trach and peg  s/p course of Azithromycin/Cefepime, meropenem and vancomycin  recently finished course of Daptomycin on 2/1  s/p Decadron x10 days, Toci x1 and 2u convalescent plasma earlier on the in the hospitalization  taper down sedation    Dislodged PEG tubex2  initially replaced by surgery and placement confirmed with Xray  displaced again today, surgery aware to f/u     Paroxysmal atrial fibrillation  On heparin, being bridged to coumadin, check PTTs and INR  - Metoprolol tartrate 12.5mg po q12hrs      Thrombocytopenia- resolved   pt being transfered to step down unit  seen on 1 lab on 1/29, likely lab error    Upper extremity edema R>L  - arterial and venous duplex- negative   - loosened restraints, elevate if possible    ESRD on HD TThsat, due to DM  Acute on Chronic anemia  renal following  monitor CBC/keep active t&s/tranfuse if <7

## 2021-02-04 NOTE — CHART NOTE - NSCHARTNOTEFT_GEN_A_CORE
59 yo male with PMHx of afib on coumadin, DM2, ESRD on HD MWF, HLD, HTN, CVA, who presented w/ weakness and fall. Admitted initially to ICU, then downgraded to 3E. Patient's COVID RCP positive 2/2. Will be transferred to CEU again.     3E stay: Patient had drop in his hb from 8.6 to 6.6 s/p 1 unit of PRBC. No evidence of active bleeding, remains hemodynamically stable, no melena. Patient was on heparin IV for afib, and was started on coumadin for bridging. Coumadin withheld yesterday, heparin stopped.    To follow:  CBC at 4:00 pm  CT abdomen/pelvis w/o contrast  Iron studies    #Acute hypoxemic respiratory failure   #Covid-19 PNA  #Superimposed bacterial PNA   - s/p intubation, now trach & PEG  - s/p Azithromycin discontinued on 1/8/2021, s/p Cefepime discontinued on 1/11/2021  - s/p Dexamethasone 6mg IV once daily (1/1/2021) x10 days  - Covid antibodies: negative --> s/p 2 units of Convalescent plasma ( 1/8/2021 and 1/12/2021)  - s/p tocilizumab 400mg IV x1 on 1/8/2021  - s/p lucinda and vanc   - c/w Klonopin q 12 , methadone  - Repeat Duplex negative for DVT  - Patient remains on ventilator AC mode 40%.     # Acute on top of chronic anemia  - HB 8.6-->7-->6.6  - s/p 1 unit of PRBC overnight  - No evidence of bleed. Patient is hemodynamically stable  - repeat Hb and T&S at 4:00 pm-->Transfuse if <7  - FIT pending  - CT abdomen/ Pelvis non contrast r/o retroperitoneal hematoma  - PPI IV Q 12H  - HOLD feeds, if repeat CBC is stable, then resume feed      #Dislodged PEG tube:   -s/p replacement by surgery resident    #Paroxysmal atrial fibrillation  - Metoprolol tartrate 12.5mg po q12hrs    - Was on heparin drip, and coumadin. Off anticoagulation now.  - Risk of bleed > risk of stroke?    #Leukocytosis- Resolved  #Sacral ulcer, heel ulcers   - Multifactorial (Possible overlying infection, ulcers, steroids)   - s/p debridement w/ burn 1/21/21  - wound culture- moderate e. faecium  - ID following: S/P daptomycin 8 mg/kg q 48 hours (please give after dialysis on HD days) finished on 2/1  - local wound care  - wound care per burn     #Ileus- resolved   - s/p PEG 1/18  - xray 10/19 w/ gasseous distention of stomach  - PEG was connected to suction, feeds were held  - Xray 10/21 w/o evidence of obstruction, restarted feeds    #Upper extremity edema R>L  - arterial and venous duplex- negative   - loosened restraints    #ESRD on HD   - EPO (retacrit) 8000 units IV push once weekly  - nephro following, f/u recs  - next HD likely 2/2/2021  -Check iron stores    #NSTEMI  - Type II MI, demand ischemia, likely secondary to hypoxia secondary to COVID-19 pneumonia  - Aspirin 81mg po once daily   - CT brain no IC bleed or concerns of IC bleed     #Hypothyroidism  - Continue with levothyroxine 100mcg po once daily     #Altered Mental Status- resolved  - EEG diffuse slowing, but no seizure activity, check the report above  - CT brain shows ventriculomegaly (check full report), no IC bleed, possible old infarct.    Diet: PEG feeds Peptamen AF/ Hold feeds  DVT ppx: off anticoagulation  GI ppx: Protonix  q 12h  ***************************************************************************************************  SOCIAL HISTORY:  Social History:  Lives at home.  +smoking.  Denies alcohol or illicit drug use. (01 Jan 2021 14:01)      ALLERGIES:  Orange (Other)  Originally Entered as [HIVES] reaction to [sulfur] (Unknown)  penicillin (Unknown)  sulfADIAZINE (Unknown)    MEDICATIONS:  STANDING MEDICATIONS  aspirin  chewable 81 milliGRAM(s) Oral daily  atorvastatin 20 milliGRAM(s) Oral at bedtime  chlorhexidine 0.12% Liquid 15 milliLiter(s) Oral Mucosa every 12 hours  chlorhexidine 4% Liquid 1 Application(s) Topical <User Schedule>  clonazePAM  Tablet 1 milliGRAM(s) Oral two times a day  collagenase Ointment 1 Application(s) Topical two times a day  Dakins Solution - 1/2 Strength 1 Application(s) Topical two times a day  dextrose 40% Gel 15 Gram(s) Oral once  dextrose 5% + lactated ringers. 1000 milliLiter(s) IV Continuous <Continuous>  dextrose 5%. 1000 milliLiter(s) IV Continuous <Continuous>  dextrose 5%. 1000 milliLiter(s) IV Continuous <Continuous>  dextrose 50% Injectable 25 Gram(s) IV Push once  dextrose 50% Injectable 12.5 Gram(s) IV Push once  dextrose 50% Injectable 25 Gram(s) IV Push once  epoetin raven-epbx (RETACRIT) Injectable 8000 Unit(s) IV Push <User Schedule>  glucagon  Injectable 1 milliGRAM(s) IntraMuscular once  insulin lispro (ADMELOG) corrective regimen sliding scale   SubCutaneous three times a day before meals  levothyroxine 100 MICROGram(s) Oral daily  lidocaine 1%/epinephrine 1:100,000 Inj 40 milliLiter(s) Local Injection once  methadone    Tablet 5 milliGRAM(s) Oral daily  metoclopramide   Syrup 5 milliGRAM(s) Oral every 8 hours  metoprolol tartrate 12.5 milliGRAM(s) Oral two times a day  midodrine 10 milliGRAM(s) Oral every 8 hours  pantoprazole  Injectable 40 milliGRAM(s) IV Push every 12 hours  QUEtiapine 100 milliGRAM(s) Oral two times a day    PRN MEDICATIONS  acetaminophen   Tablet .. 650 milliGRAM(s) Oral every 6 hours PRN    VITALS:   T(F): 98.1  HR: 83  BP: 116/54  RR: 20  SpO2: 100%    PHYSICAL EXAM:  GENERAL:  looks comfortable on ventilator. Tracheostomy in place, connected to ventilator A/C mode  SKIN: No rashes or lesions  HEENT: Atraumatic. Normocephalic.  NECK: Supple, No JVD, trach  PULMONARY: Decrease air entry on anterior auscultation  CVS: Normal S1, S2. Rate and Rhythm are regular.   ABDOMEN/GI: Soft, Nontender, Nondistended; BS present, PEG tube noted in place  MSK: no clubbing or cyanosis, edematous all over  Neuro: follows commands, opens eyes, replies by yes or no.    LABS:                        7.2    8.34  )-----------( 431      ( 04 Feb 2021 07:29 )             23.1     02-04    134<L>  |  93<L>  |  63<HH>  ----------------------------<  78  4.6   |  28  |  5.7<HH>    Ca    8.1<L>      04 Feb 2021 07:29  Mg     2.8     02-04    TPro  5.4<L>  /  Alb  2.4<L>  /  TBili  1.0  /  DBili  x   /  AST  24  /  ALT  11  /  AlkPhos  112  02-04    PT/INR - ( 04 Feb 2021 07:29 )   PT: 16.20 sec;   INR: 1.41 ratio         PTT - ( 04 Feb 2021 07:29 )  PTT:65.1 sec 59 yo male with PMHx of afib on coumadin, DM2, ESRD on HD MWF, HLD, HTN, CVA, who presented w/ weakness and fall. Admitted initially to ICU, then downgraded to 3E. Patient's COVID RCP positive 2/2. Will be transferred to CEU again.     3E stay: Patient had drop in his hb from 8.6 to 6.6 s/p 1 unit of PRBC. No evidence of active bleeding, remains hemodynamically stable, no melena. Patient was on heparin IV for afib, and was started on coumadin for bridging. Coumadin withheld yesterday, heparin stopped.    To follow:  CBC at 4:00 pm  CT abdomen/pelvis w/o contrast  Iron studies    #Acute hypoxemic respiratory failure   #Covid-19 PNA  #Superimposed bacterial PNA   - s/p intubation, now trach & PEG  - s/p Azithromycin discontinued on 1/8/2021, s/p Cefepime discontinued on 1/11/2021  - s/p Dexamethasone 6mg IV once daily (1/1/2021) x10 days  - Covid antibodies: negative --> s/p 2 units of Convalescent plasma ( 1/8/2021 and 1/12/2021)  - s/p tocilizumab 400mg IV x1 on 1/8/2021  - s/p lucinda and vanc   - c/w Klonopin q 12 , methadone  - Repeat Duplex negative for DVT  - Patient remains on ventilator AC mode 40%.     # Acute on top of chronic anemia  - HB 8.6-->7-->6.6  - s/p 1 unit of PRBC overnight with good response   - No evidence of bleed. Patient is hemodynamically stable  - FIT pending  - CT abdomen/ Pelvis non contrast r/o retroperitoneal hematoma  - PPI IV Q 12H  - Resume feeds as HB is stable    #Dislodged PEG tube:   -s/p replacement by surgery resident    #Paroxysmal atrial fibrillation  - Metoprolol tartrate 12.5mg po q12hrs    - Was on heparin drip, and coumadin. Off anticoagulation now.  - Risk of bleed > risk of stroke?    #Leukocytosis- Resolved  #Sacral ulcer, heel ulcers   - Multifactorial (Possible overlying infection, ulcers, steroids)   - s/p debridement w/ burn 1/21/21  - wound culture- moderate e. faecium  - ID following: S/P daptomycin 8 mg/kg q 48 hours (please give after dialysis on HD days) finished on 2/1  - local wound care  - wound care per burn     #Ileus- resolved   - s/p PEG 1/18  - xray 10/19 w/ gaseous distention of stomach  - PEG was connected to suction, feeds were held  - Xray 10/21 w/o evidence of obstruction, restarted feeds    #Upper extremity edema R>L  - arterial and venous duplex- negative   - loosened restraints    #ESRD on HD   - EPO (retacrit) 8000 units IV push once weekly  - nephro following, f/u recs  - next HD likely 2/2/2021  -Check iron stores    #NSTEMI  - Type II MI, demand ischemia, likely secondary to hypoxia secondary to COVID-19 pneumonia  - Aspirin 81mg po once daily   - CT brain no IC bleed or concerns of IC bleed     #Hypothyroidism  - Continue with levothyroxine 100mcg po once daily     #Altered Mental Status- resolved  - EEG diffuse slowing, but no seizure activity, check the report above  - CT brain shows ventriculomegaly (check full report), no IC bleed, possible old infarct.    Diet: PEG feeds Peptamen AF/ Hold feeds  DVT ppx: off anticoagulation  GI ppx: Protonix  q 12h  ***************************************************************************************************  SOCIAL HISTORY:  Social History:  Lives at home.  +smoking.  Denies alcohol or illicit drug use. (01 Jan 2021 14:01)      ALLERGIES:  Orange (Other)  Originally Entered as [HIVES] reaction to [sulfur] (Unknown)  penicillin (Unknown)  sulfADIAZINE (Unknown)    MEDICATIONS:  STANDING MEDICATIONS  aspirin  chewable 81 milliGRAM(s) Oral daily  atorvastatin 20 milliGRAM(s) Oral at bedtime  chlorhexidine 0.12% Liquid 15 milliLiter(s) Oral Mucosa every 12 hours  chlorhexidine 4% Liquid 1 Application(s) Topical <User Schedule>  clonazePAM  Tablet 1 milliGRAM(s) Oral two times a day  collagenase Ointment 1 Application(s) Topical two times a day  Dakins Solution - 1/2 Strength 1 Application(s) Topical two times a day  dextrose 40% Gel 15 Gram(s) Oral once  dextrose 5% + lactated ringers. 1000 milliLiter(s) IV Continuous <Continuous>  dextrose 5%. 1000 milliLiter(s) IV Continuous <Continuous>  dextrose 5%. 1000 milliLiter(s) IV Continuous <Continuous>  dextrose 50% Injectable 25 Gram(s) IV Push once  dextrose 50% Injectable 12.5 Gram(s) IV Push once  dextrose 50% Injectable 25 Gram(s) IV Push once  epoetin raven-epbx (RETACRIT) Injectable 8000 Unit(s) IV Push <User Schedule>  glucagon  Injectable 1 milliGRAM(s) IntraMuscular once  insulin lispro (ADMELOG) corrective regimen sliding scale   SubCutaneous three times a day before meals  levothyroxine 100 MICROGram(s) Oral daily  lidocaine 1%/epinephrine 1:100,000 Inj 40 milliLiter(s) Local Injection once  methadone    Tablet 5 milliGRAM(s) Oral daily  metoclopramide   Syrup 5 milliGRAM(s) Oral every 8 hours  metoprolol tartrate 12.5 milliGRAM(s) Oral two times a day  midodrine 10 milliGRAM(s) Oral every 8 hours  pantoprazole  Injectable 40 milliGRAM(s) IV Push every 12 hours  QUEtiapine 100 milliGRAM(s) Oral two times a day    PRN MEDICATIONS  acetaminophen   Tablet .. 650 milliGRAM(s) Oral every 6 hours PRN    VITALS:   T(F): 98.1  HR: 83  BP: 116/54  RR: 20  SpO2: 100%    PHYSICAL EXAM:  GENERAL:  looks comfortable on ventilator. Tracheostomy in place, connected to ventilator A/C mode  SKIN: No rashes or lesions  HEENT: Atraumatic. Normocephalic.  NECK: Supple, No JVD, trach  PULMONARY: Decrease air entry on anterior auscultation  CVS: Normal S1, S2. Rate and Rhythm are regular.   ABDOMEN/GI: Soft, Nontender, Nondistended; BS present, PEG tube noted in place  MSK: no clubbing or cyanosis, edematous all over  Neuro: follows commands, opens eyes, replies by yes or no.    LABS:                        7.2    8.34  )-----------( 431      ( 04 Feb 2021 07:29 )             23.1     02-04    134<L>  |  93<L>  |  63<HH>  ----------------------------<  78  4.6   |  28  |  5.7<HH>    Ca    8.1<L>      04 Feb 2021 07:29  Mg     2.8     02-04    TPro  5.4<L>  /  Alb  2.4<L>  /  TBili  1.0  /  DBili  x   /  AST  24  /  ALT  11  /  AlkPhos  112  02-04    PT/INR - ( 04 Feb 2021 07:29 )   PT: 16.20 sec;   INR: 1.41 ratio         PTT - ( 04 Feb 2021 07:29 )  PTT:65.1 sec

## 2021-02-04 NOTE — PROGRESS NOTE ADULT - SUBJECTIVE AND OBJECTIVE BOX
Patient is a 60y old  Male who presents with a chief complaint of s/p fall. (2021 13:40)        Over Night Events:  Patient seen and examined at the bedside  ovvernight bleeding with suctiong anti coagulation stopped hgb   one unit given  Remains on MV      ROS:     All ROS are negative except HPI         PHYSICAL EXAM    ICU Vital Signs Last 24 Hrs  T(C): 36.7 (2021 05:08), Max: 36.8 (2021 21:06)  T(F): 98.1 (2021 05:08), Max: 98.3 (2021 21:06)  HR: 87 (2021 13:40) (78 - 87)  BP: 161/58 (2021 13:40) (112/57 - 161/58)  BP(mean): 78 (2021 05:08) (69 - 78)  ABP: --  ABP(mean): --  RR: 24 (2021 13:40) (20 - 24)  SpO2: 95% (2021 12:58) (95% - 100%)      CONSTITUTIONAL:  Well nourished.  NAD    ENT:   Airway patent,   Mouth with normal mucosa.   No thrush    EYES:   Pupils equal,   Round and reactive to light.    CARDIAC:   Normal rate,   Regular rhythm.    No edema      Vascular:  Normal systolic impulse  No Carotid bruits    RESPIRATORY:   No wheezing  Bilateral BS  Normal chest expansionilateral breath sounds present  Not tachypneic,  No use of accessory muscles    GASTROINTESTINAL:  Abdomen soft,   Non-tender,   No guarding,   + BS    MUSCULOSKELETAL:   Range of motion is not limited,  No clubbing, cyanosis    NEUROLOGICAL:   Alert and oriented   No motor  deficits.    SKIN:   Skin normal color for race,   Warm and dry and intact.   No evidence of rash.    PSYCHIATRIC:   Normal mood and affect.   No apparent risk to self or others.    HEMATOLOGICAL:  No cervical  lymphadenopathy.  no inguinal lymphadenopathy      21 @ 07:01  -  21 @ 07:00  --------------------------------------------------------  IN:    Enteral Tube Flush: 220 mL    Heparin Infusion: 276 mL    Nepro with Carb Steady: 720 mL  Total IN: 1216 mL    OUT:  Total OUT: 0 mL    Total NET: 1216 mL          LABS:                            7.6    10.94 )-----------( 435      ( 2021 12:41 )             24.4                                               02-04    134<L>  |  93<L>  |  63<HH>  ----------------------------<  78  4.6   |  28  |  5.7<HH>    Ca    8.1<L>      2021 07:29  Mg     2.8     02-04    TPro  5.4<L>  /  Alb  2.4<L>  /  TBili  1.0  /  DBili  x   /  AST  24  /  ALT  11  /  AlkPhos  112  02-04      PT/INR - ( 2021 07:29 )   PT: 16.20 sec;   INR: 1.41 ratio         PTT - ( 2021 07:29 )  PTT:65.1 sec                                                                                     LIVER FUNCTIONS - ( 2021 07:29 )  Alb: 2.4 g/dL / Pro: 5.4 g/dL / ALK PHOS: 112 U/L / ALT: 11 U/L / AST: 24 U/L / GGT: x                                                                                               Mode: AC/ CMV (Assist Control/ Continuous Mandatory Ventilation)  RR (machine): 24  TV (machine): 450  FiO2: 70  PEEP: 5  ITime: 1  MAP: 12  PIP: 19                                          MEDICATIONS  (STANDING):  aspirin  chewable 81 milliGRAM(s) Oral daily  atorvastatin 20 milliGRAM(s) Oral at bedtime  chlorhexidine 0.12% Liquid 15 milliLiter(s) Oral Mucosa every 12 hours  chlorhexidine 4% Liquid 1 Application(s) Topical <User Schedule>  clonazePAM  Tablet 1 milliGRAM(s) Oral two times a day  collagenase Ointment 1 Application(s) Topical two times a day  Dakins Solution - 1/2 Strength 1 Application(s) Topical two times a day  dextrose 40% Gel 15 Gram(s) Oral once  dextrose 5%. 1000 milliLiter(s) (50 mL/Hr) IV Continuous <Continuous>  dextrose 5%. 1000 milliLiter(s) (100 mL/Hr) IV Continuous <Continuous>  dextrose 50% Injectable 25 Gram(s) IV Push once  dextrose 50% Injectable 12.5 Gram(s) IV Push once  dextrose 50% Injectable 25 Gram(s) IV Push once  epoetin raven-epbx (RETACRIT) Injectable 8000 Unit(s) IV Push <User Schedule>  glucagon  Injectable 1 milliGRAM(s) IntraMuscular once  insulin glargine Injectable (LANTUS) 12 Unit(s) SubCutaneous at bedtime  insulin lispro (ADMELOG) corrective regimen sliding scale   SubCutaneous three times a day before meals  insulin lispro Injectable (ADMELOG) 4 Unit(s) SubCutaneous three times a day before meals  levothyroxine 100 MICROGram(s) Oral daily  lidocaine 1%/epinephrine 1:100,000 Inj 40 milliLiter(s) Local Injection once  methadone    Tablet 5 milliGRAM(s) Oral daily  metoclopramide   Syrup 5 milliGRAM(s) Oral every 8 hours  metoprolol tartrate 12.5 milliGRAM(s) Oral two times a day  midodrine 10 milliGRAM(s) Oral every 8 hours  pantoprazole  Injectable 40 milliGRAM(s) IV Push every 12 hours  QUEtiapine 100 milliGRAM(s) Oral two times a day    MEDICATIONS  (PRN):  acetaminophen   Tablet .. 650 milliGRAM(s) Oral every 6 hours PRN Temp greater or equal to 38C (100.4F), Mild Pain (1 - 3)      New X-rays reviewed:                                                                                  ECHO    CXR interpreted by me:  bilateral interstitial densities

## 2021-02-04 NOTE — PROGRESS NOTE ADULT - ASSESSMENT
ESRD (due to DM) on HD TTS  s/p Fall  altered mental status   Covid-19 PNA / bacterial PNA   fluid overload  hyponatremia  hyperkalemia  DM  HTN  afib on coumadin  CVA  NSTEMI    plan:    HD today: 3 hours, opti 200 dialyzer, 2K bath, 3.5L UF  EPO with HD  followup CT  left arm precautions  covid isolation  f/u cardio / f/u pulm  icu care

## 2021-02-04 NOTE — PROGRESS NOTE ADULT - SUBJECTIVE AND OBJECTIVE BOX
[facility] FOLLOW UP NOTE  --------------------------------------------------------------------------------  24 hour events/subjective: Patient examined. Trached.    PAST HISTORY  --------------------------------------------------------------------------------  No significant changes to PMH, PSH, FHx, SHx, unless otherwise noted    ALLERGIES & MEDICATIONS  --------------------------------------------------------------------------------  Allergies    Orange (Other)  Originally Entered as [HIVES] reaction to [sulfur] (Unknown)  penicillin (Unknown)  sulfADIAZINE (Unknown)    Intolerances      Standing Inpatient Medications  aspirin  chewable 81 milliGRAM(s) Oral daily  atorvastatin 20 milliGRAM(s) Oral at bedtime  chlorhexidine 0.12% Liquid 15 milliLiter(s) Oral Mucosa every 12 hours  chlorhexidine 4% Liquid 1 Application(s) Topical <User Schedule>  clonazePAM  Tablet 1 milliGRAM(s) Oral two times a day  collagenase Ointment 1 Application(s) Topical two times a day  Dakins Solution - 1/2 Strength 1 Application(s) Topical two times a day  dextrose 40% Gel 15 Gram(s) Oral once  dextrose 5% + lactated ringers. 1000 milliLiter(s) IV Continuous <Continuous>  dextrose 5%. 1000 milliLiter(s) IV Continuous <Continuous>  dextrose 5%. 1000 milliLiter(s) IV Continuous <Continuous>  dextrose 50% Injectable 25 Gram(s) IV Push once  dextrose 50% Injectable 12.5 Gram(s) IV Push once  dextrose 50% Injectable 25 Gram(s) IV Push once  epoetin raven-epbx (RETACRIT) Injectable 8000 Unit(s) IV Push <User Schedule>  glucagon  Injectable 1 milliGRAM(s) IntraMuscular once  insulin lispro (ADMELOG) corrective regimen sliding scale   SubCutaneous three times a day before meals  levothyroxine 100 MICROGram(s) Oral daily  lidocaine 1%/epinephrine 1:100,000 Inj 40 milliLiter(s) Local Injection once  methadone    Tablet 5 milliGRAM(s) Oral daily  metoclopramide   Syrup 5 milliGRAM(s) Oral every 8 hours  metoprolol tartrate 12.5 milliGRAM(s) Oral two times a day  midodrine 10 milliGRAM(s) Oral every 8 hours  pantoprazole  Injectable 40 milliGRAM(s) IV Push every 12 hours  QUEtiapine 100 milliGRAM(s) Oral two times a day    PRN Inpatient Medications  acetaminophen   Tablet .. 650 milliGRAM(s) Oral every 6 hours PRN      VITALS/PHYSICAL EXAM  --------------------------------------------------------------------------------  T(C): 36.7 (02-04-21 @ 05:08), Max: 36.8 (02-03-21 @ 21:06)  HR: 83 (02-04-21 @ 08:59) (78 - 86)  BP: 116/54 (02-04-21 @ 05:08) (115/48 - 125/60)  RR: 20 (02-04-21 @ 05:08) (20 - 20)  SpO2: 100% (02-04-21 @ 08:59) (96% - 100%)  Wt(kg): --  Height (cm): 177.8 (02-02-21 @ 22:43)  Weight (kg): 97 (02-02-21 @ 22:43)  BMI (kg/m2): 30.7 (02-02-21 @ 22:43)  BSA (m2): 2.15 (02-02-21 @ 22:43)      02-03-21 @ 07:01  -  02-04-21 @ 07:00  --------------------------------------------------------  IN: 1216 mL / OUT: 0 mL / NET: 1216 mL      Physical Exam:  	Gen: Trached  	Pulm: CTA B/L  	CV: RRR, S1S2  	Abd: +BS, soft, nontender/nondistended  	: No suprapubic tenderness  	LE: Warm,  edema  	Vascular access: AVF    LABS/STUDIES  --------------------------------------------------------------------------------              7.6    10.94 >-----------<  435      [02-04-21 @ 12:41]              24.4     134  |  93  |  63  ----------------------------<  78      [02-04-21 @ 07:29]  4.6   |  28  |  5.7        Ca     8.1     [02-04-21 @ 07:29]      Mg     2.8     [02-04-21 @ 07:29]    TPro  5.4  /  Alb  2.4  /  TBili  1.0  /  DBili  x   /  AST  24  /  ALT  11  /  AlkPhos  112  [02-04-21 @ 07:29]    PT/INR: PT 16.20, INR 1.41       [02-04-21 @ 07:29]  PTT: 65.1       [02-04-21 @ 07:29]      Creatinine Trend:  SCr 5.7 [02-04 @ 07:29]  SCr 5.2 [02-03 @ 09:13]  SCr 6.5 [02-02 @ 06:55]  SCr 5.7 [02-01 @ 04:00]  SCr 5.1 [01-31 @ 04:30]    Urinalysis - [01-06-21 @ 18:43]      Color Yellow / Appearance Slightly Turbid / SG 1.018 / pH 6.5      Gluc 100 mg/dL / Ketone Negative  / Bili Negative / Urobili <2 mg/dL       Blood Moderate / Protein 300 mg/dL / Leuk Est Small / Nitrite Negative       /  / Hyaline 114 / Gran  / Sq Epi  / Non Sq Epi 1 / Bacteria Negative      Ferritin 983      [01-24-21 @ 06:00]  Vitamin D (25OH) 45      [01-15-21 @ 12:06]  HbA1c 7.1      [05-21-19 @ 04:30]  TSH 4.57      [01-01-21 @ 17:56]    HBsAb Reactive      [02-02-21 @ 15:05]  HBsAg Nonreact      [02-02-21 @ 18:12]  HBcAb Nonreact      [02-02-21 @ 06:55]  HCV 0.18, Nonreact      [02-02-21 @ 18:12]

## 2021-02-05 NOTE — PROGRESS NOTE ADULT - ASSESSMENT
IMPRESSION:    Acute hypoxemic resp failure failed weaning SP trach and PEG   NSTEMI  Afib was on coumadin  Severe COVID PNA  Superimposed bacteria PNA  Acute/chronic renal failure now On HD   Sacral ulcer SP debridement       PLAN:    CNS:  Continue Seroquel. Decrease Klonopin to 0.5 BID.  Decrease Methadone      HEENT: Oral care. Trach care     PULMONARY: Vent changes. Wean O2.  Monitor PPL and DP.  PEEP 7     CARDIOVASCULAR:  I<O as tolerated.      GI: GI prophylaxis.  FU with Surgery regarding PEG.       RENAL:  FU lytes, correct as needed.  FU with Renal      INFECTIOUS DISEASE:  FU cultures. ABX PER ID     HEMATOLOGICAL: DVT Prophylaxis on AC.  FU CBC and Coags.  Keep on IV heparin for now.  FU PTT     ENDOCRINE:  Follow up FS.      MUSCULOSKELETAL:  bed rest.  Wound Care      Prognosis poor     SDU

## 2021-02-05 NOTE — PROGRESS NOTE ADULT - SUBJECTIVE AND OBJECTIVE BOX
ROGER RODRIGUES  60y, Male  Allergy: Orange (Other)  Originally Entered as [HIVES] reaction to [sulfur] (Unknown)  penicillin (Unknown)  sulfADIAZINE (Unknown)    Hospital Day: 35d    Patient seen and examined earlier today. No acute events overnight.    PMH/PSH:  PAST MEDICAL & SURGICAL HISTORY:  PAD (peripheral artery disease)  multiple toe amputations    Anemia  chronic anemia - s/p transfusion 2018    Thyroid cancer    Chronic leg pain    OA (osteoarthritis)    SOB (shortness of breath) on exertion    ESRD (end stage renal disease)  2 yrs    Atrial fibrillation  on warfarin    Right sided weakness    Stroke  8 yrs ago    Hypertension    High blood cholesterol    Diabetes mellitus, type 2    Dialysis patient  Mon, Wednesday, Friday ([facility])    Smoker    H/O thyroid nodule    H/O gastroesophageal reflux (GERD)    History of tonsillectomy    History of surgery  Multiple toe amputations (amp 4 1/2 left toes; has 1/2 left mid toe; amp right mid toe)    A-V fistula  left AVF    VITALS:  T(F): 98.1 (02-05-21 @ 07:36), Max: 98.2 (02-05-21 @ 05:06)  HR: 77 (02-05-21 @ 08:10)  BP: 114/52 (02-05-21 @ 07:36) (92/53 - 161/58)  RR: 23 (02-05-21 @ 08:10)  SpO2: 100% (02-05-21 @ 08:10)    TESTS & MEASUREMENTS:  Weight (Kg):   BMI (kg/m2): 30.7 (02-02)    02-03-21 @ 07:01  -  02-04-21 @ 07:00  --------------------------------------------------------  IN: 1216 mL / OUT: 0 mL / NET: 1216 mL    02-04-21 @ 07:01  -  02-05-21 @ 07:00  --------------------------------------------------------  IN: 340 mL / OUT: 3000 mL / NET: -2660 mL                        7.1    9.80  )-----------( 392      ( 05 Feb 2021 07:47 )             23.2     PT/INR - ( 05 Feb 2021 07:47 )   PT: 14.60 sec;   INR: 1.27 ratio      PTT - ( 05 Feb 2021 07:47 )  PTT:34.4 sec  02-05    135  |  94<L>  |  48<H>  ----------------------------<  126<H>  4.1   |  27  |  4.6<HH>    Ca    8.1<L>      05 Feb 2021 07:47  Mg     2.6     02-05    TPro  5.6<L>  /  Alb  2.7<L>  /  TBili  0.8  /  DBili  x   /  AST  33  /  ALT  14  /  AlkPhos  117<H>  02-05    LIVER FUNCTIONS - ( 05 Feb 2021 07:47 )  Alb: 2.7 g/dL / Pro: 5.6 g/dL / ALK PHOS: 117 U/L / ALT: 14 U/L / AST: 33 U/L / GGT: x           RECENT DIAGNOSTIC ORDERS:  Blood Gas Arterial - Lytes,H+H,iCa,Lact: AM Sched. Collection:08-Feb-2021 04:30 (02-05-21 @ 09:30)  Xray Chest 1 View- PORTABLE-Routine: AM   Indication: Trachestomy mechanically ventilated  Transport: Portable (02-05-21 @ 09:30)  Xray Chest 1 View- PORTABLE-Routine: AM   Indication: Trachestomy mechanically ventilated  Transport: Portable (02-05-21 @ 09:30)  Xray Chest 1 View- PORTABLE-Routine: AM   Indication: Trachestomy mechanically ventilated  Transport: Portable (02-05-21 @ 09:30)  Blood Gas Arterial - Lytes,H+H,iCa,Lact: AM Sched. Collection:07-Feb-2021 04:30 (02-05-21 @ 09:30)  Blood Gas Arterial - Lytes,H+H,iCa,Lact: AM Sched. Collection:06-Feb-2021 04:30 (02-05-21 @ 09:30)  Phosphorus Level, Serum: AM Sched. Collection: 07-Feb-2021 04:30 (02-05-21 @ 09:30)  Magnesium, Serum: AM Sched. Collection: 07-Feb-2021 04:30 (02-05-21 @ 09:30)  Comprehensive Metabolic Panel: AM Sched. Collection: 07-Feb-2021 04:30 (02-05-21 @ 09:30)  Complete Blood Count + Automated Diff: AM Sched. Collection: 07-Feb-2021 04:30 (02-05-21 @ 09:30)  Phosphorus Level, Serum: AM Sched. Collection: 06-Feb-2021 04:30 (02-05-21 @ 09:30)  Magnesium, Serum: AM Sched. Collection: 06-Feb-2021 04:30 (02-05-21 @ 09:30)  Comprehensive Metabolic Panel: AM Sched. Collection: 06-Feb-2021 04:30 (02-05-21 @ 09:30)  Complete Blood Count + Automated Diff: AM Sched. Collection: 06-Feb-2021 04:30 (02-05-21 @ 09:30)  Activated Partial Thromboplastin Time:  Start Date:05-Feb-2021. AM Sched. Collection:06-Feb-2021 04:30 (02-05-21 @ 07:22)  Activated Partial Thromboplastin Time:  Start Date:05-Feb-2021. 23:30 (02-05-21 @ 07:22)  Activated Partial Thromboplastin Time:  Start Date:05-Feb-2021. 16:00 (02-05-21 @ 07:22)  Activated Partial Thromboplastin Time:  Start Date:05-Feb-2021. 11:00 (02-05-21 @ 07:22)  COVID-19 PCR: Routine  Specimen Source: Nasopharyngeal (02-04-21 @ 16:10)    MEDICATIONS:  MEDICATIONS  (STANDING):  aspirin  chewable 81 milliGRAM(s) Oral daily  atorvastatin 20 milliGRAM(s) Oral at bedtime  chlorhexidine 0.12% Liquid 15 milliLiter(s) Oral Mucosa every 12 hours  chlorhexidine 4% Liquid 1 Application(s) Topical <User Schedule>  clonazePAM  Tablet 0.5 milliGRAM(s) Oral every 12 hours  collagenase Ointment 1 Application(s) Topical two times a day  Dakins Solution - 1/2 Strength 1 Application(s) Topical two times a day  dextrose 40% Gel 15 Gram(s) Oral once  dextrose 5%. 1000 milliLiter(s) (50 mL/Hr) IV Continuous <Continuous>  dextrose 5%. 1000 milliLiter(s) (100 mL/Hr) IV Continuous <Continuous>  dextrose 50% Injectable 25 Gram(s) IV Push once  dextrose 50% Injectable 12.5 Gram(s) IV Push once  dextrose 50% Injectable 25 Gram(s) IV Push once  epoetin raven-epbx (RETACRIT) Injectable 8000 Unit(s) IV Push <User Schedule>  glucagon  Injectable 1 milliGRAM(s) IntraMuscular once  heparin  Infusion 1500 Unit(s)/Hr (15 mL/Hr) IV Continuous <Continuous>  insulin glargine Injectable (LANTUS) 12 Unit(s) SubCutaneous at bedtime  insulin lispro (ADMELOG) corrective regimen sliding scale   SubCutaneous three times a day before meals  insulin lispro Injectable (ADMELOG) 4 Unit(s) SubCutaneous three times a day before meals  levothyroxine 100 MICROGram(s) Oral daily  lidocaine 1%/epinephrine 1:100,000 Inj 40 milliLiter(s) Local Injection once  methadone    Tablet 5 milliGRAM(s) Oral daily  metoclopramide   Syrup 5 milliGRAM(s) Oral every 8 hours  metoprolol tartrate 12.5 milliGRAM(s) Oral two times a day  midodrine 10 milliGRAM(s) Oral every 8 hours  QUEtiapine 100 milliGRAM(s) Oral two times a day    MEDICATIONS  (PRN):  acetaminophen   Tablet .. 650 milliGRAM(s) Oral every 6 hours PRN Temp greater or equal to 38C (100.4F), Mild Pain (1 - 3)    HOME MEDICATIONS:  atorvastatin 20 mg oral tablet (01-01)  furosemide 40 mg oral tablet (01-01)  gabapentin 100 mg oral tablet (01-01)  insulin lispro (concentrated) 200 units/mL subcutaneous solution (01-01)  Lantus 100 units/mL subcutaneous solution (01-01)  pantoprazole 40 mg oral delayed release tablet (01-01)  pioglitazone 30 mg oral tablet (01-01)  Renvela 800 mg oral tablet (01-01)  Toprol-XL 25 mg oral tablet, extended release (01-01)      REVIEW OF SYSTEMS:  All other review of systems is negative unless indicated above.     PHYSICAL EXAM:  GENERAL: NAD  HEENT: No Swelling  CHEST/LUNG: Good air entry, No wheezing  HEART: RRR, No murmurs  ABDOMEN: Soft, Bowel sounds present  EXTREMITIES:  No clubbing

## 2021-02-05 NOTE — PROGRESS NOTE ADULT - ASSESSMENT
59 yo male with PMHx of afib on coumadin, DM2, ESRD on HD MWF, HLD, HTN, CVA, who presented w/ weakness and fall secondary to acute hypoxic respiratory failure secondary to SARS-CoV-2 pneumonia, s/p intubation on 1/4/2021, s/p tracheostomy and PEG insertion on 1/18/2021.     #Acute Hypoxic Respiratory Failure  #COVID 19 PNA  s/p Trach/Peg  Currently trach to vent  s/p tx with decadron, RDV, and plasma  COVID + 02/02  - Cont vent management per critcare  - CPAP trials as tolerated    #Acute on Chronic Anemia  Possibly due to chronic illness + ESRD  workup negative for acute bleed  - Cont to monitor Hg  - Transfuse PRN    #ESRD  - HD per nephro    #Paroxysmal Afib  - May be able to hold off on AC given overall clinical status   - Cont metoprolol 12.5mg BID    #HTN/HLD  #h/o CVA  - Cont atorvastatin 20mg qHs    DVT PPX, heparin for now

## 2021-02-05 NOTE — PROGRESS NOTE ADULT - SUBJECTIVE AND OBJECTIVE BOX
[facility] FOLLOW UP NOTE  --------------------------------------------------------------------------------  24 hour events/subjective: Patient examined. Trached.    PAST HISTORY  --------------------------------------------------------------------------------  No significant changes to PMH, PSH, FHx, SHx, unless otherwise noted    ALLERGIES & MEDICATIONS  --------------------------------------------------------------------------------  Allergies    Orange (Other)  Originally Entered as [HIVES] reaction to [sulfur] (Unknown)  penicillin (Unknown)  sulfADIAZINE (Unknown)    Intolerances      Standing Inpatient Medications  aspirin  chewable 81 milliGRAM(s) Oral daily  atorvastatin 20 milliGRAM(s) Oral at bedtime  chlorhexidine 0.12% Liquid 15 milliLiter(s) Oral Mucosa every 12 hours  chlorhexidine 4% Liquid 1 Application(s) Topical <User Schedule>  clonazePAM  Tablet 0.5 milliGRAM(s) Oral every 12 hours  collagenase Ointment 1 Application(s) Topical two times a day  Dakins Solution - 1/2 Strength 1 Application(s) Topical two times a day  dextrose 40% Gel 15 Gram(s) Oral once  dextrose 5%. 1000 milliLiter(s) IV Continuous <Continuous>  dextrose 5%. 1000 milliLiter(s) IV Continuous <Continuous>  dextrose 50% Injectable 25 Gram(s) IV Push once  dextrose 50% Injectable 12.5 Gram(s) IV Push once  dextrose 50% Injectable 25 Gram(s) IV Push once  epoetin raven-epbx (RETACRIT) Injectable 8000 Unit(s) IV Push <User Schedule>  glucagon  Injectable 1 milliGRAM(s) IntraMuscular once  heparin  Infusion 1500 Unit(s)/Hr IV Continuous <Continuous>  insulin glargine Injectable (LANTUS) 12 Unit(s) SubCutaneous at bedtime  insulin lispro (ADMELOG) corrective regimen sliding scale   SubCutaneous three times a day before meals  insulin lispro Injectable (ADMELOG) 4 Unit(s) SubCutaneous three times a day before meals  levothyroxine 100 MICROGram(s) Oral daily  lidocaine 1%/epinephrine 1:100,000 Inj 40 milliLiter(s) Local Injection once  methadone    Tablet 5 milliGRAM(s) Oral daily  metoclopramide   Syrup 5 milliGRAM(s) Oral every 8 hours  metoprolol tartrate 12.5 milliGRAM(s) Oral two times a day  midodrine 10 milliGRAM(s) Oral every 8 hours  QUEtiapine 100 milliGRAM(s) Oral two times a day    PRN Inpatient Medications  acetaminophen   Tablet .. 650 milliGRAM(s) Oral every 6 hours PRN      VITALS/PHYSICAL EXAM  --------------------------------------------------------------------------------  T(C): 36.8 (02-05-21 @ 12:00), Max: 36.8 (02-05-21 @ 05:06)  HR: 75 (02-05-21 @ 12:00) (75 - 89)  BP: 121/57 (02-05-21 @ 12:00) (92/53 - 161/58)  RR: 24 (02-05-21 @ 12:00) (23 - 24)  SpO2: 98% (02-05-21 @ 12:00) (95% - 100%)  Wt(kg): --        02-04-21 @ 07:01  -  02-05-21 @ 07:00  --------------------------------------------------------  IN: 340 mL / OUT: 3000 mL / NET: -2660 mL      Physical Exam:  	Gen: Trached  	Pulm: CTA B/L  	CV: RRR, S1S2  	Abd: +BS, soft, nondistended  	: No suprapubic tenderness  	LE: Warm,  edema  	Vascular access: AVF    LABS/STUDIES  --------------------------------------------------------------------------------              7.1    9.80  >-----------<  392      [02-05-21 @ 07:47]              23.2     135  |  94  |  48  ----------------------------<  126      [02-05-21 @ 07:47]  4.1   |  27  |  4.6        Ca     8.1     [02-05-21 @ 07:47]      Mg     2.6     [02-05-21 @ 07:47]    TPro  5.6  /  Alb  2.7  /  TBili  0.8  /  DBili  x   /  AST  33  /  ALT  14  /  AlkPhos  117  [02-05-21 @ 07:47]    PT/INR: PT 14.60, INR 1.27       [02-05-21 @ 07:47]  PTT: 34.4       [02-05-21 @ 07:47]      Creatinine Trend:  SCr 4.6 [02-05 @ 07:47]  SCr 5.7 [02-04 @ 07:29]  SCr 5.2 [02-03 @ 09:13]  SCr 6.5 [02-02 @ 06:55]  SCr 5.7 [02-01 @ 04:00]    Urinalysis - [01-06-21 @ 18:43]      Color Yellow / Appearance Slightly Turbid / SG 1.018 / pH 6.5      Gluc 100 mg/dL / Ketone Negative  / Bili Negative / Urobili <2 mg/dL       Blood Moderate / Protein 300 mg/dL / Leuk Est Small / Nitrite Negative       /  / Hyaline 114 / Gran  / Sq Epi  / Non Sq Epi 1 / Bacteria Negative      Iron 33, TIBC 212, %sat 16      [02-04-21 @ 18:57]  Ferritin 983      [01-24-21 @ 06:00]  Vitamin D (25OH) 45      [01-15-21 @ 12:06]  HbA1c 7.1      [05-21-19 @ 04:30]  TSH 4.57      [01-01-21 @ 17:56]    HBsAb Reactive      [02-02-21 @ 15:05]  HBsAg Nonreact      [02-02-21 @ 18:12]  HBcAb Nonreact      [02-02-21 @ 06:55]  HCV 0.18, Nonreact      [02-02-21 @ 18:12]

## 2021-02-05 NOTE — PROGRESS NOTE ADULT - ASSESSMENT
ESRD (due to DM) on HD TTS  s/p Fall  altered mental status   Covid-19 PNA / bacterial PNA   fluid overload  hyponatremia  hyperkalemia  DM  HTN  afib on coumadin  CVA  NSTEMI    plan:    HD tomorrow: 3 hours, opti 200 dialyzer, 2K bath, 3L UF  EPO with HD  followup CT  left arm precautions  covid isolation  f/u cardio / f/u pulm  icu care

## 2021-02-05 NOTE — PROGRESS NOTE ADULT - SUBJECTIVE AND OBJECTIVE BOX
SUBJECTIVE:    Patient is a 60y old Male who presents with a chief complaint of fall. (05 Feb 2021 06:49). Currently admitted to medicine with the primary diagnosis of Fever and pre-syncope secondary to acute hypoxic respiratory failure secondary to SARS-CoV-2 pneumonia.   Today is hospital day 35d.    PAST MEDICAL & SURGICAL HISTORY  PAD (peripheral artery disease)  multiple toe amputations    Anemia  chronic anemia - s/p transfusion 2018    Thyroid cancer    Chronic leg pain    OA (osteoarthritis)    SOB (shortness of breath) on exertion    ESRD (end stage renal disease)  2 yrs    Atrial fibrillation  on warfarin    Right sided weakness    Stroke  8 yrs ago    Hypertension    High blood cholesterol    Diabetes mellitus, type 2    Dialysis patient  Mon, Wednesday, Friday ([facility])    Smoker    H/O thyroid nodule    H/O gastroesophageal reflux (GERD)    History of tonsillectomy    History of surgery  Multiple toe amputations (amp 4 1/2 left toes; has 1/2 left mid toe; amp right mid toe)    A-V fistula  left AVF      SOCIAL HISTORY:  Negative for smoking/alcohol/drug use.     ALLERGIES:  Orange (Other)  Originally Entered as [HIVES] reaction to [sulfur] (Unknown)  penicillin (Unknown)  sulfADIAZINE (Unknown)    MEDICATIONS:  STANDING MEDICATIONS  aspirin  chewable 81 milliGRAM(s) Oral daily  atorvastatin 20 milliGRAM(s) Oral at bedtime  chlorhexidine 0.12% Liquid 15 milliLiter(s) Oral Mucosa every 12 hours  chlorhexidine 4% Liquid 1 Application(s) Topical <User Schedule>  clonazePAM  Tablet 0.5 milliGRAM(s) Oral every 12 hours  collagenase Ointment 1 Application(s) Topical two times a day  Dakins Solution - 1/2 Strength 1 Application(s) Topical two times a day  dextrose 40% Gel 15 Gram(s) Oral once  dextrose 5%. 1000 milliLiter(s) IV Continuous <Continuous>  dextrose 5%. 1000 milliLiter(s) IV Continuous <Continuous>  dextrose 50% Injectable 25 Gram(s) IV Push once  dextrose 50% Injectable 12.5 Gram(s) IV Push once  dextrose 50% Injectable 25 Gram(s) IV Push once  epoetin raven-epbx (RETACRIT) Injectable 8000 Unit(s) IV Push <User Schedule>  glucagon  Injectable 1 milliGRAM(s) IntraMuscular once  heparin  Infusion 1500 Unit(s)/Hr IV Continuous <Continuous>  insulin glargine Injectable (LANTUS) 12 Unit(s) SubCutaneous at bedtime  insulin lispro (ADMELOG) corrective regimen sliding scale   SubCutaneous three times a day before meals  insulin lispro Injectable (ADMELOG) 4 Unit(s) SubCutaneous three times a day before meals  levothyroxine 100 MICROGram(s) Oral daily  lidocaine 1%/epinephrine 1:100,000 Inj 40 milliLiter(s) Local Injection once  methadone    Tablet 5 milliGRAM(s) Oral daily  metoclopramide   Syrup 5 milliGRAM(s) Oral every 8 hours  metoprolol tartrate 12.5 milliGRAM(s) Oral two times a day  midodrine 10 milliGRAM(s) Oral every 8 hours  pantoprazole  Injectable 40 milliGRAM(s) IV Push every 12 hours  QUEtiapine 100 milliGRAM(s) Oral two times a day    PRN MEDICATIONS  acetaminophen   Tablet .. 650 milliGRAM(s) Oral every 6 hours PRN    VITALS:   T(F): 98.1  HR: 78  BP: 114/52  RR: 24  SpO2: 100%    LABS:                        7.6    10.79 )-----------( 280      ( 05 Feb 2021 00:25 )             25.0     02-04    134<L>  |  93<L>  |  63<HH>  ----------------------------<  78  4.6   |  28  |  5.7<HH>    Ca    8.1<L>      04 Feb 2021 07:29  Mg     2.8     02-04    TPro  5.4<L>  /  Alb  2.4<L>  /  TBili  1.0  /  DBili  x   /  AST  24  /  ALT  11  /  AlkPhos  112  02-04    PT/INR - ( 04 Feb 2021 07:29 )   PT: 16.20 sec;   INR: 1.41 ratio    PTT - ( 04 Feb 2021 07:29 )  PTT:65.1 sec    ABG - ( 05 Feb 2021 04:38 )  pH, Arterial: 7.47  pH, Blood: x     /  pCO2: 46    /  pO2: 61    / HCO3: 33    / Base Excess: 8.6   /  SaO2: 90        Iron with Total Binding Capacity in AM (02.04.21 @ 18:57)   Iron - Total Binding Capacity.: 212 ug/dL   % Saturation, Iron: 16 %   Iron Total, Serum: 33 ug/dL   Unsaturated Iron Binding Capacity: 179 ug/dL       RADIOLOGY:    < from: Xray Chest 1 View-PORTABLE IMMEDIATE (Xray Chest 1 View-PORTABLE IMMEDIATE .) (02.04.21 @ 20:07) >  Impression:    Stable diffuse lung opacities    < end of copied text >    < from: CT Abdomen and Pelvis No Cont (02.04.21 @ 21:15) >  IMPRESSION:    Limited study due to extensive streak artifact particularly in the upper abdomen.    No evidence of intraperitoneal or retroperitoneal hemorrhage..    < end of copied text >    < from: CT Head No Cont (01.04.21 @ 22:14) >    IMPRESSION:    No acute intracranial pathology, stable since 1/1/2021.    Stable mild ventriculomegaly.    < end of copied text >      PHYSICAL EXAM:  GEN: Mechanically ventilated through Tracheostomy  LUNGS: Clear to auscultation bilaterally   HEART: S1/S2 present. RRR.   ABD: Soft, non-tender, non-distended. Bowel sounds present  EXT: Bilateral lower extremity edema, pitting.   NEURO: Awake, responds to name, and blinks, orientation not assessable.

## 2021-02-05 NOTE — PROGRESS NOTE ADULT - SUBJECTIVE AND OBJECTIVE BOX
Patient is a 60y old  Male who presents with a chief complaint of s/p fall. (04 Feb 2021 14:59)        Over Night Events:  on MV.  Off pressors and off Sedation.          ROS:     All ROS are negative except HPI         PHYSICAL EXAM    ICU Vital Signs Last 24 Hrs  T(C): 36.8 (05 Feb 2021 05:06), Max: 36.8 (05 Feb 2021 05:06)  T(F): 98.2 (05 Feb 2021 05:06), Max: 98.2 (05 Feb 2021 05:06)  HR: 78 (05 Feb 2021 05:06) (76 - 89)  BP: 121/58 (05 Feb 2021 05:06) (92/53 - 161/58)  BP(mean): --  ABP: --  ABP(mean): --  RR: 24 (05 Feb 2021 05:06) (24 - 24)  SpO2: 100% (05 Feb 2021 05:06) (95% - 100%)      CONSTITUTIONAL:  Well nourished. Ill appearing.  NAD    ENT:   Airway patent,   Mouth with normal mucosa.   No thrush    EYES:   Pupils equal,   Round and reactive to light.    CARDIAC:   Normal rate,   Regular rhythm.     edema      Vascular:  Normal systolic impulse  No Carotid bruits    RESPIRATORY:   No wheezing  Bilateral BS  Normal chest expansion  Not tachypneic,  No use of accessory muscles    GASTROINTESTINAL:  Abdomen soft,   Non-tender,   No guarding,   + BS    MUSCULOSKELETAL:   Range of motion is not limited,  No clubbing, cyanosis    NEUROLOGICAL:   Lethargic  Responds to tactile  Does not follow commands     SKIN:   Skin normal color for race,   Warm and dry.   No evidence of rash.    PSYCHIATRIC:   No apparent risk to self or others.    HEMATOLOGICAL:  No cervical  lymphadenopathy.  no inguinal lymphadenopathy      02-03-21 @ 07:01  -  02-04-21 @ 07:00  --------------------------------------------------------  IN:    Enteral Tube Flush: 220 mL    Heparin Infusion: 276 mL    Nepro with Carb Steady: 720 mL  Total IN: 1216 mL    OUT:  Total OUT: 0 mL    Total NET: 1216 mL      02-04-21 @ 07:01  -  02-05-21 @ 06:49  --------------------------------------------------------  IN:    Enteral Tube Flush: 100 mL    Nepro with Carb Steady: 240 mL  Total IN: 340 mL    OUT:    Other (mL): 3000 mL  Total OUT: 3000 mL    Total NET: -2660 mL          LABS:                            7.6    10.79 )-----------( 280      ( 05 Feb 2021 00:25 )             25.0                                               02-04    134<L>  |  93<L>  |  63<HH>  ----------------------------<  78  4.6   |  28  |  5.7<HH>    Ca    8.1<L>      04 Feb 2021 07:29  Mg     2.8     02-04    TPro  5.4<L>  /  Alb  2.4<L>  /  TBili  1.0  /  DBili  x   /  AST  24  /  ALT  11  /  AlkPhos  112  02-04      PT/INR - ( 04 Feb 2021 07:29 )   PT: 16.20 sec;   INR: 1.41 ratio         PTT - ( 04 Feb 2021 07:29 )  PTT:65.1 sec                                                                                     LIVER FUNCTIONS - ( 04 Feb 2021 07:29 )  Alb: 2.4 g/dL / Pro: 5.4 g/dL / ALK PHOS: 112 U/L / ALT: 11 U/L / AST: 24 U/L / GGT: x                                                                                               Mode: AC/ CMV (Assist Control/ Continuous Mandatory Ventilation)  RR (machine): 24  TV (machine): 450  FiO2: 70  PEEP: 5  ITime: 1  MAP: 13  PIP: 34                                      ABG - ( 05 Feb 2021 04:38 )  pH, Arterial: 7.47  pH, Blood: x     /  pCO2: 46    /  pO2: 61    / HCO3: 33    / Base Excess: 8.6   /  SaO2: 90                  MEDICATIONS  (STANDING):  aspirin  chewable 81 milliGRAM(s) Oral daily  atorvastatin 20 milliGRAM(s) Oral at bedtime  chlorhexidine 0.12% Liquid 15 milliLiter(s) Oral Mucosa every 12 hours  chlorhexidine 4% Liquid 1 Application(s) Topical <User Schedule>  clonazePAM  Tablet 1 milliGRAM(s) Oral two times a day  collagenase Ointment 1 Application(s) Topical two times a day  Dakins Solution - 1/2 Strength 1 Application(s) Topical two times a day  dextrose 40% Gel 15 Gram(s) Oral once  dextrose 5%. 1000 milliLiter(s) (50 mL/Hr) IV Continuous <Continuous>  dextrose 5%. 1000 milliLiter(s) (100 mL/Hr) IV Continuous <Continuous>  dextrose 50% Injectable 12.5 Gram(s) IV Push once  dextrose 50% Injectable 25 Gram(s) IV Push once  dextrose 50% Injectable 25 Gram(s) IV Push once  epoetin raven-epbx (RETACRIT) Injectable 8000 Unit(s) IV Push <User Schedule>  glucagon  Injectable 1 milliGRAM(s) IntraMuscular once  insulin glargine Injectable (LANTUS) 12 Unit(s) SubCutaneous at bedtime  insulin lispro (ADMELOG) corrective regimen sliding scale   SubCutaneous three times a day before meals  insulin lispro Injectable (ADMELOG) 4 Unit(s) SubCutaneous three times a day before meals  levothyroxine 100 MICROGram(s) Oral daily  lidocaine 1%/epinephrine 1:100,000 Inj 40 milliLiter(s) Local Injection once  methadone    Tablet 5 milliGRAM(s) Oral daily  metoclopramide   Syrup 5 milliGRAM(s) Oral every 8 hours  metoprolol tartrate 12.5 milliGRAM(s) Oral two times a day  midodrine 10 milliGRAM(s) Oral every 8 hours  pantoprazole  Injectable 40 milliGRAM(s) IV Push every 12 hours  QUEtiapine 100 milliGRAM(s) Oral two times a day    MEDICATIONS  (PRN):  acetaminophen   Tablet .. 650 milliGRAM(s) Oral every 6 hours PRN Temp greater or equal to 38C (100.4F), Mild Pain (1 - 3)      New X-rays reviewed:                                                                                  ECHO    CXR interpreted by me:  Bilateral infiltrates.

## 2021-02-06 NOTE — PROGRESS NOTE ADULT - SUBJECTIVE AND OBJECTIVE BOX
[facility] FOLLOW UP NOTE  --------------------------------------------------------------------------------  Chief Complaint/24 hour events/subjective:    on vent, confused    PAST HISTORY  --------------------------------------------------------------------------------  No significant changes to PMH, PSH, FHx, SHx, unless otherwise noted    ALLERGIES & MEDICATIONS  --------------------------------------------------------------------------------  Allergies    Orange (Other)  Originally Entered as [HIVES] reaction to [sulfur] (Unknown)  penicillin (Unknown)  sulfADIAZINE (Unknown)    Intolerances      Standing Inpatient Medications  aspirin  chewable 81 milliGRAM(s) Oral daily  atorvastatin 20 milliGRAM(s) Oral at bedtime  chlorhexidine 0.12% Liquid 15 milliLiter(s) Oral Mucosa every 12 hours  chlorhexidine 4% Liquid 1 Application(s) Topical <User Schedule>  clonazePAM  Tablet 0.5 milliGRAM(s) Oral every 12 hours  collagenase Ointment 1 Application(s) Topical two times a day  Dakins Solution - 1/2 Strength 1 Application(s) Topical two times a day  dextrose 40% Gel 15 Gram(s) Oral once  dextrose 5%. 1000 milliLiter(s) IV Continuous <Continuous>  dextrose 5%. 1000 milliLiter(s) IV Continuous <Continuous>  dextrose 50% Injectable 25 Gram(s) IV Push once  dextrose 50% Injectable 12.5 Gram(s) IV Push once  dextrose 50% Injectable 25 Gram(s) IV Push once  epoetin raven-epbx (RETACRIT) Injectable 8000 Unit(s) IV Push <User Schedule>  glucagon  Injectable 1 milliGRAM(s) IntraMuscular once  heparin  Infusion 1500 Unit(s)/Hr IV Continuous <Continuous>  insulin glargine Injectable (LANTUS) 12 Unit(s) SubCutaneous at bedtime  insulin lispro (ADMELOG) corrective regimen sliding scale   SubCutaneous three times a day before meals  insulin lispro Injectable (ADMELOG) 4 Unit(s) SubCutaneous three times a day before meals  levothyroxine 100 MICROGram(s) Oral daily  lidocaine 1%/epinephrine 1:100,000 Inj 40 milliLiter(s) Local Injection once  methadone    Tablet 5 milliGRAM(s) Oral daily  metoclopramide   Syrup 5 milliGRAM(s) Oral every 8 hours  metoprolol tartrate 12.5 milliGRAM(s) Oral two times a day  midodrine 10 milliGRAM(s) Oral every 8 hours  pantoprazole    Tablet 40 milliGRAM(s) Oral before breakfast  QUEtiapine 100 milliGRAM(s) Oral two times a day    PRN Inpatient Medications  acetaminophen   Tablet .. 650 milliGRAM(s) Oral every 6 hours PRN      REVIEW OF SYSTEMS  --------------------------------------------------------------------------------    All other systems were reviewed and are negative, except as noted.    VITALS/PHYSICAL EXAM  --------------------------------------------------------------------------------  T(C): 35.9 (02-06-21 @ 12:00), Max: 37.1 (02-05-21 @ 16:00)  HR: 78 (02-06-21 @ 12:55) (75 - 96)  BP: 117/46 (02-06-21 @ 12:55) (96/45 - 124/59)  RR: 18 (02-06-21 @ 12:55) (18 - 31)  SpO2: 100% (02-06-21 @ 12:00) (99% - 100%)  Wt(kg): --        02-05-21 @ 07:01  -  02-06-21 @ 07:00  --------------------------------------------------------  IN: 1246 mL / OUT: 50 mL / NET: 1196 mL    02-06-21 @ 07:01  -  02-06-21 @ 14:18  --------------------------------------------------------  IN: 395 mL / OUT: 0 mL / NET: 395 mL      Physical Exam:    	Gen: no distress   	Pulm:  low BS  	CV: S1S2; no rub  	Abd: +BS, soft, nontender/nondistended  	LE:  no  edema      LABS/STUDIES  --------------------------------------------------------------------------------              7.2    8.70  >-----------<  392      [02-06-21 @ 09:02]              23.3     137  |  95  |  54  ----------------------------<  70      [02-06-21 @ 09:02]  4.3   |  27  |  5.3        Ca     7.6     [02-06-21 @ 09:02]      Mg     2.7     [02-06-21 @ 09:02]      Phos  6.0     [02-06-21 @ 09:02]    TPro  5.6  /  Alb  2.6  /  TBili  1.1  /  DBili  x   /  AST  28  /  ALT  14  /  AlkPhos  117  [02-06-21 @ 09:02]    PT/INR: PT 14.70, INR 1.28       [02-06-21 @ 09:02]  PTT: 59.9       [02-06-21 @ 12:30]      Creatinine Trend:  SCr 5.3 [02-06 @ 09:02]  SCr 4.6 [02-05 @ 07:47]  SCr 5.7 [02-04 @ 07:29]  SCr 5.2 [02-03 @ 09:13]  SCr 6.5 [02-02 @ 06:55]        Iron 33, TIBC 212, %sat 16      [02-04-21 @ 18:57]  Ferritin 1273      [02-04-21 @ 18:57]  Vitamin D (25OH) 45      [01-15-21 @ 12:06]  HbA1c 7.1      [05-21-19 @ 04:30]  TSH 4.57      [01-01-21 @ 17:56]    HBsAb Reactive      [02-02-21 @ 15:05]  HBsAg Nonreact      [02-02-21 @ 18:12]  HBcAb Nonreact      [02-02-21 @ 06:55]  HCV 0.18, Nonreact      [02-02-21 @ 18:12]

## 2021-02-06 NOTE — PROGRESS NOTE ADULT - ASSESSMENT
61 yo male with PMHx of afib on coumadin, DM2, ESRD on HD MWF, HLD, HTN, CVA, who presented w/ weakness and fall secondary to acute hypoxic respiratory failure secondary to SARS-CoV-2 pneumonia, s/p intubation on 1/4/2021, s/p tracheostomy and PEG insertion on 1/18/2021.     #Acute Hypoxic Respiratory Failure  #COVID 19 PNA  s/p Trach/Peg  Currently trach to vent, FiO2 50%  s/p tx with decadron, RDV, and plasma  COVID + 02/02  - Cont vent management per critcare  - CPAP trials as tolerated    #Acute on Chronic Anemia  Possibly due to chronic illness + ESRD  workup negative for acute bleed  - Cont to monitor Hg  - Transfuse PRN    #ESRD  - HD per nephro    #Paroxysmal Afib  - Cont heparin drip for now  - Cont metoprolol 12.5mg BID    #HTN/HLD  #h/o CVA  - Cont atorvastatin 20mg qHs    DVT PPX, heparin for now

## 2021-02-06 NOTE — PROGRESS NOTE ADULT - ASSESSMENT
IMPRESSION:    Acute hypoxemic resp failure failed weaning SP trach and PEG   NSTEMI  Afib was on coumadin  Severe COVID PNA  Superimposed bacteria PNA  Acute/chronic renal failure now On HD   Sacral ulcer SP debridement       PLAN:    CNS:  Continue Seroquel. Decrease Klonopin to 0.5 BID.  Decrease Methadone      HEENT: Oral care. Trach care     PULMONARY: Vent changes. Wean O2.   Monitor PPL and DP.      CARDIOVASCULAR:  I<O as tolerated.      GI: GI prophylaxis.  FU with Surgery regarding PEG.       RENAL:  FU lytes, correct as needed.  FU with Renal      INFECTIOUS DISEASE:  FU cultures. ABX PER ID     HEMATOLOGICAL: DVT Prophylaxis on AC.  FU CBC and Coags.  Keep on IV heparin for now.  FU PTT     ENDOCRINE:  Follow up FS.      MUSCULOSKELETAL:  bed rest.  Wound Care      Prognosis poor     SDU   IMPRESSION:    Acute hypoxemic resp failure failed weaning SP trach and PEG   NSTEMI  Afib was on coumadin  Severe COVID PNA  Superimposed bacteria PNA  Acute/chronic renal failure now On HD   Sacral ulcer SP debridement       PLAN:    CNS:  Continue Seroquel. light sedation     HEENT: Oral care. Trach care     PULMONARY: Vent changes. Wean O2.   Monitor PPL and DP.      CARDIOVASCULAR:  I<O as tolerated.      GI: GI prophylaxis.     nutrition    RENAL:  FU lytes, correct as needed.  FU with Renal      INFECTIOUS DISEASE:  FU cultures. ABX PER ID     HEMATOLOGICAL: DVT Prophylaxis on AC.  FU CBC and Coags.    Keep on IV heparin for now.  FU PTT     ENDOCRINE:  Follow up FS.      MUSCULOSKELETAL:  bed rest.  Wound Care      Prognosis poor     SDU

## 2021-02-06 NOTE — PROGRESS NOTE ADULT - SUBJECTIVE AND OBJECTIVE BOX
ROGER RODRIGUES  60y, Male  Allergy: Orange (Other)  Originally Entered as [HIVES] reaction to [sulfur] (Unknown)  penicillin (Unknown)  sulfADIAZINE (Unknown)    Hospital Day: 36d    Patient seen and examined earlier today. No acute events overnight.    PMH/PSH:  PAST MEDICAL & SURGICAL HISTORY:  PAD (peripheral artery disease)  multiple toe amputations    Anemia  chronic anemia - s/p transfusion 2018    Thyroid cancer    Chronic leg pain    OA (osteoarthritis)    SOB (shortness of breath) on exertion    ESRD (end stage renal disease)  2 yrs    Atrial fibrillation  on warfarin    Right sided weakness    Stroke  8 yrs ago    Hypertension    High blood cholesterol    Diabetes mellitus, type 2    Dialysis patient  Mon, Wednesday, Friday ([facility])    Smoker    H/O thyroid nodule    H/O gastroesophageal reflux (GERD)    History of tonsillectomy    History of surgery  Multiple toe amputations (amp 4 1/2 left toes; has 1/2 left mid toe; amp right mid toe)    A-V fistula  left AVF    VITALS:  T(F): 95.3 (02-06-21 @ 09:38), Max: 98.7 (02-05-21 @ 16:00)  HR: 77 (02-06-21 @ 09:38)  BP: 108/56 (02-06-21 @ 09:38) (108/56 - 124/59)  RR: 31 (02-06-21 @ 10:00)  SpO2: 100% (02-06-21 @ 10:00)    TESTS & MEASUREMENTS:  Weight (Kg):   BMI (kg/m2): 30.7 (02-02)    02-04-21 @ 07:01  -  02-05-21 @ 07:00  --------------------------------------------------------  IN: 340 mL / OUT: 3000 mL / NET: -2660 mL    02-05-21 @ 07:01  -  02-06-21 @ 07:00  --------------------------------------------------------  IN: 1246 mL / OUT: 50 mL / NET: 1196 mL                        7.2    8.70  )-----------( 392      ( 06 Feb 2021 09:02 )             23.3     PT/INR - ( 06 Feb 2021 09:02 )   PT: 14.70 sec;   INR: 1.28 ratio         PTT - ( 06 Feb 2021 09:02 )  PTT:62.1 sec  02-06    137  |  95<L>  |  54<H>  ----------------------------<  70  4.3   |  27  |  x     Ca    7.6<L>      06 Feb 2021 09:02  Phos  6.0     02-06  Mg     2.7     02-06    TPro  5.6<L>  /  Alb  2.6<L>  /  TBili  1.1  /  DBili  x   /  AST  28  /  ALT  14  /  AlkPhos  117<H>  02-06    LIVER FUNCTIONS - ( 06 Feb 2021 09:02 )  Alb: 2.6 g/dL / Pro: 5.6 g/dL / ALK PHOS: 117 U/L / ALT: 14 U/L / AST: 28 U/L / GGT: x           RECENT DIAGNOSTIC ORDERS:  Activated Partial Thromboplastin Time:  Start Date:06-Feb-2021. 23:30 (02-06-21 @ 07:03)  Activated Partial Thromboplastin Time:  Start Date:06-Feb-2021. 20:00 (02-06-21 @ 07:03)  Activated Partial Thromboplastin Time:  Start Date:06-Feb-2021. 16:00 (02-06-21 @ 07:03)  Activated Partial Thromboplastin Time:  Start Date:06-Feb-2021. 11:00 (02-06-21 @ 07:03)  Blood Gas Profile - Arterial: AM Sched. Collection:06-Feb-2021 04:30 (02-06-21 @ 00:01)    MEDICATIONS:  MEDICATIONS  (STANDING):  aspirin  chewable 81 milliGRAM(s) Oral daily  atorvastatin 20 milliGRAM(s) Oral at bedtime  chlorhexidine 0.12% Liquid 15 milliLiter(s) Oral Mucosa every 12 hours  chlorhexidine 4% Liquid 1 Application(s) Topical <User Schedule>  clonazePAM  Tablet 0.5 milliGRAM(s) Oral every 12 hours  collagenase Ointment 1 Application(s) Topical two times a day  Dakins Solution - 1/2 Strength 1 Application(s) Topical two times a day  dextrose 40% Gel 15 Gram(s) Oral once  dextrose 5%. 1000 milliLiter(s) (50 mL/Hr) IV Continuous <Continuous>  dextrose 5%. 1000 milliLiter(s) (100 mL/Hr) IV Continuous <Continuous>  dextrose 50% Injectable 25 Gram(s) IV Push once  dextrose 50% Injectable 12.5 Gram(s) IV Push once  dextrose 50% Injectable 25 Gram(s) IV Push once  epoetin raven-epbx (RETACRIT) Injectable 8000 Unit(s) IV Push <User Schedule>  glucagon  Injectable 1 milliGRAM(s) IntraMuscular once  heparin  Infusion 1500 Unit(s)/Hr (19 mL/Hr) IV Continuous <Continuous>  insulin glargine Injectable (LANTUS) 12 Unit(s) SubCutaneous at bedtime  insulin lispro (ADMELOG) corrective regimen sliding scale   SubCutaneous three times a day before meals  insulin lispro Injectable (ADMELOG) 4 Unit(s) SubCutaneous three times a day before meals  levothyroxine 100 MICROGram(s) Oral daily  lidocaine 1%/epinephrine 1:100,000 Inj 40 milliLiter(s) Local Injection once  methadone    Tablet 5 milliGRAM(s) Oral daily  metoclopramide   Syrup 5 milliGRAM(s) Oral every 8 hours  metoprolol tartrate 12.5 milliGRAM(s) Oral two times a day  midodrine 10 milliGRAM(s) Oral every 8 hours  pantoprazole    Tablet 40 milliGRAM(s) Oral before breakfast  QUEtiapine 100 milliGRAM(s) Oral two times a day    MEDICATIONS  (PRN):  acetaminophen   Tablet .. 650 milliGRAM(s) Oral every 6 hours PRN Temp greater or equal to 38C (100.4F), Mild Pain (1 - 3)    HOME MEDICATIONS:  atorvastatin 20 mg oral tablet (01-01)  furosemide 40 mg oral tablet (01-01)  gabapentin 100 mg oral tablet (01-01)  insulin lispro (concentrated) 200 units/mL subcutaneous solution (01-01)  Lantus 100 units/mL subcutaneous solution (01-01)  pantoprazole 40 mg oral delayed release tablet (01-01)  pioglitazone 30 mg oral tablet (01-01)  Renvela 800 mg oral tablet (01-01)  Toprol-XL 25 mg oral tablet, extended release (01-01)    REVIEW OF SYSTEMS:  All other review of systems is negative unless indicated above.     PHYSICAL EXAM:  GENERAL: NAD  HEENT: No Swelling  CHEST/LUNG: Good air entry, No wheezing  HEART: RRR, No murmurs  ABDOMEN: Soft, Bowel sounds present  EXTREMITIES:  No clubbing

## 2021-02-06 NOTE — PROGRESS NOTE ADULT - ASSESSMENT
ESRD (due to DM) on HD TTS  s/p Fall  altered mental status   Covid-19 PNA / bacterial PNA   fluid overload  hyponatremia  hyperkalemia  DM  HTN  afib on coumadin  CVA  NSTEMI    plan:    HD today: 3 hours, opti 200 dialyzer, 2K bath, 3L UF  EPO with HD  left arm precautions  vent care  covid isolation  f/u cardio / f/u pulm

## 2021-02-06 NOTE — PROGRESS NOTE ADULT - SUBJECTIVE AND OBJECTIVE BOX
Patient is a 60y old  Male who presents with a chief complaint of s/p fall (05 Feb 2021 12:25)        HPI:  61 yo M w/ PMH of Afib on coumadin, DM2, ESRD on HD MWF, HLD, HTN, CVA presents with weakness & fall. Patient notes when he woke up today, he felt generally weak, slid from the bed onto the floor landing on his bottom, denies head injury/loc. Patient notes that he has been having increased sputum production for the past few days, endorses mild shortness of breath without chest pain/fever/diarrhea/vomiting. Patient had full dialysis session wednesday, notes next session is tomorrow due to the holidays. Denies any focal weakness or pain currently.     On my assessment, pt is obtunded and unable to provide HPI. HPI obtained by Sister who is his primary care taker. As per sister, pt has been having increased frequency of imbalance/fall since 2 days ago. Decreased PO intake.     ICU Vital Signs Last 24 Hrs  T(C): 37.2 (01 Jan 2021 14:00), Max: 38.3 (01 Jan 2021 10:28)  T(F): 98.9 (01 Jan 2021 14:00), Max: 101 (01 Jan 2021 10:28)  HR: 78 (01 Jan 2021 14:00) (78 - 107)  BP: 109/60 (01 Jan 2021 14:00) (109/60 - 142/62)  RR: 18 (01 Jan 2021 14:00) (18 - 20)  SpO2: 97% (01 Jan 2021 14:00) (95% - 99%)    In ED, pt underwent CT head which is negative. CXR performed and shows possible RLL PNA. Given cefepime and vancomycin. Also found to be COVID+. no drips (01 Jan 2021 14:01)    Pt evaluated on AM rounds.  Interval Events: No overnight events.    REVIEW OF SYSTEMS:   see HPI      OBJECTIVE:  ICU Vital Signs Last 24 Hrs  T(C): 36.7 (06 Feb 2021 05:30), Max: 37.1 (05 Feb 2021 16:00)  T(F): 98.1 (06 Feb 2021 05:30), Max: 98.7 (05 Feb 2021 16:00)  HR: 77 (06 Feb 2021 05:30) (75 - 96)  BP: 120/58 (06 Feb 2021 05:30) (114/52 - 124/59)  BP(mean): 80 (05 Feb 2021 23:44) (80 - 80)    RR: 24 (05 Feb 2021 23:44) (19 - 24)  SpO2: 100% (06 Feb 2021 05:30) (98% - 100%)    Mode: AC/ CMV (Assist Control/ Continuous Mandatory Ventilation), RR (machine): 24, TV (machine): 450, FiO2: 50, PEEP: 5, ITime: 1, MAP: 13, PIP: 18    02-05 @ 07:01  -  02-06 @ 07:00  --------------------------------------------------------  IN: 1246 mL / OUT: 50 mL / NET: 1196 mL      CAPILLARY BLOOD GLUCOSE      POCT Blood Glucose.: 180 mg/dL (06 Feb 2021 04:28)        PHYSICAL EXAM:     · CONSTITUTIONAL:   not septic appearing,   well nourished,   NAD    · ENMT:   Airway patent,   Nasal mucosa clear.  Mouth with normal mucosa.   No thrush    · EYES:   Clear bilaterally,   pupils equal,   round and reactive to light.    · CARDIAC:   Normal rate,   regular rhythm.    Heart sounds S1, S2.   No murmurs, no rubs or gallops on auscultation  no edema        CAROTID:   normal systolic impulse  no bruits    · RESPIRATORY:   rales  normal chest expansion  no retractions or use of accessory muscles  palpation of chest is normal with no fremitus  percussion of chest demonstrates no hyperresonance or dullness    · GASTROINTESTINAL:  Abdomen soft,   non-tender,   + BS  liver/spleen not palpable    · MUSCULOSKELETAL:   no clubbing, cyanosis      · NEUROLOGICAL:   Unavailable as the patient is sedated.        · SKIN:   Skin normal color for race,   warm, dry   No evidence of rash.        · HEME LYMPH:   no splenomegaly.  No cervical  lymphadenopathy.  no inguinal lymphadenopathy    HOSPITAL MEDICATIONS:  MEDICATIONS  (STANDING):  aspirin  chewable 81 milliGRAM(s) Oral daily  atorvastatin 20 milliGRAM(s) Oral at bedtime  chlorhexidine 0.12% Liquid 15 milliLiter(s) Oral Mucosa every 12 hours  chlorhexidine 4% Liquid 1 Application(s) Topical <User Schedule>  clonazePAM  Tablet 0.5 milliGRAM(s) Oral every 12 hours  collagenase Ointment 1 Application(s) Topical two times a day  Dakins Solution - 1/2 Strength 1 Application(s) Topical two times a day  dextrose 40% Gel 15 Gram(s) Oral once  dextrose 5%. 1000 milliLiter(s) (50 mL/Hr) IV Continuous <Continuous>  dextrose 5%. 1000 milliLiter(s) (100 mL/Hr) IV Continuous <Continuous>  dextrose 50% Injectable 25 Gram(s) IV Push once  dextrose 50% Injectable 12.5 Gram(s) IV Push once  dextrose 50% Injectable 25 Gram(s) IV Push once  epoetin raven-epbx (RETACRIT) Injectable 8000 Unit(s) IV Push <User Schedule>  glucagon  Injectable 1 milliGRAM(s) IntraMuscular once  heparin  Infusion 1500 Unit(s)/Hr (19 mL/Hr) IV Continuous <Continuous>  insulin glargine Injectable (LANTUS) 12 Unit(s) SubCutaneous at bedtime  insulin lispro (ADMELOG) corrective regimen sliding scale   SubCutaneous three times a day before meals  insulin lispro Injectable (ADMELOG) 4 Unit(s) SubCutaneous three times a day before meals  levothyroxine 100 MICROGram(s) Oral daily  lidocaine 1%/epinephrine 1:100,000 Inj 40 milliLiter(s) Local Injection once  methadone    Tablet 5 milliGRAM(s) Oral daily  metoclopramide   Syrup 5 milliGRAM(s) Oral every 8 hours  metoprolol tartrate 12.5 milliGRAM(s) Oral two times a day  midodrine 10 milliGRAM(s) Oral every 8 hours  pantoprazole    Tablet 40 milliGRAM(s) Oral before breakfast  QUEtiapine 100 milliGRAM(s) Oral two times a day    MEDICATIONS  (PRN):  acetaminophen   Tablet .. 650 milliGRAM(s) Oral every 6 hours PRN Temp greater or equal to 38C (100.4F), Mild Pain (1 - 3)    lactated ringers Bolus:   250 milliLiter(s), IV Bolus, once, infuse over 30 Minute(s), Stop After 1 Doses  sodium chloride 0.9%.: Solution, 1000 milliLiter(s) infuse at 50 mL/Hr      LABS:                        7.1    9.80  )-----------( 392      ( 05 Feb 2021 07:47 )             23.2     02-05    135  |  94<L>  |  48<H>  ----------------------------<  126<H>  4.1   |  27  |  4.6<HH>    Ca    8.1<L>      05 Feb 2021 07:47  Mg     2.6     02-05    TPro  5.6<L>  /  Alb  2.7<L>  /  TBili  0.8  /  DBili  x   /  AST  33  /  ALT  14  /  AlkPhos  117<H>  02-05    PT/INR - ( 05 Feb 2021 07:47 )   PT: 14.60 sec;   INR: 1.27 ratio         PTT - ( 06 Feb 2021 01:36 )  PTT:45.3 sec    Arterial Blood Gas:  02-05 @ 04:38  7.47/46/61/33/90/8.6  ABG lactate: --            Mode: AC/ CMV (Assist Control/ Continuous Mandatory Ventilation)  RR (machine): 24  TV (machine): 450  FiO2: 50  PEEP: 5  ITime: 1  MAP: 13  PIP: 18      ABG - ( 05 Feb 2021 04:38 )  pH, Arterial: 7.47  pH, Blood: x     /  pCO2: 46    /  pO2: 61    / HCO3: 33    / Base Excess: 8.6   /  SaO2: 90                  RADIOLOGY: I personally reviewed latest CXR and other pertinent films.

## 2021-02-07 NOTE — PROGRESS NOTE ADULT - SUBJECTIVE AND OBJECTIVE BOX
Patient is a 60y old  Male who presents with a chief complaint of s/p fall. (06 Feb 2021 14:18)        HPI:  59 yo M w/ PMH of Afib on coumadin, DM2, ESRD on HD MWF, HLD, HTN, CVA presents with weakness & fall. Patient notes when he woke up today, he felt generally weak, slid from the bed onto the floor landing on his bottom, denies head injury/loc. Patient notes that he has been having increased sputum production for the past few days, endorses mild shortness of breath without chest pain/fever/diarrhea/vomiting. Patient had full dialysis session wednesday, notes next session is tomorrow due to the holidays. Denies any focal weakness or pain currently.     On my assessment, pt is obtunded and unable to provide HPI. HPI obtained by Sister who is his primary care taker. As per sister, pt has been having increased frequency of imbalance/fall since 2 days ago. Decreased PO intake.     ICU Vital Signs Last 24 Hrs  T(C): 37.2 (01 Jan 2021 14:00), Max: 38.3 (01 Jan 2021 10:28)  T(F): 98.9 (01 Jan 2021 14:00), Max: 101 (01 Jan 2021 10:28)  HR: 78 (01 Jan 2021 14:00) (78 - 107)  BP: 109/60 (01 Jan 2021 14:00) (109/60 - 142/62)  RR: 18 (01 Jan 2021 14:00) (18 - 20)  SpO2: 97% (01 Jan 2021 14:00) (95% - 99%)    In ED, pt underwent CT head which is negative. CXR performed and shows possible RLL PNA. Given cefepime and vancomycin. Also found to be COVID+. no drips (01 Jan 2021 14:01)    Pt evaluated on AM rounds.  Interval Events: No overnight events.    REVIEW OF SYSTEMS:   see HPI      OBJECTIVE:  ICU Vital Signs Last 24 Hrs  T(C): 36.4 (06 Feb 2021 23:55), Max: 36.7 (06 Feb 2021 09:00)  T(F): 97.5 (06 Feb 2021 23:55), Max: 98 (06 Feb 2021 09:00)  HR: 77 (07 Feb 2021 04:19) (75 - 86)  BP: 115/53 (07 Feb 2021 04:19) (96/45 - 138/56)  RR: 24 (07 Feb 2021 04:19) (18 - 31)  SpO2: 100% (07 Feb 2021 04:19) (94% - 100%)    Mode: AC/ CMV (Assist Control/ Continuous Mandatory Ventilation), RR (machine): 24, TV (machine): 450, FiO2: 60, PEEP: 7, ITime: 1, MAP: 13, PIP: 25    02-05 @ 07:01  -  02-06 @ 07:00  --------------------------------------------------------  IN: 1246 mL / OUT: 50 mL / NET: 1196 mL    02-06 @ 07:01  -  02-07 @ 06:31  --------------------------------------------------------  IN: 700 mL / OUT: 3100 mL / NET: -2400 mL      CAPILLARY BLOOD GLUCOSE      POCT Blood Glucose.: 127 mg/dL (07 Feb 2021 05:32)        PHYSICAL EXAM:     · CONSTITUTIONAL:   not septic appearing,   well nourished,   NAD    · ENMT:   Airway patent,   Nasal mucosa clear.  Mouth with normal mucosa.   No thrush    · EYES:   Clear bilaterally,   pupils equal,   round and reactive to light.    · CARDIAC:   Normal rate,   regular rhythm.    Heart sounds S1, S2.   No murmurs, no rubs or gallops on auscultation  no edema        CAROTID:   normal systolic impulse  no bruits    · RESPIRATORY:   rales  normal chest expansion  no retractions or use of accessory muscles  palpation of chest is normal with no fremitus  percussion of chest demonstrates no hyperresonance or dullness    · GASTROINTESTINAL:  Abdomen soft,   non-tender,   + BS  liver/spleen not palpable    · MUSCULOSKELETAL:   no clubbing, cyanosis      · NEUROLOGICAL:   Unavailable as the patient is sedated.        · SKIN:   Skin normal color for race,   warm, dry   No evidence of rash.        · HEME LYMPH:   no splenomegaly.  No cervical  lymphadenopathy.  no inguinal lymphadenopathy    HOSPITAL MEDICATIONS:  MEDICATIONS  (STANDING):  aspirin  chewable 81 milliGRAM(s) Oral daily  atorvastatin 20 milliGRAM(s) Oral at bedtime  chlorhexidine 0.12% Liquid 15 milliLiter(s) Oral Mucosa every 12 hours  chlorhexidine 4% Liquid 1 Application(s) Topical <User Schedule>  clonazePAM  Tablet 0.5 milliGRAM(s) Oral every 12 hours  collagenase Ointment 1 Application(s) Topical two times a day  Dakins Solution - 1/2 Strength 1 Application(s) Topical two times a day  dextrose 40% Gel 15 Gram(s) Oral once  dextrose 5%. 1000 milliLiter(s) (50 mL/Hr) IV Continuous <Continuous>  dextrose 5%. 1000 milliLiter(s) (100 mL/Hr) IV Continuous <Continuous>  dextrose 50% Injectable 25 Gram(s) IV Push once  dextrose 50% Injectable 12.5 Gram(s) IV Push once  dextrose 50% Injectable 25 Gram(s) IV Push once  epoetin raven-epbx (RETACRIT) Injectable 8000 Unit(s) IV Push <User Schedule>  glucagon  Injectable 1 milliGRAM(s) IntraMuscular once  heparin  Infusion 1500 Unit(s)/Hr (20 mL/Hr) IV Continuous <Continuous>  insulin glargine Injectable (LANTUS) 12 Unit(s) SubCutaneous at bedtime  insulin lispro (ADMELOG) corrective regimen sliding scale   SubCutaneous three times a day before meals  insulin lispro Injectable (ADMELOG) 4 Unit(s) SubCutaneous three times a day before meals  levothyroxine 100 MICROGram(s) Oral daily  lidocaine 1%/epinephrine 1:100,000 Inj 40 milliLiter(s) Local Injection once  methadone    Tablet 5 milliGRAM(s) Oral daily  metoclopramide   Syrup 5 milliGRAM(s) Oral every 8 hours  metoprolol tartrate 12.5 milliGRAM(s) Oral two times a day  midodrine 10 milliGRAM(s) Oral every 8 hours  pantoprazole    Tablet 40 milliGRAM(s) Oral before breakfast  QUEtiapine 100 milliGRAM(s) Oral two times a day    MEDICATIONS  (PRN):  acetaminophen   Tablet .. 650 milliGRAM(s) Oral every 6 hours PRN Temp greater or equal to 38C (100.4F), Mild Pain (1 - 3)    lactated ringers Bolus:   250 milliLiter(s), IV Bolus, once, infuse over 30 Minute(s), Stop After 1 Doses  sodium chloride 0.9%.: Solution, 1000 milliLiter(s) infuse at 50 mL/Hr      LABS:                        7.2    8.70  )-----------( 392      ( 06 Feb 2021 09:02 )             23.3     02-06    137  |  95<L>  |  54<H>  ----------------------------<  70  4.3   |  27  |  5.3<HH>    Ca    7.6<L>      06 Feb 2021 09:02  Phos  6.0     02-06  Mg     2.7     02-06    TPro  5.6<L>  /  Alb  2.6<L>  /  TBili  1.1  /  DBili  x   /  AST  28  /  ALT  14  /  AlkPhos  117<H>  02-06    PT/INR - ( 06 Feb 2021 09:02 )   PT: 14.70 sec;   INR: 1.28 ratio         PTT - ( 07 Feb 2021 00:39 )  PTT:49.9 sec    Arterial Blood Gas:  02-07 @ 05:10  7.42/53/89/34/98/8.9  ABG lactate: --            Mode: AC/ CMV (Assist Control/ Continuous Mandatory Ventilation)  RR (machine): 24  TV (machine): 450  FiO2: 60  PEEP: 7  ITime: 1  MAP: 13  PIP: 25      ABG - ( 07 Feb 2021 05:10 )  pH, Arterial: 7.42  pH, Blood: x     /  pCO2: 53    /  pO2: 89    / HCO3: 34    / Base Excess: 8.9   /  SaO2: 98                  RADIOLOGY: I personally reviewed latest CXR and other pertinent films.

## 2021-02-07 NOTE — PROGRESS NOTE ADULT - SUBJECTIVE AND OBJECTIVE BOX
[facility] FOLLOW UP NOTE  --------------------------------------------------------------------------------  Chief Complaint/24 hour events/subjective:    s/p HD on sat, on vent and feeding    PAST HISTORY  --------------------------------------------------------------------------------  No significant changes to PMH, PSH, FHx, SHx, unless otherwise noted    ALLERGIES & MEDICATIONS  --------------------------------------------------------------------------------  Allergies    Orange (Other)  Originally Entered as [HIVES] reaction to [sulfur] (Unknown)  penicillin (Unknown)  sulfADIAZINE (Unknown)    Intolerances      Standing Inpatient Medications  aspirin  chewable 81 milliGRAM(s) Oral daily  atorvastatin 20 milliGRAM(s) Oral at bedtime  chlorhexidine 0.12% Liquid 15 milliLiter(s) Oral Mucosa every 12 hours  chlorhexidine 4% Liquid 1 Application(s) Topical <User Schedule>  clonazePAM  Tablet 0.5 milliGRAM(s) Oral every 12 hours  collagenase Ointment 1 Application(s) Topical two times a day  Dakins Solution - 1/2 Strength 1 Application(s) Topical two times a day  dextrose 40% Gel 15 Gram(s) Oral once  dextrose 5%. 1000 milliLiter(s) IV Continuous <Continuous>  dextrose 5%. 1000 milliLiter(s) IV Continuous <Continuous>  dextrose 50% Injectable 25 Gram(s) IV Push once  dextrose 50% Injectable 12.5 Gram(s) IV Push once  dextrose 50% Injectable 25 Gram(s) IV Push once  epoetin raven-epbx (RETACRIT) Injectable 8000 Unit(s) IV Push <User Schedule>  glucagon  Injectable 1 milliGRAM(s) IntraMuscular once  heparin  Infusion 1500 Unit(s)/Hr IV Continuous <Continuous>  insulin glargine Injectable (LANTUS) 12 Unit(s) SubCutaneous at bedtime  insulin lispro (ADMELOG) corrective regimen sliding scale   SubCutaneous three times a day before meals  insulin lispro Injectable (ADMELOG) 4 Unit(s) SubCutaneous three times a day before meals  levothyroxine 100 MICROGram(s) Oral daily  lidocaine 1%/epinephrine 1:100,000 Inj 40 milliLiter(s) Local Injection once  methadone    Tablet 5 milliGRAM(s) Oral daily  metoclopramide   Syrup 5 milliGRAM(s) Oral every 8 hours  metoprolol tartrate 12.5 milliGRAM(s) Oral two times a day  midodrine 10 milliGRAM(s) Oral every 8 hours  pantoprazole    Tablet 40 milliGRAM(s) Oral before breakfast  QUEtiapine 100 milliGRAM(s) Oral two times a day    PRN Inpatient Medications  acetaminophen   Tablet .. 650 milliGRAM(s) Oral every 6 hours PRN      REVIEW OF SYSTEMS  --------------------------------------------------------------------------------    All other systems were reviewed and are negative, except as noted.    VITALS/PHYSICAL EXAM  --------------------------------------------------------------------------------  T(C): 36.4 (02-06-21 @ 23:55), Max: 36.7 (02-06-21 @ 09:00)  HR: 77 (02-07-21 @ 04:19) (75 - 86)  BP: 115/53 (02-07-21 @ 04:19) (96/45 - 138/56)  RR: 24 (02-07-21 @ 04:19) (18 - 31)  SpO2: 100% (02-07-21 @ 04:19) (94% - 100%)  Wt(kg): --        02-06-21 @ 07:01  -  02-07-21 @ 07:00  --------------------------------------------------------  IN: 700 mL / OUT: 3100 mL / NET: -2400 mL      Physical Exam:    	Gen: on vent , no distress   	Pulm: CTA B/L  	CV: S1S2; no rub  	Abd: +BS, soft, nontender/nondistended  	LE:  no  edema      LABS/STUDIES  --------------------------------------------------------------------------------              7.2    8.70  >-----------<  392      [02-06-21 @ 09:02]              23.3     137  |  95  |  54  ----------------------------<  70      [02-06-21 @ 09:02]  4.3   |  27  |  5.3        Ca     7.6     [02-06-21 @ 09:02]      Mg     2.7     [02-06-21 @ 09:02]      Phos  6.0     [02-06-21 @ 09:02]    TPro  5.6  /  Alb  2.6  /  TBili  1.1  /  DBili  x   /  AST  28  /  ALT  14  /  AlkPhos  117  [02-06-21 @ 09:02]    PT/INR: PT 14.70, INR 1.28       [02-06-21 @ 09:02]  PTT: 49.9       [02-07-21 @ 00:39]      Creatinine Trend:  SCr 5.3 [02-06 @ 09:02]  SCr 4.6 [02-05 @ 07:47]  SCr 5.7 [02-04 @ 07:29]  SCr 5.2 [02-03 @ 09:13]  SCr 6.5 [02-02 @ 06:55]        Iron 33, TIBC 212, %sat 16      [02-04-21 @ 18:57]  Ferritin 1273      [02-04-21 @ 18:57]  Vitamin D (25OH) 45      [01-15-21 @ 12:06]  HbA1c 7.1      [05-21-19 @ 04:30]  TSH 4.57      [01-01-21 @ 17:56]    HBsAb Reactive      [02-02-21 @ 15:05]  HBsAg Nonreact      [02-02-21 @ 18:12]  HBcAb Nonreact      [02-02-21 @ 06:55]  HCV 0.18, Nonreact      [02-02-21 @ 18:12]

## 2021-02-07 NOTE — PROGRESS NOTE ADULT - SUBJECTIVE AND OBJECTIVE BOX
SUBJECTIVE:    Patient is a 60y old Male who presents with a chief complaint of fall. (05 Feb 2021 06:49). Currently admitted to medicine with the primary diagnosis of Fever and pre-syncope secondary to acute hypoxic respiratory failure secondary to SARS-CoV-2 pneumonia.   Today is hospital day 37d.    PAST MEDICAL & SURGICAL HISTORY  PAD (peripheral artery disease)  multiple toe amputations    Anemia  chronic anemia - s/p transfusion 2018    Thyroid cancer    Chronic leg pain    OA (osteoarthritis)    SOB (shortness of breath) on exertion    ESRD (end stage renal disease)  2 yrs    Atrial fibrillation  on warfarin    Right sided weakness    Stroke  8 yrs ago    Hypertension    High blood cholesterol    Diabetes mellitus, type 2    Dialysis patient  Mon, Wednesday, Friday ([facility])    Smoker    H/O thyroid nodule    H/O gastroesophageal reflux (GERD)    History of tonsillectomy    History of surgery  Multiple toe amputations (amp 4 1/2 left toes; has 1/2 left mid toe; amp right mid toe)    A-V fistula  left AVF      SOCIAL HISTORY:  Negative for smoking/alcohol/drug use.     ALLERGIES:  Orange (Other)  Originally Entered as [HIVES] reaction to [sulfur] (Unknown)  penicillin (Unknown)  sulfADIAZINE (Unknown)    MEDICATIONS:  STANDING MEDICATIONS  aspirin  chewable 81 milliGRAM(s) Oral daily  atorvastatin 20 milliGRAM(s) Oral at bedtime  chlorhexidine 0.12% Liquid 15 milliLiter(s) Oral Mucosa every 12 hours  chlorhexidine 4% Liquid 1 Application(s) Topical <User Schedule>  clonazePAM  Tablet 0.5 milliGRAM(s) Oral every 12 hours  collagenase Ointment 1 Application(s) Topical two times a day  Dakins Solution - 1/2 Strength 1 Application(s) Topical two times a day  dextrose 40% Gel 15 Gram(s) Oral once  dextrose 5%. 1000 milliLiter(s) IV Continuous <Continuous>  dextrose 5%. 1000 milliLiter(s) IV Continuous <Continuous>  dextrose 50% Injectable 25 Gram(s) IV Push once  dextrose 50% Injectable 12.5 Gram(s) IV Push once  dextrose 50% Injectable 25 Gram(s) IV Push once  epoetin raven-epbx (RETACRIT) Injectable 8000 Unit(s) IV Push <User Schedule>  glucagon  Injectable 1 milliGRAM(s) IntraMuscular once  heparin  Infusion 1500 Unit(s)/Hr IV Continuous <Continuous>  insulin glargine Injectable (LANTUS) 12 Unit(s) SubCutaneous at bedtime  insulin lispro (ADMELOG) corrective regimen sliding scale   SubCutaneous three times a day before meals  insulin lispro Injectable (ADMELOG) 4 Unit(s) SubCutaneous three times a day before meals  levothyroxine 100 MICROGram(s) Oral daily  lidocaine 1%/epinephrine 1:100,000 Inj 40 milliLiter(s) Local Injection once  methadone    Tablet 5 milliGRAM(s) Oral daily  metoclopramide   Syrup 5 milliGRAM(s) Oral every 8 hours  metoprolol tartrate 12.5 milliGRAM(s) Oral two times a day  midodrine 10 milliGRAM(s) Oral every 8 hours  pantoprazole    Tablet 40 milliGRAM(s) Oral before breakfast  QUEtiapine 100 milliGRAM(s) Oral two times a day    PRN MEDICATIONS  acetaminophen   Tablet .. 650 milliGRAM(s) Oral every 6 hours PRN    VITALS:   T(F): 97.5  HR: 77  BP: 115/53  RR: 24  SpO2: 100%    LABS:                        7.2    8.70  )-----------( 392      ( 06 Feb 2021 09:02 )             23.3     02-06    137  |  95<L>  |  54<H>  ----------------------------<  70  4.3   |  27  |  5.3<HH>    Ca    7.6<L>      06 Feb 2021 09:02  Phos  6.0     02-06  Mg     2.7     02-06    TPro  5.6<L>  /  Alb  2.6<L>  /  TBili  1.1  /  DBili  x   /  AST  28  /  ALT  14  /  AlkPhos  117<H>  02-06    PT/INR - ( 06 Feb 2021 09:02 )   PT: 14.70 sec;   INR: 1.28 ratio    PTT - ( 07 Feb 2021 00:39 )  PTT:49.9 sec    ABG - ( 07 Feb 2021 05:10 )  pH, Arterial: 7.42  pH, Blood: x     /  pCO2: 53    /  pO2: 89    / HCO3: 34    / Base Excess: 8.9   /  SaO2: 98        RADIOLOGY:    < from: Xray Chest 1 View- PORTABLE-Routine (Xray Chest 1 View- PORTABLE-Routine in AM.) (02.06.21 @ 07:12) >    Impression:    Bilateral opacities, decreased    < end of copied text >      PHYSICAL EXAM:  GEN: No acute distress, Mechanically ventilated through a tracheostomy.   LUNGS: Clear to auscultation bilaterally   HEART: S1/S2 present. RRR.   ABD: Soft, non-tender, non-distended. Bowel sounds present  EXT: NC/NC/NE/2+PP/ROWLAND/Skin Intact. Bilateral lower extremity edema, pitting.   NEURO: Awake and Alert, would respond to simple commands, spontaneous movement of eyes and upper extremities

## 2021-02-07 NOTE — PROGRESS NOTE ADULT - ASSESSMENT
59 yo male with PMHx of afib on coumadin, DM2, ESRD on HD MWF, HLD, HTN, CVA, who presented w/ weakness and fall secondary to acute hypoxic respiratory failure secondary to SARS-CoV-2 pneumonia, s/p intubation on 1/4/2021, s/p tracheostomy and PEG insertion on 1/18/2021.     #Acute Hypoxic Respiratory Failure  #COVID 19 PNA  s/p Trach/Peg  Currently trach to vent, FiO2 50%  s/p tx with decadron, RDV, and plasma  COVID + 02/02  - Cont vent management per critcare, decrease FiO2  - CPAP trials as tolerated    #Acute on Chronic Anemia  Possibly due to chronic illness + ESRD  workup negative for acute bleed  - Cont to monitor Hg  - Transfuse PRN    #ESRD  - HD per nephro    #Paroxysmal Afib  - Cont heparin drip for now  - Cont metoprolol 12.5mg BID    #HTN/HLD  #h/o CVA  - Cont atorvastatin 20mg qHs    DVT PPX, heparin for now

## 2021-02-07 NOTE — PROGRESS NOTE ADULT - ASSESSMENT
ESRD (due to DM) on HD TTS  s/p Fall  altered mental status   Covid-19 PNA / bacterial PNA   fluid overload  hyponatremia  hyperkalemia  DM  HTN  afib on coumadin  CVA  NSTEMI    plan:    next HD on Tuesday: 3 hours, opti 200 dialyzer, 2K bath, 3L UF  EPO with HD  left arm precautions  vent care  PREG feeding  covid isolation  f/u cardio / f/u pulm   negative...

## 2021-02-07 NOTE — PROGRESS NOTE ADULT - ATTENDING COMMENTS
Attending Statement: I have personally performed a face to face diagnostic evaluation on this patient and have arrived at the suggestions for care. I have written all aspects of the above note.
Attending Statement: I have personally performed a face to face diagnostic evaluation on this patient and have arrived at the suggestions for care. I have written all aspects of the above note.
pt w/ abnl ck/mb c/w muscular. cannot exclude element of myocardial involvement. advise cardiac echo or mri to assess.
Attending Statement: I have personally performed a face to face diagnostic evaluation on this patient and have arrived at the suggestions for care. I have written all aspects of the above note.
Attending Statement: I have personally performed a face to face diagnostic evaluation on this patient. The patient is suffering from respiratory failure from COVID -19.   I have reviewed the above note and agree with the history, exam and suggestions for care, except as I have noted in the text.
Attending Statement: I have personally performed a face to face diagnostic evaluation on this patient. The patient is suffering from respiratory failure from COVID -19.   I have reviewed the above note and agree with the history, exam and suggestions for care, except as I have noted in the text.
Mr Maza is a 59 yo man with medical history of afib on coumadin, DM2, ESRD on HD MWF, HLD, HTN, CVA, who presented w/ weakness and fall.     #Acute hypoxemic respiratory failure   #Covid-19 PNA  #Superimposed bacterial PNA   s/p intubation, now trach & PEG  s/p Azithromycin discontinued on 1/8/2021, s/p Cefepime discontinued on 1/11/2021  s/p Dexamethasone 6mg IV once daily (1/1/2021) x10 days  Covid antibodies: negative --> s/p 2 units of Convalescent plasma ( 1/8/2021 and 1/12/2021)  f/u inflammatory markers   s/p tocilizumab 400mg IV x1 on 1/8/2021  s/p lucinda and vanc   d/c versed this morning --> c/w Klonopin q 12 , methadone  Repeat Duplex negative for DVT    #Acute on top of chronic anemia  HB 8.6-->7  repeat Hb and T&S at 4:00 pm  Transfuse if <7  Currently no evidence of bleed. FIT pending    #Dislodged PEG tube:   s/p replacement by surgery resident    #Paroxysmal atrial fibrillation  restarted on home coumadin 2.5 mg   Metoprolol tartrate 12.5mg po q12hrs    On heparin drip, following PTT, bridging Coumadin    #Leukocytosis- Resolved  #Sacral ulcer, heel ulcers   Multifactorial (Possible overlying infection, ulcers, steroids)   s/p debridement w/ burn 1/21/21  wound culture- moderate e. faecium  ID following: S/P daptomycin 8 mg/kg q 48 hours (please give after dialysis on HD days) finished on 2/1  local wound care  wound care per burn     #Ileus- resolved   s/p PEG 1/18  xray 10/19 w/ gasseous distention of stomach  PEG was connected to suction, feeds were held  Xray 10/21 w/o evidence of obstruction, restarted feeds    #Nausea/vomiting  on Reglan w/ meals     #Upper extremity edema R>L  arterial and venous duplex- negative   loosened restraints      #ESRD on HD   EPO (retacrit) 8000 units IV push once weekly  nephro following, f/u recs  next HD likely 2/2/2021    #NSTEMI  Type II MI, demand ischemia, likely secondary to hypoxia secondary to COVID-19 pneumonia  Aspirin 81mg po once daily   CT brain no IC bleed or concerns of IC bleed   Repeat CK-MB and CPK were stable    #Hypothyroidism  c/w levothyroxine 100mcg po once daily     #Altered Mental Status- resolved  EEG diffuse slowing, but no seizure activity, check the report above  CT brain shows ventriculomegaly (check full report), no IC bleed, possible old infarct.    I saw and evaluated the patient on the above date of service.    I agree with the above history, physical, and plan which I have reviewed and edited where appropriate.
doing well post Trache/PEG   may resume TF  site clean   recall as needed
for Trache/ PEG today
Attending Statement: I have personally performed a face to face diagnostic evaluation on this patient and have arrived at the suggestions for care. I have written all aspects of the above note.
Seen and examined with the pulmonary fellow at the bed side.  Impression and plan as outlined above.
Patient seen and examined independently earlier today. Case discussed with housestaff, nursing, case mgmt, pulm. Chart, radiology, labs, etc personally reviewed. I agree with most of the resident's note, physical exam, and plan except as below. Patient fwithout specific complaints. Denies CP, SOB, AP. Remainder ROS unremarkable    GEN: NAD, alert, follows commands, comfortable; +Vent 70% O2  CV: nl S1 S2  Resp: decreased BS b/l  GI: NT/ND/S +BS +PEG  MS: chronic venous stasis, +pulses  Neuro: ROWLAND, +reflexes    #ARF: full vent support, weaning as per pulm  #Acute on chronic anemia: check CT abd to r/o retroperit bleed. No evidence of GIB. Check iron studies. Cont Epo. Serial CBC.  #ESRD: cont HD  #Paroxysmal Afib: resume A/c if H/h stable    Dispo: will be transferred to CEU due to high O2 reqs. Repeat CXR.    Very high risk pt. Px is guarded.    My note supersedes the residents note should a discrepancy arise.

## 2021-02-07 NOTE — PROGRESS NOTE ADULT - ASSESSMENT
IMPRESSION:    Acute hypoxemic resp failure failed weaning SP trach and PEG   NSTEMI  Afib was on coumadin  Severe COVID PNA  Superimposed bacteria PNA  Acute/chronic renal failure now On HD   Sacral ulcer SP debridement       PLAN:    CNS:  Continue Seroquel. light sedation     HEENT: Oral care. Trach care     PULMONARY: Vent changes. Wean O2.   Monitor PPL and DP.      CARDIOVASCULAR:  I<O as tolerated.      GI: GI prophylaxis.     nutrition    RENAL:  FU lytes, correct as needed.  FU with Renal    RRT per renal    INFECTIOUS DISEASE:  FU cultures. ABX PER ID     HEMATOLOGICAL: DVT Prophylaxis on AC.  FU CBC and Coags.    Keep on IV heparin for now.  FU PTT   keep HH>7.0    ENDOCRINE:  Follow up FS.      MUSCULOSKELETAL:  bed rest.  Wound Care      Prognosis poor     SDU

## 2021-02-07 NOTE — PROGRESS NOTE ADULT - SUBJECTIVE AND OBJECTIVE BOX
ROGER RODRIGUES  60y, Male  Allergy: Orange (Other)  Originally Entered as [HIVES] reaction to [sulfur] (Unknown)  penicillin (Unknown)  sulfADIAZINE (Unknown)    Hospital Day: 37d    Patient seen and examined earlier today. No acute events overnight.    PMH/PSH:  PAST MEDICAL & SURGICAL HISTORY:  PAD (peripheral artery disease)  multiple toe amputations    Anemia  chronic anemia - s/p transfusion 2018    Thyroid cancer    Chronic leg pain    OA (osteoarthritis)    SOB (shortness of breath) on exertion    ESRD (end stage renal disease)  2 yrs    Atrial fibrillation  on warfarin    Right sided weakness    Stroke  8 yrs ago    Hypertension    High blood cholesterol    Diabetes mellitus, type 2    Dialysis patient  Mon, Wednesday, Friday ([facility])    Smoker    H/O thyroid nodule    H/O gastroesophageal reflux (GERD)    History of tonsillectomy    History of surgery  Multiple toe amputations (amp 4 1/2 left toes; has 1/2 left mid toe; amp right mid toe)    A-V fistula  left AVF    VITALS:  T(F): 97.5 (02-06-21 @ 23:55), Max: 97.5 (02-06-21 @ 23:55)  HR: 77 (02-07-21 @ 04:19)  BP: 115/53 (02-07-21 @ 04:19) (96/45 - 138/56)  RR: 24 (02-07-21 @ 04:19)  SpO2: 100% (02-07-21 @ 04:19)    TESTS & MEASUREMENTS:  Weight (Kg):   BMI (kg/m2): 30.7 (02-02)    02-05-21 @ 07:01  -  02-06-21 @ 07:00  --------------------------------------------------------  IN: 1246 mL / OUT: 50 mL / NET: 1196 mL    02-06-21 @ 07:01  -  02-07-21 @ 07:00  --------------------------------------------------------  IN: 700 mL / OUT: 3100 mL / NET: -2400 mL                        7.3    10.81 )-----------( 412      ( 07 Feb 2021 08:33 )             24.9     PT/INR - ( 07 Feb 2021 08:33 )   PT: 14.60 sec;   INR: 1.27 ratio         PTT - ( 07 Feb 2021 08:33 )  PTT:60.7 sec  02-06    137  |  95<L>  |  54<H>  ----------------------------<  70  4.3   |  27  |  5.3<HH>    Ca    7.6<L>      06 Feb 2021 09:02  Phos  6.0     02-06  Mg     2.7     02-06    TPro  5.6<L>  /  Alb  2.6<L>  /  TBili  1.1  /  DBili  x   /  AST  28  /  ALT  14  /  AlkPhos  117<H>  02-06    LIVER FUNCTIONS - ( 06 Feb 2021 09:02 )  Alb: 2.6 g/dL / Pro: 5.6 g/dL / ALK PHOS: 117 U/L / ALT: 14 U/L / AST: 28 U/L / GGT: x           RECENT DIAGNOSTIC ORDERS:  Antibody Screen Interpretation: 09:00 (02-07-21 @ 09:02)  Type + Screen: STAT (02-07-21 @ 08:36)  Ferritin, Serum: AM Sched. Collection: 07-Feb-2021 04:30 (02-06-21 @ 12:26)  Procalcitonin, Serum: AM Sched. Collection: 07-Feb-2021 04:30 (02-06-21 @ 12:26)  C-Reactive Protein, Serum: AM Sched. Collection: 07-Feb-2021 04:30 (02-06-21 @ 12:26)    MEDICATIONS:  MEDICATIONS  (STANDING):  aspirin  chewable 81 milliGRAM(s) Oral daily  atorvastatin 20 milliGRAM(s) Oral at bedtime  chlorhexidine 0.12% Liquid 15 milliLiter(s) Oral Mucosa every 12 hours  chlorhexidine 4% Liquid 1 Application(s) Topical <User Schedule>  clonazePAM  Tablet 0.5 milliGRAM(s) Oral every 12 hours  collagenase Ointment 1 Application(s) Topical two times a day  Dakins Solution - 1/2 Strength 1 Application(s) Topical two times a day  dextrose 40% Gel 15 Gram(s) Oral once  dextrose 5%. 1000 milliLiter(s) (50 mL/Hr) IV Continuous <Continuous>  dextrose 5%. 1000 milliLiter(s) (100 mL/Hr) IV Continuous <Continuous>  dextrose 50% Injectable 25 Gram(s) IV Push once  dextrose 50% Injectable 12.5 Gram(s) IV Push once  dextrose 50% Injectable 25 Gram(s) IV Push once  epoetin raven-epbx (RETACRIT) Injectable 8000 Unit(s) IV Push <User Schedule>  glucagon  Injectable 1 milliGRAM(s) IntraMuscular once  heparin  Infusion 1500 Unit(s)/Hr (20 mL/Hr) IV Continuous <Continuous>  insulin glargine Injectable (LANTUS) 12 Unit(s) SubCutaneous at bedtime  insulin lispro (ADMELOG) corrective regimen sliding scale   SubCutaneous three times a day before meals  insulin lispro Injectable (ADMELOG) 4 Unit(s) SubCutaneous three times a day before meals  levothyroxine 100 MICROGram(s) Oral daily  lidocaine 1%/epinephrine 1:100,000 Inj 40 milliLiter(s) Local Injection once  methadone    Tablet 5 milliGRAM(s) Oral daily  metoclopramide   Syrup 5 milliGRAM(s) Oral every 8 hours  metoprolol tartrate 12.5 milliGRAM(s) Oral two times a day  midodrine 10 milliGRAM(s) Oral every 8 hours  pantoprazole    Tablet 40 milliGRAM(s) Oral before breakfast  QUEtiapine 100 milliGRAM(s) Oral two times a day    MEDICATIONS  (PRN):  acetaminophen   Tablet .. 650 milliGRAM(s) Oral every 6 hours PRN Temp greater or equal to 38C (100.4F), Mild Pain (1 - 3)    HOME MEDICATIONS:  atorvastatin 20 mg oral tablet (01-01)  furosemide 40 mg oral tablet (01-01)  gabapentin 100 mg oral tablet (01-01)  insulin lispro (concentrated) 200 units/mL subcutaneous solution (01-01)  Lantus 100 units/mL subcutaneous solution (01-01)  pantoprazole 40 mg oral delayed release tablet (01-01)  pioglitazone 30 mg oral tablet (01-01)  Renvela 800 mg oral tablet (01-01)  Toprol-XL 25 mg oral tablet, extended release (01-01)    REVIEW OF SYSTEMS:  All other review of systems is negative unless indicated above.     PHYSICAL EXAM:  GENERAL: NAD  HEENT: No Swelling  CHEST/LUNG: Good air entry, No wheezing  HEART: RRR, No murmurs  ABDOMEN: Soft, Bowel sounds present  EXTREMITIES:  No clubbing

## 2021-02-07 NOTE — PROGRESS NOTE ADULT - ASSESSMENT
61 yo male with PMHx of afib on coumadin, DM2, ESRD on HD MWF, HLD, HTN, CVA, who presented w/ weakness and fall secondary to acute hypoxic respiratory failure secondary to SARS-CoV-2 pneumonia, s/p intubation on 1/4/2021, s/p tracheostomy and PEG insertion on 1/18/2021.     #Acute hypoxemic respiratory failure   #Covid-19 PNA  #Superimposed bacterial PNA   - s/p intubation, now trach & PEG  - s/p Azithromycin discontinued on 1/8/2021, s/p Cefepime discontinued on 1/11/2021  - s/p Dexamethasone 6mg IV once daily (1/1/2021) x10 days  - Covid antibodies: negative --> s/p 2 units of Convalescent plasma ( 1/8/2021 and 1/12/2021)  - s/p tocilizumab 400mg IV x1 on 1/8/2021  - s/p lucinda and vanc   - continue with Klonopin, decrease to 0.5 q12hrs   - Continue with methadone 5mg per PEG once daily  - Continue with Seroquel 100mg per PEG q12hrs  - Repeat Duplex negative for DVT  - Patient remains on ventilator AC mode with settings: TV: 450mL, RR:24 bpm, PEEp:7mmHg, FiO2:50%, Peak pressure: 18mmHg, will try to decrease FiO2 further.     # Acute on top of chronic anemia  - Hb drop on s/p 1 unit of PRBC  - No evidence of bleed. Patient is hemodynamically stable  - Hb on 2/5/2021: 7.6 ( stable)  - Keep Active type and screen, transfuse if Hb <7  - CT abdomen pelvis: Negative for retroperitoneal bleed  - Will switch PPI from q12hrs to po once daily given no active bleed ( Stable hemoglobin, no melena, will follow up)    #Dislodged PEG tube:   -s/p replacement by surgery resident    #Paroxysmal atrial fibrillation  - Metoprolol tartrate 12.5mg po q12hrs    - Resume anticoagulation with heparin as per Pulm/crit team  - Warfarin bridge ?   - Given the patient's status and condition, risks and benefits of AC should be reconsidered. Issued discussed with the patient's sister shantel ( discussion conducted by Dr. Llamas), and she wishes to continue Anticoagulation.     #Leukocytosis- Resolved  #Sacral ulcer, heel ulcers   - Multifactorial (Possible overlying infection, ulcers, steroids)   - s/p debridement w/ burn 1/21/21  - wound culture- moderate e. faecium  - ID following: S/P daptomycin 8 mg/kg q 48 hours (please give after dialysis on HD days) finished on 2/1  - local wound care  - wound care per burn     #Ileus- resolved   - s/p PEG 1/18  - xray 10/19 w/ gasseous distention of stomach  - PEG was connected to suction, feeds were held  - Xray 10/21 w/o evidence of obstruction, restarted feeds    #Upper extremity edema R>L  - arterial and venous duplex- negative   - loosened restraints    #ESRD on HD   - EPO (retacrit) 8000 units IV push once weekly  - nephro following, f/u recs  - next HD likely 2/2/2021  -Iron studies on 2/4/2021:   a. Serum iron: 33   b. TIBC: 212  c. % iron Saturation: 16%   d. Transferrin Serum: 181  --> May benefit from iron repletion, to follow up.     #NSTEMI  - Type II MI, demand ischemia, likely secondary to hypoxia secondary to COVID-19 pneumonia, resolved.  - Aspirin 81mg po once daily   - Atorvastatin 20mg po once daily  - CT brain no IC bleed or concerns of IC bleed     #Hypothyroidism  - Continue with levothyroxine 100mcg po once daily     #Altered Mental Status- resolved  - EEG diffuse slowing, but no seizure activity, check the report above  - CT brain shows ventriculomegaly (check full report), no IC bleed, possible old infarct.    Diet: Restart PEG feeds  DVT ppx: Restart heparin gtt  GI ppx: pantoprazole 40mg po once daily

## 2021-02-08 NOTE — PHYSICAL THERAPY INITIAL EVALUATION ADULT - PERTINENT HX OF CURRENT PROBLEM, REHAB EVAL
61 yo M w/ PMH of Afib on coumadin, DM2, ESRD on HD MWF, HLD, HTN, CVA presents with weakness & fall. Patient notes when he woke up today, he felt generally weak, slid from the bed onto the floor landing on his bottom, denies head injury/loc.

## 2021-02-08 NOTE — PROGRESS NOTE ADULT - SUBJECTIVE AND OBJECTIVE BOX
[facility] FOLLOW UP NOTE  --------------------------------------------------------------------------------  24 hour events/subjective: Patient examined. Trach.    PAST HISTORY  --------------------------------------------------------------------------------  No significant changes to PMH, PSH, FHx, SHx, unless otherwise noted    ALLERGIES & MEDICATIONS  --------------------------------------------------------------------------------  Allergies    Orange (Other)  Originally Entered as [HIVES] reaction to [sulfur] (Unknown)  penicillin (Unknown)  sulfADIAZINE (Unknown)    Intolerances      Standing Inpatient Medications  aspirin  chewable 81 milliGRAM(s) Oral daily  atorvastatin 20 milliGRAM(s) Oral at bedtime  chlorhexidine 0.12% Liquid 15 milliLiter(s) Oral Mucosa every 12 hours  chlorhexidine 4% Liquid 1 Application(s) Topical <User Schedule>  clonazePAM  Tablet 0.5 milliGRAM(s) Oral every 12 hours  collagenase Ointment 1 Application(s) Topical two times a day  Dakins Solution - 1/2 Strength 1 Application(s) Topical two times a day  dextrose 40% Gel 15 Gram(s) Oral once  dextrose 5%. 1000 milliLiter(s) IV Continuous <Continuous>  dextrose 5%. 1000 milliLiter(s) IV Continuous <Continuous>  dextrose 50% Injectable 25 Gram(s) IV Push once  dextrose 50% Injectable 12.5 Gram(s) IV Push once  dextrose 50% Injectable 25 Gram(s) IV Push once  epoetin raven-epbx (RETACRIT) Injectable 8000 Unit(s) IV Push <User Schedule>  glucagon  Injectable 1 milliGRAM(s) IntraMuscular once  heparin  Infusion 1500 Unit(s)/Hr IV Continuous <Continuous>  insulin glargine Injectable (LANTUS) 12 Unit(s) SubCutaneous at bedtime  insulin lispro (ADMELOG) corrective regimen sliding scale   SubCutaneous three times a day before meals  insulin lispro Injectable (ADMELOG) 4 Unit(s) SubCutaneous three times a day before meals  levothyroxine 100 MICROGram(s) Oral daily  lidocaine 1%/epinephrine 1:100,000 Inj 40 milliLiter(s) Local Injection once  LORazepam   Injectable 1 milliGRAM(s) IV Push once  methadone    Tablet 5 milliGRAM(s) Oral daily  metoclopramide   Syrup 5 milliGRAM(s) Oral every 8 hours  metoprolol tartrate 12.5 milliGRAM(s) Oral two times a day  midodrine 10 milliGRAM(s) Oral every 8 hours  pantoprazole    Tablet 40 milliGRAM(s) Oral before breakfast  QUEtiapine 100 milliGRAM(s) Oral two times a day    PRN Inpatient Medications  acetaminophen   Tablet .. 650 milliGRAM(s) Oral every 6 hours PRN      VITALS/PHYSICAL EXAM  --------------------------------------------------------------------------------  T(C): 36.5 (02-08-21 @ 07:45), Max: 36.5 (02-08-21 @ 07:45)  HR: 77 (02-08-21 @ 08:15) (72 - 84)  BP: 132/50 (02-08-21 @ 07:45) (102/46 - 132/50)  RR: 18 (02-08-21 @ 08:15) (18 - 30)  SpO2: 98% (02-08-21 @ 08:15) (93% - 100%)  Wt(kg): --        02-07-21 @ 07:01  -  02-08-21 @ 07:00  --------------------------------------------------------  IN: 1477 mL / OUT: 0 mL / NET: 1477 mL      Physical Exam:  	Gen: Trach  	Pulm: CTA B/L  	CV: RRR, S1S2  	Abd: +BS, soft, nontender/nondistended  	: No suprapubic tenderness  	LE: Warm,  edema  	Vascular access: AVF    LABS/STUDIES  --------------------------------------------------------------------------------              7.6    12.52 >-----------<  425      [02-08-21 @ 09:11]              25.0     134  |  92  |  50  ----------------------------<  180      [02-08-21 @ 09:11]  4.0   |  29  |  4.1        Ca     8.7     [02-08-21 @ 09:11]      Mg     2.7     [02-08-21 @ 09:11]      Phos  5.7     [02-08-21 @ 09:11]    TPro  6.3  /  Alb  2.7  /  TBili  0.9  /  DBili  x   /  AST  29  /  ALT  15  /  AlkPhos  112  [02-08-21 @ 09:11]    PT/INR: PT 13.80, INR 1.20       [02-08-21 @ 09:11]  PTT: 63.8       [02-08-21 @ 09:11]      Creatinine Trend:  SCr 4.1 [02-08 @ 09:11]  SCr 4.3 [02-07 @ 08:33]  SCr 5.3 [02-06 @ 09:02]  SCr 4.6 [02-05 @ 07:47]  SCr 5.7 [02-04 @ 07:29]        Iron 33, TIBC 212, %sat 16      [02-04-21 @ 18:57]  Ferritin 1273      [02-04-21 @ 18:57]  Vitamin D (25OH) 45      [01-15-21 @ 12:06]  HbA1c 7.1      [05-21-19 @ 04:30]  TSH 4.57      [01-01-21 @ 17:56]    HBsAb Reactive      [02-02-21 @ 15:05]  HBsAg Nonreact      [02-02-21 @ 18:12]  HBcAb Nonreact      [02-02-21 @ 06:55]  HCV 0.18, Nonreact      [02-02-21 @ 18:12]

## 2021-02-08 NOTE — PROGRESS NOTE ADULT - SUBJECTIVE AND OBJECTIVE BOX
Hospital Day:  38d    Subjective:    Patient is a 60y old  Male who presents with a chief complaint of s/p fall. (07 Feb 2021 10:00)      Admitted to medicine for a primary diagnosis of     Past Medical Hx:   PAD (peripheral artery disease)    Anemia    Thyroid cancer    Chronic leg pain    OA (osteoarthritis)    SOB (shortness of breath) on exertion    ESRD (end stage renal disease)    Atrial fibrillation    Right sided weakness    Stroke    Hypertension    High blood cholesterol    Diabetes mellitus, type 2    Dialysis patient      Past Sx:  Smoker    H/O thyroid nodule    H/O gastroesophageal reflux (GERD)    History of tonsillectomy    History of surgery    A-V fistula      Allergies:  Orange (Other)  Originally Entered as [HIVES] reaction to [sulfur] (Unknown)  penicillin (Unknown)  sulfADIAZINE (Unknown)    Current Meds:   Standng Meds:  aspirin  chewable 81 milliGRAM(s) Oral daily  atorvastatin 20 milliGRAM(s) Oral at bedtime  chlorhexidine 0.12% Liquid 15 milliLiter(s) Oral Mucosa every 12 hours  chlorhexidine 4% Liquid 1 Application(s) Topical <User Schedule>  clonazePAM  Tablet 0.5 milliGRAM(s) Oral every 12 hours  collagenase Ointment 1 Application(s) Topical two times a day  Dakins Solution - 1/2 Strength 1 Application(s) Topical two times a day  dextrose 40% Gel 15 Gram(s) Oral once  dextrose 5%. 1000 milliLiter(s) (50 mL/Hr) IV Continuous <Continuous>  dextrose 5%. 1000 milliLiter(s) (100 mL/Hr) IV Continuous <Continuous>  dextrose 50% Injectable 25 Gram(s) IV Push once  dextrose 50% Injectable 12.5 Gram(s) IV Push once  dextrose 50% Injectable 25 Gram(s) IV Push once  epoetin raven-epbx (RETACRIT) Injectable 8000 Unit(s) IV Push <User Schedule>  glucagon  Injectable 1 milliGRAM(s) IntraMuscular once  heparin  Infusion 1500 Unit(s)/Hr (20 mL/Hr) IV Continuous <Continuous>  insulin glargine Injectable (LANTUS) 12 Unit(s) SubCutaneous at bedtime  insulin lispro (ADMELOG) corrective regimen sliding scale   SubCutaneous three times a day before meals  insulin lispro Injectable (ADMELOG) 4 Unit(s) SubCutaneous three times a day before meals  levothyroxine 100 MICROGram(s) Oral daily  lidocaine 1%/epinephrine 1:100,000 Inj 40 milliLiter(s) Local Injection once  LORazepam   Injectable 1 milliGRAM(s) IV Push once  methadone    Tablet 5 milliGRAM(s) Oral daily  metoclopramide   Syrup 5 milliGRAM(s) Oral every 8 hours  metoprolol tartrate 12.5 milliGRAM(s) Oral two times a day  midodrine 10 milliGRAM(s) Oral every 8 hours  pantoprazole    Tablet 40 milliGRAM(s) Oral before breakfast  QUEtiapine 100 milliGRAM(s) Oral two times a day    PRN Meds:  acetaminophen   Tablet .. 650 milliGRAM(s) Oral every 6 hours PRN Temp greater or equal to 38C (100.4F), Mild Pain (1 - 3)    HOME MEDICATIONS:  atorvastatin 20 mg oral tablet: 1 tab(s) orally once a day (at bedtime)  furosemide 40 mg oral tablet: 1 tab(s) orally once a day  gabapentin 100 mg oral tablet: 1 tab(s) orally once a day  insulin lispro (concentrated) 200 units/mL subcutaneous solution: 7 unit(s) subcutaneous 3 times a day  5-10 units prn  Lantus 100 units/mL subcutaneous solution: 15 unit(s) subcutaneous once a day (at bedtime)  pantoprazole 40 mg oral delayed release tablet: 1 tab(s) orally once a day  pioglitazone 30 mg oral tablet: 1 tab(s) orally once a day  Renvela 800 mg oral tablet: 1 tab(s) orally 3 times a day (with meals)  Toprol-XL 25 mg oral tablet, extended release: 1 tab(s) orally once a day      Vital Signs:   T(F): 95.9 (02-08-21 @ 06:00), Max: 97.5 (02-07-21 @ 12:00)  HR: 72 (02-08-21 @ 06:00) (72 - 84)  BP: 120/61 (02-08-21 @ 06:00) (102/46 - 120/61)  RR: 20 (02-07-21 @ 21:59) (20 - 31)  SpO2: 93% (02-08-21 @ 06:00) (93% - 100%)      02-06-21 @ 07:01  -  02-07-21 @ 07:00  --------------------------------------------------------  IN: 950 mL / OUT: 3100 mL / NET: -2150 mL    02-07-21 @ 07:01  -  02-08-21 @ 06:56  --------------------------------------------------------  IN: 1240 mL / OUT: 0 mL / NET: 1240 mL        Physical Exam:   GENERAL: NAD  HEENT: NCAT  CHEST/LUNG: CTAB  HEART: Regular rate and rhythm; s1 s2 appreciated, No murmurs, rubs, or gallops  ABDOMEN: Soft, Nontender, Nondistended; Bowel sounds present  EXTREMITIES: No LE edema b/l  SKIN: no rashes, no new lesions  NERVOUS SYSTEM:  Alert & Oriented X3  LINES/CATHETERS:        Labs:                         7.3    10.81 )-----------( 412      ( 07 Feb 2021 08:33 )             24.9     Neutophil% 77.2, Lymphocyte% 9.2, Monocyte% 9.8, Bands% 1.1 02-07-21 @ 08:33    07 Feb 2021 08:33    140    |  97     |  40     ----------------------------<  95     4.0     |  30     |  4.3      Ca    7.9        07 Feb 2021 08:33  Phos  4.6       07 Feb 2021 08:33  Mg     2.5       07 Feb 2021 08:33    TPro  5.8    /  Alb  2.6    /  TBili  1.1    /  DBili  x      /  AST  29     /  ALT  13     /  AlkPhos  118    07 Feb 2021 08:33       pTT    75.0             ----< -- INR  (02-07-21 @ 20:44)    --        PT,    pTT    60.7             ----< 1.27 INR  (02-07-21 @ 08:33)    14.60        PT                         Hospital Day:  38d    Subjective:    Patient is a 60y old  Male who presents with a chief complaint of s/p fall. No acute events overnight. Alert, trying to answer questions. Denies pain.       Admitted to medicine for a primary diagnosis of COVID-19    Past Medical Hx:   PAD (peripheral artery disease)    Anemia    Thyroid cancer    Chronic leg pain    OA (osteoarthritis)    SOB (shortness of breath) on exertion    ESRD (end stage renal disease)    Atrial fibrillation    Right sided weakness    Stroke    Hypertension    High blood cholesterol    Diabetes mellitus, type 2    Dialysis patient      Past Sx:  Smoker    H/O thyroid nodule    H/O gastroesophageal reflux (GERD)    History of tonsillectomy    History of surgery    A-V fistula      Allergies:  Orange (Other)  Originally Entered as [HIVES] reaction to [sulfur] (Unknown)  penicillin (Unknown)  sulfADIAZINE (Unknown)    Current Meds:   Stand Meds:  aspirin  chewable 81 milliGRAM(s) Oral daily  atorvastatin 20 milliGRAM(s) Oral at bedtime  chlorhexidine 0.12% Liquid 15 milliLiter(s) Oral Mucosa every 12 hours  chlorhexidine 4% Liquid 1 Application(s) Topical <User Schedule>  clonazePAM  Tablet 0.5 milliGRAM(s) Oral every 12 hours  collagenase Ointment 1 Application(s) Topical two times a day  Dakins Solution - 1/2 Strength 1 Application(s) Topical two times a day  dextrose 40% Gel 15 Gram(s) Oral once  dextrose 5%. 1000 milliLiter(s) (50 mL/Hr) IV Continuous <Continuous>  dextrose 5%. 1000 milliLiter(s) (100 mL/Hr) IV Continuous <Continuous>  dextrose 50% Injectable 25 Gram(s) IV Push once  dextrose 50% Injectable 12.5 Gram(s) IV Push once  dextrose 50% Injectable 25 Gram(s) IV Push once  epoetin raven-epbx (RETACRIT) Injectable 8000 Unit(s) IV Push <User Schedule>  glucagon  Injectable 1 milliGRAM(s) IntraMuscular once  heparin  Infusion 1500 Unit(s)/Hr (20 mL/Hr) IV Continuous <Continuous>  insulin glargine Injectable (LANTUS) 12 Unit(s) SubCutaneous at bedtime  insulin lispro (ADMELOG) corrective regimen sliding scale   SubCutaneous three times a day before meals  insulin lispro Injectable (ADMELOG) 4 Unit(s) SubCutaneous three times a day before meals  levothyroxine 100 MICROGram(s) Oral daily  lidocaine 1%/epinephrine 1:100,000 Inj 40 milliLiter(s) Local Injection once  LORazepam   Injectable 1 milliGRAM(s) IV Push once  methadone    Tablet 5 milliGRAM(s) Oral daily  metoclopramide   Syrup 5 milliGRAM(s) Oral every 8 hours  metoprolol tartrate 12.5 milliGRAM(s) Oral two times a day  midodrine 10 milliGRAM(s) Oral every 8 hours  pantoprazole    Tablet 40 milliGRAM(s) Oral before breakfast  QUEtiapine 100 milliGRAM(s) Oral two times a day    PRN Meds:  acetaminophen   Tablet .. 650 milliGRAM(s) Oral every 6 hours PRN Temp greater or equal to 38C (100.4F), Mild Pain (1 - 3)    HOME MEDICATIONS:  atorvastatin 20 mg oral tablet: 1 tab(s) orally once a day (at bedtime)  furosemide 40 mg oral tablet: 1 tab(s) orally once a day  gabapentin 100 mg oral tablet: 1 tab(s) orally once a day  insulin lispro (concentrated) 200 units/mL subcutaneous solution: 7 unit(s) subcutaneous 3 times a day  5-10 units prn  Lantus 100 units/mL subcutaneous solution: 15 unit(s) subcutaneous once a day (at bedtime)  pantoprazole 40 mg oral delayed release tablet: 1 tab(s) orally once a day  pioglitazone 30 mg oral tablet: 1 tab(s) orally once a day  Renvela 800 mg oral tablet: 1 tab(s) orally 3 times a day (with meals)  Toprol-XL 25 mg oral tablet, extended release: 1 tab(s) orally once a day      Vital Signs:   T(F): 95.9 (02-08-21 @ 06:00), Max: 97.5 (02-07-21 @ 12:00)  HR: 72 (02-08-21 @ 06:00) (72 - 84)  BP: 120/61 (02-08-21 @ 06:00) (102/46 - 120/61)  RR: 20 (02-07-21 @ 21:59) (20 - 31)  SpO2: 93% (02-08-21 @ 06:00) (93% - 100%)      02-06-21 @ 07:01  -  02-07-21 @ 07:00  --------------------------------------------------------  IN: 950 mL / OUT: 3100 mL / NET: -2150 mL    02-07-21 @ 07:01  -  02-08-21 @ 06:56  --------------------------------------------------------  IN: 1240 mL / OUT: 0 mL / NET: 1240 mL        Physical Exam:   GENERAL: appears dry, alert, AAOx3  HEENT: NCAT  CHEST/LUNG: decreased breath sounds   HEART: Regular rate and rhythm; s1 s2 appreciated, No murmurs, rubs, or gallops  ABDOMEN: Soft, Nontender, Nondistended; Bowel sounds present  EXTREMITIES: trace edema b/l  SKIN: dermatitis fo venous stasis, R>L  NERVOUS SYSTEM:  Alert & Oriented X3  LINES/CATHETERS: PEG tube, trach, rectal tube, no gregg         Labs:                         7.3    10.81 )-----------( 412      ( 07 Feb 2021 08:33 )             24.9     Neutophil% 77.2, Lymphocyte% 9.2, Monocyte% 9.8, Bands% 1.1 02-07-21 @ 08:33    07 Feb 2021 08:33    140    |  97     |  40     ----------------------------<  95     4.0     |  30     |  4.3      Ca    7.9        07 Feb 2021 08:33  Phos  4.6       07 Feb 2021 08:33  Mg     2.5       07 Feb 2021 08:33    TPro  5.8    /  Alb  2.6    /  TBili  1.1    /  DBili  x      /  AST  29     /  ALT  13     /  AlkPhos  118    07 Feb 2021 08:33       pTT    75.0             ----< -- INR  (02-07-21 @ 20:44)    --        PT,    pTT    60.7             ----< 1.27 INR  (02-07-21 @ 08:33)    14.60        PT

## 2021-02-08 NOTE — PROGRESS NOTE ADULT - SUBJECTIVE AND OBJECTIVE BOX
ROGER RODRIGUES  60y, Male  Allergy: Orange (Other)  Originally Entered as [HIVES] reaction to [sulfur] (Unknown)  penicillin (Unknown)  sulfADIAZINE (Unknown)    Hospital Day: 38d    Patient seen and examined earlier today. No acute events overnight.    PMH/PSH:  PAST MEDICAL & SURGICAL HISTORY:  PAD (peripheral artery disease)  multiple toe amputations    Anemia  chronic anemia - s/p transfusion 2018    Thyroid cancer    Chronic leg pain    OA (osteoarthritis)    SOB (shortness of breath) on exertion    ESRD (end stage renal disease)  2 yrs    Atrial fibrillation  on warfarin    Right sided weakness    Stroke  8 yrs ago    Hypertension    High blood cholesterol    Diabetes mellitus, type 2    Dialysis patient  Mon, Wednesday, Friday ([facility])    Smoker    H/O thyroid nodule    H/O gastroesophageal reflux (GERD)    History of tonsillectomy    History of surgery  Multiple toe amputations (amp 4 1/2 left toes; has 1/2 left mid toe; amp right mid toe)    A-V fistula  left AVF    VITALS:  T(F): 97.7 (02-08-21 @ 07:45), Max: 97.7 (02-08-21 @ 07:45)  HR: 74 (02-08-21 @ 07:45)  BP: 132/50 (02-08-21 @ 07:45) (102/46 - 132/50)  RR: 27 (02-08-21 @ 07:45)  SpO2: 98% (02-08-21 @ 07:45)    TESTS & MEASUREMENTS:  Weight (Kg):       02-06-21 @ 07:01  -  02-07-21 @ 07:00  --------------------------------------------------------  IN: 950 mL / OUT: 3100 mL / NET: -2150 mL    02-07-21 @ 07:01  -  02-08-21 @ 07:00  --------------------------------------------------------  IN: 1477 mL / OUT: 0 mL / NET: 1477 mL                        7.6    12.52 )-----------( 425      ( 08 Feb 2021 09:11 )             25.0     PT/INR - ( 08 Feb 2021 09:11 )   PT: 13.80 sec;   INR: 1.20 ratio         PTT - ( 08 Feb 2021 09:11 )  PTT:63.8 sec  02-08    134<L>  |  92<L>  |  50<H>  ----------------------------<  180<H>  4.0   |  29  |  4.1<HH>    Ca    8.7      08 Feb 2021 09:11  Phos  5.7     02-08  Mg     2.7     02-08    TPro  6.3  /  Alb  2.7<L>  /  TBili  0.9  /  DBili  x   /  AST  29  /  ALT  15  /  AlkPhos  112  02-08    LIVER FUNCTIONS - ( 08 Feb 2021 09:11 )  Alb: 2.7 g/dL / Pro: 6.3 g/dL / ALK PHOS: 112 U/L / ALT: 15 U/L / AST: 29 U/L / GGT: x           RECENT DIAGNOSTIC ORDERS:  Iron with Total Binding Capacity: AM Sched. Collection: 09-Feb-2021 04:30 (02-08-21 @ 08:08)  Ferritin, Serum: AM Sched. Collection: 09-Feb-2021 04:30 (02-08-21 @ 08:08)  Vitamin B12, Serum: AM Sched. Collection: 09-Feb-2021 04:30 (02-08-21 @ 08:08)  Folate, Serum: AM Sched. Collection: 09-Feb-2021 04:30 (02-08-21 @ 08:08)  Blood Gas Profile - Arterial: AM Sched. Collection:08-Feb-2021 04:30 (02-08-21 @ 00:01)  COVID-19 PCR: Routine  Specimen Source: Nasopharyngeal (02-07-21 @ 12:13)  Microalbumin, Urine 24 Hour: AM Sched. Collection: 08-Feb-2021 04:30 (02-07-21 @ 10:26)    MEDICATIONS:  MEDICATIONS  (STANDING):  aspirin  chewable 81 milliGRAM(s) Oral daily  atorvastatin 20 milliGRAM(s) Oral at bedtime  chlorhexidine 0.12% Liquid 15 milliLiter(s) Oral Mucosa every 12 hours  chlorhexidine 4% Liquid 1 Application(s) Topical <User Schedule>  clonazePAM  Tablet 0.5 milliGRAM(s) Oral every 12 hours  collagenase Ointment 1 Application(s) Topical two times a day  Dakins Solution - 1/2 Strength 1 Application(s) Topical two times a day  dextrose 40% Gel 15 Gram(s) Oral once  dextrose 5%. 1000 milliLiter(s) (50 mL/Hr) IV Continuous <Continuous>  dextrose 5%. 1000 milliLiter(s) (100 mL/Hr) IV Continuous <Continuous>  dextrose 50% Injectable 25 Gram(s) IV Push once  dextrose 50% Injectable 12.5 Gram(s) IV Push once  dextrose 50% Injectable 25 Gram(s) IV Push once  epoetin raven-epbx (RETACRIT) Injectable 8000 Unit(s) IV Push <User Schedule>  glucagon  Injectable 1 milliGRAM(s) IntraMuscular once  heparin  Infusion 1500 Unit(s)/Hr (20 mL/Hr) IV Continuous <Continuous>  insulin glargine Injectable (LANTUS) 12 Unit(s) SubCutaneous at bedtime  insulin lispro (ADMELOG) corrective regimen sliding scale   SubCutaneous three times a day before meals  insulin lispro Injectable (ADMELOG) 4 Unit(s) SubCutaneous three times a day before meals  levothyroxine 100 MICROGram(s) Oral daily  lidocaine 1%/epinephrine 1:100,000 Inj 40 milliLiter(s) Local Injection once  LORazepam   Injectable 1 milliGRAM(s) IV Push once  methadone    Tablet 5 milliGRAM(s) Oral daily  metoclopramide   Syrup 5 milliGRAM(s) Oral every 8 hours  metoprolol tartrate 12.5 milliGRAM(s) Oral two times a day  midodrine 10 milliGRAM(s) Oral every 8 hours  pantoprazole    Tablet 40 milliGRAM(s) Oral before breakfast  QUEtiapine 100 milliGRAM(s) Oral two times a day    MEDICATIONS  (PRN):  acetaminophen   Tablet .. 650 milliGRAM(s) Oral every 6 hours PRN Temp greater or equal to 38C (100.4F), Mild Pain (1 - 3)    HOME MEDICATIONS:  atorvastatin 20 mg oral tablet (01-01)  furosemide 40 mg oral tablet (01-01)  gabapentin 100 mg oral tablet (01-01)  insulin lispro (concentrated) 200 units/mL subcutaneous solution (01-01)  Lantus 100 units/mL subcutaneous solution (01-01)  pantoprazole 40 mg oral delayed release tablet (01-01)  pioglitazone 30 mg oral tablet (01-01)  Renvela 800 mg oral tablet (01-01)  Toprol-XL 25 mg oral tablet, extended release (01-01)      REVIEW OF SYSTEMS:  All other review of systems is negative unless indicated above.     PHYSICAL EXAM:  GENERAL: NAD  HEENT: No Swelling  CHEST/LUNG: Good air entry, No wheezing  HEART: RRR, No murmurs  ABDOMEN: Soft, Bowel sounds present  EXTREMITIES:  No clubbing

## 2021-02-08 NOTE — PROGRESS NOTE ADULT - ASSESSMENT
59 yo male with PMHx of afib on coumadin, DM2, ESRD on HD MWF, HLD, HTN, CVA, who presented w/ weakness and fall secondary to acute hypoxic respiratory failure secondary to SARS-CoV-2 pneumonia, s/p intubation on 1/4/2021, s/p tracheostomy and PEG insertion on 1/18/2021.     #Acute hypoxemic respiratory failure   #Covid-19 PNA  #Superimposed bacterial PNA   - s/p intubation, now trach & PEG  - s/p Azithromycin discontinued on 1/8/2021, s/p Cefepime discontinued on 1/11/2021  - s/p Dexamethasone 6mg IV once daily (1/1/2021) x10 days  - Covid antibodies: negative --> s/p 2 units of Convalescent plasma ( 1/8/2021 and 1/12/2021)  - s/p tocilizumab 400mg IV x1 on 1/8/2021  - s/p lucinda and vanc   - continue with Klonopin, decrease to 0.5 q12hrs   - Continue with methadone 5mg per PEG once daily  - Continue with Seroquel 100mg per PEG q12hrs  - Repeat Duplex negative for DVT  - Patient remains on ventilator AC mode with settings: TV: 450mL, RR:24 bpm, PEEp:7mmHg, FiO2:50%, Peak pressure: 18mmHg, will try to decrease FiO2 further.     # Acute on top of chronic anemia  - Hb drop on s/p 1 unit of PRBC  - No evidence of bleed. Patient is hemodynamically stable  - Hb on 2/5/2021: 7.6 ( stable)  - Keep Active type and screen, transfuse if Hb <7  - CT abdomen pelvis: Negative for retroperitoneal bleed  - Will switch PPI from q12hrs to po once daily given no active bleed ( Stable hemoglobin, no melena, will follow up)    #Dislodged PEG tube:   -s/p replacement by surgery resident    #Paroxysmal atrial fibrillation  - Metoprolol tartrate 12.5mg po q12hrs    - Resume anticoagulation with heparin as per Pulm/crit team. last PTT 70, f/u AM   - Warfarin bridge ?   - Given the patient's status and condition, risks and benefits of AC should be reconsidered. Issued discussed with the patient's sister shantel ( discussion conducted by Dr. Llamas), and she wishes to continue Anticoagulation.     #Leukocytosis- Resolved  #Sacral ulcer, heel ulcers   - Multifactorial (Possible overlying infection, ulcers, steroids)   - s/p debridement w/ burn 1/21/21  - wound culture- moderate e. faecium  - ID following: S/P daptomycin 8 mg/kg q 48 hours (please give after dialysis on HD days) finished on 2/1  - local wound care  - wound care per burn     #Ileus- resolved   - s/p PEG 1/18  - xray 10/19 w/ gasseous distention of stomach  - PEG was connected to suction, feeds were held  - Xray 10/21 w/o evidence of obstruction, restarted feeds    #Upper extremity edema R>L  - arterial and venous duplex- negative   - loosened restraints    #ESRD on HD   - EPO (retacrit) 8000 units IV push once weekly  - nephro following, f/u recs  - next HD likely 2/2/2021  -Iron studies on 2/4/2021:   a. Serum iron: 33   b. TIBC: 212  c. % iron Saturation: 16%   d. Transferrin Serum: 181  --> May benefit from iron repletion, to follow up.     #NSTEMI  - Type II MI, demand ischemia, likely secondary to hypoxia secondary to COVID-19 pneumonia, resolved.  - Aspirin 81mg po once daily   - Atorvastatin 20mg po once daily  - CT brain no IC bleed or concerns of IC bleed     #Hypothyroidism  - Continue with levothyroxine 100mcg po once daily     #Altered Mental Status- resolved  - EEG diffuse slowing, but no seizure activity, check the report above  - CT brain shows ventriculomegaly (check full report), no IC bleed, possible old infarct.    Diet: Restart PEG feeds  DVT ppx: Restart heparin gtt  GI ppx: pantoprazole 40mg po once daily 59 yo M with PMHx of afib on coumadin, DM2, ESRD on HD MWF, HLD, HTN, CVA, who presented w/ weakness and fall secondary to acute hypoxic respiratory failure secondary to SARS-CoV-2 pneumonia, s/p intubation on 1/4/2021, s/p tracheostomy and PEG insertion on 1/18/2021.     #Acute hypoxemic respiratory failure   #Covid-19 PNA  #Superimposed bacterial PNA   - s/p intubation, now trach & PEG  - s/p lucinda and vanc. s/p Azithromycin discontinued on 1/8/2021, s/p Cefepime discontinued on 1/11/2021  - s/p Dexamethasone 6mg IV once daily (1/1/2021) x10 days  - Covid antibodies: negative --> s/p 2 units of Convalescent plasma ( 1/8/2021 and 1/12/2021)  - s/p tocilizumab 400mg IV x1 on 1/8/2021  - continue with Klonopin 0.5 q12hrs   - Continue with methadone 5mg per PEG once daily  - Continue with Seroquel 100mg per PEG q12hrs  - LE Duplex 2/2 negative for DVT  - Patient remains on ventilator AC mode with settings: TV: 450mL, RR:24 bpm, PEEp:8mmHg, FiO2:65%, Peak pressure: 18mmHg, decrease FiO2 as tolerated. Breathing in synch with vent     #Paroxysmal atrial fibrillation  - Metoprolol tartrate 12.5mg po q12hrs    - C/w heparin as per Pulm/crit team. last PTT 75  - Warfarin bridge ?   - Given the patient's status and condition, risks and benefits of AC should be reconsidered. Issued discussed with the patient's sister shantel ( discussion conducted by Dr. Llamas), and she wishes to continue Anticoagulation.     # Acute on chronic anemia  - Hb 7.3, s/p 1 unit of PRBC (baseline ~11 in 2019)  - No evidence of bleed. Patient is hemodynamically stable  - Keep Active type and screen, transfuse if Hb <7  - CT A/P: Negative for retroperitoneal bleed  - F/u AM labs    #ESRD on HD MWF  - Currently off schedule, next HD 2/9  - EPO (retacrit) 8000 units IV push once weekly  - nephro following, f/u recs  -Iron studies on 2/4/2021: Serum iron: 33 TIBC: 212 % iron Saturation: 16%  Transferrin Serum: 181  --> May benefit from iron repletion, to follow up.     #Sacral ulcer, heel ulcers   - Multifactorial (Possible overlying infection, ulcers, steroids)   - s/p debridement w/ burn 1/21/21  - wound culture- moderate e. faecium  - ID following: S/P daptomycin 8 mg/kg q 48 hours, finished on 2/1  - Rectal tube, local wound care    #Dislodged PEG tube:   -s/p replacement by surgery resident    #Leukocytosis- Resolved    #Ileus- resolved   - s/p PEG 1/18  - xray 10/19 w/ gasseous distention of stomach  - PEG was connected to suction, feeds were held  - Xray 10/21 w/o evidence of obstruction, restarted feeds    #Upper extremity edema R>L  - arterial and venous duplex- negative   - loosened restraints    #NSTEMI  - Type II MI, demand ischemia, likely secondary to hypoxia secondary to COVID-19 pneumonia, resolved.  - Aspirin 81mg po once daily   - Atorvastatin 20mg po once daily  - CT brain no IC bleed or concerns of IC bleed     #Hypothyroidism  - Continue with levothyroxine 100mcg po once daily     #Altered Mental Status- resolved  - EEG diffuse slowing, but no seizure activity, check the report above  - CT brain shows ventriculomegaly (check full report), no IC bleed, possible old infarct.    Diet: PEG feeds  DVT ppx: heparin gtt  GI ppx: pantoprazole 40mg po once daily  Dispo: acute  FULL CODE 61 yo M with PMHx of afib on coumadin, DM2, ESRD on HD MWF, HLD, HTN, CVA, who presented w/ weakness and fall secondary to acute hypoxic respiratory failure secondary to SARS-CoV-2 pneumonia, s/p intubation on 1/4/2021, s/p tracheostomy and PEG insertion on 1/18/2021.     #Acute hypoxemic respiratory failure   #Covid-19 PNA  #Superimposed bacterial PNA   - s/p intubation, now trach & PEG  - Remains on ventilator AC mode: TV: 450mL, RR:24 bpm, PEEp:8mmHg, FiO2:65%, Peak pressure: 18mmHg, decrease FiO2 as tolerated. Breathing in synch with vent   - s/p lucinda and vanc. s/p Azithromycin discontinued on 1/8/2021, s/p Cefepime discontinued on 1/11/2021  - s/p Dexamethasone 6mg IV once daily (1/1/2021) x10 days  - Covid antibodies: negative --> s/p 2 units of Convalescent plasma ( 1/8/2021 and 1/12/2021)  - s/p tocilizumab 400mg IV x1 on 1/8/2021  - continue with Klonopin 0.5 q12hrs, methadone 5mg per PEG once daily, Seroquel 100mg per PEG q12hrs  - LE Duplex 2/2 negative for DVT    #Paroxysmal atrial fibrillation  - Metoprolol tartrate 12.5mg po q12hrs    - C/w heparin as per Pulm/crit team. last PTT 75  - Warfarin bridge ?   - Given the patient's status and condition, risks and benefits of AC should be reconsidered. Issued discussed with the patient's sister shantel ( discussion conducted by Dr. Llamas), and she wishes to continue Anticoagulation.   - F/u daily PTT    # Acute on chronic anemia  - Hb 7.3, s/p 1 unit of PRBC (baseline ~11 in 2019)  - No evidence of bleed. Patient is hemodynamically stable  - Keep Active type and screen, transfuse if Hb <7  - CT A/P: Negative for retroperitoneal bleed  - F/u AM labs    #ESRD on HD MWF  - Currently off schedule, next HD 2/9  - EPO (retacrit) 8000 units IV push once weekly  - nephro following, f/u recs  -Iron studies on 2/4/2021: Serum iron: 33 TIBC: 212 % iron Saturation: 16%  Transferrin Serum: 181  --> May benefit from iron repletion, to follow up.     #Sacral ulcer, heel ulcers   - Multifactorial (Possible overlying infection, ulcers, steroids)   - s/p debridement w/ burn 1/21/21  - wound culture- moderate e. faecium  - ID following: S/P daptomycin 8 mg/kg q 48 hours, finished on 2/1  - Rectal tube, local wound care    #Dislodged PEG tube:   -s/p replacement by surgery resident    #Leukocytosis- Resolved    #Ileus- resolved   - s/p PEG 1/18  - xray 10/19 w/ gasseous distention of stomach  - PEG was connected to suction, feeds were held  - Xray 10/21 w/o evidence of obstruction, restarted feeds    #Upper extremity edema R>L  - arterial and venous duplex- negative   - loosened restraints    #NSTEMI  - Type II MI, demand ischemia, likely secondary to hypoxia secondary to COVID-19 pneumonia, resolved.  - Aspirin 81mg po once daily   - Atorvastatin 20mg po once daily  - CT brain no IC bleed or concerns of IC bleed     #Hypothyroidism  - Continue with levothyroxine 100mcg po once daily     #Altered Mental Status- resolved  - EEG diffuse slowing, but no seizure activity, check the report above  - CT brain shows ventriculomegaly (check full report), no IC bleed, possible old infarct.    Diet: PEG feeds  DVT ppx: heparin gtt  GI ppx: pantoprazole 40mg po once daily  Dispo: acute  FULL CODE

## 2021-02-08 NOTE — PROGRESS NOTE ADULT - SUBJECTIVE AND OBJECTIVE BOX
Patient is a 60y old  Male who presents with a chief complaint of s/p fall. (08 Feb 2021 06:56)        Over Night Events:  On MV.  More awake.          ROS:     All ROS are negative except HPI         PHYSICAL EXAM    ICU Vital Signs Last 24 Hrs  T(C): 35.5 (08 Feb 2021 06:00), Max: 36.4 (07 Feb 2021 12:00)  T(F): 95.9 (08 Feb 2021 06:00), Max: 97.5 (07 Feb 2021 12:00)  HR: 72 (08 Feb 2021 06:00) (72 - 84)  BP: 120/61 (08 Feb 2021 06:00) (102/46 - 120/61)  BP(mean): --  ABP: --  ABP(mean): --  RR: 20 (07 Feb 2021 21:59) (20 - 31)  SpO2: 93% (08 Feb 2021 06:00) (93% - 100%)      CONSTITUTIONAL:  Well nourished.  Ill appearing.  NAD    ENT:   Airway patent,   Mouth with normal mucosa.   No thrush    EYES:   Pupils equal,   Round and reactive to light.    CARDIAC:   Normal rate,   Regular rhythm.     edema      Vascular:  Normal systolic impulse  No Carotid bruits    RESPIRATORY:   No wheezing  Bilateral BS  Normal chest expansion  Not tachypneic,  No use of accessory muscles    GASTROINTESTINAL:  Abdomen soft,   Non-tender,   No guarding,   + BS    MUSCULOSKELETAL:   Range of motion is not limited,  No clubbing, cyanosis    NEUROLOGICAL:   Alert   NOt following commands .    SKIN:   Skin normal color for race,   Warm and dry.   No evidence of rash.    PSYCHIATRIC:   Normal mood and affect.   No apparent risk to self or others.    HEMATOLOGICAL:  No cervical  lymphadenopathy.  no inguinal lymphadenopathy      02-07-21 @ 07:01  -  02-08-21 @ 07:00  --------------------------------------------------------  IN:    Heparin: 240 mL    Nepro with Carb Steady: 1000 mL  Total IN: 1240 mL    OUT:  Total OUT: 0 mL    Total NET: 1240 mL          LABS:                            7.3    10.81 )-----------( 412      ( 07 Feb 2021 08:33 )             24.9                                               02-07    140  |  97<L>  |  40<H>  ----------------------------<  95  4.0   |  30  |  4.3<HH>    Ca    7.9<L>      07 Feb 2021 08:33  Phos  4.6     02-07  Mg     2.5     02-07    TPro  5.8<L>  /  Alb  2.6<L>  /  TBili  1.1  /  DBili  x   /  AST  29  /  ALT  13  /  AlkPhos  118<H>  02-07      PT/INR - ( 07 Feb 2021 08:33 )   PT: 14.60 sec;   INR: 1.27 ratio         PTT - ( 07 Feb 2021 20:44 )  PTT:75.0 sec                                                                                     LIVER FUNCTIONS - ( 07 Feb 2021 08:33 )  Alb: 2.6 g/dL / Pro: 5.8 g/dL / ALK PHOS: 118 U/L / ALT: 13 U/L / AST: 29 U/L / GGT: x                                                                                               Mode: AC/ CMV (Assist Control/ Continuous Mandatory Ventilation)  RR (machine): 18  TV (machine): 450  FiO2: 50  PEEP: 8  ITime: 1  MAP: 11  PIP: 18        Saturation 100%       MEDICATIONS  (STANDING):  aspirin  chewable 81 milliGRAM(s) Oral daily  atorvastatin 20 milliGRAM(s) Oral at bedtime  chlorhexidine 0.12% Liquid 15 milliLiter(s) Oral Mucosa every 12 hours  chlorhexidine 4% Liquid 1 Application(s) Topical <User Schedule>  clonazePAM  Tablet 0.5 milliGRAM(s) Oral every 12 hours  collagenase Ointment 1 Application(s) Topical two times a day  Dakins Solution - 1/2 Strength 1 Application(s) Topical two times a day  dextrose 40% Gel 15 Gram(s) Oral once  dextrose 5%. 1000 milliLiter(s) (50 mL/Hr) IV Continuous <Continuous>  dextrose 5%. 1000 milliLiter(s) (100 mL/Hr) IV Continuous <Continuous>  dextrose 50% Injectable 25 Gram(s) IV Push once  dextrose 50% Injectable 12.5 Gram(s) IV Push once  dextrose 50% Injectable 25 Gram(s) IV Push once  epoetin raven-epbx (RETACRIT) Injectable 8000 Unit(s) IV Push <User Schedule>  glucagon  Injectable 1 milliGRAM(s) IntraMuscular once  heparin  Infusion 1500 Unit(s)/Hr (20 mL/Hr) IV Continuous <Continuous>  insulin glargine Injectable (LANTUS) 12 Unit(s) SubCutaneous at bedtime  insulin lispro (ADMELOG) corrective regimen sliding scale   SubCutaneous three times a day before meals  insulin lispro Injectable (ADMELOG) 4 Unit(s) SubCutaneous three times a day before meals  levothyroxine 100 MICROGram(s) Oral daily  lidocaine 1%/epinephrine 1:100,000 Inj 40 milliLiter(s) Local Injection once  LORazepam   Injectable 1 milliGRAM(s) IV Push once  methadone    Tablet 5 milliGRAM(s) Oral daily  metoclopramide   Syrup 5 milliGRAM(s) Oral every 8 hours  metoprolol tartrate 12.5 milliGRAM(s) Oral two times a day  midodrine 10 milliGRAM(s) Oral every 8 hours  pantoprazole    Tablet 40 milliGRAM(s) Oral before breakfast  QUEtiapine 100 milliGRAM(s) Oral two times a day    MEDICATIONS  (PRN):  acetaminophen   Tablet .. 650 milliGRAM(s) Oral every 6 hours PRN Temp greater or equal to 38C (100.4F), Mild Pain (1 - 3)      New X-rays reviewed:                                                                                  ECHO    CXR interpreted by me:  Bilateral infiltrates

## 2021-02-08 NOTE — PROGRESS NOTE ADULT - ASSESSMENT
61 yo male with PMHx of afib on coumadin, DM2, ESRD on HD MWF, HLD, HTN, CVA, who presented w/ weakness and fall secondary to acute hypoxic respiratory failure secondary to SARS-CoV-2 pneumonia, s/p intubation on 1/4/2021, s/p tracheostomy and PEG insertion on 1/18/2021.     #Acute Hypoxic Respiratory Failure  #COVID 19 PNA  s/p Trach/Peg  Currently trach to vent, FiO2 50%  s/p tx with decadron, RDV, and plasma  COVID + 02/02  - Cont vent management per critcare, decrease FiO2  - CPAP trials as tolerated    #Acute on Chronic Anemia  Possibly due to chronic illness + ESRD  workup negative for acute bleed  - Cont to monitor Hg  - Transfuse PRN    #ESRD  - HD per nephro    #Paroxysmal Afib  - Cont heparin drip for now  - Cont metoprolol 12.5mg BID    #HTN/HLD  #h/o CVA  - Cont atorvastatin 20mg qHs    DVT PPX, heparin for now

## 2021-02-08 NOTE — PROGRESS NOTE ADULT - ASSESSMENT
IMPRESSION:    Acute hypoxemic resp failure SP trach and PEG   NSTEMI  Afib was on coumadin  Severe COVID PNA  Superimposed bacteria PNA treated   Acute/chronic renal failure now On HD   Sacral ulcer SP debridement       PLAN:    CNS:  Avoid over sedation  Follow up MS      HEENT: Oral care. Trach care     PULMONARY: Vent changes. Wean O2.  Monitor PPL and DP.      CARDIOVASCULAR:  I<O as tolerated.      GI: GI prophylaxis.   PEG Feeding    RENAL:  FU lytes, correct as needed.  FU with Renal.  RRT per renal    INFECTIOUS DISEASE:  FU cultures. ABX PER ID     HEMATOLOGICAL: DVT Prophylaxis on AC.  FU CBC and Coags.    Keep on IV heparin for now.  FU PTT   keep Hg >7.0    ENDOCRINE:  Follow up FS.      MUSCULOSKELETAL:  bed rest.  Wound Care      Prognosis poor     SDU

## 2021-02-09 NOTE — PROGRESS NOTE ADULT - SUBJECTIVE AND OBJECTIVE BOX
[facility] FOLLOW UP NOTE  --------------------------------------------------------------------------------  24 hour events/subjective: Patient examined. Trach.    PAST HISTORY  --------------------------------------------------------------------------------  No significant changes to PMH, PSH, FHx, SHx, unless otherwise noted    ALLERGIES & MEDICATIONS  --------------------------------------------------------------------------------  Allergies    Orange (Other)  Originally Entered as [HIVES] reaction to [sulfur] (Unknown)  penicillin (Unknown)  sulfADIAZINE (Unknown)    Intolerances      Standing Inpatient Medications  aspirin  chewable 81 milliGRAM(s) Oral daily  atorvastatin 20 milliGRAM(s) Oral at bedtime  chlorhexidine 0.12% Liquid 15 milliLiter(s) Oral Mucosa every 12 hours  chlorhexidine 4% Liquid 1 Application(s) Topical <User Schedule>  clonazePAM  Tablet 0.5 milliGRAM(s) Oral every 12 hours  collagenase Ointment 1 Application(s) Topical two times a day  Dakins Solution - 1/2 Strength 1 Application(s) Topical two times a day  dextrose 40% Gel 15 Gram(s) Oral once  dextrose 5%. 1000 milliLiter(s) IV Continuous <Continuous>  dextrose 5%. 1000 milliLiter(s) IV Continuous <Continuous>  dextrose 50% Injectable 25 Gram(s) IV Push once  dextrose 50% Injectable 12.5 Gram(s) IV Push once  dextrose 50% Injectable 25 Gram(s) IV Push once  epoetin raven-epbx (RETACRIT) Injectable 8000 Unit(s) IV Push <User Schedule>  glucagon  Injectable 1 milliGRAM(s) IntraMuscular once  heparin  Infusion 1500 Unit(s)/Hr IV Continuous <Continuous>  insulin glargine Injectable (LANTUS) 12 Unit(s) SubCutaneous at bedtime  insulin lispro (ADMELOG) corrective regimen sliding scale   SubCutaneous three times a day before meals  insulin lispro Injectable (ADMELOG) 4 Unit(s) SubCutaneous three times a day before meals  levothyroxine 100 MICROGram(s) Oral daily  lidocaine 1%/epinephrine 1:100,000 Inj 40 milliLiter(s) Local Injection once  LORazepam   Injectable 1 milliGRAM(s) IV Push once  metoclopramide   Syrup 5 milliGRAM(s) Oral every 8 hours  metoprolol tartrate 12.5 milliGRAM(s) Oral two times a day  midodrine 10 milliGRAM(s) Oral every 8 hours  pantoprazole    Tablet 40 milliGRAM(s) Oral before breakfast  QUEtiapine 100 milliGRAM(s) Oral two times a day    PRN Inpatient Medications  acetaminophen   Tablet .. 650 milliGRAM(s) Oral every 6 hours PRN      VITALS/PHYSICAL EXAM  --------------------------------------------------------------------------------  T(C): 35.9 (02-09-21 @ 08:14), Max: 36.2 (02-09-21 @ 05:30)  HR: 82 (02-09-21 @ 08:14) (77 - 83)  BP: 100/45 (02-09-21 @ 08:14) (98/60 - 137/64)  RR: 20 (02-09-21 @ 08:14) (20 - 20)  SpO2: 97% (02-09-21 @ 08:14) (96% - 100%)  Wt(kg): --        02-08-21 @ 07:01  -  02-09-21 @ 07:00  --------------------------------------------------------  IN: 1980 mL / OUT: 0 mL / NET: 1980 mL      Physical Exam:  	Gen: Trach  	Pulm: CTA B/L  	CV: RRR, S1S2  	Abd: +BS, soft, nondistended  	: No suprapubic tenderness  	LE: Warm,  edema  	Vascular access: AVF    LABS/STUDIES  --------------------------------------------------------------------------------              7.1    11.05 >-----------<  384      [02-09-21 @ 07:53]              23.9     131  |  90  |  60  ----------------------------<  76      [02-09-21 @ 07:53]  4.3   |  29  |  5.4        Ca     8.1     [02-09-21 @ 07:53]      Mg     2.7     [02-09-21 @ 07:53]      Phos  5.7     [02-08-21 @ 09:11]    TPro  6.0  /  Alb  2.6  /  TBili  0.8  /  DBili  x   /  AST  30  /  ALT  14  /  AlkPhos  103  [02-09-21 @ 07:53]    PT/INR: PT 13.80, INR 1.20       [02-08-21 @ 09:11]  PTT: 63.8       [02-08-21 @ 09:11]      Creatinine Trend:  SCr 5.4 [02-09 @ 07:53]  SCr 4.1 [02-08 @ 09:11]  SCr 4.3 [02-07 @ 08:33]  SCr 5.3 [02-06 @ 09:02]  SCr 4.6 [02-05 @ 07:47]        Iron 33, TIBC 212, %sat 16      [02-04-21 @ 18:57]  Ferritin 671      [02-07-21 @ 09:16]  Vitamin D (25OH) 45      [01-15-21 @ 12:06]  HbA1c 7.1      [05-21-19 @ 04:30]  TSH 4.57      [01-01-21 @ 17:56]    HBsAb Reactive      [02-02-21 @ 15:05]  HBsAg Nonreact      [02-02-21 @ 18:12]  HBcAb Nonreact      [02-02-21 @ 06:55]  HCV 0.18, Nonreact      [02-02-21 @ 18:12]

## 2021-02-09 NOTE — PROGRESS NOTE ADULT - SUBJECTIVE AND OBJECTIVE BOX
Hospital Day:  39d    Subjective:    Patient is a 60y old  Male who presents with a chief complaint of s/p fall. (08 Feb 2021 11:17)      Admitted to medicine for a primary diagnosis of     Past Medical Hx:   PAD (peripheral artery disease)    Anemia    Thyroid cancer    Chronic leg pain    OA (osteoarthritis)    SOB (shortness of breath) on exertion    ESRD (end stage renal disease)    Atrial fibrillation    Right sided weakness    Stroke    Hypertension    High blood cholesterol    Diabetes mellitus, type 2    Dialysis patient      Past Sx:  Smoker    H/O thyroid nodule    H/O gastroesophageal reflux (GERD)    History of tonsillectomy    History of surgery    A-V fistula      Allergies:  Orange (Other)  Originally Entered as [HIVES] reaction to [sulfur] (Unknown)  penicillin (Unknown)  sulfADIAZINE (Unknown)    Current Meds:   Standng Meds:  aspirin  chewable 81 milliGRAM(s) Oral daily  atorvastatin 20 milliGRAM(s) Oral at bedtime  chlorhexidine 0.12% Liquid 15 milliLiter(s) Oral Mucosa every 12 hours  chlorhexidine 4% Liquid 1 Application(s) Topical <User Schedule>  clonazePAM  Tablet 0.5 milliGRAM(s) Oral every 12 hours  collagenase Ointment 1 Application(s) Topical two times a day  Dakins Solution - 1/2 Strength 1 Application(s) Topical two times a day  dextrose 40% Gel 15 Gram(s) Oral once  dextrose 5%. 1000 milliLiter(s) (50 mL/Hr) IV Continuous <Continuous>  dextrose 5%. 1000 milliLiter(s) (100 mL/Hr) IV Continuous <Continuous>  dextrose 50% Injectable 25 Gram(s) IV Push once  dextrose 50% Injectable 12.5 Gram(s) IV Push once  dextrose 50% Injectable 25 Gram(s) IV Push once  epoetin raven-epbx (RETACRIT) Injectable 8000 Unit(s) IV Push <User Schedule>  glucagon  Injectable 1 milliGRAM(s) IntraMuscular once  heparin  Infusion 1500 Unit(s)/Hr (20 mL/Hr) IV Continuous <Continuous>  insulin glargine Injectable (LANTUS) 12 Unit(s) SubCutaneous at bedtime  insulin lispro (ADMELOG) corrective regimen sliding scale   SubCutaneous three times a day before meals  insulin lispro Injectable (ADMELOG) 4 Unit(s) SubCutaneous three times a day before meals  levothyroxine 100 MICROGram(s) Oral daily  lidocaine 1%/epinephrine 1:100,000 Inj 40 milliLiter(s) Local Injection once  LORazepam   Injectable 1 milliGRAM(s) IV Push once  methadone    Tablet 5 milliGRAM(s) Oral daily  metoclopramide   Syrup 5 milliGRAM(s) Oral every 8 hours  metoprolol tartrate 12.5 milliGRAM(s) Oral two times a day  midodrine 10 milliGRAM(s) Oral every 8 hours  pantoprazole    Tablet 40 milliGRAM(s) Oral before breakfast  QUEtiapine 100 milliGRAM(s) Oral two times a day    PRN Meds:  acetaminophen   Tablet .. 650 milliGRAM(s) Oral every 6 hours PRN Temp greater or equal to 38C (100.4F), Mild Pain (1 - 3)    HOME MEDICATIONS:  atorvastatin 20 mg oral tablet: 1 tab(s) orally once a day (at bedtime)  furosemide 40 mg oral tablet: 1 tab(s) orally once a day  gabapentin 100 mg oral tablet: 1 tab(s) orally once a day  insulin lispro (concentrated) 200 units/mL subcutaneous solution: 7 unit(s) subcutaneous 3 times a day  5-10 units prn  Lantus 100 units/mL subcutaneous solution: 15 unit(s) subcutaneous once a day (at bedtime)  pantoprazole 40 mg oral delayed release tablet: 1 tab(s) orally once a day  pioglitazone 30 mg oral tablet: 1 tab(s) orally once a day  Renvela 800 mg oral tablet: 1 tab(s) orally 3 times a day (with meals)  Toprol-XL 25 mg oral tablet, extended release: 1 tab(s) orally once a day      Vital Signs:   T(F): 97.2 (02-09-21 @ 05:30), Max: 98 (02-08-21 @ 12:00)  HR: 83 (02-09-21 @ 05:30) (74 - 83)  BP: 107/53 (02-09-21 @ 05:30) (98/60 - 137/64)  RR: 20 (02-09-21 @ 05:30) (18 - 30)  SpO2: 98% (02-09-21 @ 05:30) (98% - 100%)      02-07-21 @ 07:01  -  02-08-21 @ 07:00  --------------------------------------------------------  IN: 1497 mL / OUT: 0 mL / NET: 1497 mL    02-08-21 @ 07:01  -  02-09-21 @ 06:53  --------------------------------------------------------  IN: 1980 mL / OUT: 0 mL / NET: 1980 mL        Physical Exam:   GENERAL: NAD  HEENT: NCAT  CHEST/LUNG: CTAB  HEART: Regular rate and rhythm; s1 s2 appreciated, No murmurs, rubs, or gallops  ABDOMEN: Soft, Nontender, Nondistended; Bowel sounds present  EXTREMITIES: No LE edema b/l  SKIN: no rashes, no new lesions  NERVOUS SYSTEM:  Alert & Oriented X3  LINES/CATHETERS:        Labs:                         7.6    12.52 )-----------( 425      ( 08 Feb 2021 09:11 )             25.0     Neutophil% 80.4, Lymphocyte% 7.3, Monocyte% 10.1, Bands% 1.3 02-08-21 @ 09:11    08 Feb 2021 09:11    134    |  92     |  50     ----------------------------<  180    4.0     |  29     |  4.1      Ca    8.7        08 Feb 2021 09:11  Phos  5.7       08 Feb 2021 09:11  Mg     2.7       08 Feb 2021 09:11    TPro  6.3    /  Alb  2.7    /  TBili  0.9    /  DBili  x      /  AST  29     /  ALT  15     /  AlkPhos  112    08 Feb 2021 09:11       pTT    63.8             ----< 1.20 INR  (02-08-21 @ 09:11)    13.80        PT            Iron --, TIBC --, %Sat -- Ferritin 671 02-07-21 @ 09:16               Hospital Day:  39d    Subjective:    Patient is a 60y old  Male who presents with a chief complaint of s/p fall. No acute events overnight, lethargic this am. Arousable to noise/name.       Admitted to medicine for a primary diagnosis of     Past Medical Hx:   PAD (peripheral artery disease)    Anemia    Thyroid cancer    Chronic leg pain    OA (osteoarthritis)    SOB (shortness of breath) on exertion    ESRD (end stage renal disease)    Atrial fibrillation    Right sided weakness    Stroke    Hypertension    High blood cholesterol    Diabetes mellitus, type 2    Dialysis patient      Past Sx:  Smoker    H/O thyroid nodule    H/O gastroesophageal reflux (GERD)    History of tonsillectomy    History of surgery    A-V fistula      Allergies:  Orange (Other)  Originally Entered as [HIVES] reaction to [sulfur] (Unknown)  penicillin (Unknown)  sulfADIAZINE (Unknown)    Current Meds:   Stand Meds:  aspirin  chewable 81 milliGRAM(s) Oral daily  atorvastatin 20 milliGRAM(s) Oral at bedtime  chlorhexidine 0.12% Liquid 15 milliLiter(s) Oral Mucosa every 12 hours  chlorhexidine 4% Liquid 1 Application(s) Topical <User Schedule>  clonazePAM  Tablet 0.5 milliGRAM(s) Oral every 12 hours  collagenase Ointment 1 Application(s) Topical two times a day  Dakins Solution - 1/2 Strength 1 Application(s) Topical two times a day  dextrose 40% Gel 15 Gram(s) Oral once  dextrose 5%. 1000 milliLiter(s) (50 mL/Hr) IV Continuous <Continuous>  dextrose 5%. 1000 milliLiter(s) (100 mL/Hr) IV Continuous <Continuous>  dextrose 50% Injectable 25 Gram(s) IV Push once  dextrose 50% Injectable 12.5 Gram(s) IV Push once  dextrose 50% Injectable 25 Gram(s) IV Push once  epoetin raven-epbx (RETACRIT) Injectable 8000 Unit(s) IV Push <User Schedule>  glucagon  Injectable 1 milliGRAM(s) IntraMuscular once  heparin  Infusion 1500 Unit(s)/Hr (20 mL/Hr) IV Continuous <Continuous>  insulin glargine Injectable (LANTUS) 12 Unit(s) SubCutaneous at bedtime  insulin lispro (ADMELOG) corrective regimen sliding scale   SubCutaneous three times a day before meals  insulin lispro Injectable (ADMELOG) 4 Unit(s) SubCutaneous three times a day before meals  levothyroxine 100 MICROGram(s) Oral daily  lidocaine 1%/epinephrine 1:100,000 Inj 40 milliLiter(s) Local Injection once  LORazepam   Injectable 1 milliGRAM(s) IV Push once  methadone    Tablet 5 milliGRAM(s) Oral daily  metoclopramide   Syrup 5 milliGRAM(s) Oral every 8 hours  metoprolol tartrate 12.5 milliGRAM(s) Oral two times a day  midodrine 10 milliGRAM(s) Oral every 8 hours  pantoprazole    Tablet 40 milliGRAM(s) Oral before breakfast  QUEtiapine 100 milliGRAM(s) Oral two times a day    PRN Meds:  acetaminophen   Tablet .. 650 milliGRAM(s) Oral every 6 hours PRN Temp greater or equal to 38C (100.4F), Mild Pain (1 - 3)    HOME MEDICATIONS:  atorvastatin 20 mg oral tablet: 1 tab(s) orally once a day (at bedtime)  furosemide 40 mg oral tablet: 1 tab(s) orally once a day  gabapentin 100 mg oral tablet: 1 tab(s) orally once a day  insulin lispro (concentrated) 200 units/mL subcutaneous solution: 7 unit(s) subcutaneous 3 times a day  5-10 units prn  Lantus 100 units/mL subcutaneous solution: 15 unit(s) subcutaneous once a day (at bedtime)  pantoprazole 40 mg oral delayed release tablet: 1 tab(s) orally once a day  pioglitazone 30 mg oral tablet: 1 tab(s) orally once a day  Renvela 800 mg oral tablet: 1 tab(s) orally 3 times a day (with meals)  Toprol-XL 25 mg oral tablet, extended release: 1 tab(s) orally once a day      Vital Signs:   T(F): 97.2 (02-09-21 @ 05:30), Max: 98 (02-08-21 @ 12:00)  HR: 83 (02-09-21 @ 05:30) (74 - 83)  BP: 107/53 (02-09-21 @ 05:30) (98/60 - 137/64)  RR: 20 (02-09-21 @ 05:30) (18 - 30)  SpO2: 98% (02-09-21 @ 05:30) (98% - 100%)      02-07-21 @ 07:01  -  02-08-21 @ 07:00  --------------------------------------------------------  IN: 1497 mL / OUT: 0 mL / NET: 1497 mL    02-08-21 @ 07:01  -  02-09-21 @ 06:53  --------------------------------------------------------  IN: 1980 mL / OUT: 0 mL / NET: 1980 mL        Physical Exam:   GENERAL: NAD  HEENT: NCAT  CHEST/LUNG: CTAB  HEART: Regular rate and rhythm; s1 s2 appreciated, No murmurs, rubs, or gallops  ABDOMEN: Soft, Nontender, Nondistended; Bowel sounds present  EXTREMITIES: No LE edema b/l  SKIN: no rashes, no new lesions  NERVOUS SYSTEM:  Alert & Oriented X3  LINES/CATHETERS:        Labs:                         7.6    12.52 )-----------( 425      ( 08 Feb 2021 09:11 )             25.0     Neutophil% 80.4, Lymphocyte% 7.3, Monocyte% 10.1, Bands% 1.3 02-08-21 @ 09:11    08 Feb 2021 09:11    134    |  92     |  50     ----------------------------<  180    4.0     |  29     |  4.1      Ca    8.7        08 Feb 2021 09:11  Phos  5.7       08 Feb 2021 09:11  Mg     2.7       08 Feb 2021 09:11    TPro  6.3    /  Alb  2.7    /  TBili  0.9    /  DBili  x      /  AST  29     /  ALT  15     /  AlkPhos  112    08 Feb 2021 09:11       pTT    63.8             ----< 1.20 INR  (02-08-21 @ 09:11)    13.80        PT            Iron --, TIBC --, %Sat -- Ferritin 671 02-07-21 @ 09:16

## 2021-02-09 NOTE — CHART NOTE - NSCHARTNOTEFT_GEN_A_CORE
Patient was noted to have BP as low as 50/25 and was minimally arousable. Had HD today with 3L removed.  BP improved with fluid bolus and peripheral Levophed was started, patient also became more awake after these interventions.

## 2021-02-09 NOTE — CHART NOTE - NSCHARTNOTEFT_GEN_A_CORE
Spoke with sister Nedra Shea, updated her on pt's medication condition. She had questions about why pt wasn't intubated and was instead vented via trach. I answered all her questions and her pt remained in serious condition, but currently stable. She also inquired about visitation policy.

## 2021-02-09 NOTE — PROGRESS NOTE ADULT - ASSESSMENT
59 yo male with PMHx of afib on coumadin, DM2, ESRD on HD MWF, HLD, HTN, CVA, who presented w/ weakness and fall secondary to acute hypoxic respiratory failure secondary to SARS-CoV-2 pneumonia, s/p intubation on 1/4/2021, s/p tracheostomy and PEG insertion on 1/18/2021.     #Acute Hypoxic Respiratory Failure  #COVID 19 PNA  s/p Trach/Peg  Currently trach to vent, FiO2 50%  s/p tx with decadron, RDV, and plasma  COVID + 02/02  - Cont vent management per critcare, decrease FiO2  - CPAP trials as tolerated    #Acute on Chronic Anemia  Possibly due to chronic illness + ESRD  workup negative for acute bleed  - Cont to monitor Hg  - Transfuse PRN    #ESRD  - HD per nephro    #Paroxysmal Afib  - Cont heparin drip for now (Discussed with family regarding risk of bleed vs risk of stroke, given his clinical status (trach/peg). They would rather have him anticoagulated. They hope he will ambulate again, although I stated it is unlikely given his overall condition.)  - Cont metoprolol 12.5mg BID    #HTN/HLD  #h/o CVA  - Cont atorvastatin 20mg qHs    DVT PPX, heparin for now

## 2021-02-09 NOTE — PROGRESS NOTE ADULT - ASSESSMENT
59 yo M with PMHx of afib on coumadin, DM2, ESRD on HD MWF, HLD, HTN, CVA, who presented w/ weakness and fall secondary to acute hypoxic respiratory failure secondary to SARS-CoV-2 pneumonia, s/p intubation on 1/4/2021, s/p tracheostomy and PEG insertion on 1/18/2021.     #Acute hypoxemic respiratory failure   #Covid-19 PNA  #Superimposed bacterial PNA   - s/p intubation, now trach & PEG  - Remains on ventilator AC mode: TV: 450mL, RR:24 bpm, PEEp:8mmHg, FiO2:65%, Peak pressure: 18mmHg, decrease FiO2 as tolerated. Breathing in synch with vent   - s/p lucinda and vanc. s/p Azithromycin discontinued on 1/8/2021, s/p Cefepime discontinued on 1/11/2021  - s/p Dexamethasone 6mg IV once daily (1/1/2021) x10 days  - Covid antibodies: negative --> s/p 2 units of Convalescent plasma ( 1/8/2021 and 1/12/2021)  - s/p tocilizumab 400mg IV x1 on 1/8/2021  - continue with Klonopin 0.5 q12hrs, methadone 5mg per PEG once daily, Seroquel 100mg per PEG q12hrs  - LE Duplex 2/2 negative for DVT    #Paroxysmal atrial fibrillation  - Metoprolol tartrate 12.5mg po q12hrs    - C/w heparin as per Pulm/crit team. last PTT __ <- 65  - Warfarin bridge ?   - Given the patient's status and condition, risks and benefits of AC should be reconsidered. Issued discussed with the patient's sister shantel (discussion conducted by Dr. Llamas), and she wishes to continue Anticoagulation.   - F/u daily PTT    # Acute on chronic anemia  - Hb __ <- 7.3, s/p 1 unit of PRBC (baseline ~11 in 2019)  - No evidence of bleed. Patient is hemodynamically stable  - Keep Active type and screen, transfuse if Hb <7  - CT A/P: Negative for retroperitoneal bleed  - F/u AM labs    #ESRD on HD MWF  - Currently off schedule, next HD today  - EPO (retacrit) 8000 units IV push once weekly  - nephro following, f/u recs  -Iron studies on 2/4/2021: Serum iron: 33 TIBC: 212 % iron Saturation: 16%  Transferrin Serum: 181  --> May benefit from iron repletion, to follow up.     #Sacral ulcer, heel ulcers   - Multifactorial (Possible overlying infection, ulcers, steroids)   - s/p debridement w/ burn 1/21/21  - wound culture- moderate e. faecium  - ID following: S/P daptomycin 8 mg/kg q 48 hours, finished on 2/1  - Rectal tube, local wound care    #Dislodged PEG tube  -s/p replacement by surgery resident    #Leukocytosis- Resolved    #Ileus- resolved   - s/p PEG 1/18  - xray 10/19 w/ gasseous distention of stomach  - PEG was connected to suction, feeds were held  - Xray 10/21 w/o evidence of obstruction, restarted feeds    #Upper extremity edema R>L  - arterial and venous duplex- negative   - loosened restraints    #NSTEMI  - Type II MI, demand ischemia, likely secondary to hypoxia secondary to COVID-19 pneumonia, resolved.  - Aspirin 81mg po once daily   - Atorvastatin 20mg po once daily  - CT brain no IC bleed or concerns of IC bleed     #Hypothyroidism  - Continue with levothyroxine 100mcg po once daily     #Altered Mental Status- resolved  - EEG diffuse slowing, but no seizure activity, check the report above  - CT brain shows ventriculomegaly (check full report), no IC bleed, possible old infarct.    Diet: PEG feeds  DVT ppx: heparin gtt  GI ppx: pantoprazole 40mg po QD  Dispo: acute  FULL CODE 61 yo M with PMHx of afib on coumadin, DM2, ESRD on HD MWF, HLD, HTN, CVA, who presented w/ weakness and fall secondary to acute hypoxic respiratory failure secondary to SARS-CoV-2 pneumonia, s/p intubation on 1/4/2021, s/p tracheostomy and PEG insertion on 1/18/2021.     #Acute hypoxemic respiratory failure   #Covid-19 PNA  #Superimposed bacterial PNA   - s/p intubation, now trach & PEG  - Remains on ventilator AC mode: TV: 450mL, RR: 18 bpm, PEEp: 8mmHg, FiO2: 65%. Decrease FiO2 as tolerated (goal is <40% for vent transfer). Breathing in synch with vent   - s/p lucinda and vanc. s/p Azithromycin discontinued on 1/8/2021, s/p Cefepime discontinued on 1/11/2021  - s/p Dexamethasone 6mg IV once daily (1/1/2021) x10 days  - Covid antibodies: negative --> s/p 2 units of Convalescent plasma (1/8/2021 and 1/12/2021)  - s/p tocilizumab 400mg IV x1 on 1/8/2021  - continue with Klonopin 0.5 q12hrs, methadone 5mg per PEG once daily, Seroquel 100mg per PEG q12hrs  - LE Duplex 2/2 negative for DVT    #Paroxysmal atrial fibrillation  - Coumadin at home   - Metoprolol tartrate 12.5mg po q12hrs    - C/w heparin as per Pulm/crit team. last PTT __ <- 65  - Given the patient's status and condition, risks and benefits of AC should be reconsidered. Issued discussed with the patient's sister shantel (discussion conducted by Dr. Llamas), and she wishes to continue A/C.   - F/u daily PTT    # Acute on chronic anemia  - Hb __ <- 7.3, s/p 1 unit of PRBC (baseline ~11 in 2019)  - No evidence of bleed. Patient is hemodynamically stable  - Keep Active type and screen, transfuse if Hb <7  - CT A/P: Negative for retroperitoneal bleed  - F/u AM labs    #ESRD on HD MWF  - Currently off schedule, next HD today  - EPO (retacrit) 8000 units IV push once weekly  - nephro following, f/u recs  -Iron studies on 2/4/2021: Serum iron: 33 TIBC: 212 % iron Saturation: 16%  Transferrin Serum: 181  --> May benefit from iron repletion, to follow up.     #Sacral ulcer, heel ulcers   - Multifactorial (Possible overlying infection, ulcers, steroids)   - S/p debridement w/ burn 1/21/21  - wound cx- moderate e. faecium  - ID following: S/P daptomycin 8 mg/kg q 48 hours, finished on 2/1  - F/u Dignishield, local wound care    #Dislodged PEG tube  -s/p replacement by surgery resident    #Leukocytosis- Resolved    #Ileus- resolved   - s/p PEG 1/18  - xray 10/19 w/ gasseous distention of stomach  - PEG was connected to suction, feeds were held  - Xray 10/21 w/o evidence of obstruction, restarted feeds    #Upper extremity edema R>L  - arterial and venous duplex- negative   - loosened restraints    #NSTEMI  - Type II MI, demand ischemia, likely secondary to hypoxia secondary to COVID-19 pneumonia, resolved.  - Aspirin 81mg po once daily   - Atorvastatin 20mg po once daily  - CT brain no IC bleed or concerns of IC bleed     #Hypothyroidism  - Continue with levothyroxine 100mcg po once daily     #Altered Mental Status- resolved  - EEG diffuse slowing, but no seizure activity, check the report above  - CT brain shows ventriculomegaly (check full report), no IC bleed, possible old infarct.    Diet: PEG feeds  DVT ppx: heparin gtt  GI ppx: pantoprazole 40mg po QD  Dispo: acute  FULL CODE 61 yo M with PMHx of afib on coumadin, DM2, ESRD on HD MWF, HLD, HTN, CVA, who presented w/ weakness and fall secondary to acute hypoxic respiratory failure secondary to SARS-CoV-2 pneumonia, s/p intubation on 1/4/2021, s/p tracheostomy and PEG insertion on 1/18/2021.     #Acute hypoxemic respiratory failure   #Covid-19 PNA  #Superimposed bacterial PNA   - s/p intubation, now trach & PEG  - Remains on ventilator AC mode: TV: 450mL, RR: 18 bpm, PEEp: 8mmHg, FiO2: 65%. Decrease FiO2 as tolerated (goal is <40% for vent transfer). Breathing in synch with vent   - s/p lucinda and vanc. s/p Azithromycin discontinued on 1/8/2021, s/p Cefepime discontinued on 1/11/2021  - s/p Dexamethasone 6mg IV once daily (1/1/2021) x10 days  - Covid antibodies: negative --> s/p 2 units of Convalescent plasma (1/8/2021 and 1/12/2021)  - s/p tocilizumab 400mg IV x1 on 1/8/2021  - continue with Klonopin 0.5 q12hrs, methadone 5mg per PEG once daily, Seroquel 100mg per PEG q12hrs  - LE Duplex 2/2 negative for DVT    #Paroxysmal atrial fibrillation  - Coumadin at home   - Metoprolol tartrate 12.5mg po q12hrs    - C/w heparin as per Pulm/crit team. last PTT __ <- 65  - Given the patient's status and condition, risks and benefits of AC should be reconsidered. Issued discussed with the patient's sister shantel (discussion conducted by Dr. lLamas), and she wishes to continue A/C.   - F/u daily PTT    # Acute on chronic anemia  - Hb 7.1 <- 7.3, s/p 1 unit of PRBC (baseline ~11 in 2019)  - No evidence of bleed. Patient is hemodynamically stable  - Keep Active type and screen, transfuse if Hb <7  - CT A/P: Negative for retroperitoneal bleed  - F/u AM labs    #ESRD on HD MWF  - Currently off schedule, next HD today  - EPO (retacrit) 8000 units IV push once weekly  - nephro following, f/u recs  -Iron studies on 2/4/2021: Serum iron: 33 TIBC: 212 % iron Saturation: 16%  Transferrin Serum: 181  --> May benefit from iron repletion, to follow up.     #Sacral ulcer, heel ulcers   - Multifactorial (Possible overlying infection, ulcers, steroids)   - S/p debridement w/ burn 1/21/21  - wound cx- moderate e. faecium  - ID following: S/P daptomycin 8 mg/kg q 48 hours, finished on 2/1  - F/u Dignishield, local wound care    #Dislodged PEG tube  -s/p replacement by surgery resident    #Leukocytosis- Resolved    #Ileus- resolved   - s/p PEG 1/18  - xray 10/19 w/ gasseous distention of stomach  - PEG was connected to suction, feeds were held  - Xray 10/21 w/o evidence of obstruction, restarted feeds    #Upper extremity edema R>L  - arterial and venous duplex- negative   - loosened restraints    #NSTEMI  - Type II MI, demand ischemia, likely secondary to hypoxia secondary to COVID-19 pneumonia, resolved.  - Aspirin 81mg po once daily   - Atorvastatin 20mg po once daily  - CT brain no IC bleed or concerns of IC bleed     #Hypothyroidism  - Continue with levothyroxine 100mcg po once daily     #Altered Mental Status- resolved  - EEG diffuse slowing, but no seizure activity, check the report above  - CT brain shows ventriculomegaly (check full report), no IC bleed, possible old infarct.    Diet: PEG feeds  DVT ppx: heparin gtt  GI ppx: pantoprazole 40mg po QD  Dispo: transfer to vent when FiO2 <40%  FULL CODE 61 yo M with PMHx of afib on coumadin, DM2, ESRD on HD MWF, HLD, HTN, CVA, who presented w/ weakness and fall secondary to acute hypoxic respiratory failure secondary to SARS-CoV-2 pneumonia, s/p intubation on 1/4/2021, s/p tracheostomy and PEG insertion on 1/18/2021.     #Acute hypoxemic respiratory failure   #Covid-19 PNA  #Superimposed bacterial PNA   - s/p intubation, now trach & PEG  - Remains on ventilator AC mode: TV: 450mL, RR: 18 bpm, PEEp: 8mmHg, FiO2: 65%. Decrease FiO2 as tolerated (goal is <40% for vent transfer). Breathing in synch with vent   - s/p lucinda and vanc. s/p Azithromycin discontinued on 1/8/2021, s/p Cefepime discontinued on 1/11/2021  - s/p Dexamethasone 6mg IV once daily (1/1/2021) x10 days  - Covid antibodies: negative --> s/p 2 units of Convalescent plasma (1/8/2021 and 1/12/2021)  - s/p tocilizumab 400mg IV x1 on 1/8/2021  - continue with Klonopin 0.5 q12hrs, methadone 5mg per PEG once daily, Seroquel 100mg per PEG q12hrs  - LE Duplex 2/2 negative for DVT    #Paroxysmal atrial fibrillation  - Coumadin at home   - Metoprolol tartrate 12.5mg po q12hrs    - C/w heparin as per Pulm/crit team. Last PTT 71<- 65  - Given the patient's status and condition, risks and benefits of AC should be reconsidered. Issued discussed with the patient's sister shantel (discussion conducted by Dr. Llamas), and she wishes to continue A/C.   - F/u daily PTT    # Acute on chronic anemia  - Hb 7.1 <- 7.3, s/p 1 unit of PRBC (baseline ~11 in 2019)  - No evidence of bleed. Patient is hemodynamically stable  - Keep Active type and screen, transfuse if Hb <7  - CT A/P: Negative for retroperitoneal bleed  - F/u AM labs    #ESRD on HD MWF  - Currently off schedule, next HD today  - EPO (retacrit) 8000 units IV push once weekly  - nephro following, f/u recs  -Iron studies on 2/4/2021: Serum iron: 33 TIBC: 212 % iron Saturation: 16%  Transferrin Serum: 181  --> May benefit from iron repletion, to follow up.     #Sacral ulcer, heel ulcers   - Multifactorial (Possible overlying infection, ulcers, steroids)   - S/p debridement w/ burn 1/21/21  - wound cx- moderate e. faecium  - ID following: S/P daptomycin 8 mg/kg q 48 hours, finished on 2/1  - F/u Dignishield, local wound care    #Dislodged PEG tube  -s/p replacement by surgery resident    #Leukocytosis- Resolved    #Ileus- resolved   - s/p PEG 1/18  - xray 10/19 w/ gasseous distention of stomach  - PEG was connected to suction, feeds were held  - Xray 10/21 w/o evidence of obstruction, restarted feeds    #Upper extremity edema R>L  - arterial and venous duplex- negative   - loosened restraints    #NSTEMI  - Type II MI, demand ischemia, likely secondary to hypoxia secondary to COVID-19 pneumonia, resolved.  - Aspirin 81mg po once daily   - Atorvastatin 20mg po once daily  - CT brain no IC bleed or concerns of IC bleed     #Hypothyroidism  - Continue with levothyroxine 100mcg po once daily     #Altered Mental Status- resolved  - EEG diffuse slowing, but no seizure activity, check the report above  - CT brain shows ventriculomegaly (check full report), no IC bleed, possible old infarct.    Diet: PEG feeds  DVT ppx: heparin gtt  GI ppx: pantoprazole 40mg po QD  Dispo: transfer to vent when FiO2 <40%  FULL CODE

## 2021-02-09 NOTE — PROGRESS NOTE ADULT - SUBJECTIVE AND OBJECTIVE BOX
ROGER RODRIGUES  60y, Male  Allergy: Orange (Other)  Originally Entered as [HIVES] reaction to [sulfur] (Unknown)  penicillin (Unknown)  sulfADIAZINE (Unknown)    Hospital Day: 39d    Patient seen and examined earlier today. No acute events overnight.    PMH/PSH:  PAST MEDICAL & SURGICAL HISTORY:  PAD (peripheral artery disease)  multiple toe amputations    Anemia  chronic anemia - s/p transfusion 2018    Thyroid cancer    Chronic leg pain    OA (osteoarthritis)    SOB (shortness of breath) on exertion    ESRD (end stage renal disease)  2 yrs    Atrial fibrillation  on warfarin    Right sided weakness    Stroke  8 yrs ago    Hypertension    High blood cholesterol    Diabetes mellitus, type 2    Dialysis patient  Mon, Wednesday, Friday ([facility])    Smoker    H/O thyroid nodule    H/O gastroesophageal reflux (GERD)    History of tonsillectomy    History of surgery  Multiple toe amputations (amp 4 1/2 left toes; has 1/2 left mid toe; amp right mid toe)    A-V fistula  left AVF    VITALS:  T(F): 96.6 (02-09-21 @ 08:14), Max: 98 (02-08-21 @ 12:00)  HR: 82 (02-09-21 @ 08:14)  BP: 100/45 (02-09-21 @ 08:14) (98/60 - 137/64)  RR: 20 (02-09-21 @ 08:14)  SpO2: 97% (02-09-21 @ 08:14)    TESTS & MEASUREMENTS:  Weight (Kg):     02-07-21 @ 07:01  -  02-08-21 @ 07:00  --------------------------------------------------------  IN: 1497 mL / OUT: 0 mL / NET: 1497 mL    02-08-21 @ 07:01  -  02-09-21 @ 07:00  --------------------------------------------------------  IN: 1980 mL / OUT: 0 mL / NET: 1980 mL                        7.1    11.05 )-----------( 384      ( 09 Feb 2021 07:53 )             23.9     PT/INR - ( 08 Feb 2021 09:11 )   PT: 13.80 sec;   INR: 1.20 ratio         PTT - ( 08 Feb 2021 09:11 )  PTT:63.8 sec  02-08    134<L>  |  92<L>  |  50<H>  ----------------------------<  180<H>  4.0   |  29  |  4.1<HH>    Ca    8.7      08 Feb 2021 09:11  Phos  5.7     02-08  Mg     2.7     02-08    TPro  6.3  /  Alb  2.7<L>  /  TBili  0.9  /  DBili  x   /  AST  29  /  ALT  15  /  AlkPhos  112  02-08    LIVER FUNCTIONS - ( 08 Feb 2021 09:11 )  Alb: 2.7 g/dL / Pro: 6.3 g/dL / ALK PHOS: 112 U/L / ALT: 15 U/L / AST: 29 U/L / GGT: x           RECENT DIAGNOSTIC ORDERS:  Activated Partial Thromboplastin Time:  Start Date:09-Feb-2021. 11:00 (02-09-21 @ 09:31)  Activated Partial Thromboplastin Time:  Start Date:10-Feb-2021. AM Sched. Collection:10-Feb-2021 04:30 (02-09-21 @ 09:31)  Activated Partial Thromboplastin Time: Repeat From: 09-Feb-2021 09:31 To: 15-Feb-2021 23:59, Every 1 day(s) Start Date:09-Feb-2021 (02-09-21 @ 09:31)  Blood Gas Profile - Arterial: 04:05 (02-09-21 @ 04:05)  Magnesium, Serum: AM Sched. Collection: 09-Feb-2021 04:30 (02-09-21 @ 00:00)  Comprehensive Metabolic Panel: AM Sched. Collection: 09-Feb-2021 04:30 (02-09-21 @ 00:00)    MEDICATIONS:  MEDICATIONS  (STANDING):  aspirin  chewable 81 milliGRAM(s) Oral daily  atorvastatin 20 milliGRAM(s) Oral at bedtime  chlorhexidine 0.12% Liquid 15 milliLiter(s) Oral Mucosa every 12 hours  chlorhexidine 4% Liquid 1 Application(s) Topical <User Schedule>  clonazePAM  Tablet 0.5 milliGRAM(s) Oral every 12 hours  collagenase Ointment 1 Application(s) Topical two times a day  Dakins Solution - 1/2 Strength 1 Application(s) Topical two times a day  dextrose 40% Gel 15 Gram(s) Oral once  dextrose 5%. 1000 milliLiter(s) (50 mL/Hr) IV Continuous <Continuous>  dextrose 5%. 1000 milliLiter(s) (100 mL/Hr) IV Continuous <Continuous>  dextrose 50% Injectable 25 Gram(s) IV Push once  dextrose 50% Injectable 12.5 Gram(s) IV Push once  dextrose 50% Injectable 25 Gram(s) IV Push once  epoetin raven-epbx (RETACRIT) Injectable 8000 Unit(s) IV Push <User Schedule>  glucagon  Injectable 1 milliGRAM(s) IntraMuscular once  heparin  Infusion 1500 Unit(s)/Hr (20 mL/Hr) IV Continuous <Continuous>  insulin glargine Injectable (LANTUS) 12 Unit(s) SubCutaneous at bedtime  insulin lispro (ADMELOG) corrective regimen sliding scale   SubCutaneous three times a day before meals  insulin lispro Injectable (ADMELOG) 4 Unit(s) SubCutaneous three times a day before meals  levothyroxine 100 MICROGram(s) Oral daily  lidocaine 1%/epinephrine 1:100,000 Inj 40 milliLiter(s) Local Injection once  LORazepam   Injectable 1 milliGRAM(s) IV Push once  methadone    Tablet 5 milliGRAM(s) Oral daily  metoclopramide   Syrup 5 milliGRAM(s) Oral every 8 hours  metoprolol tartrate 12.5 milliGRAM(s) Oral two times a day  midodrine 10 milliGRAM(s) Oral every 8 hours  pantoprazole    Tablet 40 milliGRAM(s) Oral before breakfast  QUEtiapine 100 milliGRAM(s) Oral two times a day    MEDICATIONS  (PRN):  acetaminophen   Tablet .. 650 milliGRAM(s) Oral every 6 hours PRN Temp greater or equal to 38C (100.4F), Mild Pain (1 - 3)    HOME MEDICATIONS:  atorvastatin 20 mg oral tablet (01-01)  furosemide 40 mg oral tablet (01-01)  gabapentin 100 mg oral tablet (01-01)  insulin lispro (concentrated) 200 units/mL subcutaneous solution (01-01)  Lantus 100 units/mL subcutaneous solution (01-01)  pantoprazole 40 mg oral delayed release tablet (01-01)  pioglitazone 30 mg oral tablet (01-01)  Renvela 800 mg oral tablet (01-01)  Toprol-XL 25 mg oral tablet, extended release (01-01)    REVIEW OF SYSTEMS:  All other review of systems is negative unless indicated above.     PHYSICAL EXAM:  GENERAL: NAD  HEENT: No Swelling  CHEST/LUNG: Good air entry, No wheezing  HEART: RRR, No murmurs  ABDOMEN: Soft, Bowel sounds present  EXTREMITIES:  No clubbing

## 2021-02-10 NOTE — PROGRESS NOTE ADULT - PROVIDER SPECIALTY LIST ADULT
Cardiology
Critical Care
Infectious Disease
Internal Medicine
MICU
Nephrology
Nutrition Support
Critical Care
Hospitalist
Internal Medicine
Nephrology
Nutrition Support
Pulmonology
Pulmonology
Critical Care
Hospitalist
Infectious Disease
Internal Medicine
MICU
Nephrology
Nutrition Support
Pulmonology
Surgery
Critical Care
Infectious Disease
Infectious Disease
Internal Medicine
Nephrology
Nutrition Support
Pulmonology
Surgery
Critical Care
Critical Care
Nephrology
Nephrology

## 2021-02-10 NOTE — PROGRESS NOTE ADULT - SUBJECTIVE AND OBJECTIVE BOX
Patient is a 60y old  Male who presents with a chief complaint of s/p fall. (09 Feb 2021 12:33)        Over Night Events:  Remasins on MV.  On Levophed/  More awake.  Calmer         ROS:     All ROS are negative except HPI         PHYSICAL EXAM    ICU Vital Signs Last 24 Hrs  T(C): 35.6 (09 Feb 2021 20:10), Max: 35.9 (09 Feb 2021 08:14)  T(F): 96.1 (09 Feb 2021 20:10), Max: 96.6 (09 Feb 2021 08:14)  HR: 78 (10 Feb 2021 06:00) (74 - 87)  BP: 131/49 (10 Feb 2021 06:00) (75/39 - 157/60)  BP(mean): 71 (10 Feb 2021 06:00) (49 - 97)  ABP: --  ABP(mean): --  RR: 22 (10 Feb 2021 00:10) (20 - 22)  SpO2: 100% (10 Feb 2021 06:00) (96% - 100%)      CONSTITUTIONAL:  Well nourished. Ill appearing.  NAD    ENT:   Airway patent,   Mouth with normal mucosa.   No thrush    EYES:   Pupils equal,   Round and reactive to light.    CARDIAC:   Normal rate,   Regular rhythm.    No edema      Vascular:  Normal systolic impulse  No Carotid bruits    RESPIRATORY:   No wheezing  Bilateral BS  Normal chest expansion  Not tachypneic,  No use of accessory muscles    GASTROINTESTINAL:  Abdomen soft,   Non-tender,   No guarding,   + BS    MUSCULOSKELETAL:   Range of motion is not limited,  No clubbing, cyanosis    NEUROLOGICAL:   Opens eyes  No motor  deficits.    SKIN:   Skin normal color for race,   Warm and dry.   No evidence of rash.    PSYCHIATRIC:   Normal mood and affect.   No apparent risk to self or others.    HEMATOLOGICAL:  No cervical  lymphadenopathy.  no inguinal lymphadenopathy      02-08-21 @ 07:01  -  02-09-21 @ 07:00  --------------------------------------------------------  IN:    Enteral Tube Flush: 300 mL    Heparin: 480 mL    Nepro with Carb Steady: 1200 mL  Total IN: 1980 mL    OUT:  Total OUT: 0 mL    Total NET: 1980 mL      02-09-21 @ 07:01  -  02-10-21 @ 06:35  --------------------------------------------------------  IN:    Enteral Tube Flush: 440 mL    Heparin: 500 mL    Nepro with Carb Steady: 740 mL    Norepinephrine: 81.9 mL    Norepinephrine: 276.8 mL  Total IN: 2038.7 mL    OUT:    Other (mL): 3000 mL    Rectal Tube (mL): 0 mL  Total OUT: 3000 mL    Total NET: -961.3 mL          LABS:                            7.1    11.05 )-----------( 384      ( 09 Feb 2021 07:53 )             23.9                                               02-09    131<L>  |  90<L>  |  60<H>  ----------------------------<  76  4.3   |  29  |  5.4<HH>    Ca    8.1<L>      09 Feb 2021 07:53  Phos  5.7     02-08  Mg     2.7     02-09    TPro  6.0  /  Alb  2.6<L>  /  TBili  0.8  /  DBili  x   /  AST  30  /  ALT  14  /  AlkPhos  103  02-09      PT/INR - ( 08 Feb 2021 09:11 )   PT: 13.80 sec;   INR: 1.20 ratio         PTT - ( 09 Feb 2021 12:37 )  PTT:71.3 sec                                                                                     LIVER FUNCTIONS - ( 09 Feb 2021 07:53 )  Alb: 2.6 g/dL / Pro: 6.0 g/dL / ALK PHOS: 103 U/L / ALT: 14 U/L / AST: 30 U/L / GGT: x                                                                                               Mode: AC/ CMV (Assist Control/ Continuous Mandatory Ventilation)  RR (machine): 18  TV (machine): 450  FiO2: 65  PEEP: 8  ITime: 1  MAP: 13  PIP: 22                                      ABG - ( 10 Feb 2021 02:21 )  pH, Arterial: 7.39  pH, Blood: x     /  pCO2: 58    /  pO2: 72    / HCO3: 35    / Base Excess: 8.6   /  SaO2: 95                  MEDICATIONS  (STANDING):  aspirin  chewable 81 milliGRAM(s) Oral daily  atorvastatin 20 milliGRAM(s) Oral at bedtime  chlorhexidine 0.12% Liquid 15 milliLiter(s) Oral Mucosa every 12 hours  chlorhexidine 4% Liquid 1 Application(s) Topical <User Schedule>  clonazePAM  Tablet 0.5 milliGRAM(s) Oral every 12 hours  collagenase Ointment 1 Application(s) Topical two times a day  Dakins Solution - 1/2 Strength 1 Application(s) Topical two times a day  dextrose 40% Gel 15 Gram(s) Oral once  dextrose 5%. 1000 milliLiter(s) (50 mL/Hr) IV Continuous <Continuous>  dextrose 5%. 1000 milliLiter(s) (100 mL/Hr) IV Continuous <Continuous>  dextrose 50% Injectable 25 Gram(s) IV Push once  dextrose 50% Injectable 12.5 Gram(s) IV Push once  dextrose 50% Injectable 25 Gram(s) IV Push once  epoetin raven-epbx (RETACRIT) Injectable 8000 Unit(s) IV Push <User Schedule>  glucagon  Injectable 1 milliGRAM(s) IntraMuscular once  heparin  Infusion 1500 Unit(s)/Hr (20 mL/Hr) IV Continuous <Continuous>  insulin glargine Injectable (LANTUS) 12 Unit(s) SubCutaneous at bedtime  insulin lispro (ADMELOG) corrective regimen sliding scale   SubCutaneous three times a day before meals  insulin lispro Injectable (ADMELOG) 4 Unit(s) SubCutaneous three times a day before meals  levothyroxine 100 MICROGram(s) Oral daily  lidocaine 1%/epinephrine 1:100,000 Inj 40 milliLiter(s) Local Injection once  LORazepam   Injectable 1 milliGRAM(s) IV Push once  metoclopramide   Syrup 5 milliGRAM(s) Oral every 8 hours  metoprolol tartrate 12.5 milliGRAM(s) Oral two times a day  midodrine 10 milliGRAM(s) Oral every 8 hours  norepinephrine Infusion 0.19 MICROgram(s)/kG/Min (34.6 mL/Hr) IV Continuous <Continuous>  pantoprazole    Tablet 40 milliGRAM(s) Oral before breakfast  QUEtiapine 100 milliGRAM(s) Oral two times a day    MEDICATIONS  (PRN):  acetaminophen   Tablet .. 650 milliGRAM(s) Oral every 6 hours PRN Temp greater or equal to 38C (100.4F), Mild Pain (1 - 3)      New X-rays reviewed:                                                                                  ECHO    CXR interpreted by me:

## 2021-02-10 NOTE — PROGRESS NOTE ADULT - SUBJECTIVE AND OBJECTIVE BOX
Hospital Day:  40d    Subjective:    Patient is a 60y old  Male who presents with a chief complaint of s/p fall. (10 Feb 2021 06:35)      Admitted to medicine for a primary diagnosis of     Past Medical Hx:   PAD (peripheral artery disease)    Anemia    Thyroid cancer    Chronic leg pain    OA (osteoarthritis)    SOB (shortness of breath) on exertion    ESRD (end stage renal disease)    Atrial fibrillation    Right sided weakness    Stroke    Hypertension    High blood cholesterol    Diabetes mellitus, type 2    Dialysis patient      Past Sx:  Smoker    H/O thyroid nodule    H/O gastroesophageal reflux (GERD)    History of tonsillectomy    History of surgery    A-V fistula      Allergies:  Orange (Other)  Originally Entered as [HIVES] reaction to [sulfur] (Unknown)  penicillin (Unknown)  sulfADIAZINE (Unknown)    Current Meds:   Standng Meds:  aspirin  chewable 81 milliGRAM(s) Oral daily  atorvastatin 20 milliGRAM(s) Oral at bedtime  chlorhexidine 0.12% Liquid 15 milliLiter(s) Oral Mucosa every 12 hours  chlorhexidine 4% Liquid 1 Application(s) Topical <User Schedule>  clonazePAM  Tablet 0.5 milliGRAM(s) Oral every 12 hours  collagenase Ointment 1 Application(s) Topical two times a day  Dakins Solution - 1/2 Strength 1 Application(s) Topical two times a day  dextrose 40% Gel 15 Gram(s) Oral once  dextrose 5%. 1000 milliLiter(s) (50 mL/Hr) IV Continuous <Continuous>  dextrose 5%. 1000 milliLiter(s) (100 mL/Hr) IV Continuous <Continuous>  dextrose 50% Injectable 25 Gram(s) IV Push once  dextrose 50% Injectable 12.5 Gram(s) IV Push once  dextrose 50% Injectable 25 Gram(s) IV Push once  epoetin raven-epbx (RETACRIT) Injectable 8000 Unit(s) IV Push <User Schedule>  glucagon  Injectable 1 milliGRAM(s) IntraMuscular once  heparin  Infusion 1500 Unit(s)/Hr (20 mL/Hr) IV Continuous <Continuous>  insulin glargine Injectable (LANTUS) 12 Unit(s) SubCutaneous at bedtime  insulin lispro (ADMELOG) corrective regimen sliding scale   SubCutaneous three times a day before meals  insulin lispro Injectable (ADMELOG) 4 Unit(s) SubCutaneous three times a day before meals  levothyroxine 100 MICROGram(s) Oral daily  lidocaine 1%/epinephrine 1:100,000 Inj 40 milliLiter(s) Local Injection once  LORazepam   Injectable 1 milliGRAM(s) IV Push once  metoclopramide   Syrup 5 milliGRAM(s) Oral every 8 hours  metoprolol tartrate 12.5 milliGRAM(s) Oral two times a day  midodrine 10 milliGRAM(s) Oral every 8 hours  norepinephrine Infusion 0.19 MICROgram(s)/kG/Min (34.6 mL/Hr) IV Continuous <Continuous>  pantoprazole    Tablet 40 milliGRAM(s) Oral before breakfast  QUEtiapine 100 milliGRAM(s) Oral two times a day    PRN Meds:  acetaminophen   Tablet .. 650 milliGRAM(s) Oral every 6 hours PRN Temp greater or equal to 38C (100.4F), Mild Pain (1 - 3)    HOME MEDICATIONS:  atorvastatin 20 mg oral tablet: 1 tab(s) orally once a day (at bedtime)  furosemide 40 mg oral tablet: 1 tab(s) orally once a day  gabapentin 100 mg oral tablet: 1 tab(s) orally once a day  insulin lispro (concentrated) 200 units/mL subcutaneous solution: 7 unit(s) subcutaneous 3 times a day  5-10 units prn  Lantus 100 units/mL subcutaneous solution: 15 unit(s) subcutaneous once a day (at bedtime)  pantoprazole 40 mg oral delayed release tablet: 1 tab(s) orally once a day  pioglitazone 30 mg oral tablet: 1 tab(s) orally once a day  Renvela 800 mg oral tablet: 1 tab(s) orally 3 times a day (with meals)  Toprol-XL 25 mg oral tablet, extended release: 1 tab(s) orally once a day      Vital Signs:   T(F): 96.1 (02-09-21 @ 20:10), Max: 96.6 (02-09-21 @ 08:14)  HR: 78 (02-10-21 @ 06:00) (74 - 87)  BP: 131/49 (02-10-21 @ 06:00) (75/39 - 157/60)  RR: 22 (02-10-21 @ 00:10) (20 - 22)  SpO2: 100% (02-10-21 @ 06:00) (96% - 100%)      02-08-21 @ 07:01  -  02-09-21 @ 07:00  --------------------------------------------------------  IN: 1980 mL / OUT: 0 mL / NET: 1980 mL    02-09-21 @ 07:01  -  02-10-21 @ 06:58  --------------------------------------------------------  IN: 2038.7 mL / OUT: 3000 mL / NET: -961.3 mL        Physical Exam:   GENERAL: NAD  HEENT: NCAT  CHEST/LUNG: CTAB  HEART: Regular rate and rhythm; s1 s2 appreciated, No murmurs, rubs, or gallops  ABDOMEN: Soft, Nontender, Nondistended; Bowel sounds present  EXTREMITIES: No LE edema b/l  SKIN: no rashes, no new lesions  NERVOUS SYSTEM:  Alert & Oriented X3  LINES/CATHETERS:        Labs:                         7.1    11.05 )-----------( 384      ( 09 Feb 2021 07:53 )             23.9     Neutophil% 77.3, Lymphocyte% 7.1, Monocyte% 11.8, Bands% 1.7 02-09-21 @ 07:53    09 Feb 2021 07:53    131    |  90     |  60     ----------------------------<  76     4.3     |  29     |  5.4      Ca    8.1        09 Feb 2021 07:53  Phos  5.7       08 Feb 2021 09:11  Mg     2.7       09 Feb 2021 07:53    TPro  6.0    /  Alb  2.6    /  TBili  0.8    /  DBili  x      /  AST  30     /  ALT  14     /  AlkPhos  103    09 Feb 2021 07:53       pTT    71.3             ----< -- INR  (02-09-21 @ 12:37)    --        PT            Iron --, TIBC --, %Sat -- Ferritin 671 02-07-21 @ 09:16               Hospital Day:  40d    Subjective:    Patient is a 60y old  Male who presents with a chief complaint of s/p fall. Overnight, pt was a rapid response for hypotension post HD. This morning, pt is alert, unable to speak but able to make wishes known. Endorses trouble breathing and sob. Denies pain      Admitted to medicine for a primary diagnosis of chf exacerbation and COVID PNA     Past Medical Hx:   PAD (peripheral artery disease)    Anemia    Thyroid cancer    Chronic leg pain    OA (osteoarthritis)    SOB (shortness of breath) on exertion    ESRD (end stage renal disease)    Atrial fibrillation    Right sided weakness    Stroke    Hypertension    High blood cholesterol    Diabetes mellitus, type 2    Dialysis patient      Past Sx:  Smoker    H/O thyroid nodule    H/O gastroesophageal reflux (GERD)    History of tonsillectomy    History of surgery    A-V fistula      Allergies:  Orange (Other)  Originally Entered as [HIVES] reaction to [sulfur] (Unknown)  penicillin (Unknown)  sulfADIAZINE (Unknown)    Current Meds:   Standng Meds:  aspirin  chewable 81 milliGRAM(s) Oral daily  atorvastatin 20 milliGRAM(s) Oral at bedtime  chlorhexidine 0.12% Liquid 15 milliLiter(s) Oral Mucosa every 12 hours  chlorhexidine 4% Liquid 1 Application(s) Topical <User Schedule>  clonazePAM  Tablet 0.5 milliGRAM(s) Oral every 12 hours  collagenase Ointment 1 Application(s) Topical two times a day  Dakins Solution - 1/2 Strength 1 Application(s) Topical two times a day  dextrose 40% Gel 15 Gram(s) Oral once  dextrose 5%. 1000 milliLiter(s) (50 mL/Hr) IV Continuous <Continuous>  dextrose 5%. 1000 milliLiter(s) (100 mL/Hr) IV Continuous <Continuous>  dextrose 50% Injectable 25 Gram(s) IV Push once  dextrose 50% Injectable 12.5 Gram(s) IV Push once  dextrose 50% Injectable 25 Gram(s) IV Push once  epoetin raven-epbx (RETACRIT) Injectable 8000 Unit(s) IV Push <User Schedule>  glucagon  Injectable 1 milliGRAM(s) IntraMuscular once  heparin  Infusion 1500 Unit(s)/Hr (20 mL/Hr) IV Continuous <Continuous>  insulin glargine Injectable (LANTUS) 12 Unit(s) SubCutaneous at bedtime  insulin lispro (ADMELOG) corrective regimen sliding scale   SubCutaneous three times a day before meals  insulin lispro Injectable (ADMELOG) 4 Unit(s) SubCutaneous three times a day before meals  levothyroxine 100 MICROGram(s) Oral daily  lidocaine 1%/epinephrine 1:100,000 Inj 40 milliLiter(s) Local Injection once  LORazepam   Injectable 1 milliGRAM(s) IV Push once  metoclopramide   Syrup 5 milliGRAM(s) Oral every 8 hours  metoprolol tartrate 12.5 milliGRAM(s) Oral two times a day  midodrine 10 milliGRAM(s) Oral every 8 hours  norepinephrine Infusion 0.19 MICROgram(s)/kG/Min (34.6 mL/Hr) IV Continuous <Continuous>  pantoprazole    Tablet 40 milliGRAM(s) Oral before breakfast  QUEtiapine 100 milliGRAM(s) Oral two times a day    PRN Meds:  acetaminophen   Tablet .. 650 milliGRAM(s) Oral every 6 hours PRN Temp greater or equal to 38C (100.4F), Mild Pain (1 - 3)    HOME MEDICATIONS:  atorvastatin 20 mg oral tablet: 1 tab(s) orally once a day (at bedtime)  furosemide 40 mg oral tablet: 1 tab(s) orally once a day  gabapentin 100 mg oral tablet: 1 tab(s) orally once a day  insulin lispro (concentrated) 200 units/mL subcutaneous solution: 7 unit(s) subcutaneous 3 times a day  5-10 units prn  Lantus 100 units/mL subcutaneous solution: 15 unit(s) subcutaneous once a day (at bedtime)  pantoprazole 40 mg oral delayed release tablet: 1 tab(s) orally once a day  pioglitazone 30 mg oral tablet: 1 tab(s) orally once a day  Renvela 800 mg oral tablet: 1 tab(s) orally 3 times a day (with meals)  Toprol-XL 25 mg oral tablet, extended release: 1 tab(s) orally once a day      Vital Signs:   T(F): 96.1 (02-09-21 @ 20:10), Max: 96.6 (02-09-21 @ 08:14)  HR: 78 (02-10-21 @ 06:00) (74 - 87)  BP: 131/49 (02-10-21 @ 06:00) (75/39 - 157/60)  RR: 22 (02-10-21 @ 00:10) (20 - 22)  SpO2: 100% (02-10-21 @ 06:00) (96% - 100%)      02-08-21 @ 07:01  -  02-09-21 @ 07:00  --------------------------------------------------------  IN: 1980 mL / OUT: 0 mL / NET: 1980 mL    02-09-21 @ 07:01  -  02-10-21 @ 06:58  --------------------------------------------------------  IN: 2038.7 mL / OUT: 3000 mL / NET: -961.3 mL        Physical Exam:   GENERAL: appears labored when breathing, pulling up to 550 tidal volume, machine set for 450. Small amount of vinicius blood seen on vent tube   HEENT: NCAT  CHEST/LUNG: not done   HEART: Regular rate and rhythm; s1 s2 appreciated, No murmurs, rubs, or gallops  ABDOMEN: Soft, Nontender, Nondistended; Bowel sounds present  EXTREMITIES: trace LE edema b/l  SKIN: b/l dermatitis of venous stasis   NERVOUS SYSTEM:  Alert & Oriented X1  LINES/CATHETERS: dignishield, trach, peg         Labs:                         7.1    11.05 )-----------( 384      ( 09 Feb 2021 07:53 )             23.9     Neutophil% 77.3, Lymphocyte% 7.1, Monocyte% 11.8, Bands% 1.7 02-09-21 @ 07:53    09 Feb 2021 07:53    131    |  90     |  60     ----------------------------<  76     4.3     |  29     |  5.4      Ca    8.1        09 Feb 2021 07:53  Phos  5.7       08 Feb 2021 09:11  Mg     2.7       09 Feb 2021 07:53    TPro  6.0    /  Alb  2.6    /  TBili  0.8    /  DBili  x      /  AST  30     /  ALT  14     /  AlkPhos  103    09 Feb 2021 07:53       pTT    71.3             ----< -- INR  (02-09-21 @ 12:37)    --        PT            Iron --, TIBC --, %Sat -- Ferritin 671 02-07-21 @ 09:16

## 2021-02-10 NOTE — PROGRESS NOTE ADULT - NSHPATTENDINGPLANDISCUSS_GEN_ALL_CORE
TEAM
Team
Resident
staff
team
ICU house staff and HD nurse
Team
HS and HD nurse
ICU house staff
ceu staff
staff
ACS Team
ACS Team
Team

## 2021-02-10 NOTE — CHART NOTE - NSCHARTNOTEFT_GEN_A_CORE
Code blue called at 2:15 for low HR to 30s and hypoxia. Rosc was achieved after 3 minutes with 2 rounds of epi. About 4 minutes later, pt lost pulse and was and second code blue was called. Family was contacted and wanted to c/w FULL CODE. Code ran for another 20 minutes, 5 epis, 2 bicarbs and 2 CaCO3 were given, however ROSC was not achieved and pt was pronounced at 14:51. Code blue called at 2:15 for low HR to 30s and hypoxia. Rosc was achieved after 3 minutes with 2 rounds of epi. About 4 minutes later, pt lost pulse and was and second code blue was called. Family was contacted and wanted to c/w FULL CODE. Code ran for another 20 minutes, 5 epis, 2 bicarbs and 2 CaCO3 were given, however ROSC was not achieved and pt was pronounced at 14:52.

## 2021-02-10 NOTE — PROGRESS NOTE ADULT - SUBJECTIVE AND OBJECTIVE BOX
ROGER RODRIGUES  60y Male    CHIEF COMPLAINT:    Patient is a 60y old  Male who presents with a chief complaint of s/p fall. (10 Feb 2021 11:18)      INTERVAL HPI/OVERNIGHT EVENTS:    Patient seen and examined. Hypotensive overnight, started on Levophed    ROS: All other systems are negative.    Vital Signs:    T(F): 97.8 (02-10-21 @ 08:07), Max: 97.8 (02-10-21 @ 08:07)  HR: 87 (02-10-21 @ 08:15) (74 - 87)  BP: 142/86 (02-10-21 @ 08:07) (75/39 - 157/60)  RR: 30 (02-10-21 @ 08:15) (20 - 30)  SpO2: 65% (02-10-21 @ 08:15) (65% - 100%)    09 Feb 2021 07:01  -  10 Feb 2021 07:00  --------------------------------------------------------  IN: 2038.7 mL / OUT: 3000 mL / NET: -961.3 mL    10 Feb 2021 07:01  -  10 Feb 2021 12:47  --------------------------------------------------------  IN: 482.1 mL / OUT: 0 mL / NET: 482.1 mL    POCT Blood Glucose.: 126 mg/dL (10 Feb 2021 11:45)  POCT Blood Glucose.: 177 mg/dL (10 Feb 2021 07:43)  POCT Blood Glucose.: 192 mg/dL (09 Feb 2021 20:56)  POCT Blood Glucose.: 147 mg/dL (09 Feb 2021 17:06)      PHYSICAL EXAM:    GENERAL:  NAD  SKIN: No rashes or lesions  HEENT: Atraumatic. Normocephalic.    NECK: Supple, No JVD.    PULMONARY: decreased breath sounds b/l. No wheezing. No rales  CVS: Normal S1, S2. Rate and Rhythm are regular.   ABDOMEN/GI: Soft, Nontender, Nondistended; BS present  MSK: no clubbing or cyanosis  PSYCH: nonverbal, but able to communicate his concerns    Consultant(s) Notes Reviewed:  [x ] YES  [ ] NO  Care Discussed with Consultants/Other Providers [ x] YES  [ ] NO    LABS:                        7.4    14.53 )-----------( 405      ( 10 Feb 2021 08:16 )             25.1     135  |  93<L>  |  44<H>  ----------------------------<  184<H>  4.0   |  30  |  4.1<HH>    Ca    8.2<L>      10 Feb 2021 08:16  Mg     2.5     02-10    TPro  6.3  /  Alb  2.9<L>  /  TBili  0.9  /  DBili  x   /  AST  33  /  ALT  15  /  AlkPhos  139<H>  02-10    PTT - ( 10 Feb 2021 08:16 )  PTT:64.6 sec  RADIOLOGY & ADDITIONAL TESTS:    Imaging or report Personally Reviewed:  [x] YES  [ ] NO  EKG reviewed: [x] YES  [ ] NO    Medications:  Standing  aspirin  chewable 81 milliGRAM(s) Oral daily  atorvastatin 20 milliGRAM(s) Oral at bedtime  chlorhexidine 0.12% Liquid 15 milliLiter(s) Oral Mucosa every 12 hours  chlorhexidine 4% Liquid 1 Application(s) Topical <User Schedule>  clonazePAM  Tablet 0.5 milliGRAM(s) Oral every 12 hours  collagenase Ointment 1 Application(s) Topical two times a day  Dakins Solution - 1/2 Strength 1 Application(s) Topical two times a day  dextrose 40% Gel 15 Gram(s) Oral once  dextrose 5%. 1000 milliLiter(s) IV Continuous <Continuous>  dextrose 5%. 1000 milliLiter(s) IV Continuous <Continuous>  dextrose 50% Injectable 25 Gram(s) IV Push once  dextrose 50% Injectable 12.5 Gram(s) IV Push once  dextrose 50% Injectable 25 Gram(s) IV Push once  epoetin raven-epbx (RETACRIT) Injectable 8000 Unit(s) IV Push <User Schedule>  glucagon  Injectable 1 milliGRAM(s) IntraMuscular once  heparin  Infusion 1500 Unit(s)/Hr IV Continuous <Continuous>  insulin glargine Injectable (LANTUS) 12 Unit(s) SubCutaneous at bedtime  insulin lispro (ADMELOG) corrective regimen sliding scale   SubCutaneous three times a day before meals  insulin lispro Injectable (ADMELOG) 4 Unit(s) SubCutaneous three times a day before meals  levothyroxine 100 MICROGram(s) Oral daily  lidocaine 1%/epinephrine 1:100,000 Inj 40 milliLiter(s) Local Injection once  LORazepam   Injectable 1 milliGRAM(s) IV Push once  metoclopramide   Syrup 5 milliGRAM(s) Oral every 8 hours  metoprolol tartrate 12.5 milliGRAM(s) Oral two times a day  midodrine 10 milliGRAM(s) Oral every 8 hours  norepinephrine Infusion 0.19 MICROgram(s)/kG/Min IV Continuous <Continuous>  pantoprazole    Tablet 40 milliGRAM(s) Oral before breakfast  QUEtiapine 100 milliGRAM(s) Oral two times a day    PRN Meds  acetaminophen   Tablet .. 650 milliGRAM(s) Oral every 6 hours PRN
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
## 2021-02-10 NOTE — PROGRESS NOTE ADULT - ASSESSMENT
IMPRESSION:    Acute hypoxemic resp failure SP trach and PEG   NSTEMI  Afib was on coumadin  Severe COVID PNA  Superimposed bacteria PNA treated   Acute/chronic renal failure now On HD   Sacral ulcer SP debridement       PLAN:    CNS:  Avoid over sedation.  Follow up MS      HEENT: Oral care. Trach care     PULMONARY: Vent changes. Wean O2.  Monitor PPL and DP.      CARDIOVASCULAR:  I<O as tolerated.  Wean levophed     GI: GI prophylaxis.   PEG Feeding    RENAL:  FU lytes, correct as needed.  FU with Renal.  RRT per renal    INFECTIOUS DISEASE:  Monitor off ABX for now     HEMATOLOGICAL: DVT Prophylaxis on AC.  FU CBC and Coags.    Keep on IV heparin for now.  FU PTT   keep Hg >7.0    ENDOCRINE:  Follow up FS.      MUSCULOSKELETAL:  bed rest.  Wound Care      Prognosis poor     Repeat COVID for placement     SDU

## 2021-02-10 NOTE — PROGRESS NOTE ADULT - ASSESSMENT
ESRD (due to DM) on HD TTS  s/p Fall  altered mental status   Covid-19 PNA / bacterial PNA   fluid overload  hyponatremia  hyperkalemia  DM  HTN  afib on coumadin  CVA  NSTEMI    plan:    HD tomorrow: 3 hours, opti 200 dialyzer, 2K bath, 3L UF  EPO with HD  left arm precautions  f/u cardio / f/u pulm

## 2021-02-10 NOTE — PROGRESS NOTE ADULT - REASON FOR ADMISSION
s/p fall.
COVID-10 Pneumonia
COVID-19 Pneumonia
SARS-CoV-2 Pneumonia
s/p fall
s/p fall.
COVID-19 Pneumonia
s/p fall.
COVID-19 Pneumonia
s/p fall.

## 2021-02-10 NOTE — PROGRESS NOTE ADULT - ASSESSMENT
61 yo M with PMHx of afib on coumadin, DM2, ESRD on HD MWF, HLD, HTN, CVA, who presented w/ weakness and fall secondary to acute hypoxic respiratory failure secondary to SARS-CoV-2 pneumonia, s/p intubation on 1/4/2021, s/p tracheostomy and PEG insertion on 1/18/2021.     #Acute hypoxemic respiratory failure   #Covid-19 PNA  #Superimposed bacterial PNA   - s/p intubation, now trach & PEG  - Remains on ventilator AC mode: TV: 450mL, RR: 18 bpm, PEEp: 8mmHg, FiO2: 65%. Decrease FiO2 as tolerated (goal is <40% for vent transfer). Breathing in synch with vent   - s/p lucinda and vanc. s/p Azithromycin discontinued on 1/8/2021, s/p Cefepime discontinued on 1/11/2021  - s/p Dexamethasone 6mg IV once daily (1/1/2021) x10 days  - Covid antibodies: negative --> s/p 2 units of Convalescent plasma (1/8/2021 and 1/12/2021)  - s/p tocilizumab 400mg IV x1 on 1/8/2021  - continue with Klonopin 0.5 q12hrs, methadone 5mg per PEG once daily, Seroquel 100mg per PEG q12hrs  - LE Duplex 2/2 negative for DVT    # Hypotension  - S/p rapid response 2/9 for low bp  - currently on peripheral Levo, requiring increasing doses. C/w Midodrine   - BP _____    #Paroxysmal atrial fibrillation  - Coumadin at home   - Metoprolol tartrate 12.5mg po q12hrs    - C/w heparin as per Pulm/crit team. Last PTT ___ <- 71  - Given the patient's status and condition, risks and benefits of AC should be reconsidered. Issued discussed with the patient's sister shantel (discussion conducted by Dr. Llamas), and she wishes to continue A/C.   - F/u daily PTT    # Acute on chronic anemia  - Hb __ <- 7.1 <- 7.3, s/p 1 unit of PRBC (baseline ~11 in 2019)  - No evidence of bleed. Patient is hemodynamically stable  - Keep Active type and screen, transfuse if Hb <7  - CT A/P: Negative for retroperitoneal bleed  - F/u AM labs    #ESRD on HD MWF  - Currently off schedule, had HD T 2/9  - EPO (retacrit) 8000 units IV push once weekly  - nephro following, f/u recs  -Iron studies on 2/4/2021: Serum iron: 33 TIBC: 212 % iron Saturation: 16%  Transferrin Serum: 181  --> May benefit from iron repletion, to follow up.     #Sacral ulcer, heel ulcers   - Multifactorial (Possible overlying infection, ulcers, steroids)   - S/p debridement w/ burn 1/21/21  - wound cx- moderate e. faecium  - ID following: S/P daptomycin 8 mg/kg q 48 hours, finished on 2/1  - F/u Dignishield, local wound care    #Dislodged PEG tube  -s/p replacement by surgery resident    #Ileus- resolved   - s/p PEG 1/18  - xray 10/19 w/ gasseous distention of stomach  - PEG was connected to suction, feeds were held  - Xray 10/21 w/o evidence of obstruction, restarted feeds    #Upper extremity edema R>L  - arterial and venous duplex- negative   - loosened restraints    #NSTEMI  - Type II MI, demand ischemia, likely secondary to hypoxia secondary to COVID-19 pneumonia, resolved.  - Aspirin 81mg po once daily   - Atorvastatin 20mg po once daily  - CT brain no IC bleed or concerns of IC bleed     #Hypothyroidism  - levothyroxine 100mcg po QD    #Altered Mental Status- resolved  - EEG diffuse slowing, but no seizure activity, check the report above  - CT brain shows ventriculomegaly (check full report), no IC bleed, possible old infarct.    Diet: PEG feeds  DVT ppx: heparin gtt  GI ppx: pantoprazole 40mg po QD  Dispo: transfer to vent when FiO2 <40%  FULL CODE 61 yo M with PMHx of afib on coumadin, DM2, ESRD on HD MWF, HLD, HTN, CVA, who presented w/ weakness and fall secondary to acute hypoxic respiratory failure secondary to SARS-CoV-2 pneumonia, s/p intubation on 1/4/2021, s/p tracheostomy and PEG insertion on 1/18/2021.     #Acute hypoxemic respiratory failure   #Covid-19 PNA  #Superimposed bacterial PNA   - s/p intubation, now trach & PEG  - ventilator AC mode: TV: 450mL, RR: 18, PEEp: 8, FiO2: 65%. Decrease FiO2 as tolerated (goal is <40% for vent transfer). Pulling TV of 550  - sS/p lucinda and vanc. s/p Azithromycin discontinued on 1/8/2021, s/p Cefepime discontinued on 1/11/2021  - s/p Dexamethasone 6mg IV once daily (1/1/2021) x10 days  - Covid antibodies: negative --> s/p 2 units of Convalescent plasma (1/8/2021 and 1/12/2021)  - s/p tocilizumab 400mg IV x1 on 1/8/2021  - C/w Klonopin 0.5 q12hrs, methadone 5mg per PEG once daily, Seroquel 100mg per PEG q12hrs  - LE Duplex 2/2 negative for DVT  - F/u CXR and ABG as breathing is labored    # Hypotension  - Likely due to fluid shift post HD session  - S/p rapid response 2/9 for low bp  - currently on peripheral Levo 0.19, /56 MAP is 80. Will titrate down as tolerated      #Paroxysmal atrial fibrillation  - Coumadin at home   - Metoprolol tartrate 12.5mg po q12hrs    - C/w heparin as per Pulm/crit team. Last PTT ___ <- 71  - Given the patient's status and condition, risks and benefits of AC should be reconsidered. Issued discussed with the patient's sister Mohini (discussion conducted by Dr. Llamas), and she wishes to continue A/C.   - F/u daily PTT    # Acute on chronic anemia  - Hb __ <- 7.1 <- 7.3, s/p 1 unit of PRBC (baseline ~11 in 2019)  - Theron blood on vent tube seen this am. Will d/c drip for 24hrs based on Hg this am   - Keep Active type and screen, transfuse if Hb <7  - CT A/P: Negative for retroperitoneal bleed  - F/u AM labs    #ESRD on HD MWF  - Currently off schedule, had HD T 2/9  - EPO (retacrit) 8000 units IV push once weekly  - nephro following, f/u recs  -Iron studies on 2/4/2021: Serum iron: 33 TIBC: 212 % iron Saturation: 16%  Transferrin Serum: 181  --> May benefit from iron repletion, to follow up.     #Sacral ulcer, heel ulcers   - Multifactorial (Possible overlying infection, ulcers, steroids)   - S/p debridement w/ burn 1/21/21  - wound cx- moderate e. faecium  - ID following: S/P daptomycin 8 mg/kg q 48 hours, finished on 2/1  - F/u Dignishield, local wound care    #Dislodged PEG tube  -s/p replacement by surgery resident    #Ileus- resolved   - s/p PEG 1/18  - xray 10/19 w/ gasseous distention of stomach  - PEG was connected to suction, feeds were held  - Xray 10/21 w/o evidence of obstruction, restarted feeds    #Upper extremity edema R>L  - arterial and venous duplex- negative   - loosened restraints    #NSTEMI  - Type II MI, demand ischemia, likely secondary to hypoxia secondary to COVID-19 pneumonia, resolved.  - Aspirin 81mg po once daily   - Atorvastatin 20mg po once daily  - CT brain no IC bleed or concerns of IC bleed     #Hypothyroidism  - levothyroxine 100mcg po QD    #Altered Mental Status- resolved  - EEG diffuse slowing, but no seizure activity, check the report above  - CT brain shows ventriculomegaly (check full report), no IC bleed, possible old infarct.    Diet: PEG feeds  DVT ppx: heparin gtt  GI ppx: pantoprazole 40mg po QD  Dispo: transfer to vent when FiO2 <40%  FULL CODE 61 yo M with PMHx of afib on coumadin, DM2, ESRD on HD MWF, HLD, HTN, CVA, who presented w/ weakness and fall secondary to acute hypoxic respiratory failure secondary to SARS-CoV-2 pneumonia, s/p intubation on 1/4/2021, s/p tracheostomy and PEG insertion on 1/18/2021.     #Acute hypoxemic respiratory failure   #Covid-19 PNA  #Superimposed bacterial PNA   - s/p intubation, now trach & PEG  - ventilator AC mode: TV: 450mL, RR: 18, PEEp: 8, FiO2: 65%. Decrease FiO2 as tolerated (goal is <40% for vent transfer). Pulling TV of 550  - S/p lucinda and vanc. s/p Azithromycin discontinued on 1/8/2021, s/p Cefepime discontinued on 1/11/2021  - s/p Dexamethasone 6mg IV once daily (1/1/2021) x10 days  - Covid antibodies: negative --> s/p 2 units of Convalescent plasma (1/8/2021 and 1/12/2021)  - s/p tocilizumab 400mg IV x1 on 1/8/2021  - C/w Klonopin 0.5 q12hrs, methadone 5mg per PEG once daily, Seroquel 100mg per PEG q12hrs  - LE Duplex 2/2 negative for DVT  - CXR 2/10: stable b/l opacities      # Hypotension  - Likely due to fluid shift post HD session  - S/p rapid response 2/9 for low bp  - currently on peripheral Levo 0.19, /56 MAP is 80. Will titrate down as tolerated      #Paroxysmal atrial fibrillation  - Coumadin at home   - Metoprolol tartrate 12.5mg po q12hrs    - C/w heparin as per Pulm/crit team. Last PTT 65  - Given the patient's status and condition, risks and benefits of AC should be reconsidered. Issued discussed with the patient's sister Mohini (discussion conducted by Dr. Llamas), and she wishes to continue A/C.   - F/u daily PTT    # Acute on chronic anemia  - Hb 7.4 <- 7.1, s/p 1 unit of PRBC (baseline ~11 in 2019)  - Keep Active type and screen, transfuse if Hb <7  - CT A/P: Negative for retroperitoneal bleed  - F/u AM labs    #ESRD on HD MWF  - Currently off schedule, had HD T 2/9  - EPO (retacrit) 8000 units IV push once weekly  - nephro following, f/u recs  -Iron studies on 2/4/2021: Serum iron: 33 TIBC: 212 % iron Saturation: 16%  Transferrin Serum: 181 Ferritin 671  --> May benefit from iron repletion, to follow up.     #Sacral ulcer, heel ulcers   - Multifactorial (Possible overlying infection, ulcers, steroids)   - S/p debridement w/ burn 1/21/21  - wound cx- moderate e. faecium  - ID following: S/P daptomycin 8 mg/kg q 48 hours, finished on 2/1  - F/u Dignishield, local wound care    #Dislodged PEG tube  -s/p replacement by surgery resident    #Ileus- resolved   - s/p PEG 1/18  - xray 10/19 w/ gasseous distention of stomach  - PEG was connected to suction, feeds were held  - Xray 10/21 w/o evidence of obstruction, restarted feeds    #Upper extremity edema R>L  - arterial and venous duplex- negative   - loosened restraints    #NSTEMI  - Type II MI, demand ischemia, likely secondary to hypoxia secondary to COVID-19 pneumonia, resolved.  - Aspirin 81mg po once daily   - Atorvastatin 20mg po once daily  - CT brain no IC bleed or concerns of IC bleed     #Hypothyroidism  - levothyroxine 100mcg po QD    #Altered Mental Status- resolved  - EEG diffuse slowing, but no seizure activity, check the report above  - CT brain shows ventriculomegaly (check full report), no IC bleed, possible old infarct.    Diet: PEG feeds  DVT ppx: heparin gtt  GI ppx: pantoprazole 40mg po QD  Dispo: transfer to vent when FiO2 <40%  FULL CODE 61 yo M with PMHx of afib on coumadin, DM2, ESRD on HD MWF, HLD, HTN, CVA, who presented w/ weakness and fall secondary to acute hypoxic respiratory failure secondary to SARS-CoV-2 pneumonia, s/p intubation on 1/4/2021, s/p tracheostomy and PEG insertion on 1/18/2021.     #Acute hypoxemic respiratory failure   #Covid-19 PNA  #Superimposed bacterial PNA   - s/p intubation, now trach & PEG  - ventilator AC mode: TV: 450mL, RR: 18, PEEp: 8, FiO2: 65%. Decrease FiO2 as tolerated (goal is <40% for vent transfer). Pulling TV of 550  - S/p lucinda and vanc. s/p Azithromycin discontinued on 1/8/2021, s/p Cefepime discontinued on 1/11/2021  - s/p Dexamethasone 6mg IV once daily (1/1/2021) x10 days  - Covid antibodies: negative --> s/p 2 units of Convalescent plasma (1/8/2021 and 1/12/2021)  - s/p tocilizumab 400mg IV x1 on 1/8/2021  - C/w Klonopin 0.5 q12hrs, methadone 5mg per PEG once daily, Seroquel 100mg per PEG q12hrs  - LE Duplex 2/2 negative for DVT  - CXR 2/10: stable b/l opacities      # Hypotension  - Likely due to fluid shift post HD session  - S/p rapid response 2/9 for low bp  - currently on peripheral Levo 0.1, MAP is 68. Will titrate down as tolerated    - C/w Midodrine 10mg Q8H    #Paroxysmal atrial fibrillation  - Coumadin at home   - Metoprolol tartrate 12.5mg po q12hrs    - C/w heparin as per Pulm/crit team. Last PTT 65  - Given the patient's status and condition, risks and benefits of AC should be reconsidered. Issued discussed with the patient's sister Mohini (discussion conducted by Dr. Llamas), and she wishes to continue A/C.   - F/u daily PTT    # Acute on chronic anemia  - Hb 7.4 <- 7.1, s/p 1 unit of PRBC (baseline ~11 in 2019)  - Keep Active type and screen, transfuse if Hb <7  - CT A/P: Negative for retroperitoneal bleed  - F/u AM labs    #ESRD on HD MWF  - Currently off schedule, had HD T 2/9  - EPO (retacrit) 8000 units IV push once weekly  - nephro following, f/u recs  -Iron studies on 2/4/2021: Serum iron: 33 TIBC: 212 % iron Saturation: 16%  Transferrin Serum: 181 Ferritin 671  --> May benefit from iron repletion, to follow up.     #Sacral ulcer, heel ulcers   - Multifactorial (Possible overlying infection, ulcers, steroids)   - S/p debridement w/ burn 1/21/21  - wound cx- moderate e. faecium  - ID following: S/P daptomycin 8 mg/kg q 48 hours, finished on 2/1  - F/u Dignishield, local wound care    #Dislodged PEG tube  -s/p replacement by surgery resident    #Ileus- resolved   - s/p PEG 1/18    #Upper extremity edema R>L  - arterial and venous duplex- negative   - loosened restraints    #NSTEMI  - Type II MI, demand ischemia, likely secondary to hypoxia secondary to COVID-19 pneumonia, resolved.  - Aspirin 81mg po once daily   - Atorvastatin 20mg po once daily  - CT brain no IC bleed or concerns of IC bleed     #Hypothyroidism  - levothyroxine 100mcg po QD    #Altered Mental Status- resolved  - EEG diffuse slowing, but no seizure activity, check the report above  - CT brain shows ventriculomegaly (check full report), no IC bleed, possible old infarct.    Diet: PEG feeds  DVT ppx: heparin gtt  GI ppx: pantoprazole 40mg po QD  Dispo: transfer to vent when FiO2 <40%  FULL CODE 59 yo M with PMHx of afib on coumadin, DM2, ESRD on HD MWF, HLD, HTN, CVA, who presented w/ weakness and fall secondary to acute hypoxic respiratory failure secondary to SARS-CoV-2 pneumonia, s/p intubation on 1/4/2021, s/p tracheostomy and PEG insertion on 1/18/2021.     #Acute hypoxemic respiratory failure   #Covid-19 PNA  #Superimposed bacterial PNA   - s/p intubation, now trach & PEG  - ventilator AC mode: TV: 450mL, RR: 18, PEEp: 8, FiO2: 65%. Decrease FiO2 as tolerated (goal is <40% for vent transfer). Pulling TV of 550  - S/p lucinda and vanc. s/p Azithromycin discontinued on 1/8/2021, s/p Cefepime discontinued on 1/11/2021  - s/p Dexamethasone 6mg IV once daily (1/1/2021) x10 days  - Covid antibodies: negative --> s/p 2 units of Convalescent plasma (1/8/2021 and 1/12/2021)  - s/p tocilizumab 400mg IV x1 on 1/8/2021  - C/w Klonopin 0.5 q12hrs, methadone 5mg per PEG once daily, Seroquel 100mg per PEG q12hrs  - LE Duplex 2/2 negative for DVT  - CXR 2/10: stable b/l opacities    - F/u repeat COVID PRC    # Hypotension  - Likely due to fluid shift post HD session  - S/p rapid response 2/9 for low bp  - currently on peripheral Levo 0.1, MAP is 68. Will titrate down as tolerated    - C/w Midodrine 10mg Q8H    #Paroxysmal atrial fibrillation  - Coumadin at home   - Metoprolol tartrate 12.5mg po q12hrs    - C/w heparin as per Pulm/crit team. Last PTT 65  - Given the patient's status and condition, risks and benefits of AC should be reconsidered. Issued discussed with the patient's sister Mohini (discussion conducted by Dr. Llamas), and she wishes to continue A/C.   - F/u daily PTT    # Acute on chronic anemia  - Hb 7.4 <- 7.1, s/p 1 unit of PRBC (baseline ~11 in 2019)  - Keep Active type and screen, transfuse if Hb <7  - CT A/P: Negative for retroperitoneal bleed  - F/u AM labs    #ESRD on HD MWF  - Currently off schedule, had HD T 2/9  - EPO (retacrit) 8000 units IV push once weekly  - nephro following, f/u recs  -Iron studies on 2/4/2021: Serum iron: 33 TIBC: 212 % iron Saturation: 16%  Transferrin Serum: 181 Ferritin 671  --> May benefit from iron repletion, to follow up.     #Sacral ulcer, heel ulcers   - Multifactorial (Possible overlying infection, ulcers, steroids)   - S/p debridement w/ burn 1/21/21  - wound cx- moderate e. faecium  - ID following: S/P daptomycin 8 mg/kg q 48 hours, finished on 2/1  - F/u Dignishield, local wound care    #Dislodged PEG tube  -s/p replacement by surgery resident    #Ileus- resolved   - s/p PEG 1/18    #Upper extremity edema R>L  - arterial and venous duplex- negative   - loosened restraints    #NSTEMI  - Type II MI, demand ischemia, likely secondary to hypoxia secondary to COVID-19 pneumonia, resolved.  - Aspirin 81mg po once daily   - Atorvastatin 20mg po once daily  - CT brain no IC bleed or concerns of IC bleed     #Hypothyroidism  - levothyroxine 100mcg po QD    #Altered Mental Status- resolved  - EEG diffuse slowing, but no seizure activity, check the report above  - CT brain shows ventriculomegaly (check full report), no IC bleed, possible old infarct.    Diet: PEG feeds  DVT ppx: heparin gtt  GI ppx: pantoprazole 40mg po QD  Dispo: transfer to vent when FiO2 <40%  FULL CODE

## 2021-02-18 NOTE — CDI QUERY NOTE - NSCDIOTHERTXTBX_GEN_ALL_CORE_HH
Clinical Indicators:  1/21 Pressure sore on sacrum  1/21 Procedure : Debridement, ischium or sacrum      Operative Findings: Necrotic tissue	    Based on your professional judgment and above clinical findings, can you please clarify debridement as    ?            Excisional debridement down to and including skin,  subcutaneous tissue , fascia, muscle, bone  ?            Non Excisional debridement down to and including: skin, subcutaneous tissue,  fascia, muscle, bone  ?            Other (Please  specify)

## 2021-04-09 NOTE — PATIENT PROFILE ADULT. - PSH
[facility]  INTERNAL MEDICINE OFFICE VISIT    CHIEF COMPLAINT:    Chief Complaint   Patient presents with   • Establish Care     establish care, (Dr. Lopez patient)    • Convey Results     labs completed 21    • Hypertension     4 month follow up    • Hyperlipidemia   • Diabetes   • Derm Problem     about 1 month ago patient had a bump on his nose, he squeezed it and yellow fluid came out. Still not healed properly. Also a wound on his right lower leg that has not healed in almost 1 year   • Leg     leg edema bilatera, patient ususally wears compression stockings but did not today.        SUBJECTIVE:  Howie Tan Sr. is a 86 year old male who presented requesting evaluation for establishment and follow up.  Body mass index is 29.6 kg/m².  HYPERTENSION    yes--Family history of high blood pressure.  former--Smoker.  yes--Follows a low-fat diet.  Encouraged--Exercise regularly.  >1 year--Duration of hypertension.  no--At ideal body weight.  no--Taking medications/supplements that can elevate blood pressure.  yes--Compliance with medications.        Family History   Problem Relation Age of Onset   • Depression Mother    • Psychiatric Mother    • Hypertension Mother    • Diabetes Father      Social History     Tobacco Use   • Smoking status: Former Smoker     Packs/day: 1.00     Years: 55.00     Pack years: 55.00     Quit date: 3/21/2010     Years since quittin.0   • Smokeless tobacco: Former User     Quit date: 3/21/2010   Substance Use Topics   • Alcohol use: Yes     Comment: rarely   • Drug use: No        REVIEW OF SYSTEMS:   All other ROS are negative other than what is listed in this Dictation.    Constitutional:  No fevers or chills.  No fatigue.   Respiratory:  No shortness of breath.  No cough.  No wheezing.  Cardiovascular:  No chest pain.  No palpitations.  No edema.  No syncope.  Gastrointestinal:  No abdominal pain.  No nausea.  No vomiting.  No diarrhea. No constipation.  No  blood in stool.     PAST HISTORIES:  I have reviewed the patient's medications and allergies, past medical, surgical, social and family history, updating these as appropriate.  See Histories section of the electronic medical record for a display of this information.    OBJECTIVE:  PHYSICAL EXAM:    Vital Signs:    Physical Examination:     Visit Vitals  BP (!) 158/74   Pulse 89   Resp 18   Ht 5' 11\" (1.803 m)   Wt 96.3 kg   SpO2 98%   BMI 29.60 kg/m²     General:  Well developed, well nourished.  Skin:  Warm and dry , subdermal nodular cystic lesion of anterior nose, +flat macular pinkish slightly scaly lesion of the right anterior leg.    Head:  Normocephalic-atraumatic.   Neck:  Trachea is midline.     Eyes:  Normal conjunctivae. Pupils are equal. EOMI     EENT:  Mucous membranes are moist. Cellulitis resolved of ear pinna. + vesicular lesions have scabbed over. No drainage. No edema of ear.  Cardiovascular:  Symmetrical pulses with normal peripheral perfusion. Heart has a regular rate and rhythm with no gallop murmur or rub   Respiratory:  Normal respiratory effort. Lungs are clear to auscultation   Gastrointestinal:  Non-distended.    Musculoskeletal:  No deformity or edema.  Back:  Normal alignment.  No tenderness.   Neurologic:  Oriented x3.  No focal deficits.    LAB RESULTS:  Pertinent labs reviewed.    ASSESSMENT:   1. High blood pressure associated with diabetes (CMS/HCC)    2. Peripheral vascular disease, unspecified (CMS/HCC)    3. Stage 3a chronic kidney disease (CMS/HCC)    4. Poorly controlled type 2 diabetes mellitus (CMS/HCC)    5. Skin lesion        PLAN:    Patient will follow-up in clinic with me in 3 months on his diabetes.  Patient declines additional diabetic medications.  He does report noncompliance with his diet.  Patient is educated on risk of poorly-controlled diabetes.  Referral will be sent to Dermatology for skin lesion on nose as well as patient with a nonhealing skin lesion on right  lower leg.  Patient with recent infection his left external ear region for which he was treated in walk-in clinic.  For cellulitis.  Cellulitis is pretty much resolved and vesicular area on outer ear it is scabbed over.  Patient was given valacyclovir in urgent care clinic for the fascicular lesion.  He will establish with diabetic educator for poorly-controlled diabetes.  He will go to blood pressure clinic next week for blood pressure check.  Patient also states he has a blood pressure monitor at home he is encouraged to check his blood pressure readings at home and bring them with him to blood pressure clinic.  Patient states typically when he has to go to the doctor particularly with a new doctor that he is visiting that he tends to be a little nervous and that this affects his blood pressure.  He is without any cardiopulmonary symptoms without any neurovascular symptoms.        Patient will have a hemoglobin A1c and a urine microalbumin  1 week before his visit with me in 3 months.    Instructions provided as documented in the after visit summary.    The patient indicated understanding of the diagnosis and agreed with the plan of care.       A-V fistula

## 2021-11-22 NOTE — ED ADULT NURSE REASSESSMENT NOTE - NS ED NURSE REASSESS COMMENT FT1
Patient is in the lateral left side position.   Procedure performed on a bed/cart. Pt received sitting bedside in chair. Pt AAOx3 breathing O2 3L w/ 96% saturation. Pt denies pain/discomfort. IV site intact/ flushes well.  Will continue to reassess.

## 2022-01-24 NOTE — PATIENT PROFILE ADULT. - AS [facility] BRADEN NUTRITION
Infectious Diseases Daily Progress Note      Patient's Name: Negrito Bustamante   Date of Service: 1/24/2022     Date of admission: 1/12/2022                Hospital Day: 13  Principal Diagnosis:                             Negrito Bustamante who is a 62 year old male admitted 1/12/2022 12:51 PM with   Acute respiratory failure with hypoxia (CMS/HCC)  (primary encounter diagnosis)  Pneumonia due to COVID-19 virus  Elevated troponin I level  Elevated brain natriuretic peptide (BNP) level  Acute hypoxemic respiratory failure due to COVID-19 (CMS/HCC)                       Scheduled Medications   vancomycin (VANCOCIN) IVPB, 750 mg, 2 times per day  albumin human 5 %, 12.5 g, Once  albumin human 5 %, 12.5 g, Once   Followed by  albumin human 5 %, 12.5 g, Once  acyclovir, 400 mg, Q12H  ascorbic acid, 1,000 mg, BID  loratadine, 10 mg, QAM AC  polyethylene glycol, 17 g, Daily  thiamine, 200 mg, Daily  zinc sulfate, 220 mg, Daily with breakfast  insulin lispro, , 4 times per day  cefepime (MAXIPIME) IVPB, 1,000 mg, 3 times per day  VANCOMYCIN - PHARMACIST MONITORED, , See Admin Instructions  [Held by provider] insulin glargine, 30 Units, Daily  [Held by provider] enoxaparin, 1 mg/kg, Q12H  famotidine (PEPCID) injection, 20 mg, 2 times per day  dexamethasone, 20 mg, Daily  sodium chloride (PF), 10 mL, 2 times per day  sodium chloride (PF), 2 mL, 2 times per day  Potassium Standard Replacement Protocol, , See Admin Instructions  Magnesium Standard Replacement Protocol, , See Admin Instructions      Infusions:  • epoprostenol 1.5 mg in sterile water (preservative free) 50 mL inhalation solution 50 ng/kg/min (01/24/22 0633)   • NORepinephrine (LEVOPHED) 8 mg/250 mL in sodium chloride 0.9 % infusion 3 mcg/min (01/24/22 0722)   • fentaNYL (SUBLIMAZE) 2,500 mcg/250 mL in sodium chloride 0.9 % infusion 150 mcg/hr (01/23/22 1811)   • propofol (DIPRIVAN) infusion 50 mcg/kg/min (01/24/22 0830)   • ketamine (KETALAR) 2000 mg/250 mL in sodium  chloride 0.9 % infusion 2.5 mg/kg/hr (01/24/22 0913)   • dexMEDETomidine (PRECEDEX) 400 mcg/100 mL in sodium chloride 0.9 % infusion Stopped (01/22/22 2115)   • sodium chloride 0.9% infusion     • sodium chloride 0.9% infusion       Past Medical History, Social History, Medications and Allergies reviewed.   Reviewed Pertinent: Laboratory studies, radiographic studies, medications, and recent progress notes.     Subjective:  Due to increasing WBC count and worsening clinical condition patient was started on IV antibiotics yesterday by intensivist after blood cultures obtained.  Currently sedated intubated in prone condition    Objective:    VITAL SIGNS  Temp  Min: 97.4 °F (36.3 °C)  Max: 98.9 °F (37.2 °C)  Temp:  [97.4 °F (36.3 °C)-98.9 °F (37.2 °C)] 98.7 °F (37.1 °C)  Heart Rate:  [68-83] 78  Resp:  [23-31] 26  BP: (99)/(62) 99/62  FiO2 (%):  [60 %-90 %] 60 %   Vent Settings Last Value   FiO2 60 % (01/24/22 1002)   Mode Pressure A/C (01/24/22 1002)   Rate 26 (01/24/22 1002)   Tidal Volume 450 mL (01/23/22 0410)   Pressure Support     PEEP/CPAC/EPAP 8 cm H20 (01/24/22 1002)      Physical examination:  General :  Sedated, intubated, on the ventilator  Head:  PERRL, No icterus, no oral thrush  Neck supple, no LAD   Pulm:  Shallow inspirations Clear to auscultation, no wheezes, no rales  GI: Abdomen is soft , non tender, no hepatosplenmegaly, BS normoactive  : No moeller Catheter. No CVA or suprapubic tenderness.    CVS:  Pulse regular, S1, S2 normal, no m/g/r   Extremities:  Right great toe is missing.  No edema in lower extremities  Skin: No rashes  MSK: No major joint swelling , pain, erythema, effusion.      Laboratory data, microbiology, imaging:    Recent Labs   Lab 01/24/22  0556 01/23/22  0611 01/22/22  0840 01/21/22  0607 01/19/22  0517 01/19/22  0428   WBC 35.4* 44.6* 29.2* 25.5*   < >  --    HGB 14.7 17.2* 15.7 16.6   < >  --    HCT 45.5 53.7* 47.6 49.0   < >  --     186 124* 164   < >  --    CPK  --   169  --  93  --  107    < > = values in this interval not displayed.     Recent Labs   Lab 01/24/22  0556 01/23/22  0611 01/22/22  2144 01/22/22  1345 01/22/22  0840 01/21/22  0607 01/20/22  0418 01/19/22  0428   SODIUM 137 136  --   --  137 135   < > 138   POTASSIUM 5.7* 4.9 5.6*  --  4.7 4.7   < > 4.8   BUN 57* 52*  --   --  37* 48*   < > 49*   CREATININE 1.52* 1.11  --   --  0.88 0.99   < > 1.07   GLUCOSE 228* 211*  --   --  190* 90   < > 52*   CALCIUM 8.3* 8.5  --   --  8.7 8.5   < > 8.6   ALBUMIN 2.2* 2.9*  --   --  2.8* 2.1*   < > 2.2*   AST 26 53*  --   --  49* 51*   < > 40*   GPT 63 88*  --   --  53 40   < > 42   ALKPT 126* 220*  --   --  180* 172*   < > 140*   BILIRUBIN 0.8 1.1*  --   --  1.6* 1.2*   < > 0.9   CPK  --  169  --   --   --  93  --  107   CRP  --  0.7  --  0.7  --  1.2*  --  0.9    < > = values in this interval not displayed.     Recent Labs   Lab 01/23/22  0925   UWBC Negative   UNITR Negative   URBC Large*      No results found for: VANCR, VANCT, VANCP  Recent Labs     01/21/22  0607 01/22/22  1345 01/23/22  0611   CRP 1.2* 0.7 0.7     Patients's last HBA1C reading was   Hemoglobin A1C (%)   Date Value   01/12/2022 8.8 (H)         Recent Labs   Lab 01/23/22  0611 01/22/22  1345 01/21/22  0607 01/19/22  0428   FERR 1,202* 1,082* 1,096* 858*   DDIMER 19.98* 21.84* 10.83* 6.20*   LDH 1,304*  --  953* 916*   CRP 0.7 0.7 1.2* 0.9     --  93 107     Recent Labs   Lab 01/24/22  0556 01/23/22  0611 01/22/22  0840 01/21/22  0607 01/20/22  0418   AST 26 53* 49* 51* 47*   GPT 63 88* 53 40 41   ALKPT 126* 220* 180* 172* 150*   BILIRUBIN 0.8 1.1* 1.6* 1.2* 0.9       Procalcitonin level 1.47    Microbiology:  Blood cultures from 01/12/2022 are pending  COVID-19 PCR positive on 01/12/2022 CT value 28.6.      Radiology/Imaging:   XR CHEST AP OR PA   Final Result by Rafael Langston MD (01/24 0723)   IMPRESSION:        Stable chest radiograph.         XR CHEST AP OR PA   Final Result by Patrick Edwards,  MD (01/23 4700)   IMPRESSION:      1.  Endotracheal tube tip 5 cm above the ligia.   2.  Enteric tube tip in the body of the stomach.   3.  Otherwise stable chest radiograph.      XR CHEST AP OR PA   Final Result by Jay Cote MD (01/22 0708)   IMPRESSION:      No interval change      CT Angiogram Chest PE Imaging- 3D   Final Result by Edel Cavazos MD (01/18 2057)   IMPRESSION:   1. No evidence of pulmonary embolus.   2. Mildly prominent mediastinal and bilateral hilar lymph nodes are likely   reactive.   3. Extensive groundglass infiltrates throughout both lungs, right greater   than left consistent with extensive Covid pneumonia.   4. 1.4 cm nodular density in the left lower lobe anteriorly is in an area   of infiltrate. Therefore, it is difficult to determine if this represents a   true pulmonary nodule or a part of the infiltrate. Attention to this on   follow-up studies is suggested.         XR CHEST AP OR PA   Final Result by Robert Bradford DO (01/17 0758)   IMPRESSION:       Slight interval improvement in the consolidation of both lungs.      IR PICC   Final Result by Inna RAJPUT RRT (01/13 9077)   Non-reportable by Radiologist.      XR CHEST AP OR PA   Final Result by Edel Cavazos MD (01/13 1894)   IMPRESSION:   1. Marked progression of the patient's Covid pneumonia.         US Lower Extremity Venous Duplex Bilat   Final Result by Robert Gray MD (01/12 1249)   IMPRESSION:  Normal duplex scan of the inferior vena cava, iliac, common   femoral, profunda femoral, femoral, popliteal, gastrocnemius, and   tibioperoneal veins bilaterally. No evidence of deep vein thrombosis.            TECHNOLOGIST:             XR Chest AP or PA   Final Result by Celio Teran MD (01/12 9687)   IMPRESSION:      1.  Bilateral interstitial and retrocardiac airspace opacities, concerning   for Covid pneumonia in the appropriate clinical setting.   2.  Element of passive congestion may be present.             US Lower Extremity Venous Duplex Bilat    (Results Pending)   XR CHEST AP OR PA    (Results Pending)             Assessment:     COVID 19 pneumonia (U07.1, J12.82) in unvaccinated patient  - COVID-19 PCR positive on 01/12/2022 CT value 28.6.  - tocilizumab given 01/13/2022  - IV remdesivir was not prescribed as patient is out of the window period (symptoms started more than 2 weeks prior to admit)     Acute respiratory failure with hypoxia (J96.01)   - intubated 01/23/2022    Elevated inflammatory markers  Diabetes mellitus 2.  Hemoglobin A1c is 8.8  Obesity     Plan & Recommendations:     Follow culture results and continue empiric cefepime mycin for now  Continue IV dexamethasone    Acyclovir for prophylaxis x 4 wks  Continue anticoagulation  Wean from ventilator as tolerated  Continue enhanced isolation precautions       OLINDA Richards MD  Infectious Diseases  [phone] (P)   [phone] (O)  1/24/2022     11:31 AM     (3) adequate

## 2023-06-16 NOTE — PROGRESS NOTE ADULT - ASSESSMENT
ESRD (due to DM) on HD TTS  s/p Fall  altered mental status   Covid-19 PNA / bacterial PNA   fluid overload  hyponatremia  hyperkalemia  DM  HTN  afib on coumadin  CVA  NSTEMI    plan:    HD tomorrow: 3 hours, opti 200 dialyzer, 2K bath, 3L UF  EPO with HD  left arm precautions  f/u cardio / f/u pulm Where Do You Want The Question To Include Opioid Counseling Located?: Case Summary Tab

## 2023-10-20 NOTE — PROGRESS NOTE ADULT - ASSESSMENT
ESRD (due to DM) on HD TTS  s/p Fall  altered mental status   Covid-19 PNA / bacterial PNA   fluid overload  hyponatremia  hyperkalemia  DM  HTN  afib on coumadin  CVA  NSTEMI    plan:    HD today: 3 hours, opti 200 dialyzer, 2K bath, 3L UF  EPO with HD  left arm precautions  f/u cardio / f/u pulm [phone]

## 2024-01-27 NOTE — PATIENT PROFILE ADULT - [id number]_GEN_A_NUR
Encounter addended by: Shreya Amezcua MD on: 1/27/2024 5:32 PM   Actions taken: Visit diagnoses modified, Cosign clinical note with attestation, Level of Service modified none

## 2024-04-28 NOTE — ED ADULT TRIAGE NOTE - PATIENT ON (OXYGEN DELIVERY METHOD)
room air pt vomited once prior to CT.  Radiologist called noting intraventricular bleed. Discussed with Dr. roberts from [facility], advised ED to ED transfer Tier 1. Cardene drip, titrate to blood pressure less than 150 systolic. Advised Andexxa for Eliquis reversal. repeat NIH -0.

## 2024-08-14 NOTE — ASU PATIENT PROFILE, ADULT - FALL HARM RISK TYPE OF ASSESSMENT
PT WOULD LIKE A LAB ORDER PUT IN CHART TO GET LABS BEFORE HIS 9/17 APPT. LET HIM KNOW WHEN THIS IS DONE   Admission